# Patient Record
Sex: MALE | Race: BLACK OR AFRICAN AMERICAN | NOT HISPANIC OR LATINO | Employment: OTHER | URBAN - METROPOLITAN AREA
[De-identification: names, ages, dates, MRNs, and addresses within clinical notes are randomized per-mention and may not be internally consistent; named-entity substitution may affect disease eponyms.]

---

## 2017-01-09 ENCOUNTER — OFFICE VISIT (OUTPATIENT)
Dept: FAMILY MEDICINE | Facility: CLINIC | Age: 67
End: 2017-01-09
Payer: MEDICARE

## 2017-01-09 VITALS
HEART RATE: 70 BPM | OXYGEN SATURATION: 97 % | WEIGHT: 205.25 LBS | TEMPERATURE: 98 F | DIASTOLIC BLOOD PRESSURE: 80 MMHG | BODY MASS INDEX: 27.2 KG/M2 | SYSTOLIC BLOOD PRESSURE: 140 MMHG | HEIGHT: 73 IN

## 2017-01-09 DIAGNOSIS — R51.9 SINUS HEADACHE: Primary | ICD-10-CM

## 2017-01-09 PROCEDURE — 99999 PR PBB SHADOW E&M-EST. PATIENT-LVL IV: CPT | Mod: PBBFAC,,, | Performed by: NURSE PRACTITIONER

## 2017-01-09 PROCEDURE — 99213 OFFICE O/P EST LOW 20 MIN: CPT | Mod: S$PBB,,, | Performed by: NURSE PRACTITIONER

## 2017-01-09 PROCEDURE — 99214 OFFICE O/P EST MOD 30 MIN: CPT | Mod: PBBFAC,PO | Performed by: NURSE PRACTITIONER

## 2017-01-09 RX ORDER — BUDESONIDE 0.5 MG/2ML
INHALANT ORAL
COMMUNITY
Start: 2016-12-13 | End: 2017-02-07 | Stop reason: ALTCHOICE

## 2017-01-09 NOTE — PROGRESS NOTES
Patient Name: Rizwan Demarco Jr.    : 1950  MRN: [id number]    Subjective:  Rizwan is a 66 y.o. male who presents today for     1. Severe headache, facial pain  after sneezing in am, has a history of sinus surgery  so this alarm him. He recently recovered from a sinus infection started around Miguel, completed 10 day course of augmentin and has felt well until am of visit. The HA and facial pain has since spontaneously resolved.     Past Medical History  Past Medical History   Diagnosis Date    Allergy     Arthritis      Rheumatoid arthritis    Blood clotting tendency     BPH (benign prostatic hypertrophy)     Cervical spondylosis     Colon polyps     Coronary artery disease     Depression 2015    Dry eyes     Dry mouth     Emphysema of lung     GERD (gastroesophageal reflux disease)     Heart attack     Hyperlipidemia     Hypertension     Lumbar spondylosis     Nasal obstruction     Sinusitis     Special screening for malignant neoplasm of colon 2016       Past Surgical History  Past Surgical History   Procedure Laterality Date    Coronary artery bypass graft      Tonsillectomy      Knee arthroscopy w/ debridement      Excision turbinate, submucous      Nasal septum surgery      Colonoscopy N/A 2016     Procedure: COLONOSCOPY;  Surgeon: Partha Saucedo MD;  Location: [facility];  Service: Endoscopy;  Laterality: N/A;       Family History  Family History   Problem Relation Age of Onset    Hyperlipidemia Mother     Stomach cancer Father     Cataracts Other     No Known Problems Sister     No Known Problems Brother     No Known Problems Maternal Aunt     No Known Problems Maternal Uncle     No Known Problems Paternal Aunt     No Known Problems Paternal Uncle     No Known Problems Maternal Grandmother     No Known Problems Maternal Grandfather     No Known Problems Paternal Grandmother     No Known Problems Paternal Grandfather     Blindness Neg Hx      Cancer Neg Hx     Diabetes Neg Hx     Glaucoma Neg Hx     Rheum arthritis Neg Hx     Psoriasis Neg Hx     Lupus Neg Hx     Amblyopia Neg Hx     Hypertension Neg Hx     Macular degeneration Neg Hx     Retinal detachment Neg Hx     Strabismus Neg Hx     Stroke Neg Hx     Thyroid disease Neg Hx        Social History  Social History     Social History    Marital status: Single     Spouse name: N/A    Number of children: N/A    Years of education: N/A     Occupational History    Not on file.     Social History Main Topics    Smoking status: Former Smoker     Packs/day: 1.00     Years: 20.00     Types: Cigarettes     Quit date: 1/1/1991    Smokeless tobacco: Not on file      Comment: Quit 1991.    Alcohol use No    Drug use: No    Sexual activity: Not on file     Other Topics Concern    Not on file     Social History Narrative       Allergies  Review of patient's allergies indicates:   Allergen Reactions    Astelin [azelastine] Other (See Comments)     Dry mouth    Flomax [tamsulosin]      Nosebleed    -reviewed and updated      Medications  Reviewed and updated.   Current Outpatient Prescriptions   Medication Sig Dispense Refill    alfuzosin (UROXATRAL) 10 mg Tb24 Take 1 tablet (10 mg total) by mouth daily with breakfast. 30 tablet 11    budesonide (PULMICORT) 0.5 mg/2 mL nebulizer solution       BUDESONIDE NASL by Nasal route. Nasal flush      cinnamon bark 500 mg capsule Take 500 mg by mouth once daily.      fluoxetine (PROZAC) 20 MG tablet Take 1 tablet (20 mg total) by mouth once daily. 30 tablet 11    fluticasone (FLONASE) 50 mcg/actuation nasal spray 1 spray by Each Nare route once daily. 16 g 0    lisinopril-hydrochlorothiazide (PRINZIDE,ZESTORETIC) 10-12.5 mg per tablet Take 1 tablet by mouth once daily. 30 tablet 11    multivitamin (THERAGRAN) per tablet Take by mouth.  Tablet Oral Every day      omega-3 fatty acids 1,250 mg Cap Take by mouth.      SOD CHLOR&BICARB/SQUEEZ  "BOTTLE (NEILMED SINUS RINSE COMPLETE NASL) 1 Bottle by Nasal route once daily.      aspirin (ECOTRIN) 81 MG EC tablet Take 1 tablet (81 mg total) by mouth once daily.  0     No current facility-administered medications for this visit.          Review of Systems   Constitutional: Negative for chills and fever.   HENT: Positive for congestion (feel congested, no drainage). Negative for ear pain and sore throat.    Respiratory: Negative for cough.    Cardiovascular: Negative for chest pain.   Neurological: Positive for headaches.         Physical Exam  Visit Vitals    BP (!) 140/80 (BP Location: Left arm, Patient Position: Sitting, BP Method: Manual)    Pulse 70    Temp 97.6 °F (36.4 °C) (Oral)    Ht 6' 1" (1.854 m)    Wt 93.1 kg (205 lb 4 oz)    SpO2 97%    BMI 27.08 kg/m2     Physical Exam   Constitutional: He appears well-developed and well-nourished. No distress.   HENT:   Head: Normocephalic and atraumatic.   Right Ear: Tympanic membrane normal.   Left Ear: Tympanic membrane normal.   Nose: Mucosal edema present. Right sinus exhibits no maxillary sinus tenderness and no frontal sinus tenderness. Left sinus exhibits no maxillary sinus tenderness and no frontal sinus tenderness.   Mouth/Throat: No posterior oropharyngeal edema or posterior oropharyngeal erythema.   Cardiovascular: Normal rate, regular rhythm and normal heart sounds.    Pulmonary/Chest: Effort normal and breath sounds normal.   Skin: He is not diaphoretic.         Assessment/Plan:  Rizwan Demarco Jr. is a 66 y.o. male who presents today for :    Sinus headache   to monitor conservatively for recurrence of symptoms as may just be sinus pressure after sneeze    Return if symptoms worsen or fail to improve.      "

## 2017-01-09 NOTE — PATIENT INSTRUCTIONS
Sinus Headache    The sinuses are air-filled spaces within the bones of the face. They connect to the inside of the nose. Sinusitis is an inflammation of the tissue lining the sinus cavity. Sinus inflammation can occur during a cold or hay fever (allergies to pollens and other particles in the air) and cause symptoms of sinus congestion and fullness and perhaps a low-grade fever. An infection is usually present when there is also facial pain or headache and green or yellow drainage from the nose or into the back of the throat (postnasal drip). Antibiotics are often prescribed to treat this condition.  Sinus headache may cause pain in different places, depending on which sinuses are infected. There may be pain in the temples, forehead, top of the head, behind or around the eye, across the cheekbone, or into the upper teeth.  You may find that changing your position, sitting upright or lying down, will bring some relief.  Home care  The following guidelines will help you care for yourself at home:  1. Drink plenty of water, hot tea, and other liquids to stay well hydrated. This thins the mucus and promotes sinus drainage.  2. Apply heat to the painful areas of the face. Use a towel soaked in hot water. Or,  the shower and direct the hot spray onto your face. This is a good way to inhale warm water vapor and get heat on your face at the same time. (Cover your mouth and nose with your hands so you can still breathe as you do this.)  3. Use a cool mist vaporizer at night or breathe in steam from a hot shower. Suck on peppermint, menthol, or eucalyptus hard candies during the day.  4. An expectorant containing guaifenesin helps thin the mucus and promote drainage from the sinuses.  5. Over-the-counter decongestants may be used unless a similar medicine was prescribed. Nasal sprays work the fastest. Use one that contains phenylephrine or oxymetazoline. First blow the nose gently to remove mucus. Then apply the  drops. Don't use decongestant nasal sprays more often than the label says or for more than 3 days. This can make symptoms worse. Nasal sprays prescribed from your doctor typically do not have these restrictions. Check with your doctor or pharmacist. You may also use tablets containing pseudoephedrine. Many sinus remedies combine ingredients, which may increase side effects. Also, if you are taking a combination medicine with another medicine, be sure you are not taking a double dose of anything by mistake. Read the labels or ask the pharmacist for help. [NOTE: Those with high blood pressure should not use decongestants. They can raise blood pressure.]  6. Antihistamines are useful if allergies are a cause of your sinusitis. You can get chlorpheniramine and diphenhydramine over the counter, but these can cause drowsiness. [NOTE: Don't use these if you have glaucoma or if you are a man with trouble urinating due to an enlarged prostate.] Over-the-counter antihistamines containing loratidine and cetirizine cause less drowsiness and are a good choice for daytime use.  7. When allergies are the cause for sinusitis, a saline nasal rinse may give relief. Saline nasal rinse reduces swelling and clears excess mucus. This allows sinuses to drain. Prepackaged kits are available at [facility]. These contain premixed salt packets and an irrigation device. If antibiotics have been prescribed to treat an acute sinus infection, talk to your doctor before using a nasal rinse to be sure it is safe for you.  8. You may use acetaminophen or ibuprofen to control pain, unless another pain medicine was prescribed. [NOTE: If you have chronic liver or kidney disease or ever had a stomach ulcer, talk with your doctor before using these medicines.] (Aspirin should never be used in anyone under 18 years of age who has a fever. It may cause severe liver damage.)  9. If antibiotics were given, finish all of them, even if you are feeling  better after a few days.  Follow-up care  Follow up with your doctor or this facility in 1 week or as instructed by our staff if not improving.  When to seek medical care  Get prompt medical attention if any of the following occur:  · Facial pain or headache becomes more severe  · Stiff neck  · Unusual drowsiness or confusion  · Swelling of the forehead or eyelids  · Vision problems including blurred or double vision  · Fever over 100.4º F (38.0º C) oral for more than 3 days on antibiotics  · Bleeding from the nose or throat  · Seizure  © 9500-5304 [employer]. [address] [place] [address]. All rights reserved. This information is not intended as a substitute for professional medical care. Always follow your healthcare professional's instructions.

## 2017-01-09 NOTE — MR AVS SNAPSHOT
[facility]  [address]  [address]  [place] [address]  Phone: [phone]  Fax: [phone]                  Rizwan Demarco Jr.   2017 11:20 AM   Office Visit    Description:  Male : 1950   Provider:  Divine Aquino NP   Department:  [facility]           Reason for Visit     Sinus Problem           Diagnoses this Visit        Comments    Sinus headache    -  Primary            To Do List           Goals (5 Years of Data)     None      Follow-Up and Disposition     Return if symptoms worsen or fail to improve.      [facility]     [facility] -  Assistance  Registered nurses in the [facility] provide clinical advisement, health education, appointment booking, and other advisory services.  Call for this free service at [phone].             Medications           Message regarding Medications     Verify the changes and/or additions to your medication regime listed below are the same as discussed with your clinician today.  If any of these changes or additions are incorrect, please notify your healthcare provider.        STOP taking these medications     papaverine 30 mg/mL injection Add: Phentolamine 10 mg  Add: PGE1 100 mcg    Sig:  Inject 35 units (0.35 mls) as directed    HYPROMELLOSE (SYSTANE GEL OPHT) Apply to eye.    gabapentin (NEURONTIN) 100 MG capsule Take 1-3 capsules (100-300 mg total) by mouth every evening.    alclomethasone (ACLOVATE) 0.05 % ointment Apply topically 2 (two) times daily.    polyethylene glycol (COLYTE) 240-22.72-6.72 -5.84 gram SolR Take 4,000 mLs (4 L total) by mouth as directed.           Verify that the below list of medications is an accurate representation of the medications you are currently taking.  If none reported, the list may be blank. If incorrect, please contact your healthcare provider. Carry this list with you in case of emergency.           Current Medications  "    alfuzosin (UROXATRAL) 10 mg Tb24 Take 1 tablet (10 mg total) by mouth daily with breakfast.    budesonide (PULMICORT) 0.5 mg/2 mL nebulizer solution     BUDESONIDE NASL by Nasal route. Nasal flush    cinnamon bark 500 mg capsule Take 500 mg by mouth once daily.    fluoxetine (PROZAC) 20 MG tablet Take 1 tablet (20 mg total) by mouth once daily.    fluticasone (FLONASE) 50 mcg/actuation nasal spray 1 spray by Each Nare route once daily.    lisinopril-hydrochlorothiazide (PRINZIDE,ZESTORETIC) 10-12.5 mg per tablet Take 1 tablet by mouth once daily.    multivitamin (THERAGRAN) per tablet Take by mouth.  Tablet Oral Every day    omega-3 fatty acids 1,250 mg Cap Take by mouth.    SOD CHLOR&BICARB/SQUEEZ BOTTLE (NEILMED SINUS RINSE COMPLETE NASL) 1 Bottle by Nasal route once daily.    aspirin (ECOTRIN) 81 MG EC tablet Take 1 tablet (81 mg total) by mouth once daily.           Clinical Reference Information           Vital Signs - Last Recorded  Most recent update: 1/9/2017 11:22 AM by Basilio Longoria MA    BP Pulse Temp Ht Wt SpO2    (!) 140/80 (BP Location: Left arm, Patient Position: Sitting, BP Method: Manual) 70 97.6 °F (36.4 °C) (Oral) 6' 1" (1.854 m) 93.1 kg (205 lb 4 oz) 97%    BMI                27.08 kg/m2          Blood Pressure          Most Recent Value    BP  (!)  140/80      Allergies as of 1/9/2017     Astelin [Azelastine]    Flomax [Tamsulosin]      Immunizations Administered on Date of Encounter - 1/9/2017     None      Instructions      Sinus Headache    The sinuses are air-filled spaces within the bones of the face. They connect to the inside of the nose. Sinusitis is an inflammation of the tissue lining the sinus cavity. Sinus inflammation can occur during a cold or hay fever (allergies to pollens and other particles in the air) and cause symptoms of sinus congestion and fullness and perhaps a low-grade fever. An infection is usually present when there is also facial pain or headache and green or " yellow drainage from the nose or into the back of the throat (postnasal drip). Antibiotics are often prescribed to treat this condition.  Sinus headache may cause pain in different places, depending on which sinuses are infected. There may be pain in the temples, forehead, top of the head, behind or around the eye, across the cheekbone, or into the upper teeth.  You may find that changing your position, sitting upright or lying down, will bring some relief.  Home care  The following guidelines will help you care for yourself at home:  1. Drink plenty of water, hot tea, and other liquids to stay well hydrated. This thins the mucus and promotes sinus drainage.  2. Apply heat to the painful areas of the face. Use a towel soaked in hot water. Or,  the shower and direct the hot spray onto your face. This is a good way to inhale warm water vapor and get heat on your face at the same time. (Cover your mouth and nose with your hands so you can still breathe as you do this.)  3. Use a cool mist vaporizer at night or breathe in steam from a hot shower. Suck on peppermint, menthol, or eucalyptus hard candies during the day.  4. An expectorant containing guaifenesin helps thin the mucus and promote drainage from the sinuses.  5. Over-the-counter decongestants may be used unless a similar medicine was prescribed. Nasal sprays work the fastest. Use one that contains phenylephrine or oxymetazoline. First blow the nose gently to remove mucus. Then apply the drops. Don't use decongestant nasal sprays more often than the label says or for more than 3 days. This can make symptoms worse. Nasal sprays prescribed from your doctor typically do not have these restrictions. Check with your doctor or pharmacist. You may also use tablets containing pseudoephedrine. Many sinus remedies combine ingredients, which may increase side effects. Also, if you are taking a combination medicine with another medicine, be sure you are not taking a  double dose of anything by mistake. Read the labels or ask the pharmacist for help. [NOTE: Those with high blood pressure should not use decongestants. They can raise blood pressure.]  6. Antihistamines are useful if allergies are a cause of your sinusitis. You can get chlorpheniramine and diphenhydramine over the counter, but these can cause drowsiness. [NOTE: Don't use these if you have glaucoma or if you are a man with trouble urinating due to an enlarged prostate.] Over-the-counter antihistamines containing loratidine and cetirizine cause less drowsiness and are a good choice for daytime use.  7. When allergies are the cause for sinusitis, a saline nasal rinse may give relief. Saline nasal rinse reduces swelling and clears excess mucus. This allows sinuses to drain. Prepackaged kits are available at most drugstores. These contain premixed salt packets and an irrigation device. If antibiotics have been prescribed to treat an acute sinus infection, talk to your doctor before using a nasal rinse to be sure it is safe for you.  8. You may use acetaminophen or ibuprofen to control pain, unless another pain medicine was prescribed. [NOTE: If you have chronic liver or kidney disease or ever had a stomach ulcer, talk with your doctor before using these medicines.] (Aspirin should never be used in anyone under 18 years of age who has a fever. It may cause severe liver damage.)  9. If antibiotics were given, finish all of them, even if you are feeling better after a few days.  Follow-up care  Follow up with your doctor or this facility in 1 week or as instructed by our staff if not improving.  When to seek medical care  Get prompt medical attention if any of the following occur:  · Facial pain or headache becomes more severe  · Stiff neck  · Unusual drowsiness or confusion  · Swelling of the forehead or eyelids  · Vision problems including blurred or double vision  · Fever over 100.4º F (38.0º C) oral for more than 3 days  on antibiotics  · Bleeding from the nose or throat  · Seizure  © 9426-9960 [employer]. [address] [place] [address], [place] [address]. All rights reserved. This information is not intended as a substitute for professional medical care. Always follow your healthcare professional's instructions.

## 2017-02-07 ENCOUNTER — OFFICE VISIT (OUTPATIENT)
Dept: FAMILY MEDICINE | Facility: CLINIC | Age: 67
End: 2017-02-07
Payer: MEDICARE

## 2017-02-07 VITALS
HEART RATE: 66 BPM | OXYGEN SATURATION: 97 % | BODY MASS INDEX: 26.97 KG/M2 | WEIGHT: 203.5 LBS | HEIGHT: 73 IN | TEMPERATURE: 99 F | DIASTOLIC BLOOD PRESSURE: 70 MMHG | SYSTOLIC BLOOD PRESSURE: 118 MMHG

## 2017-02-07 DIAGNOSIS — I25.10 CORONARY ARTERY DISEASE DUE TO CALCIFIED CORONARY LESION: Chronic | ICD-10-CM

## 2017-02-07 DIAGNOSIS — E78.5 HYPERLIPIDEMIA, UNSPECIFIED HYPERLIPIDEMIA TYPE: Chronic | ICD-10-CM

## 2017-02-07 DIAGNOSIS — I25.84 CORONARY ARTERY DISEASE DUE TO CALCIFIED CORONARY LESION: Chronic | ICD-10-CM

## 2017-02-07 DIAGNOSIS — J32.9 SINUSITIS, UNSPECIFIED CHRONICITY, UNSPECIFIED LOCATION: Primary | ICD-10-CM

## 2017-02-07 DIAGNOSIS — I10 ESSENTIAL HYPERTENSION: Chronic | ICD-10-CM

## 2017-02-07 PROCEDURE — 99214 OFFICE O/P EST MOD 30 MIN: CPT | Mod: S$PBB,,, | Performed by: NURSE PRACTITIONER

## 2017-02-07 PROCEDURE — 99999 PR PBB SHADOW E&M-EST. PATIENT-LVL IV: CPT | Mod: PBBFAC,,, | Performed by: NURSE PRACTITIONER

## 2017-02-07 PROCEDURE — 99214 OFFICE O/P EST MOD 30 MIN: CPT | Mod: PBBFAC,PO | Performed by: NURSE PRACTITIONER

## 2017-02-07 RX ORDER — FLUOXETINE HYDROCHLORIDE 20 MG/1
20 CAPSULE ORAL DAILY
COMMUNITY
Start: 2017-02-06 | End: 2017-05-08 | Stop reason: SDUPTHER

## 2017-02-07 RX ORDER — FLUTICASONE PROPIONATE 50 MCG
1 SPRAY, SUSPENSION (ML) NASAL DAILY
Qty: 1 BOTTLE | Refills: 2 | Status: SHIPPED | OUTPATIENT
Start: 2017-02-07 | End: 2017-05-08 | Stop reason: SDUPTHER

## 2017-02-07 RX ORDER — AZITHROMYCIN 250 MG/1
TABLET, FILM COATED ORAL
Qty: 6 TABLET | Refills: 0 | Status: SHIPPED | OUTPATIENT
Start: 2017-02-07 | End: 2017-05-10 | Stop reason: ALTCHOICE

## 2017-02-07 NOTE — PROGRESS NOTES
Subjective:       Patient ID: Rizwan Demarco Jr. is a 66 y.o. male.    Chief Complaint: Nasal Congestion and Sinus Problem    HPI Comments: 66-year-old male presents to the clinic today with complaint of sinus congestion, sore throat, pressure to face for the past couple of days.  He states he is unable to get a nasal rinse to go through.  He has been using Afrin for a few days.  He denies any fever, chills, ear pain, coughing, wheezing, shortness of breath, abdominal pain, nausea, vomiting, or diarrhea.  He denies any cardiac chest pain, heart palpitations, shortness of breath, or swelling to lower extremities.      Past Medical History   Diagnosis Date    Allergy     Arthritis      Rheumatoid arthritis    Blood clotting tendency     BPH (benign prostatic hypertrophy)     Cervical spondylosis     Colon polyps     Coronary artery disease     Depression 5/8/2015    Dry eyes     Dry mouth     Emphysema of lung     GERD (gastroesophageal reflux disease)     Heart attack     Hyperlipidemia     Hypertension     Lumbar spondylosis     Nasal obstruction     Sinusitis     Special screening for malignant neoplasm of colon 6/29/2016     Past Surgical History   Procedure Laterality Date    Coronary artery bypass graft      Tonsillectomy      Knee arthroscopy w/ debridement      Excision turbinate, submucous      Nasal septum surgery      Colonoscopy N/A 6/29/2016     Procedure: COLONOSCOPY;  Surgeon: Partha Saucedo MD;  Location: [facility];  Service: Endoscopy;  Laterality: N/A;      reports that he quit smoking about 26 years ago. His smoking use included Cigarettes. He has a 20.00 pack-year smoking history. He does not have any smokeless tobacco history on file. He reports that he does not drink alcohol or use illicit drugs.  Review of Systems   Constitutional: Negative for chills and fever.   HENT: Positive for congestion and sinus pressure. Negative for ear discharge, ear pain, postnasal  drip, rhinorrhea, sneezing and sore throat.    Eyes: Negative for pain, discharge and itching.   Respiratory: Negative for cough, shortness of breath and wheezing.    Cardiovascular: Negative for chest pain, palpitations and leg swelling.   Gastrointestinal: Negative for abdominal pain, diarrhea, nausea and vomiting.   Musculoskeletal: Negative for gait problem.   Neurological: Negative for dizziness, light-headedness and headaches.       Objective:      Physical Exam   Constitutional: He is oriented to person, place, and time. He appears well-developed and well-nourished. No distress.   HENT:   Head: Normocephalic and atraumatic.   Right Ear: External ear normal.   Left Ear: External ear normal.   Mouth/Throat: Oropharynx is clear and moist. No oropharyngeal exudate.   Both nares red and inflamed with tenderness noted over both maxillary sinuses    Eyes: Conjunctivae and EOM are normal. Pupils are equal, round, and reactive to light. Right eye exhibits no discharge. Left eye exhibits no discharge. No scleral icterus.   Neck: Normal range of motion. Neck supple.   Cardiovascular: Normal rate, regular rhythm and normal heart sounds.  Exam reveals no gallop and no friction rub.    No murmur heard.  Pulmonary/Chest: Effort normal and breath sounds normal. No respiratory distress. He has no wheezes. He has no rales.   Abdominal: Soft. Bowel sounds are normal. There is no tenderness.   Musculoskeletal: Normal range of motion. He exhibits no edema.   Lymphadenopathy:     He has no cervical adenopathy.   Neurological: He is alert and oriented to person, place, and time.   Skin: Skin is warm and dry. He is not diaphoretic.   Psychiatric: He has a normal mood and affect.       Assessment:       1. Sinusitis, unspecified chronicity, unspecified location    2. Coronary artery disease due to calcified coronary lesion    3. Essential hypertension    4. Hyperlipidemia, unspecified hyperlipidemia type        Plan:          Sinusitis, unspecified chronicity, unspecified location  -     azithromycin (ZITHROMAX Z-RAMANDEEP) 250 MG tablet; Take 2 pills day 1, then 1 pill day 2-5.  Dispense: 6 tablet; Refill: 0  -     fluticasone (FLONASE) 50 mcg/actuation nasal spray; 1 spray by Each Nare route once daily.  Dispense: 1 Bottle; Refill: 2  Plain Mucinex     Stop Afrin     Coronary artery disease due to calcified coronary lesion  - The current medical regimen is effective;  continue present plan and medications.    Essential hypertension  - The current medical regimen is effective;  continue present plan and medications.    Hyperlipidemia, unspecified hyperlipidemia type  - The current medical regimen is effective;  continue present plan and medications.

## 2017-02-07 NOTE — MR AVS SNAPSHOT
[facility]  [address]  [place] [address]  Phone: [phone]  Fax: [phone]                  Rizwan Demarco Jr.   2017 4:00 PM   Office Visit    Description:  Male : 1950   Provider:  AYLA Duvall   Department:  [facility]           Reason for Visit     Nasal Congestion     Sinus Problem           Diagnoses this Visit        Comments    Sinusitis, unspecified chronicity, unspecified location    -  Primary     Coronary artery disease due to calcified coronary lesion         Essential hypertension         Hyperlipidemia, unspecified hyperlipidemia type                To Do List           Goals (5 Years of Data)     None       These Medications        Disp Refills Start End    azithromycin (ZITHROMAX Z-RAMANDEEP) 250 MG tablet 6 tablet 0 2017     Take 2 pills day 1, then 1 pill day 2-5.    Pharmacy: [facility] [address] [place] [address] [address] Ph #: [phone]       fluticasone (FLONASE) 50 mcg/actuation nasal spray 1 Bottle 2 2017     1 spray by Each Nare route once daily. - Each Nare    Pharmacy: [facility] [address] [place] [address] [address] Ph #: [phone]         [facility]     [facility] Nurse Care [facility] -  Assistance  Registered nurses in the [facility] Center provide clinical advisement, health education, appointment booking, and other advisory services.  Call for this free service at [phone].             Medications           Message regarding Medications     Verify the changes and/or additions to your medication regime listed below are the same as discussed with your clinician today.  If any of these changes or additions are incorrect, please notify your healthcare provider.        START taking these NEW medications        Refills    azithromycin (ZITHROMAX Z-RAMANDEEP) 250 MG tablet 0    Sig: Take 2 pills day 1, then 1 pill day 2-5.    Class: Normal    fluticasone (FLONASE)  "50 mcg/actuation nasal spray 2    Si spray by Each Nare route once daily.    Class: Normal    Route: Each Nare      STOP taking these medications     fluoxetine (PROZAC) 20 MG tablet Take 1 tablet (20 mg total) by mouth once daily.    budesonide (PULMICORT) 0.5 mg/2 mL nebulizer solution            Verify that the below list of medications is an accurate representation of the medications you are currently taking.  If none reported, the list may be blank. If incorrect, please contact your healthcare provider. Carry this list with you in case of emergency.           Current Medications     alfuzosin (UROXATRAL) 10 mg Tb24 Take 1 tablet (10 mg total) by mouth daily with breakfast.    BUDESONIDE NASL by Nasal route. Nasal flush    cinnamon bark 500 mg capsule Take 500 mg by mouth once daily.    fluoxetine (PROZAC) 20 MG capsule Take 20 mg by mouth once daily.     lisinopril-hydrochlorothiazide (PRINZIDE,ZESTORETIC) 10-12.5 mg per tablet Take 1 tablet by mouth once daily.    multivitamin (THERAGRAN) per tablet Take by mouth.  Tablet Oral Every day    omega-3 fatty acids 1,250 mg Cap Take by mouth.    SOD CHLOR&BICARB/SQUEEZ BOTTLE (NEILMED SINUS RINSE COMPLETE NASL) 1 Bottle by Nasal route once daily.    aspirin (ECOTRIN) 81 MG EC tablet Take 1 tablet (81 mg total) by mouth once daily.    azithromycin (ZITHROMAX Z-RAMANDEEP) 250 MG tablet Take 2 pills day 1, then 1 pill day 2-5.    fluticasone (FLONASE) 50 mcg/actuation nasal spray 1 spray by Each Nare route once daily.           Clinical Reference Information           Your Vitals Were     BP Pulse Temp Height Weight SpO2    118/70 (BP Location: Left arm, Patient Position: Sitting, BP Method: Manual) 66 98.6 °F (37 °C) (Oral) 6' 1" (1.854 m) 92.3 kg (203 lb 7.8 oz) 97%    BMI                26.85 kg/m2          Blood Pressure          Most Recent Value    BP  118/70      Allergies as of 2017     Astelin [Azelastine]    Flomax [Tamsulosin]      Immunizations " Administered on Date of Encounter - 2/7/2017     None      Instructions    Mucinex as directed        Language Assistance Services     ATTENTION: Language assistance services are available, free of charge. Please call [phone].      ATENCIÓN: Si shaheen grier, tiene a randall disposición servicios gratuitos de asistencia lingüística. Llame al [phone].     CHÚ Ý: N?u b?n nói Ti?ng Vi?t, có các d?ch v? h? tr? ngôn ng? mi?n phí dành cho b?n. G?i s? [phone].         [facility] complies with applicable Federal civil rights laws and does not discriminate on the basis of race, color, national origin, age, disability, or sex.

## 2017-03-13 ENCOUNTER — OFFICE VISIT (OUTPATIENT)
Dept: OTOLARYNGOLOGY | Facility: CLINIC | Age: 67
End: 2017-03-13
Payer: MEDICARE

## 2017-03-13 DIAGNOSIS — Z98.890 S/P FESS (FUNCTIONAL ENDOSCOPIC SINUS SURGERY): Primary | ICD-10-CM

## 2017-03-13 PROCEDURE — 99212 OFFICE O/P EST SF 10 MIN: CPT | Mod: PBBFAC,25 | Performed by: OTOLARYNGOLOGY

## 2017-03-13 PROCEDURE — 31231 NASAL ENDOSCOPY DX: CPT | Mod: PBBFAC | Performed by: OTOLARYNGOLOGY

## 2017-03-13 PROCEDURE — 99213 OFFICE O/P EST LOW 20 MIN: CPT | Mod: 25,S$PBB,, | Performed by: OTOLARYNGOLOGY

## 2017-03-13 PROCEDURE — 99999 PR PBB SHADOW E&M-EST. PATIENT-LVL II: CPT | Mod: PBBFAC,,, | Performed by: OTOLARYNGOLOGY

## 2017-03-13 PROCEDURE — 31231 NASAL ENDOSCOPY DX: CPT | Mod: S$PBB,,, | Performed by: OTOLARYNGOLOGY

## 2017-03-14 NOTE — PROGRESS NOTES
Subjective:       Patient ID: Rizwan Demarco Jr. is a 66 y.o. male.    Chief Complaint: Follow-up and Sinusitis    HPI   65 y/o male presents with a history of sinus surgery (FESS) in 09/18/ 2015 and a 3 month history of sinusitis and sinus headaches. He reports no improvement with prescribed  Augmentin, promethazine-dextromethorphan (PROMETHAZINE-DM), and Flonase. Nothing aggravates or alleviates his symptoms. He presents today with c/o several month history of left sided nasal congestion. He denies rhinorrhea, hyposmia/anosmia, postnasal drip, facial pressure/headache, cough.     Past Medical History: Patient has a past medical history of Allergy; Arthritis; Blood clotting tendency; BPH (benign prostatic hypertrophy); Cervical spondylosis; Colon polyps; Coronary artery disease; Depression (5/8/2015); Dry eyes; Dry mouth; Emphysema of lung; GERD (gastroesophageal reflux disease); Heart attack; Hyperlipidemia; Hypertension; Lumbar spondylosis; Nasal obstruction; Sinusitis; and Special screening for malignant neoplasm of colon (6/29/2016).    Past Surgical History: Patient has a past surgical history that includes Coronary artery bypass graft; Tonsillectomy; Knee arthroscopy w/ debridement; Excision turbinate, submucous; Nasal septum surgery; and Colonoscopy (N/A, 6/29/2016).    Social History: Patient reports that he quit smoking about 26 years ago. His smoking use included Cigarettes. He has a 20.00 pack-year smoking history. He does not have any smokeless tobacco history on file. He reports that he does not drink alcohol or use illicit drugs.    Family History: family history includes Cataracts in his other; Hyperlipidemia in his mother; No Known Problems in his brother, maternal aunt, maternal grandfather, maternal grandmother, maternal uncle, paternal aunt, paternal grandfather, paternal grandmother, paternal uncle, and sister; Stomach cancer in his father. There is no history of Blindness, Cancer, Diabetes,  Glaucoma, Rheum arthritis, Psoriasis, Lupus, Amblyopia, Hypertension, Macular degeneration, Retinal detachment, Strabismus, Stroke, or Thyroid disease.    Medications:   Current Outpatient Prescriptions   Medication Sig    alfuzosin (UROXATRAL) 10 mg Tb24 Take 1 tablet (10 mg total) by mouth daily with breakfast.    azithromycin (ZITHROMAX Z-RAMANDEEP) 250 MG tablet Take 2 pills day 1, then 1 pill day 2-5.    BUDESONIDE NASL by Nasal route. Nasal flush    cinnamon bark 500 mg capsule Take 500 mg by mouth once daily.    fluoxetine (PROZAC) 20 MG capsule Take 20 mg by mouth once daily.     fluticasone (FLONASE) 50 mcg/actuation nasal spray 1 spray by Each Nare route once daily.    lisinopril-hydrochlorothiazide (PRINZIDE,ZESTORETIC) 10-12.5 mg per tablet Take 1 tablet by mouth once daily.    multivitamin (THERAGRAN) per tablet Take by mouth.  Tablet Oral Every day    omega-3 fatty acids 1,250 mg Cap Take by mouth.    SOD CHLOR&BICARB/SQUEEZ BOTTLE (NEILMED SINUS RINSE COMPLETE NASL) 1 Bottle by Nasal route once daily.    aspirin (ECOTRIN) 81 MG EC tablet Take 1 tablet (81 mg total) by mouth once daily.     No current facility-administered medications for this visit.        Allergies: Patient is allergic to astelin [azelastine] and flomax [tamsulosin].    Review of Systems   Constitutional: Negative for activity change, fatigue, fever and unexpected weight change.   HENT: Negative for dental problem, ear discharge, facial swelling, hearing loss, mouth sores, nosebleeds, postnasal drip, rhinorrhea, tinnitus, trouble swallowing and voice change.         C/o left nasal obstruction.   Eyes: Negative for pain and visual disturbance.   Respiratory: Negative for choking, chest tightness, wheezing and stridor.    Cardiovascular: Negative for chest pain.   Gastrointestinal: Negative for nausea and vomiting.   Musculoskeletal: Negative for gait problem and neck stiffness.   Skin: Negative for color change, rash and wound.    Allergic/Immunologic: Negative for environmental allergies.   Neurological: Negative for dizziness, seizures, syncope, facial asymmetry, speech difficulty, weakness, light-headedness and numbness.   Psychiatric/Behavioral: Negative for agitation, confusion and decreased concentration. The patient is not nervous/anxious.        Objective:       There were no vitals taken for this visit.      Constitutional:   Vital signs are normal. He appears well-developed and well-nourished. Normal speech.      Head:  Normocephalic.     Ears:  Hearing normal to normal and whispered voice; external ear normal without scars, lesions, or masses; ear canal, tympanic membrane, and middle ear normal..     Nose:  No rhinorrhea, nose lacerations, sinus tenderness, septal deviation, nasal septal hematoma or polyps. No epistaxis.  No foreign bodies. Turbinate hypertrophy.  Right sinus exhibits no maxillary sinus tenderness and no frontal sinus tenderness. Left sinus exhibits no maxillary sinus tenderness and no frontal sinus tenderness.     Neck:  Neck normal without thyromegaly masses, asymmetry, normal tracheal structure, crepitus, and tenderness, thyroid normal, trachea normal, phonation normal and full range of motion with neck supple.     Pulmonary/Chest:   Effort normal.     Psychiatric:   He has a normal mood and affect. His speech is normal and behavior is normal.       Assessment:       1. S/P FESS (functional endoscopic sinus surgery)        Plan:       Resume Steroid Sinus Rinses.  RTC prn or sooner if symptoms worsen.

## 2017-03-15 NOTE — PROGRESS NOTES
I have personally taken the history and examined this patient and agree with the NP's note as stated below with the following exceptions:  Exam with scope # [id number] reveal his sinus ostea to be open and well healed.  No evidence of infection or polyps.  Very mild right sided inflammation of ethmoid mucosa.  Imp: Resolved sinusitis.  Plan: Resume steroid sinus rinses.  RTC prn

## 2017-05-08 DIAGNOSIS — J32.9 SINUSITIS, UNSPECIFIED CHRONICITY, UNSPECIFIED LOCATION: ICD-10-CM

## 2017-05-08 DIAGNOSIS — I10 ESSENTIAL HYPERTENSION: ICD-10-CM

## 2017-05-08 RX ORDER — LISINOPRIL AND HYDROCHLOROTHIAZIDE 10; 12.5 MG/1; MG/1
1 TABLET ORAL DAILY
Qty: 30 TABLET | Refills: 2 | Status: SHIPPED | OUTPATIENT
Start: 2017-05-08 | End: 2017-07-31 | Stop reason: SDUPTHER

## 2017-05-08 RX ORDER — FLUTICASONE PROPIONATE 50 MCG
1 SPRAY, SUSPENSION (ML) NASAL DAILY
Qty: 1 BOTTLE | Refills: 11 | Status: SHIPPED | OUTPATIENT
Start: 2017-05-08 | End: 2017-12-06

## 2017-05-08 RX ORDER — ALFUZOSIN HYDROCHLORIDE 10 MG/1
10 TABLET, EXTENDED RELEASE ORAL
Qty: 30 TABLET | Refills: 5 | Status: SHIPPED | OUTPATIENT
Start: 2017-05-08 | End: 2018-07-09 | Stop reason: SDUPTHER

## 2017-05-08 RX ORDER — FLUOXETINE HYDROCHLORIDE 20 MG/1
20 CAPSULE ORAL DAILY
Qty: 30 CAPSULE | Refills: 5 | Status: SHIPPED | OUTPATIENT
Start: 2017-05-08 | End: 2019-02-19

## 2017-05-08 NOTE — TELEPHONE ENCOUNTER
----- Message from Flora Herrera sent at 5/8/2017  7:37 AM CDT -----  Contact: self  fluoxetine (PROZAC) 20 MG capsule  fluticasone (FLONASE) 50 mcg/actuation nasal spray  lisinopril-hydrochlorothiazide (PRINZIDE,ZESTORETIC) 10-12.5 mg per tablet  alfuzosin (UROXATRAL) 10 mg Tb24    Pt calling to request a refill of the above to be sent to [facility]. Please call [phone].

## 2017-05-10 ENCOUNTER — OFFICE VISIT (OUTPATIENT)
Dept: FAMILY MEDICINE | Facility: CLINIC | Age: 67
End: 2017-05-10
Payer: MEDICARE

## 2017-05-10 VITALS
HEART RATE: 60 BPM | BODY MASS INDEX: 27.09 KG/M2 | TEMPERATURE: 97 F | WEIGHT: 204.38 LBS | SYSTOLIC BLOOD PRESSURE: 102 MMHG | OXYGEN SATURATION: 99 % | DIASTOLIC BLOOD PRESSURE: 64 MMHG | HEIGHT: 73 IN

## 2017-05-10 DIAGNOSIS — J34.89 NASAL OBSTRUCTION: ICD-10-CM

## 2017-05-10 DIAGNOSIS — M35.01 SJOGREN'S SYNDROME WITH KERATOCONJUNCTIVITIS SICCA: ICD-10-CM

## 2017-05-10 DIAGNOSIS — J30.89 PERENNIAL ALLERGIC RHINITIS: ICD-10-CM

## 2017-05-10 DIAGNOSIS — M25.50 ARTHRALGIA, UNSPECIFIED JOINT: ICD-10-CM

## 2017-05-10 DIAGNOSIS — Z23 NEED FOR ZOSTAVAX ADMINISTRATION: ICD-10-CM

## 2017-05-10 DIAGNOSIS — E78.5 HYPERLIPIDEMIA, UNSPECIFIED HYPERLIPIDEMIA TYPE: Chronic | ICD-10-CM

## 2017-05-10 DIAGNOSIS — F51.01 PRIMARY INSOMNIA: ICD-10-CM

## 2017-05-10 DIAGNOSIS — I10 ESSENTIAL HYPERTENSION: Chronic | ICD-10-CM

## 2017-05-10 DIAGNOSIS — F32.A DEPRESSION, UNSPECIFIED DEPRESSION TYPE: Chronic | ICD-10-CM

## 2017-05-10 DIAGNOSIS — Z23 NEED FOR PNEUMOCOCCAL VACCINATION: Primary | ICD-10-CM

## 2017-05-10 PROCEDURE — G0009 ADMIN PNEUMOCOCCAL VACCINE: HCPCS | Mod: PBBFAC

## 2017-05-10 PROCEDURE — 90670 PCV13 VACCINE IM: CPT | Mod: PBBFAC,PO | Performed by: FAMILY MEDICINE

## 2017-05-10 PROCEDURE — 99999 PR PBB SHADOW E&M-EST. PATIENT-LVL III: CPT | Mod: PBBFAC,,, | Performed by: FAMILY MEDICINE

## 2017-05-10 PROCEDURE — 90670 PCV13 VACCINE IM: CPT | Mod: S$PBB,,, | Performed by: FAMILY MEDICINE

## 2017-05-10 PROCEDURE — G0009 ADMIN PNEUMOCOCCAL VACCINE: HCPCS | Mod: S$PBB,,, | Performed by: FAMILY MEDICINE

## 2017-05-10 PROCEDURE — 99215 OFFICE O/P EST HI 40 MIN: CPT | Mod: S$PBB,,, | Performed by: FAMILY MEDICINE

## 2017-05-10 PROCEDURE — 99213 OFFICE O/P EST LOW 20 MIN: CPT | Mod: PBBFAC,PO | Performed by: FAMILY MEDICINE

## 2017-05-10 RX ORDER — BUDESONIDE 0.5 MG/2ML
INHALANT ORAL
COMMUNITY
Start: 2017-04-04 | End: 2017-10-04 | Stop reason: SDUPTHER

## 2017-05-10 RX ORDER — IPRATROPIUM BROMIDE 42 UG/1
2 SPRAY, METERED NASAL 4 TIMES DAILY
Qty: 15 ML | Refills: 1 | Status: SHIPPED | OUTPATIENT
Start: 2017-05-10 | End: 2021-05-05

## 2017-05-10 RX ORDER — TIZANIDINE 4 MG/1
4-8 TABLET ORAL EVERY 6 HOURS PRN
Qty: 60 TABLET | Refills: 0 | Status: SHIPPED | OUTPATIENT
Start: 2017-05-10 | End: 2017-06-09

## 2017-05-10 RX ORDER — TIZANIDINE 4 MG/1
4-8 TABLET ORAL EVERY 6 HOURS PRN
Qty: 60 TABLET | Refills: 0 | Status: SHIPPED | OUTPATIENT
Start: 2017-05-10 | End: 2017-05-10 | Stop reason: SDUPTHER

## 2017-05-10 RX ORDER — IPRATROPIUM BROMIDE 42 UG/1
2 SPRAY, METERED NASAL 4 TIMES DAILY
Qty: 15 ML | Refills: 1 | Status: SHIPPED | OUTPATIENT
Start: 2017-05-10 | End: 2017-05-10 | Stop reason: SDUPTHER

## 2017-05-10 NOTE — PROGRESS NOTES
Chief Complaint   Patient presents with    Annual Exam       SUBJECTIVE:  Rizwan Demarco Jr. is a 66 y.o. male here for new problem of reviewing his conditions, he is very skeptical of medical care in light of Anabaptist beliefs and his history is very bizarre at times and contradictory.  However he does state he is feeling better, but the sinus issues and the sleep problems are still present, urinating well.  Currently has co-morbidities including per problem list.      Past Medical History:   Diagnosis Date    Allergy     Arthritis     Rheumatoid arthritis    Blood clotting tendency     BPH (benign prostatic hypertrophy)     Cervical spondylosis     Colon polyps     Coronary artery disease     Depression 5/8/2015    Dry eyes     Dry mouth     Emphysema of lung     GERD (gastroesophageal reflux disease)     Heart attack     Hyperlipidemia     Hypertension     Lumbar spondylosis     Nasal obstruction     Sinusitis     Special screening for malignant neoplasm of colon 6/29/2016     Past Surgical History:   Procedure Laterality Date    COLONOSCOPY N/A 6/29/2016    Procedure: COLONOSCOPY;  Surgeon: Partha Saucedo MD;  [facility];  Service: Endoscopy;  Laterality: N/A;    CORONARY ARTERY BYPASS GRAFT      EXCISION TURBINATE, SUBMUCOUS      KNEE ARTHROSCOPY W/ DEBRIDEMENT      NASAL SEPTUM SURGERY      TONSILLECTOMY       Social History     Social History    Marital status: Single     Spouse name: N/A    Number of children: N/A    Years of education: N/A     Occupational History    Not on file.     Social History Main Topics    Smoking status: Former Smoker     Packs/day: 1.00     Years: 20.00     Types: Cigarettes     Quit date: 1/1/1991    Smokeless tobacco: Not on file      Comment: Quit 1991.    Alcohol use No    Drug use: No    Sexual activity: Not on file     Other Topics Concern    Not on file     Social History Narrative     Family History   Problem Relation Age  of Onset    Hyperlipidemia Mother     Stomach cancer Father     Cataracts Other     No Known Problems Sister     No Known Problems Brother     No Known Problems Maternal Aunt     No Known Problems Maternal Uncle     No Known Problems Paternal Aunt     No Known Problems Paternal Uncle     No Known Problems Maternal Grandmother     No Known Problems Maternal Grandfather     No Known Problems Paternal Grandmother     No Known Problems Paternal Grandfather     Blindness Neg Hx     Cancer Neg Hx     Diabetes Neg Hx     Glaucoma Neg Hx     Rheum arthritis Neg Hx     Psoriasis Neg Hx     Lupus Neg Hx     Amblyopia Neg Hx     Hypertension Neg Hx     Macular degeneration Neg Hx     Retinal detachment Neg Hx     Strabismus Neg Hx     Stroke Neg Hx     Thyroid disease Neg Hx      Current Outpatient Prescriptions on File Prior to Visit   Medication Sig Dispense Refill    alfuzosin (UROXATRAL) 10 mg Tb24 Take 1 tablet (10 mg total) by mouth daily with breakfast. 30 tablet 5    cinnamon bark 500 mg capsule Take 500 mg by mouth once daily.      fluoxetine (PROZAC) 20 MG capsule Take 1 capsule (20 mg total) by mouth once daily. 30 capsule 5    fluticasone (FLONASE) 50 mcg/actuation nasal spray 1 spray by Each Nare route once daily. 1 Bottle 11    lisinopril-hydrochlorothiazide (PRINZIDE,ZESTORETIC) 10-12.5 mg per tablet Take 1 tablet by mouth once daily. 30 tablet 2    multivitamin (THERAGRAN) per tablet Take by mouth.  Tablet Oral Every day      omega-3 fatty acids 1,250 mg Cap Take by mouth.      SOD CHLOR&BICARB/SQUEEZ BOTTLE (NEILMED SINUS RINSE COMPLETE NASL) 1 Bottle by Nasal route once daily.      [DISCONTINUED] azithromycin (ZITHROMAX Z-RAMANDEEP) 250 MG tablet Take 2 pills day 1, then 1 pill day 2-5. 6 tablet 0    aspirin (ECOTRIN) 81 MG EC tablet Take 1 tablet (81 mg total) by mouth once daily.  0     No current facility-administered medications on file prior to visit.      Review of  "patient's allergies indicates:   Allergen Reactions    Astelin [azelastine] Other (See Comments)     Dry mouth    Flomax [tamsulosin]      Nosebleed         Review of Systems   Constitutional: Malaise/fatigue: likely from not sleeping well.   HENT:        2 years ago surgery for sinuses, chronic obstruction and fluid build up, taking flonase and other treatments. Has sjogren's, uses distilled water and steroids   Eyes: Positive for redness (sjogren's, coffee worsens).   Respiratory: Negative.    Cardiovascular: Negative.    Gastrointestinal: Negative.         3 weeks ago had constipation and urinary retention, stool softener resolved.   Genitourinary: Negative.    Musculoskeletal: Positive for neck pain. Negative for back pain, falls, joint pain and myalgias.   Skin: Negative.         None noted, watch with sjogrens   Neurological: Positive for headaches (cervicogenic headaches still present takes tylenol, avoids NSAID).   Psychiatric/Behavioral: The patient has insomnia.        OBJECTIVE:  /64 (BP Location: Left arm, Patient Position: Sitting, BP Method: Manual)  Pulse 60  Temp 96.7 °F (35.9 °C) (Oral)   Ht 6' 1" (1.854 m)  Wt 92.7 kg (204 lb 5.9 oz)  SpO2 99%  BMI 26.96 kg/m2    Wt Readings from Last 3 Encounters:   05/10/17 92.7 kg (204 lb 5.9 oz)   02/07/17 92.3 kg (203 lb 7.8 oz)   01/09/17 93.1 kg (205 lb 4 oz)     BP Readings from Last 3 Encounters:   05/10/17 102/64   02/07/17 118/70   01/09/17 (!) 140/80       He appears well, in no apparent distress.  Alert and oriented times three, pleasant and cooperative. Vital signs are as documented in vital signs section.  Eyes with chronic dry appearance, his vision is good.  Nose with coryza  Throat with PND  The neck is supple and free of adenopathy or masses, the thyroid is normal without enlargement or nodules.  Has left sided increased muscle tone in the neck  S1 and S2 normal, no murmurs, clicks, gallops or rubs. Regular rate and rhythm. Chest is " clear; no wheezes or rales. No edema or JVD.      Review of old Records:  Reviewed per epic since last visit    Review of old labs:  Lab Results   Component Value Date    TSH 0.506 05/11/2016     Lab Results   Component Value Date    WBC 5.09 05/11/2016    HGB 15.5 05/11/2016    HCT 48.8 05/11/2016    MCV 91 05/11/2016     05/11/2016       Chemistry        Component Value Date/Time     05/11/2016 0832    K 4.5 05/11/2016 0832     05/11/2016 0832    CO2 30 (H) 05/11/2016 0832    BUN 10 05/11/2016 0832    CREATININE 1.1 05/11/2016 0832    GLU 94 05/11/2016 0832        Component Value Date/Time    CALCIUM 10.1 05/11/2016 0832    ALKPHOS 45 (L) 05/11/2016 0832    AST 15 05/11/2016 0832    ALT 12 05/11/2016 0832    BILITOT 0.4 05/11/2016 0832        Lab Results   Component Value Date    CHOL 219 (H) 05/11/2016    CHOL 168 06/03/2015    CHOL 150 02/03/2014     Lab Results   Component Value Date    HDL 46 05/11/2016    HDL 38 (L) 06/03/2015    HDL 34 (L) 02/03/2014     Lab Results   Component Value Date    LDLCALC 156.0 05/11/2016    LDLCALC 113.2 06/03/2015    LDLCALC 99.6 02/03/2014     Lab Results   Component Value Date    TRIG 85 05/11/2016    TRIG 84 06/03/2015    TRIG 82 02/03/2014     Lab Results   Component Value Date    CHOLHDL 21.0 05/11/2016    CHOLHDL 22.6 06/03/2015    CHOLHDL 22.7 02/03/2014         Review of old imaging:  Reviewed sleep study    ASSESSMENT:  Problem List Items Addressed This Visit     Sjogren's disease    Primary insomnia    Perennial allergic rhinitis    Nasal obstruction    Relevant Medications    ipratropium (ATROVENT) 0.06 % nasal spray    Joint pain    Relevant Medications    tizanidine (ZANAFLEX) 4 MG tablet    Hypertension (Chronic)    Hyperlipidemia (Chronic)    Depression (Chronic)      Other Visit Diagnoses     Need for pneumococcal vaccination    -  Primary    Relevant Orders    Pneumococcal Conjugate Vaccine (13 Valent) (IM) (Completed)    Need for Zostavax  administration        Relevant Medications    zoster vaccine live, PF, (ZOSTAVAX, PF,) 19,400 unit/0.65 mL injection          ICD-10-CM ICD-9-CM   1. Need for pneumococcal vaccination Z23 V03.82   2. Need for Zostavax administration Z23 V04.89   3. Nasal obstruction J34.89 478.19   4. Primary insomnia F51.01 307.42   5. Arthralgia, unspecified joint M25.50 719.40   6. Depression, unspecified depression type F32.9 311   7. Perennial allergic rhinitis J30.89 477.8   8. Essential hypertension I10 401.9   9. Sjogren's syndrome with keratoconjunctivitis sicca M35.01 710.2   10. Hyperlipidemia, unspecified hyperlipidemia type E78.5 272.4         PLAN:  1. Need for pneumococcal vaccination    - Pneumococcal Conjugate Vaccine (13 Valent) (IM)    2. Need for Zostavax administration  At the pharmacy  - zoster vaccine live, PF, (ZOSTAVAX, PF,) 19,400 unit/0.65 mL injection; Inject 19,400 Units into the skin once.  Dispense: 1 vial; Refill: 0    3. Nasal obstruction  Trial of this with zanaflex  To help with snoring and restlessness  - ipratropium (ATROVENT) 0.06 % nasal spray; 2 sprays by Nasal route 4 (four) times daily.  Dispense: 15 mL; Refill: 1    4. Primary insomnia  Long standing hard to say given his skepticism to medication    5. Arthralgia, unspecified joint  Potential medication side effects were discussed with the patient; let me know if any occur.    - tizanidine (ZANAFLEX) 4 MG tablet; Take 1-2 tablets (4-8 mg total) by mouth every 6 (six) hours as needed.  Dispense: 60 tablet; Refill: 0    6. Depression, unspecified depression type  Unclear if good or bad, he states he has frandy in God.  Continue to seek spiritual guidance    7. Perennial allergic rhinitis  Continue nasal rinse, flonase    8. Essential hypertension  The current medical regimen is effective;  continue present plan and medications.      9. Sjogren's syndrome with keratoconjunctivitis sicca  Noted.  Continue eye drops    10. Hyperlipidemia,  unspecified hyperlipidemia type  The current medical regimen is effective;  continue present plan and medications.      He has hyperreligious beliefs.  This directly impacts his care.  We will respect his beliefs and try to just give the best medical knowledge we can for his own education and let him choose.  Spent >40 minutes with the patient with 1/2 time in face to face counseling about the above.    Medication List with Changes/Refills   New Medications    IPRATROPIUM (ATROVENT) 0.06 % NASAL SPRAY    2 sprays by Nasal route 4 (four) times daily.    TIZANIDINE (ZANAFLEX) 4 MG TABLET    Take 1-2 tablets (4-8 mg total) by mouth every 6 (six) hours as needed.    ZOSTER VACCINE LIVE, PF, (ZOSTAVAX, PF,) 19,400 UNIT/0.65 ML INJECTION    Inject 19,400 Units into the skin once.   Current Medications    ALFUZOSIN (UROXATRAL) 10 MG TB24    Take 1 tablet (10 mg total) by mouth daily with breakfast.    ASPIRIN (ECOTRIN) 81 MG EC TABLET    Take 1 tablet (81 mg total) by mouth once daily.    BUDESONIDE (PULMICORT) 0.5 MG/2 ML NEBULIZER SOLUTION        CINNAMON BARK 500 MG CAPSULE    Take 500 mg by mouth once daily.    FLUOXETINE (PROZAC) 20 MG CAPSULE    Take 1 capsule (20 mg total) by mouth once daily.    FLUTICASONE (FLONASE) 50 MCG/ACTUATION NASAL SPRAY    1 spray by Each Nare route once daily.    LISINOPRIL-HYDROCHLOROTHIAZIDE (PRINZIDE,ZESTORETIC) 10-12.5 MG PER TABLET    Take 1 tablet by mouth once daily.    MULTIVITAMIN (THERAGRAN) PER TABLET    Take by mouth.  Tablet Oral Every day    OMEGA-3 FATTY ACIDS 1,250 MG CAP    Take by mouth.    SOD CHLOR&BICARB/SQUEEZ BOTTLE (NEILMED SINUS RINSE COMPLETE NASL)    1 Bottle by Nasal route once daily.   Discontinued Medications    AZITHROMYCIN (ZITHROMAX Z-RAMANDEEP) 250 MG TABLET    Take 2 pills day 1, then 1 pill day 2-5.     Call if medication is working for 3 month supply    Return in about 3 months (around 8/10/2017) for assess treatment plan.

## 2017-05-31 ENCOUNTER — HOSPITAL ENCOUNTER (OUTPATIENT)
Dept: RADIOLOGY | Facility: HOSPITAL | Age: 67
Discharge: HOME | End: 2017-05-31
Attending: OTOLARYNGOLOGY
Payer: MEDICARE

## 2017-05-31 DIAGNOSIS — J32.9 CHRONIC RECURRENT SINUSITIS: ICD-10-CM

## 2017-05-31 DIAGNOSIS — J32.9 CHRONIC RECURRENT SINUSITIS: Primary | ICD-10-CM

## 2017-05-31 PROCEDURE — 70486 CT MAXILLOFACIAL W/O DYE: CPT | Mod: 26,,, | Performed by: RADIOLOGY

## 2017-05-31 PROCEDURE — 70486 CT MAXILLOFACIAL W/O DYE: CPT | Mod: TC

## 2017-06-14 ENCOUNTER — OFFICE VISIT (OUTPATIENT)
Dept: OPTOMETRY | Facility: CLINIC | Age: 67
End: 2017-06-14
Payer: MEDICARE

## 2017-06-14 DIAGNOSIS — H16.133 FLASH BURN OF BOTH EYES: Primary | ICD-10-CM

## 2017-06-14 PROCEDURE — 92012 INTRM OPH EXAM EST PATIENT: CPT | Mod: S$PBB,,, | Performed by: OPTOMETRIST

## 2017-06-14 PROCEDURE — 99999 PR PBB SHADOW E&M-EST. PATIENT-LVL II: CPT | Mod: PBBFAC,,, | Performed by: OPTOMETRIST

## 2017-06-14 PROCEDURE — 99212 OFFICE O/P EST SF 10 MIN: CPT | Mod: PBBFAC,PO | Performed by: OPTOMETRIST

## 2017-06-14 RX ORDER — PREDNISOLONE ACETATE 10 MG/ML
1 SUSPENSION/ DROPS OPHTHALMIC 4 TIMES DAILY
Qty: 5 ML | Refills: 0 | Status: SHIPPED | OUTPATIENT
Start: 2017-06-14 | End: 2017-12-06

## 2017-06-16 ENCOUNTER — OFFICE VISIT (OUTPATIENT)
Dept: OPTOMETRY | Facility: CLINIC | Age: 67
End: 2017-06-16
Payer: MEDICARE

## 2017-06-16 DIAGNOSIS — H16.133 FLASH BURN OF BOTH EYES: Primary | ICD-10-CM

## 2017-06-16 PROCEDURE — 92012 INTRM OPH EXAM EST PATIENT: CPT | Mod: S$PBB,,, | Performed by: OPTOMETRIST

## 2017-06-16 NOTE — PROGRESS NOTES
HPI      ios here for flash burn follow/up. Pt sts his eyes feel   much  Better   Eyemeds: Pred Forte as  Directed   (-)Flashes (-)Floaters  (-)Itch, (-)tear, (-)burn, (-)Dryness. (-) OTC Drops   (-)Photophobia  (-)Glare (-)diplopia (-) headaches          Last edited by Ishmael Kurtz PCT on 6/16/2017 11:25 AM. (History)            Assessment /Plan     For exam results, see Encounter Report.    Flash burn of both eyes  -Taper PF1% 3-2-1 every 3 days    RTC annual

## 2017-07-05 ENCOUNTER — OFFICE VISIT (OUTPATIENT)
Dept: OTOLARYNGOLOGY | Facility: CLINIC | Age: 67
End: 2017-07-05
Payer: MEDICARE

## 2017-07-05 DIAGNOSIS — Z98.890 S/P FESS (FUNCTIONAL ENDOSCOPIC SINUS SURGERY): Primary | ICD-10-CM

## 2017-07-05 PROCEDURE — 99213 OFFICE O/P EST LOW 20 MIN: CPT | Mod: S$PBB,,, | Performed by: OTOLARYNGOLOGY

## 2017-07-05 PROCEDURE — 1159F MED LIST DOCD IN RCRD: CPT | Mod: ,,, | Performed by: OTOLARYNGOLOGY

## 2017-07-05 PROCEDURE — 99212 OFFICE O/P EST SF 10 MIN: CPT | Mod: PBBFAC | Performed by: OTOLARYNGOLOGY

## 2017-07-05 PROCEDURE — 99999 PR PBB SHADOW E&M-EST. PATIENT-LVL II: CPT | Mod: PBBFAC,,, | Performed by: OTOLARYNGOLOGY

## 2017-07-05 NOTE — PROGRESS NOTES
Four months since last F/U.   He is S/P FESS in 9/2015.  Had sinus infection symptoms in March of this year so changed to medicated rinses.  Also using steroid sprays from another prescriber. This caused nose bleeds.  He was advised by phone to D/C sprays.  He is doing well now with no C/O.  Exam shows good airway.  Plan: Cont present Rx.  RTC prn

## 2017-07-31 ENCOUNTER — PATIENT MESSAGE (OUTPATIENT)
Dept: FAMILY MEDICINE | Facility: CLINIC | Age: 67
End: 2017-07-31

## 2017-07-31 DIAGNOSIS — I10 ESSENTIAL HYPERTENSION: ICD-10-CM

## 2017-08-01 ENCOUNTER — TELEPHONE (OUTPATIENT)
Dept: FAMILY MEDICINE | Facility: CLINIC | Age: 67
End: 2017-08-01

## 2017-08-01 RX ORDER — LISINOPRIL AND HYDROCHLOROTHIAZIDE 10; 12.5 MG/1; MG/1
1 TABLET ORAL DAILY
Qty: 30 TABLET | Refills: 5 | Status: SHIPPED | OUTPATIENT
Start: 2017-08-01 | End: 2018-05-21 | Stop reason: SDUPTHER

## 2017-08-01 NOTE — TELEPHONE ENCOUNTER
----- Message from Della Moon sent at 8/1/2017  8:42 AM CDT -----  Contact: Self   Patient need a refill. Please call patient at [phone]    lisinopril-hydrochlorothiazide (PRINZIDE,ZESTORETIC) 10-12.5 mg per tablet      [facility] SYBIL RENO [facility]  [phone]

## 2017-08-02 ENCOUNTER — LAB VISIT (OUTPATIENT)
Dept: LAB | Facility: HOSPITAL | Age: 67
End: 2017-08-02
Attending: NURSE PRACTITIONER
Payer: MEDICARE

## 2017-08-02 DIAGNOSIS — I10 ESSENTIAL HYPERTENSION: ICD-10-CM

## 2017-08-02 LAB
ALBUMIN SERPL BCP-MCNC: 3.3 G/DL
ALP SERPL-CCNC: 41 U/L
ALT SERPL W/O P-5'-P-CCNC: 12 U/L
ANION GAP SERPL CALC-SCNC: 6 MMOL/L
AST SERPL-CCNC: 17 U/L
BILIRUB SERPL-MCNC: 0.6 MG/DL
BUN SERPL-MCNC: 13 MG/DL
CALCIUM SERPL-MCNC: 9.6 MG/DL
CHLORIDE SERPL-SCNC: 106 MMOL/L
CHOLEST/HDLC SERPL: 5 {RATIO}
CO2 SERPL-SCNC: 27 MMOL/L
CREAT SERPL-MCNC: 1.1 MG/DL
EST. GFR  (AFRICAN AMERICAN): >60 ML/MIN/1.73 M^2
EST. GFR  (NON AFRICAN AMERICAN): >60 ML/MIN/1.73 M^2
GLUCOSE SERPL-MCNC: 86 MG/DL
HDL/CHOLESTEROL RATIO: 19.9 %
HDLC SERPL-MCNC: 226 MG/DL
HDLC SERPL-MCNC: 45 MG/DL
LDLC SERPL CALC-MCNC: 162.6 MG/DL
NONHDLC SERPL-MCNC: 181 MG/DL
POTASSIUM SERPL-SCNC: 4 MMOL/L
PROT SERPL-MCNC: 6.4 G/DL
SODIUM SERPL-SCNC: 139 MMOL/L
TRIGL SERPL-MCNC: 92 MG/DL

## 2017-08-02 PROCEDURE — 36415 COLL VENOUS BLD VENIPUNCTURE: CPT | Mod: PO

## 2017-08-02 PROCEDURE — 80053 COMPREHEN METABOLIC PANEL: CPT

## 2017-08-02 PROCEDURE — 80061 LIPID PANEL: CPT

## 2017-08-03 ENCOUNTER — PATIENT MESSAGE (OUTPATIENT)
Dept: FAMILY MEDICINE | Facility: CLINIC | Age: 67
End: 2017-08-03

## 2017-08-08 ENCOUNTER — TELEPHONE (OUTPATIENT)
Dept: FAMILY MEDICINE | Facility: CLINIC | Age: 67
End: 2017-08-08

## 2017-08-08 NOTE — TELEPHONE ENCOUNTER
"Pt called to follow up on recommendation for statin therapy, high risk heart event  given history CAD and high cholesterol. Pt states he has talked to his cardiologist about this. He does not desire to start statin.States that he is "trusting in God" and ready to go if God desires.   "

## 2017-09-01 ENCOUNTER — TELEPHONE (OUTPATIENT)
Dept: OTOLARYNGOLOGY | Facility: CLINIC | Age: 67
End: 2017-09-01

## 2017-10-04 RX ORDER — BUDESONIDE 0.25 MG/2ML
0.25 INHALANT ORAL DAILY
Qty: 30 VIAL | Refills: 3 | Status: SHIPPED | OUTPATIENT
Start: 2017-10-04 | End: 2018-08-14 | Stop reason: SDUPTHER

## 2017-12-06 ENCOUNTER — OFFICE VISIT (OUTPATIENT)
Dept: FAMILY MEDICINE | Facility: CLINIC | Age: 67
End: 2017-12-06
Payer: MEDICARE

## 2017-12-06 VITALS
OXYGEN SATURATION: 98 % | HEART RATE: 66 BPM | TEMPERATURE: 98 F | BODY MASS INDEX: 26.21 KG/M2 | SYSTOLIC BLOOD PRESSURE: 100 MMHG | HEIGHT: 73 IN | DIASTOLIC BLOOD PRESSURE: 60 MMHG | WEIGHT: 197.75 LBS

## 2017-12-06 DIAGNOSIS — Z23 NEED FOR SHINGLES VACCINE: ICD-10-CM

## 2017-12-06 DIAGNOSIS — J06.9 VIRAL URI: Primary | ICD-10-CM

## 2017-12-06 PROCEDURE — 99213 OFFICE O/P EST LOW 20 MIN: CPT | Mod: S$PBB,,, | Performed by: INTERNAL MEDICINE

## 2017-12-06 PROCEDURE — 99999 PR PBB SHADOW E&M-EST. PATIENT-LVL III: CPT | Mod: PBBFAC,,, | Performed by: INTERNAL MEDICINE

## 2017-12-06 PROCEDURE — 99213 OFFICE O/P EST LOW 20 MIN: CPT | Mod: PBBFAC,PO | Performed by: INTERNAL MEDICINE

## 2017-12-06 RX ORDER — BENZONATATE 100 MG/1
100 CAPSULE ORAL 3 TIMES DAILY PRN
Qty: 30 CAPSULE | Refills: 0 | Status: SHIPPED | OUTPATIENT
Start: 2017-12-06 | End: 2017-12-16
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
## 2018-01-04 ENCOUNTER — OFFICE VISIT (OUTPATIENT)
Dept: OTOLARYNGOLOGY | Facility: CLINIC | Age: 68
End: 2018-01-04
Payer: MEDICARE

## 2018-01-04 VITALS
SYSTOLIC BLOOD PRESSURE: 122 MMHG | HEART RATE: 68 BPM | TEMPERATURE: 99 F | HEIGHT: 72 IN | DIASTOLIC BLOOD PRESSURE: 71 MMHG

## 2018-01-04 DIAGNOSIS — J00 OTHER ACUTE RHINITIS: ICD-10-CM

## 2018-01-04 DIAGNOSIS — Z87.898 HX OF EPISTAXIS: ICD-10-CM

## 2018-01-04 DIAGNOSIS — R43.9 CACOSMIA: ICD-10-CM

## 2018-01-04 DIAGNOSIS — Z98.890 S/P FESS (FUNCTIONAL ENDOSCOPIC SINUS SURGERY): ICD-10-CM

## 2018-01-04 DIAGNOSIS — J06.9 ACUTE UPPER RESPIRATORY INFECTION: ICD-10-CM

## 2018-01-04 PROCEDURE — 87077 CULTURE AEROBIC IDENTIFY: CPT

## 2018-01-04 PROCEDURE — 31231 NASAL ENDOSCOPY DX: CPT | Mod: S$GLB,,, | Performed by: OTOLARYNGOLOGY

## 2018-01-04 PROCEDURE — 87186 SC STD MICRODIL/AGAR DIL: CPT

## 2018-01-04 PROCEDURE — 99214 OFFICE O/P EST MOD 30 MIN: CPT | Mod: 25,S$GLB,, | Performed by: OTOLARYNGOLOGY

## 2018-01-04 PROCEDURE — 87070 CULTURE OTHR SPECIMN AEROBIC: CPT

## 2018-01-04 RX ORDER — MUPIROCIN 20 MG/G
OINTMENT TOPICAL 2 TIMES DAILY
Qty: 15 G | Refills: 0 | Status: SHIPPED | OUTPATIENT
Start: 2018-01-04 | End: 2018-01-14

## 2018-01-04 NOTE — PATIENT INSTRUCTIONS
No nose blowing, wiping, cleaning.  Start mupirocin ointment and use in both nostrils 2 - 3 times a day for 10 days, then discontinue.  Call for culture results in 3 days.  Follow up in 10 - 14 days.

## 2018-01-05 ENCOUNTER — TELEPHONE (OUTPATIENT)
Dept: OTOLARYNGOLOGY | Facility: CLINIC | Age: 68
End: 2018-01-05

## 2018-01-05 NOTE — PROGRESS NOTES
Subjective:       Patient ID: Rizwan Demarco Jr. is a 67 y.o. male.    Chief Complaint: Epistaxis (has been about 1 week and foul smell today but went away with irragation)    Mr. Demarco is a new patient for me here today complaining of symptoms for the past week.  He has a history of endoscopic sinus surgery in 2015 and since then has been using nasal sinus irrigations with saline on a regular basis.  Over the past week he has noted whenever he rinses his nose he sees blood primarily spotting from the left side of his nose.  He also reports a foul odor today which he believes is coming from his left nostril and is described as a rotten smell.  He later states he may have had very mild sense of a bad smell over the past week as well.  He denies any environmental changes or any changes in his normal routine as far as taking care of his nose and sinuses.  He denies URI or LRI symptoms, however he does give a history of a persisting respiratory infection beginning at the end of November but which he believes eventually cleared a few weeks ago.  Review of his record reveals reports of periapical lucencies on prior sinus CTs.  But he reports extraction of his remaining upper dentition a few months ago for which he was just fitted with a temporary upper denture 2 days ago.          Review of Systems   Ears: Positive for ringing in ear.  Negative for hearing loss, ear pain, ear pressure, ear discharge, ear infections, dizziness, head trauma, taken gentramycin/streptomycin and family history of hearing loss.    Nose:  Positive for nosebleeds, nasal obstruction, nasal or sinus surgery, loss of smell and snoring. Negative for postnasal drip.    Mouth/Throat: Negative for pain swallowing, impaired swallowing, hoarse voice, throat mass, neck mass, oral ulcers and neck lumps.   Constitutional: Negative for recent unexplained weight loss, fever, chills and night sweats.    Eyes:  Negative for history of glaucoma and visual  change.   Cardiovascular:  Positive for history of high blood pressure. Negative for chest pain and palpitations.   Respiratory:  Negative for asthma, emphysema, history of tuberculosis, recent cough and shortness of breath.    Gastrointestinal:  Positive for acid reflux and indigestion. Negative for history of stomach ulcers or pain, blood in stool and change in stool.   Other:  Positive for arthritis. Negative for kidney problem, bladder problem, prostate disease, new or changing moles, weakness, disturbances in coordination, slurred, confusion, heat intolerance, cold intolerance, swollen glands, anemia and persistent infections.           Objective:      Physical Exam   Constitutional: He is oriented to person, place, and time. He appears well-developed and well-nourished. No distress.   HENT:   Head: Normocephalic and atraumatic.   Right Ear: Tympanic membrane, external ear and ear canal normal.   Left Ear: Tympanic membrane, external ear and ear canal normal.   Nose: No mucosal edema, rhinorrhea or nasal deformity. No epistaxis.   Mouth/Throat: Uvula is midline, oropharynx is clear and moist and mucous membranes are normal. No oral lesions. No trismus in the jaw. No uvula swelling. No oropharyngeal exudate, posterior oropharyngeal edema or posterior oropharyngeal erythema.   Nasal mucosa is pink with few flecks of dried blood over the anterior septum bilaterally and some pale yellow secretions anteriorly as well.    An upper full denture is in place and removed with no lesions seen.  He has very mild upper alveolar tenderness status post extractions within the past few months and presently with a new denture as of the past 2 days.   Eyes: Conjunctivae are normal. Right eye exhibits no discharge. Left eye exhibits no discharge. No scleral icterus.   Neck: Normal range of motion and phonation normal. Neck supple. No tracheal deviation present. No thyromegaly present.   Cardiovascular: Normal rate and regular  rhythm.    Pulmonary/Chest: Effort normal and breath sounds normal. No stridor. No respiratory distress. He has no wheezes. He has no rales.   Musculoskeletal: Normal range of motion. He exhibits no deformity.   Lymphadenopathy:     He has no cervical adenopathy.   Neurological: He is alert and oriented to person, place, and time. He displays no weakness. No cranial nerve deficit. Coordination normal.   Skin: Skin is warm and dry. He is not diaphoretic.   Psychiatric: He has a normal mood and affect. His behavior is normal. His speech is not slurred.       Assessment:       1. Hx of epistaxis    2. Cacosmia    3. Acute upper respiratory infection    4. Other acute rhinitis    5. S/P FESS (functional endoscopic sinus surgery)        Plan:        Reviewed above and options and nasal sinus endoscopy and patient would like to proceed today.  See procedure note.    Reviewed all above and considerations and recommendations and answered questions.  Start mupirocin antibiotic ointment and use 2-3 times daily in both nostrils for 10 days and then follow up.  Patient to call in 3 days for culture results.  Nasal care reviewed including no wiping or blowing or cleaning the nose although he denies any of this and states his only contact to the inside of his nose is for the irrigations.  Hold nasal and sinus irrigations for the next couple of days and then resume as tolerated.  Follow-up with Dr. Nascimento or with me in 2 weeks.

## 2018-01-05 NOTE — PROCEDURES
Nasal/sinus endoscopy  Date/Time: 1/4/2018 7:31 PM  Performed by: ALEJANDRINA MAY  Authorized by: ALEJANDRINA MAY     Consent Done?:  Yes (Verbal)  Anesthesia:     Local anesthetic:  Lidocaine 2% and Brady-Synephrine 1/2%    Patient tolerance:  Patient tolerated the procedure well with no immediate complications  Nose:     Procedure Performed:  Nasal Endoscopy  External:      No external nasal deformity  Intranasal:      Mucosa no polyps     Mucosa ulcers not present     No mucosa lesions present     No septum gross deformity  Nasopharynx:      No mucosa lesions     Adenoids not present     Posterior choanae patent     Bilateral nasal and sinus endoscopy was performed revealing postop changes consistent with his history of endoscopic sinus surgery in 2015.  The exteriorized ethmoidal air cells do not reveal any polyps or fungal debris.  Similarly the maxillary antra are without polyps or fungal debris.  There are a few flecks of blood over the anterior nasal septum bilaterally but otherwise no blood is seen.  The anterior nasal septum is pink and irritated with some yellow secretions although secretions are whitish posterior to this.  Culture was obtained using a mini tip culturette.

## 2018-01-08 LAB — BACTERIA NPH AEROBE CULT: NORMAL

## 2018-01-11 ENCOUNTER — TELEPHONE (OUTPATIENT)
Dept: OTOLARYNGOLOGY | Facility: CLINIC | Age: 68
End: 2018-01-11

## 2018-01-11 NOTE — TELEPHONE ENCOUNTER
----- Message from Sylvia Xiao MA sent at 1/10/2018 11:27 AM CST -----  Contact: pt  Patient calling for culture results and questions about medications.  ----- Message -----  From: Nora Briones  Sent: 1/10/2018   9:33 AM  To: [facility] Staff    x_  1st Request  _  2nd Request  _  3rd Request    Who: JOANNE BUENO JR. [[id number]]    Why: Patient would like to speak with staff to discuss lab results and also medication. No further details given. Please call to further discuss monique advise.     What Number to Call Back:[phone]    When to Expect a call back: (Within 24 hours)    Please return the call at earliest convenience. Thanks!

## 2018-01-15 ENCOUNTER — TELEPHONE (OUTPATIENT)
Dept: OTOLARYNGOLOGY | Facility: CLINIC | Age: 68
End: 2018-01-15

## 2018-01-15 NOTE — TELEPHONE ENCOUNTER
Called pt again and reviewed culture results and recommendations.  States nose is feeling much better and doing well and stopped mupirocin ointment 1/13/18.  Some nasal dryness yesterday so used Ayr gel as per prior recommendation.  Okay to resume nasal irrigations in next few days if tolerated.  Keep follow up with Dr. Nascimento next week.

## 2018-01-15 NOTE — TELEPHONE ENCOUNTER
Patient is calling back today for his culture results and to see if he needs any medication if so its to go to [facility] on [facility] / seeing Dr. Nascimento on Monday 1/22/2018

## 2018-01-22 ENCOUNTER — OFFICE VISIT (OUTPATIENT)
Dept: OTOLARYNGOLOGY | Facility: CLINIC | Age: 68
End: 2018-01-22
Payer: MEDICARE

## 2018-01-22 VITALS — SYSTOLIC BLOOD PRESSURE: 132 MMHG | DIASTOLIC BLOOD PRESSURE: 74 MMHG | HEART RATE: 64 BPM

## 2018-01-22 DIAGNOSIS — R04.0 EPISTAXIS: Primary | ICD-10-CM

## 2018-01-22 PROCEDURE — 99212 OFFICE O/P EST SF 10 MIN: CPT | Mod: PBBFAC | Performed by: OTOLARYNGOLOGY

## 2018-01-22 PROCEDURE — 99213 OFFICE O/P EST LOW 20 MIN: CPT | Mod: 25,S$PBB,, | Performed by: OTOLARYNGOLOGY

## 2018-01-22 PROCEDURE — 30901 CONTROL OF NOSEBLEED: CPT | Mod: PBBFAC | Performed by: OTOLARYNGOLOGY

## 2018-01-22 PROCEDURE — 31238 NSL/SINS NDSC SRG NSL HEMRRG: CPT | Mod: S$PBB,LT,, | Performed by: OTOLARYNGOLOGY

## 2018-01-22 PROCEDURE — 99999 PR PBB SHADOW E&M-EST. PATIENT-LVL II: CPT | Mod: PBBFAC,,, | Performed by: OTOLARYNGOLOGY

## 2018-01-22 PROCEDURE — 31231 NASAL ENDOSCOPY DX: CPT | Mod: PBBFAC | Performed by: OTOLARYNGOLOGY

## 2018-01-23 NOTE — PROGRESS NOTES
Rizwan presents back in followup.  He is a past patient of mine having undergone   FESS in September 2015.  He had most recently seen Dr. Foote with complaint   of epistaxis.  He presents back today with similar complaint.  On scope exam   with scope #029587, his ostia appear open.  He does have two very prominent   vessels in the anterior aspect of his left nasal septum.  I have cauterized   these utilizing silver nitrate.  I have given epistaxis precaution information   and discussed its use.  He will return to clinic p.r.n.      HG/HN  dd: 01/22/2018 13:04:16 (CST)  td: 01/22/2018 23:36:31 (CST)  Doc ID   #[id number]  Job ID #[id number]    CC:

## 2018-02-05 ENCOUNTER — OFFICE VISIT (OUTPATIENT)
Dept: OTOLARYNGOLOGY | Facility: CLINIC | Age: 68
End: 2018-02-05
Payer: MEDICARE

## 2018-02-05 VITALS — HEART RATE: 58 BPM | DIASTOLIC BLOOD PRESSURE: 74 MMHG | SYSTOLIC BLOOD PRESSURE: 126 MMHG

## 2018-02-05 DIAGNOSIS — Z87.898 HX OF EPISTAXIS: Primary | ICD-10-CM

## 2018-02-05 PROCEDURE — 99212 OFFICE O/P EST SF 10 MIN: CPT | Mod: S$PBB,,, | Performed by: OTOLARYNGOLOGY

## 2018-02-05 PROCEDURE — 99999 PR PBB SHADOW E&M-EST. PATIENT-LVL II: CPT | Mod: PBBFAC,,, | Performed by: OTOLARYNGOLOGY

## 2018-02-05 PROCEDURE — 99212 OFFICE O/P EST SF 10 MIN: CPT | Mod: PBBFAC | Performed by: OTOLARYNGOLOGY

## 2018-02-05 PROCEDURE — 1126F AMNT PAIN NOTED NONE PRSNT: CPT | Mod: ,,, | Performed by: OTOLARYNGOLOGY

## 2018-02-05 PROCEDURE — 1159F MED LIST DOCD IN RCRD: CPT | Mod: ,,, | Performed by: OTOLARYNGOLOGY

## 2018-02-05 NOTE — PROGRESS NOTES
Rizwan presents back in followup.  He is two weeks S/P cauterization of 2 prominent vessels on left anterior septum utilizing silver nitrate.    He has followed epistaxis precautions and is healing well.  Plan: Cont present Rx.  RTC prn

## 2018-05-21 ENCOUNTER — OFFICE VISIT (OUTPATIENT)
Dept: FAMILY MEDICINE | Facility: CLINIC | Age: 68
End: 2018-05-21
Payer: MEDICARE

## 2018-05-21 VITALS
TEMPERATURE: 99 F | HEART RATE: 72 BPM | BODY MASS INDEX: 26.3 KG/M2 | OXYGEN SATURATION: 98 % | DIASTOLIC BLOOD PRESSURE: 68 MMHG | SYSTOLIC BLOOD PRESSURE: 146 MMHG | HEIGHT: 73 IN | WEIGHT: 198.44 LBS

## 2018-05-21 DIAGNOSIS — M05.79 RHEUMATOID ARTHRITIS INVOLVING MULTIPLE SITES WITH POSITIVE RHEUMATOID FACTOR: Primary | ICD-10-CM

## 2018-05-21 DIAGNOSIS — M35.01 SJOGREN'S SYNDROME WITH KERATOCONJUNCTIVITIS SICCA: ICD-10-CM

## 2018-05-21 DIAGNOSIS — I10 ESSENTIAL HYPERTENSION: Chronic | ICD-10-CM

## 2018-05-21 DIAGNOSIS — Z97.2 WEARS DENTURES: ICD-10-CM

## 2018-05-21 PROCEDURE — 99999 PR PBB SHADOW E&M-EST. PATIENT-LVL III: CPT | Mod: PBBFAC,,, | Performed by: FAMILY MEDICINE

## 2018-05-21 PROCEDURE — 99213 OFFICE O/P EST LOW 20 MIN: CPT | Mod: PBBFAC,PO | Performed by: FAMILY MEDICINE

## 2018-05-21 PROCEDURE — 99214 OFFICE O/P EST MOD 30 MIN: CPT | Mod: S$PBB,,, | Performed by: FAMILY MEDICINE

## 2018-05-21 RX ORDER — LISINOPRIL AND HYDROCHLOROTHIAZIDE 10; 12.5 MG/1; MG/1
1 TABLET ORAL DAILY
Qty: 90 TABLET | Refills: 3 | Status: SHIPPED | OUTPATIENT
Start: 2018-05-21 | End: 2019-01-28 | Stop reason: SDUPTHER

## 2018-05-21 NOTE — PROGRESS NOTES
Chief Complaint   Patient presents with    Establish Care       SUBJECTIVE:  Rizwan Demarco Jr. is a 67 y.o. male here for new problem of HTN and he is out of his medication, lost it.  He has new dentures.  He doing better overall, still focusing on his spiritual side.  Currently has co-morbidities including per problem list.      Past Medical History:   Diagnosis Date    Allergy     Arthritis     Rheumatoid arthritis    Blood clotting tendency     BPH (benign prostatic hypertrophy)     Cervical spondylosis     Colon polyps     Coronary artery disease     Depression 5/8/2015    Dry eyes     Dry mouth     Emphysema of lung     GERD (gastroesophageal reflux disease)     Heart attack     Hyperlipidemia     Hypertension     Lumbar spondylosis     Nasal obstruction     Sinusitis     Special screening for malignant neoplasm of colon 6/29/2016     Past Surgical History:   Procedure Laterality Date    COLONOSCOPY N/A 6/29/2016    Procedure: COLONOSCOPY;  Surgeon: Partha Saucedo MD;  Location: [facility];  Service: Endoscopy;  Laterality: N/A;    CORONARY ARTERY BYPASS GRAFT      EXCISION TURBINATE, SUBMUCOUS      KNEE ARTHROSCOPY W/ DEBRIDEMENT      NASAL SEPTUM SURGERY      TONSILLECTOMY       Social History     Social History    Marital status: Single     Spouse name: N/A    Number of children: N/A    Years of education: N/A     Occupational History    Not on file.     Social History Main Topics    Smoking status: Former Smoker     Packs/day: 1.00     Years: 20.00     Types: Cigarettes     Quit date: 1/1/1991    Smokeless tobacco: Never Used      Comment: Quit 1991.    Alcohol use No    Drug use: No    Sexual activity: Not on file     Other Topics Concern    Not on file     Social History Narrative    No narrative on file     Family History   Problem Relation Age of Onset    Hyperlipidemia Mother     Stomach cancer Father     Cataracts Other     No Known Problems Sister      No Known Problems Brother     No Known Problems Maternal Aunt     No Known Problems Maternal Uncle     No Known Problems Paternal Aunt     No Known Problems Paternal Uncle     No Known Problems Maternal Grandmother     No Known Problems Maternal Grandfather     No Known Problems Paternal Grandmother     No Known Problems Paternal Grandfather     Blindness Neg Hx     Cancer Neg Hx     Diabetes Neg Hx     Glaucoma Neg Hx     Rheum arthritis Neg Hx     Psoriasis Neg Hx     Lupus Neg Hx     Amblyopia Neg Hx     Hypertension Neg Hx     Macular degeneration Neg Hx     Retinal detachment Neg Hx     Strabismus Neg Hx     Stroke Neg Hx     Thyroid disease Neg Hx      Current Outpatient Prescriptions on File Prior to Visit   Medication Sig Dispense Refill    alfuzosin (UROXATRAL) 10 mg Tb24 Take 1 tablet (10 mg total) by mouth daily with breakfast. 30 tablet 5    aspirin (ECOTRIN) 81 MG EC tablet Take 1 tablet (81 mg total) by mouth once daily.  0    budesonide (PULMICORT) 0.25 mg/2 mL nebulizer solution Take 2 mLs (0.25 mg total) by nebulization once daily. Controller 30 vial 3    cinnamon bark 500 mg capsule Take 500 mg by mouth once daily.      fluoxetine (PROZAC) 20 MG capsule Take 1 capsule (20 mg total) by mouth once daily. 30 capsule 5    ipratropium (ATROVENT) 0.06 % nasal spray 2 sprays by Nasal route 4 (four) times daily. 15 mL 1    lisinopril-hydrochlorothiazide (PRINZIDE,ZESTORETIC) 10-12.5 mg per tablet Take 1 tablet by mouth once daily. 30 tablet 5    multivitamin (THERAGRAN) per tablet Take by mouth.  Tablet Oral Every day      omega-3 fatty acids 1,250 mg Cap Take by mouth.       No current facility-administered medications on file prior to visit.      Review of patient's allergies indicates:   Allergen Reactions    Astelin [azelastine] Other (See Comments)     Dry mouth    Flomax [tamsulosin]      Nosebleed         Review of Systems   Constitutional: Negative.    HENT:  "Negative.    Eyes: Negative.    Respiratory: Negative.    Cardiovascular: Negative.    Gastrointestinal: Negative.    Genitourinary: Negative.    Musculoskeletal: Negative.    Skin: Negative.    Neurological: Negative.    Endo/Heme/Allergies: Negative.    Psychiatric/Behavioral: Negative.        OBJECTIVE:  BP (!) 146/68   Pulse 72   Temp 98.6 °F (37 °C) (Oral)   Ht 6' 1" (1.854 m)   Wt 90 kg (198 lb 6.6 oz)   SpO2 98%   BMI 26.18 kg/m²     Wt Readings from Last 3 Encounters:   05/21/18 90 kg (198 lb 6.6 oz)   12/06/17 89.7 kg (197 lb 12 oz)   05/10/17 92.7 kg (204 lb 5.9 oz)     BP Readings from Last 3 Encounters:   05/21/18 (!) 146/68   02/05/18 126/74   01/22/18 132/74       He appears well, in no apparent distress.  Alert and oriented times three, pleasant and cooperative. Vital signs are as documented in vital signs section.  S1 and S2 normal, no murmurs, clicks, gallops or rubs. Regular rate and rhythm. Chest is clear; no wheezes or rales. No edema or JVD.      Review of old Records:  Reviewed per [facility]    Review of old labs:      Review of old imaging:      ASSESSMENT:  Problem List Items Addressed This Visit     Wears dentures    Sjogren's disease    RA (rheumatoid arthritis) - Primary    Hypertension (Chronic)          ICD-10-CM ICD-9-CM   1. Rheumatoid arthritis involving multiple sites with positive rheumatoid factor M05.79 714.0   2. Sjogren's syndrome with keratoconjunctivitis sicca M35.01 710.2   3. Wears dentures Z97.2 V45.84   4. Essential hypertension I10 401.9         PLAN:  1. Rheumatoid arthritis involving multiple sites with positive rheumatoid factor  monitor    2. Sjogren's syndrome with keratoconjunctivitis sicca  monitor    3. Wears dentures  The current medical regimen is effective;  continue present plan and medications.      4. Essential hypertension  The current medical regimen is effective;  continue present plan and medications.        Medication List with Changes/Refills "   Current Medications    ALFUZOSIN (UROXATRAL) 10 MG TB24    Take 1 tablet (10 mg total) by mouth daily with breakfast.    ASPIRIN (ECOTRIN) 81 MG EC TABLET    Take 1 tablet (81 mg total) by mouth once daily.    BUDESONIDE (PULMICORT) 0.25 MG/2 ML NEBULIZER SOLUTION    Take 2 mLs (0.25 mg total) by nebulization once daily. Controller    CINNAMON BARK 500 MG CAPSULE    Take 500 mg by mouth once daily.    FLUOXETINE (PROZAC) 20 MG CAPSULE    Take 1 capsule (20 mg total) by mouth once daily.    IPRATROPIUM (ATROVENT) 0.06 % NASAL SPRAY    2 sprays by Nasal route 4 (four) times daily.    LISINOPRIL-HYDROCHLOROTHIAZIDE (PRINZIDE,ZESTORETIC) 10-12.5 MG PER TABLET    Take 1 tablet by mouth once daily.    MULTIVITAMIN (THERAGRAN) PER TABLET    Take by mouth.  Tablet Oral Every day    OMEGA-3 FATTY ACIDS 1,250 MG CAP    Take by mouth.       No Follow-up on file.

## 2018-07-09 RX ORDER — ALFUZOSIN HYDROCHLORIDE 10 MG/1
10 TABLET, EXTENDED RELEASE ORAL
Qty: 30 TABLET | Refills: 5 | Status: SHIPPED | OUTPATIENT
Start: 2018-07-09 | End: 2019-01-28 | Stop reason: SDUPTHER

## 2018-07-09 NOTE — TELEPHONE ENCOUNTER
----- Message from Semaj Travis sent at 2018  2:38 PM CDT -----  Contact: JOANNE [phone]  REFILL:alfuzosin (UROXATRAL) 10 mg Tb24 (),     PHARMACY: [facility] [place] [address]

## 2018-07-10 ENCOUNTER — CLINICAL SUPPORT (OUTPATIENT)
Dept: FAMILY MEDICINE | Facility: CLINIC | Age: 68
End: 2018-07-10
Payer: MEDICARE

## 2018-07-10 VITALS — SYSTOLIC BLOOD PRESSURE: 122 MMHG | DIASTOLIC BLOOD PRESSURE: 70 MMHG

## 2018-07-10 DIAGNOSIS — I10 ESSENTIAL HYPERTENSION: Primary | ICD-10-CM

## 2018-07-10 PROCEDURE — 99999 PR PBB SHADOW E&M-EST. PATIENT-LVL II: CPT | Mod: PBBFAC,,,

## 2018-07-10 PROCEDURE — 99499 UNLISTED E&M SERVICE: CPT | Mod: S$PBB,,, | Performed by: FAMILY MEDICINE

## 2018-07-10 PROCEDURE — 99212 OFFICE O/P EST SF 10 MIN: CPT | Mod: PBBFAC,PO

## 2018-07-10 NOTE — PROGRESS NOTES
Rizwan Demarco Jr. 68 y.o. male is here today for Blood Pressure check.   History of HTN yes.    Review of patient's allergies indicates:   Allergen Reactions    Astelin [azelastine] Other (See Comments)     Dry mouth    Flomax [tamsulosin]      Nosebleed     Creatinine   Date Value Ref Range Status   08/02/2017 1.1 0.5 - 1.4 mg/dL Final     Sodium   Date Value Ref Range Status   08/02/2017 139 136 - 145 mmol/L Final     Potassium   Date Value Ref Range Status   08/02/2017 4.0 3.5 - 5.1 mmol/L Final   ]  Patient verifies taking blood pressure medications on a regular basis at the same time of the day.     Current Outpatient Prescriptions:     lisinopril-hydrochlorothiazide (PRINZIDE,ZESTORETIC) 10-12.5 mg per tablet, Take 1 tablet by mouth once daily., Disp: 90 tablet, Rfl: 3    alfuzosin (UROXATRAL) 10 mg Tb24, Take 1 tablet (10 mg total) by mouth daily with breakfast., Disp: 30 tablet, Rfl: 5    aspirin (ECOTRIN) 81 MG EC tablet, Take 1 tablet (81 mg total) by mouth once daily., Disp: , Rfl: 0    budesonide (PULMICORT) 0.25 mg/2 mL nebulizer solution, Take 2 mLs (0.25 mg total) by nebulization once daily. Controller, Disp: 30 vial, Rfl: 3    cinnamon bark 500 mg capsule, Take 500 mg by mouth once daily., Disp: , Rfl:     fluoxetine (PROZAC) 20 MG capsule, Take 1 capsule (20 mg total) by mouth once daily., Disp: 30 capsule, Rfl: 5    ipratropium (ATROVENT) 0.06 % nasal spray, 2 sprays by Nasal route 4 (four) times daily., Disp: 15 mL, Rfl: 1    multivitamin (THERAGRAN) per tablet, Take by mouth.  Tablet Oral Every day, Disp: , Rfl:     omega-3 fatty acids 1,250 mg Cap, Take by mouth., Disp: , Rfl:   Does patient have record of home blood pressure readings no. Readings have been averaging .   Last dose of blood pressure medication was taken at 7 am.  Patient is asymptomatic.   Complains of patient has no complaints.    Vitals:    07/10/18 0953   BP: 122/70         Dr. Perkins informed of nurse visit.

## 2018-08-14 RX ORDER — BUDESONIDE 0.25 MG/2ML
0.25 INHALANT ORAL DAILY
Qty: 30 VIAL | Refills: 3 | Status: SHIPPED | OUTPATIENT
Start: 2018-08-14 | End: 2019-01-28 | Stop reason: SDUPTHER

## 2018-08-14 NOTE — TELEPHONE ENCOUNTER
----- Message from Kesha Trujillo sent at 8/14/2018  7:40 AM CDT -----  Contact: self [phone]  Pt is requesting a refill on Budesonide, Pls call [facility] [phone]. Thanks.......Linda

## 2018-09-10 ENCOUNTER — OFFICE VISIT (OUTPATIENT)
Dept: FAMILY MEDICINE | Facility: CLINIC | Age: 68
End: 2018-09-10
Payer: MEDICARE

## 2018-09-10 VITALS
HEART RATE: 65 BPM | SYSTOLIC BLOOD PRESSURE: 130 MMHG | WEIGHT: 202.19 LBS | DIASTOLIC BLOOD PRESSURE: 90 MMHG | HEIGHT: 73 IN | BODY MASS INDEX: 26.8 KG/M2 | TEMPERATURE: 98 F | OXYGEN SATURATION: 97 %

## 2018-09-10 DIAGNOSIS — I10 ESSENTIAL HYPERTENSION: ICD-10-CM

## 2018-09-10 DIAGNOSIS — M25.512 ACUTE PAIN OF LEFT SHOULDER: Primary | ICD-10-CM

## 2018-09-10 PROCEDURE — 99999 PR PBB SHADOW E&M-EST. PATIENT-LVL III: CPT | Mod: PBBFAC,,, | Performed by: INTERNAL MEDICINE

## 2018-09-10 PROCEDURE — 99214 OFFICE O/P EST MOD 30 MIN: CPT | Mod: S$PBB,,, | Performed by: INTERNAL MEDICINE

## 2018-09-10 PROCEDURE — 99213 OFFICE O/P EST LOW 20 MIN: CPT | Mod: PBBFAC,PO | Performed by: INTERNAL MEDICINE

## 2018-09-10 RX ORDER — TIZANIDINE 4 MG/1
4 TABLET ORAL EVERY 8 HOURS PRN
Qty: 30 TABLET | Refills: 0 | Status: SHIPPED | OUTPATIENT
Start: 2018-09-10 | End: 2018-09-20

## 2018-09-10 NOTE — PROGRESS NOTES
SUBJECTIVE     Chief Complaint   Patient presents with    Shoulder Pain     L shoulder pain x 1 week . No full ROM        HPI  Rizwan Demarco Jr. is a 68 y.o. male with multiple medical diagnoses as listed in the medical history and problem list that presents for evaluation of L shoulder pain since Friday. Pt was the  on a long road trip, but denies any preceding trauma, falls, or heavy lifting. He now has pain that is sharp at a 10/10 and intermittent in nature with radiation to the elbow. Pt also reports some numbness to the LUE. He has been taking Tylenol, Biofreeze, and other OTC ointment without relief of pain.    PAST MEDICAL HISTORY:  Past Medical History:   Diagnosis Date    Allergy     Arthritis     Rheumatoid arthritis    Blood clotting tendency     BPH (benign prostatic hypertrophy)     Cervical spondylosis     Colon polyps     Coronary artery disease     Depression 5/8/2015    Dry eyes     Dry mouth     Emphysema of lung     GERD (gastroesophageal reflux disease)     Heart attack     Hyperlipidemia     Hypertension     Lumbar spondylosis     Nasal obstruction     Sinusitis     Special screening for malignant neoplasm of colon 6/29/2016       PAST SURGICAL HISTORY:  Past Surgical History:   Procedure Laterality Date    COLONOSCOPY N/A 6/29/2016    Procedure: COLONOSCOPY;  Surgeon: Partha Saucedo MD;  Location: [facility];  Service: Endoscopy;  Laterality: N/A;    COLONOSCOPY N/A 6/29/2016    [facility]O    CORONARY ARTERY BYPASS GRAFT      EXCISION TURBINATE, SUBMUCOUS      KNEE ARTHROSCOPY W/ DEBRIDEMENT      NASAL SEPTUM SURGERY      RESECTION-TURBINATES (SMR) Bilateral 9/18/2015    Performed by Jose Nascimento III, MD at [facility] OR [address] [place] [address]    SEPTOPLASTY Bilateral 9/18/2015    Performed by Jose Nascimento III, MD at [facility] OR [address] [place] [address]    SINUS SURGERY FUNCTIONAL ENDOSCOPIC WITH NAVIGATION Bilateral 9/18/2015    Performed by Jose THAO  Azam TORRES MD at [facility] OR [facility]    TONSILLECTOMY         SOCIAL HISTORY:  Social History     Socioeconomic History    Marital status: Single     Spouse name: Not on file    Number of children: Not on file    Years of education: Not on file    Highest education level: Not on file   Social Needs    Financial resource strain: Not on file    Food insecurity - worry: Not on file    Food insecurity - inability: Not on file    Transportation needs - medical: Not on file    Transportation needs - non-medical: Not on file   Occupational History    Not on file   Tobacco Use    Smoking status: Former Smoker     Packs/day: 1.00     Years: 20.00     Pack years: 20.00     Types: Cigarettes     Last attempt to quit: 1991     Years since quittin.7    Smokeless tobacco: Never Used    Tobacco comment: Quit .   Substance and Sexual Activity    Alcohol use: No    Drug use: No    Sexual activity: Not on file   Other Topics Concern    Not on file   Social History Narrative    Not on file       FAMILY HISTORY:  Family History   Problem Relation Age of Onset    Hyperlipidemia Mother     Stomach cancer Father     Cataracts Other     No Known Problems Sister     No Known Problems Brother     No Known Problems Maternal Aunt     No Known Problems Maternal Uncle     No Known Problems Paternal Aunt     No Known Problems Paternal Uncle     No Known Problems Maternal Grandmother     No Known Problems Maternal Grandfather     No Known Problems Paternal Grandmother     No Known Problems Paternal Grandfather     Blindness Neg Hx     Cancer Neg Hx     Diabetes Neg Hx     Glaucoma Neg Hx     Rheum arthritis Neg Hx     Psoriasis Neg Hx     Lupus Neg Hx     Amblyopia Neg Hx     Hypertension Neg Hx     Macular degeneration Neg Hx     Retinal detachment Neg Hx     Strabismus Neg Hx     Stroke Neg Hx     Thyroid disease Neg Hx        ALLERGIES AND MEDICATIONS: updated and reviewed.  Review of  patient's allergies indicates:   Allergen Reactions    Astelin [azelastine] Other (See Comments)     Dry mouth    Flomax [tamsulosin]      Nosebleed     Current Outpatient Medications   Medication Sig Dispense Refill    alfuzosin (UROXATRAL) 10 mg Tb24 Take 1 tablet (10 mg total) by mouth daily with breakfast. 30 tablet 5    budesonide (PULMICORT) 0.25 mg/2 mL nebulizer solution Take 2 mLs (0.25 mg total) by nebulization once daily. Controller 30 vial 3    cinnamon bark 500 mg capsule Take 500 mg by mouth once daily.      fluoxetine (PROZAC) 20 MG capsule Take 1 capsule (20 mg total) by mouth once daily. 30 capsule 5    ipratropium (ATROVENT) 0.06 % nasal spray 2 sprays by Nasal route 4 (four) times daily. 15 mL 1    lisinopril-hydrochlorothiazide (PRINZIDE,ZESTORETIC) 10-12.5 mg per tablet Take 1 tablet by mouth once daily. 90 tablet 3    multivitamin (THERAGRAN) per tablet Take by mouth.  Tablet Oral Every day      omega-3 fatty acids 1,250 mg Cap Take by mouth.      aspirin (ECOTRIN) 81 MG EC tablet Take 1 tablet (81 mg total) by mouth once daily.  0    tiZANidine (ZANAFLEX) 4 MG tablet Take 1 tablet (4 mg total) by mouth every 8 (eight) hours as needed. 30 tablet 0     No current facility-administered medications for this visit.        ROS  Review of Systems   Constitutional: Negative for chills and fever.   HENT: Negative for hearing loss and sore throat.    Eyes: Negative for visual disturbance.   Respiratory: Negative for cough and shortness of breath.    Cardiovascular: Negative for chest pain, palpitations and leg swelling.   Gastrointestinal: Negative for abdominal pain, constipation, diarrhea, nausea and vomiting.   Genitourinary: Negative for dysuria, frequency and urgency.   Musculoskeletal: Positive for arthralgias (L shoulder pain). Negative for joint swelling and myalgias.   Skin: Negative for rash and wound.   Neurological: Negative for headaches.   Psychiatric/Behavioral: Negative for  "agitation and confusion. The patient is not nervous/anxious.          OBJECTIVE     Physical Exam  Vitals:    09/10/18 1357   BP: (!) 130/90   Pulse: 65   Temp: 98.3 °F (36.8 °C)    Body mass index is 26.67 kg/m².  Weight: 91.7 kg (202 lb 2.6 oz)   Height: 6' 1" (185.4 cm)     Physical Exam   Constitutional: He is oriented to person, place, and time. He appears well-developed and well-nourished. No distress.   HENT:   Head: Normocephalic and atraumatic.   Right Ear: External ear normal.   Left Ear: External ear normal.   Nose: Nose normal.   Mouth/Throat: Oropharynx is clear and moist.   Eyes: Conjunctivae and EOM are normal. Right eye exhibits no discharge. Left eye exhibits no discharge. No scleral icterus.   Neck: Normal range of motion. Neck supple. No JVD present. No tracheal deviation present.   Cardiovascular: Normal rate, regular rhythm, normal heart sounds and intact distal pulses. Exam reveals no gallop and no friction rub.   No murmur heard.  Pulmonary/Chest: Effort normal and breath sounds normal. No respiratory distress. He has no wheezes.   Abdominal: Soft. Bowel sounds are normal. He exhibits no distension and no mass. There is no tenderness. There is no rebound and no guarding.   Musculoskeletal: Normal range of motion. He exhibits tenderness (L posterior shoulder). He exhibits no edema or deformity.   Neurological: He is alert and oriented to person, place, and time. He exhibits normal muscle tone. Coordination normal.   Skin: Skin is warm and dry. No rash noted. No erythema.   Psychiatric: He has a normal mood and affect. His behavior is normal. Judgment and thought content normal.         Health Maintenance       Date Due Completion Date    High Dose Statin 06/09/1969 ---    Zoster Vaccine 06/09/2008 ---    Influenza Vaccine 08/01/2018 10/30/2015 (Declined)    Override on 10/30/2015: Declined    Lipid Panel 08/02/2022 8/2/2017    TETANUS VACCINE 06/20/2026 6/20/2016    Colonoscopy 06/29/2026 " 6/29/2016            ASSESSMENT     68 y.o. male with     1. Acute pain of left shoulder    2. Essential hypertension        PLAN:     1. Acute pain of left shoulder  - Possibly 2/2 muscle strain  - Pt encouraged to apply ice packs 2-3 times daily at 10 minute intervals x 72 hours, then okay to change to heating compress with care not to burn his self; he  voiced understanding   - Pt advised to take NSAIDs prn pain, but reports a ?adverse reaction so pt to continue Tylenol prn  - tiZANidine (ZANAFLEX) 4 MG tablet; Take 1 tablet (4 mg total) by mouth every 8 (eight) hours as needed.  Dispense: 30 tablet; Refill: 0    2. Essential hypertension  - BP elevated above goal of <140/90; possibly 2/2 pain  - Continue current meds and monitor        RTC in 1-2 weeks as needed for any acute worsening of current condition or failure to improve        Serina Boles MD  09/10/2018 2:28 PM        No Follow-up on file.

## 2018-10-08 ENCOUNTER — PES CALL (OUTPATIENT)
Dept: ADMINISTRATIVE | Facility: CLINIC | Age: 68
End: 2018-10-08

## 2018-12-28 ENCOUNTER — PES CALL (OUTPATIENT)
Dept: ADMINISTRATIVE | Facility: CLINIC | Age: 68
End: 2018-12-28

## 2019-01-23 ENCOUNTER — OFFICE VISIT (OUTPATIENT)
Dept: FAMILY MEDICINE | Facility: CLINIC | Age: 69
End: 2019-01-23
Payer: MEDICARE

## 2019-01-23 VITALS
HEIGHT: 73 IN | DIASTOLIC BLOOD PRESSURE: 80 MMHG | SYSTOLIC BLOOD PRESSURE: 142 MMHG | WEIGHT: 201.94 LBS | BODY MASS INDEX: 26.76 KG/M2 | OXYGEN SATURATION: 97 % | HEART RATE: 82 BPM

## 2019-01-23 DIAGNOSIS — Z00.00 ENCOUNTER FOR PREVENTIVE HEALTH EXAMINATION: Primary | ICD-10-CM

## 2019-01-23 DIAGNOSIS — M05.79 RHEUMATOID ARTHRITIS INVOLVING MULTIPLE SITES WITH POSITIVE RHEUMATOID FACTOR: ICD-10-CM

## 2019-01-23 DIAGNOSIS — I70.0 ATHEROSCLEROSIS OF AORTA: ICD-10-CM

## 2019-01-23 DIAGNOSIS — I10 ESSENTIAL HYPERTENSION: ICD-10-CM

## 2019-01-23 DIAGNOSIS — I25.10 CORONARY ARTERY DISEASE DUE TO CALCIFIED CORONARY LESION: Chronic | ICD-10-CM

## 2019-01-23 DIAGNOSIS — M35.01 SJOGREN'S SYNDROME WITH KERATOCONJUNCTIVITIS SICCA: ICD-10-CM

## 2019-01-23 DIAGNOSIS — I25.84 CORONARY ARTERY DISEASE DUE TO CALCIFIED CORONARY LESION: Chronic | ICD-10-CM

## 2019-01-23 PROCEDURE — G0439 PPPS, SUBSEQ VISIT: HCPCS | Mod: S$GLB,,, | Performed by: NURSE PRACTITIONER

## 2019-01-23 PROCEDURE — 99214 OFFICE O/P EST MOD 30 MIN: CPT | Mod: PBBFAC,PO | Performed by: NURSE PRACTITIONER

## 2019-01-23 PROCEDURE — 99999 PR PBB SHADOW E&M-EST. PATIENT-LVL IV: ICD-10-PCS | Mod: PBBFAC,,, | Performed by: NURSE PRACTITIONER

## 2019-01-23 PROCEDURE — 99999 PR PBB SHADOW E&M-EST. PATIENT-LVL IV: CPT | Mod: PBBFAC,,, | Performed by: NURSE PRACTITIONER

## 2019-01-23 PROCEDURE — G0439 PR MEDICARE ANNUAL WELLNESS SUBSEQUENT VISIT: ICD-10-PCS | Mod: S$GLB,,, | Performed by: NURSE PRACTITIONER

## 2019-01-23 NOTE — PATIENT INSTRUCTIONS
Counseling and Referral of Other Preventative  (Italic type indicates deductible and co-insurance are waived)    Patient Name: Rizwan Demarco  Today's Date: 1/23/2019    Health Maintenance       Date Due Completion Date    High Dose Statin 06/09/1969 ---    Zoster Vaccine 06/09/2008 Patient aware of recommendation for zoster vaccine.     Influenza Vaccine 08/01/2018 10/30/2015 (Declined), Patient aware of recommendation for updated influenza vaccine.     Override on 10/30/2015: Declined    Lipid Panel 08/02/2022 8/2/2017    TETANUS VACCINE 06/20/2026 6/20/2016    Colonoscopy 06/29/2026 6/29/2016        No orders of the defined types were placed in this encounter.    The following information is provided to all patients.  This information is to help you find resources for any of the problems found today that may be affecting your health:                Living healthy guide: www.[facility].[place].gov      Understanding Diabetes: www.diabetes.org      Eating healthy: www.cdc.gov/healthyweight      CDC home safety checklist: www.cdc.gov/steadi/patient.html      Agency on Aging: www.goea.[place].[facility]      Alcoholics anonymous (AA): www.aa.org      Physical Activity: www.jimenez.nih.gov/vz8bgxm      Tobacco use: www.quitwithusla.org

## 2019-01-24 PROBLEM — I70.0 ATHEROSCLEROSIS OF AORTA: Status: ACTIVE | Noted: 2019-01-24

## 2019-01-24 NOTE — PROGRESS NOTES
"Rizwan Demarco presented for a  Medicare AWV and comprehensive Health Risk Assessment today. The following components were reviewed and updated:    · Medical history  · Family History  · Social history  · Allergies and Current Medications  · Health Risk Assessment  · Health Maintenance  · Care Team     ** See Completed Assessments for Annual Wellness Visit within the encounter summary.**       The following assessments were completed:  · Living Situation  · CAGE  · Depression Screening  · Timed Get Up and Go  · Whisper Test  · Cognitive Function Screening  ·   ·   · Nutrition Screening  · ADL Screening  · PAQ Screening    Vitals:    01/23/19 0806   BP: (!) 142/80   BP Location: Left arm   Patient Position: Sitting   BP Method: Large (Manual)   Pulse: 82   SpO2: 97%   Weight: 91.6 kg (201 lb 15.1 oz)   Height: 6' 1" (1.854 m)     Body mass index is 26.64 kg/m².  Physical Exam   Constitutional: He is oriented to person, place, and time.   Cardiovascular: Normal rate.   Pulmonary/Chest: Effort normal.   Musculoskeletal: Normal range of motion.   Neurological: He is alert and oriented to person, place, and time.   Skin: Skin is warm. Capillary refill takes less than 2 seconds.   Psychiatric: He has a normal mood and affect. His behavior is normal. Thought content normal.   Vitals reviewed.        Diagnoses and health risks identified today and associated recommendations/orders:    1. Encounter for preventive health examination  Education provided about preventive health examinations and procedures; addressed and discussed patient's health concerns. Additionally, reviewed medical record for risk factors and documented the results during this encounter.    2. Sjogren's syndrome with keratoconjunctivitis sicca  Stable and controlled. Continue current treatment plan as previously prescribed with your PCP.     3. Rheumatoid arthritis involving multiple sites with positive rheumatoid factor  Stable, patient " evaluated/monitored by [facility] rheumatology dept; continue as advised.     4. Atherosclerosis of aorta  Stable, patient evaluated/monitored by [facility] cardiology dept; continue as advised.     5. Coronary artery disease due to calcified coronary lesion  Stable, patient evaluated/monitored by [facility] cardiology dept; continue as advised.     6. Essential hypertension  Presently not at goal. We discussed health risks associated with this issue.  Patient aware of dietary and lifestyle modifications and medicinal interventions.     Reviewed health maintenance, educated about recommended examinations, procedures (labs & images), and immunizations. Declined immunizations (influenza and zoster) at today's visit.       Provided Rizwan with a 5-10 year written screening schedule and personal prevention plan. Recommendations were developed using the USPSTF age appropriate recommendations. Education, counseling, and referrals were provided as needed. After Visit Summary printed and given to patient which includes a list of additional screenings\tests needed.    Follow-up in about 1 year (around 1/23/2020) for assessment .    Aurelio Schaefer Jr, NP

## 2019-01-27 ENCOUNTER — PATIENT MESSAGE (OUTPATIENT)
Dept: OTOLARYNGOLOGY | Facility: CLINIC | Age: 69
End: 2019-01-27

## 2019-01-28 ENCOUNTER — TELEPHONE (OUTPATIENT)
Dept: FAMILY MEDICINE | Facility: CLINIC | Age: 69
End: 2019-01-28

## 2019-01-28 ENCOUNTER — OFFICE VISIT (OUTPATIENT)
Dept: FAMILY MEDICINE | Facility: CLINIC | Age: 69
End: 2019-01-28
Payer: MEDICARE

## 2019-01-28 VITALS
TEMPERATURE: 98 F | WEIGHT: 207.44 LBS | SYSTOLIC BLOOD PRESSURE: 154 MMHG | BODY MASS INDEX: 27.49 KG/M2 | OXYGEN SATURATION: 97 % | DIASTOLIC BLOOD PRESSURE: 88 MMHG | HEART RATE: 68 BPM | HEIGHT: 73 IN

## 2019-01-28 DIAGNOSIS — I25.84 CORONARY ARTERY DISEASE DUE TO CALCIFIED CORONARY LESION: Chronic | ICD-10-CM

## 2019-01-28 DIAGNOSIS — R39.9 LOWER URINARY TRACT SYMPTOMS (LUTS): ICD-10-CM

## 2019-01-28 DIAGNOSIS — R06.02 SHORTNESS OF BREATH: ICD-10-CM

## 2019-01-28 DIAGNOSIS — I10 ESSENTIAL HYPERTENSION: ICD-10-CM

## 2019-01-28 DIAGNOSIS — L24.9 IRRITANT CONTACT DERMATITIS, UNSPECIFIED TRIGGER: Primary | ICD-10-CM

## 2019-01-28 DIAGNOSIS — I10 ESSENTIAL HYPERTENSION: Chronic | ICD-10-CM

## 2019-01-28 DIAGNOSIS — I70.0 ATHEROSCLEROSIS OF AORTA: ICD-10-CM

## 2019-01-28 DIAGNOSIS — I25.10 CORONARY ARTERY DISEASE DUE TO CALCIFIED CORONARY LESION: Chronic | ICD-10-CM

## 2019-01-28 PROCEDURE — 99213 OFFICE O/P EST LOW 20 MIN: CPT | Mod: PBBFAC,PO | Performed by: INTERNAL MEDICINE

## 2019-01-28 PROCEDURE — 99213 PR OFFICE/OUTPT VISIT, EST, LEVL III, 20-29 MIN: ICD-10-PCS | Mod: S$PBB,,, | Performed by: INTERNAL MEDICINE

## 2019-01-28 PROCEDURE — 99999 PR PBB SHADOW E&M-EST. PATIENT-LVL III: ICD-10-PCS | Mod: PBBFAC,,, | Performed by: INTERNAL MEDICINE

## 2019-01-28 PROCEDURE — 99213 OFFICE O/P EST LOW 20 MIN: CPT | Mod: S$PBB,,, | Performed by: INTERNAL MEDICINE

## 2019-01-28 PROCEDURE — 99999 PR PBB SHADOW E&M-EST. PATIENT-LVL III: CPT | Mod: PBBFAC,,, | Performed by: INTERNAL MEDICINE

## 2019-01-28 RX ORDER — ALFUZOSIN HYDROCHLORIDE 10 MG/1
10 TABLET, EXTENDED RELEASE ORAL
Qty: 90 TABLET | Refills: 1 | Status: SHIPPED | OUTPATIENT
Start: 2019-01-28 | End: 2019-10-30 | Stop reason: SDUPTHER

## 2019-01-28 RX ORDER — ALFUZOSIN HYDROCHLORIDE 10 MG/1
10 TABLET, EXTENDED RELEASE ORAL
Qty: 90 TABLET | OUTPATIENT
Start: 2019-01-28 | End: 2020-01-28

## 2019-01-28 RX ORDER — BUDESONIDE 0.25 MG/2ML
0.25 INHALANT ORAL DAILY
Qty: 90 VIAL | Refills: 1 | Status: SHIPPED | OUTPATIENT
Start: 2019-01-28 | End: 2019-08-02 | Stop reason: SDUPTHER

## 2019-01-28 RX ORDER — ALCLOMETASONE DIPROPIONATE 0.5 MG/G
CREAM TOPICAL 2 TIMES DAILY
Qty: 45 G | Refills: 0 | Status: SHIPPED | OUTPATIENT
Start: 2019-01-28 | End: 2022-06-03

## 2019-01-28 RX ORDER — BUDESONIDE 0.25 MG/2ML
0.25 INHALANT ORAL DAILY
Qty: 180 ML | Refills: 3 | OUTPATIENT
Start: 2019-01-28 | End: 2020-01-28

## 2019-01-28 RX ORDER — LISINOPRIL AND HYDROCHLOROTHIAZIDE 10; 12.5 MG/1; MG/1
1 TABLET ORAL DAILY
Qty: 90 TABLET | Refills: 3 | Status: SHIPPED | OUTPATIENT
Start: 2019-01-28 | End: 2019-02-19 | Stop reason: SDUPTHER

## 2019-01-28 RX ORDER — BUDESONIDE 0.25 MG/2ML
0.25 INHALANT ORAL DAILY
Qty: 90 VIAL | Refills: 1 | Status: SHIPPED | OUTPATIENT
Start: 2019-01-28 | End: 2019-01-28 | Stop reason: SDUPTHER

## 2019-01-28 RX ORDER — ALFUZOSIN HYDROCHLORIDE 10 MG/1
10 TABLET, EXTENDED RELEASE ORAL
Qty: 90 TABLET | Refills: 1 | Status: SHIPPED | OUTPATIENT
Start: 2019-01-28 | End: 2019-01-28 | Stop reason: SDUPTHER

## 2019-01-28 RX ORDER — LISINOPRIL AND HYDROCHLOROTHIAZIDE 10; 12.5 MG/1; MG/1
1 TABLET ORAL DAILY
Qty: 90 TABLET | OUTPATIENT
Start: 2019-01-28 | End: 2020-01-28

## 2019-01-28 RX ORDER — LISINOPRIL AND HYDROCHLOROTHIAZIDE 10; 12.5 MG/1; MG/1
1 TABLET ORAL DAILY
Qty: 90 TABLET | Refills: 1 | Status: SHIPPED | OUTPATIENT
Start: 2019-01-28 | End: 2019-01-28 | Stop reason: SDUPTHER

## 2019-01-28 RX ORDER — LISINOPRIL AND HYDROCHLOROTHIAZIDE 10; 12.5 MG/1; MG/1
1 TABLET ORAL DAILY
Qty: 90 TABLET | Refills: 1 | Status: SHIPPED | OUTPATIENT
Start: 2019-01-28 | End: 2021-05-05

## 2019-01-28 NOTE — TELEPHONE ENCOUNTER
----- Message from Kesha Trujillo sent at 1/28/2019  8:22 AM CST -----  Contact: self  [phone]  Pt is requesting 90 day refill on      budesonide (PULMICORT) 0.25 mg/2 mL nebulizer solution      alfuzosin (UROXATRAL) 10 mg Tb24     lisinopril-hydrochlorothiazide (PRINZIDE,ZESTORETIC) 10-12    Pls send to ...  [facility] - [place], [place] - [address] [address]  Phone: [phone] Fax: [phone]    Thanks......Linda

## 2019-01-28 NOTE — PROGRESS NOTES
Subjective:       Chief Complaint  Chief Complaint   Patient presents with    Rash     1 mo       HPI  Rizwan Demarco Jr. is a 68 y.o. male with multiple medical diagnoses as listed in the medical history and problem list that presents for rash.     Rash/irrititation of penis - 1 month  Non-painful  Has had similar rash before - Seen in 2016 by Dermatology - Dr Smith - diagnosed with irritant contact dermatitis  No sexual activity/new partners  No exposures or ointments/creams that he has been using  History of frequent urination an dpoor urinary flow - evaluated by Urology/Dr Diaz      Patient Care Team:  Raúl Perkins MD as PCP - General (Family Medicine)  Raúl Perkins MD as PCP - MSSP Attributed  Jose Nascimento III, MD as Consulting Physician (Otolaryngology)  Osman Ordoñez OD as Consulting Physician (Optometry)      PAST MEDICAL HISTORY:  Past Medical History:   Diagnosis Date    Allergy     Arthritis     Rheumatoid arthritis    Blood clotting tendency     BPH (benign prostatic hypertrophy)     Cervical spondylosis     Colon polyps     Coronary artery disease     Depression 5/8/2015    Dry eyes     Dry mouth     GERD (gastroesophageal reflux disease)     Heart attack     Hyperlipidemia     Hypertension     Lumbar spondylosis     Nasal obstruction     Rheumatoid arthritis     Sinusitis     Special screening for malignant neoplasm of colon 6/29/2016    Trouble in sleeping        PAST SURGICAL HISTORY:  Past Surgical History:   Procedure Laterality Date    COLONOSCOPY N/A 6/29/2016    [facility]O    CORONARY ARTERY BYPASS GRAFT      EXCISION TURBINATE, SUBMUCOUS      KNEE ARTHROSCOPY W/ DEBRIDEMENT      NASAL SEPTUM SURGERY      RESECTION-TURBINATES (SMR) Bilateral 9/18/2015    Performed by [facility] at [facility] OR 2ND FLR    SEPTOPLASTY Bilateral 9/18/2015    Performed by Jose Nascimento III, MD at [facility] OR [facility] FLR    SINUS SURGERY  FUNCTIONAL ENDOSCOPIC WITH NAVIGATION Bilateral 2015    Per[facility] at [facility] OR 2ND FLR    TONSILLECTOMY         SOCIAL HISTORY:  Social History     Socioeconomic History    Marital status: Single     Spouse name: Not on file    Number of children: Not on file    Years of education: Not on file    Highest education level: Not on file   Social Needs    Financial resource strain: Not on file    Food insecurity - worry: Not on file    Food insecurity - inability: Not on file    Transportation needs - medical: Not on file    Transportation needs - non-medical: Not on file   Occupational History    Not on file   Tobacco Use    Smoking status: Former Smoker     Packs/day: 1.00     Years: 20.00     Pack years: 20.00     Types: Cigarettes     Last attempt to quit: 1991     Years since quittin.0    Smokeless tobacco: Never Used    Tobacco comment: Quit .   Substance and Sexual Activity    Alcohol use: No    Drug use: No    Sexual activity: Not on file   Other Topics Concern    Not on file   Social History Narrative    Not on file       FAMILY HISTORY:  Family History   Problem Relation Age of Onset    Hyperlipidemia Mother     Alzheimer's disease Mother     Stomach cancer Father     Cataracts Other     No Known Problems Sister     No Known Problems Brother     No Known Problems Maternal Aunt     No Known Problems Maternal Uncle     No Known Problems Paternal Aunt     No Known Problems Paternal Uncle     No Known Problems Maternal Grandmother     No Known Problems Maternal Grandfather     No Known Problems Paternal Grandmother     No Known Problems Paternal Grandfather     Blindness Neg Hx     Cancer Neg Hx     Diabetes Neg Hx     Glaucoma Neg Hx     Rheum arthritis Neg Hx     Psoriasis Neg Hx     Lupus Neg Hx     Amblyopia Neg Hx     Hypertension Neg Hx     Macular degeneration Neg Hx     Retinal detachment Neg Hx     Strabismus Neg Hx      Stroke Neg Hx     Thyroid disease Neg Hx        ALLERGIES AND MEDICATIONS: updated and reviewed.  Review of patient's allergies indicates:   Allergen Reactions    Astelin [azelastine] Other (See Comments)     Dry mouth    Flomax [tamsulosin]      Nosebleed     Current Outpatient Medications   Medication Sig Dispense Refill    alfuzosin (UROXATRAL) 10 mg Tb24 Take 1 tablet (10 mg total) by mouth daily with breakfast. 90 tablet 1    budesonide (PULMICORT) 0.25 mg/2 mL nebulizer solution Take 2 mLs (0.25 mg total) by nebulization once daily. Controller 90 vial 1    cinnamon bark 500 mg capsule Take 500 mg by mouth once daily.      ipratropium (ATROVENT) 0.06 % nasal spray 2 sprays by Nasal route 4 (four) times daily. 15 mL 1    lisinopril-hydrochlorothiazide (PRINZIDE,ZESTORETIC) 10-12.5 mg per tablet Take 1 tablet by mouth once daily. 90 tablet 3    lisinopril-hydrochlorothiazide (PRINZIDE,ZESTORETIC) 10-12.5 mg per tablet Take 1 tablet by mouth once daily. 90 tablet 1    multivitamin (THERAGRAN) per tablet Take by mouth.  Tablet Oral Every day      propylene glycol (SYSTANE BALANCE) 0.6 % Drop Apply to eye.      alclomethasone (ACLOVATE) 0.05 % cream Apply topically 2 (two) times daily. For irritant contact dermatitis 45 g 0    aspirin (ECOTRIN) 81 MG EC tablet Take 1 tablet (81 mg total) by mouth once daily.  0    fluoxetine (PROZAC) 20 MG capsule Take 1 capsule (20 mg total) by mouth once daily. 30 capsule 5    omega-3 fatty acids 1,250 mg Cap Take by mouth.       No current facility-administered medications for this visit.          ROS  Review of Systems   Constitutional: Negative for chills, diaphoresis and fever.   Skin: Positive for rash (penis). Negative for wound.   Hematological: Negative for adenopathy.         Objective:       Physical Exam  Vitals:    01/28/19 1023 01/28/19 1241   BP: (!) 160/80 (!) 154/88   BP Location: Left arm    Patient Position: Sitting    BP Method: Large (Manual)  "   Pulse: 68    Temp: 97.9 °F (36.6 °C)    TempSrc: Oral    SpO2: 97%    Weight: 94.1 kg (207 lb 7.3 oz)    Height: 6' 1" (1.854 m)     Body mass index is 27.37 kg/m².  Weight: 94.1 kg (207 lb 7.3 oz)   Height: 6' 1" (185.4 cm)   Physical Exam   Constitutional: He appears well-developed and well-nourished.   HENT:   Head: Normocephalic and atraumatic.   Eyes: Conjunctivae and EOM are normal.   Neck: Normal range of motion. Neck supple.   Cardiovascular: Normal rate.   Pulmonary/Chest: Effort normal.   Musculoskeletal: He exhibits no edema.   Lymphadenopathy:     He has no cervical adenopathy.   Neurological: He is alert. No cranial nerve deficit.   Skin: Skin is warm and dry. Rash (pink erythematous patches of glans penis) noted.   Psychiatric: He has a normal mood and affect. His behavior is normal.   Vitals reviewed.          Assessment:     1. Irritant contact dermatitis, unspecified trigger    2. Essential hypertension    3. Coronary artery disease due to calcified coronary lesion    4. Atherosclerosis of aorta    5. Lower urinary tract symptoms (LUTS)      Plan:     Rizwan was seen today for rash.    Diagnoses and all orders for this visit:    Irritant contact dermatitis, unspecified trigger  Of glans penis - steroid prescribed as noted  Consider Derm referral if not improving  -     alclomethasone (ACLOVATE) 0.05 % cream; Apply topically 2 (two) times daily. For irritant contact dermatitis    Essential hypertension  Blood pressure is not controlled today. We discussed low salt diet and regular exercise. Patient reports that they have not taken any decongestant or anti-inflammatory medication recently (patient educated that these medications can increase blood pressure).  Medication changes made today: None - needs refill on ACEi-HCTZ combo - Patient will come back in 2-4 weeks for recheck of blood pressure by nursing staff. Patient also asked to check blood pressure at home and bring recordings to next office " visit. Patient may consider Digital HTN program  -     lisinopril-hydrochlorothiazide (PRINZIDE,ZESTORETIC) 10-12.5 mg per tablet; Take 1 tablet by mouth once daily.    Coronary artery disease due to calcified coronary lesion  No anginal complaints. The current medical regimen is effective;  continue present plan and medications.    Atherosclerosis of aorta  Asymtptomatic. The current medical regimen is effective;  continue present plan and medications.    Lower urinary tract symptoms (LUTS)  Sent medication refill to patient's preferred pharmacy on file.  -     alfuzosin (UROXATRAL) 10 mg Tb24; Take 1 tablet (10 mg total) by mouth daily with breakfast.    Other orders  Sent medication refill to patient's preferred pharmacy on file.  -     budesonide (PULMICORT) 0.25 mg/2 mL nebulizer solution; Take 2 mLs (0.25 mg total) by nebulization once daily. Controller          Health Maintenance       Date Due Completion Date    High Dose Statin 06[birth year]/1969 ---    Influenza Vaccine 03/14/2019 (Originally 8/1/2018) 10/30/2015 (Declined)    Override on 10/30/2015: Declined    Zoster Vaccine 01/23/2020 (Originally 6/9/2008) ---    Lipid Panel 08/02/2022 8/2/2017    TETANUS VACCINE 06/20/2026 6/20/2016    Colonoscopy 06/29/2026 6/29/2016            Health Maintenance reviewed, addressed as per orders    Follow-up if symptoms worsen or fail to improve.    The patient expressed understanding and no barriers to adherence were identified.     1. The patient indicates understanding of these issues and agrees with the plan. Brief care plan is updated and reviewed with the patient as applicable.     2. The patient is given an After Visit Summary that lists all medications with directions, allergies, orders placed during this encounter and follow-up instructions.     3. I have reviewed the patient's medical information including past medical, family, and social history sections including the medications and allergies.     4. We discussed  the patient's current medications. I reconciled the patient's medication list and prepared and supplied needed refills.       Ignacio Hodges MD  Internal Medicine-Pediatrics

## 2019-01-28 NOTE — TELEPHONE ENCOUNTER
----- Message from Moriah Jha sent at 1/28/2019  2:30 PM CST -----  Contact: self [phone]  Pt is requesting a refill on albuterol sulfate nebulizer solution 2.5 mg   .  [facility] [place] [facility] - [place], [place] - [facility] [address]  [facility] [address] [facility] [id number]  Phone: [phone] Fax: [phone]

## 2019-01-28 NOTE — PATIENT INSTRUCTIONS
"  Contact Dermatitis  Contact dermatitis is a skin rash caused by something that touches the skin and makes it irritated and inflamed. Your skin may be red, swollen, dry, and may be cracked. Blisters may form and ooze. The rash will itch.  Contact dermatitis can form on the face and neck, backs of hands, forearms, genitals, and lower legs.  People can get contact dermatitis from lots of sources. These include:  · Plants such as poison ivy, oak, or sumac  · Chemicals in hair dyes and rinses, soaps, solvents, waxes, fingernail polish, and deodorants   · Jewelry or watchbands made of nickel  Contact dermatitis is not passed from person to person.  Talk with your healthcare provider about what may have caused the rash. A type of allergy testing called "patch testing" may be used to discover what you are allergic to. You will need to avoid the source of your rash in the future to prevent it from coming back.  Treatment is done to relieve itching and prevent the rash from coming back. The rash should go away in a few days to a few weeks.  Home care  Your healthcare provider may prescribe medicine to relieve swelling and itching. Follow all instructions when using these medicines.  General care:  · Avoid anything that heats up your skin, such as hot showers or baths, or direct sunlight. This can make itching worse.  · Apply cold compresses to soothe your sores to help relieve your symptoms. Do this for 30 minutes 3 to 4 times a day. You can make a cold compress by soaking a cloth in cold water. Squeeze out excess water. You can add colloidal oatmeal to the water to help reduce itching. For severe itching in a small area, apply an ice pack wrapped in a thin towel. Do this for 20 minutes 3 to 4 times a day.  · You can also try wet dressings. One way to do this is to wear a wet piece of clothing under a dry one. Wear a damp shirt under a dry shirt if your upper body is affected. This can relieve itching and prevent you from " scratching the affected area.  · You can also help relieve large areas of itching by taking a lukewarm bath with colloidal oatmeal added to the water.  · Use hydrocortisone cream for redness and irritation, unless another medicine was prescribed. You can also use benzocaine anesthetic cream or spray. Calamine lotion can also relieve mild symptoms.  · Use oral diphenhydramine to help reduce itching. You can buy this antihistamine at drug and grocery stores. It can make you sleepy, so use lower doses during the daytime. Or you can use loratadine. This is an antihistamine that will not make you sleepy. Do not use diphenhydramine if you have glaucoma or have trouble urinating due to an enlarged prostate.  · If a plant causes your rash, make sure to wash your skin and the clothes you were wearing when you came into contact with the plant. This is to wash away the plant oils that gave you the rash and prevent more or worse symptoms.  · Stay away from the substance or object that causes your symptoms. If you cant avoid it, wear gloves or some other type of protection.  Follow-up care  Follow up with your healthcare provider, or as advised.  When to seek medical advice  Call your healthcare provider right away if any of these occur:  · Spreading of the rash to other parts of your body  · Severe swelling of your face, eyelids, mouth, throat or tongue  · Trouble urinating due to swelling in the genital area  · Fever of 100.4°F (38°C) or higher  · Redness or swelling that gets worse  · Pain that gets worse  · Foul-smelling fluid leaking from the skin  · Yellow-brown crusts on the open blisters  Date Last Reviewed: 9/1/2016  © 7333-5549 [employer]. [address] [place] [address], [place] [address]. All rights reserved. This information is not intended as a substitute for professional medical care. Always follow your healthcare professional's instructions.

## 2019-01-28 NOTE — TELEPHONE ENCOUNTER
Spoke with pt refill request were sent to the [facility] and a refill for topical cream called to [facility], pt aware.

## 2019-01-29 ENCOUNTER — PATIENT MESSAGE (OUTPATIENT)
Dept: ADMINISTRATIVE | Facility: OTHER | Age: 69
End: 2019-01-29

## 2019-02-19 ENCOUNTER — OFFICE VISIT (OUTPATIENT)
Dept: FAMILY MEDICINE | Facility: CLINIC | Age: 69
End: 2019-02-19
Payer: MEDICARE

## 2019-02-19 VITALS
HEART RATE: 61 BPM | SYSTOLIC BLOOD PRESSURE: 130 MMHG | WEIGHT: 203.5 LBS | RESPIRATION RATE: 16 BRPM | OXYGEN SATURATION: 98 % | HEIGHT: 73 IN | BODY MASS INDEX: 26.97 KG/M2 | DIASTOLIC BLOOD PRESSURE: 74 MMHG | TEMPERATURE: 98 F

## 2019-02-19 DIAGNOSIS — R39.12 BENIGN PROSTATIC HYPERPLASIA WITH WEAK URINARY STREAM: ICD-10-CM

## 2019-02-19 DIAGNOSIS — M47.812 OSTEOARTHRITIS OF CERVICAL SPINE, UNSPECIFIED SPINAL OSTEOARTHRITIS COMPLICATION STATUS: Chronic | ICD-10-CM

## 2019-02-19 DIAGNOSIS — M05.79 RHEUMATOID ARTHRITIS INVOLVING MULTIPLE SITES WITH POSITIVE RHEUMATOID FACTOR: Primary | ICD-10-CM

## 2019-02-19 DIAGNOSIS — E78.5 HYPERLIPIDEMIA, UNSPECIFIED HYPERLIPIDEMIA TYPE: Chronic | ICD-10-CM

## 2019-02-19 DIAGNOSIS — I25.84 CORONARY ARTERY DISEASE DUE TO CALCIFIED CORONARY LESION: Chronic | ICD-10-CM

## 2019-02-19 DIAGNOSIS — N40.1 BENIGN PROSTATIC HYPERPLASIA WITH WEAK URINARY STREAM: ICD-10-CM

## 2019-02-19 DIAGNOSIS — I25.10 CORONARY ARTERY DISEASE DUE TO CALCIFIED CORONARY LESION: Chronic | ICD-10-CM

## 2019-02-19 DIAGNOSIS — R53.83 FATIGUE, UNSPECIFIED TYPE: ICD-10-CM

## 2019-02-19 DIAGNOSIS — K59.04 CHRONIC IDIOPATHIC CONSTIPATION: ICD-10-CM

## 2019-02-19 PROCEDURE — 99213 OFFICE O/P EST LOW 20 MIN: CPT | Mod: PBBFAC,PO | Performed by: FAMILY MEDICINE

## 2019-02-19 PROCEDURE — 99214 PR OFFICE/OUTPT VISIT, EST, LEVL IV, 30-39 MIN: ICD-10-PCS | Mod: S$PBB,,, | Performed by: FAMILY MEDICINE

## 2019-02-19 PROCEDURE — 99999 PR PBB SHADOW E&M-EST. PATIENT-LVL III: ICD-10-PCS | Mod: PBBFAC,,, | Performed by: FAMILY MEDICINE

## 2019-02-19 PROCEDURE — 99999 PR PBB SHADOW E&M-EST. PATIENT-LVL III: CPT | Mod: PBBFAC,,, | Performed by: FAMILY MEDICINE

## 2019-02-19 PROCEDURE — 99214 OFFICE O/P EST MOD 30 MIN: CPT | Mod: S$PBB,,, | Performed by: FAMILY MEDICINE

## 2019-02-19 NOTE — PROGRESS NOTES
"Chief Complaint   Patient presents with    Hypertension     SUBJECTIVE:   Rizwan Demarco Jr. is a 68 y.o. male presenting for his annual checkup.    Current Outpatient Medications   Medication Sig Dispense Refill    alfuzosin (UROXATRAL) 10 mg Tb24 Take 1 tablet (10 mg total) by mouth daily with breakfast. 90 tablet 1    aspirin (ECOTRIN) 81 MG EC tablet Take 1 tablet (81 mg total) by mouth once daily.  0    budesonide (PULMICORT) 0.25 mg/2 mL nebulizer solution Take 2 mLs (0.25 mg total) by nebulization once daily. Controller 90 vial 1    cinnamon bark 500 mg capsule Take 500 mg by mouth once daily.      ipratropium (ATROVENT) 0.06 % nasal spray 2 sprays by Nasal route 4 (four) times daily. 15 mL 1    lisinopril-hydrochlorothiazide (PRINZIDE,ZESTORETIC) 10-12.5 mg per tablet Take 1 tablet by mouth once daily. 90 tablet 1    multivitamin (THERAGRAN) per tablet Take by mouth.  Tablet Oral Every day      omega-3 fatty acids 1,250 mg Cap Take by mouth.      propylene glycol (SYSTANE BALANCE) 0.6 % Drop Apply to eye.      alclomethasone (ACLOVATE) 0.05 % cream Apply topically 2 (two) times daily. For irritant contact dermatitis 45 g 0     No current facility-administered medications for this visit.      Allergies: Astelin [azelastine] and Flomax [tamsulosin]     ROS:  Feeling well. No dyspnea or chest pain on exertion. No abdominal pain, change in bowel habits, black or bloody stools. No urinary tract or prostatic symptoms. No neurological complaints.    OBJECTIVE:   The patient appears well, alert, oriented x 3, in no distress.   /74   Pulse 61   Temp 98.1 °F (36.7 °C) (Oral)   Resp 16   Ht 6' 1" (1.854 m)   Wt 92.3 kg (203 lb 7.8 oz)   SpO2 98%   BMI 26.85 kg/m²        ENT normal.  Neck supple. No adenopathy or thyromegaly. DANIEL. Lungs are clear, good air entry, no wheezes, rhonchi or rales. S1 and S2 normal, no murmurs, regular rate and rhythm. Abdomen is soft without tenderness, guarding, " mass or organomegaly.  exam: deferred.  Extremities show no edema, normal peripheral pulses. Neurological is normal without focal findings.    ASSESSMENT:   1. Rheumatoid arthritis involving multiple sites with positive rheumatoid factor    2. Osteoarthritis of cervical spine, unspecified spinal osteoarthritis complication status    3. Coronary artery disease due to calcified coronary lesion    4. Benign prostatic hyperplasia with weak urinary stream    5. Fatigue, unspecified type    6. Hyperlipidemia, unspecified hyperlipidemia type        PLAN:   Rizwan was seen today for hypertension.    Diagnoses and all orders for this visit:    Rheumatoid arthritis involving multiple sites with positive rheumatoid factor    Osteoarthritis of cervical spine, unspecified spinal osteoarthritis complication status    Coronary artery disease due to calcified coronary lesion    Benign prostatic hyperplasia with weak urinary stream    Fatigue, unspecified type    Hyperlipidemia, unspecified hyperlipidemia type    continue to monitor no symptoms    Potential medication side effects were discussed with the patient; let me know if any occur.    The current medical regimen is effective;  continue present plan and medications.

## 2019-04-23 ENCOUNTER — OFFICE VISIT (OUTPATIENT)
Dept: FAMILY MEDICINE | Facility: CLINIC | Age: 69
End: 2019-04-23
Payer: MEDICARE

## 2019-04-23 VITALS
TEMPERATURE: 98 F | SYSTOLIC BLOOD PRESSURE: 136 MMHG | BODY MASS INDEX: 27.53 KG/M2 | HEART RATE: 60 BPM | DIASTOLIC BLOOD PRESSURE: 64 MMHG | WEIGHT: 207.69 LBS | OXYGEN SATURATION: 99 % | HEIGHT: 73 IN

## 2019-04-23 DIAGNOSIS — M05.79 RHEUMATOID ARTHRITIS INVOLVING MULTIPLE SITES WITH POSITIVE RHEUMATOID FACTOR: Primary | ICD-10-CM

## 2019-04-23 DIAGNOSIS — I25.84 CORONARY ARTERY DISEASE DUE TO CALCIFIED CORONARY LESION: Chronic | ICD-10-CM

## 2019-04-23 DIAGNOSIS — E78.5 HYPERLIPIDEMIA, UNSPECIFIED HYPERLIPIDEMIA TYPE: Chronic | ICD-10-CM

## 2019-04-23 DIAGNOSIS — J30.89 PERENNIAL ALLERGIC RHINITIS: ICD-10-CM

## 2019-04-23 DIAGNOSIS — I25.10 CORONARY ARTERY DISEASE DUE TO CALCIFIED CORONARY LESION: Chronic | ICD-10-CM

## 2019-04-23 DIAGNOSIS — I70.0 ATHEROSCLEROSIS OF AORTA: ICD-10-CM

## 2019-04-23 DIAGNOSIS — I65.01 OBSTRUCTION OF RIGHT VERTEBRAL ARTERY: ICD-10-CM

## 2019-04-23 PROCEDURE — 99214 PR OFFICE/OUTPT VISIT, EST, LEVL IV, 30-39 MIN: ICD-10-PCS | Mod: S$PBB,,, | Performed by: FAMILY MEDICINE

## 2019-04-23 PROCEDURE — 99999 PR PBB SHADOW E&M-EST. PATIENT-LVL III: ICD-10-PCS | Mod: PBBFAC,,, | Performed by: FAMILY MEDICINE

## 2019-04-23 PROCEDURE — 99214 OFFICE O/P EST MOD 30 MIN: CPT | Mod: S$PBB,,, | Performed by: FAMILY MEDICINE

## 2019-04-23 PROCEDURE — 99999 PR PBB SHADOW E&M-EST. PATIENT-LVL III: CPT | Mod: PBBFAC,,, | Performed by: FAMILY MEDICINE

## 2019-04-23 PROCEDURE — 99213 OFFICE O/P EST LOW 20 MIN: CPT | Mod: PBBFAC,PO | Performed by: FAMILY MEDICINE

## 2019-04-23 RX ORDER — ATORVASTATIN CALCIUM 40 MG/1
40 TABLET, FILM COATED ORAL DAILY
Qty: 90 TABLET | Refills: 3 | Status: SHIPPED | OUTPATIENT
Start: 2019-04-23 | End: 2020-02-14

## 2019-04-23 NOTE — PROGRESS NOTES
Chief Complaint   Patient presents with    Rheumatoid Arthritis     follow up      Chief Complaint   Patient presents with    Rheumatoid Arthritis     follow up        SUBJECTIVE:  Rizwan Demarco Jr. is a 68 y.o. male here for new problem of resolving constipation and he is doing well with the current treatment.  He has RA that is doing well and back and neck issues that are stable as well, here to go over his results as well and b/p that is contorlled.  Currently has co-morbidities including per problem list.      Past Medical History:   Diagnosis Date    Allergy     Arthritis     Rheumatoid arthritis    Blood clotting tendency     BPH (benign prostatic hypertrophy)     Cervical spondylosis     Colon polyps     Coronary artery disease     Depression 5/8/2015    Dry eyes     Dry mouth     GERD (gastroesophageal reflux disease)     Heart attack     Hyperlipidemia     Hypertension     Lumbar spondylosis     Nasal obstruction     Rheumatoid arthritis     Sinusitis     Special screening for malignant neoplasm of colon 6/29/2016    Trouble in sleeping      Past Surgical History:   Procedure Laterality Date    COLONOSCOPY N/A 6/29/2016    Per[facility] by [facility] ENDO    CORONARY ARTERY BYPASS GRAFT      EXCISION TURBINATE, SUBMUCOUS      KNEE ARTHROSCOPY W/ DEBRIDEMENT      NASAL SEPTUM SURGERY      RESECTION-TURBINATES (SMR) Bilateral 9/18/2015    Performed by Jose Nascimento III, MD at [facility] OR [address] [place] [address]    SEPTOPLASTY Bilateral 9/18/2015    Performed by Jose Nascimento III, MD at [facility] OR [address] [place] [address]    SINUS SURGERY FUNCTIONAL ENDOSCOPIC WITH NAVIGATION Bilateral 9/18/2015    Performed by Jose Nascimento III, MD at [facility] OR [address] [place] [address]    TONSILLECTOMY       Social History     Socioeconomic History    Marital status: Single     Spouse name: Not on file    Number of children: Not on file    Years of education: Not on file    Highest education level: Not on file   Occupational  History    Not on file   Social Needs    Financial resource strain: Not on file    Food insecurity:     Worry: Not on file     Inability: Not on file    Transportation needs:     Medical: Not on file     Non-medical: Not on file   Tobacco Use    Smoking status: Former Smoker     Packs/day: 1.00     Years: 20.00     Pack years: 20.00     Types: Cigarettes     Last attempt to quit: 1991     Years since quittin.3    Smokeless tobacco: Never Used    Tobacco comment: Quit .   Substance and Sexual Activity    Alcohol use: No    Drug use: No    Sexual activity: Not on file   Lifestyle    Physical activity:     Days per week: Not on file     Minutes per session: Not on file    Stress: Not on file   Relationships    Social connections:     Talks on phone: Not on file     Gets together: Not on file     Attends Jainism service: Not on file     Active member of club or organization: Not on file     Attends meetings of clubs or organizations: Not on file     Relationship status: Not on file   Other Topics Concern    Not on file   Social History Narrative    Not on file     Family History   Problem Relation Age of Onset    Hyperlipidemia Mother     Alzheimer's disease Mother     Stomach cancer Father     Cataracts Other     No Known Problems Sister     No Known Problems Brother     No Known Problems Maternal Aunt     No Known Problems Maternal Uncle     No Known Problems Paternal Aunt     No Known Problems Paternal Uncle     No Known Problems Maternal Grandmother     No Known Problems Maternal Grandfather     No Known Problems Paternal Grandmother     No Known Problems Paternal Grandfather     Blindness Neg Hx     Cancer Neg Hx     Diabetes Neg Hx     Glaucoma Neg Hx     Rheum arthritis Neg Hx     Psoriasis Neg Hx     Lupus Neg Hx     Amblyopia Neg Hx     Hypertension Neg Hx     Macular degeneration Neg Hx     Retinal detachment Neg Hx     Strabismus Neg Hx     Stroke Neg  "Hx     Thyroid disease Neg Hx      Current Outpatient Medications on File Prior to Visit   Medication Sig Dispense Refill    alclomethasone (ACLOVATE) 0.05 % cream Apply topically 2 (two) times daily. For irritant contact dermatitis 45 g 0    alfuzosin (UROXATRAL) 10 mg Tb24 Take 1 tablet (10 mg total) by mouth daily with breakfast. 90 tablet 1    aspirin (ECOTRIN) 81 MG EC tablet Take 1 tablet (81 mg total) by mouth once daily.  0    budesonide (PULMICORT) 0.25 mg/2 mL nebulizer solution Take 2 mLs (0.25 mg total) by nebulization once daily. Controller 90 vial 1    cinnamon bark 500 mg capsule Take 500 mg by mouth once daily.      ipratropium (ATROVENT) 0.06 % nasal spray 2 sprays by Nasal route 4 (four) times daily. 15 mL 1    linaclotide (LINZESS) 145 mcg Cap capsule Take 1 capsule (145 mcg total) by mouth once daily. 90 capsule 3    lisinopril-hydrochlorothiazide (PRINZIDE,ZESTORETIC) 10-12.5 mg per tablet Take 1 tablet by mouth once daily. 90 tablet 1    multivitamin (THERAGRAN) per tablet Take by mouth.  Tablet Oral Every day      omega-3 fatty acids 1,250 mg Cap Take by mouth.      propylene glycol (SYSTANE BALANCE) 0.6 % Drop Apply to eye.       No current facility-administered medications on file prior to visit.      Review of patient's allergies indicates:   Allergen Reactions    Astelin [azelastine] Other (See Comments)     Dry mouth    Flomax [tamsulosin]      Nosebleed         Review of Systems   Constitutional: Negative.    Cardiovascular: Negative.    Musculoskeletal: Positive for back pain, joint pain, myalgias and neck pain.   Skin: Negative.    Psychiatric/Behavioral: Negative.        OBJECTIVE:  /64 (BP Location: Right arm, Patient Position: Sitting, BP Method: Medium (Manual))   Pulse 60   Temp 98.2 °F (36.8 °C) (Oral)   Ht 6' 1" (1.854 m)   Wt 94.2 kg (207 lb 10.8 oz)   SpO2 99%   BMI 27.40 kg/m²     Wt Readings from Last 3 Encounters:   04/23/19 94.2 kg (207 lb 10.8 " oz)   02/19/19 92.3 kg (203 lb 7.8 oz)   01/28/19 94.1 kg (207 lb 7.3 oz)     BP Readings from Last 3 Encounters:   04/23/19 136/64   02/19/19 130/74   01/28/19 (!) 154/88       He appears well, in no apparent distress.  Alert and oriented times three, pleasant and cooperative. Vital signs are as documented in vital signs section.  S1 and S2 normal, no murmurs, clicks, gallops or rubs. Regular rate and rhythm. Chest is clear; no wheezes or rales. No edema or JVD.  Neck with some crepitus noted and muscles normal  No rashes noted.  Neuro: Cranial nerves and fundi are normal. DANIEL. EOM's intact. No papilledema. Neck supple. No bruits. Normal deep tendon reflexes.      Review of old Records:  Reviewed per epic and     Review of old labs:  Lab Results   Component Value Date    TSH 0.506 05/11/2016     Lab Results   Component Value Date    WBC 5.09 05/11/2016    HGB 15.5 05/11/2016    HCT 48.8 05/11/2016    MCV 91 05/11/2016     05/11/2016       Chemistry        Component Value Date/Time     08/02/2017 1044    K 4.0 08/02/2017 1044     08/02/2017 1044    CO2 27 08/02/2017 1044    BUN 13 08/02/2017 1044    CREATININE 1.1 08/02/2017 1044    GLU 86 08/02/2017 1044        Component Value Date/Time    CALCIUM 9.6 08/02/2017 1044    ALKPHOS 41 (L) 08/02/2017 1044    AST 17 08/02/2017 1044    ALT 12 08/02/2017 1044    BILITOT 0.6 08/02/2017 1044    ESTGFRAFRICA >60.0 08/02/2017 1044    EGFRNONAA >60.0 08/02/2017 1044        Lab Results   Component Value Date    CHOL 226 (H) 08/02/2017    CHOL 219 (H) 05/11/2016    CHOL 168 06/03/2015     Lab Results   Component Value Date    HDL 45 08/02/2017    HDL 46 05/11/2016    HDL 38 (L) 06/03/2015     Lab Results   Component Value Date    LDLCALC 162.6 (H) 08/02/2017    LDLCALC 156.0 05/11/2016    LDLCALC 113.2 06/03/2015     Lab Results   Component Value Date    TRIG 92 08/02/2017    TRIG 85 05/11/2016    TRIG 84 06/03/2015     Lab Results   Component Value Date     CHOLHDL 19.9 (L) 08/02/2017    CHOLHDL 21.0 05/11/2016    CHOLHDL 22.6 06/03/2015         Review of old imaging:  n/a    ASSESSMENT:  Problem List Items Addressed This Visit     Vertebral artery obstruction    RA (rheumatoid arthritis) - Primary    Perennial allergic rhinitis    Hyperlipidemia (Chronic)    CAD (coronary artery disease) (Chronic)    Atherosclerosis of aorta          ICD-10-CM ICD-9-CM   1. Rheumatoid arthritis involving multiple sites with positive rheumatoid factor M05.79 714.0   2. Obstruction of right vertebral artery I65.01 433.20   3. Coronary artery disease due to calcified coronary lesion I25.10 414.00    I25.84 414.4   4. Atherosclerosis of aorta I70.0 440.0   5. Perennial allergic rhinitis J30.89 477.8   6. Hyperlipidemia, unspecified hyperlipidemia type E78.5 272.4         PLAN:  1. Rheumatoid arthritis involving multiple sites with positive rheumatoid factor  Continue to monitor.    2. Obstruction of right vertebral artery  Given risk will start statin    3. Coronary artery disease due to calcified coronary lesion  The current medical regimen is effective;  continue present plan and medications.  Start statin    4. Atherosclerosis of aorta  Start statin  Potential medication side effects were discussed with the patient; let me know if any occur.      5. Perennial allergic rhinitis  The current medical regimen is effective;  continue present plan and medications.      6. Hyperlipidemia, unspecified hyperlipidemia type  See above  A low fat, low cholesterol is discussed with the patient, and a written copy is given to him.        Medication List with Changes/Refills   Current Medications    ALCLOMETHASONE (ACLOVATE) 0.05 % CREAM    Apply topically 2 (two) times daily. For irritant contact dermatitis    ALFUZOSIN (UROXATRAL) 10 MG TB24    Take 1 tablet (10 mg total) by mouth daily with breakfast.    ASPIRIN (ECOTRIN) 81 MG EC TABLET    Take 1 tablet (81 mg total) by mouth once daily.     BUDESONIDE (PULMICORT) 0.25 MG/2 ML NEBULIZER SOLUTION    Take 2 mLs (0.25 mg total) by nebulization once daily. Controller    CINNAMON BARK 500 MG CAPSULE    Take 500 mg by mouth once daily.    IPRATROPIUM (ATROVENT) 0.06 % NASAL SPRAY    2 sprays by Nasal route 4 (four) times daily.    LINACLOTIDE (LINZESS) 145 MCG CAP CAPSULE    Take 1 capsule (145 mcg total) by mouth once daily.    LISINOPRIL-HYDROCHLOROTHIAZIDE (PRINZIDE,ZESTORETIC) 10-12.5 MG PER TABLET    Take 1 tablet by mouth once daily.    MULTIVITAMIN (THERAGRAN) PER TABLET    Take by mouth.  Tablet Oral Every day    OMEGA-3 FATTY ACIDS 1,250 MG CAP    Take by mouth.    PROPYLENE GLYCOL (SYSTANE BALANCE) 0.6 % DROP    Apply to eye.       No follow-ups on file.

## 2019-05-22 ENCOUNTER — TELEPHONE (OUTPATIENT)
Dept: FAMILY MEDICINE | Facility: CLINIC | Age: 69
End: 2019-05-22

## 2019-05-22 DIAGNOSIS — R39.12 BENIGN PROSTATIC HYPERPLASIA WITH WEAK URINARY STREAM: ICD-10-CM

## 2019-05-22 DIAGNOSIS — G47.20 DYSFUNCTIONS ASSOCIATED WITH SLEEP STAGES OR AROUSAL FROM SLEEP: ICD-10-CM

## 2019-05-22 DIAGNOSIS — R79.9 ABNORMAL FINDING OF BLOOD CHEMISTRY: ICD-10-CM

## 2019-05-22 DIAGNOSIS — I70.0 ATHEROSCLEROSIS OF AORTA: Primary | ICD-10-CM

## 2019-05-22 DIAGNOSIS — M47.812 OSTEOARTHRITIS OF CERVICAL SPINE, UNSPECIFIED SPINAL OSTEOARTHRITIS COMPLICATION STATUS: Chronic | ICD-10-CM

## 2019-05-22 DIAGNOSIS — M25.50 ARTHRALGIA, UNSPECIFIED JOINT: ICD-10-CM

## 2019-05-22 DIAGNOSIS — N40.1 BENIGN PROSTATIC HYPERPLASIA WITH WEAK URINARY STREAM: ICD-10-CM

## 2019-05-22 DIAGNOSIS — I25.84 CORONARY ARTERY DISEASE DUE TO CALCIFIED CORONARY LESION: Chronic | ICD-10-CM

## 2019-05-22 DIAGNOSIS — E78.5 HYPERLIPIDEMIA, UNSPECIFIED HYPERLIPIDEMIA TYPE: Chronic | ICD-10-CM

## 2019-05-22 DIAGNOSIS — I10 ESSENTIAL HYPERTENSION: ICD-10-CM

## 2019-05-22 DIAGNOSIS — I25.10 CORONARY ARTERY DISEASE DUE TO CALCIFIED CORONARY LESION: Chronic | ICD-10-CM

## 2019-05-22 DIAGNOSIS — Z12.5 ENCOUNTER FOR SCREENING FOR MALIGNANT NEOPLASM OF PROSTATE: ICD-10-CM

## 2019-05-22 NOTE — TELEPHONE ENCOUNTER
----- Message from Francine Hernandez sent at 5/22/2019 11:52 AM CDT -----  Contact: Self   Type: Patient Call Back    Who called: Self     What is the request in detail: Patient is asking to speak with the office in ref to believing something was supposed to be scheduled for him. He is asking to speak with the office     Can the clinic reply by [facility]?    Would the patient rather a call back or a response via [facility]? Call     Best call back number: [phone]

## 2019-05-23 PROBLEM — H16.133: Status: RESOLVED | Noted: 2017-06-16 | Resolved: 2019-05-23

## 2019-05-24 ENCOUNTER — LAB VISIT (OUTPATIENT)
Dept: LAB | Facility: HOSPITAL | Age: 69
End: 2019-05-24
Attending: FAMILY MEDICINE
Payer: MEDICARE

## 2019-05-24 DIAGNOSIS — Z12.5 ENCOUNTER FOR SCREENING FOR MALIGNANT NEOPLASM OF PROSTATE: ICD-10-CM

## 2019-05-24 DIAGNOSIS — I25.10 CORONARY ARTERY DISEASE DUE TO CALCIFIED CORONARY LESION: Chronic | ICD-10-CM

## 2019-05-24 DIAGNOSIS — I25.84 CORONARY ARTERY DISEASE DUE TO CALCIFIED CORONARY LESION: Chronic | ICD-10-CM

## 2019-05-24 DIAGNOSIS — M25.50 ARTHRALGIA, UNSPECIFIED JOINT: ICD-10-CM

## 2019-05-24 DIAGNOSIS — I10 ESSENTIAL HYPERTENSION: ICD-10-CM

## 2019-05-24 DIAGNOSIS — E78.5 HYPERLIPIDEMIA, UNSPECIFIED HYPERLIPIDEMIA TYPE: Chronic | ICD-10-CM

## 2019-05-24 DIAGNOSIS — R39.12 BENIGN PROSTATIC HYPERPLASIA WITH WEAK URINARY STREAM: ICD-10-CM

## 2019-05-24 DIAGNOSIS — I70.0 ATHEROSCLEROSIS OF AORTA: ICD-10-CM

## 2019-05-24 DIAGNOSIS — G47.20 DYSFUNCTIONS ASSOCIATED WITH SLEEP STAGES OR AROUSAL FROM SLEEP: ICD-10-CM

## 2019-05-24 DIAGNOSIS — M47.812 OSTEOARTHRITIS OF CERVICAL SPINE, UNSPECIFIED SPINAL OSTEOARTHRITIS COMPLICATION STATUS: Chronic | ICD-10-CM

## 2019-05-24 DIAGNOSIS — R79.9 ABNORMAL FINDING OF BLOOD CHEMISTRY: ICD-10-CM

## 2019-05-24 DIAGNOSIS — N40.1 BENIGN PROSTATIC HYPERPLASIA WITH WEAK URINARY STREAM: ICD-10-CM

## 2019-05-24 LAB
ALBUMIN SERPL BCP-MCNC: 3.5 G/DL (ref 3.5–5.2)
ALP SERPL-CCNC: 62 U/L (ref 55–135)
ALT SERPL W/O P-5'-P-CCNC: 18 U/L (ref 10–44)
ANION GAP SERPL CALC-SCNC: 5 MMOL/L (ref 8–16)
AST SERPL-CCNC: 17 U/L (ref 10–40)
BASOPHILS # BLD AUTO: 0.03 K/UL (ref 0–0.2)
BASOPHILS NFR BLD: 0.6 % (ref 0–1.9)
BILIRUB SERPL-MCNC: 0.4 MG/DL (ref 0.1–1)
BUN SERPL-MCNC: 10 MG/DL (ref 8–23)
CALCIUM SERPL-MCNC: 10.3 MG/DL (ref 8.7–10.5)
CHLORIDE SERPL-SCNC: 104 MMOL/L (ref 95–110)
CHOLEST SERPL-MCNC: 152 MG/DL (ref 120–199)
CHOLEST/HDLC SERPL: 3.6 {RATIO} (ref 2–5)
CO2 SERPL-SCNC: 29 MMOL/L (ref 23–29)
COMPLEXED PSA SERPL-MCNC: 1.2 NG/ML (ref 0–4)
CREAT SERPL-MCNC: 1.2 MG/DL (ref 0.5–1.4)
DIFFERENTIAL METHOD: ABNORMAL
EOSINOPHIL # BLD AUTO: 0.2 K/UL (ref 0–0.5)
EOSINOPHIL NFR BLD: 3 % (ref 0–8)
ERYTHROCYTE [DISTWIDTH] IN BLOOD BY AUTOMATED COUNT: 12.7 % (ref 11.5–14.5)
EST. GFR  (AFRICAN AMERICAN): >60 ML/MIN/1.73 M^2
EST. GFR  (NON AFRICAN AMERICAN): >60 ML/MIN/1.73 M^2
ESTIMATED AVG GLUCOSE: 117 MG/DL (ref 68–131)
GLUCOSE SERPL-MCNC: 103 MG/DL (ref 70–110)
HBA1C MFR BLD HPLC: 5.7 % (ref 4–5.6)
HCT VFR BLD AUTO: 49.4 % (ref 40–54)
HDLC SERPL-MCNC: 42 MG/DL (ref 40–75)
HDLC SERPL: 27.6 % (ref 20–50)
HGB BLD-MCNC: 15.7 G/DL (ref 14–18)
IMM GRANULOCYTES # BLD AUTO: 0.01 K/UL (ref 0–0.04)
IMM GRANULOCYTES NFR BLD AUTO: 0.2 % (ref 0–0.5)
LDLC SERPL CALC-MCNC: 98.8 MG/DL (ref 63–159)
LYMPHOCYTES # BLD AUTO: 3.6 K/UL (ref 1–4.8)
LYMPHOCYTES NFR BLD: 67.9 % (ref 18–48)
MCH RBC QN AUTO: 30.5 PG (ref 27–31)
MCHC RBC AUTO-ENTMCNC: 31.8 G/DL (ref 32–36)
MCV RBC AUTO: 96 FL (ref 82–98)
MONOCYTES # BLD AUTO: 0.4 K/UL (ref 0.3–1)
MONOCYTES NFR BLD: 8.3 % (ref 4–15)
NEUTROPHILS # BLD AUTO: 1.1 K/UL (ref 1.8–7.7)
NEUTROPHILS NFR BLD: 20 % (ref 38–73)
NONHDLC SERPL-MCNC: 110 MG/DL
NRBC BLD-RTO: 0 /100 WBC
PLATELET # BLD AUTO: 174 K/UL (ref 150–350)
PMV BLD AUTO: 10.6 FL (ref 9.2–12.9)
POTASSIUM SERPL-SCNC: 4.5 MMOL/L (ref 3.5–5.1)
PROT SERPL-MCNC: 6.7 G/DL (ref 6–8.4)
RBC # BLD AUTO: 5.15 M/UL (ref 4.6–6.2)
SODIUM SERPL-SCNC: 138 MMOL/L (ref 136–145)
TRIGL SERPL-MCNC: 56 MG/DL (ref 30–150)
TSH SERPL DL<=0.005 MIU/L-ACNC: 0.44 UIU/ML (ref 0.4–4)
URATE SERPL-MCNC: 7.8 MG/DL (ref 3.4–7)
WBC # BLD AUTO: 5.27 K/UL (ref 3.9–12.7)

## 2019-05-24 PROCEDURE — 85025 COMPLETE CBC W/AUTO DIFF WBC: CPT

## 2019-05-24 PROCEDURE — 83036 HEMOGLOBIN GLYCOSYLATED A1C: CPT

## 2019-05-24 PROCEDURE — 84443 ASSAY THYROID STIM HORMONE: CPT

## 2019-05-24 PROCEDURE — 84550 ASSAY OF BLOOD/URIC ACID: CPT

## 2019-05-24 PROCEDURE — 36415 COLL VENOUS BLD VENIPUNCTURE: CPT | Mod: PO

## 2019-05-24 PROCEDURE — 80053 COMPREHEN METABOLIC PANEL: CPT

## 2019-05-24 PROCEDURE — 84153 ASSAY OF PSA TOTAL: CPT

## 2019-05-24 PROCEDURE — 80061 LIPID PANEL: CPT

## 2019-08-02 DIAGNOSIS — R06.02 SHORTNESS OF BREATH: ICD-10-CM

## 2019-08-02 RX ORDER — BUDESONIDE 0.25 MG/2ML
0.25 INHALANT ORAL DAILY
Qty: 90 VIAL | Refills: 1 | Status: SHIPPED | OUTPATIENT
Start: 2019-08-02 | End: 2019-12-18 | Stop reason: SDUPTHER

## 2019-08-02 NOTE — TELEPHONE ENCOUNTER
----- Message from Bhavana Ayers sent at 8/2/2019  8:22 AM CDT -----  Contact: Patient  Type: RX Refill Request    Who Called: Patient    Refill or New Rx: Refill    RX Name and Strength: budesonide (PULMICORT) 0.25 mg/2 mL nebulizer solution    How is the patient currently taking it? (ex. 1XDay):    Is this a 30 day or 90 day RX:    Preferred Pharmacy with phone number: [facility] [place], [place] - [facility] [address] [phone] (Phone)  [phone] (Fax)        Local or Mail Order: Local    Ordering Provider: Dr. Perkins     Would the patient rather a call back or a response [facility]? Call back     Best Call Back Number: [phone]    Additional Information:

## 2019-09-09 ENCOUNTER — PATIENT OUTREACH (OUTPATIENT)
Dept: ADMINISTRATIVE | Facility: HOSPITAL | Age: 69
End: 2019-09-09

## 2019-10-14 ENCOUNTER — OFFICE VISIT (OUTPATIENT)
Dept: FAMILY MEDICINE | Facility: CLINIC | Age: 69
End: 2019-10-14
Payer: MEDICARE

## 2019-10-14 VITALS
OXYGEN SATURATION: 97 % | SYSTOLIC BLOOD PRESSURE: 130 MMHG | HEART RATE: 62 BPM | BODY MASS INDEX: 26.91 KG/M2 | TEMPERATURE: 98 F | WEIGHT: 203.06 LBS | DIASTOLIC BLOOD PRESSURE: 90 MMHG | HEIGHT: 73 IN

## 2019-10-14 DIAGNOSIS — I10 ESSENTIAL HYPERTENSION: ICD-10-CM

## 2019-10-14 DIAGNOSIS — I70.0 ATHEROSCLEROSIS OF AORTA: ICD-10-CM

## 2019-10-14 DIAGNOSIS — I25.10 CORONARY ARTERY DISEASE DUE TO CALCIFIED CORONARY LESION: ICD-10-CM

## 2019-10-14 DIAGNOSIS — H00.012 HORDEOLUM EXTERNUM OF RIGHT LOWER EYELID: Primary | ICD-10-CM

## 2019-10-14 DIAGNOSIS — I25.84 CORONARY ARTERY DISEASE DUE TO CALCIFIED CORONARY LESION: ICD-10-CM

## 2019-10-14 PROCEDURE — 99214 OFFICE O/P EST MOD 30 MIN: CPT | Mod: S$PBB,,, | Performed by: FAMILY MEDICINE

## 2019-10-14 PROCEDURE — 99214 PR OFFICE/OUTPT VISIT, EST, LEVL IV, 30-39 MIN: ICD-10-PCS | Mod: S$PBB,,, | Performed by: FAMILY MEDICINE

## 2019-10-14 PROCEDURE — 99999 PR PBB SHADOW E&M-EST. PATIENT-LVL III: CPT | Mod: PBBFAC,,, | Performed by: FAMILY MEDICINE

## 2019-10-14 PROCEDURE — 99999 PR PBB SHADOW E&M-EST. PATIENT-LVL III: ICD-10-PCS | Mod: PBBFAC,,, | Performed by: FAMILY MEDICINE

## 2019-10-14 PROCEDURE — 99213 OFFICE O/P EST LOW 20 MIN: CPT | Mod: PBBFAC,PO | Performed by: FAMILY MEDICINE

## 2019-10-14 RX ORDER — ERYTHROMYCIN 5 MG/G
OINTMENT OPHTHALMIC EVERY 8 HOURS
Qty: 3.5 G | Refills: 1 | Status: SHIPPED | OUTPATIENT
Start: 2019-10-14 | End: 2020-02-14

## 2019-10-14 NOTE — PROGRESS NOTES
Office Visit    Patient Name: Rizwan Demarco Jr.    : 1950  MRN: [id number]      Assessment/Plan:  Rizwan Demarco Jr. is a 69 y.o. male who presents today for :    Hordeolum externum of right lower eyelid  -     erythromycin (ROMYCIN) ophthalmic ointment; Place into the right eye every 8 (eight) hours.  Dispense: 3.5 g; Refill: 1    Advised warm compresses to affected eye for 5 to 10 minutes, 3 or 4 times a day.   Keep the area around eye clean and avoid touching/rubbing eye. Daily cleansing of eyelid with gentle scrub of eyelid/lashes  Reassured pt that it should be self-limited.  Advised to avoid contact lenses while the sty heals.  Advised to avoid squeezing or popping any bumps near eye as doing so can spread the infection.        Essential hypertension  Coronary artery disease due to calcified coronary lesion  Atherosclerosis of aorta  -stable, continue current regimen   -f/u as needed          Follow up for worsening Sx or as needed.     This note was created by combination of typed  and Dragon dictation.  Transcription errors may be present.  If there are any questions, please contact me.      ----------------------------------------------------------------------------------------------------------------------      HPI:  Patient Care Team:  Raúl Perkins MD as PCP - General (Family Medicine)  [facility] Perkins [facility]  Texline KEESHA Nascimento III, MD as Consulting Physician (Otolaryngology)  Osman Ordoñez OD as Consulting Physician (Optometry)  Kenzie Sandoval MA as Care Coordinator    Rizwan is a 69 y.o. male with      Patient Active Problem List   Diagnosis    Sjogren's disease    Encounter for long-term (current) use of other medications    Fatigue    GERD (gastroesophageal reflux disease)    CAD (coronary artery disease)    Hyperlipidemia    Cervical spondylosis    Lumbar spondylosis    RA (rheumatoid arthritis)    Benign prostatic hyperplasia with weak urinary  stream    Perennial allergic rhinitis    Essential hypertension    High frequency hearing loss    Vertebral artery obstruction    Nasal obstruction    Joint pain    Suspected sleep apnea    Dysfunctions associated with sleep stages or arousal from sleep    Primary insomnia    Wears dentures    Atherosclerosis of aorta     This patient is new to me      Patient presents today for :  Stye (on right on redness/ itchy  sx been lisa on for 2 wks )  and pain with swelling on the R lower eyelid for over a week and a half. He says that overall it's getting better but he would like to take medication for it to resolve faster. He endorses redness, and pain when he tries to clean his eyelids. He denies vision changes, no eye discharge. Patient denies any F/C/N/V/sore throat/ear pain/coughing/nasal congestion.    Pt is compliant with daily BP medications at home - no side effects, BP log is noted to be controlled at home.  No CP/SOB/leg swelling        Additional ROS    No dysphagia  No CP/SOB/palpitations/swelling  No nausea/vomiting/abd pain/no diarrhea  No rashes      Patient Active Problem List   Diagnosis    Sjogren's disease    Encounter for long-term (current) use of other medications    Fatigue    GERD (gastroesophageal reflux disease)    CAD (coronary artery disease)    Hyperlipidemia    Cervical spondylosis    Lumbar spondylosis    RA (rheumatoid arthritis)    Benign prostatic hyperplasia with weak urinary stream    Perennial allergic rhinitis    Essential hypertension    High frequency hearing loss    Vertebral artery obstruction    Nasal obstruction    Joint pain    Suspected sleep apnea    Dysfunctions associated with sleep stages or arousal from sleep    Primary insomnia    Wears dentures    Atherosclerosis of aorta       Current Medications  Medications reviewed and updated.       Current Outpatient Medications:     alclomethasone (ACLOVATE) 0.05 % cream, Apply topically 2 (two)  times daily. For irritant contact dermatitis, Disp: 45 g, Rfl: 0    alfuzosin (UROXATRAL) 10 mg Tb24, Take 1 tablet (10 mg total) by mouth daily with breakfast., Disp: 90 tablet, Rfl: 1    aspirin (ECOTRIN) 81 MG EC tablet, Take 1 tablet (81 mg total) by mouth once daily., Disp: , Rfl: 0    budesonide (PULMICORT) 0.25 mg/2 mL nebulizer solution, Take 2 mLs (0.25 mg total) by nebulization once daily. Controller, Disp: 90 vial, Rfl: 1    cinnamon bark 500 mg capsule, Take 500 mg by mouth once daily., Disp: , Rfl:     ipratropium (ATROVENT) 0.06 % nasal spray, 2 sprays by Nasal route 4 (four) times daily., Disp: 15 mL, Rfl: 1    lisinopril-hydrochlorothiazide (PRINZIDE,ZESTORETIC) 10-12.5 mg per tablet, Take 1 tablet by mouth once daily., Disp: 90 tablet, Rfl: 1    multivitamin (THERAGRAN) per tablet, Take by mouth.  Tablet Oral Every day, Disp: , Rfl:     omega-3 fatty acids 1,250 mg Cap, Take by mouth., Disp: , Rfl:     atorvastatin (LIPITOR) 40 MG tablet, Take 1 tablet (40 mg total) by mouth once daily. (Patient not taking: Reported on 10/14/2019), Disp: 90 tablet, Rfl: 3    erythromycin (ROMYCIN) ophthalmic ointment, Place into the right eye every 8 (eight) hours., Disp: 3.5 g, Rfl: 1    linaclotide (LINZESS) 145 mcg Cap capsule, Take 1 capsule (145 mcg total) by mouth once daily. (Patient not taking: Reported on 10/14/2019), Disp: 90 capsule, Rfl: 3    propylene glycol (SYSTANE BALANCE) 0.6 % Drop, Apply to eye., Disp: , Rfl:     Past Surgical History:   Procedure Laterality Date    COLONOSCOPY N/A 6/29/2016    Procedure: COLONOSCOPY;  Surgeon: Partha Saucedo MD;  Location: [facility];  Service: Endoscopy;  Laterality: N/A;    CORONARY ARTERY BYPASS GRAFT      EXCISION TURBINATE, SUBMUCOUS      KNEE ARTHROSCOPY W/ DEBRIDEMENT      NASAL SEPTUM SURGERY      TONSILLECTOMY         Family History   Problem Relation Age of Onset    Hyperlipidemia Mother     Alzheimer's disease Mother     Stomach  cancer Father     Cataracts Other     No Known Problems Sister     No Known Problems Brother     No Known Problems Maternal Aunt     No Known Problems Maternal Uncle     No Known Problems Paternal Aunt     No Known Problems Paternal Uncle     No Known Problems Maternal Grandmother     No Known Problems Maternal Grandfather     No Known Problems Paternal Grandmother     No Known Problems Paternal Grandfather     Blindness Neg Hx     Cancer Neg Hx     Diabetes Neg Hx     Glaucoma Neg Hx     Rheum arthritis Neg Hx     Psoriasis Neg Hx     Lupus Neg Hx     Amblyopia Neg Hx     Hypertension Neg Hx     Macular degeneration Neg Hx     Retinal detachment Neg Hx     Strabismus Neg Hx     Stroke Neg Hx     Thyroid disease Neg Hx        Social History     Socioeconomic History    Marital status: Single     Spouse name: Not on file    Number of children: Not on file    Years of education: Not on file    Highest education level: Not on file   Occupational History    Not on file   Social Needs    Financial resource strain: Not on file    Food insecurity:     Worry: Not on file     Inability: Not on file    Transportation needs:     Medical: Not on file     Non-medical: Not on file   Tobacco Use    Smoking status: Former Smoker     Packs/day: 1.00     Years: 20.00     Pack years: 20.00     Types: Cigarettes     Last attempt to quit: 1991     Years since quittin.8    Smokeless tobacco: Never Used    Tobacco comment: Quit .   Substance and Sexual Activity    Alcohol use: No    Drug use: No    Sexual activity: Not on file   Lifestyle    Physical activity:     Days per week: Not on file     Minutes per session: Not on file    Stress: Not on file   Relationships    Social connections:     Talks on phone: Not on file     Gets together: Not on file     Attends [employer] service: Not on file     Active member of club or organization: Not on file     Attends meetings of clubs or  "organizations: Not on file     Relationship status: Not on file   Other Topics Concern    Not on file   Social History Narrative    Not on file             Allergies   Review of patient's allergies indicates:   Allergen Reactions    Astelin [azelastine] Other (See Comments)     Dry mouth    Flomax [tamsulosin]      Nosebleed             Review of Systems  See HPI      Physical Exam  BP (!) 130/90 (BP Location: Left arm, Patient Position: Sitting, BP Method: Large (Manual))   Pulse 62   Temp 97.9 °F (36.6 °C) (Oral)   Ht 6' 1" (1.854 m)   Wt 92.1 kg (203 lb 0.7 oz)   SpO2 97%   BMI 26.79 kg/m²     GEN: NAD, well developed  HEENT: NCAT, PERRLA, EOMI, sclera clear, anicteric, R lower eyelid with +small hordeolum that is slightly TTP with moderate erythema - no obvious drainage, TM clear bilaterally with normal light reflex, mild nasal turbinate swelling, MMM with no lesions, O/P clear - no tonsillar swelling/discharge, +mild drainage, +minimal clear nasal discharge, no maxillary TTP, No trismus/uvula deviation  NECK: normal, supple with midline trachea, no preauricular nor cervical LAD, no thyromegaly  LUNGS: CTAB, no w/r/r, no increased work of breathing  HEART: RRR, normal S1 and S2, no m/r/g  ABD: s/nt/nd, NABS  SKIN: normal turgor, no rashes, no other lesions.   PSYCH: AOx3, appropriate mood and affect    "

## 2019-10-30 DIAGNOSIS — R39.9 LOWER URINARY TRACT SYMPTOMS (LUTS): ICD-10-CM

## 2019-10-30 RX ORDER — ALFUZOSIN HYDROCHLORIDE 10 MG/1
10 TABLET, EXTENDED RELEASE ORAL
Qty: 90 TABLET | Refills: 1 | Status: SHIPPED | OUTPATIENT
Start: 2019-10-30 | End: 2020-04-20 | Stop reason: SDUPTHER

## 2019-10-30 NOTE — TELEPHONE ENCOUNTER
----- Message from Moriah Abhijeet sent at 10/30/2019  8:42 AM CDT -----  Contact: self  [phone]  .Type: RX Refill Request    Who Called:  self    Have you contacted your pharmacy: yes    Refill or New Rx: refill    RX Name and Strength: alfuzosin (UROXATRAL) 10 mg Tb24    Is this a 30 day or 90 day RX: 90 day     Preferred Pharm[place] with phone number: .  [place] [place] [address] [address]  Phone: [phone] Fax: [phone]    Local or Mail Order: local    Would the patient rather a call back or a response via My [facility]?  Call back    Best Call Back Number: [phone]

## 2019-12-02 ENCOUNTER — NURSE TRIAGE (OUTPATIENT)
Dept: ADMINISTRATIVE | Facility: CLINIC | Age: 69
End: 2019-12-02

## 2019-12-02 NOTE — TELEPHONE ENCOUNTER
"    Reason for Disposition   NON-URGENT call redirected to PCP's office because it is open    Protocols used: ST NO CONTACT OR DUPLICATE CONTACT CALL-A-AH    Rizwan states he had sinus surgery by Dr Jose Nascimento.  He is requesting an appointment with Dr Nascimento to discuss sinus concerns.  No sinus issues at present, but is concerned because he was prescribed Cipro for hematuria at a [facility] last Friday, and wants to see Dr Nascimento for follow up of his sinus issues (worried that Cipro "bad for my sinus").  Declined offer of assistance to appoint with primary care. Call soft-transferred to , to assist Rizwan in reaching Dr Nascimento's office.  Message to Dr Nascimento.  Please contact patient directly with appointment and with any additional care advice as soon as possible..    "

## 2019-12-11 ENCOUNTER — PATIENT OUTREACH (OUTPATIENT)
Dept: ADMINISTRATIVE | Facility: OTHER | Age: 69
End: 2019-12-11

## 2019-12-17 ENCOUNTER — PATIENT OUTREACH (OUTPATIENT)
Dept: ADMINISTRATIVE | Facility: OTHER | Age: 69
End: 2019-12-17

## 2019-12-18 ENCOUNTER — OFFICE VISIT (OUTPATIENT)
Dept: OTOLARYNGOLOGY | Facility: CLINIC | Age: 69
End: 2019-12-18
Payer: MEDICARE

## 2019-12-18 VITALS
SYSTOLIC BLOOD PRESSURE: 135 MMHG | DIASTOLIC BLOOD PRESSURE: 88 MMHG | HEART RATE: 63 BPM | WEIGHT: 205.25 LBS | BODY MASS INDEX: 27.08 KG/M2

## 2019-12-18 DIAGNOSIS — R06.02 SHORTNESS OF BREATH: ICD-10-CM

## 2019-12-18 DIAGNOSIS — J01.90 ACUTE SINUSITIS, RECURRENCE NOT SPECIFIED, UNSPECIFIED LOCATION: Primary | ICD-10-CM

## 2019-12-18 PROCEDURE — 99999 PR PBB SHADOW E&M-EST. PATIENT-LVL III: CPT | Mod: PBBFAC,,, | Performed by: OTOLARYNGOLOGY

## 2019-12-18 PROCEDURE — 99213 OFFICE O/P EST LOW 20 MIN: CPT | Mod: 25,S$PBB,, | Performed by: OTOLARYNGOLOGY

## 2019-12-18 PROCEDURE — 1159F MED LIST DOCD IN RCRD: CPT | Mod: ,,, | Performed by: OTOLARYNGOLOGY

## 2019-12-18 PROCEDURE — 1126F AMNT PAIN NOTED NONE PRSNT: CPT | Mod: ,,, | Performed by: OTOLARYNGOLOGY

## 2019-12-18 PROCEDURE — 1159F PR MEDICATION LIST DOCUMENTED IN MEDICAL RECORD: ICD-10-PCS | Mod: ,,, | Performed by: OTOLARYNGOLOGY

## 2019-12-18 PROCEDURE — 99213 PR OFFICE/OUTPT VISIT, EST, LEVL III, 20-29 MIN: ICD-10-PCS | Mod: 25,S$PBB,, | Performed by: OTOLARYNGOLOGY

## 2019-12-18 PROCEDURE — 31231 PR NASAL ENDOSCOPY, DX: ICD-10-PCS | Mod: S$PBB,,, | Performed by: OTOLARYNGOLOGY

## 2019-12-18 PROCEDURE — 31231 NASAL ENDOSCOPY DX: CPT | Mod: PBBFAC | Performed by: OTOLARYNGOLOGY

## 2019-12-18 PROCEDURE — 1126F PR PAIN SEVERITY QUANTIFIED, NO PAIN PRESENT: ICD-10-PCS | Mod: ,,, | Performed by: OTOLARYNGOLOGY

## 2019-12-18 PROCEDURE — 99213 OFFICE O/P EST LOW 20 MIN: CPT | Mod: PBBFAC | Performed by: OTOLARYNGOLOGY

## 2019-12-18 PROCEDURE — 99999 PR PBB SHADOW E&M-EST. PATIENT-LVL III: ICD-10-PCS | Mod: PBBFAC,,, | Performed by: OTOLARYNGOLOGY

## 2019-12-18 PROCEDURE — 31231 NASAL ENDOSCOPY DX: CPT | Mod: S$PBB,,, | Performed by: OTOLARYNGOLOGY

## 2019-12-18 RX ORDER — BUDESONIDE 0.25 MG/2ML
0.25 INHALANT ORAL DAILY
Qty: 90 VIAL | Refills: 1 | Status: SHIPPED | OUTPATIENT
Start: 2019-12-18 | End: 2020-02-17 | Stop reason: SDUPTHER

## 2019-12-18 RX ORDER — CIPROFLOXACIN 500 MG/1
TABLET ORAL
Refills: 0 | COMMUNITY
Start: 2019-11-29 | End: 2020-02-14

## 2019-12-18 NOTE — PROGRESS NOTES
presents back in followup.  He is a past patient of mine having undergone   FESS in September 2015.  He has recently had C/O brownish nasal D/C and Facial pressure pain last month.  He has been using his steroid sinus rinses. He was placed on 21 days of Cipro and his symptoms have mostly resolved.    On scope exam with scope #121SC, his ostia appear open though some mucosal edema.    Plan: Finish up Cipro(friday)  Resume steroid sinus rinses(reordered)  Contact me if symptoms recur otherwise RTC annually   Answers for HPI/ROS submitted by the patient on 12/18/2019   tinnitus: Yes  sinus pressure : Yes  Sinus infection(s)?: Yes  congestion: Yes  Hoarseness?: Yes  eye itching: Yes  Snoring?: Yes  shortness of breath: Yes  wheezing: Yes  cough: Yes  Foot swelling?: Yes  abdominal pain: Yes  constipation: Yes  heartburn: Yes  Acid Reflux?: Yes  frequency: Yes  Sexual problems / dysfunction?: Yes  back pain: Yes  seizures: Yes

## 2020-01-07 ENCOUNTER — OFFICE VISIT (OUTPATIENT)
Dept: FAMILY MEDICINE | Facility: CLINIC | Age: 70
End: 2020-01-07
Payer: MEDICARE

## 2020-01-07 VITALS
HEART RATE: 73 BPM | OXYGEN SATURATION: 97 % | RESPIRATION RATE: 16 BRPM | TEMPERATURE: 99 F | BODY MASS INDEX: 27.23 KG/M2 | HEIGHT: 73 IN | DIASTOLIC BLOOD PRESSURE: 72 MMHG | SYSTOLIC BLOOD PRESSURE: 112 MMHG | WEIGHT: 205.5 LBS

## 2020-01-07 DIAGNOSIS — M05.79 RHEUMATOID ARTHRITIS INVOLVING MULTIPLE SITES WITH POSITIVE RHEUMATOID FACTOR: ICD-10-CM

## 2020-01-07 DIAGNOSIS — R39.12 BENIGN PROSTATIC HYPERPLASIA WITH WEAK URINARY STREAM: ICD-10-CM

## 2020-01-07 DIAGNOSIS — I70.0 ATHEROSCLEROSIS OF AORTA: ICD-10-CM

## 2020-01-07 DIAGNOSIS — N41.0 ACUTE PROSTATITIS: Primary | ICD-10-CM

## 2020-01-07 DIAGNOSIS — N40.1 BENIGN PROSTATIC HYPERPLASIA WITH WEAK URINARY STREAM: ICD-10-CM

## 2020-01-07 DIAGNOSIS — K21.9 GASTROESOPHAGEAL REFLUX DISEASE WITHOUT ESOPHAGITIS: Chronic | ICD-10-CM

## 2020-01-07 LAB
BACTERIA #/AREA URNS HPF: ABNORMAL /HPF
BILIRUB UR QL STRIP: NEGATIVE
CLARITY UR: CLEAR
COLOR UR: ABNORMAL
GLUCOSE UR QL STRIP: NEGATIVE
HGB UR QL STRIP: ABNORMAL
KETONES UR QL STRIP: NEGATIVE
LEUKOCYTE ESTERASE UR QL STRIP: NEGATIVE
MICROSCOPIC COMMENT: ABNORMAL
NITRITE UR QL STRIP: NEGATIVE
PH UR STRIP: 7 [PH] (ref 5–8)
PROT UR QL STRIP: NEGATIVE
RBC #/AREA URNS HPF: 5 /HPF (ref 0–4)
SP GR UR STRIP: 1 (ref 1–1.03)
SQUAMOUS #/AREA URNS HPF: 1 /HPF
URN SPEC COLLECT METH UR: ABNORMAL
UROBILINOGEN UR STRIP-ACNC: NEGATIVE EU/DL

## 2020-01-07 PROCEDURE — 87086 URINE CULTURE/COLONY COUNT: CPT

## 2020-01-07 PROCEDURE — 1159F PR MEDICATION LIST DOCUMENTED IN MEDICAL RECORD: ICD-10-PCS | Mod: ,,, | Performed by: FAMILY MEDICINE

## 2020-01-07 PROCEDURE — 81000 URINALYSIS NONAUTO W/SCOPE: CPT

## 2020-01-07 PROCEDURE — 99213 OFFICE O/P EST LOW 20 MIN: CPT | Mod: S$PBB,,, | Performed by: FAMILY MEDICINE

## 2020-01-07 PROCEDURE — 1159F MED LIST DOCD IN RCRD: CPT | Mod: ,,, | Performed by: FAMILY MEDICINE

## 2020-01-07 PROCEDURE — 99213 PR OFFICE/OUTPT VISIT, EST, LEVL III, 20-29 MIN: ICD-10-PCS | Mod: S$PBB,,, | Performed by: FAMILY MEDICINE

## 2020-01-07 PROCEDURE — 99999 PR PBB SHADOW E&M-EST. PATIENT-LVL III: ICD-10-PCS | Mod: PBBFAC,,, | Performed by: FAMILY MEDICINE

## 2020-01-07 PROCEDURE — 99999 PR PBB SHADOW E&M-EST. PATIENT-LVL III: CPT | Mod: PBBFAC,,, | Performed by: FAMILY MEDICINE

## 2020-01-07 PROCEDURE — 1126F PR PAIN SEVERITY QUANTIFIED, NO PAIN PRESENT: ICD-10-PCS | Mod: ,,, | Performed by: FAMILY MEDICINE

## 2020-01-07 PROCEDURE — 99213 OFFICE O/P EST LOW 20 MIN: CPT | Mod: PBBFAC,PO | Performed by: FAMILY MEDICINE

## 2020-01-07 PROCEDURE — 1126F AMNT PAIN NOTED NONE PRSNT: CPT | Mod: ,,, | Performed by: FAMILY MEDICINE

## 2020-01-07 RX ORDER — LISINOPRIL 10 MG/1
10 TABLET ORAL DAILY
COMMUNITY
Start: 2019-10-23 | End: 2020-04-20 | Stop reason: SDUPTHER

## 2020-01-07 RX ORDER — HYDROCHLOROTHIAZIDE 12.5 MG/1
CAPSULE ORAL
COMMUNITY
Start: 2019-10-23 | End: 2020-04-20 | Stop reason: SDUPTHER

## 2020-01-08 ENCOUNTER — TELEPHONE (OUTPATIENT)
Dept: FAMILY MEDICINE | Facility: CLINIC | Age: 70
End: 2020-01-08

## 2020-01-08 NOTE — TELEPHONE ENCOUNTER
----- Message from Marah Damon sent at 1/8/2020 11:18 AM CST -----  Contact: Self  Type: Patient Call Back    Who called:Rizwan    What is the request in detail:Patient called to speak with office about recent results and repeating labs. Please call to advise    Can the clinic reply by [facility]?    Would the patient rather a call back or a response via [facility]? Call    Best call back number:[phone]

## 2020-01-09 LAB — BACTERIA UR CULT: NO GROWTH

## 2020-01-10 NOTE — PROGRESS NOTES
Chief Complaint   Patient presents with    Follow-up       SUBJECTIVE:  Rizwan Demarco Jr. is a 69 y.o. male here for new problem of acute prostattis s/p treatment and needed f/u per urgent care with new labs.  We will work from there,, he has GERD and will see GI from there.  Currently has co-morbidities including per problem list.      Past Medical History:   Diagnosis Date    Allergy     Arthritis     Rheumatoid arthritis    Blood clotting tendency     BPH (benign prostatic hypertrophy)     Cervical spondylosis     Colon polyps     Coronary artery disease     Depression 2015    Dry eyes     Dry mouth     GERD (gastroesophageal reflux disease)     Heart attack     Hyperlipidemia     Hypertension     Lumbar spondylosis     Nasal obstruction     Rheumatoid arthritis     Sinusitis     Special screening for malignant neoplasm of colon 2016    Trouble in sleeping      Past Surgical History:   Procedure Laterality Date    COLONOSCOPY N/A 2016    Procedure: COLONOSCOPY;  Surgeon: Partha Saucedo MD;  [facility];  Service: Endoscopy;  Laterality: N/A;    CORONARY ARTERY BYPASS GRAFT      EXCISION TURBINATE, SUBMUCOUS      KNEE ARTHROSCOPY W/ DEBRIDEMENT      NASAL SEPTUM SURGERY      TONSILLECTOMY       Social History     Socioeconomic History    Marital status: Single     Spouse name: Not on file    Number of children: Not on file    Years of education: Not on file    Highest education level: Not on file   Occupational History    Not on file   Social Needs    Financial resource strain: Not on file    Food insecurity:     Worry: Not on file     Inability: Not on file    Transportation needs:     Medical: Not on file     Non-medical: Not on file   Tobacco Use    Smoking status: Former Smoker     Packs/day: 1.00     Years: 20.00     Pack years: 20.00     Types: Cigarettes     Last attempt to quit: 1991     Years since quittin.0    Smokeless tobacco:  Never Used    Tobacco comment: Quit 1991.   Substance and Sexual Activity    Alcohol use: No    Drug use: No    Sexual activity: Not on file   Lifestyle    Physical activity:     Days per week: Not on file     Minutes per session: Not on file    Stress: Not on file   Relationships    Social connections:     Talks on phone: Not on file     Gets together: Not on file     Attends Druze service: Not on file     Active member of club or organization: Not on file     Attends meetings of clubs or organizations: Not on file     Relationship status: Not on file   Other Topics Concern    Not on file   Social History Narrative    Not on file     Family History   Problem Relation Age of Onset    Hyperlipidemia Mother     Alzheimer's disease Mother     Stomach cancer Father     Cataracts Other     No Known Problems Sister     No Known Problems Brother     No Known Problems Maternal Aunt     No Known Problems Maternal Uncle     No Known Problems Paternal Aunt     No Known Problems Paternal Uncle     No Known Problems Maternal Grandmother     No Known Problems Maternal Grandfather     No Known Problems Paternal Grandmother     No Known Problems Paternal Grandfather     Blindness Neg Hx     Cancer Neg Hx     Diabetes Neg Hx     Glaucoma Neg Hx     Rheum arthritis Neg Hx     Psoriasis Neg Hx     Lupus Neg Hx     Amblyopia Neg Hx     Hypertension Neg Hx     Macular degeneration Neg Hx     Retinal detachment Neg Hx     Strabismus Neg Hx     Stroke Neg Hx     Thyroid disease Neg Hx      Current Outpatient Medications on File Prior to Visit   Medication Sig Dispense Refill    alclomethasone (ACLOVATE) 0.05 % cream Apply topically 2 (two) times daily. For irritant contact dermatitis 45 g 0    alfuzosin (UROXATRAL) 10 mg Tb24 Take 1 tablet (10 mg total) by mouth daily with breakfast. 90 tablet 1    aspirin (ECOTRIN) 81 MG EC tablet Take 1 tablet (81 mg total) by mouth once daily.  0     "atorvastatin (LIPITOR) 40 MG tablet Take 1 tablet (40 mg total) by mouth once daily. 90 tablet 3    budesonide (PULMICORT) 0.25 mg/2 mL nebulizer solution Take 2 mLs (0.25 mg total) by nebulization once daily. Controller 90 vial 1    cinnamon bark 500 mg capsule Take 500 mg by mouth once daily.      ciprofloxacin HCl (CIPRO) 500 MG tablet TAKE 1 TABLET BY MOUTH TWICE DAILY FOR 21 DAYS  0    erythromycin (ROMYCIN) ophthalmic ointment Place into the right eye every 8 (eight) hours. 3.5 g 1    ipratropium (ATROVENT) 0.06 % nasal spray 2 sprays by Nasal route 4 (four) times daily. 15 mL 1    linaclotide (LINZESS) 145 mcg Cap capsule Take 1 capsule (145 mcg total) by mouth once daily. 90 capsule 3    lisinopril-hydrochlorothiazide (PRINZIDE,ZESTORETIC) 10-12.5 mg per tablet Take 1 tablet by mouth once daily. 90 tablet 1    multivitamin (THERAGRAN) per tablet Take by mouth.  Tablet Oral Every day      omega-3 fatty acids 1,250 mg Cap Take by mouth.      propylene glycol (SYSTANE BALANCE) 0.6 % Drop Apply to eye.      hydroCHLOROthiazide (MICROZIDE) 12.5 mg capsule       lisinopril 10 MG tablet        No current facility-administered medications on file prior to visit.      Review of patient's allergies indicates:   Allergen Reactions    Astelin [azelastine] Other (See Comments)     Dry mouth    Flomax [tamsulosin]      Nosebleed         Review of Systems   Constitutional: Negative for fever.   Gastrointestinal: Positive for heartburn.   Genitourinary: Positive for dysuria.       OBJECTIVE:  /72 (BP Location: Left arm, Patient Position: Sitting, BP Method: Large (Manual))   Pulse 73   Temp 98.6 °F (37 °C) (Oral)   Resp 16   Ht 6' 1" (1.854 m)   Wt 93.2 kg (205 lb 7.5 oz)   SpO2 97%   BMI 27.11 kg/m²     Wt Readings from Last 3 Encounters:   01/07/20 93.2 kg (205 lb 7.5 oz)   12/18/19 93.1 kg (205 lb 4 oz)   10/14/19 92.1 kg (203 lb 0.7 oz)     BP Readings from Last 3 Encounters:   01/07/20 112/72 "   12/18/19 135/88   10/14/19 (!) 130/90       He appears well, in no apparent distress.  Alert and oriented times three, pleasant and cooperative. Vital signs are as documented in vital signs section.  The abdomen is soft without tenderness, guarding, mass, rebound or organomegaly. Bowel sounds are normal. No CVA tenderness or inguinal adenopathy noted.      Review of old Records:  reviewd per epic and     Review of old labs:  Lab Results   Component Value Date    TSH 0.440 05/24/2019     Lab Results   Component Value Date    WBC 5.27 05/24/2019    HGB 15.7 05/24/2019    HCT 49.4 05/24/2019    MCV 96 05/24/2019     05/24/2019       Chemistry        Component Value Date/Time     05/24/2019 1008    K 4.5 05/24/2019 1008     05/24/2019 1008    CO2 29 05/24/2019 1008    BUN 10 05/24/2019 1008    CREATININE 1.2 05/24/2019 1008     05/24/2019 1008        Component Value Date/Time    CALCIUM 10.3 05/24/2019 1008    ALKPHOS 62 05/24/2019 1008    AST 17 05/24/2019 1008    ALT 18 05/24/2019 1008    BILITOT 0.4 05/24/2019 1008    ESTGFRAFRICA >60.0 05/24/2019 1008    EGFRNONAA >60.0 05/24/2019 1008        Lab Results   Component Value Date    CHOL 152 05/24/2019    CHOL 226 (H) 08/02/2017    CHOL 219 (H) 05/11/2016     Lab Results   Component Value Date    HDL 42 05/24/2019    HDL 45 08/02/2017    HDL 46 05/11/2016     Lab Results   Component Value Date    LDLCALC 98.8 05/24/2019    LDLCALC 162.6 (H) 08/02/2017    LDLCALC 156.0 05/11/2016     Lab Results   Component Value Date    TRIG 56 05/24/2019    TRIG 92 08/02/2017    TRIG 85 05/11/2016     Lab Results   Component Value Date    CHOLHDL 27.6 05/24/2019    CHOLHDL 19.9 (L) 08/02/2017    CHOLHDL 21.0 05/11/2016         Review of old imaging:  n/a    ASSESSMENT:  Problem List Items Addressed This Visit     GERD (gastroesophageal reflux disease) (Chronic)     Will see GI to see about this         Benign prostatic hyperplasia with weak urinary  stream      Other Visit Diagnoses     Acute prostatitis    -  Primary    Relevant Orders    Urinalysis (Completed)    Urine culture (Completed)          ICD-10-CM ICD-9-CM   1. Acute prostatitis N41.0 601.0   2. Gastroesophageal reflux disease without esophagitis K21.9 530.81   3. Benign prostatic hyperplasia with weak urinary stream N40.1 600.01    R39.12 788.62         PLAN:  Problem List Items Addressed This Visit     GERD (gastroesophageal reflux disease) (Chronic)    Current Assessment & Plan     Will see GI to see about this         Benign prostatic hyperplasia with weak urinary stream      Other Visit Diagnoses     Acute prostatitis    -  Primary    Relevant Orders    Urinalysis (Completed)    Urine culture (Completed)          Medication List with Changes/Refills   Current Medications    ALCLOMETHASONE (ACLOVATE) 0.05 % CREAM    Apply topically 2 (two) times daily. For irritant contact dermatitis    ALFUZOSIN (UROXATRAL) 10 MG TB24    Take 1 tablet (10 mg total) by mouth daily with breakfast.    ASPIRIN (ECOTRIN) 81 MG EC TABLET    Take 1 tablet (81 mg total) by mouth once daily.    ATORVASTATIN (LIPITOR) 40 MG TABLET    Take 1 tablet (40 mg total) by mouth once daily.    BUDESONIDE (PULMICORT) 0.25 MG/2 ML NEBULIZER SOLUTION    Take 2 mLs (0.25 mg total) by nebulization once daily. Controller    CINNAMON BARK 500 MG CAPSULE    Take 500 mg by mouth once daily.    CIPROFLOXACIN HCL (CIPRO) 500 MG TABLET    TAKE 1 TABLET BY MOUTH TWICE DAILY FOR 21 DAYS    ERYTHROMYCIN (ROMYCIN) OPHTHALMIC OINTMENT    Place into the right eye every 8 (eight) hours.    HYDROCHLOROTHIAZIDE (MICROZIDE) 12.5 MG CAPSULE        IPRATROPIUM (ATROVENT) 0.06 % NASAL SPRAY    2 sprays by Nasal route 4 (four) times daily.    LINACLOTIDE (LINZESS) 145 MCG CAP CAPSULE    Take 1 capsule (145 mcg total) by mouth once daily.    LISINOPRIL 10 MG TABLET        LISINOPRIL-HYDROCHLOROTHIAZIDE (PRINZIDE,ZESTORETIC) 10-12.5 MG PER TABLET    Take 1  tablet by mouth once daily.    MULTIVITAMIN (THERAGRAN) PER TABLET    Take by mouth.  Tablet Oral Every day    OMEGA-3 FATTY ACIDS 1,250 MG CAP    Take by mouth.    PROPYLENE GLYCOL (SYSTANE BALANCE) 0.6 % DROP    Apply to eye.       sjogren's is currently stable.  Monitor  Atherosclerosis of aorta noted and stable  The current medical regimen is effective;  continue present plan and medications.    No follow-ups on file.

## 2020-02-06 ENCOUNTER — OFFICE VISIT (OUTPATIENT)
Dept: OPTOMETRY | Facility: CLINIC | Age: 70
End: 2020-02-06
Payer: MEDICARE

## 2020-02-06 DIAGNOSIS — H02.88B MEIBOMIAN GLAND DYSFUNCTION (MGD), BILATERAL, BOTH UPPER AND LOWER LIDS: ICD-10-CM

## 2020-02-06 DIAGNOSIS — H52.13 MYOPIA WITH PRESBYOPIA OF BOTH EYES: ICD-10-CM

## 2020-02-06 DIAGNOSIS — H02.88A MEIBOMIAN GLAND DYSFUNCTION (MGD), BILATERAL, BOTH UPPER AND LOWER LIDS: ICD-10-CM

## 2020-02-06 DIAGNOSIS — H25.13 NUCLEAR SCLEROSIS, BILATERAL: Primary | ICD-10-CM

## 2020-02-06 DIAGNOSIS — Z13.5 GLAUCOMA SCREENING: ICD-10-CM

## 2020-02-06 DIAGNOSIS — H52.4 MYOPIA WITH PRESBYOPIA OF BOTH EYES: ICD-10-CM

## 2020-02-06 PROCEDURE — 92015 DETERMINE REFRACTIVE STATE: CPT | Mod: ,,, | Performed by: OPTOMETRIST

## 2020-02-06 PROCEDURE — 99999 PR PBB SHADOW E&M-EST. PATIENT-LVL III: ICD-10-PCS | Mod: PBBFAC,,, | Performed by: OPTOMETRIST

## 2020-02-06 PROCEDURE — 92014 COMPRE OPH EXAM EST PT 1/>: CPT | Mod: S$PBB,,, | Performed by: OPTOMETRIST

## 2020-02-06 PROCEDURE — 99999 PR PBB SHADOW E&M-EST. PATIENT-LVL III: CPT | Mod: PBBFAC,,, | Performed by: OPTOMETRIST

## 2020-02-06 PROCEDURE — 92015 PR REFRACTION: ICD-10-PCS | Mod: ,,, | Performed by: OPTOMETRIST

## 2020-02-06 PROCEDURE — 92014 PR EYE EXAM, EST PATIENT,COMPREHESV: ICD-10-PCS | Mod: S$PBB,,, | Performed by: OPTOMETRIST

## 2020-02-06 PROCEDURE — 99213 OFFICE O/P EST LOW 20 MIN: CPT | Mod: PBBFAC | Performed by: OPTOMETRIST

## 2020-02-06 NOTE — PROGRESS NOTES
HPI     Patient is here for annual eye exam  Patient complaints of pressure behind the eyes   (-)Flashes (-)Floaters  (+)Itch, (+)tear, (+)burn, (+)Dryness. (+) OTC Drops Systane Gel and   Balance  (-)Photophobia  (-)Glare (-)diplopia (-) headaches          Last edited by Osman Ordoñez, OD on 2/6/2020  9:46 AM. (History)            Assessment /Plan     For exam results, see Encounter Report.    Nuclear sclerosis, bilateral  -Educated patient on presence of cataracts at today's exam, monitor at annual dilated fundus exam. 5+ years surgical estimate.    Meibomian gland dysfunction (MGD), bilateral, both upper and lower lids  -Systane Balance    Glaucoma screening  -Monitor with annual eye exam and IOP check    Myopia with presbyopia of both eyes  Eyeglass Final Rx     Eyeglass Final Rx       Sphere Cylinder Axis Dist VA Add    Right -0.25 Sphere  20/30 +2.50    Left -0.75 +0.50 150 20/30 +2.50    Type:  Bifocal    Expiration Date:  2/6/2021                  RTC 1 yr

## 2020-02-12 NOTE — PROGRESS NOTES
[facility] Consultation Note    Reason for Consult:  Gastroesophageal reflux    PCP: Raúl Perkins     Referring MD:   Aaareferral Self  No address on file    HPI:  This is a 69 y.o. male here for evaluation of gastroesophageal reflux. Has a history of Sjogren's disease.  Drinks an herbal tea with laxative      Typical Symptoms (high pretest probability=impedance on meds):  Pyrosis - not in chest but in abdomen  Regurgitation/Water Brash - no  Upright/Nocturnal symptoms - no   Dysphagia/Odynophagia - no  PPI usage/history/response - Tums prn    Atypical Symptoms (low pretest probability=bravo off meds):  Hoarseness/Cough -   Throat clearing -   Chest pain -   Asthma -         ROS:  Constitutional: No fevers, chills, No weight loss  ENT: No allergies  CV: No chest pain  Pulm: No cough, No shortness of breath  Ophtho: No vision changes  GI: see HPI  Derm: No rash  Heme: No lymphadenopathy, No bruising  MSK: No arthritis  : No dysuria, No hematuria  Endo: No hot or cold intolerance  Neuro: No syncope, No seizure  Psych: No anxiety, No depression    Medical History:  has a past medical history of Allergy, Arthritis, Blood clotting tendency, BPH (benign prostatic hypertrophy), Cervical spondylosis, Colon polyps, Coronary artery disease, Depression (5/8/2015), Dry eyes, Dry mouth, GERD (gastroesophageal reflux disease), Heart attack, Hyperlipidemia, Hypertension, Lumbar spondylosis, Nasal obstruction, Rheumatoid arthritis, Sinusitis, Special screening for malignant neoplasm of colon (6/29/2016), and Trouble in sleeping.    Surgical History:  has a past surgical history that includes Coronary artery bypass graft; Tonsillectomy; Knee arthroscopy w/ debridement; Excision turbinate, submucous; Nasal septum surgery; and Colonoscopy (N/A, 6/29/2016).    Family History: family history includes Alzheimer's disease in his mother; Cataracts in his other; Colon cancer (age of onset: 45) in his father;  Hyperlipidemia in his mother; No Known Problems in his brother, maternal aunt, maternal grandfather, maternal grandmother, maternal uncle, paternal aunt, paternal grandfather, paternal grandmother, paternal uncle, and sister; Stomach cancer in his father..     Social History:  reports that he quit smoking about 29 years ago. His smoking use included cigarettes. He has a 20.00 pack-year smoking history. He has never used smokeless tobacco. He reports that he does not drink alcohol or use drugs.    Review of patient's allergies indicates:   Allergen Reactions    Astelin [azelastine] Other (See Comments)     Dry mouth    Flomax [tamsulosin]      Nosebleed         Medication List with Changes/Refills   Current Medications    ALCLOMETHASONE (ACLOVATE) 0.05 % CREAM    Apply topically 2 (two) times daily. For irritant contact dermatitis    ALFUZOSIN (UROXATRAL) 10 MG TB24    Take 1 tablet (10 mg total) by mouth daily with breakfast.    ASPIRIN (ECOTRIN) 81 MG EC TABLET    Take 1 tablet (81 mg total) by mouth once daily.    BUDESONIDE (PULMICORT) 0.25 MG/2 ML NEBULIZER SOLUTION    Take 2 mLs (0.25 mg total) by nebulization once daily. Controller    CINNAMON BARK 500 MG CAPSULE    Take 500 mg by mouth once daily.    HYDROCHLOROTHIAZIDE (MICROZIDE) 12.5 MG CAPSULE        IPRATROPIUM (ATROVENT) 0.06 % NASAL SPRAY    2 sprays by Nasal route 4 (four) times daily.    LISINOPRIL 10 MG TABLET    Take 10 mg by mouth once daily.     LISINOPRIL-HYDROCHLOROTHIAZIDE (PRINZIDE,ZESTORETIC) 10-12.5 MG PER TABLET    Take 1 tablet by mouth once daily.    MULTIVITAMIN (THERAGRAN) PER TABLET    Take by mouth.  Tablet Oral Every day    OMEGA-3 FATTY ACIDS 1,250 MG CAP    Take by mouth.    PROPYLENE GLYCOL (SYSTANE BALANCE) 0.6 % DROP    Apply to eye.    SODIUM BICARB-SODIUM CHLORIDE (SINUS RINSE) PACK    by sinus irrigation route.    SODIUM CHLORIDE/ALOE VERA (AYR SALINE GEL NASL)    by Nasal route.   Discontinued Medications    ATORVASTATIN  "(LIPITOR) 40 MG TABLET    Take 1 tablet (40 mg total) by mouth once daily.    CIPROFLOXACIN HCL (CIPRO) 500 MG TABLET    TAKE 1 TABLET BY MOUTH TWICE DAILY FOR 21 DAYS    ERYTHROMYCIN (ROMYCIN) OPHTHALMIC OINTMENT    Place into the right eye every 8 (eight) hours.    LINACLOTIDE (LINZESS) 145 MCG CAP CAPSULE    Take 1 capsule (145 mcg total) by mouth once daily.         Objective Findings:    Vital Signs:  /85 (BP Location: Left arm, Patient Position: Sitting, BP Method: Large (Automatic))   Pulse 69   Ht 6' 1" (1.854 m)   Wt 93 kg (205 lb 0.4 oz)   BMI 27.05 kg/m²   Body mass index is 27.05 kg/m².    Physical Exam:  General Appearance: Well appearing in no acute distress  Head:   Normocephalic, without obvious abnormality  Eyes:    No scleral icterus, EOMI  ENT: Neck supple, Lips, mucosa, and tongue normal; teeth and gums normal  Lungs: CTA bilaterally in anterior and posterior fields, no wheezes, no crackles.  Heart:  Regular rate and rhythm, S1, S2 normal, no murmurs heard  Abdomen: Soft, non tender, non distended with positive bowel sounds in all four quadrants. No hepatosplenomegaly, ascites, or mass  Extremities: 2+ pulses, no clubbing, cyanosis or edema  Skin: No rash  Neurologic: CN II-XII intact      Labs:  Lab Results   Component Value Date    WBC 5.27 05/24/2019    HGB 15.7 05/24/2019    HCT 49.4 05/24/2019     05/24/2019    CHOL 152 05/24/2019    TRIG 56 05/24/2019    HDL 42 05/24/2019    ALT 18 05/24/2019    AST 17 05/24/2019     05/24/2019    K 4.5 05/24/2019     05/24/2019    CREATININE 1.2 05/24/2019    BUN 10 05/24/2019    CO2 29 05/24/2019    TSH 0.440 05/24/2019    PSA 1.2 05/24/2019    INR 1.0 08/20/2015    HGBA1C 5.7 (H) 05/24/2019         Imaging:  CT 2010 -   1.  BILATERAL RENAL CYSTS.  2.  PROSTATOMEGALY.    3.  DEGENERATIVE CHANGES IN THE SPINE AND ATHEROSCLEROTIC CHANGES WITHIN   THE AORTA.      Endoscopy:    Colon 2016 - melanosis, tics    Assessment:  1. " Gastroesophageal reflux disease, esophagitis presence not specified    2. Family history of colon cancer           Recommendations:  1. EGD/Colon  2. CT if negative    No follow-ups on file.      Order summary:  Orders Placed This Encounter    Case request GI: ESOPHAGOGASTRODUODENOSCOPY (EGD), COLONOSCOPY         Thank you so much for allowing me to participate in the care of Rizwan Dav Martinez MD

## 2020-02-14 ENCOUNTER — TELEPHONE (OUTPATIENT)
Dept: ENDOSCOPY | Facility: HOSPITAL | Age: 70
End: 2020-02-14

## 2020-02-14 ENCOUNTER — OFFICE VISIT (OUTPATIENT)
Dept: GASTROENTEROLOGY | Facility: CLINIC | Age: 70
End: 2020-02-14
Payer: MEDICARE

## 2020-02-14 VITALS
HEIGHT: 73 IN | HEART RATE: 69 BPM | BODY MASS INDEX: 27.17 KG/M2 | SYSTOLIC BLOOD PRESSURE: 138 MMHG | WEIGHT: 205 LBS | DIASTOLIC BLOOD PRESSURE: 85 MMHG

## 2020-02-14 DIAGNOSIS — Z12.11 SPECIAL SCREENING FOR MALIGNANT NEOPLASMS, COLON: Primary | ICD-10-CM

## 2020-02-14 DIAGNOSIS — Z80.0 FAMILY HISTORY OF COLON CANCER: ICD-10-CM

## 2020-02-14 DIAGNOSIS — K21.9 GASTROESOPHAGEAL REFLUX DISEASE, ESOPHAGITIS PRESENCE NOT SPECIFIED: Primary | ICD-10-CM

## 2020-02-14 PROCEDURE — 99204 OFFICE O/P NEW MOD 45 MIN: CPT | Mod: S$PBB,,, | Performed by: INTERNAL MEDICINE

## 2020-02-14 PROCEDURE — 99999 PR PBB SHADOW E&M-EST. PATIENT-LVL III: ICD-10-PCS | Mod: PBBFAC,,, | Performed by: INTERNAL MEDICINE

## 2020-02-14 PROCEDURE — 99999 PR PBB SHADOW E&M-EST. PATIENT-LVL III: CPT | Mod: PBBFAC,,, | Performed by: INTERNAL MEDICINE

## 2020-02-14 PROCEDURE — 99204 PR OFFICE/OUTPT VISIT, NEW, LEVL IV, 45-59 MIN: ICD-10-PCS | Mod: S$PBB,,, | Performed by: INTERNAL MEDICINE

## 2020-02-14 PROCEDURE — 99213 OFFICE O/P EST LOW 20 MIN: CPT | Mod: PBBFAC | Performed by: INTERNAL MEDICINE

## 2020-02-14 RX ORDER — POLYETHYLENE GLYCOL 3350, SODIUM SULFATE ANHYDROUS, SODIUM BICARBONATE, SODIUM CHLORIDE, POTASSIUM CHLORIDE 236; 22.74; 6.74; 5.86; 2.97 G/4L; G/4L; G/4L; G/4L; G/4L
4 POWDER, FOR SOLUTION ORAL ONCE
Qty: 4000 ML | Refills: 0 | Status: SHIPPED | OUTPATIENT
Start: 2020-02-14 | End: 2020-02-14

## 2020-02-16 ENCOUNTER — PATIENT MESSAGE (OUTPATIENT)
Dept: FAMILY MEDICINE | Facility: CLINIC | Age: 70
End: 2020-02-16

## 2020-02-16 DIAGNOSIS — R06.02 SHORTNESS OF BREATH: ICD-10-CM

## 2020-02-17 ENCOUNTER — OFFICE VISIT (OUTPATIENT)
Dept: FAMILY MEDICINE | Facility: CLINIC | Age: 70
End: 2020-02-17
Payer: MEDICARE

## 2020-02-17 VITALS
HEART RATE: 90 BPM | TEMPERATURE: 98 F | HEIGHT: 73 IN | DIASTOLIC BLOOD PRESSURE: 80 MMHG | OXYGEN SATURATION: 96 % | BODY MASS INDEX: 27.54 KG/M2 | SYSTOLIC BLOOD PRESSURE: 132 MMHG | WEIGHT: 207.81 LBS

## 2020-02-17 DIAGNOSIS — I70.0 ATHEROSCLEROSIS OF AORTA: ICD-10-CM

## 2020-02-17 DIAGNOSIS — I10 ESSENTIAL HYPERTENSION: ICD-10-CM

## 2020-02-17 DIAGNOSIS — Z74.09 OTHER REDUCED MOBILITY: ICD-10-CM

## 2020-02-17 DIAGNOSIS — M35.01 SJOGREN'S SYNDROME WITH KERATOCONJUNCTIVITIS SICCA: ICD-10-CM

## 2020-02-17 DIAGNOSIS — Z00.00 ENCOUNTER FOR PREVENTIVE HEALTH EXAMINATION: Primary | ICD-10-CM

## 2020-02-17 DIAGNOSIS — M05.79 RHEUMATOID ARTHRITIS INVOLVING MULTIPLE SITES WITH POSITIVE RHEUMATOID FACTOR: ICD-10-CM

## 2020-02-17 PROCEDURE — 99214 OFFICE O/P EST MOD 30 MIN: CPT | Mod: PBBFAC,PO | Performed by: NURSE PRACTITIONER

## 2020-02-17 PROCEDURE — G0439 PR MEDICARE ANNUAL WELLNESS SUBSEQUENT VISIT: ICD-10-PCS | Mod: S$GLB,,, | Performed by: NURSE PRACTITIONER

## 2020-02-17 PROCEDURE — 99999 PR PBB SHADOW E&M-EST. PATIENT-LVL IV: ICD-10-PCS | Mod: PBBFAC,,, | Performed by: NURSE PRACTITIONER

## 2020-02-17 PROCEDURE — 99999 PR PBB SHADOW E&M-EST. PATIENT-LVL IV: CPT | Mod: PBBFAC,,, | Performed by: NURSE PRACTITIONER

## 2020-02-17 PROCEDURE — G0439 PPPS, SUBSEQ VISIT: HCPCS | Mod: S$GLB,,, | Performed by: NURSE PRACTITIONER

## 2020-02-17 RX ORDER — BUDESONIDE 0.25 MG/2ML
0.25 INHALANT ORAL DAILY
Qty: 90 VIAL | Refills: 1 | Status: SHIPPED | OUTPATIENT
Start: 2020-02-17 | End: 2020-08-21 | Stop reason: SDUPTHER

## 2020-02-17 NOTE — PATIENT INSTRUCTIONS
Counseling and Referral of Other Preventative  (Italic type indicates deductible and co-insurance are waived)    Patient Name: Rizwan Demarco  Today's Date: 2/17/2020    Health Maintenance       Date Due Completion Date    High Dose Statin 06/09/1969 ---    Shingles Vaccine (1 of 2) 06/09/1998 ---    Aspirin/Antiplatelet Therapy 02/17/2021 2/17/2020    Lipid Panel 05/24/2024 5/24/2019    TETANUS VACCINE 06/20/2026 6/20/2016    Colonoscopy 06/29/2026 6/29/2016        No orders of the defined types were placed in this encounter.    The following information is provided to all patients.  This information is to help you find resources for any of the problems found today that may be affecting your health:                Living healthy guide: www.[facility].[place].gov      Understanding Diabetes: www.diabetes.org      Eating healthy: www.cdc.gov/healthyweight      [facility] home safety checklist: www.cdc.gov/steadi/patient.html      Agency on Aging: www.goea.[place].gov      Alcoholics anonymous (AA): www.aa.org      Physical Activity: www.jimenez.nih.gov/sa3tscc      Tobacco use: www.quitwithusla.org

## 2020-02-17 NOTE — PROGRESS NOTES
"Rizwan Demarco presented for a  Medicare AWV and comprehensive Health Risk Assessment today. The following components were reviewed and updated:    · Medical history  · Family History  · Social history  · Allergies and Current Medications  · Health Risk Assessment  · Health Maintenance  · Care Team     ** See Completed Assessments for Annual Wellness Visit within the encounter summary.**       The following assessments were completed:  · Living Situation  · CAGE  · Depression Screening  · Timed Get Up and Go  · Whisper Test  · Cognitive Function Screening  ·   ·   · Nutrition Screening  · ADL Screening  · PAQ Screening    Vitals:    02/17/20 0807   BP: 132/80   BP Location: Left arm   Patient Position: Sitting   BP Method: Medium (Automatic)   Pulse: 90   Temp: 98.3 °F (36.8 °C)   TempSrc: Oral   SpO2: 96%   Weight: 94.3 kg (207 lb 12.5 oz)   Height: 6' 1" (1.854 m)     Body mass index is 27.41 kg/m².  Physical Exam   Constitutional: He is oriented to person, place, and time.   Cardiovascular: Normal rate, regular rhythm and normal heart sounds.   Pulmonary/Chest: Effort normal and breath sounds normal.   Musculoskeletal: Normal range of motion.   Neurological: He is alert and oriented to person, place, and time.   Skin: Skin is warm.   Psychiatric: He has a normal mood and affect. His behavior is normal. Thought content normal.   Vitals reviewed.        Diagnoses and health risks identified today and associated recommendations/orders:    1. Encounter for preventive health examination  Education provided about preventive health examinations and procedures; addressed and discussed patient's health concerns. Additionally, reviewed medical record for risk factors and documented the results during this encounter.    2. Rheumatoid arthritis involving multiple sites with positive rheumatoid factor  Stable and monitored. Continue current treatment plan as previously prescribed.     3. Atherosclerosis of aorta  Stable " and monitored. Continue current treatment plan as previously prescribed.     4. Sjogren's syndrome with keratoconjunctivitis sicca  Stable, patient evaluated/monitored by [facility] optometry dept; continue as advised.     5. Essential hypertension  Presently at goal. Continue as advised regarding dietary and lifestyle modifications.     6. Other reduced mobility  We discussed shoes, insoles, sleep hygiene, and fall precautions.     Reviewed health maintenance, educated about recommended examinations, procedures (labs & images), and immunizations. Declined shingles vaccine at today's visit.     Provided Rizwan with a 5-10 year written screening schedule and personal prevention plan. Recommendations were developed using the USPSTF age appropriate recommendations. Education, counseling, and referrals were provided as needed. After Visit Summary printed and given to patient which includes a list of additional screenings\tests needed.    Follow up in about 1 year (around 2/17/2021) for assessment .    Aurelio Schaefer Jr, NP  I offered to discuss end of life issues, including information on how to make advance directives that the patient could use to name someone who would make medical decisions on their behalf if they became too ill to make themselves.    ___Patient declined  _X_Patient is interested, I provided paper work and offered to discuss.

## 2020-03-13 ENCOUNTER — TELEPHONE (OUTPATIENT)
Dept: ENDOSCOPY | Facility: HOSPITAL | Age: 70
End: 2020-03-13

## 2020-03-13 NOTE — TELEPHONE ENCOUNTER
Dr. Martinez,    I called patient to confirm his EGD/Colonoscopy appointment on 3/19/20. I answered his questions/concerns regarding COVID-19 and reassured him we are following CDC guidelines.     Patient would still like you to give him a call to discuss his concerns regarding COVID-19.      Thank you,  Almita

## 2020-03-17 ENCOUNTER — TELEPHONE (OUTPATIENT)
Dept: ENDOSCOPY | Facility: HOSPITAL | Age: 70
End: 2020-03-17

## 2020-03-17 NOTE — TELEPHONE ENCOUNTER
----- Message from Liliana Iqbal MA sent at 3/16/2020  4:59 PM CDT -----  Contact: pt  See message below   ----- Message -----  From: Lanette Martinez  Sent: 3/16/2020   4:52 PM CDT  To: [facility] Staff    Pt called to cancel procedure scheduled for 3/19/2020    Pt can be reached at [phone]

## 2020-04-08 ENCOUNTER — OFFICE VISIT (OUTPATIENT)
Dept: FAMILY MEDICINE | Facility: CLINIC | Age: 70
End: 2020-04-08
Payer: MEDICARE

## 2020-04-08 VITALS — TEMPERATURE: 97 F

## 2020-04-08 DIAGNOSIS — J32.9 SINUSITIS, UNSPECIFIED CHRONICITY, UNSPECIFIED LOCATION: Primary | ICD-10-CM

## 2020-04-08 PROCEDURE — 99441 PR PHYSICIAN TELEPHONE EVALUATION 5-10 MIN: CPT | Mod: 95,,, | Performed by: FAMILY MEDICINE

## 2020-04-08 PROCEDURE — 99441 PR PHYSICIAN TELEPHONE EVALUATION 5-10 MIN: ICD-10-PCS | Mod: 95,,, | Performed by: FAMILY MEDICINE

## 2020-04-08 RX ORDER — PREDNISONE 20 MG/1
20 TABLET ORAL DAILY
Qty: 7 TABLET | Refills: 0 | Status: SHIPPED | OUTPATIENT
Start: 2020-04-08 | End: 2020-04-15

## 2020-04-08 RX ORDER — PENICILLIN V POTASSIUM 500 MG/1
500 TABLET, FILM COATED ORAL EVERY 8 HOURS
Qty: 21 TABLET | Refills: 0 | Status: SHIPPED | OUTPATIENT
Start: 2020-04-08 | End: 2020-04-15

## 2020-04-08 NOTE — PROGRESS NOTES
Chief Complaint   Patient presents with    Sinusitis       SUBJECTIVE:  Rizwan Demarco Jr. is a 69 y.o. male here for new problem of sinusitis and to just review and make sure he doesn't need anything else, no change in taste or smell.  no fever or chills and doesn't feel sick..  Currently has co-morbidities including per problem list.      The patient location is: [place]/home  The chief complaint leading to consultation is: sinusitis  Visit type: Virtual visit with synchronous audio and video  Total time spent with patient: 8  Each patient to whom he or she provides medical services by telemedicine is:  (1) informed of the relationship between the physician and patient and the respective role of any other health care provider with respect to management of the patient; and (2) notified that he or she may decline to receive medical services by telemedicine and may withdraw from such care at any time.    Notes:     Past Medical History:   Diagnosis Date    Allergy     Arthritis     Rheumatoid arthritis    Blood clotting tendency     BPH (benign prostatic hypertrophy)     Cervical spondylosis     Colon polyp 2010    adenoma    Coronary artery disease     Depression 5/8/2015    Dry eyes     Dry mouth     GERD (gastroesophageal reflux disease)     Heart attack     pt denies    Hyperlipidemia     Hypertension     Lumbar spondylosis     Nasal obstruction     Rheumatoid arthritis     Sinusitis     Special screening for malignant neoplasm of colon 6/29/2016    Trouble in sleeping      Past Surgical History:   Procedure Laterality Date    COLONOSCOPY N/A 6/29/2016    Procedure: COLONOSCOPY;  Surgeon: Partha Saucedo MD;  Location: [facility];  Service: Endoscopy;  Laterality: N/A;    CORONARY ARTERY BYPASS GRAFT      EXCISION TURBINATE, SUBMUCOUS      KNEE ARTHROSCOPY W/ DEBRIDEMENT      NASAL SEPTUM SURGERY      TONSILLECTOMY       Social History     Socioeconomic History    Marital status:       Spouse name: Not on file    Number of children: Not on file    Years of education: Not on file    Highest education level: Not on file   Occupational History    Not on file   Social Needs    Financial resource strain: Not on file    Food insecurity:     Worry: Not on file     Inability: Not on file    Transportation needs:     Medical: Not on file     Non-medical: Not on file   Tobacco Use    Smoking status: Former Smoker     Packs/day: 1.00     Years: 20.00     Pack years: 20.00     Types: Cigarettes     Last attempt to quit: 1991     Years since quittin.2    Smokeless tobacco: Never Used    Tobacco comment: Quit .   Substance and Sexual Activity    Alcohol use: No    Drug use: No    Sexual activity: Not on file   Lifestyle    Physical activity:     Days per week: Not on file     Minutes per session: Not on file    Stress: Not on file   Relationships    Social connections:     Talks on phone: Not on file     Gets together: Not on file     Attends Yazidism service: Not on file     Active member of club or organization: Not on file     Attends meetings of clubs or organizations: Not on file     Relationship status: Not on file   Other Topics Concern    Not on file   Social History Narrative    Not on file     Family History   Problem Relation Age of Onset    Hyperlipidemia Mother     Alzheimer's disease Mother     Stomach cancer Father     Colon cancer Father 45    Cataracts Other     No Known Problems Sister     No Known Problems Brother     No Known Problems Maternal Aunt     No Known Problems Maternal Uncle     No Known Problems Paternal Aunt     No Known Problems Paternal Uncle     No Known Problems Maternal Grandmother     No Known Problems Maternal Grandfather     No Known Problems Paternal Grandmother     No Known Problems Paternal Grandfather     Blindness Neg Hx     Cancer Neg Hx     Diabetes Neg Hx     Glaucoma Neg Hx     Rheum arthritis Neg Hx      Psoriasis Neg Hx     Lupus Neg Hx     Amblyopia Neg Hx     Hypertension Neg Hx     Macular degeneration Neg Hx     Retinal detachment Neg Hx     Strabismus Neg Hx     Stroke Neg Hx     Thyroid disease Neg Hx     Esophageal cancer Neg Hx      Current Outpatient Medications on File Prior to Visit   Medication Sig Dispense Refill    alclomethasone (ACLOVATE) 0.05 % cream Apply topically 2 (two) times daily. For irritant contact dermatitis 45 g 0    alfuzosin (UROXATRAL) 10 mg Tb24 Take 1 tablet (10 mg total) by mouth daily with breakfast. 90 tablet 1    aspirin (ECOTRIN) 81 MG EC tablet Take 1 tablet (81 mg total) by mouth once daily.  0    budesonide (PULMICORT) 0.25 mg/2 mL nebulizer solution Take 2 mLs (0.25 mg total) by nebulization once daily. Controller 90 vial 1    cinnamon bark 500 mg capsule Take 500 mg by mouth once daily.      hydroCHLOROthiazide (MICROZIDE) 12.5 mg capsule       ipratropium (ATROVENT) 0.06 % nasal spray 2 sprays by Nasal route 4 (four) times daily. (Patient not taking: Reported on 2/17/2020) 15 mL 1    lisinopril 10 MG tablet Take 10 mg by mouth once daily.       lisinopril-hydrochlorothiazide (PRINZIDE,ZESTORETIC) 10-12.5 mg per tablet Take 1 tablet by mouth once daily. 90 tablet 1    multivitamin (THERAGRAN) per tablet Take by mouth.  Tablet Oral Every day      omega-3 fatty acids 1,250 mg Cap Take by mouth.      propylene glycol (SYSTANE BALANCE) 0.6 % Drop Apply to eye.      sodium bicarb-sodium chloride (SINUS RINSE) Pack by sinus irrigation route.      sodium chloride/aloe vera (AYR SALINE GEL NASL) by Nasal route.       No current facility-administered medications on file prior to visit.      Review of patient's allergies indicates:   Allergen Reactions    Astelin [azelastine] Other (See Comments)     Dry mouth    Flomax [tamsulosin]      Nosebleed         ROS    OBJECTIVE:  Temp 97.3 °F (36.3 °C)     Wt Readings from Last 3 Encounters:   02/17/20 94.3  kg (207 lb 12.5 oz)   02/14/20 93 kg (205 lb 0.4 oz)   01/07/20 93.2 kg (205 lb 7.5 oz)     BP Readings from Last 3 Encounters:   02/17/20 132/80   02/14/20 138/85   01/07/20 112/72       Sounds like his sinus but has raw inflammation by history.      Review of old Records:  reviewed    Review of old labs:      Review of old imaging:      ASSESSMENT:  Problem List Items Addressed This Visit     None      Visit Diagnoses     Sinusitis, unspecified chronicity, unspecified location    -  Primary          ICD-10-CM ICD-9-CM   1. Sinusitis, unspecified chronicity, unspecified location J32.9 473.9         PLAN:  Problem List Items Addressed This Visit     None      Visit Diagnoses     Sinusitis, unspecified chronicity, unspecified location    -  Primary          Medication List with Changes/Refills   New Medications    PENICILLIN V POTASSIUM (VEETID) 500 MG TABLET    Take 1 tablet (500 mg total) by mouth every 8 (eight) hours. for 7 days    PREDNISONE (DELTASONE) 20 MG TABLET    Take 1 tablet (20 mg total) by mouth once daily. for 7 days   Current Medications    ALCLOMETHASONE (ACLOVATE) 0.05 % CREAM    Apply topically 2 (two) times daily. For irritant contact dermatitis    ALFUZOSIN (UROXATRAL) 10 MG TB24    Take 1 tablet (10 mg total) by mouth daily with breakfast.    ASPIRIN (ECOTRIN) 81 MG EC TABLET    Take 1 tablet (81 mg total) by mouth once daily.    BUDESONIDE (PULMICORT) 0.25 MG/2 ML NEBULIZER SOLUTION    Take 2 mLs (0.25 mg total) by nebulization once daily. Controller    CINNAMON BARK 500 MG CAPSULE    Take 500 mg by mouth once daily.    HYDROCHLOROTHIAZIDE (MICROZIDE) 12.5 MG CAPSULE        IPRATROPIUM (ATROVENT) 0.06 % NASAL SPRAY    2 sprays by Nasal route 4 (four) times daily.    LISINOPRIL 10 MG TABLET    Take 10 mg by mouth once daily.     LISINOPRIL-HYDROCHLOROTHIAZIDE (PRINZIDE,ZESTORETIC) 10-12.5 MG PER TABLET    Take 1 tablet by mouth once daily.    MULTIVITAMIN (THERAGRAN) PER TABLET    Take by  mouth.  Tablet Oral Every day    OMEGA-3 FATTY ACIDS 1,250 MG CAP    Take by mouth.    PROPYLENE GLYCOL (SYSTANE BALANCE) 0.6 % DROP    Apply to eye.    SODIUM BICARB-SODIUM CHLORIDE (SINUS RINSE) PACK    by sinus irrigation route.    SODIUM CHLORIDE/ALOE VERA (AYR SALINE GEL NASL)    by Nasal route.         No follow-ups on file.

## 2020-04-12 ENCOUNTER — PATIENT MESSAGE (OUTPATIENT)
Dept: FAMILY MEDICINE | Facility: CLINIC | Age: 70
End: 2020-04-12

## 2020-04-13 ENCOUNTER — TELEPHONE (OUTPATIENT)
Dept: FAMILY MEDICINE | Facility: CLINIC | Age: 70
End: 2020-04-13

## 2020-04-13 DIAGNOSIS — R09.1 PLEURISY: Primary | ICD-10-CM

## 2020-04-13 NOTE — TELEPHONE ENCOUNTER
----- Message from Semaj Travis sent at 4/13/2020  7:41 AM CDT -----  Contact: Rizwan [phone]  Type: Patient Call Back    Who called:Rizwan    What is the request in detail: The patient is requesting a call back from the staff in regards to medication that Dr. Perkins called in. He stated that it causes him back pain    Can the clinic reply by [facility]?no    Would the patient rather a call back or a response via [facility]? Call back     Best call back number: [phone]

## 2020-04-13 NOTE — TELEPHONE ENCOUNTER
That is very odd.  Tell him to please stop the medications for 2 days and let us know Wednesday if improving or not.    JR

## 2020-04-13 NOTE — TELEPHONE ENCOUNTER
Pt states that the meds you prescribed are causing him to have back pain ( prednisone and Penicillin) pt states he can barely  Move around. Said you told him to call if he had a problem.

## 2020-04-16 ENCOUNTER — TELEPHONE (OUTPATIENT)
Dept: FAMILY MEDICINE | Facility: CLINIC | Age: 70
End: 2020-04-16

## 2020-04-16 ENCOUNTER — HOSPITAL ENCOUNTER (EMERGENCY)
Facility: HOSPITAL | Age: 70
Discharge: HOME | End: 2020-04-16
Attending: EMERGENCY MEDICINE
Payer: MEDICARE

## 2020-04-16 ENCOUNTER — HOSPITAL ENCOUNTER (OUTPATIENT)
Dept: RADIOLOGY | Facility: HOSPITAL | Age: 70
Discharge: HOME | End: 2020-04-16
Attending: FAMILY MEDICINE
Payer: MEDICARE

## 2020-04-16 VITALS
RESPIRATION RATE: 16 BRPM | HEART RATE: 88 BPM | OXYGEN SATURATION: 96 % | HEIGHT: 73 IN | DIASTOLIC BLOOD PRESSURE: 72 MMHG | BODY MASS INDEX: 26.51 KG/M2 | WEIGHT: 200 LBS | TEMPERATURE: 99 F | SYSTOLIC BLOOD PRESSURE: 166 MMHG

## 2020-04-16 DIAGNOSIS — M54.9 BACK PAIN, UNSPECIFIED BACK LOCATION, UNSPECIFIED BACK PAIN LATERALITY, UNSPECIFIED CHRONICITY: Primary | ICD-10-CM

## 2020-04-16 DIAGNOSIS — R09.1 PLEURISY: ICD-10-CM

## 2020-04-16 DIAGNOSIS — R19.5 DARK STOOLS: ICD-10-CM

## 2020-04-16 DIAGNOSIS — R35.0 URINARY FREQUENCY: ICD-10-CM

## 2020-04-16 LAB
ALBUMIN SERPL-MCNC: 4 G/DL (ref 3.3–5.5)
ALP SERPL-CCNC: 43 U/L (ref 42–141)
BILIRUB SERPL-MCNC: 1 MG/DL (ref 0.2–1.6)
BILIRUBIN, POC UA: NEGATIVE
BLOOD, POC UA: NEGATIVE
BUN SERPL-MCNC: 10 MG/DL (ref 7–22)
CALCIUM SERPL-MCNC: 10.1 MG/DL (ref 8–10.3)
CHLORIDE SERPL-SCNC: 107 MMOL/L (ref 98–108)
CLARITY, POC UA: CLEAR
COLOR, POC UA: YELLOW
CREAT SERPL-MCNC: 1 MG/DL (ref 0.6–1.2)
CTP QC/QA: YES
FECAL OCCULT BLOOD, POC: NEGATIVE
GLUCOSE SERPL-MCNC: 96 MG/DL (ref 73–118)
GLUCOSE, POC UA: NEGATIVE
KETONES, POC UA: NEGATIVE
LEUKOCYTE EST, POC UA: NEGATIVE
NITRITE, POC UA: NEGATIVE
PH UR STRIP: 7.5 [PH]
POC ALT (SGPT): 18 U/L (ref 10–47)
POC AST (SGOT): 23 U/L (ref 11–38)
POC PTINR: 1 (ref 0.9–1.2)
POC PTWBT: 12.2 SEC (ref 9.7–14.3)
POC TCO2: 31 MMOL/L (ref 18–33)
POTASSIUM BLD-SCNC: 3.8 MMOL/L (ref 3.6–5.1)
PROTEIN, POC UA: NEGATIVE
PROTEIN, POC: 6.7 G/DL (ref 6.4–8.1)
SAMPLE: NORMAL
SODIUM BLD-SCNC: 142 MMOL/L (ref 128–145)
SPECIFIC GRAVITY, POC UA: 1.02
UROBILINOGEN, POC UA: 0.2 E.U./DL

## 2020-04-16 PROCEDURE — 80053 COMPREHEN METABOLIC PANEL: CPT | Mod: ER

## 2020-04-16 PROCEDURE — 85025 COMPLETE CBC W/AUTO DIFF WBC: CPT | Mod: ER

## 2020-04-16 PROCEDURE — 99283 EMERGENCY DEPT VISIT LOW MDM: CPT | Mod: 25,ER

## 2020-04-16 PROCEDURE — 71046 XR CHEST PA AND LATERAL: ICD-10-PCS | Mod: 26,,, | Performed by: RADIOLOGY

## 2020-04-16 PROCEDURE — 85610 PROTHROMBIN TIME: CPT | Mod: ER

## 2020-04-16 PROCEDURE — 82270 OCCULT BLOOD FECES: CPT | Mod: ER

## 2020-04-16 PROCEDURE — 81003 URINALYSIS AUTO W/O SCOPE: CPT | Mod: ER

## 2020-04-16 PROCEDURE — 71046 X-RAY EXAM CHEST 2 VIEWS: CPT | Mod: TC,FY,PO

## 2020-04-16 PROCEDURE — 71046 X-RAY EXAM CHEST 2 VIEWS: CPT | Mod: 26,,, | Performed by: RADIOLOGY

## 2020-04-16 NOTE — TELEPHONE ENCOUNTER
Pt states that since he started the ABO-therapy that he was having side effects- frequent urination, black stool, and pain. I asked Pt if he stopped the medication. Pt stated that he stopped it 2 day's ago when he spoke to someone in the office.That he has abdominal pain and he doesn't know what he had a chest xray when it's his back and stomach bothering him. He was informed to call the office today, and give a update on his status. Pt states that he is having serious back pain, that he is hurting. Lisy advise.

## 2020-04-16 NOTE — TELEPHONE ENCOUNTER
----- Message from Catarina Horn sent at 4/16/2020  7:53 AM CDT -----  Contact: Self/  [phone]  Type: Patient Call Back    Who called:  Patient    What is the request in detail:  Patient stated it's more to the right side of his back that is hurting.  He stated when he take a deep breath he can feel it as well.  He would like staff to give him a call.  Thank you    Would the patient rather a call back or a response via [facility]?   Call back    Best call back number: [phone]

## 2020-04-16 NOTE — TELEPHONE ENCOUNTER
Call placed to Pt, and informed of Provider response. Pt acknowledged understanding and stated that he will go to the ED.

## 2020-04-16 NOTE — TELEPHONE ENCOUNTER
Chest xray was to look at the lungs and the heart and make sure he wasn't having a problem in the big artery that can cause back and stomach pain that is life threatening.    With the black stool, I am very concerned he could have a bleed in the stomach!    If he is hurting that bad and he is having the black stools he must go to ER to get evaluated immediately to be sure he is not ACTIVELY bleeding, which is life threatening.    I am awaiting his urine results.

## 2020-04-16 NOTE — TELEPHONE ENCOUNTER
----- Message from Semaj Travis sent at 4/16/2020  1:32 PM CDT -----  Contact: Rizwan [phone]  Type: Patient Call Back    Who called:Rizwan     What is the request in detail: The patient is requesting a call back from the staff. He stated that he completed the chest xray, but he thought it was supposed to be for his lower back on the right hand side. The patient stated that his last visit with Dr. Perkins was for pain in the lower back area. Patient stated he completed the chest xray anyway.    Can the clinic reply by [facility]?no    Would the patient rather a call back or a response via [facility]? Call back     Best call back number:[phone]

## 2020-04-16 NOTE — ED PROVIDER NOTES
Encounter Date: 4/16/2020    SCRIBE #1 NOTE: I, Verona Corea, am scribing for, and in the presence of,  DONTA Barrientos. I have scribed the following portions of the note - Other sections scribed: HPI, ROS, PE.       History     Chief Complaint   Patient presents with    Back Pain     and dark stool x 1 week     69 year old male complains of lower back pain and dark stools x1 week.  He reports the back pain has been mostly in the middle of his lower back, but is currently without pain.  He also reports noticing his stools as being dark in color, but denies black tar appearance or tia blood.  He denies abdominal pain, nausea vomiting, weight loss, or weakness.  He takes a baby aspirin a day, but denies other recent NSAID use.  He does drink alcohol regularly, and smoke cigarettes.  He also reports urinary frequency without dysuria, hematuria, dyspnea, or extremity edema.  He was directed to the ED by primary care due to concern for GI bleed.     The history is provided by the patient. No  was used.     Review of patient's allergies indicates:   Allergen Reactions    Astelin [azelastine] Other (See Comments)     Dry mouth    Flomax [tamsulosin]      Nosebleed     Past Medical History:   Diagnosis Date    Allergy     Arthritis     Rheumatoid arthritis    Blood clotting tendency     BPH (benign prostatic hypertrophy)     Cervical spondylosis     Colon polyp 2010    adenoma    Coronary artery disease     Depression 5/8/2015    Dry eyes     Dry mouth     GERD (gastroesophageal reflux disease)     Heart attack     pt denies    Hyperlipidemia     Hypertension     Lumbar spondylosis     Nasal obstruction     Rheumatoid arthritis     Sinusitis     Special screening for malignant neoplasm of colon 6/29/2016    Trouble in sleeping      Past Surgical History:   Procedure Laterality Date    COLONOSCOPY N/A 6/29/2016    Procedure: COLONOSCOPY;  Surgeon: Partha Saucedo MD;   Location: [facility];  Service: Endoscopy;  Laterality: N/A;    CORONARY ARTERY BYPASS GRAFT      EXCISION TURBINATE, SUBMUCOUS      KNEE ARTHROSCOPY W/ DEBRIDEMENT      NASAL SEPTUM SURGERY      TONSILLECTOMY       Family History   Problem Relation Age of Onset    Hyperlipidemia Mother     Alzheimer's disease Mother     Stomach cancer Father     Colon cancer Father 45    Cataracts Other     No Known Problems Sister     No Known Problems Brother     No Known Problems Maternal Aunt     No Known Problems Maternal Uncle     No Known Problems Paternal Aunt     No Known Problems Paternal Uncle     No Known Problems Maternal Grandmother     No Known Problems Maternal Grandfather     No Known Problems Paternal Grandmother     No Known Problems Paternal Grandfather     Blindness Neg Hx     Cancer Neg Hx     Diabetes Neg Hx     Glaucoma Neg Hx     Rheum arthritis Neg Hx     Psoriasis Neg Hx     Lupus Neg Hx     Amblyopia Neg Hx     Hypertension Neg Hx     Macular degeneration Neg Hx     Retinal detachment Neg Hx     Strabismus Neg Hx     Stroke Neg Hx     Thyroid disease Neg Hx     Esophageal cancer Neg Hx      Social History     Tobacco Use    Smoking status: Former Smoker     Packs/day: 1.00     Years: 20.00     Pack years: 20.00     Types: Cigarettes     Last attempt to quit: 1991     Years since quittin.3    Smokeless tobacco: Never Used    Tobacco comment: Quit .   Substance Use Topics    Alcohol use: No    Drug use: No     Review of Systems   Constitutional: Negative.  Negative for fever.   HENT: Negative.  Negative for sore throat.    Eyes: Negative.  Negative for pain.   Respiratory: Negative.  Negative for shortness of breath.    Cardiovascular: Negative.  Negative for chest pain.   Gastrointestinal: Negative.  Negative for abdominal pain and vomiting.   Genitourinary: Negative.  Negative for dysuria.   Musculoskeletal: Positive for back pain.   Skin: Negative.   Negative for rash.   Neurological: Negative.  Negative for headaches.   Hematological: Negative.    Psychiatric/Behavioral: Negative.    All other systems reviewed and are negative.      Physical Exam     Initial Vitals [04/16/20 1429]   BP Pulse Resp Temp SpO2   (!) 154/86 80 19 98.6 °F (37 °C) 98 %      MAP       --         Physical Exam    Nursing note and vitals reviewed.  Constitutional: He appears well-developed and well-nourished.   HENT:   Head: Normocephalic and atraumatic.   Eyes: Conjunctivae are normal.   Neck: Normal range of motion. Neck supple.   Cardiovascular: Normal rate, regular rhythm and intact distal pulses.   Pulmonary/Chest: Breath sounds normal. No respiratory distress. He has no wheezes. He has no rales.   Abdominal: Soft. He exhibits no distension and no mass. There is no tenderness. There is no rebound.   Musculoskeletal: Normal range of motion.   Neurological: He is alert and oriented to person, place, and time.   Skin: Skin is warm and dry. Capillary refill takes less than 2 seconds.   Psychiatric: He has a normal mood and affect. His behavior is normal.         ED Course   Procedures  Labs Reviewed   POCT CBC   POCT OCCULT BLOOD STOOL   POCT URINALYSIS W/O SCOPE   POCT URINALYSIS DIPSTICK (CULTURE IF INDICATED)   POCT CMP   POCT PROTIME-INR   ISTAT PROCEDURE   POCT CMP          Imaging Results    None          Medical Decision Making:   History:   Old Medical Records: I decided to obtain old medical records.  Clinical Tests:   Lab Tests: Ordered and Reviewed  ED Management:  69-year-old male with recent lower back pain and reported dark stools.  Stool is Hemoccult negative and brown.  Abdomen exam is benign.  H&H within normal limits.  Low suspicion for brisk GI bleed.  Patient's last colonoscopy was 10 years ago.  Malignancy not ruled out.  UA without evidence for infection or hematuria.  Electrolytes and glucose within normal limits.  Suspect BPH as etiology of urinary frequency.   Chest x-ray obtained prior to ED arrival reviewed in without evidence of acute cardiopulmonary process.  Patient currently asymptomatic and stable for discharge.  Instructions to follow up with PCP, and GI for colonoscopy.            Scribe Attestation:   Scribe #1: I performed the above scribed service and the documentation accurately describes the services I performed. I attest to the accuracy of the note.    Marky Lugo                      Clinical Impression:     1. Back pain, unspecified back location, unspecified back pain laterality, unspecified chronicity    2. Urinary frequency    3. Dark stools                ED Disposition Condition    Discharge Stable        ED Prescriptions     None        Follow-up Information     Follow up With Specialties Details Why Contact Info    Raúl Perkins MD Family Medicine Schedule an appointment as soon as possible for a visit  For follow-up care [address] [facility]  [place] [address]  [phone]      [facility] Emergency Department Emergency Medicine Go to  If symptoms worsen [address] [facility] [id number] [phone]                                     Marky Lugo PAIRON  04/16/20 1782

## 2020-04-16 NOTE — TELEPHONE ENCOUNTER
Patient is requesting to do a urine sample also (has complaints of frequent urination)/ please place a STAT order for a home collect urine test

## 2020-04-20 DIAGNOSIS — R39.9 LOWER URINARY TRACT SYMPTOMS (LUTS): ICD-10-CM

## 2020-04-20 RX ORDER — LISINOPRIL 10 MG/1
10 TABLET ORAL DAILY
Qty: 90 TABLET | Refills: 3 | Status: SHIPPED | OUTPATIENT
Start: 2020-04-20 | End: 2021-05-06 | Stop reason: SDUPTHER

## 2020-04-20 RX ORDER — HYDROCHLOROTHIAZIDE 12.5 MG/1
12.5 CAPSULE ORAL DAILY
Qty: 90 CAPSULE | Refills: 3 | Status: SHIPPED | OUTPATIENT
Start: 2020-04-20 | End: 2021-05-03 | Stop reason: SDUPTHER

## 2020-04-20 RX ORDER — ALFUZOSIN HYDROCHLORIDE 10 MG/1
10 TABLET, EXTENDED RELEASE ORAL
Qty: 90 TABLET | Refills: 1 | Status: SHIPPED | OUTPATIENT
Start: 2020-04-20 | End: 2020-11-10 | Stop reason: SDUPTHER

## 2020-04-20 NOTE — TELEPHONE ENCOUNTER
----- Message from Cynthia majorizwaniza sent at 4/20/2020  2:56 PM CDT -----  Type: RX Refill Request    Who Called: pt     Have you contacted your pharmacy: no     Refill or New Rx: refill     RX Name and Strength: hydroCHLOROthiazide (MICROZIDE) 12.5 mg capsule--lisinopril 10 MG tablet--and---alfuzosin (UROXATRAL) 10 mg Tb24    How is the patient currently taking it? (ex. 1XDay):    Is this a 30 day or 90 day RX:    Preferred Pharm[facility] with phone number: [facility] [place] [address]  [address] [facility] [address]  Phone: [phone] Fax: [phone]    Local or Mail Order: local    Ordering Provider:    Would the patient rather a call back or a response via [facility]? Call back     Best Call Back Number: [phone]    Additional Information:

## 2020-04-21 ENCOUNTER — HOSPITAL ENCOUNTER (EMERGENCY)
Facility: HOSPITAL | Age: 70
Discharge: HOME | End: 2020-04-21
Attending: INTERNAL MEDICINE
Payer: MEDICARE

## 2020-04-21 VITALS
TEMPERATURE: 98 F | DIASTOLIC BLOOD PRESSURE: 71 MMHG | BODY MASS INDEX: 26.51 KG/M2 | WEIGHT: 200 LBS | HEIGHT: 73 IN | SYSTOLIC BLOOD PRESSURE: 140 MMHG | HEART RATE: 57 BPM | OXYGEN SATURATION: 97 % | RESPIRATION RATE: 18 BRPM

## 2020-04-21 DIAGNOSIS — H10.33 ACUTE CONJUNCTIVITIS OF BOTH EYES, UNSPECIFIED ACUTE CONJUNCTIVITIS TYPE: Primary | ICD-10-CM

## 2020-04-21 PROCEDURE — 25000003 PHARM REV CODE 250: Mod: ER | Performed by: INTERNAL MEDICINE

## 2020-04-21 PROCEDURE — 90714 TD VACC NO PRESV 7 YRS+ IM: CPT | Mod: ER | Performed by: INTERNAL MEDICINE

## 2020-04-21 PROCEDURE — 99283 EMERGENCY DEPT VISIT LOW MDM: CPT | Mod: 25,ER

## 2020-04-21 PROCEDURE — 90471 IMMUNIZATION ADMIN: CPT | Mod: ER | Performed by: INTERNAL MEDICINE

## 2020-04-21 PROCEDURE — 63600175 PHARM REV CODE 636 W HCPCS: Mod: ER | Performed by: INTERNAL MEDICINE

## 2020-04-21 RX ORDER — TOBRAMYCIN AND DEXAMETHASONE 3; 1 MG/ML; MG/ML
2 SUSPENSION/ DROPS OPHTHALMIC
Qty: 5 ML | Refills: 0 | Status: SHIPPED | OUTPATIENT
Start: 2020-04-21 | End: 2020-04-26

## 2020-04-21 RX ORDER — TOBRAMYCIN AND DEXAMETHASONE 3; 1 MG/ML; MG/ML
2 SUSPENSION/ DROPS OPHTHALMIC
Status: COMPLETED | OUTPATIENT
Start: 2020-04-21 | End: 2020-04-21

## 2020-04-21 RX ORDER — TOBRAMYCIN AND DEXAMETHASONE 3; 1 MG/ML; MG/ML
2 SUSPENSION/ DROPS OPHTHALMIC
Qty: 5 ML | Refills: 0 | Status: SHIPPED | OUTPATIENT
Start: 2020-04-21 | End: 2020-04-21 | Stop reason: SDUPTHER

## 2020-04-21 RX ADMIN — CLOSTRIDIUM TETANI TOXOID ANTIGEN (FORMALDEHYDE INACTIVATED) AND CORYNEBACTERIUM DIPHTHERIAE TOXOID ANTIGEN (FORMALDEHYDE INACTIVATED) 0.5 ML: 5; 2 INJECTION, SUSPENSION INTRAMUSCULAR at 08:04

## 2020-04-21 RX ADMIN — TOBRAMYCIN AND DEXAMETHASONE 2 DROP: 3; 1 SUSPENSION/ DROPS OPHTHALMIC at 08:04

## 2020-04-22 ENCOUNTER — TELEPHONE (OUTPATIENT)
Dept: OPHTHALMOLOGY | Facility: CLINIC | Age: 70
End: 2020-04-22

## 2020-04-22 ENCOUNTER — OFFICE VISIT (OUTPATIENT)
Dept: OPTOMETRY | Facility: CLINIC | Age: 70
End: 2020-04-22
Payer: MEDICARE

## 2020-04-22 DIAGNOSIS — H10.213 CHEMICAL CONJUNCTIVITIS OF BOTH EYES: Primary | ICD-10-CM

## 2020-04-22 PROCEDURE — 92012 PR EYE EXAM, EST PATIENT,INTERMED: ICD-10-PCS | Mod: S$PBB,,, | Performed by: OPTOMETRIST

## 2020-04-22 PROCEDURE — 92012 INTRM OPH EXAM EST PATIENT: CPT | Mod: S$PBB,,, | Performed by: OPTOMETRIST

## 2020-04-22 PROCEDURE — 99999 PR PBB SHADOW E&M-EST. PATIENT-LVL I: CPT | Mod: PBBFAC,,, | Performed by: OPTOMETRIST

## 2020-04-22 PROCEDURE — 99999 PR PBB SHADOW E&M-EST. PATIENT-LVL I: ICD-10-PCS | Mod: PBBFAC,,, | Performed by: OPTOMETRIST

## 2020-04-22 PROCEDURE — 99211 OFF/OP EST MAY X REQ PHY/QHP: CPT | Mod: PBBFAC,PO | Performed by: OPTOMETRIST

## 2020-04-22 NOTE — PROGRESS NOTES
Subjective:       Patient ID: Rizwan Demarco Jr. is a 69 y.o. male      Chief Complaint   Patient presents with    Eye Problem     History of Present Illness  Dls: 2/6/20 Dr. Ordoñez     68 y/o male presents today with c/o x 1 day bleach splashes in ou more in os than od. Pt was seen in ER x 1 day. Pt was given rx gtts Q 4 hrs ou  while awake. Pt c/o red ou no pain no blurry vision no tearing no mucous.     Tobradex q4h OU, last does at noon     Assessment/Plan:     1. Chemical conjunctivitis of both eyes  Pt was seen in ED after getting bleach splashed into the eyes. No pain, mild SPK with injection. Taper Tobradex QID/TID/BID/QD every 2 days. AT PRN. RTC PRN.     Follow up if symptoms worsen or fail to improve.

## 2020-04-22 NOTE — TELEPHONE ENCOUNTER
----- Message from Shanice Angeles sent at 4/22/2020  8:40 AM CDT -----  Contact: Pt  Pt was admitted to ER yesterday because he was exposed to chemicals in the OU. Dr. Luther Larson from [facility] told pt he should schedule an appt w/ Dr. Ceja immediately. However, there isn't a referral in for this pt. Pt says he is a pt of Dr. Ordoñez.    Pts# [phone]

## 2020-04-22 NOTE — ED PROVIDER NOTES
Encounter Date: 4/21/2020    SCRIBE #1 NOTE: I, Danuta Iqbal, am scribing for, and in the presence of,  Dr. Larson. I have scribed the following portions of the note - Other sections scribed: HPI, ROS, PE.       History     Chief Complaint   Patient presents with    Eye Problem     PT REPORTS SPLASHED BLEACH IN EYES 15 MIN AGO, REPORTS RINSED EYES OUT PTA, PT REPORTS MOSTLY LEFT EYE AND STATES FEELS LIKE SOMETHING IS IN EYE     Rizwan Demarco Jr. is a 69 y.o. male who presents to the ED complaining of pain in his left eye s/p splashing bleach into his left eye 15 min PTA. Pt reports rinsing his eye out with water with no relief. He reports it feels like something is in his eye.    The history is provided by the patient. No  was used.     Review of patient's allergies indicates:   Allergen Reactions    Astelin [azelastine] Other (See Comments)     Dry mouth    Flomax [tamsulosin]      Nosebleed     Past Medical History:   Diagnosis Date    Allergy     Arthritis     Rheumatoid arthritis    Blood clotting tendency     BPH (benign prostatic hypertrophy)     Cervical spondylosis     Colon polyp 2010    adenoma    Coronary artery disease     Depression 5/8/2015    Dry eyes     Dry mouth     GERD (gastroesophageal reflux disease)     Heart attack     pt denies    Hyperlipidemia     Hypertension     Lumbar spondylosis     Nasal obstruction     Rheumatoid arthritis     Sinusitis     Special screening for malignant neoplasm of colon 6/29/2016    Trouble in sleeping      Past Surgical History:   Procedure Laterality Date    COLONOSCOPY N/A 6/29/2016    Procedure: COLONOSCOPY;  Surgeon: Partha Saucedo MD;  Location: [facility];  Service: Endoscopy;  Laterality: N/A;    CORONARY ARTERY BYPASS GRAFT      EXCISION TURBINATE, SUBMUCOUS      KNEE ARTHROSCOPY W/ DEBRIDEMENT      NASAL SEPTUM SURGERY      TONSILLECTOMY       Family History   Problem Relation Age of Onset     Hyperlipidemia Mother     Alzheimer's disease Mother     Stomach cancer Father     Colon cancer Father 45    Cataracts Other     No Known Problems Sister     No Known Problems Brother     No Known Problems Maternal Aunt     No Known Problems Maternal Uncle     No Known Problems Paternal Aunt     No Known Problems Paternal Uncle     No Known Problems Maternal Grandmother     No Known Problems Maternal Grandfather     No Known Problems Paternal Grandmother     No Known Problems Paternal Grandfather     Blindness Neg Hx     Cancer Neg Hx     Diabetes Neg Hx     Glaucoma Neg Hx     Rheum arthritis Neg Hx     Psoriasis Neg Hx     Lupus Neg Hx     Amblyopia Neg Hx     Hypertension Neg Hx     Macular degeneration Neg Hx     Retinal detachment Neg Hx     Strabismus Neg Hx     Stroke Neg Hx     Thyroid disease Neg Hx     Esophageal cancer Neg Hx      Social History     Tobacco Use    Smoking status: Former Smoker     Packs/day: 1.00     Years: 20.00     Pack years: 20.00     Types: Cigarettes     Last attempt to quit: 1991     Years since quittin.3    Smokeless tobacco: Never Used    Tobacco comment: Quit .   Substance Use Topics    Alcohol use: No    Drug use: No     Review of Systems   Constitutional: Negative for fever.   HENT: Negative for sore throat.    Eyes: Positive for pain.   Respiratory: Negative for shortness of breath.    Cardiovascular: Negative for chest pain.   Gastrointestinal: Negative for diarrhea, nausea and vomiting.   Genitourinary: Negative for dysuria.   Musculoskeletal: Negative for back pain.   Skin: Negative for rash.   Neurological: Negative for weakness and headaches.   Psychiatric/Behavioral: Negative for behavioral problems.   All other systems reviewed and are negative.      Physical Exam     Initial Vitals [20]   BP Pulse Resp Temp SpO2   (!) 163/87 73 20 98.4 °F (36.9 °C) 98 %      MAP       --         Physical Exam    Nursing note  and vitals reviewed.  Constitutional: He appears well-developed and well-nourished.   HENT:   Head: Normocephalic and atraumatic.   Right Ear: External ear normal.   Left Ear: External ear normal.   Eyes: EOM are normal. Pupils are equal, round, and reactive to light. Right eye exhibits no discharge. Left eye exhibits no discharge.   Bilateral conjunctival injection   Neck: Normal range of motion. Neck supple.   Cardiovascular: Normal rate, regular rhythm and normal heart sounds. Exam reveals no gallop and no friction rub.    No murmur heard.  Pulmonary/Chest: Breath sounds normal. No respiratory distress. He has no wheezes. He has no rhonchi. He has no rales.   Abdominal: Soft. There is no tenderness.   Musculoskeletal: Normal range of motion. He exhibits no edema.   Neurological: He is alert and oriented to person, place, and time. GCS score is 15. GCS eye subscore is 4. GCS verbal subscore is 5. GCS motor subscore is 6.   Skin: Skin is warm and dry.   Psychiatric: He has a normal mood and affect.         ED Course   Procedures  Labs Reviewed - No data to display       Imaging Results    None          Medical Decision Making:   History:   Old Medical Records: I decided to obtain old medical records.  Initial Assessment:    69 y.o. male who presents to the ED complaining of pain in his left eye s/p splashing bleach into his left eye 15 min PTA  ED Management:  Patient states his eyes feel better after irrigation and TobraDex.  He was given a prescription for TobraDex and advised to follow-up with ophthalmology tomorrow for re-evaluation/return to the emergency department if condition worsens.            Scribe Attestation:   Scribe #1: I performed the above scribed service and the documentation accurately describes the services I performed. I attest to the accuracy of the note.    This document was produced by a scribe under my direction and in my presence. I agree with the content of the note and have made any  necessary edits.     Dr. Larson    04/22/2020 2:10 AM                        Clinical Impression:     1. Acute conjunctivitis of both eyes, unspecified acute conjunctivitis type            Disposition:   Disposition: Discharged  Condition: Stable                        Robert Larson MD  04/22/20 0215

## 2020-06-05 DIAGNOSIS — I10 ESSENTIAL HYPERTENSION: ICD-10-CM

## 2020-06-19 ENCOUNTER — LAB VISIT (OUTPATIENT)
Dept: LAB | Facility: HOSPITAL | Age: 70
End: 2020-06-19
Attending: FAMILY MEDICINE
Payer: MEDICARE

## 2020-06-19 ENCOUNTER — TELEPHONE (OUTPATIENT)
Dept: OTOLARYNGOLOGY | Facility: CLINIC | Age: 70
End: 2020-06-19

## 2020-06-19 DIAGNOSIS — I10 ESSENTIAL HYPERTENSION: ICD-10-CM

## 2020-06-19 LAB
ALBUMIN SERPL BCP-MCNC: 3.6 G/DL (ref 3.5–5.2)
ALP SERPL-CCNC: 48 U/L (ref 55–135)
ALT SERPL W/O P-5'-P-CCNC: 16 U/L (ref 10–44)
ANION GAP SERPL CALC-SCNC: 6 MMOL/L (ref 8–16)
AST SERPL-CCNC: 17 U/L (ref 10–40)
BILIRUB SERPL-MCNC: 0.7 MG/DL (ref 0.1–1)
BUN SERPL-MCNC: 12 MG/DL (ref 8–23)
CALCIUM SERPL-MCNC: 9.8 MG/DL (ref 8.7–10.5)
CHLORIDE SERPL-SCNC: 106 MMOL/L (ref 95–110)
CO2 SERPL-SCNC: 28 MMOL/L (ref 23–29)
CREAT SERPL-MCNC: 1.2 MG/DL (ref 0.5–1.4)
EST. GFR  (AFRICAN AMERICAN): >60 ML/MIN/1.73 M^2
EST. GFR  (NON AFRICAN AMERICAN): >60 ML/MIN/1.73 M^2
GLUCOSE SERPL-MCNC: 94 MG/DL (ref 70–110)
POTASSIUM SERPL-SCNC: 4.1 MMOL/L (ref 3.5–5.1)
PROT SERPL-MCNC: 6.8 G/DL (ref 6–8.4)
SODIUM SERPL-SCNC: 140 MMOL/L (ref 136–145)

## 2020-06-19 PROCEDURE — 80053 COMPREHEN METABOLIC PANEL: CPT

## 2020-06-19 PROCEDURE — 36415 COLL VENOUS BLD VENIPUNCTURE: CPT | Mod: PO

## 2020-06-19 NOTE — TELEPHONE ENCOUNTER
Called and left voicemail for patient.      We have available appointments on June 24th.  Patient should not need a COVID swab or scope test done.  Last scoped in December.      If patient calls in please schedule appointment.

## 2020-06-19 NOTE — TELEPHONE ENCOUNTER
----- Message from Mamie Arteaga sent at 6/19/2020  9:51 AM CDT -----  Contact: self  Pt is asking for a call back in regards to scheduling a sinus f/u appointment as soon as possible    I was not able to schedule due to no availability       Contact info- [phone]

## 2020-06-23 DIAGNOSIS — Z01.818 PRE-OP TESTING: ICD-10-CM

## 2020-06-24 ENCOUNTER — OFFICE VISIT (OUTPATIENT)
Dept: OTOLARYNGOLOGY | Facility: CLINIC | Age: 70
End: 2020-06-24
Payer: MEDICARE

## 2020-06-24 VITALS
DIASTOLIC BLOOD PRESSURE: 78 MMHG | BODY MASS INDEX: 27.22 KG/M2 | WEIGHT: 206.31 LBS | SYSTOLIC BLOOD PRESSURE: 132 MMHG | HEART RATE: 62 BPM | TEMPERATURE: 98 F

## 2020-06-24 DIAGNOSIS — R51.9 FACIAL PAIN: Primary | ICD-10-CM

## 2020-06-24 PROCEDURE — 99213 PR OFFICE/OUTPT VISIT, EST, LEVL III, 20-29 MIN: ICD-10-PCS | Mod: S$GLB,,, | Performed by: OTOLARYNGOLOGY

## 2020-06-24 PROCEDURE — 99213 OFFICE O/P EST LOW 20 MIN: CPT | Mod: S$GLB,,, | Performed by: OTOLARYNGOLOGY

## 2020-06-24 NOTE — PROGRESS NOTES
presents back in followup.  He is a past patient of mine having undergone   FESS in September 2015.  His last visit with me was 12/19 at which point he had a sinus infection and was treated with Cipro.That resolved but his pressure pain,especially around his eyes has persisted and worsened.  He has been using his steroid sinus rinses.  Is intranasal exam shows mild septal deflection to the right though nasal airway is clear.  He shows no masses lesions or polyps nor any purulent discharge intranasally.  He is somewhat tender to palpation in his ethmoid and frontal sinus regions.  I have discussed my findings with him in detail as well as my recommendations for treatment.  Will order a [place] CT scan of his sinuses for evaluation.  He will contact us after this is completed to arrange for follow-up.

## 2020-06-29 ENCOUNTER — HOSPITAL ENCOUNTER (OUTPATIENT)
Dept: RADIOLOGY | Facility: OTHER | Age: 70
Discharge: HOME | End: 2020-06-29
Attending: OTOLARYNGOLOGY
Payer: MEDICARE

## 2020-06-29 DIAGNOSIS — R51.9 FACIAL PAIN: ICD-10-CM

## 2020-06-29 PROCEDURE — 70486 CT MAXILLOFACIAL W/O DYE: CPT | Mod: 26,,, | Performed by: RADIOLOGY

## 2020-06-29 PROCEDURE — 70486 CT MAXILLOFACIAL W/O DYE: CPT | Mod: TC

## 2020-06-29 PROCEDURE — 70486 CT MEDTRONIC SINUSES WITHOUT: ICD-10-PCS | Mod: 26,,, | Performed by: RADIOLOGY

## 2020-07-01 ENCOUNTER — TELEPHONE (OUTPATIENT)
Dept: OTOLARYNGOLOGY | Facility: CLINIC | Age: 70
End: 2020-07-01

## 2020-07-02 ENCOUNTER — PATIENT MESSAGE (OUTPATIENT)
Dept: OTOLARYNGOLOGY | Facility: CLINIC | Age: 70
End: 2020-07-02

## 2020-07-02 NOTE — TELEPHONE ENCOUNTER
----- Message from [place] KEESHA Nascimento III, MD sent at 7/2/2020  7:44 AM CDT -----  His scans are clear with no sign of sinus disease.  Rec: Cont sinus rinses.  No surgery required at this time.

## 2020-07-21 ENCOUNTER — LAB VISIT (OUTPATIENT)
Dept: FAMILY MEDICINE | Facility: CLINIC | Age: 70
End: 2020-07-21
Payer: MEDICARE

## 2020-07-21 DIAGNOSIS — Z01.818 PRE-OP TESTING: ICD-10-CM

## 2020-07-21 PROCEDURE — U0003 INFECTIOUS AGENT DETECTION BY NUCLEIC ACID (DNA OR RNA); SEVERE ACUTE RESPIRATORY SYNDROME CORONAVIRUS 2 (SARS-COV-2) (CORONAVIRUS DISEASE [COVID-19]), AMPLIFIED PROBE TECHNIQUE, MAKING USE OF HIGH THROUGHPUT TECHNOLOGIES AS DESCRIBED BY CMS-2020-01-R: HCPCS

## 2020-07-22 LAB — SARS-COV-2 RNA RESP QL NAA+PROBE: NOT DETECTED

## 2020-07-24 ENCOUNTER — HOSPITAL ENCOUNTER (OUTPATIENT)
Facility: HOSPITAL | Age: 70
Discharge: HOME | End: 2020-07-24
Attending: INTERNAL MEDICINE | Admitting: INTERNAL MEDICINE
Payer: MEDICARE

## 2020-07-24 ENCOUNTER — ANESTHESIA (OUTPATIENT)
Dept: ENDOSCOPY | Facility: HOSPITAL | Age: 70
End: 2020-07-24
Payer: MEDICARE

## 2020-07-24 ENCOUNTER — ANESTHESIA EVENT (OUTPATIENT)
Dept: ENDOSCOPY | Facility: HOSPITAL | Age: 70
End: 2020-07-24
Payer: MEDICARE

## 2020-07-24 VITALS
OXYGEN SATURATION: 98 % | HEART RATE: 63 BPM | TEMPERATURE: 98 F | RESPIRATION RATE: 16 BRPM | WEIGHT: 205 LBS | BODY MASS INDEX: 27.17 KG/M2 | HEIGHT: 73 IN | SYSTOLIC BLOOD PRESSURE: 126 MMHG | DIASTOLIC BLOOD PRESSURE: 73 MMHG

## 2020-07-24 DIAGNOSIS — K21.9 GERD (GASTROESOPHAGEAL REFLUX DISEASE): ICD-10-CM

## 2020-07-24 PROCEDURE — 37000009 HC ANESTHESIA EA ADD 15 MINS: Performed by: INTERNAL MEDICINE

## 2020-07-24 PROCEDURE — 45385 COLONOSCOPY W/LESION REMOVAL: CPT | Performed by: INTERNAL MEDICINE

## 2020-07-24 PROCEDURE — 88305 TISSUE EXAM BY PATHOLOGIST: ICD-10-PCS | Mod: 26,,, | Performed by: PATHOLOGY

## 2020-07-24 PROCEDURE — 27201012 HC FORCEPS, HOT/COLD, DISP: Performed by: INTERNAL MEDICINE

## 2020-07-24 PROCEDURE — 43235 EGD DIAGNOSTIC BRUSH WASH: CPT | Performed by: INTERNAL MEDICINE

## 2020-07-24 PROCEDURE — 45380 COLONOSCOPY AND BIOPSY: CPT | Performed by: INTERNAL MEDICINE

## 2020-07-24 PROCEDURE — 37000008 HC ANESTHESIA 1ST 15 MINUTES: Performed by: INTERNAL MEDICINE

## 2020-07-24 PROCEDURE — 25000003 PHARM REV CODE 250: Performed by: INTERNAL MEDICINE

## 2020-07-24 PROCEDURE — 45380 COLONOSCOPY AND BIOPSY: CPT | Mod: 59,,, | Performed by: INTERNAL MEDICINE

## 2020-07-24 PROCEDURE — 63600175 PHARM REV CODE 636 W HCPCS: Performed by: NURSE ANESTHETIST, CERTIFIED REGISTERED

## 2020-07-24 PROCEDURE — E9220 PRA ENDO ANESTHESIA: HCPCS | Mod: PT,,, | Performed by: NURSE ANESTHETIST, CERTIFIED REGISTERED

## 2020-07-24 PROCEDURE — 43235 PR EGD, FLEX, DIAGNOSTIC: ICD-10-PCS | Mod: 51,GC,, | Performed by: INTERNAL MEDICINE

## 2020-07-24 PROCEDURE — 43235 EGD DIAGNOSTIC BRUSH WASH: CPT | Mod: 51,GC,, | Performed by: INTERNAL MEDICINE

## 2020-07-24 PROCEDURE — 45385 COLONOSCOPY W/LESION REMOVAL: CPT | Mod: PT,,, | Performed by: INTERNAL MEDICINE

## 2020-07-24 PROCEDURE — 88305 TISSUE EXAM BY PATHOLOGIST: CPT | Mod: 26,,, | Performed by: PATHOLOGY

## 2020-07-24 PROCEDURE — E9220 PRA ENDO ANESTHESIA: ICD-10-PCS | Mod: PT,,, | Performed by: NURSE ANESTHETIST, CERTIFIED REGISTERED

## 2020-07-24 PROCEDURE — 45380 PR COLONOSCOPY,BIOPSY: ICD-10-PCS | Mod: 59,,, | Performed by: INTERNAL MEDICINE

## 2020-07-24 PROCEDURE — 88305 TISSUE EXAM BY PATHOLOGIST: CPT | Performed by: PATHOLOGY

## 2020-07-24 PROCEDURE — 45385 PR COLONOSCOPY,REMV LESN,SNARE: ICD-10-PCS | Mod: PT,,, | Performed by: INTERNAL MEDICINE

## 2020-07-24 PROCEDURE — 27201089 HC SNARE, DISP (ANY): Performed by: INTERNAL MEDICINE

## 2020-07-24 RX ORDER — OMEPRAZOLE 40 MG/1
40 CAPSULE, DELAYED RELEASE ORAL EVERY MORNING
Qty: 30 CAPSULE | Refills: 3 | Status: SHIPPED | OUTPATIENT
Start: 2020-07-24 | End: 2021-03-19 | Stop reason: SDUPTHER

## 2020-07-24 RX ORDER — SODIUM CHLORIDE 9 MG/ML
INJECTION, SOLUTION INTRAVENOUS CONTINUOUS
Status: DISCONTINUED | OUTPATIENT
Start: 2020-07-24 | End: 2020-07-24 | Stop reason: HOSPADM

## 2020-07-24 RX ORDER — PHENYLEPHRINE HYDROCHLORIDE 10 MG/ML
INJECTION INTRAVENOUS
Status: DISCONTINUED | OUTPATIENT
Start: 2020-07-24 | End: 2020-07-24

## 2020-07-24 RX ORDER — LIDOCAINE HYDROCHLORIDE 20 MG/ML
INJECTION INTRAVENOUS
Status: DISCONTINUED | OUTPATIENT
Start: 2020-07-24 | End: 2020-07-24

## 2020-07-24 RX ORDER — PROPOFOL 10 MG/ML
VIAL (ML) INTRAVENOUS CONTINUOUS PRN
Status: DISCONTINUED | OUTPATIENT
Start: 2020-07-24 | End: 2020-07-24

## 2020-07-24 RX ORDER — PROPOFOL 10 MG/ML
VIAL (ML) INTRAVENOUS
Status: DISCONTINUED | OUTPATIENT
Start: 2020-07-24 | End: 2020-07-24

## 2020-07-24 RX ADMIN — LIDOCAINE HYDROCHLORIDE 100 MG: 20 INJECTION, SOLUTION INTRAVENOUS at 07:07

## 2020-07-24 RX ADMIN — PHENYLEPHRINE HYDROCHLORIDE 100 MCG: 10 INJECTION INTRAVENOUS at 08:07

## 2020-07-24 RX ADMIN — PROPOFOL 50 MG: 10 INJECTION, EMULSION INTRAVENOUS at 07:07

## 2020-07-24 RX ADMIN — PHENYLEPHRINE HYDROCHLORIDE 100 MCG: 10 INJECTION INTRAVENOUS at 07:07

## 2020-07-24 RX ADMIN — SODIUM CHLORIDE: 0.9 INJECTION, SOLUTION INTRAVENOUS at 07:07

## 2020-07-24 RX ADMIN — PROPOFOL 150 MCG/KG/MIN: 10 INJECTION, EMULSION INTRAVENOUS at 07:07

## 2020-07-24 RX ADMIN — PROPOFOL 20 MG: 10 INJECTION, EMULSION INTRAVENOUS at 07:07

## 2020-07-24 RX ADMIN — PROPOFOL 30 MG: 10 INJECTION, EMULSION INTRAVENOUS at 07:07

## 2020-07-24 NOTE — PLAN OF CARE
Written and verbal instructions given to patient. Patient verbalizes understanding and has no questions at this time.

## 2020-07-24 NOTE — ANESTHESIA PREPROCEDURE EVALUATION
2020  Rizwan Demarco Jr. is a 70 y.o., male.  Pre-operative evaluation for ESOPHAGOGASTRODUODENOSCOPY (EGD) (N/A), COLONOSCOPY (N/A)    Chief Complaint:polyps, GERD    PMH:  RA  Sjogren's  CAD, s/p CABG  Past Surgical History:   Procedure Laterality Date    COLONOSCOPY N/A 2016    Procedure: COLONOSCOPY;  Surgeon: Partha Saucedo MD;  Location: [facility];  Service: Endoscopy;  Laterality: N/A;    CORONARY ARTERY BYPASS GRAFT      EXCISION TURBINATE, SUBMUCOUS      KNEE ARTHROSCOPY W/ DEBRIDEMENT      NASAL SEPTUM SURGERY      TONSILLECTOMY           Vital Signs Range (Last 24H):         CBC:   No results for input(s): WBC, RBC, HGB, HCT, PLT, MCV, MCH, MCHC in the last 720 hours.    CMP: No results for input(s): NA, K, CL, CO2, BUN, CREATININE, GLU, MG, PHOS, CALCIUM, ALBUMIN, PROT, ALKPHOS, ALT, AST, BILITOT in the last 720 hours.    INR:  No results for input(s): PT, INR, PROTIME, APTT in the last 720 hours.      Diagnostic Studies:      EKD Echo:  CONCLUSIONS     1 - Normal left ventricular systolic function (EF 60-65%).     2 - Concentric remodeling.     3 - Mild left atrial enlargement.     4 - Mild mitral regurgitation.     5 - Trivial tricuspid regurgitation.     6 - Trivial pulmonic regurgitation.         This document has been electronically    SIGNED BY: Rizwan Elise MD On: 2015 15:16  Anesthesia Evaluation    I have reviewed the Patient Summary Reports.     I have reviewed the Nursing Notes.    I have reviewed the Medications.     Review of Systems  Anesthesia Hx:  No problems with previous Anesthesia    Social:  Non-Smoker    Pulmonary:  Pulmonary Normal    Neurological:  Neurology Normal        Physical Exam  General:  Well nourished    Airway/Jaw/Neck:  Airway Findings: Mouth Opening: Normal Tongue: Normal  General Airway Assessment: Good  Mallampati: I   TM Distance: Normal, at least 6 cm  Jaw/Neck Findings:  Neck ROM: Normal ROM      Dental:  Dental Findings: Edentulous   Chest/Lungs:  Chest/Lungs Findings: Normal Respiratory Rate     Heart/Vascular:  Heart Findings: Rate: Normal  Rhythm: Regular Rhythm  Sounds: Normal       Skin:  Skin Findings:  Red conjunctivae     Mental Status:  Mental Status Findings:  Cooperative, Alert and Oriented         Anesthesia Plan  Type of Anesthesia, risks & benefits discussed:  Anesthesia Type:  general  Patient's Preference:   Intra-op Monitoring Plan:   Intra-op Monitoring Plan Comments:   Post Op Pain Control Plan:   Post Op Pain Control Plan Comments:   Induction:   IV  Beta Blocker:  Patient is not currently on a Beta-Blocker (No further documentation required).       Informed Consent: Patient understands risks and agrees with Anesthesia plan.  Questions answered. Anesthesia consent signed with patient.  ASA Score: 3     Day of Surgery Review of History & Physical:    H&P update referred to the surgeon.         Ready For Surgery From Anesthesia Perspective.

## 2020-07-24 NOTE — ANESTHESIA POSTPROCEDURE EVALUATION
Anesthesia Post Evaluation    Patient: Rizwan Demarco Jr.    Procedure(s) Performed: Procedure(s) (LRB):  ESOPHAGOGASTRODUODENOSCOPY (EGD) (N/A)  COLONOSCOPY (N/A)    Final Anesthesia Type: general    Patient location during evaluation: PACU  Patient participation: Yes- Able to Participate  Level of consciousness: awake and alert and oriented  Post-procedure vital signs: reviewed and stable  Pain management: adequate  Airway patency: patent    PONV status at discharge: No PONV  Anesthetic complications: no      Cardiovascular status: hemodynamically stable  Respiratory status: unassisted  Hydration status: euvolemic  Follow-up not needed.          Vitals Value Taken Time   /73 07/24/20 0834   Temp 36.7 °C (98 °F) 07/24/20 0810   Pulse 63 07/24/20 0834   Resp 16 07/24/20 0834   SpO2 98 % 07/24/20 0834         No case tracking events are documented in the log.      Pain/Prashanth Score: Prashanth Score: 9 (7/24/2020  8:20 AM)

## 2020-07-24 NOTE — [employer] PATIENT INSTRUCTIONS
Discharge Summary/Instructions after an Endoscopic Procedure  Patient Name: Rizwan Demarco  Patient MRN: [id number]  Patient YOB: 1950 Friday, July 24, 2020  Ian Martinez MD  RESTRICTIONS:  During your procedure today, you received medications for sedation.  These   medications may affect your judgment, balance and coordination.  Therefore,   for 24 hours, you have the following restrictions:   - DO NOT drive a car, operate machinery, make legal/financial decisions,   sign important papers or drink alcohol.    ACTIVITY:  Today: no heavy lifting, straining or running due to procedural   sedation/anesthesia.  The following day: return to full activity including work.  DIET:  Eat and drink normally unless instructed otherwise.     TREATMENT FOR COMMON SIDE EFFECTS:  - Mild abdominal pain, nausea, belching, bloating or excessive gas:  rest,   eat lightly and use a heating pad.  - Sore Throat: treat with throat lozenges and/or gargle with warm salt   water.  - Because air was used during the procedure, expelling large amounts of air   from your rectum or belching is normal.  - If a bowel prep was taken, you may not have a bowel movement for 1-3 days.    This is normal.  SYMPTOMS TO WATCH FOR AND REPORT TO YOUR PHYSICIAN:  1. Abdominal pain or bloating, other than gas cramps.  2. Chest pain.  3. Back pain.  4. Signs of infection such as: chills or fever occurring within 24 hours   after the procedure.  5. Rectal bleeding, which would show as bright red, maroon, or black stools.   (A tablespoon of blood from the rectum is not serious, especially if   hemorrhoids are present.)  6. Vomiting.  7. Weakness or dizziness.  GO DIRECTLY TO THE NEAREST EMERGENCY ROOM IF YOU HAVE ANY OF THE FOLLOWING:      Difficulty breathing              Chills and/or fever over 101 F   Persistent vomiting and/or vomiting blood   Severe abdominal pain   Severe chest pain   Black, tarry stools   Bleeding- more than one tablespoon   Any  other symptom or condition that you feel may need urgent attention  Your doctor recommends these additional instructions:  If any biopsies were taken, your doctors clinic will contact you in 1 to 2   weeks with any results.  - Patient has a contact number available for emergencies.  The signs and   symptoms of potential delayed complications were discussed with the   patient.  Return to normal activities tomorrow.  Written discharge   instructions were provided to the patient.   - Discharge patient to home.   - Await pathology results.   - Repeat colonoscopy in 3 years for surveillance.   - The findings and recommendations were discussed with the designated   responsible adult.  For questions, problems or results please call your physician - Ian Martinez MD at Work:  [phone].  [facility], EMERGENCY ROOM PHONE NUMBER: [phone]  IF A COMPLICATION OR EMERGENCY SITUATION ARISES AND YOU ARE UNABLE TO REACH   YOUR PHYSICIAN - GO DIRECTLY TO THE EMERGENCY ROOM.  Ian Martinez MD  7/24/2020 8:09:10 AM  This report has been verified and signed electronically.  [employer]

## 2020-07-24 NOTE — H&P
[facility]  History & Physical    Subjective:      Chief Complaint/Reason for Admission: EGD/Colon, GERD and CRC screening    Rizwan Demarco Jr. is a 70 y.o. male.    Past Medical History:   Diagnosis Date    Allergy     Arthritis     Rheumatoid arthritis    Blood clotting tendency     BPH (benign prostatic hypertrophy)     Cervical spondylosis     Colon polyp 2010    adenoma    Coronary artery disease     Depression 5/8/2015    Dry eyes     Dry mouth     GERD (gastroesophageal reflux disease)     Heart attack     pt denies    Hyperlipidemia     Hypertension     Lumbar spondylosis     Nasal obstruction     Rheumatoid arthritis     Sinusitis     Special screening for malignant neoplasm of colon 6/29/2016    Trouble in sleeping      Past Surgical History:   Procedure Laterality Date    COLONOSCOPY N/A 6/29/2016    Procedure: COLONOSCOPY;  Surgeon: Partha Saucedo MD;  Location: [facility];  Service: Endoscopy;  Laterality: N/A;    CORONARY ARTERY BYPASS GRAFT      EXCISION TURBINATE, SUBMUCOUS      KNEE ARTHROSCOPY W/ DEBRIDEMENT      NASAL SEPTUM SURGERY      TONSILLECTOMY       Family History   Problem Relation Age of Onset    Hyperlipidemia Mother     Alzheimer's disease Mother     Stomach cancer Father     Colon cancer Father 45    Cataracts Other     No Known Problems Sister     No Known Problems Brother     No Known Problems Maternal Aunt     No Known Problems Maternal Uncle     No Known Problems Paternal Aunt     No Known Problems Paternal Uncle     No Known Problems Maternal Grandmother     No Known Problems Maternal Grandfather     No Known Problems Paternal Grandmother     No Known Problems Paternal Grandfather     Blindness Neg Hx     Cancer Neg Hx     Diabetes Neg Hx     Glaucoma Neg Hx     Rheum arthritis Neg Hx     Psoriasis Neg Hx     Lupus Neg Hx     Amblyopia Neg Hx     Hypertension Neg Hx     Macular degeneration Neg Hx      Retinal detachment Neg Hx     Strabismus Neg Hx     Stroke Neg Hx     Thyroid disease Neg Hx     Esophageal cancer Neg Hx      Social History     Tobacco Use    Smoking status: Former Smoker     Packs/day: 1.00     Years: 20.00     Pack years: 20.00     Types: Cigarettes     Quit date: 1991     Years since quittin.5    Smokeless tobacco: Never Used    Tobacco comment: Quit .   Substance Use Topics    Alcohol use: No    Drug use: No       PTA Medications   Medication Sig    alfuzosin (UROXATRAL) 10 mg Tb24 Take 1 tablet (10 mg total) by mouth daily with breakfast.    aspirin (ECOTRIN) 81 MG EC tablet Take 1 tablet (81 mg total) by mouth once daily.    cinnamon bark 500 mg capsule Take 500 mg by mouth once daily.    hydroCHLOROthiazide (MICROZIDE) 12.5 mg capsule Take 1 capsule (12.5 mg total) by mouth once daily.    lisinopriL 10 MG tablet Take 1 tablet (10 mg total) by mouth once daily.    multivitamin (THERAGRAN) per tablet Take by mouth.  Tablet Oral Every day    omega-3 fatty acids 1,250 mg Cap Take by mouth.    alclomethasone (ACLOVATE) 0.05 % cream Apply topically 2 (two) times daily. For irritant contact dermatitis    budesonide (PULMICORT) 0.25 mg/2 mL nebulizer solution Take 2 mLs (0.25 mg total) by nebulization once daily. Controller    ipratropium (ATROVENT) 0.06 % nasal spray 2 sprays by Nasal route 4 (four) times daily.    lisinopril-hydrochlorothiazide (PRINZIDE,ZESTORETIC) 10-12.5 mg per tablet Take 1 tablet by mouth once daily.    propylene glycol (SYSTANE BALANCE) 0.6 % Drop Apply to eye.    sodium bicarb-sodium chloride (SINUS RINSE) Pack by sinus irrigation route.    sodium chloride/aloe vera (AYR SALINE GEL NASL) by Nasal route.     Review of patient's allergies indicates:   Allergen Reactions    Astelin [azelastine] Other (See Comments)     Dry mouth    Flomax [tamsulosin]      Nosebleed        Review of Systems   Constitutional: Negative for chills and  fever.   Respiratory: Negative for cough, sputum production and shortness of breath.    Cardiovascular: Negative for chest pain and palpitations.   Gastrointestinal: Positive for heartburn. Negative for abdominal pain, blood in stool, constipation, diarrhea, melena, nausea and vomiting.   Genitourinary: Negative.    Neurological: Negative.    Psychiatric/Behavioral: Negative.        Objective:      Vital Signs (Most Recent)       Vital Signs Range (Last 24H):       Physical Exam    Gen: NAD, lying comfortably  HENT: NCAT, oropharynx clear  Eyes: anicteric sclerae, EOMI grossly  Neck: supple, no visible masses/goiter  Cardiac: RRR  Lungs:non-labored  Abd: soft, NT/ND, normoactive BS  Ext: no LE edema, warm, well perfused  Skin: skin intact on exposed body parts, no visible rashes, lesions  Neuro: A&Ox4, neuro exam grossly intact, moves all extremities  Psych: appropriate mood, affect      Assessment:      Active Hospital Problems    Diagnosis  POA    GERD (gastroesophageal reflux disease) [K21.9]  Yes     Chronic      Resolved Hospital Problems   No resolved problems to display.       Plan:    Indication for procedure:    ASA: 2  Airway normal  Malampati class: per anesthesia    Personal and family history negative for anesthesia problems    Plan: proceed with EGD/Colon  Anesthesia plan: general

## 2020-07-24 NOTE — [employer]ATION PATIENT INSTRUCTIONS
Discharge Summary/Instructions after an Endoscopic Procedure  Patient Name: Rizwan Demarco  Patient MRN: [id number]  Patient YOB: 1950 Friday, July 24, 2020  Ian Martinez MD  RESTRICTIONS:  During your procedure today, you received medications for sedation.  These   medications may affect your judgment, balance and coordination.  Therefore,   for 24 hours, you have the following restrictions:   - DO NOT drive a car, operate machinery, make legal/financial decisions,   sign important papers or drink alcohol.    ACTIVITY:  Today: no heavy lifting, straining or running due to procedural   sedation/anesthesia.  The following day: return to full activity including work.  DIET:  Eat and drink normally unless instructed otherwise.     TREATMENT FOR COMMON SIDE EFFECTS:  - Mild abdominal pain, nausea, belching, bloating or excessive gas:  rest,   eat lightly and use a heating pad.  - Sore Throat: treat with throat lozenges and/or gargle with warm salt   water.  - Because air was used during the procedure, expelling large amounts of air   from your rectum or belching is normal.  - If a bowel prep was taken, you may not have a bowel movement for 1-3 days.    This is normal.  SYMPTOMS TO WATCH FOR AND REPORT TO YOUR PHYSICIAN:  1. Abdominal pain or bloating, other than gas cramps.  2. Chest pain.  3. Back pain.  4. Signs of infection such as: chills or fever occurring within 24 hours   after the procedure.  5. Rectal bleeding, which would show as bright red, maroon, or black stools.   (A tablespoon of blood from the rectum is not serious, especially if   hemorrhoids are present.)  6. Vomiting.  7. Weakness or dizziness.  GO DIRECTLY TO THE NEAREST EMERGENCY ROOM IF YOU HAVE ANY OF THE FOLLOWING:      Difficulty breathing              Chills and/or fever over 101 F   Persistent vomiting and/or vomiting blood   Severe abdominal pain   Severe chest pain   Black, tarry stools   Bleeding- more than one tablespoon   Any  other symptom or condition that you feel may need urgent attention  Your doctor recommends these additional instructions:  If any biopsies were taken, your doctors clinic will contact you in 1 to 2   weeks with any results.  - Discharge patient to home.   - Perform a colonoscopy today.   - The findings and recommendations were discussed with the designated   responsible adult.  For questions, problems or results please call your physician - Ian Martinez MD at Work:  [phone].  [facility], EMERGENCY ROOM PHONE NUMBER: [phone]  IF A COMPLICATION OR EMERGENCY SITUATION ARISES AND YOU ARE UNABLE TO REACH   YOUR PHYSICIAN - GO DIRECTLY TO THE EMERGENCY ROOM.  Ian Martinez MD  7/24/2020 7:43:39 AM  This report has been verified and signed electronically.  [employer]

## 2020-07-24 NOTE — TRANSFER OF CARE
"Anesthesia Transfer of Care Note    Patient: Rizwan Demarco Jr.    Procedure(s) Performed: Procedure(s) (LRB):  ESOPHAGOGASTRODUODENOSCOPY (EGD) (N/A)  COLONOSCOPY (N/A)    Patient location: [facility]    Anesthesia Type: general    Transport from OR: Transported from OR on 2-3 L/min O2 by NC with adequate spontaneous ventilation    Post pain: adequate analgesia    Post assessment: no apparent anesthetic complications    Post vital signs: stable    Level of consciousness: lethargic and responds to stimulation    Nausea/Vomiting: no nausea/vomiting    Complications: none    Transfer of care protocol was followed      Last vitals:   Visit Vitals  BP (!) 136/91 (BP Location: Left arm, Patient Position: Lying)   Pulse 76   Temp 36.7 °C (98.1 °F) (Oral)   Resp 16   Ht 6' 1" (1.854 m)   Wt 93 kg (205 lb)   SpO2 98%   BMI 27.05 kg/m²     "

## 2020-07-27 ENCOUNTER — TELEPHONE (OUTPATIENT)
Dept: GASTROENTEROLOGY | Facility: CLINIC | Age: 70
End: 2020-07-27

## 2020-07-27 LAB
FINAL PATHOLOGIC DIAGNOSIS: NORMAL
GROSS: NORMAL

## 2020-07-27 NOTE — TELEPHONE ENCOUNTER
MA informed pt per Dr. Martinez - tell him just to plan on taking it for 8 weeks since we saw some reflux inflammation on his EGD scope recently.     Pt verbalize understanding, cancelled appt with Dedrick and thank MA

## 2020-07-27 NOTE — TELEPHONE ENCOUNTER
MA spoke to pt,     Pt would like to know why he need to take medication  (omeprazole) and how long he should take it for?    MA informed pt that omeprazole is for his GERD     Pt stated he don't think it's necessary that he take this medication (omeprazole) because his GERD isn't that bad. Pt is requesting to speak directly to Dr. Martinez.    MA informed pt that Dr. Martinez isn't in clinic this entire week but I will send a message so that he can advise.   MA offered pt an appt with Dr. Martinez to discuss in October which is Dr. Martinez's next available appt.   MA offered pt appt with NP on 8/17/20 at 3:40 pm to discuss med's and any other concerns.     Pt verbalize understanding, confirmed appt with NP and request to send Dr. Martinez a message and thank MA     ----- Message from Taya Villanueva sent at 7/27/2020  2:45 PM CDT -----  Regarding: pt   pt is calling to speak with the nurse pt had a colonoscopy done on Friday pt is calling in ref to his medication. Can you please call pt at [phone].    SUE

## 2020-08-21 DIAGNOSIS — R06.02 SHORTNESS OF BREATH: ICD-10-CM

## 2020-08-21 RX ORDER — BUDESONIDE 0.25 MG/2ML
0.25 INHALANT ORAL DAILY
Qty: 90 VIAL | Refills: 1 | Status: SHIPPED | OUTPATIENT
Start: 2020-08-21 | End: 2020-08-27 | Stop reason: SDUPTHER

## 2020-08-21 NOTE — TELEPHONE ENCOUNTER
----- Message from Catarina Horn sent at 8/21/2020  7:32 AM CDT -----  Regarding: Self/  [phone]  Type: RX Refill Request    Who Called:   Patient    Refill or New Rx:  Refill    RX Name and Strength:  budesonide (PULMICORT) 0.25 mg/2 mL nebulizer solution    Preferred Pharmacy with phone number:  [facility] [place] [address] [address]    Local or Mail Order:  Local    Ordering Provider:  TISH Perkins    Would the patient rather a call back or a response via [facility]?   Call back    Best Call Back Number:    [phone]

## 2020-08-27 DIAGNOSIS — R06.02 SHORTNESS OF BREATH: ICD-10-CM

## 2020-08-27 RX ORDER — BUDESONIDE 0.25 MG/2ML
0.25 INHALANT ORAL DAILY
Qty: 90 VIAL | Refills: 1 | Status: SHIPPED | OUTPATIENT
Start: 2020-08-27 | End: 2021-03-17 | Stop reason: SDUPTHER

## 2020-09-06 ENCOUNTER — PATIENT OUTREACH (OUTPATIENT)
Dept: ADMINISTRATIVE | Facility: OTHER | Age: 70
End: 2020-09-06

## 2020-09-09 ENCOUNTER — OFFICE VISIT (OUTPATIENT)
Dept: CARDIOLOGY | Facility: CLINIC | Age: 70
End: 2020-09-09
Payer: MEDICARE

## 2020-09-09 VITALS
HEART RATE: 69 BPM | SYSTOLIC BLOOD PRESSURE: 155 MMHG | HEIGHT: 73 IN | DIASTOLIC BLOOD PRESSURE: 78 MMHG | WEIGHT: 205.69 LBS | BODY MASS INDEX: 27.26 KG/M2

## 2020-09-09 DIAGNOSIS — I25.83 CORONARY ARTERY DISEASE DUE TO LIPID RICH PLAQUE: Primary | Chronic | ICD-10-CM

## 2020-09-09 DIAGNOSIS — I25.10 CORONARY ARTERY DISEASE DUE TO LIPID RICH PLAQUE: Primary | Chronic | ICD-10-CM

## 2020-09-09 DIAGNOSIS — I10 ESSENTIAL HYPERTENSION: ICD-10-CM

## 2020-09-09 DIAGNOSIS — E78.5 HYPERLIPIDEMIA, UNSPECIFIED HYPERLIPIDEMIA TYPE: Chronic | ICD-10-CM

## 2020-09-09 PROCEDURE — 93005 ELECTROCARDIOGRAM TRACING: CPT | Mod: PBBFAC | Performed by: INTERNAL MEDICINE

## 2020-09-09 PROCEDURE — 99214 PR OFFICE/OUTPT VISIT, EST, LEVL IV, 30-39 MIN: ICD-10-PCS | Mod: S$PBB,,, | Performed by: INTERNAL MEDICINE

## 2020-09-09 PROCEDURE — 99999 PR PBB SHADOW E&M-EST. PATIENT-LVL V: CPT | Mod: PBBFAC,,, | Performed by: INTERNAL MEDICINE

## 2020-09-09 PROCEDURE — 93010 EKG 12-LEAD: ICD-10-PCS | Mod: S$PBB,,, | Performed by: INTERNAL MEDICINE

## 2020-09-09 PROCEDURE — 99999 PR PBB SHADOW E&M-EST. PATIENT-LVL V: ICD-10-PCS | Mod: PBBFAC,,, | Performed by: INTERNAL MEDICINE

## 2020-09-09 PROCEDURE — 99214 OFFICE O/P EST MOD 30 MIN: CPT | Mod: S$PBB,,, | Performed by: INTERNAL MEDICINE

## 2020-09-09 PROCEDURE — 93010 ELECTROCARDIOGRAM REPORT: CPT | Mod: S$PBB,,, | Performed by: INTERNAL MEDICINE

## 2020-09-09 PROCEDURE — 99215 OFFICE O/P EST HI 40 MIN: CPT | Mod: PBBFAC | Performed by: INTERNAL MEDICINE

## 2020-09-09 NOTE — PROGRESS NOTES
Subjective:   Patient ID:  Rizwan Demarco Jr. is a 70 y.o. male who presents for follow-up of Coronary Artery Disease      HPI:   Rizwan Demarco Jr. presents for follow up of coronary artery disease having had CABG in 2003. Rizwan Demarco Jr. is not exercising but stays active. Rizwan Demarco Jr. denies chest pain, shortness of breath, palpitations, presyncope , or syncope. Rizwan Demarco Jr. has hypertension with BP at home in the 130-140/. Rizwan Demarco Jr. has dyslipidemia not At LDL goal ( refuses lipid lowering agents . Doesn't want to manipulate his body).    Review of Systems   Constitution: Negative for malaise/fatigue, weight gain and weight loss.   Eyes: Negative for blurred vision.   Cardiovascular: Negative for chest pain, claudication, cyanosis, dyspnea on exertion, irregular heartbeat, leg swelling, near-syncope, orthopnea, palpitations, paroxysmal nocturnal dyspnea and syncope.   Respiratory: Negative for cough, shortness of breath and wheezing.    Musculoskeletal: Negative for falls and myalgias.   Gastrointestinal: Positive for constipation. Negative for abdominal pain, heartburn, nausea and vomiting.   Genitourinary: Negative for nocturia.   Neurological: Negative for brief paralysis, dizziness, focal weakness, headaches, numbness, paresthesias and weakness.   Psychiatric/Behavioral: Negative for altered mental status.       Current Outpatient Medications   Medication Sig    alclomethasone (ACLOVATE) 0.05 % cream Apply topically 2 (two) times daily. For irritant contact dermatitis    alfuzosin (UROXATRAL) 10 mg Tb24 Take 1 tablet (10 mg total) by mouth daily with breakfast.    aspirin (ECOTRIN) 81 MG EC tablet Take 1 tablet (81 mg total) by mouth once daily.    budesonide (PULMICORT) 0.25 mg/2 mL nebulizer solution Take 2 mLs (0.25 mg total) by nebulization once daily. Controller    cinnamon bark 500 mg capsule Take 500 mg by mouth once daily.    hydroCHLOROthiazide  "(MICROZIDE) 12.5 mg capsule Take 1 capsule (12.5 mg total) by mouth once daily.    ipratropium (ATROVENT) 0.06 % nasal spray 2 sprays by Nasal route 4 (four) times daily.    lisinopriL 10 MG tablet Take 1 tablet (10 mg total) by mouth once daily.    multivitamin (THERAGRAN) per tablet Take by mouth.  Tablet Oral Every day    omega-3 fatty acids 1,250 mg Cap Take by mouth.    omeprazole (PRILOSEC) 40 MG capsule Take 1 capsule (40 mg total) by mouth every morning.    propylene glycol (SYSTANE BALANCE) 0.6 % Drop Apply to eye.    sodium bicarb-sodium chloride (SINUS RINSE) Pack by sinus irrigation route.    sodium chloride/aloe vera (AYR SALINE GEL NASL) by Nasal route.    lisinopril-hydrochlorothiazide (PRINZIDE,ZESTORETIC) 10-12.5 mg per tablet Take 1 tablet by mouth once daily. (Patient not taking: Reported on 9/9/2020)     No current facility-administered medications for this visit.      Objective:   Physical Exam   Constitutional: He is oriented to person, place, and time. He appears well-developed. No distress.   BP (!) 155/78 (BP Location: Left arm, Patient Position: Sitting, BP Method: Medium (Automatic))   Pulse 69   Ht 6' 1" (1.854 m)   Wt 93.3 kg (205 lb 11 oz)   BMI 27.14 kg/m²    HENT:   Head: Normocephalic.   Right Ear: External ear normal.   Left Ear: External ear normal.   Eyes: Pupils are equal, round, and reactive to light. EOM are normal. No scleral icterus.   Neck: Neck supple. No JVD present. No thyromegaly present.   Cardiovascular: Normal rate, regular rhythm and intact distal pulses. Frequent extrasystoles are present. PMI is not displaced. Exam reveals no gallop and no friction rub.   Murmur heard.   Medium-pitched midsystolic murmur is present with a grade of 1/6 at the upper left sternal border and lower left sternal border.  Pulmonary/Chest: Effort normal and breath sounds normal. No respiratory distress. He has no wheezes. He has no rales.   Abdominal: Soft. He exhibits no " distension. There is no hepatosplenomegaly. There is no abdominal tenderness.   Musculoskeletal:         General: No tenderness or edema.      Comments: Gait normal   Neurological: He is alert and oriented to person, place, and time.   Skin: Skin is warm and dry. No rash noted.   Psychiatric: He has a normal mood and affect. His behavior is normal.       Lab Results   Component Value Date     06/19/2020    K 4.1 06/19/2020     06/19/2020    CO2 28 06/19/2020    BUN 12 06/19/2020    CREATININE 1.2 06/19/2020    GLU 94 06/19/2020    HGBA1C 5.7 (H) 05/24/2019    MG 2.2 05/08/2015    AST 17 06/19/2020    ALT 16 06/19/2020    ALBUMIN 3.6 06/19/2020    PROT 6.8 06/19/2020    BILITOT 0.7 06/19/2020    WBC 5.27 05/24/2019    HGB 15.7 05/24/2019    HCT 49.4 05/24/2019    MCV 96 05/24/2019     05/24/2019    TSH 0.440 05/24/2019    CHOL 152 05/24/2019    HDL 42 05/24/2019    LDLCALC 98.8 05/24/2019    TRIG 56 05/24/2019       Assessment:     1. Coronary artery disease due to lipid rich plaque : stable   2. Essential hypertension ; Borderline control per home readings   3. Hyperlipidemia, unspecified hyperlipidemia type ; not At LDL goal refusing medication       Plan:     Rizwan was seen today for coronary artery disease.    Diagnoses and all orders for this visit:    Coronary artery disease due to lipid rich plaque  -     EKG 12-lead; Future ( PACs with aberrancy, fusion beat )    Essential hypertension  It is recommended that  blood pressure be checked 3-4 times a week and a log  maintained with a goal 120-130/.  Indicates he is not interested in increasing his medications.  Hyperlipidemia, unspecified hyperlipidemia type  -     Lipid Panel; Future; Expected date: 09/09/2020  See above

## 2020-09-09 NOTE — PATIENT INSTRUCTIONS
.Mediteranian diet recommended  Graded exercise for 30 minutes a day at least 5 days a week suggested.

## 2020-09-10 ENCOUNTER — LAB VISIT (OUTPATIENT)
Dept: LAB | Facility: HOSPITAL | Age: 70
End: 2020-09-10
Attending: INTERNAL MEDICINE
Payer: MEDICARE

## 2020-09-10 DIAGNOSIS — E78.5 HYPERLIPIDEMIA, UNSPECIFIED HYPERLIPIDEMIA TYPE: Chronic | ICD-10-CM

## 2020-09-10 LAB
CHOLEST SERPL-MCNC: 214 MG/DL (ref 120–199)
CHOLEST/HDLC SERPL: 4.6 {RATIO} (ref 2–5)
HDLC SERPL-MCNC: 47 MG/DL (ref 40–75)
HDLC SERPL: 22 % (ref 20–50)
LDLC SERPL CALC-MCNC: 151.4 MG/DL (ref 63–159)
NONHDLC SERPL-MCNC: 167 MG/DL
TRIGL SERPL-MCNC: 78 MG/DL (ref 30–150)

## 2020-09-10 PROCEDURE — 36415 COLL VENOUS BLD VENIPUNCTURE: CPT | Mod: PO

## 2020-09-10 PROCEDURE — 80061 LIPID PANEL: CPT

## 2020-10-29 ENCOUNTER — TELEPHONE (OUTPATIENT)
Dept: FAMILY MEDICINE | Facility: CLINIC | Age: 70
End: 2020-10-29

## 2020-11-09 ENCOUNTER — PATIENT MESSAGE (OUTPATIENT)
Dept: FAMILY MEDICINE | Facility: CLINIC | Age: 70
End: 2020-11-09

## 2020-11-10 DIAGNOSIS — R39.9 LOWER URINARY TRACT SYMPTOMS (LUTS): ICD-10-CM

## 2020-11-10 NOTE — TELEPHONE ENCOUNTER
Last Office Visit Info:   The patient's last visit with Raúl Perkins MD was on 4/8/2020.    The patient's last visit in current department was on Visit date not found.        Last CBC Results:   Lab Results   Component Value Date    WBC 5.27 05/24/2019    HGB 15.7 05/24/2019    HCT 49.4 05/24/2019     05/24/2019       Last CMP Results  Lab Results   Component Value Date     06/19/2020    K 4.1 06/19/2020     06/19/2020    CO2 28 06/19/2020    BUN 12 06/19/2020    CREATININE 1.2 06/19/2020    CALCIUM 9.8 06/19/2020    ALBUMIN 3.6 06/19/2020    AST 17 06/19/2020    ALT 16 06/19/2020       Last Lipids  Lab Results   Component Value Date    CHOL 214 (H) 09/10/2020    TRIG 78 09/10/2020    HDL 47 09/10/2020    LDLCALC 151.4 09/10/2020       Last A1C  Lab Results   Component Value Date    HGBA1C 5.7 (H) 05/24/2019       Last TSH  Lab Results   Component Value Date    TSH 0.440 05/24/2019         Current Med Refills  Medication List with Changes/Refills   Current Medications    ALCLOMETHASONE (ACLOVATE) 0.05 % CREAM    Apply topically 2 (two) times daily. For irritant contact dermatitis       Start Date: 1/28/2019 End Date: --    ALFUZOSIN (UROXATRAL) 10 MG TB24    Take 1 tablet (10 mg total) by mouth daily with breakfast.       Start Date: 4/20/2020 End Date: 4/20/2021    ASPIRIN (ECOTRIN) 81 MG EC TABLET    Take 1 tablet (81 mg total) by mouth once daily.       Start Date: 5/8/2015  End Date: 2/19/2022    BUDESONIDE (PULMICORT) 0.25 MG/2 ML NEBULIZER SOLUTION    Take 2 mLs (0.25 mg total) by nebulization once daily. Controller       Start Date: 8/27/2020 End Date: 8/27/2021    CINNAMON BARK 500 MG CAPSULE    Take 500 mg by mouth once daily.       Start Date: --        End Date: --    HYDROCHLOROTHIAZIDE (MICROZIDE) 12.5 MG CAPSULE    Take 1 capsule (12.5 mg total) by mouth once daily.       Start Date: 4/20/2020 End Date: --    IPRATROPIUM (ATROVENT) 0.06 % NASAL SPRAY    2 sprays by Nasal route 4  (four) times daily.       Start Date: 5/10/2017 End Date: --    LISINOPRIL 10 MG TABLET    Take 1 tablet (10 mg total) by mouth once daily.       Start Date: 4/20/2020 End Date: --    LISINOPRIL-HYDROCHLOROTHIAZIDE (PRINZIDE,ZESTORETIC) 10-12.5 MG PER TABLET    Take 1 tablet by mouth once daily.       Start Date: 1/28/2019 End Date: 1/28/2020    MULTIVITAMIN (THERAGRAN) PER TABLET    Take by mouth.  Tablet Oral Every day       Start Date: --        End Date: --    OMEGA-3 FATTY ACIDS 1,250 MG CAP    Take by mouth.       Start Date: --        End Date: --    OMEPRAZOLE (PRILOSEC) 40 MG CAPSULE    Take 1 capsule (40 mg total) by mouth every morning.       Start Date: 7/24/2020 End Date: --    PROPYLENE GLYCOL (SYSTANE BALANCE) 0.6 % DROP    Apply to eye.       Start Date: --        End Date: --    SODIUM BICARB-SODIUM CHLORIDE (SINUS RINSE) PACK    by sinus irrigation route.       Start Date: --        End Date: --    SODIUM CHLORIDE/ALOE VERA (AYR SALINE GEL NASL)    by Nasal route.       Start Date: --        End Date: --

## 2020-11-11 RX ORDER — ALFUZOSIN HYDROCHLORIDE 10 MG/1
10 TABLET, EXTENDED RELEASE ORAL
Qty: 90 TABLET | Refills: 1 | Status: SHIPPED | OUTPATIENT
Start: 2020-11-11 | End: 2021-01-06 | Stop reason: SDUPTHER

## 2021-01-04 ENCOUNTER — PATIENT OUTREACH (OUTPATIENT)
Dept: ADMINISTRATIVE | Facility: OTHER | Age: 71
End: 2021-01-04

## 2021-01-06 ENCOUNTER — LAB VISIT (OUTPATIENT)
Dept: LAB | Facility: HOSPITAL | Age: 71
End: 2021-01-06
Payer: MEDICARE

## 2021-01-06 ENCOUNTER — OFFICE VISIT (OUTPATIENT)
Dept: UROLOGY | Facility: CLINIC | Age: 71
End: 2021-01-06
Payer: MEDICARE

## 2021-01-06 VITALS
HEART RATE: 57 BPM | WEIGHT: 200.19 LBS | DIASTOLIC BLOOD PRESSURE: 83 MMHG | HEIGHT: 73 IN | SYSTOLIC BLOOD PRESSURE: 160 MMHG | BODY MASS INDEX: 26.53 KG/M2

## 2021-01-06 DIAGNOSIS — N40.1 BPH WITH OBSTRUCTION/LOWER URINARY TRACT SYMPTOMS: ICD-10-CM

## 2021-01-06 DIAGNOSIS — N13.8 BPH WITH OBSTRUCTION/LOWER URINARY TRACT SYMPTOMS: Primary | ICD-10-CM

## 2021-01-06 DIAGNOSIS — N13.8 BPH WITH OBSTRUCTION/LOWER URINARY TRACT SYMPTOMS: ICD-10-CM

## 2021-01-06 DIAGNOSIS — R39.9 LOWER URINARY TRACT SYMPTOMS (LUTS): ICD-10-CM

## 2021-01-06 DIAGNOSIS — N40.1 BPH WITH OBSTRUCTION/LOWER URINARY TRACT SYMPTOMS: Primary | ICD-10-CM

## 2021-01-06 LAB — COMPLEXED PSA SERPL-MCNC: 1.9 NG/ML (ref 0–4)

## 2021-01-06 PROCEDURE — 99999 PR PBB SHADOW E&M-EST. PATIENT-LVL III: ICD-10-PCS | Mod: PBBFAC,,, | Performed by: PHYSICIAN ASSISTANT

## 2021-01-06 PROCEDURE — 84153 ASSAY OF PSA TOTAL: CPT

## 2021-01-06 PROCEDURE — 99999 PR PBB SHADOW E&M-EST. PATIENT-LVL III: CPT | Mod: PBBFAC,,, | Performed by: PHYSICIAN ASSISTANT

## 2021-01-06 PROCEDURE — 99204 PR OFFICE/OUTPT VISIT, NEW, LEVL IV, 45-59 MIN: ICD-10-PCS | Mod: S$PBB,,, | Performed by: PHYSICIAN ASSISTANT

## 2021-01-06 PROCEDURE — 36415 COLL VENOUS BLD VENIPUNCTURE: CPT

## 2021-01-06 PROCEDURE — 99204 OFFICE O/P NEW MOD 45 MIN: CPT | Mod: S$PBB,,, | Performed by: PHYSICIAN ASSISTANT

## 2021-01-06 PROCEDURE — 99213 OFFICE O/P EST LOW 20 MIN: CPT | Mod: PBBFAC | Performed by: PHYSICIAN ASSISTANT

## 2021-01-06 RX ORDER — ALFUZOSIN HYDROCHLORIDE 10 MG/1
10 TABLET, EXTENDED RELEASE ORAL
Qty: 90 TABLET | Refills: 3 | Status: SHIPPED | OUTPATIENT
Start: 2021-01-06 | End: 2021-05-03 | Stop reason: SDUPTHER

## 2021-01-20 ENCOUNTER — OFFICE VISIT (OUTPATIENT)
Dept: FAMILY MEDICINE | Facility: CLINIC | Age: 71
End: 2021-01-20
Payer: MEDICARE

## 2021-01-20 VITALS
HEART RATE: 90 BPM | WEIGHT: 205 LBS | DIASTOLIC BLOOD PRESSURE: 60 MMHG | SYSTOLIC BLOOD PRESSURE: 124 MMHG | BODY MASS INDEX: 27.17 KG/M2 | OXYGEN SATURATION: 96 % | HEIGHT: 73 IN | TEMPERATURE: 97 F

## 2021-01-20 DIAGNOSIS — M05.79 RHEUMATOID ARTHRITIS INVOLVING MULTIPLE SITES WITH POSITIVE RHEUMATOID FACTOR: ICD-10-CM

## 2021-01-20 DIAGNOSIS — M35.01 SJOGREN'S SYNDROME WITH KERATOCONJUNCTIVITIS SICCA: ICD-10-CM

## 2021-01-20 DIAGNOSIS — J30.89 PERENNIAL ALLERGIC RHINITIS: ICD-10-CM

## 2021-01-20 DIAGNOSIS — E78.5 HYPERLIPIDEMIA, UNSPECIFIED HYPERLIPIDEMIA TYPE: ICD-10-CM

## 2021-01-20 DIAGNOSIS — Z00.00 ANNUAL PHYSICAL EXAM: Primary | ICD-10-CM

## 2021-01-20 DIAGNOSIS — I25.10 CORONARY ARTERY DISEASE DUE TO LIPID RICH PLAQUE: ICD-10-CM

## 2021-01-20 DIAGNOSIS — I10 ESSENTIAL HYPERTENSION: ICD-10-CM

## 2021-01-20 DIAGNOSIS — I25.83 CORONARY ARTERY DISEASE DUE TO LIPID RICH PLAQUE: ICD-10-CM

## 2021-01-20 PROBLEM — Z91.89 OTHER PERSONAL HISTORY PRESENTING HAZARDS TO HEALTH: Status: ACTIVE | Noted: 2021-01-20

## 2021-01-20 PROCEDURE — 99999 PR PBB SHADOW E&M-EST. PATIENT-LVL IV: CPT | Mod: PBBFAC,,, | Performed by: FAMILY MEDICINE

## 2021-01-20 PROCEDURE — 99213 PR OFFICE/OUTPT VISIT, EST, LEVL III, 20-29 MIN: ICD-10-PCS | Mod: S$PBB,,, | Performed by: FAMILY MEDICINE

## 2021-01-20 PROCEDURE — 99999 PR PBB SHADOW E&M-EST. PATIENT-LVL IV: ICD-10-PCS | Mod: PBBFAC,,, | Performed by: FAMILY MEDICINE

## 2021-01-20 PROCEDURE — 99214 OFFICE O/P EST MOD 30 MIN: CPT | Mod: PBBFAC,PO | Performed by: FAMILY MEDICINE

## 2021-01-20 PROCEDURE — 99213 OFFICE O/P EST LOW 20 MIN: CPT | Mod: S$PBB,,, | Performed by: FAMILY MEDICINE

## 2021-01-21 ENCOUNTER — LAB VISIT (OUTPATIENT)
Dept: LAB | Facility: HOSPITAL | Age: 71
End: 2021-01-21
Attending: FAMILY MEDICINE
Payer: MEDICARE

## 2021-01-21 DIAGNOSIS — M05.79 RHEUMATOID ARTHRITIS INVOLVING MULTIPLE SITES WITH POSITIVE RHEUMATOID FACTOR: ICD-10-CM

## 2021-01-21 DIAGNOSIS — I10 ESSENTIAL HYPERTENSION: ICD-10-CM

## 2021-01-21 DIAGNOSIS — E78.5 HYPERLIPIDEMIA, UNSPECIFIED HYPERLIPIDEMIA TYPE: ICD-10-CM

## 2021-01-21 DIAGNOSIS — M35.01 SJOGREN'S SYNDROME WITH KERATOCONJUNCTIVITIS SICCA: ICD-10-CM

## 2021-01-21 DIAGNOSIS — I25.83 CORONARY ARTERY DISEASE DUE TO LIPID RICH PLAQUE: ICD-10-CM

## 2021-01-21 DIAGNOSIS — J30.89 PERENNIAL ALLERGIC RHINITIS: ICD-10-CM

## 2021-01-21 DIAGNOSIS — I25.10 CORONARY ARTERY DISEASE DUE TO LIPID RICH PLAQUE: ICD-10-CM

## 2021-01-21 LAB
ALBUMIN SERPL BCP-MCNC: 3.6 G/DL (ref 3.5–5.2)
ALP SERPL-CCNC: 45 U/L (ref 55–135)
ALT SERPL W/O P-5'-P-CCNC: 12 U/L (ref 10–44)
ANION GAP SERPL CALC-SCNC: 4 MMOL/L (ref 8–16)
AST SERPL-CCNC: 14 U/L (ref 10–40)
BASOPHILS # BLD AUTO: 0.03 K/UL (ref 0–0.2)
BASOPHILS NFR BLD: 0.7 % (ref 0–1.9)
BILIRUB SERPL-MCNC: 0.7 MG/DL (ref 0.1–1)
BUN SERPL-MCNC: 12 MG/DL (ref 8–23)
CALCIUM SERPL-MCNC: 9.8 MG/DL (ref 8.7–10.5)
CHLORIDE SERPL-SCNC: 106 MMOL/L (ref 95–110)
CHOLEST SERPL-MCNC: 215 MG/DL (ref 120–199)
CHOLEST/HDLC SERPL: 5.2 {RATIO} (ref 2–5)
CO2 SERPL-SCNC: 30 MMOL/L (ref 23–29)
CREAT SERPL-MCNC: 1.1 MG/DL (ref 0.5–1.4)
DIFFERENTIAL METHOD: ABNORMAL
EOSINOPHIL # BLD AUTO: 0.2 K/UL (ref 0–0.5)
EOSINOPHIL NFR BLD: 3.9 % (ref 0–8)
ERYTHROCYTE [DISTWIDTH] IN BLOOD BY AUTOMATED COUNT: 12 % (ref 11.5–14.5)
ERYTHROCYTE [SEDIMENTATION RATE] IN BLOOD BY WESTERGREN METHOD: 1 MM/HR (ref 0–10)
EST. GFR  (AFRICAN AMERICAN): >60 ML/MIN/1.73 M^2
EST. GFR  (NON AFRICAN AMERICAN): >60 ML/MIN/1.73 M^2
GLUCOSE SERPL-MCNC: 94 MG/DL (ref 70–110)
HCT VFR BLD AUTO: 48.2 % (ref 40–54)
HDLC SERPL-MCNC: 41 MG/DL (ref 40–75)
HDLC SERPL: 19.1 % (ref 20–50)
HGB BLD-MCNC: 15.7 G/DL (ref 14–18)
IMM GRANULOCYTES # BLD AUTO: 0.01 K/UL (ref 0–0.04)
IMM GRANULOCYTES NFR BLD AUTO: 0.2 % (ref 0–0.5)
LDLC SERPL CALC-MCNC: 157.4 MG/DL (ref 63–159)
LYMPHOCYTES # BLD AUTO: 2.9 K/UL (ref 1–4.8)
LYMPHOCYTES NFR BLD: 62.9 % (ref 18–48)
MCH RBC QN AUTO: 30.3 PG (ref 27–31)
MCHC RBC AUTO-ENTMCNC: 32.6 G/DL (ref 32–36)
MCV RBC AUTO: 93 FL (ref 82–98)
MONOCYTES # BLD AUTO: 0.4 K/UL (ref 0.3–1)
MONOCYTES NFR BLD: 9.2 % (ref 4–15)
NEUTROPHILS # BLD AUTO: 1.1 K/UL (ref 1.8–7.7)
NEUTROPHILS NFR BLD: 23.1 % (ref 38–73)
NONHDLC SERPL-MCNC: 174 MG/DL
NRBC BLD-RTO: 0 /100 WBC
PLATELET # BLD AUTO: 163 K/UL (ref 150–350)
PMV BLD AUTO: 10.6 FL (ref 9.2–12.9)
POTASSIUM SERPL-SCNC: 4 MMOL/L (ref 3.5–5.1)
PROT SERPL-MCNC: 6.8 G/DL (ref 6–8.4)
RBC # BLD AUTO: 5.19 M/UL (ref 4.6–6.2)
SODIUM SERPL-SCNC: 140 MMOL/L (ref 136–145)
TRIGL SERPL-MCNC: 83 MG/DL (ref 30–150)
WBC # BLD AUTO: 4.56 K/UL (ref 3.9–12.7)

## 2021-01-21 PROCEDURE — 85652 RBC SED RATE AUTOMATED: CPT

## 2021-01-21 PROCEDURE — 85025 COMPLETE CBC W/AUTO DIFF WBC: CPT

## 2021-01-21 PROCEDURE — 80061 LIPID PANEL: CPT

## 2021-01-21 PROCEDURE — 80053 COMPREHEN METABOLIC PANEL: CPT
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
## 2021-02-02 ENCOUNTER — TELEPHONE (OUTPATIENT)
Dept: GASTROENTEROLOGY | Facility: CLINIC | Age: 71
End: 2021-02-02

## 2021-02-24 ENCOUNTER — PES CALL (OUTPATIENT)
Dept: ADMINISTRATIVE | Facility: CLINIC | Age: 71
End: 2021-02-24

## 2021-02-26 NOTE — TELEPHONE ENCOUNTER
----- Message from Janice Iqbal sent at 1/5/2018 10:36 AM CST -----  Contact: patient  [phone]-please call above patient need to speak with the nurse thanks  
Returned call. Spoke with pt. Requested appointment. Appointment scheduled.  
Render Risk Assessment In Note?: no
Additional Notes: Single dermaplane, waxed brows, glycolic on/off, zo aloe masque, ultra sheer,spf
Detail Level: Zone

## 2021-03-11 ENCOUNTER — PES CALL (OUTPATIENT)
Dept: ADMINISTRATIVE | Facility: CLINIC | Age: 71
End: 2021-03-11

## 2021-03-15 DIAGNOSIS — R06.02 SHORTNESS OF BREATH: ICD-10-CM

## 2021-03-16 RX ORDER — BUDESONIDE 0.25 MG/2ML
0.25 INHALANT ORAL DAILY
Qty: 90 VIAL | Refills: 1 | OUTPATIENT
Start: 2021-03-16 | End: 2022-03-16

## 2021-03-17 ENCOUNTER — PATIENT OUTREACH (OUTPATIENT)
Dept: ADMINISTRATIVE | Facility: OTHER | Age: 71
End: 2021-03-17

## 2021-03-17 ENCOUNTER — OFFICE VISIT (OUTPATIENT)
Dept: OPTOMETRY | Facility: CLINIC | Age: 71
End: 2021-03-17
Payer: MEDICARE

## 2021-03-17 DIAGNOSIS — H16.103 SUPERFICIAL KERATITIS OF BOTH EYES: ICD-10-CM

## 2021-03-17 DIAGNOSIS — H16.229 KERATOCONJUNCT SICCA, NOT SPECIFIED AS SJOGREN'S, UNSP EYE: Primary | ICD-10-CM

## 2021-03-17 DIAGNOSIS — M05.711 RHEUMATOID ARTHRITIS INVOLVING BOTH SHOULDERS WITH POSITIVE RHEUMATOID FACTOR: ICD-10-CM

## 2021-03-17 DIAGNOSIS — H26.9 CORTICAL CATARACT OF BOTH EYES: ICD-10-CM

## 2021-03-17 DIAGNOSIS — H04.123 DRY EYE SYNDROME, BILATERAL: ICD-10-CM

## 2021-03-17 DIAGNOSIS — H25.13 NUCLEAR SCLEROSIS, BILATERAL: ICD-10-CM

## 2021-03-17 DIAGNOSIS — M05.712 RHEUMATOID ARTHRITIS INVOLVING BOTH SHOULDERS WITH POSITIVE RHEUMATOID FACTOR: ICD-10-CM

## 2021-03-17 DIAGNOSIS — R06.02 SHORTNESS OF BREATH: ICD-10-CM

## 2021-03-17 PROCEDURE — 92014 COMPRE OPH EXAM EST PT 1/>: CPT | Mod: S$PBB,,, | Performed by: OPTOMETRIST

## 2021-03-17 PROCEDURE — 92014 PR EYE EXAM, EST PATIENT,COMPREHESV: ICD-10-PCS | Mod: S$PBB,,, | Performed by: OPTOMETRIST

## 2021-03-17 PROCEDURE — 99999 PR PBB SHADOW E&M-EST. PATIENT-LVL II: CPT | Mod: PBBFAC,,, | Performed by: OPTOMETRIST

## 2021-03-17 PROCEDURE — 99212 OFFICE O/P EST SF 10 MIN: CPT | Mod: PBBFAC | Performed by: OPTOMETRIST

## 2021-03-17 PROCEDURE — 99999 PR PBB SHADOW E&M-EST. PATIENT-LVL II: ICD-10-PCS | Mod: PBBFAC,,, | Performed by: OPTOMETRIST

## 2021-03-17 RX ORDER — BUDESONIDE 0.25 MG/2ML
0.25 INHALANT ORAL DAILY
Qty: 90 VIAL | Refills: 1 | Status: SHIPPED | OUTPATIENT
Start: 2021-03-17 | End: 2022-01-04 | Stop reason: SDUPTHER

## 2021-03-17 RX ORDER — CYCLOSPORINE 0.5 MG/ML
1 EMULSION OPHTHALMIC 2 TIMES DAILY
Qty: 180 VIAL | Refills: 3 | Status: SHIPPED | OUTPATIENT
Start: 2021-03-17 | End: 2021-05-05

## 2021-03-19 ENCOUNTER — OFFICE VISIT (OUTPATIENT)
Dept: GASTROENTEROLOGY | Facility: CLINIC | Age: 71
End: 2021-03-19
Payer: MEDICARE

## 2021-03-19 VITALS
WEIGHT: 207.88 LBS | SYSTOLIC BLOOD PRESSURE: 145 MMHG | DIASTOLIC BLOOD PRESSURE: 77 MMHG | HEIGHT: 73 IN | HEART RATE: 68 BPM | BODY MASS INDEX: 27.55 KG/M2

## 2021-03-19 DIAGNOSIS — K21.00 GASTROESOPHAGEAL REFLUX DISEASE WITH ESOPHAGITIS WITHOUT HEMORRHAGE: Primary | ICD-10-CM

## 2021-03-19 DIAGNOSIS — Z86.010 HISTORY OF COLONIC POLYPS: ICD-10-CM

## 2021-03-19 DIAGNOSIS — Z80.0 FAMILY HISTORY OF COLON CANCER: ICD-10-CM

## 2021-03-19 PROCEDURE — 99214 OFFICE O/P EST MOD 30 MIN: CPT | Mod: S$PBB,,, | Performed by: INTERNAL MEDICINE

## 2021-03-19 PROCEDURE — 99999 PR PBB SHADOW E&M-EST. PATIENT-LVL IV: ICD-10-PCS | Mod: PBBFAC,,, | Performed by: INTERNAL MEDICINE

## 2021-03-19 PROCEDURE — 99214 OFFICE O/P EST MOD 30 MIN: CPT | Mod: PBBFAC | Performed by: INTERNAL MEDICINE

## 2021-03-19 PROCEDURE — 99214 PR OFFICE/OUTPT VISIT, EST, LEVL IV, 30-39 MIN: ICD-10-PCS | Mod: S$PBB,,, | Performed by: INTERNAL MEDICINE

## 2021-03-19 PROCEDURE — 99999 PR PBB SHADOW E&M-EST. PATIENT-LVL IV: CPT | Mod: PBBFAC,,, | Performed by: INTERNAL MEDICINE

## 2021-03-19 RX ORDER — OMEPRAZOLE 20 MG/1
20 CAPSULE, DELAYED RELEASE ORAL DAILY PRN
Qty: 30 CAPSULE | Refills: 3 | Status: SHIPPED | OUTPATIENT
Start: 2021-03-19 | End: 2022-02-25 | Stop reason: SDUPTHER

## 2021-03-31 ENCOUNTER — PES CALL (OUTPATIENT)
Dept: ADMINISTRATIVE | Facility: CLINIC | Age: 71
End: 2021-03-31

## 2021-03-31 ENCOUNTER — TELEPHONE (OUTPATIENT)
Dept: OPTOMETRY | Facility: CLINIC | Age: 71
End: 2021-03-31

## 2021-04-19 ENCOUNTER — OFFICE VISIT (OUTPATIENT)
Dept: URGENT CARE | Facility: CLINIC | Age: 71
End: 2021-04-19
Payer: MEDICARE

## 2021-04-19 VITALS
RESPIRATION RATE: 12 BRPM | HEART RATE: 81 BPM | OXYGEN SATURATION: 98 % | DIASTOLIC BLOOD PRESSURE: 72 MMHG | TEMPERATURE: 98 F | SYSTOLIC BLOOD PRESSURE: 130 MMHG

## 2021-04-19 DIAGNOSIS — M54.50 ACUTE LEFT-SIDED LOW BACK PAIN WITHOUT SCIATICA: Primary | ICD-10-CM

## 2021-04-19 DIAGNOSIS — R35.0 URINARY FREQUENCY: ICD-10-CM

## 2021-04-19 LAB
BILIRUB UR QL STRIP: NEGATIVE
GLUCOSE UR QL STRIP: NEGATIVE
KETONES UR QL STRIP: NEGATIVE
LEUKOCYTE ESTERASE UR QL STRIP: NEGATIVE
PH, POC UA: 7 (ref 5–8)
POC BLOOD, URINE: NEGATIVE
POC NITRATES, URINE: NEGATIVE
PROT UR QL STRIP: NEGATIVE
SP GR UR STRIP: 1.01 (ref 1–1.03)
UROBILINOGEN UR STRIP-ACNC: NORMAL (ref 0.3–2.2)

## 2021-04-19 PROCEDURE — 99214 PR OFFICE/OUTPT VISIT, EST, LEVL IV, 30-39 MIN: ICD-10-PCS | Mod: 25,S$GLB,, | Performed by: INTERNAL MEDICINE

## 2021-04-19 PROCEDURE — 81003 POCT URINALYSIS, DIPSTICK, AUTOMATED, W/O SCOPE: ICD-10-PCS | Mod: QW,S$GLB,, | Performed by: INTERNAL MEDICINE

## 2021-04-19 PROCEDURE — 99214 OFFICE O/P EST MOD 30 MIN: CPT | Mod: 25,S$GLB,, | Performed by: INTERNAL MEDICINE

## 2021-04-19 PROCEDURE — 81003 URINALYSIS AUTO W/O SCOPE: CPT | Mod: QW,S$GLB,, | Performed by: INTERNAL MEDICINE

## 2021-04-19 RX ORDER — TIZANIDINE 4 MG/1
2 TABLET ORAL EVERY 8 HOURS
Qty: 8 TABLET | Refills: 0 | Status: SHIPPED | OUTPATIENT
Start: 2021-04-19 | End: 2021-04-24

## 2021-04-19 RX ORDER — DICLOFENAC SODIUM 10 MG/G
2 GEL TOPICAL 3 TIMES DAILY
Qty: 100 G | Refills: 0 | Status: SHIPPED | OUTPATIENT
Start: 2021-04-19 | End: 2021-05-05

## 2021-05-03 ENCOUNTER — PES CALL (OUTPATIENT)
Dept: ADMINISTRATIVE | Facility: CLINIC | Age: 71
End: 2021-05-03

## 2021-05-03 DIAGNOSIS — I10 ESSENTIAL HYPERTENSION: Primary | ICD-10-CM

## 2021-05-03 DIAGNOSIS — R39.9 LOWER URINARY TRACT SYMPTOMS (LUTS): ICD-10-CM

## 2021-05-03 RX ORDER — ALFUZOSIN HYDROCHLORIDE 10 MG/1
10 TABLET, EXTENDED RELEASE ORAL
Qty: 90 TABLET | Refills: 3 | Status: SHIPPED | OUTPATIENT
Start: 2021-05-03 | End: 2022-05-02 | Stop reason: SDUPTHER

## 2021-05-03 RX ORDER — HYDROCHLOROTHIAZIDE 12.5 MG/1
12.5 CAPSULE ORAL DAILY
Qty: 90 CAPSULE | Refills: 3 | Status: SHIPPED | OUTPATIENT
Start: 2021-05-03 | End: 2022-05-02 | Stop reason: SDUPTHER

## 2021-05-05 ENCOUNTER — OFFICE VISIT (OUTPATIENT)
Dept: FAMILY MEDICINE | Facility: CLINIC | Age: 71
End: 2021-05-05
Payer: MEDICARE

## 2021-05-05 VITALS
BODY MASS INDEX: 28.2 KG/M2 | SYSTOLIC BLOOD PRESSURE: 122 MMHG | RESPIRATION RATE: 16 BRPM | HEIGHT: 73 IN | TEMPERATURE: 98 F | OXYGEN SATURATION: 97 % | HEART RATE: 79 BPM | DIASTOLIC BLOOD PRESSURE: 70 MMHG | WEIGHT: 212.75 LBS

## 2021-05-05 DIAGNOSIS — K21.00 GASTROESOPHAGEAL REFLUX DISEASE WITH ESOPHAGITIS WITHOUT HEMORRHAGE: Chronic | ICD-10-CM

## 2021-05-05 DIAGNOSIS — J34.89 NASAL OBSTRUCTION: ICD-10-CM

## 2021-05-05 DIAGNOSIS — H91.93 HIGH FREQUENCY HEARING LOSS OF BOTH EARS: ICD-10-CM

## 2021-05-05 DIAGNOSIS — R39.12 BENIGN PROSTATIC HYPERPLASIA WITH WEAK URINARY STREAM: ICD-10-CM

## 2021-05-05 DIAGNOSIS — I25.10 CORONARY ARTERY DISEASE DUE TO LIPID RICH PLAQUE: Chronic | ICD-10-CM

## 2021-05-05 DIAGNOSIS — J01.90 ACUTE SINUSITIS WITH SYMPTOMS GREATER THAN 10 DAYS: ICD-10-CM

## 2021-05-05 DIAGNOSIS — I70.0 ATHEROSCLEROSIS OF AORTA: ICD-10-CM

## 2021-05-05 DIAGNOSIS — M47.816 LUMBAR SPONDYLOSIS: Chronic | ICD-10-CM

## 2021-05-05 DIAGNOSIS — I25.83 CORONARY ARTERY DISEASE DUE TO LIPID RICH PLAQUE: Chronic | ICD-10-CM

## 2021-05-05 DIAGNOSIS — M47.812 CERVICAL SPONDYLOSIS: Chronic | ICD-10-CM

## 2021-05-05 DIAGNOSIS — I10 ESSENTIAL HYPERTENSION: ICD-10-CM

## 2021-05-05 DIAGNOSIS — N40.1 BENIGN PROSTATIC HYPERPLASIA WITH WEAK URINARY STREAM: ICD-10-CM

## 2021-05-05 DIAGNOSIS — F51.01 PRIMARY INSOMNIA: ICD-10-CM

## 2021-05-05 DIAGNOSIS — Z00.00 ENCOUNTER FOR PREVENTIVE HEALTH EXAMINATION: Primary | ICD-10-CM

## 2021-05-05 DIAGNOSIS — J30.89 PERENNIAL ALLERGIC RHINITIS: ICD-10-CM

## 2021-05-05 DIAGNOSIS — M35.01 SJOGREN'S SYNDROME WITH KERATOCONJUNCTIVITIS SICCA: ICD-10-CM

## 2021-05-05 DIAGNOSIS — E78.5 HYPERLIPIDEMIA, UNSPECIFIED HYPERLIPIDEMIA TYPE: Chronic | ICD-10-CM

## 2021-05-05 DIAGNOSIS — M06.9 RHEUMATOID ARTHRITIS INVOLVING SHOULDER, UNSPECIFIED LATERALITY, UNSPECIFIED WHETHER RHEUMATOID FACTOR PRESENT: ICD-10-CM

## 2021-05-05 PROBLEM — H10.33 ACUTE CONJUNCTIVITIS OF BOTH EYES: Status: RESOLVED | Noted: 2020-04-21 | Resolved: 2021-05-05

## 2021-05-05 PROCEDURE — 99999 PR PBB SHADOW E&M-EST. PATIENT-LVL V: ICD-10-PCS | Mod: PBBFAC,,, | Performed by: PHYSICIAN ASSISTANT

## 2021-05-05 PROCEDURE — 99215 OFFICE O/P EST HI 40 MIN: CPT | Mod: PBBFAC,PO | Performed by: PHYSICIAN ASSISTANT

## 2021-05-05 PROCEDURE — 99999 PR PBB SHADOW E&M-EST. PATIENT-LVL V: CPT | Mod: PBBFAC,,, | Performed by: PHYSICIAN ASSISTANT

## 2021-05-05 PROCEDURE — G0439 PPPS, SUBSEQ VISIT: HCPCS | Mod: ,,, | Performed by: PHYSICIAN ASSISTANT

## 2021-05-05 PROCEDURE — G0439 PR MEDICARE ANNUAL WELLNESS SUBSEQUENT VISIT: ICD-10-PCS | Mod: ,,, | Performed by: PHYSICIAN ASSISTANT

## 2021-05-05 RX ORDER — AMOXICILLIN AND CLAVULANATE POTASSIUM 500; 125 MG/1; MG/1
1 TABLET, FILM COATED ORAL 2 TIMES DAILY
Qty: 14 TABLET | Refills: 0 | Status: SHIPPED | OUTPATIENT
Start: 2021-05-05 | End: 2021-05-12

## 2021-05-06 RX ORDER — LISINOPRIL 10 MG/1
10 TABLET ORAL DAILY
Qty: 90 TABLET | Refills: 3 | Status: SHIPPED | OUTPATIENT
Start: 2021-05-06 | End: 2022-05-02 | Stop reason: SDUPTHER

## 2021-05-20 ENCOUNTER — OFFICE VISIT (OUTPATIENT)
Dept: OTOLARYNGOLOGY | Facility: CLINIC | Age: 71
End: 2021-05-20
Payer: MEDICARE

## 2021-05-20 VITALS
TEMPERATURE: 98 F | WEIGHT: 207 LBS | DIASTOLIC BLOOD PRESSURE: 78 MMHG | BODY MASS INDEX: 27.31 KG/M2 | SYSTOLIC BLOOD PRESSURE: 154 MMHG | HEART RATE: 77 BPM

## 2021-05-20 DIAGNOSIS — Z98.890 S/P FESS (FUNCTIONAL ENDOSCOPIC SINUS SURGERY): Primary | ICD-10-CM

## 2021-05-20 DIAGNOSIS — R06.02 SHORTNESS OF BREATH: ICD-10-CM

## 2021-05-20 PROCEDURE — 99213 OFFICE O/P EST LOW 20 MIN: CPT | Mod: 25,S$GLB,, | Performed by: OTOLARYNGOLOGY

## 2021-05-20 PROCEDURE — 31231 PR NASAL ENDOSCOPY, DX: ICD-10-PCS | Mod: S$GLB,,, | Performed by: OTOLARYNGOLOGY

## 2021-05-20 PROCEDURE — 31231 NASAL ENDOSCOPY DX: CPT | Mod: S$GLB,,, | Performed by: OTOLARYNGOLOGY

## 2021-05-20 PROCEDURE — 99213 PR OFFICE/OUTPT VISIT, EST, LEVL III, 20-29 MIN: ICD-10-PCS | Mod: 25,S$GLB,, | Performed by: OTOLARYNGOLOGY

## 2021-05-20 RX ORDER — BUDESONIDE 0.25 MG/2ML
0.25 INHALANT ORAL DAILY
Qty: 90 VIAL | Refills: 1 | Status: CANCELLED | OUTPATIENT
Start: 2021-05-20 | End: 2022-05-20

## 2021-06-01 ENCOUNTER — OFFICE VISIT (OUTPATIENT)
Dept: PODIATRY | Facility: CLINIC | Age: 71
End: 2021-06-01
Payer: MEDICARE

## 2021-06-01 VITALS — WEIGHT: 207 LBS | BODY MASS INDEX: 27.43 KG/M2 | HEIGHT: 73 IN

## 2021-06-01 DIAGNOSIS — M79.672 PAIN IN BOTH FEET: Primary | ICD-10-CM

## 2021-06-01 DIAGNOSIS — M79.671 PAIN IN BOTH FEET: Primary | ICD-10-CM

## 2021-06-01 DIAGNOSIS — B35.1 ONYCHOMYCOSIS DUE TO DERMATOPHYTE: ICD-10-CM

## 2021-06-01 PROCEDURE — 99999 PR PBB SHADOW E&M-EST. PATIENT-LVL III: ICD-10-PCS | Mod: PBBFAC,,, | Performed by: PODIATRIST

## 2021-06-01 PROCEDURE — 99213 OFFICE O/P EST LOW 20 MIN: CPT | Mod: PBBFAC,PO | Performed by: PODIATRIST

## 2021-06-01 PROCEDURE — 99999 PR PBB SHADOW E&M-EST. PATIENT-LVL III: CPT | Mod: PBBFAC,,, | Performed by: PODIATRIST

## 2021-06-01 PROCEDURE — 99203 OFFICE O/P NEW LOW 30 MIN: CPT | Mod: S$PBB,,, | Performed by: PODIATRIST

## 2021-06-01 PROCEDURE — 99203 PR OFFICE/OUTPT VISIT, NEW, LEVL III, 30-44 MIN: ICD-10-PCS | Mod: S$PBB,,, | Performed by: PODIATRIST

## 2021-06-01 RX ORDER — CICLOPIROX 80 MG/ML
SOLUTION TOPICAL NIGHTLY
Qty: 1 BOTTLE | Refills: 3 | Status: SHIPPED | OUTPATIENT
Start: 2021-06-01 | End: 2022-06-03

## 2021-06-03 NOTE — TELEPHONE ENCOUNTER
--- Message from Catarina Horn sent at 4/16/2020  7:53 AM CDT -----  Contact: Self/  [phone]  Type: Patient Call Back    Who called:  Patient    What is the request in detail:  Patient stated it's more to the right side of his back that is hurting.  He stated when he take a deep breath he can feel it as well.  He would like staff to give him a call.  Thank you    Would the patient rather a call back or a response via [facility]?   Call back    Best call back number: [phone]      LOV 03/11/2021  NOV 06/17/2021    Refilled 12/14/2020

## 2021-06-14 ENCOUNTER — TELEPHONE (OUTPATIENT)
Dept: OTOLARYNGOLOGY | Facility: CLINIC | Age: 71
End: 2021-06-14

## 2021-06-23 ENCOUNTER — HOSPITAL ENCOUNTER (OUTPATIENT)
Dept: CARDIOLOGY | Facility: HOSPITAL | Age: 71
Discharge: HOME | End: 2021-06-23
Attending: PODIATRIST
Payer: MEDICARE

## 2021-06-23 DIAGNOSIS — M79.671 PAIN IN BOTH FEET: ICD-10-CM

## 2021-06-23 DIAGNOSIS — M79.672 PAIN IN BOTH FEET: Primary | ICD-10-CM

## 2021-06-23 DIAGNOSIS — M79.671 PAIN IN BOTH FEET: Primary | ICD-10-CM

## 2021-06-23 DIAGNOSIS — M79.672 PAIN IN BOTH FEET: ICD-10-CM

## 2021-06-23 LAB
LEFT ABI: 0.89
LEFT ARM BP: 136 MMHG
LEFT DORSALIS PEDIS: 119 MMHG
LEFT POSTERIOR TIBIAL: 121 MMHG
LEFT TBI: 0.62
LEFT TOE PRESSURE: 84 MMHG
RIGHT ABI: 0.68
RIGHT ARM BP: 135 MMHG
RIGHT DORSALIS PEDIS: 92 MMHG
RIGHT POSTERIOR TIBIAL: 81 MMHG
RIGHT TBI: 0.46
RIGHT TOE PRESSURE: 63 MMHG

## 2021-06-23 PROCEDURE — 93922 ANKLE BRACHIAL INDICES (ABI): ICD-10-PCS | Mod: 26,,, | Performed by: INTERNAL MEDICINE

## 2021-06-23 PROCEDURE — 93922 UPR/L XTREMITY ART 2 LEVELS: CPT

## 2021-06-23 PROCEDURE — 93922 UPR/L XTREMITY ART 2 LEVELS: CPT | Mod: 26,,, | Performed by: INTERNAL MEDICINE

## 2021-06-24 ENCOUNTER — TELEPHONE (OUTPATIENT)
Dept: PODIATRY | Facility: CLINIC | Age: 71
End: 2021-06-24

## 2021-07-21 ENCOUNTER — INITIAL CONSULT (OUTPATIENT)
Dept: VASCULAR SURGERY | Facility: CLINIC | Age: 71
End: 2021-07-21
Payer: MEDICARE

## 2021-07-21 VITALS
DIASTOLIC BLOOD PRESSURE: 68 MMHG | SYSTOLIC BLOOD PRESSURE: 126 MMHG | WEIGHT: 205.56 LBS | HEIGHT: 73 IN | BODY MASS INDEX: 27.24 KG/M2

## 2021-07-21 DIAGNOSIS — M79.661 RIGHT CALF PAIN: Primary | ICD-10-CM

## 2021-07-21 DIAGNOSIS — M79.671 PAIN IN BOTH FEET: ICD-10-CM

## 2021-07-21 DIAGNOSIS — M79.672 PAIN IN BOTH FEET: ICD-10-CM

## 2021-07-21 DIAGNOSIS — I70.203 ATHEROSCLEROSIS OF NATIVE ARTERY OF BOTH LOWER EXTREMITIES, WITH UNSPECIFIED PRESENCE OF CLINICAL MANIFESTATION: ICD-10-CM

## 2021-07-21 PROCEDURE — 99214 OFFICE O/P EST MOD 30 MIN: CPT | Mod: PBBFAC | Performed by: SURGERY

## 2021-07-21 PROCEDURE — 99999 PR PBB SHADOW E&M-EST. PATIENT-LVL IV: ICD-10-PCS | Mod: PBBFAC,,, | Performed by: SURGERY

## 2021-07-21 PROCEDURE — 99204 OFFICE O/P NEW MOD 45 MIN: CPT | Mod: S$PBB,,, | Performed by: SURGERY

## 2021-07-21 PROCEDURE — 99999 PR PBB SHADOW E&M-EST. PATIENT-LVL IV: CPT | Mod: PBBFAC,,, | Performed by: SURGERY

## 2021-07-21 PROCEDURE — 99204 PR OFFICE/OUTPT VISIT, NEW, LEVL IV, 45-59 MIN: ICD-10-PCS | Mod: S$PBB,,, | Performed by: SURGERY

## 2021-08-04 ENCOUNTER — OFFICE VISIT (OUTPATIENT)
Dept: FAMILY MEDICINE | Facility: CLINIC | Age: 71
End: 2021-08-04
Payer: MEDICARE

## 2021-08-04 DIAGNOSIS — J32.9 SINUSITIS, UNSPECIFIED CHRONICITY, UNSPECIFIED LOCATION: Primary | ICD-10-CM

## 2021-08-04 PROCEDURE — 99442 PR PHYSICIAN TELEPHONE EVALUATION 11-20 MIN: CPT | Mod: 95,,, | Performed by: FAMILY MEDICINE

## 2021-08-04 PROCEDURE — 99442 PR PHYSICIAN TELEPHONE EVALUATION 11-20 MIN: ICD-10-PCS | Mod: 95,,, | Performed by: FAMILY MEDICINE

## 2021-08-04 RX ORDER — METHYLPREDNISOLONE 4 MG/1
TABLET ORAL
Qty: 1 PACKAGE | Refills: 0 | Status: SHIPPED | OUTPATIENT
Start: 2021-08-04 | End: 2021-08-25

## 2021-08-04 RX ORDER — AZITHROMYCIN 500 MG/1
500 TABLET, FILM COATED ORAL DAILY
Qty: 3 TABLET | Refills: 0 | Status: SHIPPED | OUTPATIENT
Start: 2021-08-04 | End: 2022-01-29

## 2021-08-09 ENCOUNTER — TELEPHONE (OUTPATIENT)
Dept: FAMILY MEDICINE | Facility: CLINIC | Age: 71
End: 2021-08-09
Payer: MEDICARE

## 2021-08-09 NOTE — TELEPHONE ENCOUNTER
Per patient he is blowing yellowish mucus from his nose and wants to know if he needs another set of Antibodies. Please Advised. Thanks

## 2021-08-09 NOTE — TELEPHONE ENCOUNTER
----- Message from Aziza Wiggins sent at 8/9/2021 12:58 PM CDT -----  Type: Patient Call Back    Who called: pt     What is the request in detail: pt states the medication the dr gave him last week worked. He states he would like to speak with the dr to discuss something else with him please contact the pt.     Can the clinic reply by [facility]?  No     Would the patient rather a call back or a response via [facility]? Call back     Best call back number: [phone]    Additional Information:        Thank You

## 2021-09-23 ENCOUNTER — OFFICE VISIT (OUTPATIENT)
Dept: OTOLARYNGOLOGY | Facility: CLINIC | Age: 71
End: 2021-09-23
Payer: MEDICARE

## 2021-09-23 VITALS
DIASTOLIC BLOOD PRESSURE: 74 MMHG | HEIGHT: 73 IN | SYSTOLIC BLOOD PRESSURE: 118 MMHG | WEIGHT: 203.69 LBS | BODY MASS INDEX: 26.99 KG/M2

## 2021-09-23 DIAGNOSIS — J01.90 ACUTE SINUSITIS, RECURRENCE NOT SPECIFIED, UNSPECIFIED LOCATION: ICD-10-CM

## 2021-09-23 DIAGNOSIS — J34.89 NASAL VESTIBULITIS: Primary | ICD-10-CM

## 2021-09-23 DIAGNOSIS — J34.89 NOSTRIL SORE: ICD-10-CM

## 2021-09-23 PROCEDURE — 99213 PR OFFICE/OUTPT VISIT, EST, LEVL III, 20-29 MIN: ICD-10-PCS | Mod: S$GLB,,, | Performed by: NURSE PRACTITIONER

## 2021-09-23 PROCEDURE — 99213 OFFICE O/P EST LOW 20 MIN: CPT | Mod: S$GLB,,, | Performed by: NURSE PRACTITIONER

## 2022-01-04 DIAGNOSIS — R06.02 SHORTNESS OF BREATH: ICD-10-CM

## 2022-01-04 RX ORDER — BUDESONIDE 0.25 MG/2ML
0.25 INHALANT ORAL DAILY
Qty: 90 EACH | Refills: 1 | Status: SHIPPED | OUTPATIENT
Start: 2022-01-04 | End: 2022-01-05 | Stop reason: SDUPTHER

## 2022-01-04 NOTE — TELEPHONE ENCOUNTER
Care Due:                  Date            Visit Type   Department     Provider  --------------------------------------------------------------------------------                                             [facility]                                           MEDICINE/INTERN  Last Visit: 08-      None         AL MED         Raúl Perkins  Next Visit: None Scheduled  None         None Found                                                            Last  Test          Frequency    Reason                     Performed    Due Date  --------------------------------------------------------------------------------    CMP.........  12 months..  hydroCHLOROthiazide,       Not Found    Overdue                             lisinopriL...............    Powered by [employer] by [employer]. Reference number: [id number].   1/04/2022 2:28:55 PM CST

## 2022-01-04 NOTE — TELEPHONE ENCOUNTER
----- Message from Geri Sargent sent at 1/4/2022  9:17 AM CST -----  Type: RX Refill Request    Who Called:  self    Have you contacted your pharmacy: no    Refill or New Rx: refill    RX Name and Strength: budesonide (PULMICORT) 0.25 mg/2 mL nebulizer solution    Preferred Pharmacy with phone number: [facility] [place] [place] [address] [address]   Phone:  [phone]  Fax:  [phone]    Local or Mail Order:  Local    Would the patient rather a call back or a response via [facility]?  call    Best Call Back Number:.[phone] (home)

## 2022-01-05 NOTE — TELEPHONE ENCOUNTER
----- Message from Thai Arteaga sent at 1/5/2022 11:22 AM CST -----      Can the clinic reply in [facility]:N        Please refill the medication(s) listed below. Please call the patient when the prescription(s) is ready for  at this phone number. Pt said script should be written for twice daily instead on once.        Medication #1 budesonide (PULMICORT) 0.25 mg/2 mL nebulizer solution    Medication #2       Preferred Pharmacy: [facility] [place] [facility] [place] [address] [address]

## 2022-01-06 DIAGNOSIS — R06.02 SHORTNESS OF BREATH: ICD-10-CM

## 2022-01-06 RX ORDER — BUDESONIDE 0.25 MG/2ML
0.25 INHALANT ORAL DAILY
Qty: 90 EACH | Refills: 1 | Status: SHIPPED | OUTPATIENT
Start: 2022-01-06 | End: 2022-01-06 | Stop reason: SDUPTHER

## 2022-01-06 RX ORDER — BUDESONIDE 0.25 MG/2ML
0.25 INHALANT ORAL 2 TIMES DAILY
Qty: 90 EACH | Refills: 1 | Status: SHIPPED | OUTPATIENT
Start: 2022-01-06 | End: 2022-07-13 | Stop reason: SDUPTHER

## 2022-01-06 NOTE — TELEPHONE ENCOUNTER
----- Message from Lisa Sutton sent at 1/6/2022  9:54 AM CST -----  Regarding: Rx concerns  Name of Who is Calling: Rizwan           What is the request in detail: Patient is requesting a call in regards to his budesonide (PULMICORT) 0.25 mg/2 mL nebulizer solution that is only for 1 time a day but his ENT had it for 2 times a day. He need a new Rx for 2 times a day. He picked up the Rx already for the 1 time a day.           Can the clinic reply by [facility]: No           What Number to Call Back [facility]: [phone]

## 2022-01-06 NOTE — TELEPHONE ENCOUNTER
No new care gaps identified.  Powered by [employer]. Reference number: [id number].   1/06/2022 11:23:47 AM CST

## 2022-01-29 ENCOUNTER — NURSE TRIAGE (OUTPATIENT)
Dept: ADMINISTRATIVE | Facility: CLINIC | Age: 72
End: 2022-01-29
Payer: MEDICARE

## 2022-01-29 ENCOUNTER — HOSPITAL ENCOUNTER (EMERGENCY)
Facility: HOSPITAL | Age: 72
Discharge: HOME | End: 2022-01-29
Attending: EMERGENCY MEDICINE
Payer: MEDICARE

## 2022-01-29 VITALS
OXYGEN SATURATION: 98 % | HEART RATE: 71 BPM | WEIGHT: 200 LBS | SYSTOLIC BLOOD PRESSURE: 122 MMHG | HEIGHT: 73 IN | TEMPERATURE: 98 F | DIASTOLIC BLOOD PRESSURE: 67 MMHG | RESPIRATION RATE: 18 BRPM | BODY MASS INDEX: 26.51 KG/M2

## 2022-01-29 DIAGNOSIS — M54.50 ACUTE LEFT-SIDED LOW BACK PAIN WITHOUT SCIATICA: Primary | ICD-10-CM

## 2022-01-29 LAB
BILIRUBIN, POC UA: NEGATIVE
BLOOD, POC UA: NEGATIVE
CLARITY, POC UA: CLEAR
COLOR, POC UA: YELLOW
GLUCOSE, POC UA: NEGATIVE
KETONES, POC UA: NEGATIVE
LEUKOCYTE EST, POC UA: NEGATIVE
NITRITE, POC UA: NEGATIVE
PH UR STRIP: 6.5 [PH]
PROTEIN, POC UA: NEGATIVE
SPECIFIC GRAVITY, POC UA: 1.01
UROBILINOGEN, POC UA: 0.2 E.U./DL

## 2022-01-29 PROCEDURE — 96372 THER/PROPH/DIAG INJ SC/IM: CPT | Mod: ER

## 2022-01-29 PROCEDURE — 99284 EMERGENCY DEPT VISIT MOD MDM: CPT | Mod: 25,ER

## 2022-01-29 PROCEDURE — 25000003 PHARM REV CODE 250: Mod: ER | Performed by: EMERGENCY MEDICINE

## 2022-01-29 PROCEDURE — 81003 URINALYSIS AUTO W/O SCOPE: CPT | Mod: ER

## 2022-01-29 PROCEDURE — 63600175 PHARM REV CODE 636 W HCPCS: Mod: ER | Performed by: EMERGENCY MEDICINE

## 2022-01-29 RX ORDER — MELOXICAM 7.5 MG/1
7.5 TABLET ORAL DAILY PRN
Qty: 15 TABLET | Refills: 0 | Status: SHIPPED | OUTPATIENT
Start: 2022-01-29 | End: 2022-06-03

## 2022-01-29 RX ORDER — KETOROLAC TROMETHAMINE 30 MG/ML
INJECTION, SOLUTION INTRAMUSCULAR; INTRAVENOUS
Status: DISCONTINUED
Start: 2022-01-29 | End: 2022-01-30 | Stop reason: HOSPADM

## 2022-01-29 RX ORDER — ACETAMINOPHEN 500 MG
1000 TABLET ORAL EVERY 8 HOURS PRN
Qty: 30 TABLET | Refills: 0 | Status: SHIPPED | OUTPATIENT
Start: 2022-01-29 | End: 2022-06-03

## 2022-01-29 RX ORDER — DOCUSATE SODIUM 100 MG/1
100 CAPSULE, LIQUID FILLED ORAL 2 TIMES DAILY
Qty: 60 CAPSULE | Refills: 0 | Status: SHIPPED | OUTPATIENT
Start: 2022-01-29

## 2022-01-29 RX ORDER — HYDROCODONE BITARTRATE AND ACETAMINOPHEN 5; 325 MG/1; MG/1
1 TABLET ORAL EVERY 8 HOURS PRN
Qty: 6 TABLET | Refills: 0 | Status: SHIPPED | OUTPATIENT
Start: 2022-01-29 | End: 2022-06-03

## 2022-01-29 RX ORDER — DEXAMETHASONE SODIUM PHOSPHATE 4 MG/ML
6 INJECTION, SOLUTION INTRA-ARTICULAR; INTRALESIONAL; INTRAMUSCULAR; INTRAVENOUS; SOFT TISSUE
Status: COMPLETED | OUTPATIENT
Start: 2022-01-29 | End: 2022-01-29

## 2022-01-29 RX ORDER — BACLOFEN 10 MG/1
TABLET ORAL
Status: DISCONTINUED
Start: 2022-01-29 | End: 2022-01-30 | Stop reason: HOSPADM

## 2022-01-29 RX ORDER — HYDROCODONE BITARTRATE AND ACETAMINOPHEN 5; 325 MG/1; MG/1
1 TABLET ORAL
Status: COMPLETED | OUTPATIENT
Start: 2022-01-29 | End: 2022-01-29

## 2022-01-29 RX ORDER — BACLOFEN 5 MG/1
5 TABLET ORAL
Status: COMPLETED | OUTPATIENT
Start: 2022-01-29 | End: 2022-01-29

## 2022-01-29 RX ORDER — KETOROLAC TROMETHAMINE 30 MG/ML
15 INJECTION, SOLUTION INTRAMUSCULAR; INTRAVENOUS
Status: COMPLETED | OUTPATIENT
Start: 2022-01-29 | End: 2022-01-29

## 2022-01-29 RX ORDER — TIZANIDINE 4 MG/1
4 TABLET ORAL EVERY 8 HOURS PRN
Qty: 20 TABLET | Refills: 0 | Status: SHIPPED | OUTPATIENT
Start: 2022-01-29 | End: 2022-02-08

## 2022-01-29 RX ADMIN — HYDROCODONE BITARTRATE AND ACETAMINOPHEN 1 TABLET: 5; 325 TABLET ORAL at 09:01

## 2022-01-29 RX ADMIN — KETOROLAC TROMETHAMINE 15 MG: 30 INJECTION, SOLUTION INTRAMUSCULAR at 08:01

## 2022-01-29 RX ADMIN — BACLOFEN 5 MG: 10 TABLET ORAL at 08:01

## 2022-01-29 RX ADMIN — DEXAMETHASONE SODIUM PHOSPHATE 6 MG: 4 INJECTION INTRA-ARTICULAR; INTRALESIONAL; INTRAMUSCULAR; INTRAVENOUS; SOFT TISSUE at 09:01

## 2022-01-30 NOTE — ED PROVIDER NOTES
Encounter Date: 1/29/2022    SCRIBE #1 NOTE: I, Orlin Heller, am scribing for, and in the presence of,  Luis Alberto Schwab MD. I have scribed the following portions of the note - Other sections scribed: HPI, ROS, PE.       History     Chief Complaint   Patient presents with    Back Pain     Reports twisting/bending wrong on Thursday and felt pain in left lower back. Reports pain worse with movement and bending. Denies any urinary complaints.      Rizwan Demarco Jr. 71 y.o. male, with hx of lumbar spondylosis, CAD, GERD, HTN, presents to the ED with left lower back pain that began 2 days ago after twisting and bending. Patient also reports of intermittent frequency at night.  Denies radiation pain, numbness, weakness, saddle anesthesia bowel or bladder incontinence.  Patient endorses the use of Tylenol last night with minimal improvement in symptoms.. Patient denies nausea, vomiting, headache, chest pain, or other associated symptoms. Former smoker.    The history is provided by the patient. No  was used.     Review of patient's allergies indicates:   Allergen Reactions    Astelin [azelastine] Other (See Comments)     Dry mouth    Flomax [tamsulosin]      Nosebleed    Aspirin Nausea And Vomiting     Past Medical History:   Diagnosis Date    Allergy     Arthritis     Rheumatoid arthritis    Back pain     Blood clotting tendency     BPH (benign prostatic hypertrophy)     Cervical spondylosis     Colon polyp 2010    adenoma    Coronary artery disease     Depression 5/8/2015    Dry eyes     Dry mouth     GERD (gastroesophageal reflux disease)     Hyperlipidemia     Hypertension     Lumbar spondylosis     Nasal obstruction     Rheumatoid arthritis     Sinusitis     Special screening for malignant neoplasm of colon 6/29/2016    Trouble in sleeping      Past Surgical History:   Procedure Laterality Date    COLONOSCOPY N/A 6/29/2016    Procedure: COLONOSCOPY;  Surgeon: Partha Flowers  MD Lewis;  Location: [facility] ENDO;  Service: Endoscopy;  Laterality: N/A;    COLONOSCOPY N/A 2020    Procedure: COLONOSCOPY;  Surgeon: Ian Martinez MD;  [facility] [place]  Service: Endoscopy;  Laterality: N/A;  20 - removed from 20, not called yet due to acute pain issues being addressed today - pg    CORONARY ARTERY BYPASS GRAFT      ESOPHAGOGASTRODUODENOSCOPY N/A 2020    Procedure: ESOPHAGOGASTRODUODENOSCOPY (EGD);  Surgeon: Ian Martinez MD;  Location: [facility] [place] (4TH FLR);  Service: Endoscopy;  Laterality: N/A;  20 - removed from 20, not called yet due to acute pain issues being addressed today.  20 - rescheduled 20 - pg    EXCISION TURBINATE, SUBMUCOUS      KNEE ARTHROSCOPY W/ DEBRIDEMENT      NASAL SEPTUM SURGERY      TONSILLECTOMY       Family History   Problem Relation Age of Onset    Hyperlipidemia Mother     Alzheimer's disease Mother     Stomach cancer Father     Colon cancer Father         from wife--he is not unsure     Cataracts Other     No Known Problems Maternal Aunt     No Known Problems Maternal Uncle     No Known Problems Paternal Aunt     No Known Problems Paternal Uncle     No Known Problems Maternal Grandmother     No Known Problems Maternal Grandfather     No Known Problems Paternal Grandmother     No Known Problems Paternal Grandfather     Blindness Neg Hx     Cancer Neg Hx     Diabetes Neg Hx     Glaucoma Neg Hx     Rheum arthritis Neg Hx     Psoriasis Neg Hx     Lupus Neg Hx     Amblyopia Neg Hx     Hypertension Neg Hx     Macular degeneration Neg Hx     Retinal detachment Neg Hx     Strabismus Neg Hx     Stroke Neg Hx     Thyroid disease Neg Hx     Esophageal cancer Neg Hx      Social History     Tobacco Use    Smoking status: Former Smoker     Packs/day: 1.00     Years: 20.00     Pack years: 20.00     Types: Cigarettes     Quit date: 1991     Years since quittin.0    Smokeless tobacco: Never Used     Tobacco comment: Quit 1991.   Substance Use Topics    Alcohol use: No    Drug use: No     Review of Systems   Constitutional: Negative for fever.   HENT: Negative for sore throat.    Respiratory: Negative for shortness of breath.    Cardiovascular: Negative for chest pain and leg swelling.   Gastrointestinal: Negative for nausea and vomiting.   Genitourinary: Positive for frequency. Negative for dysuria.   Musculoskeletal: Positive for back pain.   Skin: Negative for rash.   Neurological: Negative for weakness, numbness and headaches.   Hematological: Does not bruise/bleed easily.   All other systems reviewed and are negative.      Physical Exam     Initial Vitals [01/29/22 1945]   BP Pulse Resp Temp SpO2   (!) 155/72 71 18 98 °F (36.7 °C) 97 %      MAP       --         Physical Exam    Nursing note and vitals reviewed.  Constitutional: He is not diaphoretic. No distress.   HENT:   Head: Normocephalic and atraumatic.   Mouth/Throat: Oropharynx is clear and moist.   Eyes: EOM are normal. No scleral icterus.   Neck: Neck supple. No tracheal deviation present.   Normal range of motion.  Cardiovascular: Normal rate, regular rhythm and intact distal pulses.   Pulmonary/Chest: Breath sounds normal. No stridor. No respiratory distress.   Abdominal: Abdomen is soft. He exhibits no distension. There is no abdominal tenderness.   Musculoskeletal:         General: No edema. Normal range of motion.      Cervical back: Normal range of motion and neck supple.      Lumbar back: Tenderness present.      Comments: Left paraspinal lumbar tenderness present.  No midline tenderness or step offs.     Neurological: He is alert. He has normal strength. No cranial nerve deficit or sensory deficit. GCS score is 15. GCS eye subscore is 4. GCS verbal subscore is 5. GCS motor subscore is 6.   Skin: Skin is warm and dry.   Psychiatric: He has a normal mood and affect.         ED Course   Procedures  Labs Reviewed   POCT URINALYSIS W/O SCOPE    POCT URINALYSIS W/O SCOPE          Imaging Results          X-Ray Lumbar Spine Ap And Lateral (Final result)  Result time 01/29/22 20:59:13    Final result by Violeta Escalante MD (01/29/22 20:59:13)                 Impression:      No acute lumbar spine abnormalities identified.      Electronically signed by: Violeta Escalante MD  Date:    01/29/2022  Time:    20:59             Narrative:    EXAMINATION:  XR LUMBAR SPINE AP AND LATERAL    CLINICAL HISTORY:  low back pain;    TECHNIQUE:  AP, lateral and spot images were performed of the lumbar spine.    COMPARISON:  None    FINDINGS:  Lumbar spine alignment is within normal limits.  No evidence of acute lumbar spine fracture or subluxation.  Intervertebral disc spaces appear fairly well maintained.  Visualized sacrum is unremarkable.                                 Medications   ketorolac injection 15 mg (15 mg Intramuscular Given 1/29/22 2042)   baclofen tablet 5 mg (5 mg Oral Given 1/29/22 2041)   dexamethasone injection 6 mg (6 mg Intramuscular Given 1/29/22 2153)   HYDROcodone-acetaminophen 5-325 mg per tablet 1 tablet (1 tablet Oral Given 1/29/22 2153)     Medical Decision Making:   History:   Old Medical Records: I decided to obtain old medical records.  Differential Diagnosis:   Includes but is not limited to: strain, sprain, radiculopathy, spondylolisthesis  Clinical Tests:   Lab Tests: Ordered and Reviewed  Radiological Study: Ordered and Reviewed          Scribe Attestation:   Scribe #1: I performed the above scribed service and the documentation accurately describes the services I performed. I attest to the accuracy of the note.        ED Course as of 01/29/22 2331   Sat Jan 29, 2022   2203 Patient is afebrile and in no acute distress at time history and physical.  He has no obvious focal neurological deficits.  He has no midline vertebral tenderness or step-offs.  He denies saddle anesthesia or bowel or bladder symptoms.  Urinalysis is without occult  blood or evidence of UTI.  X-rays without acute fracture or spondylolisthesis.  Patient given analgesia in the emergency department with some improvement symptoms.  He is clinically stable and fit for discharge on trial of management supportive care and close follow-up with primary physician as well as Orthopedics.  Patient and escort at the bedside counseled on supportive care, appropriate medication usage, concerning symptoms for which to return to ER and the importance of follow up. Understanding and agreement with treatment plan was expressed.  [SG]      ED Course User Index  [SG] Luis Alberto Schwab MD        This chart was completed using dictation software, as a result there may be some transcription errors.       I, Luis Alberto Schwab , personally performed the services described in the documentation. All medical record entries made by the scribe were at my direction and in my presence. I have reviewed the chart and agree that the record reflects my personal performance and is accurate and complete.    Clinical Impression:   Final diagnoses:  [M54.50] Acute left-sided low back pain without sciatica (Primary)          ED Disposition Condition    Discharge Stable        ED Prescriptions     Medication Sig Dispense Start Date End Date Auth. Provider    tiZANidine (ZANAFLEX) 4 MG tablet Take 1 tablet (4 mg total) by mouth every 8 (eight) hours as needed (Back pain, muscle tightness, spasm). Do not drive or operate heavy machinery while taking this medications as it can cause drowsiness and decrease coordination. 20 tablet 1/29/2022 2/8/2022 Luis Alberto Schwab MD    meloxicam (MOBIC) 7.5 MG tablet Take 1 tablet (7.5 mg total) by mouth daily as needed for Pain. 15 tablet 1/29/2022  Luis Alberto Schwab MD    acetaminophen (TYLENOL) 500 MG tablet Take 2 tablets (1,000 mg total) by mouth every 8 (eight) hours as needed for Pain. 30 tablet 1/29/2022  Luis Alberto Schwab MD    HYDROcodone-acetaminophen (NORCO) 5-325 mg per tablet  Take 1 tablet by mouth every 8 (eight) hours as needed (Severe pain not improved by other medications). 6 tablet 1/29/2022  Luis Alberto Schwab MD    docusate sodium (COLACE) 100 MG capsule Take 1 capsule (100 mg total) by mouth 2 (two) times daily. 60 capsule 1/29/2022  Luis Alberto Schwab MD        Follow-up Information     Follow up With Specialties Details Why Contact Info    Raúl Perkins MD Family Medicine Schedule an appointment as soon as possible for a visit   [address] [place] [address]  [phone]      [facility] Orthopedics Schedule an appointment as soon as possible for a visit   [address]  [facility] [address]  [phone]           Luis Alberto Schwab MD  01/29/22 0621

## 2022-01-30 NOTE — TELEPHONE ENCOUNTER
[place]    PCP:  Dr. Perkins    Reports twisted back wrong while trying to reach something in the closet Thurs.  Had taken Tylenol, Biofreeze, and Epsom salt bath.  Reports that Tylenol and Epsom salt bath has not improved sxs.  He does report biofreeze has helped sx.  H/O previous back injury on the job.  Reports constant severe lower back that radiates into Lt hip rated 10/10.  He reports that movement intensifies sx.  He thinks that he thinks re-injury caused back pain.  Denies any strenuous work or exercise or lifting of any heavy objects.  Denies any weakness, numbness, problems with bowel/bladder control, fever, abd pain, burning with urination, or blood in urine.  Per protocol, care advised is go to the ED now.  Instructed to call NOC for worsening/questions/concerns.  VU.    Reason for Disposition   [1] SEVERE back pain (e.g., excruciating) AND [2] sudden onset AND [3] age > 60 years    Additional Information   Negative: Passed out (i.e., lost consciousness, collapsed and was not responding)   Negative: Shock suspected (e.g., cold/pale/clammy skin, too weak to stand, low BP, rapid pulse)   Negative: Sounds like a life-threatening emergency to the triager    Protocols used: BACK PAIN-A-

## 2022-01-30 NOTE — DISCHARGE INSTRUCTIONS
X-rays are within normal ranges.  Urinalysis does not show urine infection.  Your symptoms are likely related to a lumbar strain.  Use meloxicam and tizanidine as needed for pain or muscle spasm.  Do not drive or operate heavy machinery while taking tizanidine cause drowsiness decrease coordination.  You can attempt Tylenol as needed for pain as well.  Use Norco as needed for severe pain not improved by other medications.  Use Norco with caution as it can cause drowsiness and decrease coordination.  Use Colace when taking Norco to prevent constipation.  Be sure to continue your Prilosec antacid. Schedule close follow-up with your primary physician to monitor and further evaluate your symptoms.  You have been referred to Orthopedics as well.  Return to the emergency department for fever, new numbness, new weakness, bowel or bladder problems or any new, worsening or significantly concerning symptoms.    Thank you for coming to our Emergency Department today. It is important to remember that some problems are difficult to diagnose and may not be found during your first visit. Be sure to follow up with your primary care doctor and review any labs/imaging that was performed with them. If you do not have a primary care doctor, you may contact the one listed on your discharge paperwork or you may also call the [facility] Appointment Desk at [phone] to schedule an appointment with one.     All medications may potentially have side effects and it is impossible to predict which medications may give you side effects. If you feel that you are having a negative effect of any medication you should immediately stop taking them and seek medical attention.    Return to the ER with any questions/concerns, new/concerning symptoms, worsening or failure to improve. Do not drive or make any important decisions for 24 hours if you have received any pain medications, sedatives or mood altering drugs during your ER visit.

## 2022-02-07 ENCOUNTER — OFFICE VISIT (OUTPATIENT)
Dept: FAMILY MEDICINE | Facility: CLINIC | Age: 72
End: 2022-02-07
Payer: MEDICARE

## 2022-02-07 VITALS
OXYGEN SATURATION: 98 % | SYSTOLIC BLOOD PRESSURE: 136 MMHG | BODY MASS INDEX: 27.17 KG/M2 | HEART RATE: 60 BPM | TEMPERATURE: 98 F | DIASTOLIC BLOOD PRESSURE: 76 MMHG | WEIGHT: 200.63 LBS | HEIGHT: 72 IN

## 2022-02-07 DIAGNOSIS — Z00.00 ANNUAL PHYSICAL EXAM: Primary | ICD-10-CM

## 2022-02-07 DIAGNOSIS — R79.9 ABNORMAL FINDING OF BLOOD CHEMISTRY: ICD-10-CM

## 2022-02-07 DIAGNOSIS — M51.36 DDD (DEGENERATIVE DISC DISEASE), LUMBAR: Primary | ICD-10-CM

## 2022-02-07 DIAGNOSIS — M50.30 DDD (DEGENERATIVE DISC DISEASE), CERVICAL: Primary | ICD-10-CM

## 2022-02-07 DIAGNOSIS — I70.203 ATHEROSCLEROSIS OF NATIVE ARTERY OF BOTH LOWER EXTREMITIES, WITH UNSPECIFIED PRESENCE OF CLINICAL MANIFESTATION: ICD-10-CM

## 2022-02-07 DIAGNOSIS — M35.01 SJOGREN'S SYNDROME WITH KERATOCONJUNCTIVITIS SICCA: ICD-10-CM

## 2022-02-07 PROCEDURE — 99214 OFFICE O/P EST MOD 30 MIN: CPT | Mod: S$PBB,,, | Performed by: FAMILY MEDICINE

## 2022-02-07 PROCEDURE — 99214 PR OFFICE/OUTPT VISIT, EST, LEVL IV, 30-39 MIN: ICD-10-PCS | Mod: S$PBB,,, | Performed by: FAMILY MEDICINE

## 2022-02-07 PROCEDURE — 99999 PR PBB SHADOW E&M-EST. PATIENT-LVL IV: ICD-10-PCS | Mod: PBBFAC,,, | Performed by: FAMILY MEDICINE

## 2022-02-07 PROCEDURE — 99214 OFFICE O/P EST MOD 30 MIN: CPT | Mod: PBBFAC,PO | Performed by: FAMILY MEDICINE

## 2022-02-07 PROCEDURE — 99999 PR PBB SHADOW E&M-EST. PATIENT-LVL IV: CPT | Mod: PBBFAC,,, | Performed by: FAMILY MEDICINE

## 2022-02-07 NOTE — PROGRESS NOTES
Chief Complaint   Patient presents with    Annual Exam       SUBJECTIVE:   Rizwan Demarco Jr. is a 71 y.o. male presenting for his annual checkup. Stable and doing well.  He had back issue that resolved  Constipation is better  Doing well with medication and treatment.  Current Outpatient Medications   Medication Sig Dispense Refill    acetaminophen (TYLENOL) 500 MG tablet Take 2 tablets (1,000 mg total) by mouth every 8 (eight) hours as needed for Pain. 30 tablet 0    alclomethasone (ACLOVATE) 0.05 % cream Apply topically 2 (two) times daily. For irritant contact dermatitis 45 g 0    alfuzosin (UROXATRAL) 10 mg Tb24 Take 1 tablet (10 mg total) by mouth daily with breakfast. 90 tablet 3    aspirin (ECOTRIN) 81 MG EC tablet Take 1 tablet (81 mg total) by mouth once daily.  0    budesonide (PULMICORT) 0.25 mg/2 mL nebulizer solution Take 2 mLs (0.25 mg total) by nebulization 2 (two) times daily. Controller 90 each 1    ciclopirox (PENLAC) 8 % Soln Apply topically nightly. 1 Bottle 3    cinnamon bark 500 mg capsule Take 500 mg by mouth once daily.      docusate sodium (COLACE) 100 MG capsule Take 1 capsule (100 mg total) by mouth 2 (two) times daily. 60 capsule 0    hydroCHLOROthiazide (MICROZIDE) 12.5 mg capsule Take 1 capsule (12.5 mg total) by mouth once daily. 90 capsule 3    HYDROcodone-acetaminophen (NORCO) 5-325 mg per tablet Take 1 tablet by mouth every 8 (eight) hours as needed (Severe pain not improved by other medications). 6 tablet 0    lisinopriL 10 MG tablet Take 1 tablet (10 mg total) by mouth once daily. 90 tablet 3    meloxicam (MOBIC) 7.5 MG tablet Take 1 tablet (7.5 mg total) by mouth daily as needed for Pain. 15 tablet 0    multivitamin (THERAGRAN) per tablet Take by mouth.  Tablet Oral Every day      omega-3 fatty acids 1,250 mg Cap Take by mouth.      omeprazole (PRILOSEC) 20 MG capsule Take 1 capsule (20 mg total) by mouth daily as needed (reflux). 30 capsule 3    propylene  glycoL 0.6 % Drop Apply to eye.      sodium bicarb-sodium chloride Pack by sinus irrigation route.      tiZANidine (ZANAFLEX) 4 MG tablet Take 1 tablet (4 mg total) by mouth every 8 (eight) hours as needed (Back pain, muscle tightness, spasm). Do not drive or operate heavy machinery while taking this medications as it can cause drowsiness and decrease coordination. 20 tablet 0     No current facility-administered medications for this visit.     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 No dyspnea or chest pain on exertion. No abdominal pain, change in bowel habits, black or bloody stools. No urinary tract or prostatic symptoms. No neurological complaints.  Improving with his arthritis and had a flare of the back  OBJECTIVE:   The patient appears well, alert, oriented x 3, in no distress.   /76   Pulse 60   Temp 97.7 °F (36.5 °C) (Oral)   Ht 6' (1.829 m)   Wt 91 kg (200 lb 9.9 oz)   SpO2 98%   BMI 27.21 kg/m²   Wt Readings from Last 5 Encounters:   02/07/22 91 kg (200 lb 9.9 oz)   01/29/22 90.7 kg (200 lb)   09/23/21 92.4 kg (203 lb 11.3 oz)   07/21/21 93.2 kg (205 lb  9.3 oz)   06/01/21 93.9 kg (207 lb 0.2 oz)       ENT abnormal.  Neck supple. No adenopathy or thyromegaly. DANIEL. Lungs are clear, good air entry, no wheezes, rhonchi or rales. S1 and S2 normal, no murmurs, regular rate and rhythm. Abdomen is soft without tenderness, guarding, mass or organomegaly.  exam: deferred.  Extremities show no edema, normal peripheral pulses. Neurological is normal without focal findings.    ASSESSMENT:   1. Annual physical exam    2. Sjogren's syndrome with keratoconjunctivitis sicca    3. Atherosclerosis of native artery of both lower extremities, with unspecified presence of clinical manifestation          PLAN:   Counseled on age appropriate medical preventative services, including age appropriate cancer screenings, over all nutritional health, need for a consistent exercise regimen and an over all push towards maintaining a vigorous and active lifestyle.  Counseled on age appropriate vaccines and discussed upcoming health care needs based on age/gender.  Spent time with patient counseling on need for a good patient/doctor relationship moving forward.  Discussed use of common OTC medications and supplements.  Discussed common dietary aids and use of caffeine and the need for good sleep hygiene and stress management.    Problem List Items Addressed This Visit     Sjogren's disease      Other Visit Diagnoses     Annual physical exam    -  Primary    Atherosclerosis of native artery of both lower extremities, with unspecified presence of clinical manifestation            The current medical regimen is effective;  continue present plan and medications.    Dry eyes manageable  Constipation manageable.  Chronic sinusitis is tony with treatment.    The 10-year ASCVD risk score (Lancasternicolas ALLEN Jr., et al., 2013) is: 24.8%    Values used to calculate the score:      Age: 71 years      Sex: Male      Is Non- : Yes      Diabetic: No      Tobacco smoker: No      Systolic Blood  Pressure: 136 mmHg      Is BP treated: Yes      HDL Cholesterol: 41 mg/dL      Total Cholesterol: 215 mg/dL  Higher risk  Still won't do statin, last done in 2019 understands risk score and risk  Get new labs.

## 2022-02-08 ENCOUNTER — TELEPHONE (OUTPATIENT)
Dept: FAMILY MEDICINE | Facility: CLINIC | Age: 72
End: 2022-02-08
Payer: MEDICARE

## 2022-02-08 ENCOUNTER — LAB VISIT (OUTPATIENT)
Dept: LAB | Facility: HOSPITAL | Age: 72
End: 2022-02-08
Attending: FAMILY MEDICINE
Payer: MEDICARE

## 2022-02-08 DIAGNOSIS — M35.01 SJOGREN'S SYNDROME WITH KERATOCONJUNCTIVITIS SICCA: ICD-10-CM

## 2022-02-08 DIAGNOSIS — I70.203 ATHEROSCLEROSIS OF NATIVE ARTERY OF BOTH LOWER EXTREMITIES, WITH UNSPECIFIED PRESENCE OF CLINICAL MANIFESTATION: ICD-10-CM

## 2022-02-08 DIAGNOSIS — R79.9 ABNORMAL FINDING OF BLOOD CHEMISTRY: ICD-10-CM

## 2022-02-08 LAB
ALBUMIN SERPL BCP-MCNC: 3.8 G/DL (ref 3.5–5.2)
ALP SERPL-CCNC: 46 U/L (ref 55–135)
ALT SERPL W/O P-5'-P-CCNC: 13 U/L (ref 10–44)
ANION GAP SERPL CALC-SCNC: 8 MMOL/L (ref 8–16)
AST SERPL-CCNC: 13 U/L (ref 10–40)
BASOPHILS # BLD AUTO: 0.04 K/UL (ref 0–0.2)
BASOPHILS NFR BLD: 0.7 % (ref 0–1.9)
BILIRUB SERPL-MCNC: 0.7 MG/DL (ref 0.1–1)
BUN SERPL-MCNC: 13 MG/DL (ref 8–23)
CALCIUM SERPL-MCNC: 10.2 MG/DL (ref 8.7–10.5)
CHLORIDE SERPL-SCNC: 105 MMOL/L (ref 95–110)
CHOLEST SERPL-MCNC: 238 MG/DL (ref 120–199)
CHOLEST/HDLC SERPL: 5.7 {RATIO} (ref 2–5)
CO2 SERPL-SCNC: 29 MMOL/L (ref 23–29)
CREAT SERPL-MCNC: 1.2 MG/DL (ref 0.5–1.4)
DIFFERENTIAL METHOD: ABNORMAL
EOSINOPHIL # BLD AUTO: 0.1 K/UL (ref 0–0.5)
EOSINOPHIL NFR BLD: 1.7 % (ref 0–8)
ERYTHROCYTE [DISTWIDTH] IN BLOOD BY AUTOMATED COUNT: 12.5 % (ref 11.5–14.5)
EST. GFR  (AFRICAN AMERICAN): >60 ML/MIN/1.73 M^2
EST. GFR  (NON AFRICAN AMERICAN): 60 ML/MIN/1.73 M^2
GLUCOSE SERPL-MCNC: 92 MG/DL (ref 70–110)
HCT VFR BLD AUTO: 53 % (ref 40–54)
HDLC SERPL-MCNC: 42 MG/DL (ref 40–75)
HDLC SERPL: 17.6 % (ref 20–50)
HGB BLD-MCNC: 16.6 G/DL (ref 14–18)
IMM GRANULOCYTES # BLD AUTO: 0.01 K/UL (ref 0–0.04)
IMM GRANULOCYTES NFR BLD AUTO: 0.2 % (ref 0–0.5)
LDLC SERPL CALC-MCNC: 180.8 MG/DL (ref 63–159)
LYMPHOCYTES # BLD AUTO: 3.2 K/UL (ref 1–4.8)
LYMPHOCYTES NFR BLD: 55.1 % (ref 18–48)
MCH RBC QN AUTO: 30 PG (ref 27–31)
MCHC RBC AUTO-ENTMCNC: 31.3 G/DL (ref 32–36)
MCV RBC AUTO: 96 FL (ref 82–98)
MONOCYTES # BLD AUTO: 0.5 K/UL (ref 0.3–1)
MONOCYTES NFR BLD: 8.4 % (ref 4–15)
NEUTROPHILS # BLD AUTO: 2 K/UL (ref 1.8–7.7)
NEUTROPHILS NFR BLD: 33.9 % (ref 38–73)
NONHDLC SERPL-MCNC: 196 MG/DL
NRBC BLD-RTO: 0 /100 WBC
PLATELET # BLD AUTO: 197 K/UL (ref 150–450)
PMV BLD AUTO: 10.5 FL (ref 9.2–12.9)
POTASSIUM SERPL-SCNC: 4.8 MMOL/L (ref 3.5–5.1)
PROT SERPL-MCNC: 7.3 G/DL (ref 6–8.4)
RBC # BLD AUTO: 5.53 M/UL (ref 4.6–6.2)
SODIUM SERPL-SCNC: 142 MMOL/L (ref 136–145)
TRIGL SERPL-MCNC: 76 MG/DL (ref 30–150)
WBC # BLD AUTO: 5.81 K/UL (ref 3.9–12.7)

## 2022-02-08 PROCEDURE — 80061 LIPID PANEL: CPT | Performed by: FAMILY MEDICINE

## 2022-02-08 PROCEDURE — 36415 COLL VENOUS BLD VENIPUNCTURE: CPT | Mod: PO | Performed by: FAMILY MEDICINE

## 2022-02-08 PROCEDURE — 85025 COMPLETE CBC W/AUTO DIFF WBC: CPT | Performed by: FAMILY MEDICINE

## 2022-02-08 PROCEDURE — 83036 HEMOGLOBIN GLYCOSYLATED A1C: CPT | Performed by: FAMILY MEDICINE

## 2022-02-08 PROCEDURE — 80053 COMPREHEN METABOLIC PANEL: CPT | Performed by: FAMILY MEDICINE

## 2022-02-08 NOTE — TELEPHONE ENCOUNTER
----- Message from Krupa Live sent at 2/8/2022  4:36 PM CST -----  Regarding: Returning call  Who Called: pt         Who Left Message for Patient: n/a         Does the patient know what this is regarding: throat burning         Best Call Back Number: [phone]

## 2022-02-08 NOTE — TELEPHONE ENCOUNTER
----- Message from Neeru Anguiano sent at 2/8/2022  2:50 PM CST -----  Regarding: Patient advice  Contact: Pt  Pt called in regards to experiencing  coughing and  burning in throat when swallowing     Please advise , Pt can be reached at [phone]

## 2022-02-08 NOTE — TELEPHONE ENCOUNTER
Patient stated that while he was fasting for labs that were done this morning he began experiencing coughing and burning in his throat when he swallows. Is still experiencing even with having eaten something.  Would like to know what Dr. Perkins suggests to help with this issue.  Please advise.

## 2022-02-09 LAB
ESTIMATED AVG GLUCOSE: 114 MG/DL (ref 68–131)
HBA1C MFR BLD: 5.6 % (ref 4–5.6)

## 2022-02-10 ENCOUNTER — OFFICE VISIT (OUTPATIENT)
Dept: ORTHOPEDICS | Facility: CLINIC | Age: 72
End: 2022-02-10
Payer: MEDICARE

## 2022-02-10 ENCOUNTER — HOSPITAL ENCOUNTER (OUTPATIENT)
Dept: RADIOLOGY | Facility: HOSPITAL | Age: 72
Discharge: HOME | End: 2022-02-10
Attending: ORTHOPAEDIC SURGERY
Payer: MEDICARE

## 2022-02-10 VITALS — WEIGHT: 201.19 LBS | HEIGHT: 73 IN | BODY MASS INDEX: 26.66 KG/M2

## 2022-02-10 DIAGNOSIS — M51.36 DDD (DEGENERATIVE DISC DISEASE), LUMBAR: ICD-10-CM

## 2022-02-10 DIAGNOSIS — M47.816 LUMBAR SPONDYLOSIS: Primary | ICD-10-CM

## 2022-02-10 DIAGNOSIS — M54.50 ACUTE LEFT-SIDED LOW BACK PAIN WITHOUT SCIATICA: ICD-10-CM

## 2022-02-10 PROCEDURE — 72120 X-RAY BEND ONLY L-S SPINE: CPT | Mod: TC

## 2022-02-10 PROCEDURE — 99999 PR PBB SHADOW E&M-EST. PATIENT-LVL IV: CPT | Mod: PBBFAC,,, | Performed by: ORTHOPAEDIC SURGERY

## 2022-02-10 PROCEDURE — 99204 OFFICE O/P NEW MOD 45 MIN: CPT | Mod: S$PBB,,, | Performed by: ORTHOPAEDIC SURGERY

## 2022-02-10 PROCEDURE — 72120 X-RAY BEND ONLY L-S SPINE: CPT | Mod: 26,,, | Performed by: RADIOLOGY

## 2022-02-10 PROCEDURE — 72120 XR LUMBAR SPINE FLEXION AND EXTENSION ONLY: ICD-10-PCS | Mod: 26,,, | Performed by: RADIOLOGY

## 2022-02-10 PROCEDURE — 99214 OFFICE O/P EST MOD 30 MIN: CPT | Mod: PBBFAC | Performed by: ORTHOPAEDIC SURGERY

## 2022-02-10 PROCEDURE — 99204 PR OFFICE/OUTPT VISIT, NEW, LEVL IV, 45-59 MIN: ICD-10-PCS | Mod: S$PBB,,, | Performed by: ORTHOPAEDIC SURGERY

## 2022-02-10 PROCEDURE — 99999 PR PBB SHADOW E&M-EST. PATIENT-LVL IV: ICD-10-PCS | Mod: PBBFAC,,, | Performed by: ORTHOPAEDIC SURGERY

## 2022-02-10 NOTE — TELEPHONE ENCOUNTER
Patient advised of Dr. Perkins's response and verbalized understanding.  Stated at this time he is feeling fine and thinks it was caused by too much citrus in diet.  Will keep Dr. Perkins's suggestion in mind next time he experiences this.

## 2022-02-10 NOTE — PROGRESS NOTES
DATE: 2/10/2022  PATIENT: Rizwan Demarco Jr.    Attending Physician: Darnell Pelletier M.D.    CHIEF COMPLAINT: LBP    HISTORY:  Rizwan Demarco Jr. is a 71 y.o. male here for initial evaluation of low back pain (Back - 0). The pain has been present since 1/27/22 when he twisted his back wrong. He went to the ER and got mobic and zanaflex. The pain has resolved. The patient describes the pain as dull.  The pain is worse with activity and improved by rest. There is no associated numbness and tingling. There is no subjective weakness. Prior treatments have included ER treatment, but no PT, injections or surgery.    The Patient denies myelopathic symptoms such as handwriting changes or difficulty with buttons/coins/keys. Denies perineal paresthesias, bowel/bladder dysfunction.    The patient does not smoke, have DM or endorse IVDU. The patient is not on any blood thinners and does not take chronic narcotics. Patient is retired; he used to work in [employer] and was in .    PAST MEDICAL/SURGICAL HISTORY:  Past Medical History:   Diagnosis Date    Allergy     Arthritis     Rheumatoid arthritis    Back pain     Blood clotting tendency     BPH (benign prostatic hypertrophy)     Cervical spondylosis     Colon polyp 2010    adenoma    Coronary artery disease     Depression 5/8/2015    Dry eyes     Dry mouth     GERD (gastroesophageal reflux disease)     Hyperlipidemia     Hypertension     Lumbar spondylosis     Nasal obstruction     Rheumatoid arthritis     Sinusitis     Special screening for malignant neoplasm of colon 6/29/2016    Trouble in sleeping      Past Surgical History:   Procedure Laterality Date    COLONOSCOPY N/A 6/29/2016    Procedure: COLONOSCOPY;  Surgeon: Partha Saucedo MD;  Location: [facility];  Service: Endoscopy;  Laterality: N/A;    COLONOSCOPY N/A 7/24/2020    Procedure: COLONOSCOPY;  Surgeon: Ian Martinez MD;  Location: [facility] ([address] [place] [address]);  Service: Endoscopy;  Laterality:  N/A;  4/16/20 - removed from 5/1/20, not called yet due to acute pain issues being addressed today - pg    CORONARY ARTERY BYPASS GRAFT      ESOPHAGOGASTRODUODENOSCOPY N/A 7/24/2020    Procedure: ESOPHAGOGASTRODUODENOSCOPY (EGD);  Surgeon: Ian Martinez MD;  Location: [address];  Service: Endoscopy;  Laterality: N/A;  4/16/20 - removed from 5/1/20, not called yet due to acute pain issues being addressed today.  4/17/20 - rescheduled 7/24/20 - pg    EXCISION TURBINATE, SUBMUCOUS      KNEE ARTHROSCOPY W/ DEBRIDEMENT      NASAL SEPTUM SURGERY      TONSILLECTOMY         Current Medications:   Current Outpatient Medications:     acetaminophen (TYLENOL) 500 MG tablet, Take 2 tablets (1,000 mg total) by mouth every 8 (eight) hours as needed for Pain., Disp: 30 tablet, Rfl: 0    alclomethasone (ACLOVATE) 0.05 % cream, Apply topically 2 (two) times daily. For irritant contact dermatitis, Disp: 45 g, Rfl: 0    alfuzosin (UROXATRAL) 10 mg Tb24, Take 1 tablet (10 mg total) by mouth daily with breakfast., Disp: 90 tablet, Rfl: 3    aspirin (ECOTRIN) 81 MG EC tablet, Take 1 tablet (81 mg total) by mouth once daily., Disp: , Rfl: 0    budesonide (PULMICORT) 0.25 mg/2 mL nebulizer solution, Take 2 mLs (0.25 mg total) by nebulization 2 (two) times daily. Controller, Disp: 90 each, Rfl: 1    ciclopirox (PENLAC) 8 % Soln, Apply topically nightly., Disp: 1 Bottle, Rfl: 3    cinnamon bark 500 mg capsule, Take 500 mg by mouth once daily., Disp: , Rfl:     docusate sodium (COLACE) 100 MG capsule, Take 1 capsule (100 mg total) by mouth 2 (two) times daily., Disp: 60 capsule, Rfl: 0    hydroCHLOROthiazide (MICROZIDE) 12.5 mg capsule, Take 1 capsule (12.5 mg total) by mouth once daily., Disp: 90 capsule, Rfl: 3    HYDROcodone-acetaminophen (NORCO) 5-325 mg per tablet, Take 1 tablet by mouth every 8 (eight) hours as needed (Severe pain not improved by other medications)., Disp: 6 tablet, Rfl: 0    lisinopriL 10 MG  "tablet, Take 1 tablet (10 mg total) by mouth once daily., Disp: 90 tablet, Rfl: 3    meloxicam (MOBIC) 7.5 MG tablet, Take 1 tablet (7.5 mg total) by mouth daily as needed for Pain., Disp: 15 tablet, Rfl: 0    multivitamin (THERAGRAN) per tablet, Take by mouth.  Tablet Oral Every day, Disp: , Rfl:     omega-3 fatty acids 1,250 mg Cap, Take by mouth., Disp: , Rfl:     omeprazole (PRILOSEC) 20 MG capsule, Take 1 capsule (20 mg total) by mouth daily as needed (reflux)., Disp: 30 capsule, Rfl: 3    propylene glycoL 0.6 % Drop, Apply to eye., Disp: , Rfl:     sodium bicarb-sodium chloride Pack, by sinus irrigation route., Disp: , Rfl:     Social History:   Social History     Socioeconomic History    Marital status:    Tobacco Use    Smoking status: Former Smoker     Packs/day: 1.00     Years: 20.00     Pack years: 20.00     Types: Cigarettes     Quit date: 1991     Years since quittin.1    Smokeless tobacco: Never Used    Tobacco comment: Quit .   Substance and Sexual Activity    Alcohol use: No    Drug use: No    Sexual activity: Yes     Partners: Female       REVIEW OF SYSTEMS:  Constitution: Negative. Negative for chills, fever and night sweats.   Cardiovascular: Negative for chest pain and syncope.   Respiratory: Negative for cough and shortness of breath.   Gastrointestinal: See HPI. Negative for nausea/vomiting. Negative for abdominal pain.  Genitourinary: See HPI. Negative for discoloration or dysuria.  Skin: Negative for dry skin, itching and rash.   Hematologic/Lymphatic: negative for bleeding/clotting disorders.   Musculoskeletal: Negative for falls and muscle weakness.   Neurological: See HPI. no history of seizures. no history of cranial surgery or shunts.  Endocrine: Negative for polydipsia, polyphagia and polyuria.   Allergic/Immunologic: Negative for hives and persistent infections.    PHYSICAL EXAMINATION:    Ht 6' 1" (1.854 m)   Wt 91.3 kg (201 lb 2.7 oz)   BMI 26.54 " kg/m²     General: The patient is a 71 y.o. male in no apparent distress, the patient is orientatied to person, place and time.   Psych: Normal mood and affect  HEENT: Vision grossly intact, hearing intact to the spoken word.  Lungs: Respirations unlabored.  Gait: Normal station and gait, no difficulty with toe or heel walk.   Skin: Dorsal lumbar skin negative for rashes, lesions, hairy patches and surgical scars.  Range of motion: Lumbar range of motion is acceptable. There is no lumbar tenderness to palpation.  Spinal Balance: Global saggital and coronal spinal balance acceptable, no significant for scoliosis and kyphosis.  Musculoskeletal: No pain with the range of motion of the bilateral hips. No trochanteric tenderness to palpation.  Vascular: Bilateral lower extremities warm and well perfused, Dorsalis pedis pulses 2+ bilaterally.  Neurological: Normal strength and tone in all major motor groups in the bilateral lower extremities. Normal sensation to light touch in the L2-S1 dermatomes bilaterally.  Deep tendon reflexes symmetric 2+ in the bilateral lower extremities.  Negative Babinski bilaterally.    IMAGING:   Today I personally reviewed AP, Lat and Flex/Ex upright L-spine films that demonstrate lumbar spondylosis and L5-S1 anterolisthesis measuring 4mm.      Body mass index is 26.54 kg/m².  Hemoglobin A1C   Date Value Ref Range Status   02/08/2022 5.6 4.0 - 5.6 % Final     Comment:     ADA Screening Guidelines:  5.7-6.4%  Consistent with prediabetes  >or=6.5%  Consistent with diabetes    High levels of fetal hemoglobin interfere with the HbA1C  assay. Heterozygous hemoglobin variants (HbS, HgC, etc)do  not significantly interfere with this assay.   However, presence of multiple variants may affect accuracy.     05/24/2019 5.7 (H) 4.0 - 5.6 % Final     Comment:     ADA Screening Guidelines:  5.7-6.4%  Consistent with prediabetes  >or=6.5%  Consistent with diabetes  High levels of fetal hemoglobin interfere  with the HbA1C  assay. Heterozygous hemoglobin variants (HbS, HgC, etc)do  not significantly interfere with this assay.   However, presence of multiple variants may affect accuracy.           ASSESSMENT/PLAN:    Rizwan was seen today for pain.    Diagnoses and all orders for this visit:    Lumbar spondylosis    Acute left-sided low back pain without sciatica  -     Ambulatory referral/consult to Orthopedics      Follow up if symptoms worsen or fail to improve.    Patient has lumbar spondylosis. His LBP has resolved. I discussed the natural history of their diagnoses as well as surgical and nonsurgical treatment options. I educated the patient on the importance of core/back strengthening, correct posture, bending/lifting ergonomics, and low-impact aerobic exercises (walking, elliptical, and aquatherapy). Patient will follow up PRN.    Darnell Pelletier MD  Orthopaedic Spine Surgeon  Department of Orthopaedic Surgery  [phone]

## 2022-02-15 ENCOUNTER — OFFICE VISIT (OUTPATIENT)
Dept: FAMILY MEDICINE | Facility: CLINIC | Age: 72
End: 2022-02-15
Payer: MEDICARE

## 2022-02-15 VITALS
WEIGHT: 201.06 LBS | HEART RATE: 74 BPM | BODY MASS INDEX: 26.65 KG/M2 | SYSTOLIC BLOOD PRESSURE: 130 MMHG | DIASTOLIC BLOOD PRESSURE: 68 MMHG | RESPIRATION RATE: 19 BRPM | HEIGHT: 73 IN | OXYGEN SATURATION: 96 % | TEMPERATURE: 98 F

## 2022-02-15 DIAGNOSIS — R09.82 POST-NASAL DRIP: ICD-10-CM

## 2022-02-15 DIAGNOSIS — R05.9 COUGH IN ADULT PATIENT: Primary | ICD-10-CM

## 2022-02-15 PROCEDURE — 99214 OFFICE O/P EST MOD 30 MIN: CPT | Mod: S$PBB,,, | Performed by: NURSE PRACTITIONER

## 2022-02-15 PROCEDURE — 99214 PR OFFICE/OUTPT VISIT, EST, LEVL IV, 30-39 MIN: ICD-10-PCS | Mod: S$PBB,,, | Performed by: NURSE PRACTITIONER

## 2022-02-15 PROCEDURE — 99999 PR PBB SHADOW E&M-EST. PATIENT-LVL IV: ICD-10-PCS | Mod: PBBFAC,,, | Performed by: NURSE PRACTITIONER

## 2022-02-15 PROCEDURE — 99214 OFFICE O/P EST MOD 30 MIN: CPT | Mod: PBBFAC,PN | Performed by: NURSE PRACTITIONER

## 2022-02-15 PROCEDURE — 99999 PR PBB SHADOW E&M-EST. PATIENT-LVL IV: CPT | Mod: PBBFAC,,, | Performed by: NURSE PRACTITIONER

## 2022-02-15 RX ORDER — FLUTICASONE PROPIONATE 50 MCG
1 SPRAY, SUSPENSION (ML) NASAL DAILY
Qty: 15.8 ML | Refills: 0 | Status: SHIPPED | OUTPATIENT
Start: 2022-02-15 | End: 2022-06-03

## 2022-02-15 NOTE — PROGRESS NOTES
Routine Office Visit    Patient Name: Rizwan Demarco Jr.    : 1950  MRN: [id number]    Chief Complaint:  Postnasal drip, cough    Subjective:  Rizwan is a 71 y.o. male who presents today for:    1.  Postnasal drip, cough -  Patient who is new to me with a history of CAD, BPH, RA reports today for evaluation.  Since last week he has been dealing with what he describes as a head cold endorses slight nasal congestion, postnasal drip, and productive cough without any fevers, chills, shortness of breath, chest pain, wheezing, palpitations, or recent sick contacts.  He notes that his sputum color has turned from clear to greenish so he became concerned.  He reports a history of sinus surgeries in the past and uses nasal water irrigation daily.  He notes that his symptoms have improved over the last week with the water irrigations as well as Mucinex.  He has not had his COVID her flu vaccinations.  Denies any sore throat or other acute concerns.  He does not smoke. He has no other acute concerns.    Past Medical History  Past Medical History:   Diagnosis Date    Allergy     Arthritis     Rheumatoid arthritis    Back pain     Blood clotting tendency     BPH (benign prostatic hypertrophy)     Cervical spondylosis     Colon polyp     adenoma    Coronary artery disease     Depression 2015    Dry eyes     Dry mouth     GERD (gastroesophageal reflux disease)     Hyperlipidemia     Hypertension     Lumbar spondylosis     Nasal obstruction     Rheumatoid arthritis     Sinusitis     Special screening for malignant neoplasm of colon 2016    Trouble in sleeping        Past Surgical History  Past Surgical History:   Procedure Laterality Date    COLONOSCOPY N/A 2016    Procedure: COLONOSCOPY;  Surgeon: Partha Saucedo MD;  Location: [facility];  Service: Endoscopy;  Laterality: N/A;    COLONOSCOPY N/A 2020    Procedure: COLONOSCOPY;  Surgeon: Ian Martinez MD;  Location: [address]  [facility]);  Service: Endoscopy;  Laterality: N/A;  20 - removed from 20, not called yet due to acute pain issues being addressed today - pg    CORONARY ARTERY BYPASS GRAFT      ESOPHAGOGASTRODUODENOSCOPY N/A 2020    Procedure: ESOPHAGOGASTRODUODENOSCOPY (EGD);  Surgeon: Ian Martinez MD;  Location: [facility] (4TH FLR);  Service: Endoscopy;  Laterality: N/A;  20 - removed from 20, not called yet due to acute pain issues being addressed today.  20 - rescheduled 20 - pg    EXCISION TURBINATE, SUBMUCOUS      KNEE ARTHROSCOPY W/ DEBRIDEMENT      NASAL SEPTUM SURGERY      TONSILLECTOMY         Family History  Family History   Problem Relation Age of Onset    Hyperlipidemia Mother     Alzheimer's disease Mother     Stomach cancer Father     Colon cancer Father         from wife--he is not unsure     Cataracts Other     No Known Problems Maternal Aunt     No Known Problems Maternal Uncle     No Known Problems Paternal Aunt     No Known Problems Paternal Uncle     No Known Problems Maternal Grandmother     No Known Problems Maternal Grandfather     No Known Problems Paternal Grandmother     No Known Problems Paternal Grandfather     Blindness Neg Hx     Cancer Neg Hx     Diabetes Neg Hx     Glaucoma Neg Hx     Rheum arthritis Neg Hx     Psoriasis Neg Hx     Lupus Neg Hx     Amblyopia Neg Hx     Hypertension Neg Hx     Macular degeneration Neg Hx     Retinal detachment Neg Hx     Strabismus Neg Hx     Stroke Neg Hx     Thyroid disease Neg Hx     Esophageal cancer Neg Hx        Social History  Social History     Socioeconomic History    Marital status:    Tobacco Use    Smoking status: Former Smoker     Packs/day: 1.00     Years: 20.00     Pack years: 20.00     Types: Cigarettes     Quit date: 1991     Years since quittin.1    Smokeless tobacco: Never Used    Tobacco comment: Quit .   Substance and Sexual Activity    Alcohol use: No    Drug  use: No    Sexual activity: Yes     Partners: Female       Current Medications  Current Outpatient Medications on File Prior to Visit   Medication Sig Dispense Refill    acetaminophen (TYLENOL) 500 MG tablet Take 2 tablets (1,000 mg total) by mouth every 8 (eight) hours as needed for Pain. 30 tablet 0    alclomethasone (ACLOVATE) 0.05 % cream Apply topically 2 (two) times daily. For irritant contact dermatitis 45 g 0    alfuzosin (UROXATRAL) 10 mg Tb24 Take 1 tablet (10 mg total) by mouth daily with breakfast. 90 tablet 3    aspirin (ECOTRIN) 81 MG EC tablet Take 1 tablet (81 mg total) by mouth once daily.  0    budesonide (PULMICORT) 0.25 mg/2 mL nebulizer solution Take 2 mLs (0.25 mg total) by nebulization 2 (two) times daily. Controller 90 each 1    ciclopirox (PENLAC) 8 % Soln Apply topically nightly. 1 Bottle 3    cinnamon bark 500 mg capsule Take 500 mg by mouth once daily.      docusate sodium (COLACE) 100 MG capsule Take 1 capsule (100 mg total) by mouth 2 (two) times daily. 60 capsule 0    hydroCHLOROthiazide (MICROZIDE) 12.5 mg capsule Take 1 capsule (12.5 mg total) by mouth once daily. 90 capsule 3    HYDROcodone-acetaminophen (NORCO) 5-325 mg per tablet Take 1 tablet by mouth every 8 (eight) hours as needed (Severe pain not improved by other medications). 6 tablet 0    lisinopriL 10 MG tablet Take 1 tablet (10 mg total) by mouth once daily. 90 tablet 3    meloxicam (MOBIC) 7.5 MG tablet Take 1 tablet (7.5 mg total) by mouth daily as needed for Pain. 15 tablet 0    multivitamin (THERAGRAN) per tablet Take by mouth.  Tablet Oral Every day      omega-3 fatty acids 1,250 mg Cap Take by mouth.      omeprazole (PRILOSEC) 20 MG capsule Take 1 capsule (20 mg total) by mouth daily as needed (reflux). 30 capsule 3    propylene glycoL 0.6 % Drop Apply to eye.      sodium bicarb-sodium chloride Pack by sinus irrigation route.       No current facility-administered medications on file prior to  "visit.       Allergies   Review of patient's allergies indicates:   Allergen Reactions    Astelin [azelastine] Other (See Comments)     Dry mouth    Flomax [tamsulosin]      Nosebleed    Aspirin Nausea And Vomiting       Review of Systems (Pertinent positives)  Review of Systems   Constitutional: Negative.  Negative for chills, fever and weight loss.   HENT: Positive for congestion. Negative for ear discharge, ear pain, hearing loss, nosebleeds, sinus pain, sore throat and tinnitus.    Eyes: Negative.    Respiratory: Positive for cough and sputum production. Negative for hemoptysis, shortness of breath, wheezing and stridor.    Cardiovascular: Negative.    Gastrointestinal: Negative.    Genitourinary: Negative.    Musculoskeletal: Negative.    Skin: Negative.    Neurological: Negative.    Endo/Heme/Allergies: Negative.    Psychiatric/Behavioral: Negative.        /68 (BP Location: Right arm, Patient Position: Sitting, BP Method: Large (Manual))   Pulse 74   Temp 98.1 °F (36.7 °C) (Oral)   Resp 19   Ht 6' 1" (1.854 m)   Wt 91.2 kg (201 lb 1 oz)   SpO2 96%   BMI 26.53 kg/m²     Physical Exam  Vitals reviewed.   Constitutional:       General: He is not in acute distress.     Appearance: Normal appearance. He is not ill-appearing, toxic-appearing or diaphoretic.   HENT:      Head: Normocephalic and atraumatic.      Right Ear: Tympanic membrane, ear canal and external ear normal.      Left Ear: Tympanic membrane, ear canal and external ear normal.      Nose: Mucosal edema and rhinorrhea present. Rhinorrhea is clear.      Right Sinus: No maxillary sinus tenderness or frontal sinus tenderness.      Left Sinus: No maxillary sinus tenderness or frontal sinus tenderness.      Mouth/Throat:      Mouth: Mucous membranes are moist.      Pharynx: Uvula midline. No pharyngeal swelling, oropharyngeal exudate, posterior oropharyngeal erythema or uvula swelling.      Tonsils: No tonsillar exudate or tonsillar " abscesses.     Eyes:      Conjunctiva/sclera: Conjunctivae normal.   Cardiovascular:      Rate and Rhythm: Normal rate and regular rhythm.      Pulses: Normal pulses.      Heart sounds: Normal heart sounds.   Pulmonary:      Effort: Pulmonary effort is normal.      Breath sounds: Normal breath sounds.   Abdominal:      General: Bowel sounds are normal. There is no distension.      Palpations: Abdomen is soft.      Tenderness: There is no abdominal tenderness.   Musculoskeletal:         General: No swelling or tenderness. Normal range of motion.      Cervical back: Normal range of motion and neck supple.   Lymphadenopathy:      Head:      Right side of head: No submental, submandibular or tonsillar adenopathy.      Left side of head: No submental, submandibular or tonsillar adenopathy.      Cervical: No cervical adenopathy.   Skin:     General: Skin is warm and dry.      Capillary Refill: Capillary refill takes less than 2 seconds.   Neurological:      General: No focal deficit present.      Mental Status: He is alert and oriented to person, place, and time.   Psychiatric:         Mood and Affect: Mood normal.         Behavior: Behavior normal.          Assessment/Plan:  Rizwan Demarco Jr. is a 71 y.o. male who presents today for :    Diagnoses and all orders for this visit:    Cough in adult patient    Post-nasal drip  -     fluticasone propionate (FLONASE) 50 mcg/actuation nasal spray; 1 spray (50 mcg total) by Each Nostril route once daily.    I had a detailed discussion with the patient regarding his symptoms.  Cough likely due to bronchitis and/or postnasal drip.  Will treat with Flonase for postnasal drip.  Offered antihistamine the patient states that his eyes get too dry with antihistamines sometimes and would like to avoid these.  I also offered COVID testing but patient declined.  No need for imaging today.  No need for antibiotics.  Continue Mucinex as needed for cough.  Follow-up as needed.  All  questions answered.        This office note has been dictated.  This dictation has been generated using M-Modal Fluency Direct dictation; some phonetic errors may occur.   My collaborating physician is Dr. Raúl Perkins.

## 2022-04-08 ENCOUNTER — PATIENT OUTREACH (OUTPATIENT)
Dept: ADMINISTRATIVE | Facility: OTHER | Age: 72
End: 2022-04-08
Payer: MEDICARE

## 2022-04-09 NOTE — PROGRESS NOTES
LINKS immunization registry updated  Care Everywhere updated  Health Maintenance updated  Chart reviewed for overdue Proactive [facility]counters (ANNELIESE) health maintenance testing (CRS, Breast Ca, Diabetic Eye Exam)   Orders entered:N/A

## 2022-04-12 ENCOUNTER — OFFICE VISIT (OUTPATIENT)
Dept: UROLOGY | Facility: CLINIC | Age: 72
End: 2022-04-12
Payer: MEDICARE

## 2022-04-12 VITALS
BODY MASS INDEX: 25.48 KG/M2 | SYSTOLIC BLOOD PRESSURE: 129 MMHG | HEIGHT: 73 IN | HEART RATE: 63 BPM | WEIGHT: 192.25 LBS | DIASTOLIC BLOOD PRESSURE: 75 MMHG

## 2022-04-12 DIAGNOSIS — N40.1 BENIGN PROSTATIC HYPERPLASIA WITH WEAK URINARY STREAM: Primary | ICD-10-CM

## 2022-04-12 DIAGNOSIS — R39.12 BENIGN PROSTATIC HYPERPLASIA WITH WEAK URINARY STREAM: Primary | ICD-10-CM

## 2022-04-12 PROCEDURE — 99213 OFFICE O/P EST LOW 20 MIN: CPT | Mod: PBBFAC | Performed by: UROLOGY

## 2022-04-12 PROCEDURE — 99213 PR OFFICE/OUTPT VISIT, EST, LEVL III, 20-29 MIN: ICD-10-PCS | Mod: S$PBB,,, | Performed by: UROLOGY

## 2022-04-12 PROCEDURE — 99999 PR PBB SHADOW E&M-EST. PATIENT-LVL III: ICD-10-PCS | Mod: PBBFAC,,, | Performed by: UROLOGY

## 2022-04-12 PROCEDURE — 99213 OFFICE O/P EST LOW 20 MIN: CPT | Mod: S$PBB,,, | Performed by: UROLOGY

## 2022-04-12 PROCEDURE — 99999 PR PBB SHADOW E&M-EST. PATIENT-LVL III: CPT | Mod: PBBFAC,,, | Performed by: UROLOGY

## 2022-04-12 NOTE — PROGRESS NOTES
"Urology - [facility]  Clinic Note    SUBJECTIVE:     Chief Complaint: BPH and ED    History of Present Illness:  Rizwan Demarco Jr. is a 71 y.o. male with PMH of BPH, ED, CAD, HTN who presents to clinic for BPH and ED. He is established to our clinic. Referral from No ref. provider found     He has a history of BPH and ED.  If he misses a dose of uroxatral his urine starts to "slow down" but he is still able to void.   When he is constipated he reports urinary frequency, urgency, weak stream.   He denies dysuria, gross hematuria, hesitancy or intermittency.      He reports severe ED for 20 years.   Tried ICI but did not like it. Does not want to try medications. ED is not bothersome to him.     Anticoagulation:  Yes ASA 81    OBJECTIVE:     Estimated body mass index is 25.36 kg/m² as calculated from the following:    Height as of this encounter: 6' 1" (1.854 m).    Weight as of this encounter: 87.2 kg (192 lb 3.9 oz).    Vital Signs (Most Recent)  Pulse: 63 (04/12/22 0845)  BP: 129/75 (04/12/22 0845)    Physical Exam  Constitutional:       General: He is not in acute distress.     Appearance: He is not diaphoretic.   HENT:      Head: Normocephalic and atraumatic.      Nose: Nose normal.   Eyes:      Conjunctiva/sclera: Conjunctivae normal.   Cardiovascular:      Rate and Rhythm: Normal rate.   Pulmonary:      Effort: Pulmonary effort is normal. No respiratory distress.   Abdominal:      General: There is no distension.   Musculoskeletal:         General: Normal range of motion.      Cervical back: Normal range of motion.   Skin:     General: Skin is dry.   Neurological:      Mental Status: He is alert.   Psychiatric:         Behavior: Behavior normal.         Thought Content: Thought content normal.         Lab Results   Component Value Date    BUN 13 02/08/2022    CREATININE 1.2 02/08/2022    WBC 5.81 02/08/2022    HGB 16.6 02/08/2022    HCT 53.0 02/08/2022     02/08/2022    AST 13 02/08/2022    " ALT 13 02/08/2022    ALKPHOS 46 (L) 02/08/2022    ALBUMIN 3.8 02/08/2022    HGBA1C 5.6 02/08/2022        Lab Results   Component Value Date    PSA 1.2 05/24/2019    PSA 0.97 06/18/2013    PSA 0.85 06/14/2012    PSA 0.88 06/10/2011    PSA 0.89 05/05/2010    PSADIAG 1.9 01/06/2021    PSADIAG 2.9 09/13/2016       ASSESSMENT     1. Benign prostatic hyperplasia with weak urinary stream      PLAN:   Rizwan was seen today for annual exam.    Diagnoses and all orders for this visit:    Benign prostatic hyperplasia with weak urinary stream    - Continue Uroxatral, refilled by PCP.  - Avoid constipation.  - Discussed options for ED. Not bothered by ED.  - RTC 1 year.    Jamie Parker MD     Letter to No ref. provider found

## 2022-04-25 ENCOUNTER — PES CALL (OUTPATIENT)
Dept: ADMINISTRATIVE | Facility: CLINIC | Age: 72
End: 2022-04-25
Payer: MEDICARE

## 2022-05-01 ENCOUNTER — PATIENT MESSAGE (OUTPATIENT)
Dept: FAMILY MEDICINE | Facility: CLINIC | Age: 72
End: 2022-05-01
Payer: MEDICARE

## 2022-05-01 DIAGNOSIS — R39.9 LOWER URINARY TRACT SYMPTOMS (LUTS): ICD-10-CM

## 2022-05-01 DIAGNOSIS — I10 ESSENTIAL HYPERTENSION: ICD-10-CM

## 2022-05-02 RX ORDER — LISINOPRIL 10 MG/1
10 TABLET ORAL DAILY
Qty: 90 TABLET | Refills: 3 | Status: SHIPPED | OUTPATIENT
Start: 2022-05-02 | End: 2023-02-20 | Stop reason: SDUPTHER

## 2022-05-02 RX ORDER — HYDROCHLOROTHIAZIDE 12.5 MG/1
12.5 CAPSULE ORAL DAILY
Qty: 90 CAPSULE | Refills: 3 | Status: SHIPPED | OUTPATIENT
Start: 2022-05-02 | End: 2023-02-20 | Stop reason: SDUPTHER

## 2022-05-02 RX ORDER — ALFUZOSIN HYDROCHLORIDE 10 MG/1
10 TABLET, EXTENDED RELEASE ORAL
Qty: 90 TABLET | Refills: 3 | Status: SHIPPED | OUTPATIENT
Start: 2022-05-02 | End: 2023-02-20 | Stop reason: SDUPTHER

## 2022-05-02 NOTE — TELEPHONE ENCOUNTER
No new care gaps identified.  Powered by [employer]. Reference number: [id number].   5/02/2022 8:05:08 AM CDT

## 2022-05-10 ENCOUNTER — TELEPHONE (OUTPATIENT)
Dept: ADMINISTRATIVE | Facility: CLINIC | Age: 72
End: 2022-05-10
Payer: MEDICARE

## 2022-05-11 ENCOUNTER — OFFICE VISIT (OUTPATIENT)
Dept: FAMILY MEDICINE | Facility: CLINIC | Age: 72
End: 2022-05-11
Payer: MEDICARE

## 2022-05-11 VITALS
RESPIRATION RATE: 18 BRPM | HEIGHT: 73 IN | WEIGHT: 199.75 LBS | SYSTOLIC BLOOD PRESSURE: 120 MMHG | BODY MASS INDEX: 26.47 KG/M2 | TEMPERATURE: 98 F | OXYGEN SATURATION: 97 % | HEART RATE: 73 BPM | DIASTOLIC BLOOD PRESSURE: 66 MMHG

## 2022-05-11 DIAGNOSIS — Z00.00 ENCOUNTER FOR PREVENTIVE HEALTH EXAMINATION: Primary | ICD-10-CM

## 2022-05-11 DIAGNOSIS — I70.0 ATHEROSCLEROSIS OF AORTA: ICD-10-CM

## 2022-05-11 DIAGNOSIS — M06.9 RHEUMATOID ARTHRITIS INVOLVING SHOULDER, UNSPECIFIED LATERALITY, UNSPECIFIED WHETHER RHEUMATOID FACTOR PRESENT: ICD-10-CM

## 2022-05-11 DIAGNOSIS — M35.00 SJOGREN'S SYNDROME, WITH UNSPECIFIED ORGAN INVOLVEMENT: ICD-10-CM

## 2022-05-11 DIAGNOSIS — I25.10 ARTERIOSCLEROSIS OF CORONARY ARTERY: ICD-10-CM

## 2022-05-11 DIAGNOSIS — I10 ESSENTIAL HYPERTENSION: ICD-10-CM

## 2022-05-11 PROCEDURE — G0439 PPPS, SUBSEQ VISIT: HCPCS | Mod: ,,, | Performed by: NURSE PRACTITIONER

## 2022-05-11 PROCEDURE — G0439 PR MEDICARE ANNUAL WELLNESS SUBSEQUENT VISIT: ICD-10-PCS | Mod: ,,, | Performed by: NURSE PRACTITIONER

## 2022-05-11 PROCEDURE — 99999 PR PBB SHADOW E&M-EST. PATIENT-LVL V: CPT | Mod: PBBFAC,,, | Performed by: NURSE PRACTITIONER

## 2022-05-11 PROCEDURE — 99999 PR PBB SHADOW E&M-EST. PATIENT-LVL V: ICD-10-PCS | Mod: PBBFAC,,, | Performed by: NURSE PRACTITIONER

## 2022-05-11 PROCEDURE — 99215 OFFICE O/P EST HI 40 MIN: CPT | Mod: PBBFAC,PN | Performed by: NURSE PRACTITIONER

## 2022-05-11 NOTE — PATIENT INSTRUCTIONS
Counseling and Referral of Other Preventative  (Italic type indicates deductible and co-insurance are waived)    Patient Name: Rizwan Demarco  Today's Date: 5/11/2022    Health Maintenance       Date Due Completion Date    Aspirin/Antiplatelet Therapy Never done ---    High Dose Statin Never done ---    Shingles Vaccine (1 of 2) Never done ---    COVID-19 Vaccine (2 - Mixed Product series) 04/03/2021 3/6/2021    Influenza Vaccine (Season Ended) 09/01/2022 12/18/2019 (Declined)    Override on 12/18/2019: Declined (Declines until next season)    Colorectal Cancer Screening 07/24/2023 7/24/2020    Lipid Panel 02/08/2027 2/8/2022    TETANUS VACCINE 04/21/2030 4/21/2020        No orders of the defined types were placed in this encounter.    The following information is provided to all patients.  This information is to help you find resources for any of the problems found today that may be affecting your health:                Living healthy guide: www.[facility].[place].[facility]      Understanding Diabetes: www.diabetes.org      Eating healthy: www.cdc.gov/healthyweight      CDC home safety checklist: www.cdc.gov/steadi/patient.html      Agency on Aging: www.goea.[place].gov      Alcoholics anonymous (AA): www.aa.org      Physical Activity: www.jimenez.nih.gov/tt4qpzl      Tobacco use: www.quitwithusla.org

## 2022-05-11 NOTE — PROGRESS NOTES
"  Rizwan Demarco presented for a  Medicare AWV and comprehensive Health Risk Assessment today. The following components were reviewed and updated:    · Medical history  · Family History  · Social history  · Allergies and Current Medications  · Health Risk Assessment  · Health Maintenance  · Care Team         ** See Completed Assessments for Annual Wellness Visit within the encounter summary.**         The following assessments were completed:  · Living Situation  · CAGE  · Depression Screening  · Timed Get Up and Go  · Whisper Test  · Cognitive Function Screening  · Nutrition Screening  · ADL Screening  · PAQ Screening        Vitals:    05/11/22 1540   BP: 120/66   BP Location: Left arm   Patient Position: Sitting   BP Method: Medium (Manual)   Pulse: 73   Resp: 18   Temp: 98.2 °F (36.8 °C)   TempSrc: Oral   SpO2: 97%   Weight: 90.6 kg (199 lb 11.8 oz)   Height: 6' 1" (1.854 m)     Body mass index is 26.35 kg/m².  Physical Exam  Vitals reviewed.   Constitutional:       General: He is not in acute distress.     Appearance: Normal appearance. He is not ill-appearing, toxic-appearing or diaphoretic.   Cardiovascular:      Rate and Rhythm: Normal rate and regular rhythm.      Pulses: Normal pulses.      Heart sounds: Normal heart sounds.   Skin:     General: Skin is warm and dry.   Neurological:      General: No focal deficit present.      Mental Status: He is alert and oriented to person, place, and time.   Psychiatric:         Mood and Affect: Mood normal.         Behavior: Behavior normal.                   Diagnoses and health risks identified today and associated recommendations/orders:    1. Encounter for preventive health examination  Provided patient with a 5-10 year written screening schedule and personal prevention plan. Recommendations were developed using the USPSTF age appropriate recommendations. Education, counseling, and referrals were provided as needed. After Visit Summary printed and given to " patient which includes a list of additional screenings\tests needed.    2. Rheumatoid arthritis involving shoulder, unspecified laterality, unspecified whether rheumatoid factor present  Patient reports a history of rheumatoid arthritis but he has not seen Rheumatology in some time.  He would like to be set up with a new rheumatologist.  - Ambulatory referral/consult to Rheumatology; Future    3. Sjogren's syndrome, with unspecified organ involvement  As above  - Ambulatory referral/consult to Rheumatology; Future    4. Atherosclerosis of aorta  No acute concerns has declined statin per PCP in the past    5. Arteriosclerosis of coronary artery  As above    6. Essential hypertension  No acute concerns      Provided Rizwan with a 5-10 year written screening schedule and personal prevention plan. Recommendations were developed using the USPSTF age appropriate recommendations. Education, counseling, and referrals were provided as needed. After Visit Summary printed and given to patient which includes a list of additional screenings\tests needed.    Follow up in about 1 year (around 5/11/2023) for AWV.    Luther Beaver, PAOLO  I offered to discuss advanced care planning, including how to pick a person who would make decisions for you if you were unable to make them for yourself, called a health care power of , and what kind of decisions you might make such as use of life sustaining treatments such as ventilators and tube feeding when faced with a life limiting illness recorded on a living will that they will need to know. (How you want to be cared for as you near the end of your natural life)     X Patient is interested in learning more about how to make advanced directives.  I provided them paperwork and offered to discuss this with them.

## 2022-06-01 ENCOUNTER — TELEPHONE (OUTPATIENT)
Dept: GASTROENTEROLOGY | Facility: CLINIC | Age: 72
End: 2022-06-01
Payer: MEDICARE

## 2022-06-01 NOTE — TELEPHONE ENCOUNTER
Spoke to pt regarding message below and informed pt to start miralax per Dr. Juan louis not see if that helps with constipation. Scheduled pt and appt to be seen by Dr. Roberts on 6/2 at 3:30 pm at [place] location.   Pt verbalize understanding, confirmed appt and thank me.   ----- Message from Taya Villanueva sent at 6/1/2022  9:22 AM CDT -----  Regarding: pt  Same Day Appointment Request    Was an appointment with another provider offered?  N/A   Reason for FST appt.: burning in stomach pt said he has spots on his stomach   Communication Preference: can you please call pt at [phone]  Additional Information: none    SUE

## 2022-06-03 ENCOUNTER — OFFICE VISIT (OUTPATIENT)
Dept: FAMILY MEDICINE | Facility: CLINIC | Age: 72
End: 2022-06-03
Payer: MEDICARE

## 2022-06-03 VITALS
SYSTOLIC BLOOD PRESSURE: 130 MMHG | HEIGHT: 73 IN | BODY MASS INDEX: 26.27 KG/M2 | WEIGHT: 198.19 LBS | HEART RATE: 70 BPM | TEMPERATURE: 98 F | DIASTOLIC BLOOD PRESSURE: 78 MMHG | OXYGEN SATURATION: 98 % | RESPIRATION RATE: 16 BRPM

## 2022-06-03 DIAGNOSIS — K21.00 GASTROESOPHAGEAL REFLUX DISEASE WITH ESOPHAGITIS WITHOUT HEMORRHAGE: ICD-10-CM

## 2022-06-03 DIAGNOSIS — L50.9 HIVES OF UNKNOWN ORIGIN: Primary | ICD-10-CM

## 2022-06-03 PROCEDURE — 96372 THER/PROPH/DIAG INJ SC/IM: CPT | Mod: PBBFAC,PO

## 2022-06-03 PROCEDURE — 99214 OFFICE O/P EST MOD 30 MIN: CPT | Mod: PBBFAC,PO | Performed by: NURSE PRACTITIONER

## 2022-06-03 PROCEDURE — 99999 PR PBB SHADOW E&M-EST. PATIENT-LVL IV: CPT | Mod: PBBFAC,,, | Performed by: NURSE PRACTITIONER

## 2022-06-03 PROCEDURE — 99214 OFFICE O/P EST MOD 30 MIN: CPT | Mod: S$PBB,,, | Performed by: NURSE PRACTITIONER

## 2022-06-03 PROCEDURE — 99214 PR OFFICE/OUTPT VISIT, EST, LEVL IV, 30-39 MIN: ICD-10-PCS | Mod: S$PBB,,, | Performed by: NURSE PRACTITIONER

## 2022-06-03 PROCEDURE — 99999 PR PBB SHADOW E&M-EST. PATIENT-LVL IV: ICD-10-PCS | Mod: PBBFAC,,, | Performed by: NURSE PRACTITIONER

## 2022-06-03 RX ORDER — HYPROMELLOSE 3 MG/G
1 GEL OPHTHALMIC
COMMUNITY

## 2022-06-03 RX ORDER — OMEPRAZOLE 20 MG/1
20 CAPSULE, DELAYED RELEASE ORAL DAILY
Qty: 30 CAPSULE | Refills: 5 | Status: SHIPPED | OUTPATIENT
Start: 2022-06-03 | End: 2023-02-20 | Stop reason: SDUPTHER

## 2022-06-03 RX ADMIN — METHYLPREDNISOLONE SODIUM SUCCINATE 125 MG: 125 INJECTION, POWDER, FOR SOLUTION INTRAMUSCULAR; INTRAVENOUS at 11:06

## 2022-06-03 NOTE — PROGRESS NOTES
Subjective:      Patient ID: Rizwan Demarco Jr. is a 71 y.o. male.  New to me but seen previously in clinic by a fellow provider. Pt presents with rash after working out in the field a few days ago. Also reports gastric burnging with excessive gas and constipation now relieved with colace and mirilax.     Rash  This is a new problem. The current episode started in the past 7 days. The problem has been gradually worsening since onset. The affected locations include the abdomen, torso, chest, back and neck. The rash is characterized by dryness, redness and itchiness. It is unknown if there was an exposure to a precipitant. Pertinent negatives include no anorexia, congestion, cough, diarrhea, eye pain, facial edema, fatigue, fever, joint pain, nail changes, rhinorrhea, shortness of breath, sore throat or vomiting. Past treatments include nothing. The treatment provided no relief. There is no history of allergies, asthma, eczema or varicella.   Abdominal Pain  This is a recurrent problem. The current episode started in the past 7 days. The onset quality is gradual. The problem occurs intermittently. The problem has been rapidly improving. The pain is located in the generalized abdominal region. The pain is moderate. The quality of the pain is aching, cramping, colicky, a sensation of fullness and burning. The abdominal pain does not radiate. Associated symptoms include constipation and flatus. Pertinent negatives include no anorexia, arthralgias, belching, diarrhea, dysuria, fever, frequency, headaches, hematochezia, hematuria, melena, myalgias, nausea, vomiting or weight loss. Nothing aggravates the pain. The pain is relieved by bowel movements. Treatments tried: laxative. The treatment provided significant relief. His past medical history is significant for GERD. There is no history of abdominal surgery, colon cancer, Crohn's disease, gallstones, irritable bowel syndrome, pancreatitis, PUD or ulcerative colitis.  "Patient's medical history does not include kidney stones and UTI.     Review of Systems   Constitutional: Negative for activity change, appetite change, fatigue, fever, unexpected weight change and weight loss.   HENT: Negative for nasal congestion, ear pain, postnasal drip, rhinorrhea, sneezing, sore throat and voice change.    Eyes: Negative for pain and visual disturbance.   Respiratory: Negative for cough, chest tightness and shortness of breath.    Cardiovascular: Negative for chest pain, palpitations and leg swelling.   Gastrointestinal: Positive for abdominal distention, abdominal pain, constipation, flatus and reflux. Negative for anal bleeding, anorexia, blood in stool, diarrhea, hematochezia, melena, nausea, rectal pain, vomiting and fecal incontinence.   Endocrine: Negative.    Genitourinary: Negative for difficulty urinating, dysuria, frequency and hematuria.   Musculoskeletal: Negative for arthralgias, gait problem, joint pain and myalgias.   Integumentary:  Positive for rash. Negative for nail changes.   Allergic/Immunologic: Negative.    Neurological: Negative for speech difficulty and headaches.   Hematological: Negative.    Psychiatric/Behavioral: Negative.    All other systems reviewed and are negative.        Objective:     Vitals:    06/03/22 1024   BP: 130/78   Pulse: 70   Resp: 16   Temp: 98 °F (36.7 °C)   TempSrc: Oral   SpO2: 98%   Weight: 89.9 kg (198 lb 3.1 oz)   Height: 6' 1" (1.854 m)     Physical Exam  Vitals and nursing note reviewed.   Constitutional:       General: He is not in acute distress.     Appearance: Normal appearance. He is well-developed, well-groomed and normal weight. He is not ill-appearing.   HENT:      Head: Normocephalic and atraumatic.      Right Ear: External ear normal.      Left Ear: External ear normal.      Nose: Nose normal.      Mouth/Throat:      Lips: Pink.      Mouth: Mucous membranes are moist.   Eyes:      General: Lids are normal.      " Conjunctiva/sclera: Conjunctivae normal.      Pupils: Pupils are equal, round, and reactive to light.   Neck:      Trachea: Phonation normal.   Cardiovascular:      Rate and Rhythm: Normal rate and regular rhythm.      Pulses: Normal pulses.      Heart sounds: Normal heart sounds.   Pulmonary:      Effort: Pulmonary effort is normal. No accessory muscle usage or respiratory distress.      Breath sounds: Normal breath sounds and air entry. No wheezing or rales.   Abdominal:      General: Abdomen is flat. Bowel sounds are normal. There is no distension.      Palpations: Abdomen is soft.      Tenderness: There is no abdominal tenderness.   Musculoskeletal:      Cervical back: Neck supple.      Right lower leg: No edema.      Left lower leg: No edema.   Skin:     General: Skin is warm and dry.      Findings: Rash present. Rash is urticarial.   Neurological:      General: No focal deficit present.      Mental Status: He is alert and oriented to person, place, and time. Mental status is at baseline.      Cranial Nerves: Cranial nerves are intact.      Sensory: Sensation is intact.      Motor: Motor function is intact.      Coordination: Coordination is intact.      Gait: Gait is intact.   Psychiatric:         Attention and Perception: Attention and perception normal.         Mood and Affect: Mood and affect normal.         Speech: Speech normal.         Behavior: Behavior normal. Behavior is cooperative.         Thought Content: Thought content normal.         Cognition and Memory: Cognition and memory normal.         Judgment: Judgment normal.       Assessment and Plan:     1. Hives of unknown origin  Aveeno baths  Benadryl prn for itching.  Follow up prn  Information on the above diagnosis was given to the patient.  Observe for signs of superimposed infection and systemic symptoms.  Skin moisturizer.  Tylenol or Ibuprofen for pain, fever.  Watch for signs of fever or worsening of the rash.  - methylPREDNISolone sod  suc(PF) injection 125 mg    2. Gastroesophageal reflux disease with esophagitis without hemorrhage  The pathophysiology of reflux is discussed.  Anti-reflux measures such as raising the head of the bed, avoiding tight clothing or belts, avoiding eating late at night and not lying down shortly after mealtime and achieving weight loss are discussed. Avoid ASA, NSAID's, caffeine, peppermints, alcohol and tobacco. OTC H2 blockers and/or antacids are often very helpful for PRN use. However, for persisting chronic or daily symptoms, prescription strength H2 blockers or a trial of PPI's are often used. Further recommendations to her: Rx for PPI is written. She should alert me if there are persistent symptoms, dysphagia, weight loss or GI bleeding.  - omeprazole (PRILOSEC) 20 MG capsule; Take 1 capsule (20 mg total) by mouth once daily.  Dispense: 30 capsule; Refill: 5           ELIZABETH Whitney, FNP-C  Family/Internal Medicine  [facility]

## 2022-06-28 ENCOUNTER — OFFICE VISIT (OUTPATIENT)
Dept: URGENT CARE | Facility: CLINIC | Age: 72
End: 2022-06-28
Payer: MEDICARE

## 2022-06-28 ENCOUNTER — OFFICE VISIT (OUTPATIENT)
Dept: OPTOMETRY | Facility: CLINIC | Age: 72
End: 2022-06-28
Payer: MEDICARE

## 2022-06-28 VITALS
WEIGHT: 198 LBS | BODY MASS INDEX: 26.24 KG/M2 | RESPIRATION RATE: 16 BRPM | OXYGEN SATURATION: 97 % | HEIGHT: 73 IN | HEART RATE: 63 BPM | SYSTOLIC BLOOD PRESSURE: 147 MMHG | DIASTOLIC BLOOD PRESSURE: 87 MMHG | TEMPERATURE: 98 F

## 2022-06-28 DIAGNOSIS — Z13.5 GLAUCOMA SCREENING: ICD-10-CM

## 2022-06-28 DIAGNOSIS — Z87.438 HISTORY OF BPH: ICD-10-CM

## 2022-06-28 DIAGNOSIS — H16.229 KERATOCONJUNCT SICCA, NOT SPECIFIED AS SJOGREN'S, UNSP EYE: Primary | ICD-10-CM

## 2022-06-28 DIAGNOSIS — H25.13 NUCLEAR SCLEROSIS, BILATERAL: ICD-10-CM

## 2022-06-28 DIAGNOSIS — M54.9 BACK PAIN, UNSPECIFIED BACK LOCATION, UNSPECIFIED BACK PAIN LATERALITY, UNSPECIFIED CHRONICITY: ICD-10-CM

## 2022-06-28 DIAGNOSIS — R35.0 URINARY FREQUENCY: Primary | ICD-10-CM

## 2022-06-28 LAB
BILIRUB UR QL STRIP: NEGATIVE
GLUCOSE UR QL STRIP: NEGATIVE
KETONES UR QL STRIP: NEGATIVE
LEUKOCYTE ESTERASE UR QL STRIP: NEGATIVE
PH, POC UA: 7.5
POC BLOOD, URINE: NEGATIVE
POC NITRATES, URINE: NEGATIVE
PROT UR QL STRIP: NEGATIVE
SP GR UR STRIP: 1.01 (ref 1–1.03)
UROBILINOGEN UR STRIP-ACNC: NORMAL (ref 0.3–2.2)

## 2022-06-28 PROCEDURE — 92014 COMPRE OPH EXAM EST PT 1/>: CPT | Mod: S$PBB,,, | Performed by: OPTOMETRIST

## 2022-06-28 PROCEDURE — 99999 PR PBB SHADOW E&M-EST. PATIENT-LVL III: ICD-10-PCS | Mod: PBBFAC,,, | Performed by: OPTOMETRIST

## 2022-06-28 PROCEDURE — 99213 OFFICE O/P EST LOW 20 MIN: CPT | Mod: PBBFAC,PO | Performed by: OPTOMETRIST

## 2022-06-28 PROCEDURE — 81003 URINALYSIS AUTO W/O SCOPE: CPT | Mod: QW,S$GLB,,

## 2022-06-28 PROCEDURE — 99213 PR OFFICE/OUTPT VISIT, EST, LEVL III, 20-29 MIN: ICD-10-PCS | Mod: S$GLB,,,

## 2022-06-28 PROCEDURE — 81003 POCT URINALYSIS, DIPSTICK, AUTOMATED, W/O SCOPE: ICD-10-PCS | Mod: QW,S$GLB,,

## 2022-06-28 PROCEDURE — 99999 PR PBB SHADOW E&M-EST. PATIENT-LVL III: CPT | Mod: PBBFAC,,, | Performed by: OPTOMETRIST

## 2022-06-28 PROCEDURE — 92014 PR EYE EXAM, EST PATIENT,COMPREHESV: ICD-10-PCS | Mod: S$PBB,,, | Performed by: OPTOMETRIST

## 2022-06-28 PROCEDURE — 87086 URINE CULTURE/COLONY COUNT: CPT

## 2022-06-28 PROCEDURE — 99213 OFFICE O/P EST LOW 20 MIN: CPT | Mod: S$GLB,,,

## 2022-06-28 NOTE — PATIENT INSTRUCTIONS
PLEASE READ YOUR DISCHARGE INSTRUCTIONS ENTIRELY AS IT CONTAINS IMPORTANT INFORMATION.          Drink plenty of fluids, wipe front to back, take showers not baths, no scented soaps, wear breathable cotton underwear, urinate after sexual intercourse.     IF A URINE CULTURE WAS SENT: You will be contacted once it results and appropriate action will be taken if needed.         Please go to the ER for worsening symptoms including fever, worsening flank pain, vomiting, etc.       Please return or see your primary care doctor if you develop new or worsening symptoms.     Please arrange follow up with your primary medical clinic as soon as possible. You must understand that you've received an Urgent Care treatment only and that you may be released before all of your medical problems are known or treated. You, the patient, will arrange for follow up as instructed. If your symptoms worsen or fail to improve you should go to the Emergency Room.    WE CANNOT RULE OUT ALL POSSIBLE CAUSES OF YOUR SYMPTOMS IN THE URGENT CARE SETTING PLEASE GO TO THE ER IF YOU FEELS YOUR CONDITION IS WORSENING OR YOU WOULD LIKE EMERGENT EVALUATION.

## 2022-06-28 NOTE — PROGRESS NOTES
HPI     Annual eye exam  DLS: 02/06/20    GTTS: Systane    Patient is here for annual eye exam  Pt states he have dry eyes. Systane with relief  Pt have flashes and floaters but stable over several years  Pt feels his RX is the same  Pt want to ask  about blue circles around his OU    Last edited by Osman Ordoñez, OD on 6/28/2022  9:38 AM. (History)            Assessment /Plan     For exam results, see Encounter Report.    Keratoconjunct sicca, not specified as Sjogren's, unsp eye  -Fish Oil BID PO  -Systane Gel    Nuclear sclerosis, bilateral  -Educated patient on presence of cataracts at today's exam, monitor at annual dilated fundus exam. 5+ years surgical estimate.    Glaucoma screening  -Monitor with annual eye exam and IOP check      RTC 1 yr

## 2022-06-28 NOTE — PROGRESS NOTES
"Subjective:       Patient ID: Rizwan Demarco Jr. is a 72 y.o. male.    Vitals:  height is 6' 1" (1.854 m) and weight is 89.8 kg (198 lb). His tympanic temperature is 98.1 °F (36.7 °C). His blood pressure is 147/87 (abnormal) and his pulse is 63. His respiration is 16 and oxygen saturation is 97%.     Chief Complaint: Urinary Tract Infection    73 yo male presents to urgent care for evaluation. Patient states his urine had a "foul" odor last week and he is concerned for a UTI. Denies dysuria. Does report some frequency and urgency but states he notes prostate issues when he gets constipated which happened recently. Denies fever, flank pain, nausea/vomiting.     Urinary Tract Infection   This is a new problem. The current episode started 1 to 4 weeks ago. The problem occurs every urination. The problem has been unchanged. The quality of the pain is described as aching. The pain is at a severity of 4/10. The pain is mild. There has been no fever. Associated symptoms include frequency and urgency. Pertinent negatives include no chills, flank pain, nausea or vomiting. Associated symptoms comments: Back pain. He has tried nothing for the symptoms. The treatment provided no relief. There is no history of kidney stones or recurrent UTIs.       Constitution: Negative for chills, fatigue and fever.   HENT: Negative for ear pain, ear discharge, congestion, postnasal drip, sinus pain, sore throat and trouble swallowing.    Neck: Negative for neck pain and neck stiffness.   Cardiovascular: Negative for chest pain.   Eyes: Negative for eye pain.   Respiratory: Negative for cough, sputum production, shortness of breath and wheezing.    Gastrointestinal: Negative for abdominal pain, nausea, vomiting and diarrhea.   Genitourinary: Positive for frequency and urgency. Negative for dysuria, urine decreased and flank pain.   Neurological: Negative for dizziness and headaches.       Objective:      Physical Exam   Constitutional: He " is oriented to person, place, and time. He appears well-developed. No distress.      Comments:Sitting comfortably in exam room, well appearing. NAD  Able to talk in complete sentences without difficulty or pause       HENT:   Head: Normocephalic and atraumatic.   Ears:   Right Ear: External ear normal.   Left Ear: External ear normal.   Nose: Nose normal. No nasal deformity. No epistaxis.   Mouth/Throat: Oropharynx is clear and moist and mucous membranes are normal.   Eyes: Conjunctivae and lids are normal.   Neck: Trachea normal and phonation normal. Neck supple.   Cardiovascular: Normal rate, regular rhythm and normal heart sounds.   Pulmonary/Chest: Effort normal and breath sounds normal.   Abdominal: Normal appearance and bowel sounds are normal. He exhibits no distension. Soft. There is no abdominal tenderness. There is no left CVA tenderness and no right CVA tenderness.   Musculoskeletal: Normal range of motion.         General: Normal range of motion.   Neurological: He is alert and oriented to person, place, and time. He has normal reflexes.   Skin: Skin is warm, dry, intact and not diaphoretic.   Psychiatric: His speech is normal and behavior is normal. Judgment and thought content normal.   Nursing note and vitals reviewed.    Results for orders placed or performed in visit on 06/28/22   POCT Urinalysis, Dipstick, Automated, W/O Scope   Result Value Ref Range    POC Blood, Urine Negative Negative    POC Bilirubin, Urine Negative Negative    POC Urobilinogen, Urine normal 0.3 - 2.2    POC Ketones, Urine Negative Negative    POC Protein, Urine Negative Negative    POC Nitrates, Urine Negative Negative    POC Glucose, Urine Negative Negative    pH, UA 7.5     POC Specific Gravity, Urine 1.010 1.003 - 1.029    POC Leukocytes, Urine Negative Negative           Assessment:       1. Urinary frequency    2. Back pain, unspecified back location, unspecified back pain laterality, unspecified chronicity    3. History  of BPH          Plan:         Discussed with patient that his sx are likely 2/2 BPH rather than UTI. Low suspicion given normal urine. Will send for UCx to ensure no infection. Patient instructed to f/u with urologist if sx persist. ED precautions discussed     Urinary frequency  -     CULTURE, URINE    Back pain, unspecified back location, unspecified back pain laterality, unspecified chronicity  -     POCT Urinalysis, Dipstick, Automated, W/O Scope    History of BPH         Patient Instructions   PLEASE READ YOUR DISCHARGE INSTRUCTIONS ENTIRELY AS IT CONTAINS IMPORTANT INFORMATION.          Drink plenty of fluids, wipe front to back, take showers not baths, no scented soaps, wear breathable cotton underwear, urinate after sexual intercourse.     IF A URINE CULTURE WAS SENT: You will be contacted once it results and appropriate action will be taken if needed.         Please go to the ER for worsening symptoms including fever, worsening flank pain, vomiting, etc.       Please return or see your primary care doctor if you develop new or worsening symptoms.     Please arrange follow up with your primary medical clinic as soon as possible. You must understand that you've received an Urgent Care treatment only and that you may be released before all of your medical problems are known or treated. You, the patient, will arrange for follow up as instructed. If your symptoms worsen or fail to improve you should go to the Emergency Room.    WE CANNOT RULE OUT ALL POSSIBLE CAUSES OF YOUR SYMPTOMS IN THE URGENT CARE SETTING PLEASE GO TO THE ER IF YOU FEELS YOUR CONDITION IS WORSENING OR YOU WOULD LIKE EMERGENT EVALUATION.

## 2022-06-29 LAB — BACTERIA UR CULT: NO GROWTH

## 2022-07-01 ENCOUNTER — TELEPHONE (OUTPATIENT)
Dept: URGENT CARE | Facility: CLINIC | Age: 72
End: 2022-07-01
Payer: MEDICARE

## 2022-07-01 NOTE — TELEPHONE ENCOUNTER
Call back 07/01/22 - urine cx - Spoke with and gave him neg culture results.     ----- Message from Mariia Bustillo PA-C sent at 7/1/2022  8:18 AM CDT -----  Please call the patient about his negative test result(s).

## 2022-07-13 DIAGNOSIS — R06.02 SHORTNESS OF BREATH: ICD-10-CM

## 2022-07-13 RX ORDER — BUDESONIDE 0.25 MG/2ML
0.25 INHALANT ORAL 2 TIMES DAILY
Qty: 90 EACH | Refills: 1 | Status: SHIPPED | OUTPATIENT
Start: 2022-07-13 | End: 2022-12-19 | Stop reason: SDUPTHER

## 2022-07-13 NOTE — TELEPHONE ENCOUNTER
No new care gaps identified.  [facility] Embedded Care Gaps. Reference number: [id number]. 7/13/2022   1:45:21 PM CDT

## 2022-07-13 NOTE — TELEPHONE ENCOUNTER
Last Office Visit Info:   The patient's last visit with Raúl Perkins MD was on 2/7/2022.    The patient's last visit in current department was on 6/3/2022.        Last CBC Results:   Lab Results   Component Value Date    WBC 5.81 02/08/2022    HGB 16.6 02/08/2022    HCT 53.0 02/08/2022     02/08/2022       Last CMP Results  Lab Results   Component Value Date     02/08/2022    K 4.8 02/08/2022     02/08/2022    CO2 29 02/08/2022    BUN 13 02/08/2022    CREATININE 1.2 02/08/2022    CALCIUM 10.2 02/08/2022    ALBUMIN 3.8 02/08/2022    AST 13 02/08/2022    ALT 13 02/08/2022       Last Lipids  Lab Results   Component Value Date    CHOL 238 (H) 02/08/2022    TRIG 76 02/08/2022    HDL 42 02/08/2022    LDLCALC 180.8 (H) 02/08/2022       Last A1C  Lab Results   Component Value Date    HGBA1C 5.6 02/08/2022       Last TSH  Lab Results   Component Value Date    TSH 0.440 05/24/2019             Current Med Refills  Medication List with Changes/Refills   Current Medications    ALFUZOSIN (UROXATRAL) 10 MG TB24    Take 1 tablet (10 mg total) by mouth daily with breakfast.       Start Date: 5/2/2022  End Date: 5/2/2023    ARTIFICIAL TEARS,HYPROMELLOSE,,GENTEAL/SUSTANE, (SYSTANE GEL) 0.3 % GEL    1 drop as needed.       Start Date: --        End Date: --    ASPIRIN (ECOTRIN) 81 MG EC TABLET    Take 1 tablet (81 mg total) by mouth once daily.       Start Date: 5/8/2015  End Date: 6/3/2023    BUDESONIDE (PULMICORT) 0.25 MG/2 ML NEBULIZER SOLUTION    Take 2 mLs (0.25 mg total) by nebulization 2 (two) times daily. Controller       Start Date: 1/6/2022  End Date: 1/6/2023    CINNAMON BARK 500 MG CAPSULE    Take 500 mg by mouth once daily.       Start Date: --        End Date: --    DOCUSATE SODIUM (COLACE) 100 MG CAPSULE    Take 1 capsule (100 mg total) by mouth 2 (two) times daily.       Start Date: 1/29/2022 End Date: --    HYDROCHLOROTHIAZIDE (MICROZIDE) 12.5 MG CAPSULE    Take 1 capsule (12.5 mg total) by mouth  once daily.       Start Date: 5/2/2022  End Date: --    LISINOPRIL 10 MG TABLET    Take 1 tablet (10 mg total) by mouth once daily.       Start Date: 5/2/2022  End Date: --    MULTIVITAMIN (THERAGRAN) PER TABLET    Take by mouth.  Tablet Oral Every day       Start Date: --        End Date: --    OMEGA-3 FATTY ACIDS 1,250 MG CAP    Take by mouth.       Start Date: --        End Date: --    OMEPRAZOLE (PRILOSEC) 20 MG CAPSULE    Take 1 capsule (20 mg total) by mouth once daily.       Start Date: 6/3/2022  End Date: --    PROPYLENE GLYCOL 0.6 % DROP    Apply to eye.       Start Date: --        End Date: --    SODIUM BICARB-SODIUM CHLORIDE PACK    by sinus irrigation route.       Start Date: --        End Date: --

## 2022-07-13 NOTE — TELEPHONE ENCOUNTER
----- Message from Aminta Zheng sent at 7/13/2022 12:37 PM CDT -----  Regarding: Refill request  Type:  RX Refill Request    Who Called: JOANNE BUENO JR. [[id number]]    Refill or New Rx: refill     RX Name and Strength: budesonide (PULMICORT) 0.25 mg/2 mL nebulizer solution     How is the patient currently taking it? (ex. 1XDay) 1xday     Is this a 30 day or 90 day RX: 90 day     Preferred Pharmacy with phone number:[facility] [place], [place] - [facility] [address]    Local or Mail Order:[email or web address]     Ordering Provider:Gregorio Tariq Call Back Number: [phone]     Additional Information:

## 2022-08-09 ENCOUNTER — TELEPHONE (OUTPATIENT)
Dept: OPHTHALMOLOGY | Facility: CLINIC | Age: 72
End: 2022-08-09
Payer: MEDICARE

## 2022-08-10 ENCOUNTER — OFFICE VISIT (OUTPATIENT)
Dept: OPTOMETRY | Facility: CLINIC | Age: 72
End: 2022-08-10
Payer: MEDICARE

## 2022-08-10 DIAGNOSIS — Z77.098 CHEMICAL EXPOSURE OF EYE: Primary | ICD-10-CM

## 2022-08-10 DIAGNOSIS — H04.123 DRY EYE SYNDROME, BILATERAL: ICD-10-CM

## 2022-08-10 PROCEDURE — 99999 PR PBB SHADOW E&M-EST. PATIENT-LVL II: ICD-10-PCS | Mod: PBBFAC,,, | Performed by: OPTOMETRIST

## 2022-08-10 PROCEDURE — 92012 INTRM OPH EXAM EST PATIENT: CPT | Mod: S$PBB,,, | Performed by: OPTOMETRIST

## 2022-08-10 PROCEDURE — 92012 PR EYE EXAM, EST PATIENT,INTERMED: ICD-10-PCS | Mod: S$PBB,,, | Performed by: OPTOMETRIST

## 2022-08-10 PROCEDURE — 99212 OFFICE O/P EST SF 10 MIN: CPT | Mod: PBBFAC,PO | Performed by: OPTOMETRIST

## 2022-08-10 PROCEDURE — 99999 PR PBB SHADOW E&M-EST. PATIENT-LVL II: CPT | Mod: PBBFAC,,, | Performed by: OPTOMETRIST

## 2022-08-10 NOTE — PROGRESS NOTES
Subjective:       Patient ID: Rizwan Demarco Jr. is a 72 y.o. male      Chief Complaint   Patient presents with    Eye Problem     History of Present Illness  HPI     Dls: 6/28/22 Dr. Ordoñez     73 y/o male presents today with c/o x 1 day some of carbarator  splashed in ou. Pt washed out ou with water and otc eye wash. Pt c/o  red ou no fbs no pain no changes in vision.     Eye meds   Systane ou prn     Last edited by Mirna Branch MA on 8/10/2022 10:53 AM. (History)      Assessment/Plan:     1. Chemical exposure of eye  2. Dry eye syndrome, bilateral  Pt flushed out eye and used systane balance. No changes in vision, no eye pain. H/o dry eyes. Continue systane QID OU.     Follow up if symptoms worsen or fail to improve.

## 2022-08-15 ENCOUNTER — OFFICE VISIT (OUTPATIENT)
Dept: RHEUMATOLOGY | Facility: CLINIC | Age: 72
End: 2022-08-15
Payer: MEDICARE

## 2022-08-15 VITALS
HEIGHT: 73 IN | WEIGHT: 196.44 LBS | HEART RATE: 76 BPM | DIASTOLIC BLOOD PRESSURE: 62 MMHG | SYSTOLIC BLOOD PRESSURE: 121 MMHG | OXYGEN SATURATION: 97 % | BODY MASS INDEX: 26.03 KG/M2

## 2022-08-15 DIAGNOSIS — Z71.89 COUNSELING AND COORDINATION OF CARE: ICD-10-CM

## 2022-08-15 DIAGNOSIS — M35.00 SJOGREN'S SYNDROME, WITH UNSPECIFIED ORGAN INVOLVEMENT: ICD-10-CM

## 2022-08-15 DIAGNOSIS — M15.9 PRIMARY OSTEOARTHRITIS INVOLVING MULTIPLE JOINTS: ICD-10-CM

## 2022-08-15 DIAGNOSIS — M06.9 RHEUMATOID ARTHRITIS INVOLVING SHOULDER, UNSPECIFIED LATERALITY, UNSPECIFIED WHETHER RHEUMATOID FACTOR PRESENT: Primary | ICD-10-CM

## 2022-08-15 PROCEDURE — 99999 PR PBB SHADOW E&M-EST. PATIENT-LVL IV: ICD-10-PCS | Mod: PBBFAC,,, | Performed by: INTERNAL MEDICINE

## 2022-08-15 PROCEDURE — 99204 OFFICE O/P NEW MOD 45 MIN: CPT | Mod: S$PBB,,, | Performed by: INTERNAL MEDICINE

## 2022-08-15 PROCEDURE — 99214 OFFICE O/P EST MOD 30 MIN: CPT | Mod: PBBFAC,PN | Performed by: INTERNAL MEDICINE

## 2022-08-15 PROCEDURE — 99999 PR PBB SHADOW E&M-EST. PATIENT-LVL IV: CPT | Mod: PBBFAC,,, | Performed by: INTERNAL MEDICINE

## 2022-08-15 PROCEDURE — 99204 PR OFFICE/OUTPT VISIT, NEW, LEVL IV, 45-59 MIN: ICD-10-PCS | Mod: S$PBB,,, | Performed by: INTERNAL MEDICINE

## 2022-08-15 NOTE — PROGRESS NOTES
RHEUMATOLOGY OUTPATIENT CLINIC NOTE    8/15/2022    Attending Rheumatologist: Adrian Gallegos  Primary Care Provider: Raúl Perkins MD   Physician Requesting Consultation: Luther Beaver, NP  [address] [place] [address]  Chief Complaint/Reason For Consultation:  No chief complaint on file.      Subjective:       HPI  Rizwan Demarco Jr. is a 72 y.o. Black or  male with medical history noted below who presents for evaluation of RA.     Patient last seen Dr. Lugo in 2016, at which point no evidence of active RA.   Patient notes overall feeling well, denies chronic joint pain, occasional back pain if he over exerts himself, otherwise no issues. Does not take anything for pain. He notes overall he is fine, just came as his PCP urged him to see Rheumatology.     Review of Systems   Constitutional: Negative for chills, fatigue, fever and unexpected weight change.   HENT: Negative for mouth sores.    Eyes: Positive for eye dryness. Negative for redness.   Respiratory: Negative for cough and shortness of breath.    Cardiovascular: Negative for chest pain.   Gastrointestinal: Negative for abdominal distention, constipation, diarrhea, nausea, vomiting and reflux.   Musculoskeletal: Negative for arthralgias, back pain, gait problem, joint swelling, leg pain, myalgias, neck pain, neck stiffness and joint deformity.   Integumentary:  Negative for rash.   Neurological: Negative for weakness, numbness, headaches and memory loss.   Hematological: Negative for adenopathy. Does not bruise/bleed easily.   Psychiatric/Behavioral: Negative for confusion, decreased concentration, sleep disturbance and suicidal ideas. The patient is not nervous/anxious.    All other systems reviewed and are negative.       Chronic comorbid conditions affecting medical decision making today:  Past Medical History:   Diagnosis Date    Allergy     Arthritis     Rheumatoid arthritis    Back pain     Blood clotting  tendency     BPH (benign prostatic hypertrophy)     Cervical spondylosis     Colon polyp 2010    adenoma    Coronary artery disease     Depression 5/8/2015    Dry eyes     Dry mouth     GERD (gastroesophageal reflux disease)     Hyperlipidemia     Hypertension     Lumbar spondylosis     Nasal obstruction     Rheumatoid arthritis     Sinusitis     Special screening for malignant neoplasm of colon 6/29/2016    Trouble in sleeping      Past Surgical History:   Procedure Laterality Date    CHALAZION EXCISION      COLONOSCOPY N/A 6/29/2016    Procedure: COLONOSCOPY;  Surgeon: Partha Saucedo MD;  Location: [facility] ENDO;  Service: Endoscopy;  Laterality: N/A;    COLONOSCOPY N/A 7/24/2020    Procedure: COLONOSCOPY;  Surgeon: Ian Martinez MD;  [facility]  Service: Endoscopy;  Laterality: N/A;  4/16/20 - removed from 5/1/20, not called yet due to acute pain issues being addressed today - pg    CORONARY ARTERY BYPASS GRAFT      ESOPHAGOGASTRODUODENOSCOPY N/A 7/24/2020    Procedure: ESOPHAGOGASTRODUODENOSCOPY (EGD);  Surgeon: Ian Martinez MD;  [facility]  Service: Endoscopy;  Laterality: N/A;  4/16/20 - removed from 5/1/20, not called yet due to acute pain issues being addressed today.  4/17/20 - rescheduled 7/24/20 - pg    EXCISION TURBINATE, SUBMUCOUS      KNEE ARTHROSCOPY W/ DEBRIDEMENT      NASAL SEPTUM SURGERY      TONSILLECTOMY       Family History   Problem Relation Age of Onset    Hyperlipidemia Mother     Alzheimer's disease Mother     Stomach cancer Father     Colon cancer Father         from wife--he is not unsure     Cataracts Other     No Known Problems Maternal Aunt     No Known Problems Maternal Uncle     No Known Problems Paternal Aunt     No Known Problems Paternal Uncle     No Known Problems Maternal Grandmother     No Known Problems Maternal Grandfather     No Known Problems Paternal Grandmother     No Known Problems Paternal  Grandfather     Blindness Neg Hx     Cancer Neg Hx     Diabetes Neg Hx     Glaucoma Neg Hx     Rheum arthritis Neg Hx     Psoriasis Neg Hx     Lupus Neg Hx     Amblyopia Neg Hx     Hypertension Neg Hx     Macular degeneration Neg Hx     Retinal detachment Neg Hx     Strabismus Neg Hx     Stroke Neg Hx     Thyroid disease Neg Hx     Esophageal cancer Neg Hx      Social History     Substance and Sexual Activity   Alcohol Use No     Social History     Tobacco Use   Smoking Status Former Smoker    Packs/day: 1.00    Years: 20.00    Pack years: 20.00    Types: Cigarettes    Quit date: 1991    Years since quittin.6   Smokeless Tobacco Never Used   Tobacco Comment    Quit .     Social History     Substance and Sexual Activity   Drug Use No       Current Outpatient Medications:     alfuzosin (UROXATRAL) 10 mg Tb24, Take 1 tablet (10 mg total) by mouth daily with breakfast., Disp: 90 tablet, Rfl: 3    artificial tears,hypromellose,,GENTEAL/SUSTANE, (SYSTANE GEL) 0.3 % Gel, 1 drop as needed., Disp: , Rfl:     aspirin (ECOTRIN) 81 MG EC tablet, Take 1 tablet (81 mg total) by mouth once daily., Disp: , Rfl: 0    budesonide (PULMICORT) 0.25 mg/2 mL nebulizer solution, Take 2 mLs (0.25 mg total) by nebulization 2 (two) times daily. Controller, Disp: 90 each, Rfl: 1    cinnamon bark 500 mg capsule, Take 500 mg by mouth once daily., Disp: , Rfl:     docusate sodium (COLACE) 100 MG capsule, Take 1 capsule (100 mg total) by mouth 2 (two) times daily., Disp: 60 capsule, Rfl: 0    hydroCHLOROthiazide (MICROZIDE) 12.5 mg capsule, Take 1 capsule (12.5 mg total) by mouth once daily., Disp: 90 capsule, Rfl: 3    lisinopriL 10 MG tablet, Take 1 tablet (10 mg total) by mouth once daily., Disp: 90 tablet, Rfl: 3    multivitamin (THERAGRAN) per tablet, Take by mouth.  Tablet Oral Every day, Disp: , Rfl:     omega-3 fatty acids 1,250 mg Cap, Take by mouth., Disp: , Rfl:     omeprazole (PRILOSEC) 20  MG capsule, Take 1 capsule (20 mg total) by mouth once daily., Disp: 30 capsule, Rfl: 5    propylene glycoL 0.6 % Drop, Apply to eye., Disp: , Rfl:     sodium bicarb-sodium chloride Pack, by sinus irrigation route., Disp: , Rfl:      Objective:         Vitals:    08/15/22 1454   BP: 121/62   Pulse: 76     Physical Exam   Musculoskeletal:      Comments: Can make fist, no synovitis  Knee crepitus  Negative ankle/MTP  No tender points        Reviewed old and all outside pertinent medical records available.    All lab results personally reviewed and interpreted by me.  Lab Results   Component Value Date    WBC 5.81 02/08/2022    HGB 16.6 02/08/2022    HCT 53.0 02/08/2022    MCV 96 02/08/2022    MCH 30.0 02/08/2022    MCHC 31.3 (L) 02/08/2022    RDW 12.5 02/08/2022     02/08/2022    MPV 10.5 02/08/2022    PLTEST Adequate 09/29/2011       Lab Results   Component Value Date     02/08/2022    K 4.8 02/08/2022     02/08/2022    CO2 29 02/08/2022    GLU 92 02/08/2022    BUN 13 02/08/2022    CALCIUM 10.2 02/08/2022    PROT 7.3 02/08/2022    ALBUMIN 3.8 02/08/2022    BILITOT 0.7 02/08/2022    AST 13 02/08/2022    ALKPHOS 46 (L) 02/08/2022    ALT 13 02/08/2022       Lab Results   Component Value Date    COLORU Yellow 02/08/2022    APPEARANCEUA Clear 02/08/2022    SPECGRAV 1.015 02/08/2022    PHUR 7.5 06/28/2022    PROTEINUA Negative 02/08/2022    KETONESU Negative 02/08/2022    LEUKOCYTESUR Negative 02/08/2022    NITRITE Negative 02/08/2022    UROBILINOGEN Negative 02/08/2022       Lab Results   Component Value Date    CRP 6.8 05/11/2016       Lab Results   Component Value Date    SEDRATE 1 01/21/2021       Lab Results   Component Value Date    SEDRATE 1 01/21/2021       No components found for: 25OHVITDTOT, 99TIHDPG0, 40ZZTHMU2, METHODNOTE    Lab Results   Component Value Date    URICACID 7.8 (H) 05/24/2019       No components found for: TSPOTTB        Imaging:  All imaging reviewed and independently  interpreted by me.         ASSESSMENT / PLAN:     Rizwan Demarco Jr. is a 72 y.o. Black or  male with:      1. Rheumatoid arthritis involving shoulder, unspecified laterality, unspecified whether rheumatoid factor present  - patient with Hx of RA  - prior HCQ use, stopped due to skin hyperpigmentation  - no evidence of active disease  - reassurance    2. Sjogren's syndrome, with unspecified organ involvement  - patient with 2/2 Sjogrens  - following with EYE  - artificial tear use encouraged  - reassurance     3. Primary osteoarthritis involving multiple joints  - patient does have OA  - though well controlled  -reassurance and exercise     4. Other specified counseling  - over 10 minutes spent regarding below topics:  - Immunization counseling done.  - Weight loss counseling done.  - Nutrition and exercise counseling.  - Limitation of alcohol consumption.  - Regular exercise:  Aerobic and resistance.  - Medication counseling provided.      Follow up in about 6 months (around 2/15/2023).    Method of contact with patient concerns: [facility]    Disclaimer:  This note is prepared using voice recognition software and as such is likely to have errors and has not been proof read. Please contact me for questions.     Time spent: 45 minutes in face to face discussion concerning diagnosis, prognosis, review of lab and test results, benefits of treatment as well as management of disease, counseling of patient and coordination of care between various health care providers.  Greater than half the time spent was used for coordination of care and counseling of patient.    Adrian Gallegos M.D.  Rheumatology Department   [facility]

## 2022-12-12 ENCOUNTER — TELEPHONE (OUTPATIENT)
Dept: RHEUMATOLOGY | Facility: CLINIC | Age: 72
End: 2022-12-12
Payer: MEDICARE

## 2022-12-12 NOTE — TELEPHONE ENCOUNTER
----- Message from Alice Iqbal sent at 12/12/2022  8:48 AM CST -----  Type:  Sooner Apoointment Request    Caller is requesting a sooner appointment.  Caller declined first available appointment listed below.  Caller will not accept being placed on the waitlist and is requesting a message be sent to doctor.  Name of Caller:pt  When is the first available appointment?Mar 2023  Symptoms:f/u  Would the patient rather a call back or a response via [employer]? call  Best Call Back Number:[phone]  Additional Information: pt had to cancel last appt because he had to go out of town

## 2022-12-14 DIAGNOSIS — R06.02 SHORTNESS OF BREATH: ICD-10-CM

## 2022-12-14 RX ORDER — BUDESONIDE 0.25 MG/2ML
0.25 INHALANT ORAL 2 TIMES DAILY
Qty: 90 EACH | Refills: 1 | Status: CANCELLED | OUTPATIENT
Start: 2022-12-14 | End: 2023-12-14

## 2022-12-14 NOTE — TELEPHONE ENCOUNTER
Last Office Visit Info:   The patient's last visit with Raúl Perkins MD was on 2/7/2022.    The patient's last visit in current department was on Visit date not found.        Last CBC Results:   Lab Results   Component Value Date    WBC 5.81 02/08/2022    HGB 16.6 02/08/2022    HCT 53.0 02/08/2022     02/08/2022       Last CMP Results  Lab Results   Component Value Date     02/08/2022    K 4.8 02/08/2022     02/08/2022    CO2 29 02/08/2022    BUN 13 02/08/2022    CREATININE 1.2 02/08/2022    CALCIUM 10.2 02/08/2022    ALBUMIN 3.8 02/08/2022    AST 13 02/08/2022    ALT 13 02/08/2022       Last Lipids  Lab Results   Component Value Date    CHOL 238 (H) 02/08/2022    TRIG 76 02/08/2022    HDL 42 02/08/2022    LDLCALC 180.8 (H) 02/08/2022       Last A1C  Lab Results   Component Value Date    HGBA1C 5.6 02/08/2022       Last TSH  Lab Results   Component Value Date    TSH 0.440 05/24/2019             Current Med Refills  Medication List with Changes/Refills   Current Medications    ALFUZOSIN (UROXATRAL) 10 MG TB24    Take 1 tablet (10 mg total) by mouth daily with breakfast.       Start Date: 5/2/2022  End Date: 5/2/2023    ARTIFICIAL TEARS,HYPROMELLOSE,,GENTEAL/SUSTANE, (SYSTANE GEL) 0.3 % GEL    1 drop as needed.       Start Date: --        End Date: --    ASPIRIN (ECOTRIN) 81 MG EC TABLET    Take 1 tablet (81 mg total) by mouth once daily.       Start Date: 5/8/2015  End Date: 6/3/2023    BUDESONIDE (PULMICORT) 0.25 MG/2 ML NEBULIZER SOLUTION    Take 2 mLs (0.25 mg total) by nebulization 2 (two) times daily. Controller       Start Date: 7/13/2022 End Date: 7/13/2023    CINNAMON BARK 500 MG CAPSULE    Take 500 mg by mouth once daily.       Start Date: --        End Date: --    DOCUSATE SODIUM (COLACE) 100 MG CAPSULE    Take 1 capsule (100 mg total) by mouth 2 (two) times daily.       Start Date: 1/29/2022 End Date: --    HYDROCHLOROTHIAZIDE (MICROZIDE) 12.5 MG CAPSULE    Take 1 capsule (12.5 mg  total) by mouth once daily.       Start Date: 5/2/2022  End Date: --    LISINOPRIL 10 MG TABLET    Take 1 tablet (10 mg total) by mouth once daily.       Start Date: 5/2/2022  End Date: --    MULTIVITAMIN (THERAGRAN) PER TABLET    Take by mouth.  Tablet Oral Every day       Start Date: --        End Date: --    OMEGA-3 FATTY ACIDS 1,250 MG CAP    Take by mouth.       Start Date: --        End Date: --    OMEPRAZOLE (PRILOSEC) 20 MG CAPSULE    Take 1 capsule (20 mg total) by mouth once daily.       Start Date: 6/3/2022  End Date: --    PROPYLENE GLYCOL 0.6 % DROP    Apply to eye.       Start Date: --        End Date: --    SODIUM BICARB-SODIUM CHLORIDE PACK    by sinus irrigation route.       Start Date: --        End Date: --

## 2022-12-14 NOTE — TELEPHONE ENCOUNTER
Care Due:                  Date            Visit Type   Department     Provider  --------------------------------------------------------------------------------                                [facility] FAMILY                              PRIMARY      MEDICINE/INTERN  Last Visit: 02-      CARE ([facility])   MICHAEL Perkins                              [facility]                              PRIMARY      MEDICINE/INTERN  Next Visit: 01-      CARE ([facility])   AL BARAK Perkins                                                            Last  Test          Frequency    Reason                     Performed    Due Date  --------------------------------------------------------------------------------    CMP.........  12 months..  hydroCHLOROthiazide,       02- 02-                             lisinopriL...............    [employer] Embedded Care Gaps. Reference number: [id number]. 12/14/2022   9:19:37 AM CST

## 2022-12-19 ENCOUNTER — OFFICE VISIT (OUTPATIENT)
Dept: FAMILY MEDICINE | Facility: CLINIC | Age: 72
End: 2022-12-19
Payer: MEDICARE

## 2022-12-19 VITALS
DIASTOLIC BLOOD PRESSURE: 66 MMHG | OXYGEN SATURATION: 97 % | HEART RATE: 90 BPM | SYSTOLIC BLOOD PRESSURE: 122 MMHG | HEIGHT: 73 IN | TEMPERATURE: 98 F | WEIGHT: 208.75 LBS | BODY MASS INDEX: 27.67 KG/M2

## 2022-12-19 DIAGNOSIS — R79.9 ABNORMAL FINDING OF BLOOD CHEMISTRY: ICD-10-CM

## 2022-12-19 DIAGNOSIS — R39.12 BENIGN PROSTATIC HYPERPLASIA WITH WEAK URINARY STREAM: ICD-10-CM

## 2022-12-19 DIAGNOSIS — R06.02 SHORTNESS OF BREATH: ICD-10-CM

## 2022-12-19 DIAGNOSIS — N40.1 BENIGN PROSTATIC HYPERPLASIA WITH WEAK URINARY STREAM: ICD-10-CM

## 2022-12-19 DIAGNOSIS — I10 ESSENTIAL HYPERTENSION: ICD-10-CM

## 2022-12-19 DIAGNOSIS — M35.00 SJOGREN'S SYNDROME, WITH UNSPECIFIED ORGAN INVOLVEMENT: Primary | ICD-10-CM

## 2022-12-19 PROCEDURE — 99999 PR PBB SHADOW E&M-EST. PATIENT-LVL III: ICD-10-PCS | Mod: PBBFAC,,, | Performed by: FAMILY MEDICINE

## 2022-12-19 PROCEDURE — 99214 PR OFFICE/OUTPT VISIT, EST, LEVL IV, 30-39 MIN: ICD-10-PCS | Mod: S$PBB,,, | Performed by: FAMILY MEDICINE

## 2022-12-19 PROCEDURE — 99214 OFFICE O/P EST MOD 30 MIN: CPT | Mod: S$PBB,,, | Performed by: FAMILY MEDICINE

## 2022-12-19 PROCEDURE — 99999 PR PBB SHADOW E&M-EST. PATIENT-LVL III: CPT | Mod: PBBFAC,,, | Performed by: FAMILY MEDICINE

## 2022-12-19 PROCEDURE — 99213 OFFICE O/P EST LOW 20 MIN: CPT | Mod: PBBFAC,PO | Performed by: FAMILY MEDICINE

## 2022-12-19 RX ORDER — BUDESONIDE 0.25 MG/2ML
0.25 INHALANT ORAL 2 TIMES DAILY
Qty: 90 EACH | Refills: 5 | Status: SHIPPED | OUTPATIENT
Start: 2022-12-19 | End: 2023-08-08 | Stop reason: SDUPTHER

## 2022-12-19 NOTE — PROGRESS NOTES
"HISTORY OF PRESENT ILLNESS:  Rizwan Demarco Jr. is a 72 y.o. male who presents to the clinic today for Follow-up  .     Stable and doing well.  Medication side effects not noted.  Overall doing better.        Patient Active Problem List   Diagnosis    Sjogren's disease    GERD (gastroesophageal reflux disease)    Arteriosclerosis of coronary artery    Hyperlipidemia    Cervical spondylosis    Degeneration of intervertebral disc    Rheumatoid arthritis    Benign prostatic hyperplasia with weak urinary stream    Perennial allergic rhinitis    Essential hypertension    High frequency hearing loss    Nasal obstruction    Dysfunctions associated with sleep stages or arousal from sleep    Primary insomnia    Wears dentures    Atherosclerosis of aorta    Other personal history presenting hazards to health           CARE TEAM:  Patient Care Team:  Raúl Perkins MD as PCP - General (Family Medicine)  [place] KEESHA Nascimento III, MD as Consulting Physician (Otolaryngology)  Kenzie Sandoval MA (Inactive) as Care Coordinator  Ian Martinez MD as Consulting Physician (Gastroenterology)  Angeles Harding OD as Consulting Physician (Optometry)         ROS        PHYSICAL EXAM:  /66   Pulse 90   Temp 98.2 °F (36.8 °C) (Oral)   Ht 6' 1" (1.854 m)   Wt 94.7 kg (208 lb 12.4 oz)   SpO2 97%   BMI 27.54 kg/m²   Wt Readings from Last 5 Encounters:   12/19/22 94.7 kg (208 lb 12.4 oz)   08/15/22 89.1 kg (196 lb 6.9 oz)   06/28/22 89.8 kg (198 lb)   06/03/22 89.9 kg (198 lb 3.1 oz)   05/11/22 90.6 kg (199 lb 11.8 oz)     BP Readings from Last 5 Encounters:   12/19/22 122/66   08/15/22 121/62   06/28/22 (!) 147/87   06/03/22 130/78   05/11/22 120/66           He appears well, in no apparent distress.  Alert and oriented times three, pleasant and cooperative. Vital signs are as documented in vital signs section.  S1 and S2 normal, no murmurs, clicks, gallops or rubs. Regular rate and rhythm. Chest is clear; no wheezes or rales. No edema " or JVD.  Chronic mucus membrane changes.          Medication List with Changes/Refills   Current Medications    ALFUZOSIN (UROXATRAL) 10 MG TB24    Take 1 tablet (10 mg total) by mouth daily with breakfast.    ARTIFICIAL TEARS,HYPROMELLOSE,,GENTEAL/SUSTANE, (SYSTANE GEL) 0.3 % GEL    1 drop as needed.    ASPIRIN (ECOTRIN) 81 MG EC TABLET    Take 1 tablet (81 mg total) by mouth once daily.    CINNAMON BARK 500 MG CAPSULE    Take 500 mg by mouth once daily.    DOCUSATE SODIUM (COLACE) 100 MG CAPSULE    Take 1 capsule (100 mg total) by mouth 2 (two) times daily.    HYDROCHLOROTHIAZIDE (MICROZIDE) 12.5 MG CAPSULE    Take 1 capsule (12.5 mg total) by mouth once daily.    LISINOPRIL 10 MG TABLET    Take 1 tablet (10 mg total) by mouth once daily.    MULTIVITAMIN (THERAGRAN) PER TABLET    Take by mouth.  Tablet Oral Every day    OMEGA-3 FATTY ACIDS 1,250 MG CAP    Take by mouth.    OMEPRAZOLE (PRILOSEC) 20 MG CAPSULE    Take 1 capsule (20 mg total) by mouth once daily.    PROPYLENE GLYCOL 0.6 % DROP    Apply to eye.    SODIUM BICARB-SODIUM CHLORIDE PACK    by sinus irrigation route.   Changed and/or Refilled Medications    Modified Medication Previous Medication    BUDESONIDE (PULMICORT) 0.25 MG/2 ML NEBULIZER SOLUTION budesonide (PULMICORT) 0.25 mg/2 mL nebulizer solution       Take 2 mLs (0.25 mg total) by nebulization 2 (two) times daily. Controller    Take 2 mLs (0.25 mg total) by nebulization 2 (two) times daily. Controller       ASSESSMENT AND PLAN:    Problem List Items Addressed This Visit       Sjogren's disease - Primary    Relevant Orders    CBC Auto Differential    Comprehensive Metabolic Panel    Urinalysis, Reflex to Urine Culture Urine, Supra Pubic    Hemoglobin A1C    Lipid Panel    TSH    Benign prostatic hyperplasia with weak urinary stream    Relevant Orders    CBC Auto Differential    Comprehensive Metabolic Panel    Urinalysis, Reflex to Urine Culture Urine, Supra Pubic    Hemoglobin A1C    Lipid Panel     TSH    Essential hypertension    Relevant Orders    CBC Auto Differential    Comprehensive Metabolic Panel    Urinalysis, Reflex to Urine Culture Urine, Supra Pubic    Hemoglobin A1C    Lipid Panel    TSH     Other Visit Diagnoses       Shortness of breath        Relevant Medications    budesonide (PULMICORT) 0.25 mg/2 mL nebulizer solution    Other Relevant Orders    CBC Auto Differential    Comprehensive Metabolic Panel    Urinalysis, Reflex to Urine Culture Urine, Supra Pubic    Hemoglobin A1C    Lipid Panel    TSH    Abnormal finding of blood chemistry        Relevant Orders    CBC Auto Differential    Comprehensive Metabolic Panel    Urinalysis, Reflex to Urine Culture Urine, Supra Pubic    Hemoglobin A1C    Lipid Panel    TSH            Future Appointments   Date Time Provider Department Center   2/13/2023  8:00 AM LAB, [facility] DRAW STATION [facility] LAB [facility]   2/20/2023  9:00 AM [facility]l [facility]   3/8/2023 12:00 PM Adrian Gallegos MD [facility]       Follow up in about 3 months (around 3/19/2023) for assess treatment plan. or sooner as needed.

## 2023-01-13 ENCOUNTER — PATIENT MESSAGE (OUTPATIENT)
Dept: OPTOMETRY | Facility: CLINIC | Age: 73
End: 2023-01-13
Payer: MEDICARE

## 2023-01-16 ENCOUNTER — HOSPITAL ENCOUNTER (EMERGENCY)
Facility: HOSPITAL | Age: 73
Discharge: HOME | End: 2023-01-16
Attending: EMERGENCY MEDICINE
Payer: MEDICARE

## 2023-01-16 VITALS
WEIGHT: 200 LBS | DIASTOLIC BLOOD PRESSURE: 65 MMHG | HEART RATE: 52 BPM | BODY MASS INDEX: 26.51 KG/M2 | HEIGHT: 73 IN | RESPIRATION RATE: 22 BRPM | SYSTOLIC BLOOD PRESSURE: 144 MMHG | OXYGEN SATURATION: 96 % | TEMPERATURE: 98 F

## 2023-01-16 DIAGNOSIS — J01.10 ACUTE NON-RECURRENT FRONTAL SINUSITIS: Primary | ICD-10-CM

## 2023-01-16 DIAGNOSIS — I49.9 IRREGULAR HEART BEAT: ICD-10-CM

## 2023-01-16 LAB
ALBUMIN SERPL-MCNC: 4 G/DL (ref 3.3–5.5)
ALP SERPL-CCNC: 55 U/L (ref 42–141)
BILIRUB SERPL-MCNC: 1 MG/DL (ref 0.2–1.6)
BUN SERPL-MCNC: 14 MG/DL (ref 7–22)
CALCIUM SERPL-MCNC: 10.3 MG/DL (ref 8–10.3)
CHLORIDE SERPL-SCNC: 104 MMOL/L (ref 98–108)
CREAT SERPL-MCNC: 1 MG/DL (ref 0.6–1.2)
CTP QC/QA: YES
GLUCOSE SERPL-MCNC: 110 MG/DL (ref 73–118)
INFLUENZA A ANTIGEN, POC: NEGATIVE
INFLUENZA B ANTIGEN, POC: NEGATIVE
POC ALT (SGPT): 16 U/L (ref 10–47)
POC AST (SGOT): 25 U/L (ref 11–38)
POC CARDIAC TROPONIN I: 0.01 NG/ML (ref 0–0.08)
POC TCO2: 32 MMOL/L (ref 18–33)
POTASSIUM BLD-SCNC: 4.5 MMOL/L (ref 3.6–5.1)
PROTEIN, POC: 7.1 G/DL (ref 6.4–8.1)
SAMPLE: NORMAL
SARS-COV-2 RDRP RESP QL NAA+PROBE: NEGATIVE
SODIUM BLD-SCNC: 143 MMOL/L (ref 128–145)

## 2023-01-16 PROCEDURE — 87804 INFLUENZA ASSAY W/OPTIC: CPT | Mod: ER

## 2023-01-16 PROCEDURE — 99284 EMERGENCY DEPT VISIT MOD MDM: CPT | Mod: ER

## 2023-01-16 PROCEDURE — 93010 EKG 12-LEAD: ICD-10-PCS | Mod: ,,, | Performed by: INTERNAL MEDICINE

## 2023-01-16 PROCEDURE — 87635 SARS-COV-2 COVID-19 AMP PRB: CPT | Mod: ER | Performed by: EMERGENCY MEDICINE

## 2023-01-16 PROCEDURE — 93010 ELECTROCARDIOGRAM REPORT: CPT | Mod: ,,, | Performed by: INTERNAL MEDICINE

## 2023-01-16 PROCEDURE — 93005 ELECTROCARDIOGRAM TRACING: CPT | Mod: ER

## 2023-01-16 RX ORDER — AMOXICILLIN AND CLAVULANATE POTASSIUM 875; 125 MG/1; MG/1
1 TABLET, FILM COATED ORAL 2 TIMES DAILY
Qty: 14 TABLET | Refills: 0 | Status: SHIPPED | OUTPATIENT
Start: 2023-01-16 | End: 2023-01-23

## 2023-01-16 RX ORDER — ERYTHROMYCIN 5 MG/G
OINTMENT OPHTHALMIC
Qty: 1 G | Refills: 0 | Status: SHIPPED | OUTPATIENT
Start: 2023-01-16 | End: 2023-04-13

## 2023-01-16 NOTE — DISCHARGE INSTRUCTIONS
Thank you for coming to our Emergency Department today. It is important to remember that some problems are difficult to diagnose and may not be found during your Emergency Department visit. Be sure to follow up with your primary care doctor and review all labs/imaging/tests that were performed during this visit with them. Some labs/tests may be outside of the normal range and require non-emergent follow-up and further investigation to help diagnose/exclude/prevent complications or other medical conditions.    PLEASE CALL CARDIOLOGY TODAY FOR A FOLLOW UP APPOINTMENT FOR YOUR IRREGULAR HEART RHYTHM. IF YOU DEVELOP ANY WEAKNESS, CHEST PAIN, SHORTNESS OF BREATH, PLEASE IMMEDIATELY GO TO THE EMERGENCY DEPARTMENT FOR AN EVALUATION.     If you do not have a primary care doctor, you may contact the one listed on your discharge paperwork or you may also call the [facility] Appointment Desk at [phone] to schedule an appointment and establish care with one. It is important to your health that you have a primary care doctor.    Please take all medications as directed. All medications may potentially have side-effects and it is impossible to predict which medications may give you side-effects or what side-effects (if any) they will give you.. If you feel that you are having a negative effect or side-effect of any medication you should immediately stop taking them and seek medical attention. If you feel that you are having a life-threatening reaction call 201.    Return to the ER with any questions/concerns, new/concerning symptoms, worsening or failure to improve.     Do not drive, swim, climb to height, take a bath or make any important decisions for 24 hours if you have received any pain medications, sedatives or mood altering drugs during your ER visit.

## 2023-01-16 NOTE — ED PROVIDER NOTES
"Encounter Date: 1/16/2023    SCRIBE #1 NOTE: I, Naheed Boone, am scribing for, and in the presence of,  Marisela Lopez MD. I have scribed the following portions of the note - Other sections scribed: HPI, ROS, PE.     History     Chief Complaint   Patient presents with    Nasal Congestion     Patient reports eye itching and/or irritation.  Reports nasal congestion.  Onset of symptoms January 1st.     URI     72 y.o. male with PMHx of CAD, HLD, HTN, Sjogren's syndrome, and others who presents to the ED for chief complaint of eye redness/ irritation and sinus problems since 1/2. Patient notes that he has been having his "sinus act up" for 2 weeks. He has been using a Neti pot and eye drops. Patient additionally reports rhinorrhea and congestion. Denies any fever, chest pain, SOB, leg pain, or eye drainage/ discharge. He states he follows with Dr. Perkins and has an appointment next month with him.     The history is provided by the patient. No  was used.   Review of patient's allergies indicates:   Allergen Reactions    Aspirin Nausea And Vomiting    Astelin [azelastine] Other (See Comments)     Dry mouth    Flomax [tamsulosin] Other (See Comments)     Nosebleed     Past Medical History:   Diagnosis Date    Allergy     Arthritis     Rheumatoid arthritis    Back pain     Blood clotting tendency     BPH (benign prostatic hypertrophy)     Cervical spondylosis     Colon polyp 2010    adenoma    Coronary artery disease     Depression 5/8/2015    Dry eyes     Dry mouth     GERD (gastroesophageal reflux disease)     Hyperlipidemia     Hypertension     Lumbar spondylosis     Nasal obstruction     Rheumatoid arthritis     Sinusitis     Special screening for malignant neoplasm of colon 6/29/2016    Trouble in sleeping      Past Surgical History:   Procedure Laterality Date    CHALAZION EXCISION      COLONOSCOPY N/A 6/29/2016    Procedure: COLONOSCOPY;  Surgeon: Partha Saucedo MD;  Location: [facility];  Service: " Endoscopy;  Laterality: N/A;    COLONOSCOPY N/A 2020    Procedure: COLONOSCOPY;  Surgeon: Ian Martinez MD;  [facility]  Service: Endoscopy;  Laterality: N/A;  20 - removed from 20, not called yet due to acute pain issues being addressed today - pg    CORONARY ARTERY BYPASS GRAFT      ESOPHAGOGASTRODUODENOSCOPY N/A 2020    Procedure: ESOPHAGOGASTRODUODENOSCOPY (EGD);  Surgeon: Ian Martinez MD;  [facility]  Service: Endoscopy;  Laterality: N/A;  20 - removed from 20, not called yet due to acute pain issues being addressed today.  20 - rescheduled 20 - pg    EXCISION TURBINATE, SUBMUCOUS      KNEE ARTHROSCOPY W/ DEBRIDEMENT      NASAL SEPTUM SURGERY      TONSILLECTOMY       Family History   Problem Relation Age of Onset    Hyperlipidemia Mother     Alzheimer's disease Mother     Stomach cancer Father     Colon cancer Father         from wife--he is not unsure     Cataracts Other     No Known Problems Maternal Aunt     No Known Problems Maternal Uncle     No Known Problems Paternal Aunt     No Known Problems Paternal Uncle     No Known Problems Maternal Grandmother     No Known Problems Maternal Grandfather     No Known Problems Paternal Grandmother     No Known Problems Paternal Grandfather     Blindness Neg Hx     Cancer Neg Hx     Diabetes Neg Hx     Glaucoma Neg Hx     Rheum arthritis Neg Hx     Psoriasis Neg Hx     Lupus Neg Hx     Amblyopia Neg Hx     Hypertension Neg Hx     Macular degeneration Neg Hx     Retinal detachment Neg Hx     Strabismus Neg Hx     Stroke Neg Hx     Thyroid disease Neg Hx     Esophageal cancer Neg Hx      Social History     Tobacco Use    Smoking status: Former     Packs/day: 1.00     Years: 20.00     Pack years: 20.00     Types: Cigarettes     Quit date: 1991     Years since quittin.0    Smokeless tobacco: Never    Tobacco comments:     Quit .   Substance Use Topics    Alcohol use: No    Drug use: No      Review of Systems   Constitutional:  Negative for fatigue and fever.   HENT:  Positive for congestion and rhinorrhea. Negative for facial swelling.         (+) Sinus problem   Eyes:  Positive for redness. Negative for discharge.        (+) Eye irritation   Respiratory:  Negative for shortness of breath.    Cardiovascular:  Negative for chest pain.   Gastrointestinal:  Negative for abdominal pain, nausea and vomiting.   Genitourinary:  Negative for dysuria.   Musculoskeletal:  Negative for back pain and myalgias (BLE).   Skin:  Negative for color change.   Neurological:  Negative for headaches.   Hematological:  Does not bruise/bleed easily.   Psychiatric/Behavioral:  Negative for behavioral problems.      Physical Exam     Initial Vitals [01/16/23 0847]   BP Pulse Resp Temp SpO2   (!) 170/89 (!) 46 18 98.1 °F (36.7 °C) 98 %      MAP       --         Physical Exam    Nursing note and vitals reviewed.  Constitutional: He appears well-developed.   HENT:   Head: Normocephalic.   No tenderness to percussion of frontal or maxillary sinuses.    Eyes: EOM are normal. Right conjunctiva is injected. Left conjunctiva is injected.   No drainage bilaterally.   Neck: Neck supple.   Cardiovascular:  Normal heart sounds.   Bradycardia present.         Pulmonary/Chest: Breath sounds normal. No respiratory distress. He has no wheezes.   Abdominal: Abdomen is soft. He exhibits no distension. There is no abdominal tenderness.   Musculoskeletal:         General: Normal range of motion.      Cervical back: Neck supple.     Neurological: He is alert.   Skin: Skin is warm and dry.   Psychiatric: He has a normal mood and affect.       ED Course   Procedures  Labs Reviewed   TROPONIN ISTAT   SARS-COV-2 RDRP GENE    Narrative:     This test utilizes isothermal nucleic acid amplification technology to detect the SARS-CoV-2 RdRp nucleic acid segment. The analytical sensitivity (limit of detection) is 500 copies/swab.     A POSITIVE result  is indicative of the presence of SARS-CoV-2 RNA; clinical correlation with patient history and other diagnostic information is necessary to determine patient infection status.    A NEGATIVE result means that SARS-CoV-2 nucleic acids are not present above the limit of detection. A NEGATIVE result should be treated as presumptive. It does not rule out the possibility of COVID-19 and should not be the sole basis for treatment decisions. If COVID-19 is strongly suspected based on clinical and exposure history, re-testing using an alternate molecular assay should be considered.     This test is only for use under the Food and Drug Administration s Emergency Use Authorization (EUA).     Commercial kits are provided by [employer]. Performance characteristics of the EUA have been independently verified by [facility] Department of Pathology and Laboratory Medicine.   _________________________________________________________________   The authorized Fact Sheet for Healthcare Providers and the authorized Fact Sheet for Patients of the ID NOW COVID-19 are available on the FDA website:    https://www.fda.gov/media/633553/download      https://www.fda.gov/media/374451/download      POCT CBC   POCT INFLUENZA A/B MOLECULAR   POCT CMP   POCT TROPONIN   POCT RAPID INFLUENZA A/B   POCT CMP       EKG Readings: (Independently Interpreted)   Initial Reading: No STEMI. Rhythm: Sinus Bradycardia. Heart Rate: 52. Conduction: 2nd Degree AV Block - Type I.     Imaging Results    None          Medications - No data to display  Medical Decision Making:   History:   Old Medical Records: I decided to obtain old medical records.  Initial Assessment:   This is an emergent evaluation of a 72-year-old man who presented to the emergency department today for evaluation of sinus complaints and red eyes.  Differential Diagnosis:   Differential diagnoses include exacerbation of his Sjogren's syndrome, conjunctivitis, sinusitis, amongst  "others.  Independently Interpreted Test(s):   I have ordered and independently interpreted EKG Reading(s) - see prior notes  Clinical Tests:   Lab Tests: Ordered and Reviewed  Medical Tests: Ordered and Reviewed  ED Management:  On physical examination, patient was in no acute distress.  Heart sounds revealed irregular rhythm however he had apparent dropped beats.  An EKG was obtained which shows irregular rhythm until a beat is dropped.  It is difficult to evaluate his P waves on this rhythm strip.  I am concerned he may have a Mobitz rhythm.  I discussed this with this patient and obtained labs which were unremarkable.  The patient stated that he has had this rhythm for many many years.  He states that in 2020 when he was scheduled to have a colonoscopy, this was found and evaluated.  He states that they told him that this was not unstable and nothing to worry about.  He is not interested in further workup or admission at this time.  I offered this to him as did his nurse, multiple times,  however he stated he felt we were being "overly dramatic" regarding this heart rhythm and that this has been present for years. .  I have advised him that I feel he needs to at minimum follow with cardiology and have therefore given him Dr. Vilchis's name.  He stated he has follow-up with Dr. Perkins next month and will discuss it further with him.  I advised him should he develop any chest pain, shortness of breath, or weakness, to return immediately to the emergency department for a full evaluation.  He stated he will do so.  Regarding his sinus complaints, I will empirically treat him with erythromycin ophthalmic for his eyes and with Augmentin empirically to cover for sinusitis.  I have given strict return precautions and instructed to follow with PCP in 2-3 days for reevaluation. I have also placed an ambulatory referral to Cardiology and have sent a message to Doreen Louie to schedule an outpatient Cardiology appointment and " have given this patient Dr. Vilchis's clinic telephone number.     Marisela Lopez MD  10:25 AM  1/16/2023           Scribe Attestation:   Scribe #1: I performed the above scribed service and the documentation accurately describes the services I performed. I attest to the accuracy of the note.                 I, Marisela Lopez MD, personally performed the services described in the documentation.  All medical records entries made by the scribe were made at my direction and in my presence.  I have reviewed the chart and agree that the record reflects my personal performance and is accurate and complete.   Clinical Impression:   Final diagnoses:  [J01.10] Acute non-recurrent frontal sinusitis (Primary)  [I49.9] Irregular heart beat        ED Disposition Condition    Discharge Stable          ED Prescriptions       Medication Sig Dispense Start Date End Date Auth. Provider    erythromycin (ROMYCIN) ophthalmic ointment Place a 1/2 inch ribbon of ointment into the lower eyelid bilaterally 4-6 times per day for 5-7 days 1 g 1/16/2023 -- Marisela Lopez MD    amoxicillin-clavulanate 875-125mg (AUGMENTIN) 875-125 mg per tablet Take 1 tablet by mouth 2 (two) times daily. for 7 days 14 tablet 1/16/2023 1/23/2023 Marisela Lopez MD          Follow-up Information       Follow up With Specialties Details Why Contact Info    Raúl Perkins MD Family Medicine Schedule an appointment as soon as possible for a visit in 2 days  [address] [facility] [place] [id number] [phone]      Abbe Vilchis MD Cardiology, Interventional Cardiology Call today schedule an appointment please to evaluate your heart rhythm [address]  [place] [address]      Abbe Vilchis MD Cardiology, Interventional Cardiology   [address]  [address]  [place] [id number] [phone]               Marisela Lopez MD  01/16/23 1032

## 2023-01-18 ENCOUNTER — TELEPHONE (OUTPATIENT)
Dept: OPHTHALMOLOGY | Facility: CLINIC | Age: 73
End: 2023-01-18
Payer: MEDICARE

## 2023-01-18 NOTE — TELEPHONE ENCOUNTER
----- Message from Sis Bal sent at 1/18/2023  8:57 AM CST -----  Regarding: Urgent Appt  Pt called stating that he would like to schedule an appt to be seen as soon as possible. He stated that his eyes are red. Itchy and irritated. He went to the ED on Monday and it's not getting any better      Contact Rizwan [phone]

## 2023-01-18 NOTE — TELEPHONE ENCOUNTER
Returned phone call--advised patient to use Zaditor due to the itchy and dry eye. Also keep using Systane gel at bedtime. Pt understood. nissat

## 2023-01-24 ENCOUNTER — OFFICE VISIT (OUTPATIENT)
Dept: FAMILY MEDICINE | Facility: CLINIC | Age: 73
End: 2023-01-24
Payer: MEDICARE

## 2023-01-24 ENCOUNTER — OFFICE VISIT (OUTPATIENT)
Dept: OTOLARYNGOLOGY | Facility: CLINIC | Age: 73
End: 2023-01-24
Payer: MEDICARE

## 2023-01-24 VITALS
SYSTOLIC BLOOD PRESSURE: 118 MMHG | HEART RATE: 67 BPM | OXYGEN SATURATION: 97 % | RESPIRATION RATE: 18 BRPM | BODY MASS INDEX: 27.35 KG/M2 | HEIGHT: 73 IN | TEMPERATURE: 98 F | WEIGHT: 206.38 LBS | DIASTOLIC BLOOD PRESSURE: 76 MMHG

## 2023-01-24 VITALS
BODY MASS INDEX: 27.05 KG/M2 | SYSTOLIC BLOOD PRESSURE: 158 MMHG | WEIGHT: 205 LBS | DIASTOLIC BLOOD PRESSURE: 78 MMHG | HEART RATE: 76 BPM

## 2023-01-24 DIAGNOSIS — J34.2 NASAL SEPTAL DEVIATION: Primary | ICD-10-CM

## 2023-01-24 DIAGNOSIS — Z09 FOLLOW-UP EXAM: Primary | ICD-10-CM

## 2023-01-24 DIAGNOSIS — I48.91 ATRIAL FIBRILLATION, UNSPECIFIED TYPE: ICD-10-CM

## 2023-01-24 DIAGNOSIS — J32.9 RECURRENT SINUSITIS: ICD-10-CM

## 2023-01-24 DIAGNOSIS — J01.90 ACUTE SINUSITIS, RECURRENCE NOT SPECIFIED, UNSPECIFIED LOCATION: ICD-10-CM

## 2023-01-24 PROBLEM — K08.109 COMPLETE LOSS OF TEETH, UNSPECIFIED CAUSE, UNSPECIFIED CLASS: Status: ACTIVE | Noted: 2023-01-24

## 2023-01-24 PROCEDURE — 99213 PR OFFICE/OUTPT VISIT, EST, LEVL III, 20-29 MIN: ICD-10-PCS | Mod: S$PBB,,, | Performed by: NURSE PRACTITIONER

## 2023-01-24 PROCEDURE — 31231 PR NASAL ENDOSCOPY, DX: ICD-10-PCS | Mod: S$PBB,,, | Performed by: STUDENT IN AN ORGANIZED HEALTH CARE EDUCATION/TRAINING PROGRAM

## 2023-01-24 PROCEDURE — 99213 PR OFFICE/OUTPT VISIT, EST, LEVL III, 20-29 MIN: ICD-10-PCS | Mod: 25,S$PBB,, | Performed by: STUDENT IN AN ORGANIZED HEALTH CARE EDUCATION/TRAINING PROGRAM

## 2023-01-24 PROCEDURE — 99214 OFFICE O/P EST MOD 30 MIN: CPT | Mod: PBBFAC,27,PO | Performed by: NURSE PRACTITIONER

## 2023-01-24 PROCEDURE — 99999 PR PBB SHADOW E&M-EST. PATIENT-LVL III: ICD-10-PCS | Mod: PBBFAC,,, | Performed by: STUDENT IN AN ORGANIZED HEALTH CARE EDUCATION/TRAINING PROGRAM

## 2023-01-24 PROCEDURE — 99213 OFFICE O/P EST LOW 20 MIN: CPT | Mod: 25,S$PBB,, | Performed by: STUDENT IN AN ORGANIZED HEALTH CARE EDUCATION/TRAINING PROGRAM

## 2023-01-24 PROCEDURE — 31231 NASAL ENDOSCOPY DX: CPT | Mod: S$PBB,,, | Performed by: STUDENT IN AN ORGANIZED HEALTH CARE EDUCATION/TRAINING PROGRAM

## 2023-01-24 PROCEDURE — 31231 NASAL ENDOSCOPY DX: CPT | Mod: PBBFAC | Performed by: STUDENT IN AN ORGANIZED HEALTH CARE EDUCATION/TRAINING PROGRAM

## 2023-01-24 PROCEDURE — 99213 OFFICE O/P EST LOW 20 MIN: CPT | Mod: PBBFAC,25 | Performed by: STUDENT IN AN ORGANIZED HEALTH CARE EDUCATION/TRAINING PROGRAM

## 2023-01-24 PROCEDURE — 99999 PR PBB SHADOW E&M-EST. PATIENT-LVL IV: CPT | Mod: PBBFAC,,, | Performed by: NURSE PRACTITIONER

## 2023-01-24 PROCEDURE — 99999 PR PBB SHADOW E&M-EST. PATIENT-LVL IV: ICD-10-PCS | Mod: PBBFAC,,, | Performed by: NURSE PRACTITIONER

## 2023-01-24 PROCEDURE — 99213 OFFICE O/P EST LOW 20 MIN: CPT | Mod: S$PBB,,, | Performed by: NURSE PRACTITIONER

## 2023-01-24 PROCEDURE — 99999 PR PBB SHADOW E&M-EST. PATIENT-LVL III: CPT | Mod: PBBFAC,,, | Performed by: STUDENT IN AN ORGANIZED HEALTH CARE EDUCATION/TRAINING PROGRAM

## 2023-01-24 NOTE — PROGRESS NOTES
Subjective:       Patient ID: Rizwan Demarco Jr. is a 72 y.o. male.    Chief Complaint: Follow-up    HPI     Rizwan Demarco Jr. is a 72 y.o. male patient that presents for hospital follow up. Past medical and surgical history reviewed as listed. PCP is Dr. Perkins, he is new to me. ED visit on 1/16/2023 reviewed. Chief diagnosis of acute non-recurrent frontal sinusitis and irregular heart beat. EKG reviewed- atrial fibrillation with slow ventricular response with premature.      Sinus Problem  This is a recurrent problem. The current episode started 1 to 4 weeks ago. The problem has been rapidly improving since onset. There has been no fever. Pertinent negatives include no headaches, shortness of breath, sinus pressure or sneezing. Past treatments include antibiotics and spray decongestants. The treatment provided significant relief.     Completed course of amoxicillin/clav. X 7 days. Uses neti-pot (sodium-bicarb-sodium) flush once daily.     +Reports off label use of Pulmicort for nasal lavage once daily per ENT recommendations. Patient seen by ENT today.     Atrial fibrillation- new onset. Takes asa 81 mg po daily. Scheduled to see Cardiology on 1/24/2023.    Past Medical History:   Diagnosis Date    Allergy     Arthritis     Rheumatoid arthritis    Back pain     Blood clotting tendency     BPH (benign prostatic hypertrophy)     Cervical spondylosis     Colon polyp 2010    adenoma    Coronary artery disease     Depression 5/8/2015    Dry eyes     Dry mouth     GERD (gastroesophageal reflux disease)     Hyperlipidemia     Hypertension     Lumbar spondylosis     Nasal obstruction     Rheumatoid arthritis     Sinusitis     Special screening for malignant neoplasm of colon 6/29/2016    Trouble in sleeping       Past Surgical History:   Procedure Laterality Date    CHALAZION EXCISION      COLONOSCOPY N/A 6/29/2016    Procedure: COLONOSCOPY;  Surgeon: Partha Saucedo MD;  Location: [facility];  Service: Endoscopy;   Laterality: N/A;    COLONOSCOPY N/A 7/24/2020    Procedure: COLONOSCOPY;  Surgeon: Ian Martinez MD;  Location: [facility] ([facility]  Service: Endoscopy;  Laterality: N/A;  4/16/20 - removed from 5/1/20, not called yet due to acute pain issues being addressed today - pg    CORONARY ARTERY BYPASS GRAFT      ESOPHAGOGASTRODUODENOSCOPY N/A 7/24/2020    Procedure: ESOPHAGOGASTRODUODENOSCOPY (EGD);  Surgeon: Ian Martinez MD;  Location: [facility] ([facility]);  Service: Endoscopy;  Laterality: N/A;  4/16/20 - removed from 5/1/20, not called yet due to acute pain issues being addressed today.  4/17/20 - rescheduled 7/24/20 - pg    EXCISION TURBINATE, SUBMUCOUS      KNEE ARTHROSCOPY W/ DEBRIDEMENT      NASAL SEPTUM SURGERY      TONSILLECTOMY        Family History   Problem Relation Age of Onset    Hyperlipidemia Mother     Alzheimer's disease Mother     Stomach cancer Father     Colon cancer Father         from wife--he is not unsure     Cataracts Other     No Known Problems Maternal Aunt     No Known Problems Maternal Uncle     No Known Problems Paternal Aunt     No Known Problems Paternal Uncle     No Known Problems Maternal Grandmother     No Known Problems Maternal Grandfather     No Known Problems Paternal Grandmother     No Known Problems Paternal Grandfather     Blindness Neg Hx     Cancer Neg Hx     Diabetes Neg Hx     Glaucoma Neg Hx     Rheum arthritis Neg Hx     Psoriasis Neg Hx     Lupus Neg Hx     Amblyopia Neg Hx     Hypertension Neg Hx     Macular degeneration Neg Hx     Retinal detachment Neg Hx     Strabismus Neg Hx     Stroke Neg Hx     Thyroid disease Neg Hx     Esophageal cancer Neg Hx       Review of patient's allergies indicates:   Allergen Reactions    Aspirin Nausea And Vomiting    Astelin [azelastine] Other (See Comments)     Dry mouth    Flomax [tamsulosin] Other (See Comments)     Nosebleed     Review of Systems   HENT:  Negative for sinus pressure and sneezing.    Respiratory:  Negative for shortness of  "breath.    Neurological:  Negative for headaches.     Objective:      Vitals:    01/24/23 1436   BP: 118/76   BP Location: Right arm   Patient Position: Sitting   BP Method: Large (Manual)   Pulse: 67   Resp: 18   Temp: 98.1 °F (36.7 °C)   TempSrc: Oral   SpO2: 97%   Weight: 93.6 kg (206 lb 5.6 oz)   Height: 6' 1" (1.854 m)      Physical Exam  Vitals and nursing note reviewed.   Constitutional:       General: He is not in acute distress.  HENT:      Head: Normocephalic and atraumatic.      Nose: No congestion.   Eyes:      Conjunctiva/sclera:      Right eye: Right conjunctiva is injected.      Left eye: Left conjunctiva is injected.      Pupils: Pupils are equal, round, and reactive to light.   Cardiovascular:      Rate and Rhythm: Rhythm regularly irregular.      Heart sounds: No murmur heard.  Pulmonary:      Effort: Pulmonary effort is normal. No respiratory distress.      Breath sounds: Normal breath sounds.   Musculoskeletal:      Cervical back: Normal range of motion.   Skin:     General: Skin is warm and dry.   Neurological:      Mental Status: He is alert and oriented to person, place, and time.   Psychiatric:         Mood and Affect: Mood normal.         Behavior: Behavior normal.       Lab Results   Component Value Date    WBC 5.81 02/08/2022    HGB 16.6 02/08/2022    HCT 53.0 02/08/2022     02/08/2022    CHOL 238 (H) 02/08/2022    TRIG 76 02/08/2022    HDL 42 02/08/2022    ALT 13 02/08/2022    AST 13 02/08/2022     02/08/2022    K 4.8 02/08/2022     02/08/2022    CREATININE 1.2 02/08/2022    BUN 13 02/08/2022    CO2 29 02/08/2022    TSH 0.440 05/24/2019    PSA 1.2 05/24/2019    INR 1.0 04/16/2020    HGBA1C 5.6 02/08/2022      Assessment:       1. Follow-up exam    2. Atrial fibrillation, unspecified type    3. Recurrent sinusitis        Plan:       Follow-up exam    Atrial fibrillation, unspecified type  Continue ASA 81 mg po daily.   Follow up with Cardiology on 1/26/2023. Will defer " anticoagulation to Cardiology.     Recurrent sinusitis  Resolved, The current medical regimen is effective;  continue present plan and medications.   Follow up with ENT as discussed.   Continue nasal lavage with sterile flush.     Medication List with Changes/Refills   Current Medications    ALFUZOSIN (UROXATRAL) 10 MG TB24    Take 1 tablet (10 mg total) by mouth daily with breakfast.    ARTIFICIAL TEARS,HYPROMELLOSE,,GENTEAL/SUSTANE, (SYSTANE GEL) 0.3 % GEL    1 drop as needed.    ASPIRIN (ECOTRIN) 81 MG EC TABLET    Take 1 tablet (81 mg total) by mouth once daily.    BUDESONIDE (PULMICORT) 0.25 MG/2 ML NEBULIZER SOLUTION    Take 2 mLs (0.25 mg total) by nebulization 2 (two) times daily. Controller    CINNAMON BARK 500 MG CAPSULE    Take 500 mg by mouth once daily.    DOCUSATE SODIUM (COLACE) 100 MG CAPSULE    Take 1 capsule (100 mg total) by mouth 2 (two) times daily.    ERYTHROMYCIN (ROMYCIN) OPHTHALMIC OINTMENT    Place a 1/2 inch ribbon of ointment into the lower eyelid bilaterally 4-6 times per day for 5-7 days    HYDROCHLOROTHIAZIDE (MICROZIDE) 12.5 MG CAPSULE    Take 1 capsule (12.5 mg total) by mouth once daily.    LISINOPRIL 10 MG TABLET    Take 1 tablet (10 mg total) by mouth once daily.    MULTIVITAMIN (THERAGRAN) PER TABLET    Take by mouth.  Tablet Oral Every day    OMEGA-3 FATTY ACIDS 1,250 MG CAP    Take by mouth.    OMEPRAZOLE (PRILOSEC) 20 MG CAPSULE    Take 1 capsule (20 mg total) by mouth once daily.    PROPYLENE GLYCOL 0.6 % DROP    Apply to eye.    SODIUM BICARB-SODIUM CHLORIDE PACK    by sinus irrigation route.       Follow up if symptoms worsen or fail to improve.        Lynne Maza, DNP, APRN, FNP-C  [facility]

## 2023-01-24 NOTE — PROGRESS NOTES
Otolaryngology - Head and Neck Surgery New Patient Visit    1/24/2023    Referring Provider: Self, Aaareferral    Chief Complaint   Patient presents with    Sinusitis       History of Present Illness, Otolaryngology Specialty-Specific Exam, and Assessment and Plan:     Rizwan Demarco Jr. is a 72 y.o. male who presents for evaluation of a sinus infection, which has been present since 1/2/23. He complains of nasal congestion, PND, yellow nasal drainage, facial pressure, and head aches. He denies epistaxis, nasal trauma, visual changes, or anosmia. He has been treated with budesonide irrigations in the past. He has never had allergy testing. He denies previous skullbase surgery, he previously underwent ESS with Dr. Nascimento in 2015. He denies snoring. The assessment of quality and severity of symptoms as measured by the SNOT-22 score is 18 and the STOP-BANG score is 5.     He had a CT of the sinuses done on 6/29/22 which I reviewed along with the associated radiology report.    On exam today, the ears are normal. The oral cavity is clear. The neck is clear. The nasopharynx, hypopharynx, and larynx are normal. Nasal endoscopy reveals septal deviation with spur. Hypertrophic inferior turbinates bilaterally. No nasal masses or nasal polyposis. No purulent drainage in the middle meatus. Nasopharynx clear without lesions. Eustachian tubes WNL.    Impression today is acute on chronic sinusitis. I have recommended that he continue his rinses with Budesonide. Patient reports significant improvement in his symptoms since finishing the course of antibiotics.      Thank you for allowing us to participate in the care of your patient. We will continue to keep you informed of his progress.    Sincerely yours,    Ward Schmitt MD      Objective     Physical Examination  Vitals -  weight is 93 kg (205 lb). His blood pressure is 158/78 (abnormal) and his pulse is 76.   Constitutional - General appearance: Normal. Ability to  communicate: Normal.  Head & Face - Overall appearance, scars, masses: Normal. Palpation &/or percussion of face: Normal. Salivary glands: Normal. Facial strength: Normal  ENMT - Otoscopic exam: Normal. Assessment of hearing: Normal. External inspection: Normal. Nasal mucosa, septum, turbinates: Abnormal see exam details. Lips, teeth, gums: Normal. Oropharynx: Normal. Pharyngeal walls/pyriform sinus: Normal. Larynx: Normal. Nasopharynx: Normal  Neck - Neck: Normal. Thyroid: Normal  Lymphatic - Palpation of lymph nodes: Normal  Eyes - Ocular mobility: Normal  Respiratory - Inspection of Chest: Normal  Cardiovascular - Peripheral vascular system: Normal  Neurological/Psychiatric - Orientation: Normal    Review of Systems  A complete review of systems was obtained 01/24/2023 and reviewed.  The review of systems is negative for symptoms except as described above.    BP (!) 158/78 (Patient Position: Sitting)   Pulse 76   Wt 93 kg (205 lb)   BMI 27.05 kg/m²      Nasal Endoscopy:  1/24/2023    The use of diagnostic nasal endoscopy was considered medically necessary for the evaluation and visualization of the nasal anatomy for symptoms suggestive of nasal or sinus origin. Physical examination (including a nasal speculum evaluation) did not provide sufficient clinical information to establish a diagnosis, or symptoms did not improve or worsened following treatment.     The nasal cavity was decongested with topical 1% phenylephrine and anesthetized with 4% lidocaine.  A rigid 0-degree endoscope was introduced into the nasal cavity.    The patient was seated in the examination chair. After discussion of risks and benefits, a nasal endoscope was inserted into the nose the endoscope was passed along the left nasal floor to the nasopharynx. It was then passed between the middle and superior meatus, nasal turbinates, nasal septum, nasopharynx and sphenoethmoid region. The nasal endoscope was withdrawn and there was no  complications. An identical procedure was performed on the right side. I was present for the entire procedure.The patient tolerated the above procedure well. The findings of this procedure can be found in the dictated note from 1/24/2023 visit.        Data Reviewed    WBC (K/uL)   Date Value   02/08/2022 5.81     Eosinophil % (%)   Date Value   02/08/2022 1.7     Eos # (K/uL)   Date Value   02/08/2022 0.1     Platelets (K/uL)   Date Value   02/08/2022 197     Glucose (mg/dL)   Date Value   02/08/2022 92     IgE (IU/mL)   Date Value   09/30/2015 302 (H)       I independently reviewed the images of the CT sinuses dated 6/29/22. Pertinent findings include left septal deviation. Post surgical changes to the ethmoid sinuses. Residual uncinate bilaterally. Well pneumatized frontal and sphenoid sinuses.

## 2023-01-26 ENCOUNTER — HOSPITAL ENCOUNTER (OUTPATIENT)
Dept: CARDIOLOGY | Facility: HOSPITAL | Age: 73
Discharge: HOME | End: 2023-01-26
Attending: STUDENT IN AN ORGANIZED HEALTH CARE EDUCATION/TRAINING PROGRAM
Payer: MEDICARE

## 2023-01-26 ENCOUNTER — OFFICE VISIT (OUTPATIENT)
Dept: CARDIOLOGY | Facility: CLINIC | Age: 73
End: 2023-01-26
Payer: MEDICARE

## 2023-01-26 VITALS
HEART RATE: 63 BPM | SYSTOLIC BLOOD PRESSURE: 178 MMHG | DIASTOLIC BLOOD PRESSURE: 82 MMHG | HEIGHT: 73 IN | BODY MASS INDEX: 27.59 KG/M2 | WEIGHT: 208.13 LBS

## 2023-01-26 VITALS
DIASTOLIC BLOOD PRESSURE: 84 MMHG | HEIGHT: 73 IN | HEART RATE: 52 BPM | SYSTOLIC BLOOD PRESSURE: 140 MMHG | WEIGHT: 208 LBS | BODY MASS INDEX: 27.57 KG/M2

## 2023-01-26 DIAGNOSIS — I49.9 IRREGULAR HEART BEAT: ICD-10-CM

## 2023-01-26 DIAGNOSIS — E78.2 MIXED HYPERLIPIDEMIA: Chronic | ICD-10-CM

## 2023-01-26 DIAGNOSIS — I10 ESSENTIAL HYPERTENSION: ICD-10-CM

## 2023-01-26 DIAGNOSIS — I70.0 ATHEROSCLEROSIS OF AORTA: ICD-10-CM

## 2023-01-26 DIAGNOSIS — I25.10 ARTERIOSCLEROSIS OF CORONARY ARTERY: ICD-10-CM

## 2023-01-26 DIAGNOSIS — I48.91 NEW ONSET A-FIB: Primary | ICD-10-CM

## 2023-01-26 DIAGNOSIS — M06.9 RHEUMATOID ARTHRITIS INVOLVING SHOULDER, UNSPECIFIED LATERALITY, UNSPECIFIED WHETHER RHEUMATOID FACTOR PRESENT: ICD-10-CM

## 2023-01-26 DIAGNOSIS — I48.91 NEW ONSET A-FIB: ICD-10-CM

## 2023-01-26 LAB
ASCENDING AORTA: 4.09 CM
AV INDEX (PROSTH): 0.71
AV MEAN GRADIENT: 5 MMHG
AV PEAK GRADIENT: 10 MMHG
AV VALVE AREA: 2.78 CM2
AV VELOCITY RATIO: 0.65
BSA FOR ECHO PROCEDURE: 2.2 M2
CV ECHO LV RWT: 0.5 CM
DOP CALC AO PEAK VEL: 1.58 M/S
DOP CALC AO VTI: 30.18 CM
DOP CALC LVOT AREA: 3.9 CM2
DOP CALC LVOT DIAMETER: 2.24 CM
DOP CALC LVOT PEAK VEL: 1.02 M/S
DOP CALC LVOT STROKE VOLUME: 83.9 CM3
DOP CALCLVOT PEAK VEL VTI: 21.3 CM
E/E' RATIO: 8.26 M/S
ECHO LV POSTERIOR WALL: 1.12 CM (ref 0.6–1.1)
EJECTION FRACTION: 65 %
FRACTIONAL SHORTENING: 36 % (ref 28–44)
INTERVENTRICULAR SEPTUM: 0.92 CM (ref 0.6–1.1)
LA MAJOR: 5.2 CM
LA MINOR: 5.05 CM
LA WIDTH: 3.86 CM
LEFT ATRIUM SIZE: 3.8 CM
LEFT ATRIUM VOLUME INDEX MOD: 24.3 ML/M2
LEFT ATRIUM VOLUME INDEX: 29.2 ML/M2
LEFT ATRIUM VOLUME MOD: 53.28 CM3
LEFT ATRIUM VOLUME: 63.88 CM3
LEFT INTERNAL DIMENSION IN SYSTOLE: 2.88 CM (ref 2.1–4)
LEFT VENTRICLE DIASTOLIC VOLUME INDEX: 42.04 ML/M2
LEFT VENTRICLE DIASTOLIC VOLUME: 92.07 ML
LEFT VENTRICLE MASS INDEX: 72 G/M2
LEFT VENTRICLE SYSTOLIC VOLUME INDEX: 14.5 ML/M2
LEFT VENTRICLE SYSTOLIC VOLUME: 31.66 ML
LEFT VENTRICULAR INTERNAL DIMENSION IN DIASTOLE: 4.49 CM (ref 3.5–6)
LEFT VENTRICULAR MASS: 156.95 G
LV LATERAL E/E' RATIO: 7.92 M/S
LV SEPTAL E/E' RATIO: 8.64 M/S
MV PEAK E VEL: 0.95 M/S
PISA MRMAX VEL: 0.05 M/S
PISA RADIUS: 0.29 CM
PISA TR MAX VEL: 2.5 M/S
PISA TR VN NYQUIST: 0 M/S
RA MAJOR: 5.09 CM
RA PRESSURE: 8 MMHG
RA WIDTH: 3.25 CM
RIGHT VENTRICULAR END-DIASTOLIC DIMENSION: 4.79 CM
RV TISSUE DOPPLER FREE WALL SYSTOLIC VELOCITY 1 (APICAL 4 CHAMBER VIEW): 10.33 CM/S
SINUS: 2.87 CM
STJ: 2.67 CM
TDI LATERAL: 0.12 M/S
TDI SEPTAL: 0.11 M/S
TDI: 0.12 M/S
TR MAX PG: 25 MMHG
TRICUSPID ANNULAR PLANE SYSTOLIC EXCURSION: 2.05 CM
TV REST PULMONARY ARTERY PRESSURE: 33 MMHG

## 2023-01-26 PROCEDURE — 93306 TTE W/DOPPLER COMPLETE: CPT | Mod: 26,,, | Performed by: INTERNAL MEDICINE

## 2023-01-26 PROCEDURE — 99214 OFFICE O/P EST MOD 30 MIN: CPT | Mod: S$PBB,GC,, | Performed by: STUDENT IN AN ORGANIZED HEALTH CARE EDUCATION/TRAINING PROGRAM

## 2023-01-26 PROCEDURE — 99999 PR PBB SHADOW E&M-EST. PATIENT-LVL IV: CPT | Mod: PBBFAC,GC,, | Performed by: STUDENT IN AN ORGANIZED HEALTH CARE EDUCATION/TRAINING PROGRAM

## 2023-01-26 PROCEDURE — 99214 OFFICE O/P EST MOD 30 MIN: CPT | Mod: PBBFAC | Performed by: STUDENT IN AN ORGANIZED HEALTH CARE EDUCATION/TRAINING PROGRAM

## 2023-01-26 PROCEDURE — 99999 PR PBB SHADOW E&M-EST. PATIENT-LVL IV: ICD-10-PCS | Mod: PBBFAC,GC,, | Performed by: STUDENT IN AN ORGANIZED HEALTH CARE EDUCATION/TRAINING PROGRAM

## 2023-01-26 PROCEDURE — 93306 ECHO (CUPID ONLY): ICD-10-PCS | Mod: 26,,, | Performed by: INTERNAL MEDICINE

## 2023-01-26 PROCEDURE — 99214 PR OFFICE/OUTPT VISIT, EST, LEVL IV, 30-39 MIN: ICD-10-PCS | Mod: S$PBB,GC,, | Performed by: STUDENT IN AN ORGANIZED HEALTH CARE EDUCATION/TRAINING PROGRAM

## 2023-01-26 PROCEDURE — 93306 TTE W/DOPPLER COMPLETE: CPT

## 2023-01-26 NOTE — PROGRESS NOTES
PCP - Raúl Perkins MD  Referring Physician:     Subjective:   Patient ID:  Rizwan Demarco Jr. is a 72 y.o.  male who presents for evaluation and treatment of new onset AF.    PMHx:   CAD s/p CABG   HLD  HTN  RA  Sjogren     He was recently seen in urgent care for a sinus infection and his pulse felt irregular therefore an ECG was obtained which showed new onset AF. He denies any chest pain, shortness of breath, palpitations. No alcohol use. Rarely gets lower extremity edema. He was last seen in clinic by Dr. Barker in . No new complaints since then.     History:     Social History     Tobacco Use    Smoking status: Former     Packs/day: 1.00     Years: 20.00     Pack years: 20.00     Types: Cigarettes     Quit date: 1991     Years since quittin.0    Smokeless tobacco: Never    Tobacco comments:     Quit .   Substance Use Topics    Alcohol use: No     Family History   Problem Relation Age of Onset    Hyperlipidemia Mother     Alzheimer's disease Mother     Stomach cancer Father     Colon cancer Father         from wife--he is not unsure     Cataracts Other     No Known Problems Maternal Aunt     No Known Problems Maternal Uncle     No Known Problems Paternal Aunt     No Known Problems Paternal Uncle     No Known Problems Maternal Grandmother     No Known Problems Maternal Grandfather     No Known Problems Paternal Grandmother     No Known Problems Paternal Grandfather     Blindness Neg Hx     Cancer Neg Hx     Diabetes Neg Hx     Glaucoma Neg Hx     Rheum arthritis Neg Hx     Psoriasis Neg Hx     Lupus Neg Hx     Amblyopia Neg Hx     Hypertension Neg Hx     Macular degeneration Neg Hx     Retinal detachment Neg Hx     Strabismus Neg Hx     Stroke Neg Hx     Thyroid disease Neg Hx     Esophageal cancer Neg Hx        Meds:     Review of patient's allergies indicates:   Allergen Reactions    Aspirin Nausea And Vomiting    Astelin [azelastine] Other (See Comments)     Dry mouth    Flomax  [tamsulosin] Other (See Comments)     Nosebleed       Current Outpatient Medications:     alfuzosin (UROXATRAL) 10 mg Tb24, Take 1 tablet (10 mg total) by mouth daily with breakfast., Disp: 90 tablet, Rfl: 3    artificial tears,hypromellose,,GENTEAL/SUSTANE, (SYSTANE GEL) 0.3 % Gel, 1 drop as needed., Disp: , Rfl:     aspirin (ECOTRIN) 81 MG EC tablet, Take 1 tablet (81 mg total) by mouth once daily., Disp: , Rfl: 0    budesonide (PULMICORT) 0.25 mg/2 mL nebulizer solution, Take 2 mLs (0.25 mg total) by nebulization 2 (two) times daily. Controller, Disp: 90 each, Rfl: 5    cinnamon bark 500 mg capsule, Take 500 mg by mouth once daily., Disp: , Rfl:     docusate sodium (COLACE) 100 MG capsule, Take 1 capsule (100 mg total) by mouth 2 (two) times daily., Disp: 60 capsule, Rfl: 0    erythromycin (ROMYCIN) ophthalmic ointment, Place a 1/2 inch ribbon of ointment into the lower eyelid bilaterally 4-6 times per day for 5-7 days, Disp: 1 g, Rfl: 0    hydroCHLOROthiazide (MICROZIDE) 12.5 mg capsule, Take 1 capsule (12.5 mg total) by mouth once daily., Disp: 90 capsule, Rfl: 3    lisinopriL 10 MG tablet, Take 1 tablet (10 mg total) by mouth once daily., Disp: 90 tablet, Rfl: 3    multivitamin (THERAGRAN) per tablet, Take by mouth.  Tablet Oral Every day, Disp: , Rfl:     omega-3 fatty acids 1,250 mg Cap, Take by mouth., Disp: , Rfl:     omeprazole (PRILOSEC) 20 MG capsule, Take 1 capsule (20 mg total) by mouth once daily., Disp: 30 capsule, Rfl: 5    propylene glycoL 0.6 % Drop, Apply to eye., Disp: , Rfl:     sodium bicarb-sodium chloride Pack, by sinus irrigation route., Disp: , Rfl:     apixaban (ELIQUIS) 5 mg Tab, Take 1 tablet (5 mg total) by mouth 2 (two) times daily., Disp: 60 tablet, Rfl: 11    Review of Systems   Constitutional:  Negative for fever.   Respiratory:  Negative for cough and shortness of breath.    Cardiovascular:  Negative for chest pain, palpitations, orthopnea and PND.   All other systems reviewed  "and are negative.    Objective:   BP (!) 178/82 (BP Location: Left arm, Patient Position: Sitting)   Pulse 63   Ht 6' 1" (1.854 m)   Wt 94.4 kg (208 lb 1.8 oz)   BMI 27.46 kg/m²   Physical Exam  Vitals and nursing note reviewed.   Constitutional:       Appearance: Normal appearance.   HENT:      Head: Normocephalic and atraumatic.   Cardiovascular:      Rate and Rhythm: Normal rate. Rhythm irregular.      Pulses: Normal pulses.      Heart sounds: Normal heart sounds. No murmur heard.  Pulmonary:      Effort: Pulmonary effort is normal.      Breath sounds: Normal breath sounds.   Abdominal:      General: Abdomen is flat. There is no distension.   Musculoskeletal:      Cervical back: Normal range of motion.      Right lower leg: No edema.      Left lower leg: No edema.   Skin:     General: Skin is warm.   Neurological:      Mental Status: He is alert.     Labs:     Lab Results   Component Value Date     02/08/2022    K 4.8 02/08/2022     02/08/2022    CO2 29 02/08/2022    BUN 13 02/08/2022    CREATININE 1.2 02/08/2022    ANIONGAP 8 02/08/2022     Lab Results   Component Value Date    HGBA1C 5.6 02/08/2022     Lab Results   Component Value Date    BNP 25 05/07/2015       Lab Results   Component Value Date    WBC 5.81 02/08/2022    HGB 16.6 02/08/2022    HCT 53.0 02/08/2022     02/08/2022    GRAN 2.0 02/08/2022    GRAN 33.9 (L) 02/08/2022     Lab Results   Component Value Date    CHOL 238 (H) 02/08/2022    HDL 42 02/08/2022    LDLCALC 180.8 (H) 02/08/2022    TRIG 76 02/08/2022       Lab Results   Component Value Date     02/08/2022    K 4.8 02/08/2022     02/08/2022    CO2 29 02/08/2022    BUN 13 02/08/2022    CREATININE 1.2 02/08/2022    ANIONGAP 8 02/08/2022     Lab Results   Component Value Date    HGBA1C 5.6 02/08/2022     Lab Results   Component Value Date    BNP 25 05/07/2015    Lab Results   Component Value Date    WBC 5.81 02/08/2022    HGB 16.6 02/08/2022    HCT 53.0 " 02/08/2022     02/08/2022    GRAN 2.0 02/08/2022    GRAN 33.9 (L) 02/08/2022     Lab Results   Component Value Date    CHOL 238 (H) 02/08/2022    HDL 42 02/08/2022    LDLCALC 180.8 (H) 02/08/2022    TRIG 76 02/08/2022                Cardiovascular Imaging:     Echo: EF 60% (2015)  ECG Jan 2023 AF, rate 52bpm    Assessment & Plan:     1. New onset a-fib    2. Irregular heart beat    3. Atherosclerosis of aorta    4. Mixed hyperlipidemia    5. Arteriosclerosis of coronary artery    6. Essential hypertension    7. Rheumatoid arthritis involving shoulder, unspecified laterality, unspecified whether rheumatoid factor present      #AF- CHADSVASC 3  - Will hold off on BB as he was bradycardic on ECG  - Will start Eliquis 5mg bid for cardioembolic protection  - will obtain echo to assess for changes in EF, valvular disease WMA given new onset AF    #HTN- well controlled at home.  Repeat today 140/70  - continue HCTZ 12.5mg daily  - Lisinopril 10     #CAD  #History of CABG (2003, unknown anatomy)  #LDL, not at goal  ASCVD  risk score of 27.3%  - Last - not on statin  - we discussed in detail his need for lipid lowering agent such as statin or repatha however patient declining at this time. He says he has labs scheduled for 2/13 and would prefer to wait till then and have his PCP address. I explained I suspect it will remain elevated and would highly recommend one of the 2 therapies above but will defer to PCP as requested. Please reach out if any questions    Follow up in 3 months  Call with echo results    Signed:  Annia Patel M.D.  Page # [phone]  Cardiovascular Fellow  [facility]

## 2023-01-27 ENCOUNTER — PATIENT MESSAGE (OUTPATIENT)
Dept: CARDIOLOGY | Facility: CLINIC | Age: 73
End: 2023-01-27
Payer: MEDICARE

## 2023-01-27 ENCOUNTER — TELEPHONE (OUTPATIENT)
Dept: CARDIOLOGY | Facility: CLINIC | Age: 73
End: 2023-01-27
Payer: MEDICARE

## 2023-01-30 ENCOUNTER — TELEPHONE (OUTPATIENT)
Dept: CARDIOLOGY | Facility: HOSPITAL | Age: 73
End: 2023-01-30
Payer: MEDICARE

## 2023-01-30 DIAGNOSIS — E78.5 HYPERLIPIDEMIA, UNSPECIFIED HYPERLIPIDEMIA TYPE: Primary | ICD-10-CM

## 2023-01-30 RX ORDER — ATORVASTATIN CALCIUM 40 MG/1
40 TABLET, FILM COATED ORAL DAILY
Qty: 90 TABLET | Refills: 3 | Status: SHIPPED | OUTPATIENT
Start: 2023-01-30 | End: 2023-02-20 | Stop reason: SDUPTHER

## 2023-01-30 NOTE — TELEPHONE ENCOUNTER
Notified patient of normal echo results.  He has decided to start statin medication therefore sent in atorvastatin 40mg daily to his pharmacy.   Plan to repeat lipids in 3 months    Annia Patel MD PGY VI  Cardiology  Pager: [phone]  [facility]

## 2023-02-06 ENCOUNTER — OFFICE VISIT (OUTPATIENT)
Dept: RHEUMATOLOGY | Facility: CLINIC | Age: 73
End: 2023-02-06
Payer: MEDICARE

## 2023-02-06 VITALS
SYSTOLIC BLOOD PRESSURE: 128 MMHG | OXYGEN SATURATION: 96 % | HEIGHT: 73 IN | WEIGHT: 205 LBS | DIASTOLIC BLOOD PRESSURE: 67 MMHG | BODY MASS INDEX: 27.17 KG/M2 | HEART RATE: 66 BPM

## 2023-02-06 DIAGNOSIS — M06.9 RHEUMATOID ARTHRITIS INVOLVING SHOULDER, UNSPECIFIED LATERALITY, UNSPECIFIED WHETHER RHEUMATOID FACTOR PRESENT: Primary | ICD-10-CM

## 2023-02-06 DIAGNOSIS — M35.00 SJOGREN'S SYNDROME, WITH UNSPECIFIED ORGAN INVOLVEMENT: ICD-10-CM

## 2023-02-06 DIAGNOSIS — M15.9 PRIMARY OSTEOARTHRITIS INVOLVING MULTIPLE JOINTS: ICD-10-CM

## 2023-02-06 DIAGNOSIS — Z71.89 COUNSELING AND COORDINATION OF CARE: ICD-10-CM

## 2023-02-06 PROCEDURE — 99999 PR PBB SHADOW E&M-EST. PATIENT-LVL III: ICD-10-PCS | Mod: PBBFAC,,, | Performed by: INTERNAL MEDICINE

## 2023-02-06 PROCEDURE — 99214 PR OFFICE/OUTPT VISIT, EST, LEVL IV, 30-39 MIN: ICD-10-PCS | Mod: S$PBB,,, | Performed by: INTERNAL MEDICINE

## 2023-02-06 PROCEDURE — 99213 OFFICE O/P EST LOW 20 MIN: CPT | Mod: PBBFAC,PO | Performed by: INTERNAL MEDICINE

## 2023-02-06 PROCEDURE — 99999 PR PBB SHADOW E&M-EST. PATIENT-LVL III: CPT | Mod: PBBFAC,,, | Performed by: INTERNAL MEDICINE

## 2023-02-06 PROCEDURE — 99214 OFFICE O/P EST MOD 30 MIN: CPT | Mod: S$PBB,,, | Performed by: INTERNAL MEDICINE

## 2023-02-06 NOTE — PROGRESS NOTES
RHEUMATOLOGY OUTPATIENT CLINIC NOTE    2/6/2023    Attending Rheumatologist: Adrian Gallegos  Primary Care Provider: Raúl Perkins MD   Physician Requesting Consultation: No referring provider defined for this encounter.  Chief Complaint/Reason For Consultation:  Rheumatoid Arthritis      Subjective:       HPI  Rizwan Demarco Jr. is a 72 y.o. Black or  male with medical history noted below who presents for evaluation of RA.   Patient last seen Dr. Lugo in 2016, at which point no evidence of active RA.   Patient notes overall feeling well, denies chronic joint pain, occasional back pain if he over exerts himself, otherwise no issues. Does not take anything for pain. He notes overall he is fine, just came as his PCP urged him to see Rheumatology.     Today  Patient returns for follow up.   He notes he has been doing well. Not joint pain, swelling or stiffness. Has been dealing with sinus infection, dry eyes. Feels as sand in his eye, slight redness, no vision issues. Notes Sinus is getting better, has upcoming EYE.     Review of Systems   Constitutional:  Negative for chills, fatigue, fever and unexpected weight change.   HENT:  Negative for mouth sores.    Eyes:  Positive for eye dryness. Negative for redness.   Respiratory:  Negative for cough and shortness of breath.    Cardiovascular:  Negative for chest pain.   Gastrointestinal:  Negative for abdominal distention, constipation, diarrhea, nausea, vomiting and reflux.   Musculoskeletal:  Negative for arthralgias, back pain, gait problem, joint swelling, leg pain, myalgias, neck pain, neck stiffness and joint deformity.   Integumentary:  Negative for rash.   Neurological:  Negative for weakness, numbness, headaches and memory loss.   Hematological:  Negative for adenopathy. Does not bruise/bleed easily.   Psychiatric/Behavioral:  Negative for confusion, decreased concentration, sleep disturbance and suicidal ideas. The patient is not  nervous/anxious.    All other systems reviewed and are negative.     Chronic comorbid conditions affecting medical decision making today:  Past Medical History:   Diagnosis Date    Allergy     Arthritis     Rheumatoid arthritis    Back pain     Blood clotting tendency     BPH (benign prostatic hypertrophy)     Cervical spondylosis     Colon polyp 2010    adenoma    Coronary artery disease     Depression 5/8/2015    Dry eyes     Dry mouth     GERD (gastroesophageal reflux disease)     Hyperlipidemia     Hypertension     Lumbar spondylosis     Nasal obstruction     Rheumatoid arthritis     Sinusitis     Special screening for malignant neoplasm of colon 6/29/2016    Trouble in sleeping      Past Surgical History:   Procedure Laterality Date    CHALAZION EXCISION      COLONOSCOPY N/A 6/29/2016    Procedure: COLONOSCOPY;  Surgeon: Partha Saucedo MD;  Location[facility] ENDO;  Service: Endoscopy;  Laterality: N/A;    COLONOSCOPY N/A 7/24/2020    Procedure: COLONOSCOPY;  Surgeon: Ian Martinez MD;  Location: [facility] ([address] [place] [address]);  Service: Endoscopy;  Laterality: N/A;  4/16/20 - removed from 5/1/20, not called yet due to acute pain issues being addressed today - pg    CORONARY ARTERY BYPASS GRAFT      ESOPHAGOGASTRODUODENOSCOPY N/A 7/24/2020    Procedure: ESOPHAGOGASTRODUODENOSCOPY (EGD);  Surgeon: Ian Martinez MD;  Location: [facility] ([address] [place] [address]);  Service: Endoscopy;  Laterality: N/A;  4/16/20 - removed from 5/1/20, not called yet due to acute pain issues being addressed today.  4/17/20 - rescheduled 7/24/20 - pg    EXCISION TURBINATE, SUBMUCOUS      KNEE ARTHROSCOPY W/ DEBRIDEMENT      NASAL SEPTUM SURGERY      TONSILLECTOMY       Family History   Problem Relation Age of Onset    Hyperlipidemia Mother     Alzheimer's disease Mother     Stomach cancer Father     Colon cancer Father         from wife--he is not unsure     Cataracts Other     No Known Problems Maternal Aunt     No Known Problems Maternal Uncle     No Known  Problems Paternal Aunt     No Known Problems Paternal Uncle     No Known Problems Maternal Grandmother     No Known Problems Maternal Grandfather     No Known Problems Paternal Grandmother     No Known Problems Paternal Grandfather     Blindness Neg Hx     Cancer Neg Hx     Diabetes Neg Hx     Glaucoma Neg Hx     Rheum arthritis Neg Hx     Psoriasis Neg Hx     Lupus Neg Hx     Amblyopia Neg Hx     Hypertension Neg Hx     Macular degeneration Neg Hx     Retinal detachment Neg Hx     Strabismus Neg Hx     Stroke Neg Hx     Thyroid disease Neg Hx     Esophageal cancer Neg Hx      Social History     Substance and Sexual Activity   Alcohol Use No     Social History     Tobacco Use   Smoking Status Former    Packs/day: 1.00    Years: 20.00    Pack years: 20.00    Types: Cigarettes    Quit date: 1991    Years since quittin.1   Smokeless Tobacco Never   Tobacco Comments    Quit .     Social History     Substance and Sexual Activity   Drug Use No       Current Outpatient Medications:     alfuzosin (UROXATRAL) 10 mg Tb24, Take 1 tablet (10 mg total) by mouth daily with breakfast., Disp: 90 tablet, Rfl: 3    apixaban (ELIQUIS) 5 mg Tab, Take 1 tablet (5 mg total) by mouth 2 (two) times daily., Disp: 60 tablet, Rfl: 11    artificial tears,hypromellose,,GENTEAL/SUSTANE, (SYSTANE GEL) 0.3 % Gel, 1 drop as needed., Disp: , Rfl:     aspirin (ECOTRIN) 81 MG EC tablet, Take 1 tablet (81 mg total) by mouth once daily., Disp: , Rfl: 0    atorvastatin (LIPITOR) 40 MG tablet, Take 1 tablet (40 mg total) by mouth once daily., Disp: 90 tablet, Rfl: 3    budesonide (PULMICORT) 0.25 mg/2 mL nebulizer solution, Take 2 mLs (0.25 mg total) by nebulization 2 (two) times daily. Controller, Disp: 90 each, Rfl: 5    cinnamon bark 500 mg capsule, Take 500 mg by mouth once daily., Disp: , Rfl:     docusate sodium (COLACE) 100 MG capsule, Take 1 capsule (100 mg total) by mouth 2 (two) times daily., Disp: 60 capsule, Rfl: 0     hydroCHLOROthiazide (MICROZIDE) 12.5 mg capsule, Take 1 capsule (12.5 mg total) by mouth once daily., Disp: 90 capsule, Rfl: 3    lisinopriL 10 MG tablet, Take 1 tablet (10 mg total) by mouth once daily., Disp: 90 tablet, Rfl: 3    multivitamin (THERAGRAN) per tablet, Take by mouth.  Tablet Oral Every day, Disp: , Rfl:     omega-3 fatty acids 1,250 mg Cap, Take by mouth., Disp: , Rfl:     omeprazole (PRILOSEC) 20 MG capsule, Take 1 capsule (20 mg total) by mouth once daily., Disp: 30 capsule, Rfl: 5    propylene glycoL 0.6 % Drop, Apply to eye., Disp: , Rfl:     sodium bicarb-sodium chloride Pack, by sinus irrigation route., Disp: , Rfl:     erythromycin (ROMYCIN) ophthalmic ointment, Place a 1/2 inch ribbon of ointment into the lower eyelid bilaterally 4-6 times per day for 5-7 days (Patient not taking: Reported on 2/6/2023), Disp: 1 g, Rfl: 0     Objective:         Vitals:    02/06/23 1535   BP: 128/67   Pulse: 66     Physical Exam   Musculoskeletal:      Comments: Can make fist, no synovitis  Knee crepitus  Negative ankle/MTP  No tender points      Reviewed old and all outside pertinent medical records available.    All lab results personally reviewed and interpreted by me.  Lab Results   Component Value Date    WBC 5.81 02/08/2022    HGB 16.6 02/08/2022    HCT 53.0 02/08/2022    MCV 96 02/08/2022    MCH 30.0 02/08/2022    MCHC 31.3 (L) 02/08/2022    RDW 12.5 02/08/2022     02/08/2022    MPV 10.5 02/08/2022    PLTEST Adequate 09/29/2011       Lab Results   Component Value Date     02/08/2022    K 4.8 02/08/2022     02/08/2022    CO2 29 02/08/2022    GLU 92 02/08/2022    BUN 13 02/08/2022    CALCIUM 10.2 02/08/2022    PROT 7.3 02/08/2022    ALBUMIN 3.8 02/08/2022    BILITOT 0.7 02/08/2022    AST 13 02/08/2022    ALKPHOS 46 (L) 02/08/2022    ALT 13 02/08/2022       Lab Results   Component Value Date    COLORU Yellow 02/08/2022    APPEARANCEUA Clear 02/08/2022    SPECGRAV 1.015 02/08/2022    PHUR  7.5 06/28/2022    PROTEINUA Negative 02/08/2022    KETONESU Negative 02/08/2022    LEUKOCYTESUR Negative 02/08/2022    NITRITE Negative 02/08/2022    UROBILINOGEN Negative 02/08/2022       Lab Results   Component Value Date    CRP 6.8 05/11/2016       Lab Results   Component Value Date    SEDRATE 1 01/21/2021       Lab Results   Component Value Date    SEDRATE 1 01/21/2021       No components found for: 25OHVITDTOT, 09DNMLNN2, 66YKVDXA7, METHODNOTE    Lab Results   Component Value Date    URICACID 7.8 (H) 05/24/2019       No components found for: TSPOTTB        Imaging:  All imaging reviewed and independently interpreted by me.         ASSESSMENT / PLAN:     Rizwan Demarco Jr. is a 72 y.o. Black or  male with:      1. Rheumatoid arthritis involving shoulder, unspecified laterality, unspecified whether rheumatoid factor present  - patient with Hx of RA  - prior HCQ use, stopped due to skin hyperpigmentation  - his joints continue to do well, I am curious about his EYE if they are due to dryness or allergies or possible RA, he has appointment with EYE advised to inform me of outcome   - reassurance    2. Sjogren's syndrome, with unspecified organ involvement  - patient with 2/2 Sjogrens  - upcoming EYE visit   - artificial tear use encouraged  - reassurance     3. Primary osteoarthritis involving multiple joints  - patient does have OA  - well controlled  -reassurance and exercise     4. Other specified counseling  - over 10 minutes spent regarding below topics:  - Immunization counseling done.  - Weight loss counseling done.  - Nutrition and exercise counseling.  - Limitation of alcohol consumption.  - Regular exercise:  Aerobic and resistance.  - Medication counseling provided.      Follow up in about 6 months (around 8/6/2023).    Method of contact with patient concerns: Ramiro attn Rheumatology    Disclaimer:  This note is prepared using voice recognition software and as such is likely to have  errors and has not been proof read. Please contact me for questions.     Time spent: 30 minutes in face to face discussion concerning diagnosis, prognosis, review of lab and test results, benefits of treatment as well as management of disease, counseling of patient and coordination of care between various health care providers.  Greater than half the time spent was used for coordination of care and counseling of patient.    Adrian Gallegos M.D.  Rheumatology Department   [facility]

## 2023-02-08 ENCOUNTER — OFFICE VISIT (OUTPATIENT)
Dept: OPTOMETRY | Facility: CLINIC | Age: 73
End: 2023-02-08
Payer: MEDICARE

## 2023-02-08 DIAGNOSIS — H10.13 ALLERGIC CONJUNCTIVITIS, BILATERAL: Primary | ICD-10-CM

## 2023-02-08 PROCEDURE — 92014 PR EYE EXAM, EST PATIENT,COMPREHESV: ICD-10-PCS | Mod: S$PBB,,, | Performed by: OPTOMETRIST

## 2023-02-08 PROCEDURE — 99211 OFF/OP EST MAY X REQ PHY/QHP: CPT | Mod: PBBFAC | Performed by: OPTOMETRIST

## 2023-02-08 PROCEDURE — 92014 COMPRE OPH EXAM EST PT 1/>: CPT | Mod: S$PBB,,, | Performed by: OPTOMETRIST

## 2023-02-08 PROCEDURE — 99999 PR PBB SHADOW E&M-EST. PATIENT-LVL I: CPT | Mod: PBBFAC,,, | Performed by: OPTOMETRIST

## 2023-02-08 PROCEDURE — 99999 PR PBB SHADOW E&M-EST. PATIENT-LVL I: ICD-10-PCS | Mod: PBBFAC,,, | Performed by: OPTOMETRIST

## 2023-02-08 NOTE — PROGRESS NOTES
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            Assessment /Plan     For exam results, see Encounter Report.    Allergic conjunctivitis, bilateral  -Pataday Green Label BID till better QAM there after      RTC 6 mo full DFE

## 2023-02-13 ENCOUNTER — LAB VISIT (OUTPATIENT)
Dept: LAB | Facility: HOSPITAL | Age: 73
End: 2023-02-13
Attending: FAMILY MEDICINE
Payer: MEDICARE

## 2023-02-13 DIAGNOSIS — N40.1 BENIGN PROSTATIC HYPERPLASIA WITH WEAK URINARY STREAM: ICD-10-CM

## 2023-02-13 DIAGNOSIS — I10 ESSENTIAL HYPERTENSION: ICD-10-CM

## 2023-02-13 DIAGNOSIS — R79.9 ABNORMAL FINDING OF BLOOD CHEMISTRY: ICD-10-CM

## 2023-02-13 DIAGNOSIS — R06.02 SHORTNESS OF BREATH: ICD-10-CM

## 2023-02-13 DIAGNOSIS — M35.00 SJOGREN'S SYNDROME, WITH UNSPECIFIED ORGAN INVOLVEMENT: ICD-10-CM

## 2023-02-13 DIAGNOSIS — R39.12 BENIGN PROSTATIC HYPERPLASIA WITH WEAK URINARY STREAM: ICD-10-CM

## 2023-02-13 LAB
ALBUMIN SERPL BCP-MCNC: 3.5 G/DL (ref 3.5–5.2)
ALP SERPL-CCNC: 53 U/L (ref 55–135)
ALT SERPL W/O P-5'-P-CCNC: 14 U/L (ref 10–44)
ANION GAP SERPL CALC-SCNC: 10 MMOL/L (ref 8–16)
AST SERPL-CCNC: 15 U/L (ref 10–40)
BASOPHILS # BLD AUTO: 0.04 K/UL (ref 0–0.2)
BASOPHILS NFR BLD: 0.7 % (ref 0–1.9)
BILIRUB SERPL-MCNC: 0.6 MG/DL (ref 0.1–1)
BILIRUB UR QL STRIP: NEGATIVE
BUN SERPL-MCNC: 8 MG/DL (ref 8–23)
CALCIUM SERPL-MCNC: 9.4 MG/DL (ref 8.7–10.5)
CHLORIDE SERPL-SCNC: 105 MMOL/L (ref 95–110)
CHOLEST SERPL-MCNC: 155 MG/DL (ref 120–199)
CHOLEST/HDLC SERPL: 3.9 {RATIO} (ref 2–5)
CLARITY UR: CLEAR
CO2 SERPL-SCNC: 28 MMOL/L (ref 23–29)
COLOR UR: YELLOW
CREAT SERPL-MCNC: 1.2 MG/DL (ref 0.5–1.4)
DIFFERENTIAL METHOD: ABNORMAL
EOSINOPHIL # BLD AUTO: 0.2 K/UL (ref 0–0.5)
EOSINOPHIL NFR BLD: 4.1 % (ref 0–8)
ERYTHROCYTE [DISTWIDTH] IN BLOOD BY AUTOMATED COUNT: 12.1 % (ref 11.5–14.5)
EST. GFR  (NO RACE VARIABLE): >60 ML/MIN/1.73 M^2
ESTIMATED AVG GLUCOSE: 114 MG/DL (ref 68–131)
GLUCOSE SERPL-MCNC: 100 MG/DL (ref 70–110)
GLUCOSE UR QL STRIP: NEGATIVE
HBA1C MFR BLD: 5.6 % (ref 4–5.6)
HCT VFR BLD AUTO: 49.6 % (ref 40–54)
HDLC SERPL-MCNC: 40 MG/DL (ref 40–75)
HDLC SERPL: 25.8 % (ref 20–50)
HGB BLD-MCNC: 15.6 G/DL (ref 14–18)
HGB UR QL STRIP: NEGATIVE
IMM GRANULOCYTES # BLD AUTO: 0.01 K/UL (ref 0–0.04)
IMM GRANULOCYTES NFR BLD AUTO: 0.2 % (ref 0–0.5)
KETONES UR QL STRIP: NEGATIVE
LDLC SERPL CALC-MCNC: 106 MG/DL (ref 63–159)
LEUKOCYTE ESTERASE UR QL STRIP: NEGATIVE
LYMPHOCYTES # BLD AUTO: 3.2 K/UL (ref 1–4.8)
LYMPHOCYTES NFR BLD: 60.3 % (ref 18–48)
MCH RBC QN AUTO: 29.8 PG (ref 27–31)
MCHC RBC AUTO-ENTMCNC: 31.5 G/DL (ref 32–36)
MCV RBC AUTO: 95 FL (ref 82–98)
MONOCYTES # BLD AUTO: 0.5 K/UL (ref 0.3–1)
MONOCYTES NFR BLD: 9 % (ref 4–15)
NEUTROPHILS # BLD AUTO: 1.4 K/UL (ref 1.8–7.7)
NEUTROPHILS NFR BLD: 25.7 % (ref 38–73)
NITRITE UR QL STRIP: NEGATIVE
NONHDLC SERPL-MCNC: 115 MG/DL
NRBC BLD-RTO: 0 /100 WBC
PH UR STRIP: 7 [PH] (ref 5–8)
PLATELET # BLD AUTO: 162 K/UL (ref 150–450)
PMV BLD AUTO: 10.7 FL (ref 9.2–12.9)
POTASSIUM SERPL-SCNC: 4 MMOL/L (ref 3.5–5.1)
PROT SERPL-MCNC: 6.6 G/DL (ref 6–8.4)
PROT UR QL STRIP: NEGATIVE
RBC # BLD AUTO: 5.23 M/UL (ref 4.6–6.2)
SODIUM SERPL-SCNC: 143 MMOL/L (ref 136–145)
SP GR UR STRIP: 1.01 (ref 1–1.03)
TRIGL SERPL-MCNC: 45 MG/DL (ref 30–150)
TSH SERPL DL<=0.005 MIU/L-ACNC: 0.64 UIU/ML (ref 0.4–4)
URN SPEC COLLECT METH UR: NORMAL
UROBILINOGEN UR STRIP-ACNC: NEGATIVE EU/DL
WBC # BLD AUTO: 5.34 K/UL (ref 3.9–12.7)

## 2023-02-13 PROCEDURE — 81003 URINALYSIS AUTO W/O SCOPE: CPT | Performed by: FAMILY MEDICINE

## 2023-02-13 PROCEDURE — 80061 LIPID PANEL: CPT | Performed by: FAMILY MEDICINE

## 2023-02-13 PROCEDURE — 36415 COLL VENOUS BLD VENIPUNCTURE: CPT | Mod: PO | Performed by: FAMILY MEDICINE

## 2023-02-13 PROCEDURE — 83036 HEMOGLOBIN GLYCOSYLATED A1C: CPT | Performed by: FAMILY MEDICINE

## 2023-02-13 PROCEDURE — 84443 ASSAY THYROID STIM HORMONE: CPT | Performed by: FAMILY MEDICINE

## 2023-02-13 PROCEDURE — 80053 COMPREHEN METABOLIC PANEL: CPT | Performed by: FAMILY MEDICINE

## 2023-02-13 PROCEDURE — 85025 COMPLETE CBC W/AUTO DIFF WBC: CPT | Performed by: FAMILY MEDICINE

## 2023-02-20 ENCOUNTER — OFFICE VISIT (OUTPATIENT)
Dept: FAMILY MEDICINE | Facility: CLINIC | Age: 73
End: 2023-02-20
Payer: MEDICARE

## 2023-02-20 VITALS
BODY MASS INDEX: 27.64 KG/M2 | TEMPERATURE: 98 F | HEIGHT: 73 IN | HEART RATE: 56 BPM | OXYGEN SATURATION: 98 % | WEIGHT: 208.56 LBS | DIASTOLIC BLOOD PRESSURE: 60 MMHG | SYSTOLIC BLOOD PRESSURE: 132 MMHG

## 2023-02-20 DIAGNOSIS — E78.5 HYPERLIPIDEMIA, UNSPECIFIED HYPERLIPIDEMIA TYPE: ICD-10-CM

## 2023-02-20 DIAGNOSIS — K21.00 GASTROESOPHAGEAL REFLUX DISEASE WITH ESOPHAGITIS WITHOUT HEMORRHAGE: ICD-10-CM

## 2023-02-20 DIAGNOSIS — I10 ESSENTIAL HYPERTENSION: ICD-10-CM

## 2023-02-20 DIAGNOSIS — M47.812 CERVICAL SPONDYLOSIS: Primary | Chronic | ICD-10-CM

## 2023-02-20 DIAGNOSIS — R39.9 LOWER URINARY TRACT SYMPTOMS (LUTS): ICD-10-CM

## 2023-02-20 PROCEDURE — 99999 PR PBB SHADOW E&M-EST. PATIENT-LVL IV: ICD-10-PCS | Mod: PBBFAC,,, | Performed by: FAMILY MEDICINE

## 2023-02-20 PROCEDURE — 99214 PR OFFICE/OUTPT VISIT, EST, LEVL IV, 30-39 MIN: ICD-10-PCS | Mod: S$PBB,,, | Performed by: FAMILY MEDICINE

## 2023-02-20 PROCEDURE — 99214 OFFICE O/P EST MOD 30 MIN: CPT | Mod: S$PBB,,, | Performed by: FAMILY MEDICINE

## 2023-02-20 PROCEDURE — 99214 OFFICE O/P EST MOD 30 MIN: CPT | Mod: PBBFAC,PO | Performed by: FAMILY MEDICINE

## 2023-02-20 PROCEDURE — 99999 PR PBB SHADOW E&M-EST. PATIENT-LVL IV: CPT | Mod: PBBFAC,,, | Performed by: FAMILY MEDICINE

## 2023-02-20 RX ORDER — OMEPRAZOLE 20 MG/1
20 CAPSULE, DELAYED RELEASE ORAL DAILY
Qty: 30 CAPSULE | Refills: 5 | Status: SHIPPED | OUTPATIENT
Start: 2023-02-20 | End: 2024-01-06 | Stop reason: SDUPTHER

## 2023-02-20 RX ORDER — ATORVASTATIN CALCIUM 40 MG/1
40 TABLET, FILM COATED ORAL DAILY
Qty: 90 TABLET | Refills: 3 | Status: SHIPPED | OUTPATIENT
Start: 2023-02-20 | End: 2024-02-09 | Stop reason: SDUPTHER

## 2023-02-20 RX ORDER — HYDROCHLOROTHIAZIDE 12.5 MG/1
12.5 CAPSULE ORAL DAILY
Qty: 90 CAPSULE | Refills: 3 | Status: SHIPPED | OUTPATIENT
Start: 2023-02-20 | End: 2024-02-09 | Stop reason: SDUPTHER

## 2023-02-20 RX ORDER — LISINOPRIL 10 MG/1
10 TABLET ORAL DAILY
Qty: 90 TABLET | Refills: 3 | Status: SHIPPED | OUTPATIENT
Start: 2023-02-20 | End: 2023-11-27

## 2023-02-20 RX ORDER — ALFUZOSIN HYDROCHLORIDE 10 MG/1
10 TABLET, EXTENDED RELEASE ORAL
Qty: 90 TABLET | Refills: 3 | Status: SHIPPED | OUTPATIENT
Start: 2023-02-20 | End: 2024-02-09 | Stop reason: SDUPTHER

## 2023-02-20 NOTE — PROGRESS NOTES
"HISTORY OF PRESENT ILLNESS:  Rizwan Demarco Jr. is a 72 y.o. male who presents to the clinic today for Annual Exam  .     Stable and doing well  No medication side effects noted.    He is on the blood thinner  Doing well  He is feeling better      Patient Active Problem List   Diagnosis    Sjogren's disease    GERD (gastroesophageal reflux disease)    Arteriosclerosis of coronary artery    Hyperlipidemia    Cervical spondylosis    Degeneration of intervertebral disc    Rheumatoid arthritis    Benign prostatic hyperplasia with weak urinary stream    Perennial allergic rhinitis    Essential hypertension    High frequency hearing loss    Nasal obstruction    Dysfunctions associated with sleep stages or arousal from sleep    Primary insomnia    Wears dentures    Atherosclerosis of aorta    Other personal history presenting hazards to health    Complete loss of teeth, unspecified cause, unspecified class           CARE TEAM:  Patient Care Team:  Raúl Perkins MD as PCP - General (Family Medicine)  [place] KEESHA Nascimento III, MD as Consulting Physician (Otolaryngology)  Kenzie Sandoval MA (Inactive) as Care Coordinator  Ian Martinez MD as Consulting Physician (Gastroenterology)  Angeles Harding OD as Consulting Physician (Optometry)         ROS        PHYSICAL EXAM:  /60   Pulse (!) 56   Temp 98.2 °F (36.8 °C) (Oral)   Ht 6' 1" (1.854 m)   Wt 94.6 kg (208 lb 8.9 oz)   SpO2 98%   BMI 27.52 kg/m²   Wt Readings from Last 5 Encounters:   02/20/23 94.6 kg (208 lb 8.9 oz)   02/06/23 93 kg (205 lb)   01/26/23 94.3 kg (208 lb)   01/26/23 94.4 kg (208 lb 1.8 oz)   01/24/23 93.6 kg (206 lb 5.6 oz)     BP Readings from Last 5 Encounters:   02/20/23 132/60   02/06/23 128/67   01/26/23 (!) 140/84   01/26/23 (!) 178/82   01/24/23 118/76           Nose with PND  Throat with dryness  S1 and S2 normal, no murmurs, clicks, gallops or rubs. Regular rate and rhythm. Chest is clear; no wheezes or rales. No edema or JVD.  Slight " bradycardia    Reviewed hospital      Medication List with Changes/Refills   Current Medications    APIXABAN (ELIQUIS) 5 MG TAB    Take 1 tablet (5 mg total) by mouth 2 (two) times daily.    ARTIFICIAL TEARS,HYPROMELLOSE,,GENTEAL/SUSTANE, (SYSTANE GEL) 0.3 % GEL    1 drop as needed.    ASPIRIN (ECOTRIN) 81 MG EC TABLET    Take 1 tablet (81 mg total) by mouth once daily.    BUDESONIDE (PULMICORT) 0.25 MG/2 ML NEBULIZER SOLUTION    Take 2 mLs (0.25 mg total) by nebulization 2 (two) times daily. Controller    CINNAMON BARK 500 MG CAPSULE    Take 500 mg by mouth once daily.    DOCUSATE SODIUM (COLACE) 100 MG CAPSULE    Take 1 capsule (100 mg total) by mouth 2 (two) times daily.    ERYTHROMYCIN (ROMYCIN) OPHTHALMIC OINTMENT    Place a 1/2 inch ribbon of ointment into the lower eyelid bilaterally 4-6 times per day for 5-7 days    MULTIVITAMIN (THERAGRAN) PER TABLET    Take by mouth.  Tablet Oral Every day    OMEGA-3 FATTY ACIDS 1,250 MG CAP    Take by mouth.    PROPYLENE GLYCOL 0.6 % DROP    Apply to eye.    SODIUM BICARB-SODIUM CHLORIDE PACK    by sinus irrigation route.   Changed and/or Refilled Medications    Modified Medication Previous Medication    ALFUZOSIN (UROXATRAL) 10 MG TB24 alfuzosin (UROXATRAL) 10 mg Tb24       Take 1 tablet (10 mg total) by mouth daily with breakfast.    Take 1 tablet (10 mg total) by mouth daily with breakfast.    ATORVASTATIN (LIPITOR) 40 MG TABLET atorvastatin (LIPITOR) 40 MG tablet       Take 1 tablet (40 mg total) by mouth once daily.    Take 1 tablet (40 mg total) by mouth once daily.    HYDROCHLOROTHIAZIDE (MICROZIDE) 12.5 MG CAPSULE hydroCHLOROthiazide (MICROZIDE) 12.5 mg capsule       Take 1 capsule (12.5 mg total) by mouth once daily.    Take 1 capsule (12.5 mg total) by mouth once daily.    LISINOPRIL 10 MG TABLET lisinopriL 10 MG tablet       Take 1 tablet (10 mg total) by mouth once daily.    Take 1 tablet (10 mg total) by mouth once daily.    OMEPRAZOLE (PRILOSEC) 20 MG  CAPSULE omeprazole (PRILOSEC) 20 MG capsule       Take 1 capsule (20 mg total) by mouth once daily.    Take 1 capsule (20 mg total) by mouth once daily.       ASSESSMENT AND PLAN:    Problem List Items Addressed This Visit       GERD (gastroesophageal reflux disease) (Chronic)    Relevant Medications    omeprazole (PRILOSEC) 20 MG capsule    Hyperlipidemia (Chronic)    Overview     Patient refuse statin therapy         Relevant Medications    atorvastatin (LIPITOR) 40 MG tablet    Essential hypertension    Relevant Medications    hydroCHLOROthiazide (MICROZIDE) 12.5 mg capsule     Other Visit Diagnoses       Lower urinary tract symptoms (LUTS)        Relevant Medications    alfuzosin (UROXATRAL) 10 mg Tb24        The current medical regimen is effective;  continue present plan and medications.    Dealing with dryness in throat and sinus  No signs of infection  With the sjogren's continue good hygiene  Notify if worsens and need for antibiotics.  With the cervicalgia and reduced ROM he did well with the PT in the past.  States it's under workman comp  Potential medication side effects were discussed with the patient; let me know if any occur.    Future Appointments   Date Time Provider Department Center   3/28/2023  1:30 PM Ward Schmitt MD [facility]   4/25/2023  9:40 AM Abbe Vilchis MD [facility] CARDIO [facility]       No follow-ups on file. or sooner as needed.

## 2023-03-02 ENCOUNTER — CLINICAL SUPPORT (OUTPATIENT)
Dept: REHABILITATION | Facility: HOSPITAL | Age: 73
End: 2023-03-02
Payer: MEDICARE

## 2023-03-02 DIAGNOSIS — M54.2 CERVICAL PAIN (NECK): ICD-10-CM

## 2023-03-02 DIAGNOSIS — M53.82 IMPAIRED RANGE OF MOTION OF CERVICAL SPINE: ICD-10-CM

## 2023-03-02 DIAGNOSIS — R29.3 POSTURE IMBALANCE: ICD-10-CM

## 2023-03-02 DIAGNOSIS — M47.812 CERVICAL SPONDYLOSIS: Chronic | ICD-10-CM

## 2023-03-02 PROCEDURE — 97140 MANUAL THERAPY 1/> REGIONS: CPT | Mod: PN

## 2023-03-02 PROCEDURE — 97110 THERAPEUTIC EXERCISES: CPT | Mod: PN

## 2023-03-02 PROCEDURE — 97161 PT EVAL LOW COMPLEX 20 MIN: CPT | Mod: PN

## 2023-03-02 NOTE — PLAN OF CARE
[facility]  Physical Therapy Initial Evaluation    Date: 3/2/2023   Name: Rizwan Demarco Jr.  Clinic Number: [id number]    Therapy Diagnosis:   Encounter Diagnoses   Name Primary?    Cervical spondylosis     Cervical pain (neck)     Impaired range of motion of cervical spine     Posture imbalance      Physician: Raúl Perkins MD    Physician Orders: PT Eval and Treat   Medical Diagnosis from Referral: Cervical spondylosis  Evaluation Date: 3/2/2023  Authorization Period Expiration: 02/20/2024  Plan of Care Expiration: 5-2-23  Visit # / Visits authorized: 1/ 1   Progress Note Due: 4-2-23  FOTO: 1/ 1    Precautions: Standard and hx R.A, and     Time In: 12:25 pm  Time Out: 1:25 pm   Total Appointment Time (timed & untimed codes): 60  minutes    Subjective   Date of onset: over 10 years ago  History of current condition - Rizwan reports: That he has been in therapy for the problem. Therapy has helped him. Pt states he feels cervical pain during looking up and down, and turn head side to side. He has problem to sleep. Pt states he can sleep on his side, but he experience cervical pain. Pt denies any numbness and tingling radiating down towards B UE. Pt states pain is localized in the cervical region. Pt describe cervical pain as aching pain. Pt states he knows his limitations. Pt states he has hx of back pain.     SEAMUS: No traumatic event. Pain gradually increase   Falls: No   Any dizziness or headaches:  Yes   Pain radiates: No   Pain constant or intermittent: intermittent   Any injection: yes. Many years ago     Pain:  Current 5/10, worst 10/10, best 3/10   Location: bilateral Cervical    Description: Aching  Aggravating Factors: lying down certain way, doing house shores that he has to look down   Easing Factors: nothing      Prior Therapy: yes   Social History:  lives with their family  Occupation: Retired   Prior Level of Function: Independent   Current Level of Function:  Independent     Pts goals: decrease cervical pain when looking down.     Imaging, none:      Medical History:   Past Medical History:   Diagnosis Date    Allergy     Arthritis     Rheumatoid arthritis    Back pain     Blood clotting tendency     BPH (benign prostatic hypertrophy)     Cervical spondylosis     Colon polyp 2010    adenoma    Coronary artery disease     Depression 5/8/2015    Dry eyes     Dry mouth     GERD (gastroesophageal reflux disease)     Hyperlipidemia     Hypertension     Lumbar spondylosis     Nasal obstruction     Rheumatoid arthritis     Sinusitis     Special screening for malignant neoplasm of colon 6/29/2016    Trouble in sleeping        Surgical History:   Rizwan Demarco Jr.  has a past surgical history that includes Coronary artery bypass graft; Tonsillectomy; Knee arthroscopy w/ debridement; Excision turbinate, submucous; Nasal septum surgery; Colonoscopy (N/A, 6/29/2016); Esophagogastroduodenoscopy (N/A, 7/24/2020); Colonoscopy (N/A, 7/24/2020); and Chalazion excision.    Medications:   Rizwan has a current medication list which includes the following prescription(s): alfuzosin, apixaban, systane gel, aspirin, atorvastatin, budesonide, cinnamon bark, docusate sodium, erythromycin, hydrochlorothiazide, lisinopril, multivitamin, omega-3 fatty acids, omeprazole, propylene glycol, and sodium bicarb-sodium chloride.    Allergies:   Review of patient's allergies indicates:   Allergen Reactions    Aspirin Nausea And Vomiting    Astelin [azelastine] Other (See Comments)     Dry mouth    Flomax [tamsulosin] Other (See Comments)     Nosebleed          Objective       Observation:     Posture Alignment: slouched posture;rounded shoulders     Sensation: Light touch: Intact    DTR:    GAIT DEVIATIONS: Rizwan displays no major deviation ;    Cervical Range of Motion:    Degrees Pain   Flexion 10  yes     Extension 41 Yes      Right Side Bending 20 Yes      Left Side Bending 19 Yes      Right  Rotation Mod loss yes   Left Rotation Mod  Loss  Yes       Shoulder Range of Motion:   Shoulder Left Right   Flexion WFL WFL   Abduction WFL WFL   ER WFL WFL   IR WFL WFL      Upper Extremity Strength  (R) UE  (L) UE    Shoulder elevation: 4+/5 Shoulder elevation: 4+/5   Shoulder flexion: 4+/5 Shoulder flexion: 4+/5   Shoulder Abduction: 4+/5 Shoulder abduction: 4+/5   Shoulder ER 4+/5 Shoulder ER 4+/5   Shoulder IR 4+/5 Shoulder IR 4+/5    4+/5 : 4+/5   Lower Trap 4-/5 Lower Trap 4-/5   Middle Trap 4-/5 Middle Trap 4-/5   Rhomboids 4-/5 Rhomboids 4-/5       Special Tests:  Distraction Negative    Compression Negative    Spurlings Negative    Sharp-Jermain NP   VA test NP   Lateral Flexion Alar Ligament NP   DNF test NP      Joint Mobility:   PA's Glides and  Transverse glides  to C2,C3,C4,C5,C6,  with hypomobility     Thoracic mobility:decrease mobility     Palpation: increase pain and tenderness of posterior cervical muscles.       Flexibility: decrease upper traps flexibility     PT Evaluation Completed? Yes  Discussed Plan of Care with patient: Yes    CMS Impairment/Limitation/Restriction for FOTO cervical  Survey    Therapist reviewed FOTO scores for Rizwan Demarco Jr. on 3/2/2023.   FOTO documents entered into [employer] - see Media section.    Limitation Score: 44%           TREATMENT   Treatment Time In: 12:50 pm  Treatment Time Out: 1:15 pm  Total Treatment time separate from Evaluation: 25  minutes    Rizwan received therapeutic exercises to develop strength, endurance, ROM, flexibility, and posture for 10 minutes including:    Supine cervical flexion and extension 2x10  Supine cervical rotation 2x10  Supine chin tuck 2x10 hold 3 sec     Rizwan received manual therapy to decrease soft tissue restriction for 15 min.   STM of posterior cervical muscles   Posterior cervical mobs         Home Exercises and Patient Education Provided    Education provided:   - Perform HEP 2 times per day     Written Home  Exercises Provided: Patient instructed to cont prior HEP.  Exercises were reviewed and Rizwan was able to demonstrate them prior to the end of the session.  Rizwan demonstrated good  understanding of the education provided.     See EMR under Patient Instructions for exercises provided 3/2/2023.    Assessment   Rizwan is a 72 y.o. male referred to outpatient Physical Therapy with a medical diagnosis of Cervical spondylosis. Pt presents to therapy with cervical pain. Pt ambulated with decrease posture and forwards head. Pt has decrease cervical range of motion in all planes, decrease postural muscles strength and endurance, and decrease upper traps and pect minor flexibility. During palpation, pt demonstrated severe tenderness and pain of posterior cervical muscles. Pt also demonstrated decrease of transverse process and spinal process of cervical segments causing pain, stiffness and decrease AROM. Pt will benefit from skilled PT services to decrease pain to return to PLOF.       Pt prognosis is Good.   Pt will benefit from skilled outpatient Physical Therapy to address the deficits stated above and in the chart below, provide pt/family education, and to maximize pt's level of independence.     Plan of care discussed with patient: Yes  Pt's spiritual, cultural and educational needs considered and patient is agreeable to the plan of care and goals as stated below:     Anticipated Barriers for therapy: Scheduling issues     Medical Necessity is demonstrated by the following  History  Co-morbidities and personal factors that may impact the plan of care Co-morbidities:   Allergy    Arthritis    Rheumatoid arthritis   Back pain    Blood clotting tendency    BPH (benign prostatic hypertrophy)    Cervical spondylosis    Colon polyp    adenoma   Coronary artery disease    Depression    Dry eyes    Dry mouth    GERD (gastroesophageal reflux disease)    Hyperlipidemia    Hypertension    Lumbar spondylosis    Nasal obstruction     Rheumatoid arthritis    Sinusitis    Special screening for malignant neoplasm of colon    Trouble in sleeping        Personal Factors:   no deficits     low   Examination  Body Structures and Functions, activity limitations and participation restrictions that may impact the plan of care Body Regions:   neck    Body Systems:    ROM  strength  transfers  transitions  motor control  motor learning    Participation Restrictions:   Community ambulation     Activity limitations:   Learning and applying knowledge  no deficits    General Tasks and Commands  no deficits    Communication  no deficits    Mobility  lifting and carrying objects  fine hand use (grasping/picking up)  walking    Self care  no deficits    Domestic Life  shopping  cooking  doing house work (cleaning house, washing dishes, laundry)    Interactions/Relationships  no deficits    Life Areas  no deficits    Community and Social Life  community life         Complexity: low   Clinical Presentation stable and uncomplicated low   Decision Making/ Complexity Score: low       GOALS: Short Term Goals: 4 weeks  1.Report decreased in pain at worse less than  <   / =  5  /10  to increase tolerance for functional activities. On going  2. Pt to improve Cervical range of motion  by 25% to allow for improved functional mobility to allow for improvement in IADLs.  On going  3. Pt to report decrease cervical stiffness by 50% since initial eval to improve functional evaluation.   4. Pt to tolerate HEP to improve ROM and independence with ADL's. On going    Long Term Goals: 8 weeks  1.Report decreased in pain at worse less than  <   / =  2  /10  to increase tolerance for functional activities. On going  2.Pt to improve cervical range of motion by 75% to allow for improved functional mobility to allow for improvement in IADLs. On going  3.  Pt will report 44% on FOTO neck survey score for neck pain disability to demonstrate decrease in disability and improvement in neck  pain.On going  4. Pt to be Independent with HEP to improve ROM and independence with ADL's. On going  5. Increased MMT  for lower traps/middle traps/rhomboids to > or = 4+/5 to increase tolerance for ADL and improve posture. On going      Plan   Pt to report decrease cervical stiffness by 50% since initial eval to improve functional evaluation.     Plan of care Certification: 3/2/2023 to 6-2-23.    Outpatient Physical Therapy 2 times weekly for 12 weeks to include the following interventions: Cervical/Lumbar Traction, Neuromuscular Re-ed, Patient Education, Self Care, Therapeutic Activities, and Therapeutic Exercise. Dry rosa Hurt, PT      I CERTIFY THE NEED FOR THESE SERVICES FURNISHED UNDER THIS PLAN OF TREATMENT AND WHILE UNDER MY CARE   Physician's comments:     Physician's Signature: ___________________________________________________

## 2023-03-02 NOTE — TELEPHONE ENCOUNTER
----- Message from Elodia Marti sent at 1/6/2022 11:08 AM CST -----  Regarding: Dosage  Name of Who is Calling:JOANNE BUENO JR. [[id number]]    What is the request in detail:Pt is requesting a call back to verify how he should take the medication budesonide (PULMICORT) 0.25 mg/2 mL nebulizer .    Can the clinic reply by [facility]:No     What [facility] to [facility] Back [facility][phone]           TITA Shafer

## 2023-03-06 ENCOUNTER — CLINICAL SUPPORT (OUTPATIENT)
Dept: REHABILITATION | Facility: HOSPITAL | Age: 73
End: 2023-03-06
Payer: MEDICARE

## 2023-03-06 DIAGNOSIS — R29.3 POSTURE IMBALANCE: ICD-10-CM

## 2023-03-06 DIAGNOSIS — M53.82 IMPAIRED RANGE OF MOTION OF CERVICAL SPINE: ICD-10-CM

## 2023-03-06 DIAGNOSIS — M54.2 CERVICAL PAIN (NECK): Primary | ICD-10-CM

## 2023-03-06 PROCEDURE — 97110 THERAPEUTIC EXERCISES: CPT | Mod: PN

## 2023-03-06 NOTE — PROGRESS NOTES
[facility]   Physical Therapy Treatment Note     Name: Rizwan Demarco Jr.  Clinic Number: [id number]    Therapy Diagnosis:   Encounter Diagnoses   Name Primary?    Cervical pain (neck) Yes    Impaired range of motion of cervical spine     Posture imbalance      Physician: Raúl Perkins MD    Visit Date: 3/6/2023    Physician Orders: PT Eval and Treat   Medical Diagnosis from Referral: Cervical spondylosis  Evaluation Date: 3/2/2023  Authorization Period Expiration: 02/20/2024  Plan of Care Expiration: 5-2-23  Visit # / Visits authorized: 1/ 20   Progress Note Due: 4-2-23  FOTO: 1/ 1     Precautions: Standard and hx R.A, and     PTA Visit #: 0/5     Time In: 0930AM  Time Out: 1030AM  Total Billable Time: 30 minutes (2TE)    SUBJECTIVE     Pt reports: Patient reports minimal improvement with neck pain compared to last treatment session. He expresses frustration with ongoing pain and would like some relief.  He was compliant with home exercise program.  Response to previous treatment: No pain  Functional change: Ongoing    Pain: 5/10  Location: left neck      OBJECTIVE     Objective Measures updated at progress report unless specified.     Treatment     Rizwan received the treatments listed below:      therapeutic exercises to develop strength, endurance, ROM, flexibility, and posture for 30 minutes including:  Posture exercise 30 second hold x5  Horizontal shoulder abduction in supine [id number]  Cervical rotation 2x10 2'' hold B sides  Cervical retraction 5'' hold x 20  Rows [employer] 3lbs/arm 3x10  Walls [place] 3x10    manual therapy techniques: Joint mobilizations, Manual traction, Soft tissue Mobilization, and Friction Massage were applied to the: Cervical musculature for 5 minutes, including:  Manual upper trap stretch BL [id number]  Soft tissue mobilization of BL anterior scalene, upper trap, and cervical extensor musculature.      hot pack for 00 minutes to neck.    cold pack for 00  minutes to neck.        Patient Education and Home Exercises     Home Exercises Provided and Patient Education Provided     Education provided:   - Posture correction  - Further HEP education  - Clarification in exercise technique/mechanics    Written Home Exercises Provided: Patient instructed to cont prior HEP. Exercises were reviewed and Rizwan was able to demonstrate them prior to the end of the session. Rizwan demonstrated good  understanding of the education provided. See EMR under Patient Instructions for exercises provided during therapy sessions    ASSESSMENT     Patient presents with neck pain and continues to require skilled physical therapy services to address. Improvement in exercise tolerance is noted upon visual assessment. Patient tolerated treatment well and expressed satisfaction with exercises. Patient tolerated manual therapy well with no increase in reported pain.    Rizwan Is progressing well towards his goals.   Pt prognosis is Good.     Pt will continue to benefit from skilled outpatient physical therapy to address the deficits listed in the problem list box on initial evaluation, provide pt/family education and to maximize pt's level of independence in the home and community environment.     Pt's spiritual, cultural and educational needs considered and pt agreeable to plan of care and goals.     Anticipated barriers to physical therapy: None     GOALS: Short Term Goals: 4 weeks  1.Report decreased in pain at worse less than  <   / =  5  /10  to increase tolerance for functional activities. On going  2. Pt to improve Cervical range of motion  by 25% to allow for improved functional mobility to allow for improvement in IADLs.  On going  3. Pt to report decrease cervical stiffness by 50% since initial eval to improve functional evaluation.   4. Pt to tolerate HEP to improve ROM and independence with ADL's. On going     Long Term Goals: 8 weeks  1.Report decreased in pain at worse less than  <    / =  2  /10  to increase tolerance for functional activities. On going  2.Pt to improve cervical range of motion by 75% to allow for improved functional mobility to allow for improvement in IADLs. On going  3.  Pt will report 44% on FOTO neck survey score for neck pain disability to demonstrate decrease in disability and improvement in neck pain.On going  4. Pt to be Independent with HEP to improve ROM and independence with ADL's. On going  5. Increased MMT  for lower traps/middle traps/rhomboids to > or = 4+/5 to increase tolerance for ADL and improve posture. On going    Plan   Pt to report decrease cervical stiffness by 50% since initial eval to improve functional evaluation.      Plan of care Certification: 3/2/2023 to 6-2-23.     Outpatient Physical Therapy 2 times weekly for 12 weeks to include the following interventions: Cervical/Lumbar Traction, Neuromuscular Re-ed, Patient Education, Self Care, Therapeutic Activities, and Therapeutic Exercise. Jairo Delacruz, PT, DPT

## 2023-03-08 ENCOUNTER — CLINICAL SUPPORT (OUTPATIENT)
Dept: REHABILITATION | Facility: HOSPITAL | Age: 73
End: 2023-03-08
Payer: MEDICARE

## 2023-03-08 DIAGNOSIS — R29.3 POSTURE IMBALANCE: ICD-10-CM

## 2023-03-08 DIAGNOSIS — M53.82 IMPAIRED RANGE OF MOTION OF CERVICAL SPINE: ICD-10-CM

## 2023-03-08 DIAGNOSIS — M54.2 CERVICAL PAIN (NECK): Primary | ICD-10-CM

## 2023-03-08 PROCEDURE — 97110 THERAPEUTIC EXERCISES: CPT | Mod: PN

## 2023-03-08 NOTE — PROGRESS NOTES
[facility]   Physical Therapy Treatment Note     Name: Rizwan Demarco Jr.  Clinic Number: [id number]    Therapy Diagnosis:   Encounter Diagnoses   Name Primary?    Cervical pain (neck) Yes    Impaired range of motion of cervical spine     Posture imbalance      Physician: Raúl Perkins MD    Visit Date: 3/8/2023    Physician Orders: PT Eval and Treat   Medical Diagnosis from Referral: Cervical spondylosis  Evaluation Date: 3/2/2023  Authorization Period Expiration: 02/20/2024  Plan of Care Expiration: 5-2-23  Visit # / Visits authorized: 2/ 20   Progress Note Due: 4-2-23  FOTO: 1/ 1     Precautions: Standard, hx R.A    PTA Visit #: 0/5     Time In: 0930AM  Time Out: 1030AM  Total Billable Time: 30 minutes (2TE)    SUBJECTIVE     Pt reports: continued neck pain that has not improved much since last treatment session.   He was compliant with home exercise program.  Response to previous treatment: No pain  Functional change: Ongoing    Pain: 2-3/10  Location: left neck      OBJECTIVE     Objective Measures updated at progress report unless specified.     Treatment     Rizwan received the treatments listed below:      therapeutic exercises to develop strength, endurance, ROM, flexibility, and posture for 60 minutes including:  Posture exercise 30 second hold x5  Horizontal shoulder abduction in supine [id number]  Cervical rotation 2x10 2'' hold B sides  Cervical retraction 5'' hold x 20  Rows [place] 3lbs/arm 3x10  Wall [place] 3x10 limited ROM  Standing shoulder flexion w/Free motion 3x10 3lbs/arm  Standing ER isometric walkouts at Free motion 3lbs 2x10  Standing IR isometric walkouts at Free motion 3lbs 2x10      manual therapy techniques: Joint mobilizations, Manual traction, Soft tissue Mobilization, and Friction Massage were applied to the: Cervical musculature for 00 minutes, including:  Manual upper trap stretch BL [id number]  Soft tissue mobilization of BL anterior scalene, upper trap,  and cervical extensor musculature.      hot pack for 00 minutes to neck.    cold pack for 00 minutes to neck.        Patient Education and Home Exercises     Home Exercises Provided and Patient Education Provided     Education provided:   - Posture correction  - Further HEP education  - Clarification in exercise technique/mechanics    Written Home Exercises Provided: Patient instructed to cont prior HEP. Exercises were reviewed and Rizwan was able to demonstrate them prior to the end of the session. Rizwan demonstrated good  understanding of the education provided. See EMR under Patient Instructions for exercises provided during therapy sessions    ASSESSMENT     Patient reports improved pain to a 2-3/10 after AROM exercises including cervical retractions. Introduction of shoulder stabilization exercises was provided to improve functional mobility and posture, these exercises were tolerated well with no increase in pain at neck. Continuation of skilled physical therapy is indicated to continue progressing postural strengthening and pain relief of patient's neck.    Rizwan Is progressing well towards his goals.   Pt prognosis is Good.     Pt will continue to benefit from skilled outpatient physical therapy to address the deficits listed in the problem list box on initial evaluation, provide pt/family education and to maximize pt's level of independence in the home and community environment.     Pt's spiritual, cultural and educational needs considered and pt agreeable to plan of care and goals.     Anticipated barriers to physical therapy: None     GOALS: Short Term Goals: 4 weeks  1.Report decreased in pain at worse less than  <   / =  5  /10  to increase tolerance for functional activities. On going  2. Pt to improve Cervical range of motion  by 25% to allow for improved functional mobility to allow for improvement in IADLs.  On going  3. Pt to report decrease cervical stiffness by 50% since initial eval to  improve functional evaluation.   4. Pt to tolerate HEP to improve ROM and independence with ADL's. On going     Long Term Goals: 8 weeks  1.Report decreased in pain at worse less than  <   / =  2  /10  to increase tolerance for functional activities. On going  2.Pt to improve cervical range of motion by 75% to allow for improved functional mobility to allow for improvement in IADLs. On going  3.  Pt will report 44% on FOTO neck survey score for neck pain disability to demonstrate decrease in disability and improvement in neck pain.On going  4. Pt to be Independent with HEP to improve ROM and independence with ADL's. On going  5. Increased MMT  for lower traps/middle traps/rhomboids to > or = 4+/5 to increase tolerance for ADL and improve posture. On going    Plan   Pt to report decrease cervical stiffness by 50% since initial eval to improve functional evaluation.      Plan of care Certification: 3/2/2023 to 6-2-23.     Outpatient Physical Therapy 2 times weekly for 12 weeks to include the following interventions: Cervical/Lumbar Traction, Neuromuscular Re-ed, Patient Education, Self Care, Therapeutic Activities, and Therapeutic Exercise. Jairo Delacruz, PT, DPT

## 2023-03-14 ENCOUNTER — CLINICAL SUPPORT (OUTPATIENT)
Dept: REHABILITATION | Facility: HOSPITAL | Age: 73
End: 2023-03-14
Payer: MEDICARE

## 2023-03-14 DIAGNOSIS — R29.3 POSTURE IMBALANCE: ICD-10-CM

## 2023-03-14 DIAGNOSIS — M53.82 IMPAIRED RANGE OF MOTION OF CERVICAL SPINE: ICD-10-CM

## 2023-03-14 DIAGNOSIS — M54.2 CERVICAL PAIN (NECK): Primary | ICD-10-CM

## 2023-03-14 PROCEDURE — 97110 THERAPEUTIC EXERCISES: CPT | Mod: PN

## 2023-03-14 NOTE — PROGRESS NOTES
"[facility]   Physical Therapy Treatment Note     Name: Rizwan Demarco Jr.  Clinic Number: [id number]    Therapy Diagnosis:   Encounter Diagnoses   Name Primary?    Cervical pain (neck) Yes    Impaired range of motion of cervical spine     Posture imbalance      Physician: Raúl Perkins MD    Visit Date: 3/14/2023    Physician Orders: PT Eval and Treat   Medical Diagnosis from Referral: Cervical spondylosis  Evaluation Date: 3/2/2023  Authorization Period Expiration: 02/20/2024  Plan of Care Expiration: 5-2-23  Visit # / Visits authorized: 2/ 20   Progress Note Due: 4-2-23  FOTO: 1/ 1     Precautions: Standard, hx R.A    PTA Visit #: 0/5     Time In: 0930AM  Time Out: 1030AM  Total Billable Time: 30 minutes (2TE)    SUBJECTIVE     Pt reports: Pain was aggravated after playing dominoes. Woke up feeling "a little pain in the neck" and "must have slept on it wrong" per patient report.  He was compliant with home exercise program.  Response to previous treatment: No pain  Functional change: Ongoing    Pain: 5/10  Location: left neck      OBJECTIVE     Objective Measures updated at progress report unless specified.     Treatment     Rizwan received the treatments listed below:    Bold=performed    therapeutic exercises to develop strength, endurance, ROM, flexibility, and posture for 50 minutes including:  Posture exercise 30 second hold x5  Cervical retraction 5'' hold x 20  Cervical retraction w/ extension 5'' hold x20  Cervical rotation 2x10 2'' hold B sides  Rows [place] 7lbs/arm 3x10  Standing shoulder flexion w/Free motion 3x10 3lbs/arm  Standing ER isometric walkouts at Free motion 3lbs 2x10    Standing IR isometric walkouts at Free motion 3lbs 2x10  Horizontal shoulder abduction in supine [id number]  Wall [place] 3x10 limited ROM    manual therapy techniques: Joint mobilizations, Manual traction, Soft tissue Mobilization, and Friction Massage were applied to the: Cervical musculature " for 00 minutes, including:  Manual upper trap stretch BL [id number]  Soft tissue mobilization of BL anterior scalene, upper trap, and cervical extensor musculature.      hot pack for 00 minutes to neck.    cold pack for 10 minutes to neck.        Patient Education and Home Exercises     Home Exercises Provided and Patient Education Provided     Education provided:   - Posture correction  - Further HEP education  - Clarification in exercise technique/mechanics    Written Home Exercises Provided: Patient instructed to cont prior HEP. Exercises were reviewed and Rizwan was able to demonstrate them prior to the end of the session. Rizwan demonstrated good  understanding of the education provided. See EMR under Patient Instructions for exercises provided during therapy sessions    ASSESSMENT     Patient reported to treatment session and participated in all noted exercises with minimal increase in pain. Pain in neck continues to be elicited at end range of motion of neck in all planes of movement. Patient reported feeling deep achey feeling in neck at end of treatment and wanted to rest. Patient was provided ice and reported feeling much better.    Rizwan Is progressing well towards his goals.   Pt prognosis is Good.     Pt will continue to benefit from skilled outpatient physical therapy to address the deficits listed in the problem list box on initial evaluation, provide pt/family education and to maximize pt's level of independence in the home and community environment.     Pt's spiritual, cultural and educational needs considered and pt agreeable to plan of care and goals.     Anticipated barriers to physical therapy: None     GOALS: Short Term Goals: 4 weeks  1.Report decreased in pain at worse less than  <   / =  5  /10  to increase tolerance for functional activities. On going  2. Pt to improve Cervical range of motion  by 25% to allow for improved functional mobility to allow for improvement in IADLs.  On going  3.  Pt to report decrease cervical stiffness by 50% since initial eval to improve functional evaluation.   4. Pt to tolerate HEP to improve ROM and independence with ADL's. On going     Long Term Goals: 8 weeks  1.Report decreased in pain at worse less than  <   / =  2  /10  to increase tolerance for functional activities. On going  2.Pt to improve cervical range of motion by 75% to allow for improved functional mobility to allow for improvement in IADLs. On going  3.  Pt will report 44% on FOTO neck survey score for neck pain disability to demonstrate decrease in disability and improvement in neck pain.On going  4. Pt to be Independent with HEP to improve ROM and independence with ADL's. On going  5. Increased MMT  for lower traps/middle traps/rhomboids to > or = 4+/5 to increase tolerance for ADL and improve posture. On going    Plan   Pt to report decrease cervical stiffness by 50% since initial eval to improve functional evaluation.      Plan of care Certification: 3/2/2023 to 6-2-23.     Outpatient Physical Therapy 2 times weekly for 12 weeks to include the following interventions: Cervical/Lumbar Traction, Neuromuscular Re-ed, Patient Education, Self Care, Therapeutic Activities, and Therapeutic Exercise. Jairo Delacruz, PT, DPT

## 2023-03-17 ENCOUNTER — CLINICAL SUPPORT (OUTPATIENT)
Dept: REHABILITATION | Facility: HOSPITAL | Age: 73
End: 2023-03-17
Payer: MEDICARE

## 2023-03-17 DIAGNOSIS — R29.3 POSTURE IMBALANCE: ICD-10-CM

## 2023-03-17 DIAGNOSIS — M53.82 IMPAIRED RANGE OF MOTION OF CERVICAL SPINE: ICD-10-CM

## 2023-03-17 DIAGNOSIS — M54.2 CERVICAL PAIN (NECK): Primary | ICD-10-CM

## 2023-03-17 PROCEDURE — 97140 MANUAL THERAPY 1/> REGIONS: CPT | Mod: PN

## 2023-03-17 PROCEDURE — 97110 THERAPEUTIC EXERCISES: CPT | Mod: PN

## 2023-03-17 NOTE — PROGRESS NOTES
[facility]   Physical Therapy Treatment Note     Name: Rizwan Demarco Jr.  Clinic Number: [id number]    Therapy Diagnosis:   Encounter Diagnoses   Name Primary?    Cervical pain (neck) Yes    Impaired range of motion of cervical spine     Posture imbalance        Physician: Raúl Perkins MD    Visit Date: 3/17/2023    Physician Orders: PT Eval and Treat   Medical Diagnosis from Referral: Cervical spondylosis  Evaluation Date: 3/2/2023  Authorization Period Expiration: 02/20/2024  Plan of Care Expiration: 5-2-23  Visit # / Visits authorized: 3/ 20   Progress Note Due: 4-2-23  FOTO: 1/ 1     Precautions: Standard, hx R.A    PTA Visit #: 0/5     Time In: 9:30 am  Time Out: 10:40 am   Total Billable Time: 60  minutes     SUBJECTIVE     Pt reports: that he has cervical pain. He states he felt increase cervical pain after last PT session. He states after last PT session he felt some dizziness.   He was compliant with home exercise program.  Response to previous treatment: No pain  Functional change: Ongoing    Pain: 5/10  Location: left neck      OBJECTIVE     Objective Measures updated at progress report unless specified.     Treatment     Rizwan received the treatments listed below:    Bold=performed    therapeutic exercises to develop strength, endurance, ROM, flexibility, and posture for 50 minutes including:    UBE 6 min   Posture exercise 30 second hold x5  Seated Cervical retraction 5'' hold x 20  Seated Cervical retraction w/ extension 5'' hold x20  Seated Cervical rotation 2x10 2'' hold B sides  Seated upper traps stretch 3x30 sec  Seated levator stretch 3x30 sec   Seated trunk extension 2x10  Seated No moneys' YTB   Seated scapular retraction YtTB 3x10   Standing shoulder extension YTB 3x10    Not performed:   Rows [place] 7lbs/arm 3x10  Standing shoulder flexion w/Free motion 3x10 3lbs/arm  Standing ER isometric walkouts at Free motion 3lbs 2x10  Standing IR isometric walkouts  at Free motion 3lbs 2x10  Horizontal shoulder abduction in supine [id number]  Wall [place] 3x10 limited ROM    manual therapy techniques: Joint mobilizations, Manual traction, Soft tissue Mobilization, and Friction Massage were applied to the: Cervical musculature for 10 minutes, including:  Manual upper trap stretch BL [id number]  Soft tissue mobilization of BL anterior scalene, upper trap, and cervical extensor musculature.      hot pack for 00 minutes to neck.    cold pack for 10 minutes to neck.        Patient Education and Home Exercises     Home Exercises Provided and Patient Education Provided     Education provided:   - Posture correction  - Further HEP education  - Clarification in exercise technique/mechanics    Written Home Exercises Provided: Patient instructed to cont prior HEP. Exercises were reviewed and Rizwan was able to demonstrate them prior to the end of the session. Rizwan demonstrated good  understanding of the education provided. See EMR under Patient Instructions for exercises provided during therapy sessions    ASSESSMENT     Pt presented to therapy with cervical pain. Exercises were regressed today since pt experience increase in cervical pain after last PT session. Pt responded well in seated exercises with no provocation of cervical pain. Postural exercises and deep cervical muscles strength were performed today. Pt required mod v/c's to perform exercises with proper form and proper muscles activation. Manual therapy performed to suboccipital muscles. Soft tissue restriction and tightness was noted. Pt also has decreased upper traps flexibility. Plan to cont assess  cervical pain.     Rizwan Is progressing well towards his goals.   Pt prognosis is Good.     Pt will continue to benefit from skilled outpatient physical therapy to address the deficits listed in the problem list box on initial evaluation, provide pt/family education and to maximize pt's level of independence in the home and  community environment.     Pt's spiritual, cultural and educational needs considered and pt agreeable to plan of care and goals.     Anticipated barriers to physical therapy: None     GOALS: Short Term Goals: 4 weeks  1.Report decreased in pain at worse less than  <   / =  5  /10  to increase tolerance for functional activities. On going  2. Pt to improve Cervical range of motion  by 25% to allow for improved functional mobility to allow for improvement in IADLs.  On going  3. Pt to report decrease cervical stiffness by 50% since initial eval to improve functional evaluation.   4. Pt to tolerate HEP to improve ROM and independence with ADL's. On going     Long Term Goals: 8 weeks  1.Report decreased in pain at worse less than  <   / =  2  /10  to increase tolerance for functional activities. On going  2.Pt to improve cervical range of motion by 75% to allow for improved functional mobility to allow for improvement in IADLs. On going  3.  Pt will report 44% on FOTO neck survey score for neck pain disability to demonstrate decrease in disability and improvement in neck pain.On going  4. Pt to be Independent with HEP to improve ROM and independence with ADL's. On going  5. Increased MMT  for lower traps/middle traps/rhomboids to > or = 4+/5 to increase tolerance for ADL and improve posture. On going    Plan   Pt to report decrease cervical stiffness by 50% since initial eval to improve functional evaluation.      Plan of care Certification: 3/2/2023 to 6-2-23.     Outpatient Physical Therapy 2 times weekly for 12 weeks to include the following interventions: Cervical/Lumbar Traction, Neuromuscular Re-ed, Patient Education, Self Care, Therapeutic Activities, and Therapeutic Exercise. Jairo Hurt, PT, DPT

## 2023-03-21 ENCOUNTER — CLINICAL SUPPORT (OUTPATIENT)
Dept: REHABILITATION | Facility: HOSPITAL | Age: 73
End: 2023-03-21
Payer: MEDICARE

## 2023-03-21 DIAGNOSIS — M54.2 CERVICAL PAIN (NECK): Primary | ICD-10-CM

## 2023-03-21 DIAGNOSIS — R29.3 POSTURE IMBALANCE: ICD-10-CM

## 2023-03-21 DIAGNOSIS — M53.82 IMPAIRED RANGE OF MOTION OF CERVICAL SPINE: ICD-10-CM

## 2023-03-21 PROCEDURE — 97140 MANUAL THERAPY 1/> REGIONS: CPT | Mod: PN

## 2023-03-21 PROCEDURE — 97110 THERAPEUTIC EXERCISES: CPT | Mod: PN

## 2023-03-21 NOTE — PROGRESS NOTES
[facility]   Physical Therapy Treatment Note     Name: Rizwan Demarco Jr.  Clinic Number: [id number]    Therapy Diagnosis:   Encounter Diagnoses   Name Primary?    Cervical pain (neck) Yes    Impaired range of motion of cervical spine     Posture imbalance          Physician: Raúl Perkins MD    Visit Date: 3/21/2023    Physician Orders: PT Eval and Treat   Medical Diagnosis from Referral: Cervical spondylosis  Evaluation Date: 3/2/2023  Authorization Period Expiration: 02/20/2024  Plan of Care Expiration: 5-2-23  Visit # / Visits authorized: 4/ 20   Progress Note Due: 4-2-23  FOTO: 1/ 1     Precautions: Standard, hx R.A    PTA Visit #: 0/5     Time In: 9:23 am  Time Out: 10:23 am   Total Billable Time: 60 minutes     SUBJECTIVE     Pt reports: that he felt better after last PT session. Pt states she did have less cervical pain. He did not have any dizziness.   He was compliant with home exercise program.  Response to previous treatment: No pain  Functional change: Ongoing    Pain: 3/10  Location: left neck      OBJECTIVE     Objective Measures updated at progress report unless specified.     Treatment     Rizwan received the treatments listed below:    Bold=performed    therapeutic exercises to develop strength, endurance, ROM, flexibility, and posture for 50 minutes including:    UBE 6 min   Seated Cervical retraction 5'' hold x 20  Seated Cervical retraction w/ extension 5'' hold x20  Seated Cervical rotation 2x10 2'' hold B sides  Seated upper traps stretch 3x30 sec  Seated levator stretch 3x30 sec   Seated trunk extension 2x10  Seated No moneys' YTB 3x10  Seated scapular retraction YtTB 3x10   Standing shoulder extension YTB 3x10    Not performed:   Rows [place] 7lbs/arm 3x10  Standing shoulder flexion w/Free motion 3x10 3lbs/arm  Standing ER isometric walkouts at Free motion 3lbs 2x10  Standing IR isometric walkouts at Free motion 3lbs 2x10  Horizontal shoulder abduction in  supine [id number]  Wall [place] 3x10 limited ROM    manual therapy techniques: Joint mobilizations, Manual traction, Soft tissue Mobilization, and Friction Massage were applied to the: Cervical musculature for 10 minutes, including:  Manual upper trap stretch BL [id number]  Soft tissue mobilization of BL anterior scalene, upper trap, and cervical extensor musculature.      hot pack for 00 minutes to neck.    cold pack for 00 minutes to neck.        Patient Education and Home Exercises     Home Exercises Provided and Patient Education Provided     Education provided:   - Posture correction  - Further HEP education  - Clarification in exercise technique/mechanics    Written Home Exercises Provided: Patient instructed to cont prior HEP. Exercises were reviewed and Rizwan was able to demonstrate them prior to the end of the session. Rizwan demonstrated good  understanding of the education provided. See EMR under Patient Instructions for exercises provided during therapy sessions    ASSESSMENT     Pt presented to therapy with decrease cervical pain. Same exercises performed as last PT session. Pt responded well with no increase of cervical pain. Postural exercises and deep cervical muscles strength were performed today. Pt required mod v/c's to perform exercises with proper form and proper muscles activation. Manual therapy performed to suboccipital muscles. Soft tissue restriction and tightness was noted. Pt also has decreased upper traps flexibility. HEP was given today.  Plan to cont assess  cervical pain.     Rizwan Is progressing well towards his goals.   Pt prognosis is Good.     Pt will continue to benefit from skilled outpatient physical therapy to address the deficits listed in the problem list box on initial evaluation, provide pt/family education and to maximize pt's level of independence in the home and community environment.     Pt's spiritual, cultural and educational needs considered and pt agreeable to plan of  care and goals.     Anticipated barriers to physical therapy: None     GOALS: Short Term Goals: 4 weeks  1.Report decreased in pain at worse less than  <   / =  5  /10  to increase tolerance for functional activities. On going  2. Pt to improve Cervical range of motion  by 25% to allow for improved functional mobility to allow for improvement in IADLs.  On going  3. Pt to report decrease cervical stiffness by 50% since initial eval to improve functional evaluation.   4. Pt to tolerate HEP to improve ROM and independence with ADL's. On going     Long Term Goals: 8 weeks  1.Report decreased in pain at worse less than  <   / =  2  /10  to increase tolerance for functional activities. On going  2.Pt to improve cervical range of motion by 75% to allow for improved functional mobility to allow for improvement in IADLs. On going  3.  Pt will report 44% on FOTO neck survey score for neck pain disability to demonstrate decrease in disability and improvement in neck pain.On going  4. Pt to be Independent with HEP to improve ROM and independence with ADL's. On going  5. Increased MMT  for lower traps/middle traps/rhomboids to > or = 4+/5 to increase tolerance for ADL and improve posture. On going    Plan   Pt to report decrease cervical stiffness by 50% since initial eval to improve functional evaluation.      Plan of care Certification: 3/2/2023 to 6-2-23.     Outpatient Physical Therapy 2 times weekly for 12 weeks to include the following interventions: Cervical/Lumbar Traction, Neuromuscular Re-ed, Patient Education, Self Care, Therapeutic Activities, and Therapeutic Exercise. Dry needling       Mike Hurt, PT, DPT

## 2023-03-23 ENCOUNTER — CLINICAL SUPPORT (OUTPATIENT)
Dept: REHABILITATION | Facility: HOSPITAL | Age: 73
End: 2023-03-23
Payer: MEDICARE

## 2023-03-23 DIAGNOSIS — M54.2 CERVICAL PAIN (NECK): Primary | ICD-10-CM

## 2023-03-23 DIAGNOSIS — R29.3 POSTURE IMBALANCE: ICD-10-CM

## 2023-03-23 DIAGNOSIS — M53.82 IMPAIRED RANGE OF MOTION OF CERVICAL SPINE: ICD-10-CM

## 2023-03-23 PROCEDURE — 97140 MANUAL THERAPY 1/> REGIONS: CPT | Mod: PN

## 2023-03-23 PROCEDURE — 97110 THERAPEUTIC EXERCISES: CPT | Mod: PN

## 2023-03-23 NOTE — PROGRESS NOTES
[facility]   Physical Therapy Treatment Note     Name: Rizwan Demarco Jr.  Clinic Number: [id number]    Therapy Diagnosis:   Encounter Diagnoses   Name Primary?    Cervical pain (neck) Yes    Impaired range of motion of cervical spine     Posture imbalance            Physician: Raúl Perkins MD    Visit Date: 3/23/2023    Physician Orders: PT Eval and Treat   Medical Diagnosis from Referral: Cervical spondylosis  Evaluation Date: 3/2/2023  Authorization Period Expiration: 02/20/2024  Plan of Care Expiration: 5-2-23  Visit # / Visits authorized: 6/ 20   Progress Note Due: 4-2-23  FOTO: 1/ 1     Precautions: Standard, hx R.A    PTA Visit #: 0/5     Time In: 9:30 am  Time Out: 10:40 am   Total Billable Time: 30 minutes     SUBJECTIVE     Pt reports: that he felt better after last PT session. He feels some stiffness this morning.   He was compliant with home exercise program.  Response to previous treatment: No pain  Functional change: Ongoing    Pain: 2/10  Location: left neck      OBJECTIVE     Objective Measures updated at progress report unless specified.     Treatment     Rizwan received the treatments listed below:    Bold=performed    therapeutic exercises to develop strength, endurance, ROM, flexibility, and posture for 50 minutes including:    UBE 6 min   Seated Cervical retraction 5'' hold x 30  Seated Cervical retraction w/ extension 5'' hold x30  Seated Cervical rotation 3x10 2'' hold B sides  Seated upper traps stretch 3x30 sec  Seated levator stretch 3x30 sec   Seated trunk extension 3x10  Seated No moneys' YTB 3x10  Seated scapular retraction RTB 3x10   Standing shoulder extension RTB 3x10    Not performed:   Rows [employer] 7lbs/arm 3x10  Standing shoulder flexion w/Free motion 3x10 3lbs/arm  Standing ER isometric walkouts at Free motion 3lbs 2x10  Standing IR isometric walkouts at Free motion 3lbs 2x10  Horizontal shoulder abduction in supine [id number]  Wall [place] 3x10  limited ROM    manual therapy techniques: Joint mobilizations, Manual traction, Soft tissue Mobilization, and Friction Massage were applied to the: Cervical musculature for 10 minutes, including:  Manual upper trap stretch BL [id number]  Soft tissue mobilization of BL anterior scalene, upper trap, and cervical extensor musculature.      hot pack for 00 minutes to neck.    cold pack for 00 minutes to neck.        Patient Education and Home Exercises     Home Exercises Provided and Patient Education Provided     Education provided:   - Posture correction  - Further HEP education  - Clarification in exercise technique/mechanics    Written Home Exercises Provided: Patient instructed to cont prior HEP. Exercises were reviewed and Rizwan was able to demonstrate them prior to the end of the session. Rizwan demonstrated good  understanding of the education provided. See EMR under Patient Instructions for exercises provided during therapy sessions    ASSESSMENT     Pt presented to therapy with decrease cervical pain.Progression of exercises to 3 sets of 10 reps. Addition of SNAGS cervical rotation with increase range of motion. Pt responded well with no increase of cervical pain. Postural exercises and deep cervical muscles strength were performed today. Pt required mod v/c's to perform exercises with proper form and proper muscles activation. Manual therapy performed to suboccipital muscles. Soft tissue restriction and tightness was noted. Pt also has decreased upper traps flexibility. HEP was given today.  Plan to cont assess  cervical pain.     Rizwan Is progressing well towards his goals.   Pt prognosis is Good.     Pt will continue to benefit from skilled outpatient physical therapy to address the deficits listed in the problem list box on initial evaluation, provide pt/family education and to maximize pt's level of independence in the home and community environment.     Pt's spiritual, cultural and educational needs  considered and pt agreeable to plan of care and goals.     Anticipated barriers to physical therapy: None     GOALS: Short Term Goals: 4 weeks  1.Report decreased in pain at worse less than  <   / =  5  /10  to increase tolerance for functional activities. On going  2. Pt to improve Cervical range of motion  by 25% to allow for improved functional mobility to allow for improvement in IADLs.  On going  3. Pt to report decrease cervical stiffness by 50% since initial eval to improve functional evaluation.   4. Pt to tolerate HEP to improve ROM and independence with ADL's. On going     Long Term Goals: 8 weeks  1.Report decreased in pain at worse less than  <   / =  2  /10  to increase tolerance for functional activities. On going  2.Pt to improve cervical range of motion by 75% to allow for improved functional mobility to allow for improvement in IADLs. On going  3.  Pt will report 44% on FOTO neck survey score for neck pain disability to demonstrate decrease in disability and improvement in neck pain.On going  4. Pt to be Independent with HEP to improve ROM and independence with ADL's. On going  5. Increased MMT  for lower traps/middle traps/rhomboids to > or = 4+/5 to increase tolerance for ADL and improve posture. On going    Plan   Pt to report decrease cervical stiffness by 50% since initial eval to improve functional evaluation.      Plan of care Certification: 3/2/2023 to 6-2-23.     Outpatient Physical Therapy 2 times weekly for 12 weeks to include the following interventions: Cervical/Lumbar Traction, Neuromuscular Re-ed, Patient Education, Self Care, Therapeutic Activities, and Therapeutic Exercise. Dry needling       Mike Hurt, PT, DPT

## 2023-03-27 ENCOUNTER — CLINICAL SUPPORT (OUTPATIENT)
Dept: REHABILITATION | Facility: HOSPITAL | Age: 73
End: 2023-03-27
Payer: MEDICARE

## 2023-03-27 DIAGNOSIS — M53.82 IMPAIRED RANGE OF MOTION OF CERVICAL SPINE: ICD-10-CM

## 2023-03-27 DIAGNOSIS — R29.3 POSTURE IMBALANCE: ICD-10-CM

## 2023-03-27 DIAGNOSIS — M54.2 CERVICAL PAIN (NECK): Primary | ICD-10-CM

## 2023-03-27 PROCEDURE — 97110 THERAPEUTIC EXERCISES: CPT | Mod: PN

## 2023-03-27 NOTE — PROGRESS NOTES
[facility]   Physical Therapy Treatment Note     Name: Rizwan Demarco Jr.  Clinic Number: [id number]    Therapy Diagnosis:   Encounter Diagnoses   Name Primary?    Cervical pain (neck) Yes    Impaired range of motion of cervical spine     Posture imbalance          Physician: Raúl Perkins MD    Visit Date: 3/27/2023    Physician Orders: PT Eval and Treat   Medical Diagnosis from Referral: Cervical spondylosis  Evaluation Date: 3/2/2023  Authorization Period Expiration: 02/20/2024  Plan of Care Expiration: 5-2-23  Visit # / Visits authorized: 7/ 20   Progress Note Due: 4-2-23  FOTO: 1/ 1     Precautions: Standard, hx R.A    PTA Visit #: 0/5     Time In: 12:15 pm  Time Out: 1:10 pm   Total Billable Time: 55 minutes     SUBJECTIVE     Pt reports: that he still feel neck pain specially in the morning. He states some pain radiated to R shoulder. Pt feels like he improved about 70% since started therapy. He states he can turn his head further. He can look up and down and tilt his head side to side. Pt states he can turn to side when he is driving. Pt states cervical pain at worse since start therapy is about 2-3/10. Pt states he is ok to be d/c today and cont therapy on his own  He was compliant with home exercise program.  Response to previous treatment: No pain  Functional change: Ongoing    Pain: 1/10  Location: left neck      OBJECTIVE     Objective Measures updated at progress report unless specified.          Cervical Range of Motion:     Degrees Pain   Flexion 40 Np      Extension 63 no      Right Side Bending 35 No. Pain only in the end of range       Left Side Bending 38 No. Pain only in the end of range       Right Rotation WFL Slightly pain   Left Rotation WFL  Slightly pain           Treatment     Rizwan received the treatments listed below:    Bold=performed    therapeutic exercises to develop strength, endurance, ROM, flexibility, and posture for 60 minutes including:    UBE 6  min   Seated Cervical retraction 5'' hold x 30  Seated Cervical retraction w/ extension 5'' hold x30  Seated Cervical rotation 3x10 2'' hold B sides  Seated upper traps stretch 3x30 sec  Seated levator stretch 3x30 sec   Seated trunk extension 3x10  Seated No moneys' YTB 3x10  Seated scapular retraction RTB 3x10   Standing shoulder extension RTB 3x10    Not performed:   Rows [place] 7lbs/arm 3x10  Standing shoulder flexion w/Free motion 3x10 3lbs/arm  Standing ER isometric walkouts at Free motion 3lbs 2x10  Standing IR isometric walkouts at Free motion 3lbs 2x10  Horizontal shoulder abduction in supine [id number]  Wall [place] 3x10 limited ROM    manual therapy techniques: Joint mobilizations, Manual traction, Soft tissue Mobilization, and Friction Massage were applied to the: Cervical musculature for 00 minutes, including:  Manual upper trap stretch BL [id number]  Soft tissue mobilization of BL anterior scalene, upper trap, and cervical extensor musculature.      hot pack for 00 minutes to neck.    cold pack for 00 minutes to neck.        Patient Education and Home Exercises     Home Exercises Provided and Patient Education Provided     Education provided:   - Posture correction  - Further HEP education  - Clarification in exercise technique/mechanics    Written Home Exercises Provided: Patient instructed to cont prior HEP. Exercises were reviewed and Rizwan was able to demonstrate them prior to the end of the session. Rizwan demonstrated good  understanding of the education provided. See EMR under Patient Instructions for exercises provided during therapy sessions    ASSESSMENT     Pt presented to therapy with decrease cervical pain. Pt was re-assessed today. Pt has improved cervical AROM in all planes with slightly pain during cervical motion. Cervical stiffness has decreased since he started therapy. Pt has improved FOTO cervical survey. Pt demonstrated 46% limitations on FOTO survey. Pt has met 4/4 STGs and 2/5  LTGs. Overall, pt has been improving in therapy. Pt has been independent with HEP. PT and Pt agreed to be d/c today and cont exercises at home.  Plan to be d/c with HEP today.       Rizwan Is progressing well towards his goals.   Pt prognosis is Good.     Pt will continue to benefit from skilled outpatient physical therapy to address the deficits listed in the problem list box on initial evaluation, provide pt/family education and to maximize pt's level of independence in the home and community environment.     Pt's spiritual, cultural and educational needs considered and pt agreeable to plan of care and goals.     Anticipated barriers to physical therapy: None     GOALS: Short Term Goals: 4 weeks  1.Report decreased in pain at worse less than  <   / =  5  /10  to increase tolerance for functional activities. Goal met 3-27-23  2. Pt to improve Cervical range of motion  by 25% to allow for improved functional mobility to allow for improvement in IADLs.  Goal met 3-27-23  3. Pt to report decrease cervical stiffness by 50% since initial eval to improve functional evaluation.Goal met 3-27-23  4. Pt to tolerate HEP to improve ROM and independence with ADL's. Goal met 3-27-23     Long Term Goals: 8 weeks  1.Report decreased in pain at worse less than  <   / =  2  /10  to increase tolerance for functional activities. Goal  partial met 3-27-23  2.Pt to improve cervical range of motion by 75% to allow for improved functional mobility to allow for improvement in IADLs. Goal met 3-27-23  3.  Pt will report 44% on FOTO neck survey score for neck pain disability to demonstrate decrease in disability and improvement in neck pain. Goal not met 3-27-23  4. Pt to be Independent with HEP to improve ROM and independence with ADL's. Goal met 3-27-23  5. Increased MMT  for lower traps/middle traps/rhomboids to > or = 4+/5 to increase tolerance for ADL and improve posture. NP    Plan   Plan to be d/c today with HEP.     Mike Hurt  PT, DPT

## 2023-03-28 ENCOUNTER — OFFICE VISIT (OUTPATIENT)
Dept: OTOLARYNGOLOGY | Facility: CLINIC | Age: 73
End: 2023-03-28
Payer: MEDICARE

## 2023-03-28 VITALS
WEIGHT: 205 LBS | BODY MASS INDEX: 27.17 KG/M2 | DIASTOLIC BLOOD PRESSURE: 69 MMHG | TEMPERATURE: 98 F | SYSTOLIC BLOOD PRESSURE: 149 MMHG | HEIGHT: 73 IN | HEART RATE: 61 BPM

## 2023-03-28 DIAGNOSIS — Z98.890 S/P FESS (FUNCTIONAL ENDOSCOPIC SINUS SURGERY): ICD-10-CM

## 2023-03-28 DIAGNOSIS — J01.90 ACUTE SINUSITIS, RECURRENCE NOT SPECIFIED, UNSPECIFIED LOCATION: Primary | ICD-10-CM

## 2023-03-28 PROCEDURE — 99213 OFFICE O/P EST LOW 20 MIN: CPT | Mod: S$PBB,,, | Performed by: STUDENT IN AN ORGANIZED HEALTH CARE EDUCATION/TRAINING PROGRAM

## 2023-03-28 PROCEDURE — 99213 OFFICE O/P EST LOW 20 MIN: CPT | Mod: PBBFAC | Performed by: STUDENT IN AN ORGANIZED HEALTH CARE EDUCATION/TRAINING PROGRAM

## 2023-03-28 PROCEDURE — 99999 PR PBB SHADOW E&M-EST. PATIENT-LVL III: ICD-10-PCS | Mod: PBBFAC,,, | Performed by: STUDENT IN AN ORGANIZED HEALTH CARE EDUCATION/TRAINING PROGRAM

## 2023-03-28 PROCEDURE — 99213 PR OFFICE/OUTPT VISIT, EST, LEVL III, 20-29 MIN: ICD-10-PCS | Mod: S$PBB,,, | Performed by: STUDENT IN AN ORGANIZED HEALTH CARE EDUCATION/TRAINING PROGRAM

## 2023-03-28 PROCEDURE — 99999 PR PBB SHADOW E&M-EST. PATIENT-LVL III: CPT | Mod: PBBFAC,,, | Performed by: STUDENT IN AN ORGANIZED HEALTH CARE EDUCATION/TRAINING PROGRAM

## 2023-03-28 NOTE — PROGRESS NOTES
"    Subjective:      Rizwan is a 72 y.o. male who comes for follow-up of sinusitis.  His last visit with me was on 1/24/2023.  Has been doing really well. Nasal drainage improved. No nasal obstruction. Finished course of doxy.     His current sinus regime consists of: Saline.     The assessment of quality and severity of symptoms as measured by the SNOT-22 score is 6.    The patient's medications, allergies, past medical, surgical, social and family histories were reviewed and updated as appropriate.    A detailed review of systems was obtained with pertinent positives as per the above HPI, and otherwise negative.        Objective:     BP (!) 149/69   Pulse 61   Temp 98.2 °F (36.8 °C) (Oral)   Ht 6' 1" (1.854 m)   Wt 93 kg (205 lb 0.4 oz)   BMI 27.05 kg/m²        Constitutional:   Vital signs are normal. He appears well-developed and well-nourished.     Head:  Normocephalic and atraumatic.     Ears:  Hearing normal to normal and whispered voice; external ear normal without scars, lesions, or masses; ear canal, tympanic membrane, and middle ear normal..   Right Ear: No swelling. Tympanic membrane is not perforated and not bulging. No middle ear effusion.   Left Ear: No swelling. Tympanic membrane is not perforated and not bulging.  No middle ear effusion.     Nose:  Nose normal including turbinates, nasal mucosa, sinuses and nasal septum. No epistaxis.     Mouth/Throat  Oropharynx clear and moist without lesions or asymmetry and normal uvula midline. Normal dentition. No tonsillar abscesses. Tonsillar exudate.      Neck:  Neck normal without thyromegaly masses, asymmetry, normal tracheal structure, crepitus, and tenderness, thyroid normal, trachea normal, phonation normal, full range of motion with neck supple and no adenopathy. No stridor present.        Head (right side): No submental adenopathy present.        Head (left side): No submental adenopathy present.     He has no cervical adenopathy. "     Pulmonary/Chest:   No stridor.     Procedure    None    Data Reviewed    WBC (K/uL)   Date Value   02/13/2023 5.34     Eosinophil % (%)   Date Value   02/13/2023 4.1     Eos # (K/uL)   Date Value   02/13/2023 0.2     Platelets (K/uL)   Date Value   02/13/2023 162     Glucose (mg/dL)   Date Value   02/13/2023 100     IgE (IU/mL)   Date Value   09/30/2015 302 (H)     No sinus imaging available.    Assessment:     1. Acute sinusitis, recurrence not specified, unspecified location    2. S/P FESS (functional endoscopic sinus surgery)         Plan:     - Sinusitis resolved  - Cont nasal saline  - f/u PRN    Ward Schmitt MD

## 2023-04-13 ENCOUNTER — OFFICE VISIT (OUTPATIENT)
Dept: UROLOGY | Facility: CLINIC | Age: 73
End: 2023-04-13
Payer: MEDICARE

## 2023-04-13 VITALS
BODY MASS INDEX: 27.17 KG/M2 | DIASTOLIC BLOOD PRESSURE: 74 MMHG | HEIGHT: 73 IN | HEART RATE: 46 BPM | SYSTOLIC BLOOD PRESSURE: 136 MMHG | WEIGHT: 205 LBS

## 2023-04-13 DIAGNOSIS — N13.8 BPH WITH URINARY OBSTRUCTION: Primary | ICD-10-CM

## 2023-04-13 DIAGNOSIS — N40.1 BPH WITH URINARY OBSTRUCTION: Primary | ICD-10-CM

## 2023-04-13 PROCEDURE — 99213 PR OFFICE/OUTPT VISIT, EST, LEVL III, 20-29 MIN: ICD-10-PCS | Mod: S$PBB,,, | Performed by: UROLOGY

## 2023-04-13 PROCEDURE — 99999 PR PBB SHADOW E&M-EST. PATIENT-LVL III: CPT | Mod: PBBFAC,,, | Performed by: UROLOGY

## 2023-04-13 PROCEDURE — 99999 PR PBB SHADOW E&M-EST. PATIENT-LVL III: ICD-10-PCS | Mod: PBBFAC,,, | Performed by: UROLOGY

## 2023-04-13 PROCEDURE — 99213 OFFICE O/P EST LOW 20 MIN: CPT | Mod: PBBFAC | Performed by: UROLOGY

## 2023-04-13 PROCEDURE — 99213 OFFICE O/P EST LOW 20 MIN: CPT | Mod: S$PBB,,, | Performed by: UROLOGY

## 2023-04-13 NOTE — PROGRESS NOTES
Subjective:       Patient ID: Rizwan Demarco Jr. is a 72 y.o. male.    Chief Complaint: Follow-up (1 year follow up BPH)    HPI patient is here for prostate check.  He is voiding well has a good stream and empties his bladder.  He does take alfuzosin.  No complaints    Past Medical History:   Diagnosis Date    Allergy     Arthritis     Rheumatoid arthritis    Back pain     Blood clotting tendency     BPH (benign prostatic hypertrophy)     Cervical spondylosis     Colon polyp 2010    adenoma    Coronary artery disease     Depression 5/8/2015    Dry eyes     Dry mouth     GERD (gastroesophageal reflux disease)     Hyperlipidemia     Hypertension     Lumbar spondylosis     Nasal obstruction     Rheumatoid arthritis     Sinusitis     Special screening for malignant neoplasm of colon 6/29/2016    Trouble in sleeping        Past Surgical History:   Procedure Laterality Date    CHALAZION EXCISION      COLONOSCOPY N/A 6/29/2016    Procedure: COLONOSCOPY;  Surgeon: Partha Saucedo MD;  Location: [facility];  Service: Endoscopy;  Laterality: N/A;    COLONOSCOPY N/A 7/24/2020    Procedure: COLONOSCOPY;  Surgeon: Ian Martinez MD;  Location: [facility] ([address] [place] [address]);  Service: Endoscopy;  Laterality: N/A;  4/16/20 - removed from 5/1/20, not called yet due to acute pain issues being addressed today - pg    CORONARY ARTERY BYPASS GRAFT      ESOPHAGOGASTRODUODENOSCOPY N/A 7/24/2020    Procedure: ESOPHAGOGASTRODUODENOSCOPY (EGD);  Surgeon: Ian Martinez MD;  Location: [facility] ([address] [place] [address]);  Service: Endoscopy;  Laterality: N/A;  4/16/20 - removed from 5/1/20, not called yet due to acute pain issues being addressed today.  4/17/20 - rescheduled 7/24/20 - pg    EXCISION TURBINATE, SUBMUCOUS      KNEE ARTHROSCOPY W/ DEBRIDEMENT      NASAL SEPTUM SURGERY      TONSILLECTOMY         Family History   Problem Relation Age of Onset    Hyperlipidemia Mother     Alzheimer's disease Mother     Stomach cancer Father     Colon cancer Father          from wife--he is not unsure     Cataracts Other     No Known Problems Maternal Aunt     No Known Problems Maternal Uncle     No Known Problems Paternal Aunt     No Known Problems Paternal Uncle     No Known Problems Maternal Grandmother     No Known Problems Maternal Grandfather     No Known Problems Paternal Grandmother     No Known Problems Paternal Grandfather     Blindness Neg Hx     Cancer Neg Hx     Diabetes Neg Hx     Glaucoma Neg Hx     Rheum arthritis Neg Hx     Psoriasis Neg Hx     Lupus Neg Hx     Amblyopia Neg Hx     Hypertension Neg Hx     Macular degeneration Neg Hx     Retinal detachment Neg Hx     Strabismus Neg Hx     Stroke Neg Hx     Thyroid disease Neg Hx     Esophageal cancer Neg Hx        Social History     Socioeconomic History    Marital status:    Tobacco Use    Smoking status: Former     Packs/day: 1.00     Years: 20.00     Pack years: 20.00     Types: Cigarettes     Quit date: 1991     Years since quittin.3    Smokeless tobacco: Never    Tobacco comments:     Quit .   Substance and Sexual Activity    Alcohol use: No    Drug use: No    Sexual activity: Yes     Partners: Female     Social Determinants of Health     Financial Resource Strain: Low Risk     Difficulty of Paying Living Expenses: Not hard at all   Food Insecurity: No Food Insecurity    Worried About Running Out of Food in the Last Year: Never true    Ran Out of Food in the Last Year: Never true   Transportation Needs: No Transportation Needs    Lack of Transportation (Medical): No    Lack of Transportation (Non-Medical): No   Physical Activity: Inactive    Days of Exercise per Week: 0 days    Minutes of Exercise per Session: 0 min   Stress: No Stress Concern Present    Feeling of Stress : Not at all   Social Connections: Moderately Isolated    Frequency of Communication with Friends and Family: More than three times a week    Frequency of Social Gatherings with Friends and Family: Once a week    Attends  [employer] Services: Never    Active Member of Clubs or Organizations: No    Attends Club or Organization Meetings: Never    Marital Status:    Housing Stability: Low Risk     Unable to Pay for Housing in the Last Year: No    Number of Places Lived in the Last Year: 1    Unstable Housing in the Last Year: No       Allergies:  Aspirin, Astelin [azelastine], and Flomax [tamsulosin]    Medications:    Current Outpatient Medications:     alfuzosin (UROXATRAL) 10 mg Tb24, Take 1 tablet (10 mg total) by mouth daily with breakfast., Disp: 90 tablet, Rfl: 3    apixaban (ELIQUIS) 5 mg Tab, Take 1 tablet (5 mg total) by mouth 2 (two) times daily., Disp: 60 tablet, Rfl: 11    artificial tears,hypromellose,,GENTEAL/SUSTANE, (SYSTANE GEL) 0.3 % Gel, 1 drop as needed., Disp: , Rfl:     aspirin (ECOTRIN) 81 MG EC tablet, Take 1 tablet (81 mg total) by mouth once daily., Disp: , Rfl: 0    atorvastatin (LIPITOR) 40 MG tablet, Take 1 tablet (40 mg total) by mouth once daily., Disp: 90 tablet, Rfl: 3    budesonide (PULMICORT) 0.25 mg/2 mL nebulizer solution, Take 2 mLs (0.25 mg total) by nebulization 2 (two) times daily. Controller, Disp: 90 each, Rfl: 5    cinnamon bark 500 mg capsule, Take 500 mg by mouth once daily., Disp: , Rfl:     docusate sodium (COLACE) 100 MG capsule, Take 1 capsule (100 mg total) by mouth 2 (two) times daily., Disp: 60 capsule, Rfl: 0    hydroCHLOROthiazide (MICROZIDE) 12.5 mg capsule, Take 1 capsule (12.5 mg total) by mouth once daily., Disp: 90 capsule, Rfl: 3    lisinopriL 10 MG tablet, Take 1 tablet (10 mg total) by mouth once daily., Disp: 90 tablet, Rfl: 3    multivitamin (THERAGRAN) per tablet, Take by mouth.  Tablet Oral Every day, Disp: , Rfl:     omega-3 fatty acids 1,250 mg Cap, Take by mouth., Disp: , Rfl:     omeprazole (PRILOSEC) 20 MG capsule, Take 1 capsule (20 mg total) by mouth once daily., Disp: 30 capsule, Rfl: 5    propylene glycoL 0.6 % Drop, Apply to eye., Disp: , Rfl:     sodium  bicarb-sodium chloride Pack, by sinus irrigation route., Disp: , Rfl:     Review of Systems   Constitutional:  Negative for activity change, appetite change, chills, diaphoresis, fatigue, fever and unexpected weight change.   HENT:  Negative for congestion, dental problem, hearing loss, mouth sores, postnasal drip, rhinorrhea, sinus pressure and trouble swallowing.    Eyes:  Negative for pain, discharge and itching.   Respiratory:  Negative for apnea, cough, choking, chest tightness, shortness of breath and wheezing.    Cardiovascular:  Negative for chest pain, palpitations and leg swelling.   Gastrointestinal:  Negative for abdominal distention, abdominal pain, anal bleeding, blood in stool, constipation, diarrhea, nausea, rectal pain and vomiting.   Endocrine: Negative for polydipsia and polyuria.   Genitourinary:  Negative for decreased urine volume, difficulty urinating, dysuria, enuresis, flank pain, frequency, genital sores, hematuria, penile discharge, penile pain, penile swelling, scrotal swelling, testicular pain and urgency.   Musculoskeletal:  Negative for arthralgias, back pain and myalgias.   Skin:  Negative for color change, rash and wound.   Neurological:  Negative for dizziness, syncope, speech difficulty, light-headedness and headaches.   Hematological:  Negative for adenopathy. Does not bruise/bleed easily.   Psychiatric/Behavioral:  Negative for behavioral problems, confusion, hallucinations and sleep disturbance.      Objective:      Physical Exam  Constitutional:       Appearance: He is well-developed.   HENT:      Head: Normocephalic.   Cardiovascular:      Rate and Rhythm: Normal rate.   Pulmonary:      Effort: Pulmonary effort is normal.   Abdominal:      Palpations: Abdomen is soft.   Genitourinary:     Prostate: Normal.      Comments: 3540 g benign  Skin:     General: Skin is warm.   Neurological:      Mental Status: He is alert.       Assessment:       1. BPH with urinary obstruction         Plan:       Rizwan was seen today for follow-up.    Diagnoses and all orders for this visit:    BPH with urinary obstruction        Continue alfuzosin return to clinic 1 year for BERENICE

## 2023-04-19 ENCOUNTER — PES CALL (OUTPATIENT)
Dept: ADMINISTRATIVE | Facility: CLINIC | Age: 73
End: 2023-04-19
Payer: MEDICARE

## 2023-04-25 ENCOUNTER — PATIENT OUTREACH (OUTPATIENT)
Dept: ADMINISTRATIVE | Facility: HOSPITAL | Age: 73
End: 2023-04-25
Payer: MEDICARE

## 2023-04-25 ENCOUNTER — OFFICE VISIT (OUTPATIENT)
Dept: CARDIOLOGY | Facility: CLINIC | Age: 73
End: 2023-04-25
Payer: MEDICARE

## 2023-04-25 VITALS
HEART RATE: 65 BPM | DIASTOLIC BLOOD PRESSURE: 80 MMHG | BODY MASS INDEX: 27.23 KG/M2 | RESPIRATION RATE: 18 BRPM | OXYGEN SATURATION: 98 % | HEIGHT: 73 IN | SYSTOLIC BLOOD PRESSURE: 173 MMHG | WEIGHT: 205.5 LBS

## 2023-04-25 DIAGNOSIS — E78.2 MIXED HYPERLIPIDEMIA: Chronic | ICD-10-CM

## 2023-04-25 DIAGNOSIS — I73.9 PAD (PERIPHERAL ARTERY DISEASE): Primary | ICD-10-CM

## 2023-04-25 DIAGNOSIS — I70.0 ATHEROSCLEROSIS OF AORTA: ICD-10-CM

## 2023-04-25 DIAGNOSIS — R07.9 CHEST PAIN, UNSPECIFIED TYPE: ICD-10-CM

## 2023-04-25 DIAGNOSIS — I25.10 ARTERIOSCLEROSIS OF CORONARY ARTERY: ICD-10-CM

## 2023-04-25 DIAGNOSIS — K21.00 GASTROESOPHAGEAL REFLUX DISEASE WITH ESOPHAGITIS WITHOUT HEMORRHAGE: Chronic | ICD-10-CM

## 2023-04-25 DIAGNOSIS — R09.89 POOR CIRCULATION: ICD-10-CM

## 2023-04-25 DIAGNOSIS — M35.00 SJOGREN'S SYNDROME, WITH UNSPECIFIED ORGAN INVOLVEMENT: ICD-10-CM

## 2023-04-25 PROCEDURE — 99214 OFFICE O/P EST MOD 30 MIN: CPT | Mod: S$PBB,,, | Performed by: INTERNAL MEDICINE

## 2023-04-25 PROCEDURE — 99999 PR PBB SHADOW E&M-EST. PATIENT-LVL IV: ICD-10-PCS | Mod: PBBFAC,,, | Performed by: INTERNAL MEDICINE

## 2023-04-25 PROCEDURE — 99999 PR PBB SHADOW E&M-EST. PATIENT-LVL IV: CPT | Mod: PBBFAC,,, | Performed by: INTERNAL MEDICINE

## 2023-04-25 PROCEDURE — 99214 PR OFFICE/OUTPT VISIT, EST, LEVL IV, 30-39 MIN: ICD-10-PCS | Mod: S$PBB,,, | Performed by: INTERNAL MEDICINE

## 2023-04-25 PROCEDURE — 99214 OFFICE O/P EST MOD 30 MIN: CPT | Mod: PBBFAC | Performed by: INTERNAL MEDICINE

## 2023-04-25 NOTE — PROGRESS NOTES
CARDIOVASCULAR CONSULTATION    REASON FOR CONSULT:   Rizwan Demarco Jr. is a 72 y.o. male who presents for ***.      HISTORY OF PRESENT ILLNESS:           PAST MEDICAL HISTORY:     Past Medical History:   Diagnosis Date    Allergy     Arthritis     Rheumatoid arthritis    Back pain     Blood clotting tendency     BPH (benign prostatic hypertrophy)     Cervical spondylosis     Colon polyp 2010    adenoma    Coronary artery disease     Depression 5/8/2015    Dry eyes     Dry mouth     GERD (gastroesophageal reflux disease)     Hyperlipidemia     Hypertension     Lumbar spondylosis     Nasal obstruction     Rheumatoid arthritis     Sinusitis     Special screening for malignant neoplasm of colon 6/29/2016    Trouble in sleeping        PAST SURGICAL HISTORY:     Past Surgical History:   Procedure Laterality Date    CHALAZION EXCISION      COLONOSCOPY N/A 6/29/2016    Procedure: COLONOSCOPY;  Surgeon: Partha Saucedo MD;  Location: [facility] ENDO;  Service: Endoscopy;  Laterality: N/A;    COLONOSCOPY N/A 7/24/2020    Procedure: COLONOSCOPY;  Surgeon: Ian Martinez MD;  Location: [facility] ([address] [place] [address]);  Service: Endoscopy;  Laterality: N/A;  4/16/20 - removed from 5/1/20, not called yet due to acute pain issues being addressed today - pg    CORONARY ARTERY BYPASS GRAFT      ESOPHAGOGASTRODUODENOSCOPY N/A 7/24/2020    Procedure: ESOPHAGOGASTRODUODENOSCOPY (EGD);  Surgeon: Ian Martinez MD;  Location: [facility] ([address] [place] [address]);  Service: Endoscopy;  Laterality: N/A;  4/16/20 - removed from 5/1/20, not called yet due to acute pain issues being addressed today.  4/17/20 - rescheduled 7/24/20 - pg    EXCISION TURBINATE, SUBMUCOUS      KNEE ARTHROSCOPY W/ DEBRIDEMENT      NASAL SEPTUM SURGERY      TONSILLECTOMY         ALLERGIES AND MEDICATION:     Review of patient's allergies indicates:   Allergen Reactions    Aspirin Nausea And Vomiting    Astelin [azelastine] Other (See Comments)     Dry mouth    Flomax [tamsulosin] Other (See Comments)      Nosebleed        Medication List            Accurate as of 2023  9:47 AM. If you have any questions, ask your nurse or doctor.                CONTINUE taking these medications      alfuzosin 10 mg Tb24  Commonly known as: UROXATRAL  Take 1 tablet (10 mg total) by mouth daily with breakfast.     apixaban 5 mg Tab  Commonly known as: ELIQUIS  Take 1 tablet (5 mg total) by mouth 2 (two) times daily.     aspirin 81 MG EC tablet  Commonly known as: ECOTRIN  Take 1 tablet (81 mg total) by mouth once daily.     atorvastatin 40 MG tablet  Commonly known as: LIPITOR  Take 1 tablet (40 mg total) by mouth once daily.     budesonide 0.25 mg/2 mL nebulizer solution  Commonly known as: PULMICORT  Take 2 mLs (0.25 mg total) by nebulization 2 (two) times daily. Controller     cinnamon bark 500 mg capsule     docusate sodium 100 MG capsule  Commonly known as: COLACE  Take 1 capsule (100 mg total) by mouth 2 (two) times daily.     hydroCHLOROthiazide 12.5 mg capsule  Commonly known as: MICROZIDE  Take 1 capsule (12.5 mg total) by mouth once daily.     lisinopriL 10 MG tablet  Take 1 tablet (10 mg total) by mouth once daily.     multivitamin per tablet  Commonly known as: THERAGRAN     omega-3 fatty acids 1,250 mg Cap     omeprazole 20 MG capsule  Commonly known as: PRILOSEC  Take 1 capsule (20 mg total) by mouth once daily.     propylene glycoL 0.6 % Drop     sodium bicarb-sodium chloride Pack     Systane GeL 0.3 % Gel  Generic drug: artificial tears(hypromellose)(GENTEAL/SUSTANE)              SOCIAL HISTORY:     Social History     Socioeconomic History    Marital status:    Tobacco Use    Smoking status: Former     Packs/day: 1.00     Years: 20.00     Pack years: 20.00     Types: Cigarettes     Quit date: 1991     Years since quittin.3    Smokeless tobacco: Never    Tobacco comments:     Quit .   Substance and Sexual Activity    Alcohol use: No    Drug use: No    Sexual activity: Yes     Partners:  Female     Social Determinants of Health     Financial Resource Strain: Low Risk     Difficulty of Paying Living Expenses: Not hard at all   Food Insecurity: No Food Insecurity    Worried About Running Out of Food in the Last Year: Never true    Ran Out of Food in the Last Year: Never true   Transportation Needs: No Transportation Needs    Lack of Transportation (Medical): No    Lack of Transportation (Non-Medical): No   Physical Activity: Inactive    Days of Exercise per Week: 0 days    Minutes of Exercise per Session: 0 min   Stress: No Stress Concern Present    Feeling of Stress : Not at all   Social Connections: Moderately Isolated    Frequency of Communication with Friends and Family: More than three times a week    Frequency of Social Gatherings with Friends and Family: Once a week    Attends Islam Services: Never    Active Member of Clubs or Organizations: No    Attends Club or Organization Meetings: Never    Marital Status:    Housing Stability: Low Risk     Unable to Pay for Housing in the Last Year: No    Number of Places Lived in the Last Year: 1    Unstable Housing in the Last Year: No       FAMILY HISTORY:     Family History   Problem Relation Age of Onset    Hyperlipidemia Mother     Alzheimer's disease Mother     Stomach cancer Father     Colon cancer Father         from wife--he is not unsure     Cataracts Other     No Known Problems Maternal Aunt     No Known Problems Maternal Uncle     No Known Problems Paternal Aunt     No Known Problems Paternal Uncle     No Known Problems Maternal Grandmother     No Known Problems Maternal Grandfather     No Known Problems Paternal Grandmother     No Known Problems Paternal Grandfather     Blindness Neg Hx     Cancer Neg Hx     Diabetes Neg Hx     Glaucoma Neg Hx     Rheum arthritis Neg Hx     Psoriasis Neg Hx     Lupus Neg Hx     Amblyopia Neg Hx     Hypertension Neg Hx     Macular degeneration Neg Hx     Retinal detachment Neg Hx     Strabismus Neg  "Hx     Stroke Neg Hx     Thyroid disease Neg Hx     Esophageal cancer Neg Hx        REVIEW OF SYSTEMS:   ROS    A 10 point review of systems was performed and all the pertinent positives have been mentioned. Rest of review of systems was negative.        PHYSICAL EXAM:     Vitals:    04/25/23 0934   BP: (!) 173/80   Pulse: 65   Resp: 18    Body mass index is 27.11 kg/m².  Weight: 93.2 kg (205 lb 7.5 oz)   Height: 6' 1" (185.4 cm)     Physical Exam      DATA:     Laboratory:  CBC:  Recent Labs   Lab 01/21/21  0957 02/08/22  1024 02/13/23  0818   WBC 4.56 5.81 5.34   Hemoglobin 15.7 16.6 15.6   Hematocrit 48.2 53.0 49.6   Platelets 163 197 162       CHEMISTRIES:  Recent Labs   Lab 06/19/20  1138 01/21/21  0957 02/08/22  1024 02/13/23  0818   Glucose 94 94 92 100   Sodium 140 140 142 143   Potassium 4.1 4.0 4.8 4.0   BUN 12 12 13 8   Creatinine 1.2 1.1 1.2 1.2   eGFR if African American >60.0 >60 >60  --    eGFR if non African American >60.0 >60 60  --    Calcium 9.8 9.8 10.2 9.4       CARDIAC BIOMARKERS:        COAGS:        LIPIDS/LFTS:  Recent Labs   Lab 01/21/21  0957 02/08/22  1024 02/13/23  0818   Cholesterol 215 H 238 H 155   Triglycerides 83 76 45   HDL 41 42 40   LDL Cholesterol 157.4 180.8 H 106.0   Non-HDL Cholesterol 174 196 115   AST 14 13 15   ALT 12 13 14       Hemoglobin A1C   Date Value Ref Range Status   02/13/2023 5.6 4.0 - 5.6 % Final     Comment:     ADA Screening Guidelines:  5.7-6.4%  Consistent with prediabetes  >or=6.5%  Consistent with diabetes    High levels of fetal hemoglobin interfere with the HbA1C  assay. Heterozygous hemoglobin variants (HbS, HgC, etc)do  not significantly interfere with this assay.   However, presence of multiple variants may affect accuracy.     02/08/2022 5.6 4.0 - 5.6 % Final     Comment:     ADA Screening Guidelines:  5.7-6.4%  Consistent with prediabetes  >or=6.5%  Consistent with diabetes    High levels of fetal hemoglobin interfere with the HbA1C  assay. " Heterozygous hemoglobin variants (HbS, HgC, etc)do  not significantly interfere with this assay.   However, presence of multiple variants may affect accuracy.     05/24/2019 5.7 (H) 4.0 - 5.6 % Final     Comment:     ADA Screening Guidelines:  5.7-6.4%  Consistent with prediabetes  >or=6.5%  Consistent with diabetes  High levels of fetal hemoglobin interfere with the HbA1C  assay. Heterozygous hemoglobin variants (HbS, HgC, etc)do  not significantly interfere with this assay.   However, presence of multiple variants may affect accuracy.         TSH  Recent Labs   Lab 02/13/23  0818   TSH 0.641       The 10-year ASCVD risk score (Emmanuelle CORREA, et al., 2019) is: 34.7%    Values used to calculate the score:      Age: 72 years      Sex: Male      Is Non- : Yes      Diabetic: No      Tobacco smoker: No      Systolic Blood Pressure: 173 mmHg      Is BP treated: Yes      HDL Cholesterol: 40 mg/dL      Total Cholesterol: 155 mg/dL       BNP    Lab Results   Component Value Date/Time    BNP 25 05/07/2015 05:30 PM             ASSESSMENT AND PLAN     Patient Active Problem List   Diagnosis    Sjogren's disease    GERD (gastroesophageal reflux disease)    Arteriosclerosis of coronary artery    Hyperlipidemia    Cervical spondylosis    Degeneration of intervertebral disc    Rheumatoid arthritis    Benign prostatic hyperplasia with weak urinary stream    Perennial allergic rhinitis    Essential hypertension    High frequency hearing loss    Nasal obstruction    Dysfunctions associated with sleep stages or arousal from sleep    Primary insomnia    Wears dentures    Atherosclerosis of aorta    Other personal history presenting hazards to health    Complete loss of teeth, unspecified cause, unspecified class    Cervical pain (neck)    Impaired range of motion of cervical spine    Posture imbalance                 Thank you very much for involving me in the care of your patient.  Please do not hesitate to contact  me if there are any questions.      Abbe Vilchis MD, FACC, [facility]  Interventional Cardiologist, [facility].           This note was dictated with the help of speech recognition software.  There might be un-intended errors and/or substitutions.

## 2023-04-25 NOTE — PROGRESS NOTES
CARDIOVASCULAR CONSULTATION    REASON FOR CONSULT:   Rizwan Demarco Jr. is a 72 y.o. male who presents for evaluation    HISTORY OF PRESENT ILLNESS:     Patient is a pleasant 72-year-old man.  Here for evaluation.  Past medical history significant for atrial fibrillation, on Eliquis.  Coronary artery bypass grafting in 2003.  Quadruple bypass as per patient, but unknown anatomy.  States had it done at [facility].  Dyslipidemia, hypertension, Sjogren's disease.  Also peripheral artery disease with abnormal ABIs in 2021.  Denies any orthopnea, PND.  Main complaint is right leg, right calf claudication and cramping on walking.  Also complains of occasional heaviness in the chest area states that happens when he gets constipated.      2023:    The left ventricle is normal in size with concentric remodeling and normal systolic function. The estimated ejection fraction is 65%.  Normal right ventricular size with normal right ventricular systolic function.  Normal left ventricular diastolic function.  The estimated PA systolic pressure is 33 mmHg.  Intermediate central venous pressure (8 mmHg).  The ascending aorta is mildly dilated.      2021:      Moderately decreased right ORESTES, 0.68.  Moderately dampened PVR waveforms.  Mildly decreased left ORESTES, 0.89.  Moderately dampened PVR waveforms.    PAST MEDICAL HISTORY:     Past Medical History:   Diagnosis Date    Allergy     Arthritis     Rheumatoid arthritis    Back pain     Blood clotting tendency     BPH (benign prostatic hypertrophy)     Cervical spondylosis     Colon polyp 2010    adenoma    Coronary artery disease     Depression 5/8/2015    Dry eyes     Dry mouth     GERD (gastroesophageal reflux disease)     Hyperlipidemia     Hypertension     Lumbar spondylosis     Nasal obstruction     Rheumatoid arthritis     Sinusitis     Special screening for malignant neoplasm of colon 6/29/2016    Trouble in sleeping        PAST SURGICAL HISTORY:     Past  Surgical History:   Procedure Laterality Date    CHALAZION EXCISION      COLONOSCOPY N/A 6/29/2016    Procedure: COLONOSCOPY;  Surgeon: Partha Saucedo MD;  Location: [facility] ENDO;  Service: Endoscopy;  Laterality: N/A;    COLONOSCOPY N/A 7/24/2020    Procedure: COLONOSCOPY;  Surgeon: Ian Martinez MD;  Location: [facility] (4TH FLR);  Service: Endoscopy;  Laterality: N/A;  4/16/20 - removed from 5/1/20, not called yet due to acute pain issues being addressed today - pg    CORONARY ARTERY BYPASS GRAFT      ESOPHAGOGASTRODUODENOSCOPY N/A 7/24/2020    Procedure: ESOPHAGOGASTRODUODENOSCOPY (EGD);  Surgeon: Ian Martinez MD;  Location: [facility] (4TH FLR);  Service: Endoscopy;  Laterality: N/A;  4/16/20 - removed from 5/1/20, not called yet due to acute pain issues being addressed today.  4/17/20 - rescheduled 7/24/20 - pg    EXCISION TURBINATE, SUBMUCOUS      KNEE ARTHROSCOPY W/ DEBRIDEMENT      NASAL SEPTUM SURGERY      TONSILLECTOMY         ALLERGIES AND MEDICATION:     Review of patient's allergies indicates:   Allergen Reactions    Aspirin Nausea And Vomiting    Astelin [azelastine] Other (See Comments)     Dry mouth    Flomax [tamsulosin] Other (See Comments)     Nosebleed        Medication List            Accurate as of April 25, 2023  9:42 AM. If you have any questions, ask your nurse or doctor.                CONTINUE taking these medications      alfuzosin 10 mg Tb24  Commonly known as: UROXATRAL  Take 1 tablet (10 mg total) by mouth daily with breakfast.     apixaban 5 mg Tab  Commonly known as: ELIQUIS  Take 1 tablet (5 mg total) by mouth 2 (two) times daily.     aspirin 81 MG EC tablet  Commonly known as: ECOTRIN  Take 1 tablet (81 mg total) by mouth once daily.     atorvastatin 40 MG tablet  Commonly known as: LIPITOR  Take 1 tablet (40 mg total) by mouth once daily.     budesonide 0.25 mg/2 mL nebulizer solution  Commonly known as: PULMICORT  Take 2 mLs (0.25 mg total) by nebulization 2 (two) times daily.  Controller     cinnamon bark 500 mg capsule     docusate sodium 100 MG capsule  Commonly known as: COLACE  Take 1 capsule (100 mg total) by mouth 2 (two) times daily.     hydroCHLOROthiazide 12.5 mg capsule  Commonly known as: MICROZIDE  Take 1 capsule (12.5 mg total) by mouth once daily.     lisinopriL 10 MG tablet  Take 1 tablet (10 mg total) by mouth once daily.     multivitamin per tablet  Commonly known as: THERAGRAN     omega-3 fatty acids 1,250 mg Cap     omeprazole 20 MG capsule  Commonly known as: PRILOSEC  Take 1 capsule (20 mg total) by mouth once daily.     propylene glycoL 0.6 % Drop     sodium bicarb-sodium chloride Pack     Systane GeL 0.3 % Gel  Generic drug: artificial tears(hypromellose)(GENTEAL/SUSTANE)              SOCIAL HISTORY:     Social History     Socioeconomic History    Marital status:    Tobacco Use    Smoking status: Former     Packs/day: 1.00     Years: 20.00     Pack years: 20.00     Types: Cigarettes     Quit date: 1991     Years since quittin.3    Smokeless tobacco: Never    Tobacco comments:     Quit .   Substance and Sexual Activity    Alcohol use: No    Drug use: No    Sexual activity: Yes     Partners: Female     Social Determinants of Health     Financial Resource Strain: Low Risk     Difficulty of Paying Living Expenses: Not hard at all   Food Insecurity: No Food Insecurity    Worried About Running Out of Food in the Last Year: Never true    Ran Out of Food in the Last Year: Never true   Transportation Needs: No Transportation Needs    Lack of Transportation (Medical): No    Lack of Transportation (Non-Medical): No   Physical Activity: Inactive    Days of Exercise per Week: 0 days    Minutes of Exercise per Session: 0 min   Stress: No Stress Concern Present    Feeling of Stress : Not at all   Social Connections: Moderately Isolated    Frequency of Communication with Friends and Family: More than three times a week    Frequency of Social Gatherings with  Friends and Family: Once a week    Attends Confucianism Services: Never    Active Member of Clubs or Organizations: No    Attends Club or Organization Meetings: Never    Marital Status:    Housing Stability: Low Risk     Unable to Pay for Housing in the Last Year: No    Number of Places Lived in the Last Year: 1    Unstable Housing in the Last Year: No       FAMILY HISTORY:     Family History   Problem Relation Age of Onset    Hyperlipidemia Mother     Alzheimer's disease Mother     Stomach cancer Father     Colon cancer Father         from wife--he is not unsure     Cataracts Other     No Known Problems Maternal Aunt     No Known Problems Maternal Uncle     No Known Problems Paternal Aunt     No Known Problems Paternal Uncle     No Known Problems Maternal Grandmother     No Known Problems Maternal Grandfather     No Known Problems Paternal Grandmother     No Known Problems Paternal Grandfather     Blindness Neg Hx     Cancer Neg Hx     Diabetes Neg Hx     Glaucoma Neg Hx     Rheum arthritis Neg Hx     Psoriasis Neg Hx     Lupus Neg Hx     Amblyopia Neg Hx     Hypertension Neg Hx     Macular degeneration Neg Hx     Retinal detachment Neg Hx     Strabismus Neg Hx     Stroke Neg Hx     Thyroid disease Neg Hx     Esophageal cancer Neg Hx        REVIEW OF SYSTEMS:   Review of Systems   Constitutional: Negative.   HENT: Negative.     Eyes: Negative.    Cardiovascular:  Positive for chest pain and claudication.   Respiratory: Negative.     Endocrine: Negative.    Hematologic/Lymphatic: Negative.    Skin: Negative.    Musculoskeletal: Negative.    Gastrointestinal: Negative.    Genitourinary: Negative.    Neurological: Negative.    Psychiatric/Behavioral: Negative.     Allergic/Immunologic: Negative.      A 10 point review of systems was performed and all the pertinent positives have been mentioned. Rest of review of systems was negative.        PHYSICAL EXAM:     Vitals:    04/25/23 0934   BP: (!) 173/80   Pulse: 65  "  Resp: 18    Body mass index is 27.11 kg/m².  Weight: 93.2 kg (205 lb 7.5 oz)   Height: 6' 1" (185.4 cm)     Physical Exam  Vitals reviewed.   Constitutional:       Appearance: He is well-developed.   HENT:      Head: Normocephalic.   Eyes:      Conjunctiva/sclera: Conjunctivae normal.      Pupils: Pupils are equal, round, and reactive to light.   Cardiovascular:      Rate and Rhythm: Normal rate and regular rhythm.      Heart sounds: Normal heart sounds.   Pulmonary:      Effort: Pulmonary effort is normal.      Breath sounds: Normal breath sounds.   Abdominal:      General: Bowel sounds are normal.      Palpations: Abdomen is soft.   Musculoskeletal:      Cervical back: Normal range of motion and neck supple.   Skin:     General: Skin is warm.   Neurological:      Mental Status: He is alert and oriented to person, place, and time.         DATA:     Laboratory:  CBC:  Recent Labs   Lab 01/21/21  0957 02/08/22  1024 02/13/23  0818   WBC 4.56 5.81 5.34   Hemoglobin 15.7 16.6 15.6   Hematocrit 48.2 53.0 49.6   Platelets 163 197 162       CHEMISTRIES:  Recent Labs   Lab 06/19/20  1138 01/21/21  0957 02/08/22  1024 02/13/23  0818   Glucose 94 94 92 100   Sodium 140 140 142 143   Potassium 4.1 4.0 4.8 4.0   BUN 12 12 13 8   Creatinine 1.2 1.1 1.2 1.2   eGFR if African American >60.0 >60 >60  --    eGFR if non African American >60.0 >60 60  --    Calcium 9.8 9.8 10.2 9.4       CARDIAC BIOMARKERS:        COAGS:        LIPIDS/LFTS:  Recent Labs   Lab 01/21/21  0957 02/08/22  1024 02/13/23  0818   Cholesterol 215 H 238 H 155   Triglycerides 83 76 45   HDL 41 42 40   LDL Cholesterol 157.4 180.8 H 106.0   Non-HDL Cholesterol 174 196 115   AST 14 13 15   ALT 12 13 14       Hemoglobin A1C   Date Value Ref Range Status   02/13/2023 5.6 4.0 - 5.6 % Final     Comment:     ADA Screening Guidelines:  5.7-6.4%  Consistent with prediabetes  >or=6.5%  Consistent with diabetes    High levels of fetal hemoglobin interfere with the " HbA1C  assay. Heterozygous hemoglobin variants (HbS, HgC, etc)do  not significantly interfere with this assay.   However, presence of multiple variants may affect accuracy.     02/08/2022 5.6 4.0 - 5.6 % Final     Comment:     ADA Screening Guidelines:  5.7-6.4%  Consistent with prediabetes  >or=6.5%  Consistent with diabetes    High levels of fetal hemoglobin interfere with the HbA1C  assay. Heterozygous hemoglobin variants (HbS, HgC, etc)do  not significantly interfere with this assay.   However, presence of multiple variants may affect accuracy.     05/24/2019 5.7 (H) 4.0 - 5.6 % Final     Comment:     ADA Screening Guidelines:  5.7-6.4%  Consistent with prediabetes  >or=6.5%  Consistent with diabetes  High levels of fetal hemoglobin interfere with the HbA1C  assay. Heterozygous hemoglobin variants (HbS, HgC, etc)do  not significantly interfere with this assay.   However, presence of multiple variants may affect accuracy.         TSH  Recent Labs   Lab 02/13/23  0818   TSH 0.641       The 10-year ASCVD risk score (Emmanuelle CORREA, et al., 2019) is: 34.7%    Values used to calculate the score:      Age: 72 years      Sex: Male      Is Non- : Yes      Diabetic: No      Tobacco smoker: No      Systolic Blood Pressure: 173 mmHg      Is BP treated: Yes      HDL Cholesterol: 40 mg/dL      Total Cholesterol: 155 mg/dL       BNP    Lab Results   Component Value Date/Time    BNP 25 05/07/2015 05:30 PM             ASSESSMENT AND PLAN     Patient Active Problem List   Diagnosis    Sjogren's disease    GERD (gastroesophageal reflux disease)    Arteriosclerosis of coronary artery    Hyperlipidemia    Cervical spondylosis    Degeneration of intervertebral disc    Rheumatoid arthritis    Benign prostatic hyperplasia with weak urinary stream    Perennial allergic rhinitis    Essential hypertension    High frequency hearing loss    Nasal obstruction    Dysfunctions associated with sleep stages or arousal from  sleep    Primary insomnia    Wears dentures    Atherosclerosis of aorta    Other personal history presenting hazards to health    Complete loss of teeth, unspecified cause, unspecified class    Cervical pain (neck)    Impaired range of motion of cervical spine    Posture imbalance       Peripheral artery disease: Abnormal ABIs in 2021 with decreased ORESTES in the right leg.  Patient has right leg claudication.  Repeat ABIs and peripheral ultrasound     Coronary artery disease status post CABG in 2003.  Occasional chest pressure like symptoms.  Further evaluation with the help of a stress test.  Recent echo showed normal left ventricle systolic function     Check carotid ultrasound    Hypertension:  Uncontrolled in clinic today, but states at home stays well controlled around 120 mmHg.  Ate some albright before coming to clinic today.  Heart healthy diet has been recommended. avoidance of red meat and albright has been recommend    Atrial fibrillation:  On Eliquis.  Rate controlled    Follow-up after testing          Thank you very much for involving me in the care of your patient.  Please do not hesitate to contact me if there are any questions.      Abbe Vilchis MD, FACC, [facility]  Interventional Cardiologist, [facility].           This note was dictated with the help of speech recognition software.  There might be un-intended errors and/or substitutions.

## 2023-05-09 ENCOUNTER — PES CALL (OUTPATIENT)
Dept: ADMINISTRATIVE | Facility: CLINIC | Age: 73
End: 2023-05-09
Payer: MEDICARE

## 2023-05-16 ENCOUNTER — HOSPITAL ENCOUNTER (OUTPATIENT)
Dept: CARDIOLOGY | Facility: HOSPITAL | Age: 73
Discharge: HOME | End: 2023-05-16
Attending: INTERNAL MEDICINE
Payer: MEDICARE

## 2023-05-16 ENCOUNTER — HOSPITAL ENCOUNTER (OUTPATIENT)
Dept: RADIOLOGY | Facility: HOSPITAL | Age: 73
Discharge: HOME | End: 2023-05-16
Attending: INTERNAL MEDICINE
Payer: MEDICARE

## 2023-05-16 DIAGNOSIS — I73.9 PAD (PERIPHERAL ARTERY DISEASE): ICD-10-CM

## 2023-05-16 DIAGNOSIS — R09.89 POOR CIRCULATION: ICD-10-CM

## 2023-05-16 DIAGNOSIS — R07.9 CHEST PAIN, UNSPECIFIED TYPE: ICD-10-CM

## 2023-05-16 LAB
ABDOMINAL IMA AP: 1.5 CM
ABDOMINAL IMA PS VEL: 111 CM/S
ABDOMINAL IMA TRANS: 1.8 CM
ABDOMINAL INFRARENAL AORTA AP: 1.6 CM
ABDOMINAL INFRARENAL AORTA PS VEL: 104 CM/S
ABDOMINAL INFRARENAL AORTA TRANS: 1.6 CM
ABDOMINAL JUXTARENAL AORTA AP: 1.5 CM
ABDOMINAL JUXTARENAL AORTA PS VEL: 96 CM/S
ABDOMINAL JUXTARENAL AORTA TRANS: 1.7 CM
ABDOMINAL LT COM ILIAC AP: 0.8 CM
ABDOMINAL LT COM ILIAC TRANS: 0.9 CM
ABDOMINAL LT COM ILIAC VEL: 118 CM/S
ABDOMINAL RT COM ILIAC AP: 0.8 CM
ABDOMINAL RT COM ILIAC TRANS: 0.9 CM
ABDOMINAL RT COM ILIAC VEL: 205 CM/S
ABDOMINAL SUPRARENAL AORTA AP: 2.3 CM
ABDOMINAL SUPRARENAL AORTA PS VEL: 79 CM/S
ABDOMINAL SUPRARENAL AORTA TRANS: 2.3 CM
CV STRESS BASE HR: 45 BPM
DIASTOLIC BLOOD PRESSURE: 80 MMHG
LEFT ABI: 1.01
LEFT ANT TIBIAL SYS PSV: 60 CM/S
LEFT CBA DIAS: 17 CM/S
LEFT CBA SYS: 120 CM/S
LEFT CCA DIST DIAS: 17 CM/S
LEFT CCA DIST SYS: 64 CM/S
LEFT CCA MID DIAS: 15 CM/S
LEFT CCA MID SYS: 104 CM/S
LEFT CCA PROX DIAS: 21 CM/S
LEFT CCA PROX SYS: 103 CM/S
LEFT CFA PSV: 118 CM/S
LEFT DORSALIS PEDIS: 144 MMHG
LEFT ECA DIAS: 13 CM/S
LEFT ECA SYS: 144 CM/S
LEFT EXTERNAL ILIAC PSV: 99 CM/S
LEFT ICA DIST DIAS: 25 CM/S
LEFT ICA DIST SYS: 66 CM/S
LEFT ICA MID DIAS: 37 CM/S
LEFT ICA MID SYS: 121 CM/S
LEFT ICA PROX DIAS: 40 CM/S
LEFT ICA PROX SYS: 107 CM/S
LEFT PERONEAL SYS PSV: 57 CM/S
LEFT POPLITEAL PSV: 69 CM/S
LEFT POST TIBIAL SYS PSV: 0 CM/S
LEFT POSTERIOR TIBIAL: 135 MMHG
LEFT PROFUNDA SYS PSV: 237 CM/S
LEFT SUPER FEMORAL DIST SYS PSV: 59 CM/S
LEFT SUPER FEMORAL MID SYS PSV: 253 CM/S
LEFT SUPER FEMORAL OSTIAL SYS PSV: 84 CM/S
LEFT SUPER FEMORAL PROX SYS PSV: 96 CM/S
LEFT TBI: 0.55
LEFT TIB/PER TRUNK SYS PSV: 53 CM/S
LEFT TOE PRESSURE: 78 MMHG
LEFT VERTEBRAL DIAS: 13 CM/S
LEFT VERTEBRAL SYS: 40 CM/S
NUC STRESS EJECTION FRACTION: 56 %
OHS CV CAROTID RIGHT ICA EDV HIGHEST: 36
OHS CV CAROTID ULTRASOUND LEFT ICA/CCA RATIO: 1.89
OHS CV CAROTID ULTRASOUND RIGHT ICA/CCA RATIO: 1.45
OHS CV CPX 85 PERCENT MAX PREDICTED HEART RATE MALE: 124
OHS CV CPX MAX PREDICTED HEART RATE: 146
OHS CV CPX PATIENT IS FEMALE: 0
OHS CV CPX PATIENT IS MALE: 1
OHS CV CPX PEAK DIASTOLIC BLOOD PRESSURE: 64 MMHG
OHS CV CPX PEAK HEAR RATE: 87 BPM
OHS CV CPX PEAK RATE PRESSURE PRODUCT: NORMAL
OHS CV CPX PEAK SYSTOLIC BLOOD PRESSURE: 157 MMHG
OHS CV CPX PERCENT MAX PREDICTED HEART RATE ACHIEVED: 60
OHS CV CPX RATE PRESSURE PRODUCT PRESENTING: 5490
OHS CV LEFT COMMON ILIAC ARTERY PSV: 118 CM/S
OHS CV LEFT LOWER EXTREMITY ABI (NO CALC): 1.01
OHS CV PV CAROTID LEFT HIGHEST CCA: 104
OHS CV PV CAROTID LEFT HIGHEST ICA: 121
OHS CV PV CAROTID RIGHT HIGHEST CCA: 95
OHS CV PV CAROTID RIGHT HIGHEST ICA: 112
OHS CV RIGHT ABI LOWER EXTREMITY (NO CALC): 0.73
OHS CV US ABDOMINAL AORTA PSV HIGHEST VALUE: 205 CM/S
OHS CV US CAROTID LEFT HIGHEST EDV: 40
OHS CV US RIGHT COMMON ILIAC PSV: 205 CM/S
RIGHT ABI: 0.73
RIGHT ANT TIBIAL SYS PSV: 20 CM/S
RIGHT ARM BP: 143 MMHG
RIGHT CBA DIAS: 11 CM/S
RIGHT CBA SYS: 104 CM/S
RIGHT CCA DIST DIAS: 14 CM/S
RIGHT CCA DIST SYS: 77 CM/S
RIGHT CCA MID DIAS: 16 CM/S
RIGHT CCA MID SYS: 95 CM/S
RIGHT CCA PROX DIAS: 13 CM/S
RIGHT CCA PROX SYS: 81 CM/S
RIGHT CFA PSV: 102 CM/S
RIGHT DORSALIS PEDIS: 96 MMHG
RIGHT ECA DIAS: 19 CM/S
RIGHT ECA SYS: 160 CM/S
RIGHT EXTERNAL ILLIAC PSV: 99 CM/S
RIGHT ICA DIST DIAS: 25 CM/S
RIGHT ICA DIST SYS: 86 CM/S
RIGHT ICA MID DIAS: 36 CM/S
RIGHT ICA MID SYS: 112 CM/S
RIGHT ICA PROX DIAS: 22 CM/S
RIGHT ICA PROX SYS: 90 CM/S
RIGHT PERONEAL SYS PSV: 28 CM/S
RIGHT POPLITEAL PSV: 34 CM/S
RIGHT POST TIBIAL SYS PSV: 17 CM/S
RIGHT POSTERIOR TIBIAL: 104 MMHG
RIGHT PROFUNDA SYS PSV: 154 CM/S
RIGHT SUPER FEMORAL DIST SYS PSV: 95 CM/S
RIGHT SUPER FEMORAL MID SYS PSV: 63 CM/S
RIGHT SUPER FEMORAL OSTIAL SYS PSV: 77 CM/S
RIGHT SUPER FEMORAL PROX SYS PSV: 59 CM/S
RIGHT TBI: 0.41
RIGHT TIB/PER TRUNK SYS PSV: 27 CM/S
RIGHT TOE PRESSURE: 59 MMHG
RIGHT VERTEBRAL DIAS: 8 CM/S
RIGHT VERTEBRAL SYS: 41 CM/S
SYSTOLIC BLOOD PRESSURE: 122 MMHG

## 2023-05-16 PROCEDURE — 93925 CV US DOPPLER ARTERIAL LEGS BILATERAL (CUPID ONLY): ICD-10-PCS | Mod: 26,,, | Performed by: INTERNAL MEDICINE

## 2023-05-16 PROCEDURE — 93978 CV US ABDOMINAL AORTA EVALUATION (CUPID ONLY): ICD-10-PCS | Mod: 26,,, | Performed by: INTERNAL MEDICINE

## 2023-05-16 PROCEDURE — 93016 CV STRESS TEST SUPVJ ONLY: CPT | Mod: ,,, | Performed by: INTERNAL MEDICINE

## 2023-05-16 PROCEDURE — 93880 CV US DOPPLER CAROTID (CUPID ONLY): ICD-10-PCS | Mod: 26,,, | Performed by: INTERNAL MEDICINE

## 2023-05-16 PROCEDURE — 93922 ANKLE BRACHIAL INDICES (ABI): ICD-10-PCS | Mod: 26,,, | Performed by: INTERNAL MEDICINE

## 2023-05-16 PROCEDURE — 93880 EXTRACRANIAL BILAT STUDY: CPT | Mod: 26,,, | Performed by: INTERNAL MEDICINE

## 2023-05-16 PROCEDURE — 93018 CV STRESS TEST I&R ONLY: CPT | Mod: ,,, | Performed by: INTERNAL MEDICINE

## 2023-05-16 PROCEDURE — A9502 TC99M TETROFOSMIN: HCPCS

## 2023-05-16 PROCEDURE — 63600175 PHARM REV CODE 636 W HCPCS: Performed by: INTERNAL MEDICINE

## 2023-05-16 PROCEDURE — 93922 UPR/L XTREMITY ART 2 LEVELS: CPT

## 2023-05-16 PROCEDURE — 78452 NUCLEAR STRESS - CARDIOLOGY INTERPRETED (CUPID ONLY): ICD-10-PCS | Mod: 26,,, | Performed by: INTERNAL MEDICINE

## 2023-05-16 PROCEDURE — 93017 CV STRESS TEST TRACING ONLY: CPT

## 2023-05-16 PROCEDURE — 93922 UPR/L XTREMITY ART 2 LEVELS: CPT | Mod: 26,,, | Performed by: INTERNAL MEDICINE

## 2023-05-16 PROCEDURE — 93018 NUCLEAR STRESS - CARDIOLOGY INTERPRETED (CUPID ONLY): ICD-10-PCS | Mod: ,,, | Performed by: INTERNAL MEDICINE

## 2023-05-16 PROCEDURE — 93016 NUCLEAR STRESS - CARDIOLOGY INTERPRETED (CUPID ONLY): ICD-10-PCS | Mod: ,,, | Performed by: INTERNAL MEDICINE

## 2023-05-16 PROCEDURE — 93925 LOWER EXTREMITY STUDY: CPT

## 2023-05-16 PROCEDURE — 93978 VASCULAR STUDY: CPT | Mod: 26,,, | Performed by: INTERNAL MEDICINE

## 2023-05-16 PROCEDURE — 93880 EXTRACRANIAL BILAT STUDY: CPT

## 2023-05-16 PROCEDURE — 78452 HT MUSCLE IMAGE SPECT MULT: CPT

## 2023-05-16 PROCEDURE — 93925 LOWER EXTREMITY STUDY: CPT | Mod: 26,,, | Performed by: INTERNAL MEDICINE

## 2023-05-16 PROCEDURE — 93978 VASCULAR STUDY: CPT

## 2023-05-16 PROCEDURE — 78452 HT MUSCLE IMAGE SPECT MULT: CPT | Mod: 26,,, | Performed by: INTERNAL MEDICINE

## 2023-05-16 RX ORDER — REGADENOSON 0.08 MG/ML
0.4 INJECTION, SOLUTION INTRAVENOUS ONCE
Status: COMPLETED | OUTPATIENT
Start: 2023-05-16 | End: 2023-05-16

## 2023-05-16 RX ADMIN — REGADENOSON 0.4 MG: 0.08 INJECTION, SOLUTION INTRAVENOUS at 10:05

## 2023-05-25 ENCOUNTER — OFFICE VISIT (OUTPATIENT)
Dept: CARDIOLOGY | Facility: CLINIC | Age: 73
End: 2023-05-25
Payer: MEDICARE

## 2023-05-25 VITALS
RESPIRATION RATE: 18 BRPM | HEIGHT: 73 IN | OXYGEN SATURATION: 98 % | WEIGHT: 197.75 LBS | SYSTOLIC BLOOD PRESSURE: 178 MMHG | BODY MASS INDEX: 26.21 KG/M2 | DIASTOLIC BLOOD PRESSURE: 81 MMHG | HEART RATE: 55 BPM

## 2023-05-25 DIAGNOSIS — R09.89 POOR CIRCULATION: ICD-10-CM

## 2023-05-25 DIAGNOSIS — I10 ESSENTIAL HYPERTENSION: Primary | ICD-10-CM

## 2023-05-25 DIAGNOSIS — I25.10 ARTERIOSCLEROSIS OF CORONARY ARTERY: ICD-10-CM

## 2023-05-25 DIAGNOSIS — I70.0 ATHEROSCLEROSIS OF AORTA: ICD-10-CM

## 2023-05-25 DIAGNOSIS — I73.9 PAD (PERIPHERAL ARTERY DISEASE): ICD-10-CM

## 2023-05-25 DIAGNOSIS — E78.2 MIXED HYPERLIPIDEMIA: Chronic | ICD-10-CM

## 2023-05-25 PROCEDURE — 99999 PR PBB SHADOW E&M-EST. PATIENT-LVL IV: ICD-10-PCS | Mod: PBBFAC,,, | Performed by: INTERNAL MEDICINE

## 2023-05-25 PROCEDURE — 99999 PR PBB SHADOW E&M-EST. PATIENT-LVL IV: CPT | Mod: PBBFAC,,, | Performed by: INTERNAL MEDICINE

## 2023-05-25 PROCEDURE — 99214 OFFICE O/P EST MOD 30 MIN: CPT | Mod: PBBFAC | Performed by: INTERNAL MEDICINE

## 2023-05-25 PROCEDURE — 99214 OFFICE O/P EST MOD 30 MIN: CPT | Mod: S$PBB,,, | Performed by: INTERNAL MEDICINE

## 2023-05-25 PROCEDURE — 93005 ELECTROCARDIOGRAM TRACING: CPT | Mod: PBBFAC | Performed by: INTERNAL MEDICINE

## 2023-05-25 PROCEDURE — 93010 ELECTROCARDIOGRAM REPORT: CPT | Mod: S$PBB,,, | Performed by: INTERNAL MEDICINE

## 2023-05-25 PROCEDURE — 93010 EKG 12-LEAD: ICD-10-PCS | Mod: S$PBB,,, | Performed by: INTERNAL MEDICINE

## 2023-05-25 PROCEDURE — 99214 PR OFFICE/OUTPT VISIT, EST, LEVL IV, 30-39 MIN: ICD-10-PCS | Mod: S$PBB,,, | Performed by: INTERNAL MEDICINE

## 2023-05-25 RX ORDER — CLOPIDOGREL 300 MG/1
600 TABLET, FILM COATED ORAL ONCE
Status: CANCELLED | OUTPATIENT
Start: 2023-06-21

## 2023-05-25 RX ORDER — DIPHENHYDRAMINE HCL 25 MG
50 CAPSULE ORAL ONCE
Status: CANCELLED | OUTPATIENT
Start: 2023-05-25 | End: 2023-05-25

## 2023-05-25 RX ORDER — SODIUM CHLORIDE 9 MG/ML
INJECTION, SOLUTION INTRAVENOUS CONTINUOUS
Status: CANCELLED | OUTPATIENT
Start: 2023-05-25

## 2023-05-25 RX ORDER — SODIUM CHLORIDE 0.9 % (FLUSH) 0.9 %
10 SYRINGE (ML) INJECTION
Status: SHIPPED | OUTPATIENT
Start: 2023-05-25

## 2023-05-25 NOTE — PROGRESS NOTES
CARDIOVASCULAR CONSULTATION    REASON FOR CONSULT:   Rizwan Demarco Jr. is a 72 y.o. male who presents for evaluation    HISTORY OF PRESENT ILLNESS:     Patient is a pleasant 72-year-old man.  Here for evaluation.  Past medical history significant for atrial fibrillation, on Eliquis.  Coronary artery bypass grafting in 2003.  Quadruple bypass as per patient, but unknown anatomy.  States had it done at [facility].  Dyslipidemia, hypertension, Sjogren's disease.  Also peripheral artery disease with abnormal ABIs in 2021.  Denies any orthopnea, PND.  Main complaint is right leg, right calf claudication and cramping on walking.  Also complains of occasional heaviness in the chest area states that happens when he gets constipated.      2023:      The left ventricle is normal in size with concentric remodeling and normal systolic function. The estimated ejection fraction is 65%.  Normal right ventricular size with normal right ventricular systolic function.  Normal left ventricular diastolic function.  The estimated PA systolic pressure is 33 mmHg.  Intermediate central venous pressure (8 mmHg).  The ascending aorta is mildly dilated.      2021:      Moderately decreased right ORESTES, 0.68.  Moderately dampened PVR waveforms.  Mildly decreased left ORESTES, 0.89.  Moderately dampened PVR waveforms.      May 23:  Patient here for follow-up.  Peripheral ultrasound revealed 60 90% mid left SFA stenosis.  Discussed initiating Pletal with the patient.  Patient not interested.  Would like to proceed with the peripheral angiogram for further evaluation.  Lifestyle limiting calf claudication right more than left        Right ORESTES 0.73 suggesting mild arteial flow resriction. Left ORESTES normal 1.01     Mild bilateral carotid plaque with no flow limiting stenosis     Normal abdominal aortic size and flow. No AAA       Mild right SFA plaque with no flow limiting arterial stenosis  Moderate left SFA plaque with 60-90% mis SFA  stenosis. Occluded left posterial tibial artery      PAST MEDICAL HISTORY:     Past Medical History:   Diagnosis Date    Allergy     Arthritis     Rheumatoid arthritis    Back pain     Blood clotting tendency     BPH (benign prostatic hypertrophy)     Cervical spondylosis     Colon polyp 2010    adenoma    Coronary artery disease     Depression 5/8/2015    Dry eyes     Dry mouth     GERD (gastroesophageal reflux disease)     Hyperlipidemia     Hypertension     Lumbar spondylosis     Nasal obstruction     PAD (peripheral artery disease) 4/25/2023    Rheumatoid arthritis     Sinusitis     Special screening for malignant neoplasm of colon 6/29/2016    Trouble in sleeping        PAST SURGICAL HISTORY:     Past Surgical History:   Procedure Laterality Date    CHALAZION EXCISION      COLONOSCOPY N/A 6/29/2016    Procedure: COLONOSCOPY;  Surgeon: Partha Saucedo MD;  Location: [facility] ENDO;  Service: Endoscopy;  Laterality: N/A;    COLONOSCOPY N/A 7/24/2020    Procedure: COLONOSCOPY;  Surgeon: Ian Martinez MD;  Location: [facility] ([address] [place] [address]);  Service: Endoscopy;  Laterality: N/A;  4/16/20 - removed from 5/1/20, not called yet due to acute pain issues being addressed today - pg    CORONARY ARTERY BYPASS GRAFT      ESOPHAGOGASTRODUODENOSCOPY N/A 7/24/2020    Procedure: ESOPHAGOGASTRODUODENOSCOPY (EGD);  Surgeon: Ian Martinez MD;  Location: [facility] ([address] [place] [address]);  Service: Endoscopy;  Laterality: N/A;  4/16/20 - removed from 5/1/20, not called yet due to acute pain issues being addressed today.  4/17/20 - rescheduled 7/24/20 - pg    EXCISION TURBINATE, SUBMUCOUS      KNEE ARTHROSCOPY W/ DEBRIDEMENT      NASAL SEPTUM SURGERY      TONSILLECTOMY         ALLERGIES AND MEDICATION:     Review of patient's allergies indicates:   Allergen Reactions    Aspirin Nausea And Vomiting    Astelin [azelastine] Other (See Comments)     Dry mouth    Flomax [tamsulosin] Other (See Comments)     Nosebleed        Medication List            Accurate as  of May 25, 2023  9:55 AM. If you have any questions, ask your nurse or doctor.                CONTINUE taking these medications      alfuzosin 10 mg Tb24  Commonly known as: UROXATRAL  Take 1 tablet (10 mg total) by mouth daily with breakfast.     apixaban 5 mg Tab  Commonly known as: ELIQUIS  Take 1 tablet (5 mg total) by mouth 2 (two) times daily.     aspirin 81 MG EC tablet  Commonly known as: ECOTRIN  Take 1 tablet (81 mg total) by mouth once daily.     atorvastatin 40 MG tablet  Commonly known as: LIPITOR  Take 1 tablet (40 mg total) by mouth once daily.     budesonide 0.25 mg/2 mL nebulizer solution  Commonly known as: PULMICORT  Take 2 mLs (0.25 mg total) by nebulization 2 (two) times daily. Controller     cinnamon bark 500 mg capsule     docusate sodium 100 MG capsule  Commonly known as: COLACE  Take 1 capsule (100 mg total) by mouth 2 (two) times daily.     hydroCHLOROthiazide 12.5 mg capsule  Commonly known as: MICROZIDE  Take 1 capsule (12.5 mg total) by mouth once daily.     lisinopriL 10 MG tablet  Take 1 tablet (10 mg total) by mouth once daily.     multivitamin per tablet  Commonly known as: THERAGRAN     omega-3 fatty acids 1,250 mg Cap     omeprazole 20 MG capsule  Commonly known as: PRILOSEC  Take 1 capsule (20 mg total) by mouth once daily.     propylene glycoL 0.6 % Drop     sodium bicarb-sodium chloride Pack     Systane GeL 0.3 % Gel  Generic drug: artificial tears(hypromellose)(GENTEAL/SUSTANE)              SOCIAL HISTORY:     Social History     Socioeconomic History    Marital status:    Tobacco Use    Smoking status: Former     Packs/day: 1.00     Years: 20.00     Pack years: 20.00     Types: Cigarettes     Quit date: 1991     Years since quittin.4    Smokeless tobacco: Never    Tobacco comments:     Quit .   Substance and Sexual Activity    Alcohol use: No    Drug use: No    Sexual activity: Yes     Partners: Female       FAMILY HISTORY:     Family History   Problem  "Relation Age of Onset    Hyperlipidemia Mother     Alzheimer's disease Mother     Stomach cancer Father     Colon cancer Father         from wife--he is not unsure     Cataracts Other     No Known Problems Maternal Aunt     No Known Problems Maternal Uncle     No Known Problems Paternal Aunt     No Known Problems Paternal Uncle     No Known Problems Maternal Grandmother     No Known Problems Maternal Grandfather     No Known Problems Paternal Grandmother     No Known Problems Paternal Grandfather     Blindness Neg Hx     Cancer Neg Hx     Diabetes Neg Hx     Glaucoma Neg Hx     Rheum arthritis Neg Hx     Psoriasis Neg Hx     Lupus Neg Hx     Amblyopia Neg Hx     Hypertension Neg Hx     Macular degeneration Neg Hx     Retinal detachment Neg Hx     Strabismus Neg Hx     Stroke Neg Hx     Thyroid disease Neg Hx     Esophageal cancer Neg Hx        REVIEW OF SYSTEMS:   Review of Systems   Constitutional: Negative.   HENT: Negative.     Eyes: Negative.    Cardiovascular:  Positive for claudication.   Respiratory: Negative.     Endocrine: Negative.    Hematologic/Lymphatic: Negative.    Skin: Negative.    Musculoskeletal: Negative.    Gastrointestinal: Negative.    Genitourinary: Negative.    Neurological: Negative.    Psychiatric/Behavioral: Negative.     Allergic/Immunologic: Negative.      A 10 point review of systems was performed and all the pertinent positives have been mentioned. Rest of review of systems was negative.        PHYSICAL EXAM:     Vitals:    05/25/23 0916   BP: (!) 178/81   Pulse: (!) 55   Resp: 18    Body mass index is 26.09 kg/m².  Weight: 89.7 kg (197 lb 12 oz)   Height: 6' 1" (185.4 cm)     Physical Exam  Vitals reviewed.   Constitutional:       Appearance: He is well-developed.   HENT:      Head: Normocephalic.   Eyes:      Conjunctiva/sclera: Conjunctivae normal.      Pupils: Pupils are equal, round, and reactive to light.   Cardiovascular:      Rate and Rhythm: Normal rate and regular rhythm.    "   Heart sounds: Normal heart sounds.   Pulmonary:      Effort: Pulmonary effort is normal.      Breath sounds: Normal breath sounds.   Abdominal:      General: Bowel sounds are normal.      Palpations: Abdomen is soft.   Musculoskeletal:      Cervical back: Normal range of motion and neck supple.   Skin:     General: Skin is warm.   Neurological:      Mental Status: He is alert and oriented to person, place, and time.         DATA:     Laboratory:  CBC:  Recent Labs   Lab 01/21/21  0957 02/08/22  1024 02/13/23  0818   WBC 4.56 5.81 5.34   Hemoglobin 15.7 16.6 15.6   Hematocrit 48.2 53.0 49.6   Platelets 163 197 162       CHEMISTRIES:  Recent Labs   Lab 06/19/20  1138 01/21/21  0957 02/08/22  1024 02/13/23  0818   Glucose 94 94 92 100   Sodium 140 140 142 143   Potassium 4.1 4.0 4.8 4.0   BUN 12 12 13 8   Creatinine 1.2 1.1 1.2 1.2   eGFR if African American >60.0 >60 >60  --    eGFR if non African American >60.0 >60 60  --    Calcium 9.8 9.8 10.2 9.4       CARDIAC BIOMARKERS:        COAGS:        LIPIDS/LFTS:  Recent Labs   Lab 01/21/21  0957 02/08/22  1024 02/13/23  0818   Cholesterol 215 H 238 H 155   Triglycerides 83 76 45   HDL 41 42 40   LDL Cholesterol 157.4 180.8 H 106.0   Non-HDL Cholesterol 174 196 115   AST 14 13 15   ALT 12 13 14       Hemoglobin A1C   Date Value Ref Range Status   02/13/2023 5.6 4.0 - 5.6 % Final     Comment:     ADA Screening Guidelines:  5.7-6.4%  Consistent with prediabetes  >or=6.5%  Consistent with diabetes    High levels of fetal hemoglobin interfere with the HbA1C  assay. Heterozygous hemoglobin variants (HbS, HgC, etc)do  not significantly interfere with this assay.   However, presence of multiple variants may affect accuracy.     02/08/2022 5.6 4.0 - 5.6 % Final     Comment:     ADA Screening Guidelines:  5.7-6.4%  Consistent with prediabetes  >or=6.5%  Consistent with diabetes    High levels of fetal hemoglobin interfere with the HbA1C  assay. Heterozygous hemoglobin variants  (HbS, HgC, etc)do  not significantly interfere with this assay.   However, presence of multiple variants may affect accuracy.     05/24/2019 5.7 (H) 4.0 - 5.6 % Final     Comment:     ADA Screening Guidelines:  5.7-6.4%  Consistent with prediabetes  >or=6.5%  Consistent with diabetes  High levels of fetal hemoglobin interfere with the HbA1C  assay. Heterozygous hemoglobin variants (HbS, HgC, etc)do  not significantly interfere with this assay.   However, presence of multiple variants may affect accuracy.         TSH  Recent Labs   Lab 02/13/23  0818   TSH 0.641       The 10-year ASCVD risk score (Emmanuelle CORREA, et al., 2019) is: 36.3%    Values used to calculate the score:      Age: 72 years      Sex: Male      Is Non- : Yes      Diabetic: No      Tobacco smoker: No      Systolic Blood Pressure: 178 mmHg      Is BP treated: Yes      HDL Cholesterol: 40 mg/dL      Total Cholesterol: 155 mg/dL       BNP    Lab Results   Component Value Date/Time    BNP 25 05/07/2015 05:30 PM             ASSESSMENT AND PLAN     Patient Active Problem List   Diagnosis    Sjogren's disease    GERD (gastroesophageal reflux disease)    Arteriosclerosis of coronary artery    Hyperlipidemia    Cervical spondylosis    Degeneration of intervertebral disc    Rheumatoid arthritis    Benign prostatic hyperplasia with weak urinary stream    Perennial allergic rhinitis    Essential hypertension    High frequency hearing loss    Nasal obstruction    Dysfunctions associated with sleep stages or arousal from sleep    Primary insomnia    Wears dentures    Atherosclerosis of aorta    Other personal history presenting hazards to health    Complete loss of teeth, unspecified cause, unspecified class    Cervical pain (neck)    Impaired range of motion of cervical spine    Posture imbalance    PAD (peripheral artery disease)    Poor circulation    Chest pain       Peripheral artery disease: Abnormal ABIs in 2021 with decreased ORESTES in the  right leg.  Patient has right leg claudication.  Abnormal ORESTES on the right, and peripheral ultrasound revealed lesion on the left.  We will do further evaluation with the help of peripheral angiogram.  Right radial artery access      Risks, benefits and alternatives of the catheterization procedure were discussed with the patient.The risks of coronary angiography include but are not limited to: bleeding, infection, death, heart attack, arrhythmia, kidney injury or failure, potential need for dialysis, allergic reactions, stroke, need for emergency surgery, hematoma, pseudoaneurysm etc.  Should stenting be indicated, the patient has agreed to dual anti-platelet therapy for 1-consecutive year with a drug-eluting stent and a minimum of 1-month with the use of a bare metal stent. Additionally, pt is aware that non-compliance is likely to result in stent clotting with heart attack, heart failure, and/or death  The risks of moderate sedation include hypotension, respiratory depression, arrhythmias, bronchospasm, and death. Informed consent was obtained and the  patient is agreeable to proceed with the procedure. Consent was placed on the chart.          Coronary artery disease status post CABG in 2003.  Occasional chest pressure like symptoms.  Further evaluation with the help of a stress test.  Recent echo showed normal left ventricle systolic function     Check carotid ultrasound    Hypertension:  Uncontrolled in clinic today, but states at home stays well controlled around 120 mmHg.      Atrial fibrillation:  On Eliquis.  Rate controlled.  Patient has been told to hold his Eliquis 2 days prior to the procedure    Follow-up after testing          Thank you very much for involving me in the care of your patient.  Please do not hesitate to contact me if there are any questions.      Abbe Vilchis MD, FACC, [facility]  Interventional Cardiologist, [facility].           This note was dictated with the help of speech  recognition software.  There might be un-intended errors and/or substitutions.

## 2023-05-25 NOTE — H&P (VIEW-ONLY)
CARDIOVASCULAR CONSULTATION    REASON FOR CONSULT:   Rizwan Demarco Jr. is a 72 y.o. male who presents for evaluation    HISTORY OF PRESENT ILLNESS:     Patient is a pleasant 72-year-old man.  Here for evaluation.  Past medical history significant for atrial fibrillation, on Eliquis.  Coronary artery bypass grafting in 2003.  Quadruple bypass as per patient, but unknown anatomy.  States had it done at [facility].  Dyslipidemia, hypertension, Sjogren's disease.  Also peripheral artery disease with abnormal ABIs in 2021.  Denies any orthopnea, PND.  Main complaint is right leg, right calf claudication and cramping on walking.  Also complains of occasional heaviness in the chest area states that happens when he gets constipated.      2023:      The left ventricle is normal in size with concentric remodeling and normal systolic function. The estimated ejection fraction is 65%.  Normal right ventricular size with normal right ventricular systolic function.  Normal left ventricular diastolic function.  The estimated PA systolic pressure is 33 mmHg.  Intermediate central venous pressure (8 mmHg).  The ascending aorta is mildly dilated.      2021:      Moderately decreased right ORESTES, 0.68.  Moderately dampened PVR waveforms.  Mildly decreased left ORESTES, 0.89.  Moderately dampened PVR waveforms.      May 23:  Patient here for follow-up.  Peripheral ultrasound revealed 60 90% mid left SFA stenosis.  Discussed initiating Pletal with the patient.  Patient not interested.  Would like to proceed with the peripheral angiogram for further evaluation.  Lifestyle limiting calf claudication right more than left        Right ORESTES 0.73 suggesting mild arteial flow resriction. Left ORESTES normal 1.01     Mild bilateral carotid plaque with no flow limiting stenosis     Normal abdominal aortic size and flow. No AAA       Mild right SFA plaque with no flow limiting arterial stenosis  Moderate left SFA plaque with 60-90% mis SFA  stenosis. Occluded left posterial tibial artery      PAST MEDICAL HISTORY:     Past Medical History:   Diagnosis Date    Allergy     Arthritis     Rheumatoid arthritis    Back pain     Blood clotting tendency     BPH (benign prostatic hypertrophy)     Cervical spondylosis     Colon polyp 2010    adenoma    Coronary artery disease     Depression 5/8/2015    Dry eyes     Dry mouth     GERD (gastroesophageal reflux disease)     Hyperlipidemia     Hypertension     Lumbar spondylosis     Nasal obstruction     PAD (peripheral artery disease) 4/25/2023    Rheumatoid arthritis     Sinusitis     Special screening for malignant neoplasm of colon 6/29/2016    Trouble in sleeping        PAST SURGICAL HISTORY:     Past Surgical History:   Procedure Laterality Date    CHALAZION EXCISION      COLONOSCOPY N/A 6/29/2016    Procedure: COLONOSCOPY;  Surgeon: Partha Saucedo MD;  Location: [facility] ENDO;  Service: Endoscopy;  Laterality: N/A;    COLONOSCOPY N/A 7/24/2020    Procedure: COLONOSCOPY;  Surgeon: Ian Martinez MD;  Location: [facility] ([address] [place] [address]);  Service: Endoscopy;  Laterality: N/A;  4/16/20 - removed from 5/1/20, not called yet due to acute pain issues being addressed today - pg    CORONARY ARTERY BYPASS GRAFT      ESOPHAGOGASTRODUODENOSCOPY N/A 7/24/2020    Procedure: ESOPHAGOGASTRODUODENOSCOPY (EGD);  Surgeon: Ian Martinez MD;  Location: [facility] ([address] [place] [address]);  Service: Endoscopy;  Laterality: N/A;  4/16/20 - removed from 5/1/20, not called yet due to acute pain issues being addressed today.  4/17/20 - rescheduled 7/24/20 - pg    EXCISION TURBINATE, SUBMUCOUS      KNEE ARTHROSCOPY W/ DEBRIDEMENT      NASAL SEPTUM SURGERY      TONSILLECTOMY         ALLERGIES AND MEDICATION:     Review of patient's allergies indicates:   Allergen Reactions    Aspirin Nausea And Vomiting    Astelin [azelastine] Other (See Comments)     Dry mouth    Flomax [tamsulosin] Other (See Comments)     Nosebleed        Medication List            Accurate as  of May 25, 2023  9:55 AM. If you have any questions, ask your nurse or doctor.                CONTINUE taking these medications      alfuzosin 10 mg Tb24  Commonly known as: UROXATRAL  Take 1 tablet (10 mg total) by mouth daily with breakfast.     apixaban 5 mg Tab  Commonly known as: ELIQUIS  Take 1 tablet (5 mg total) by mouth 2 (two) times daily.     aspirin 81 MG EC tablet  Commonly known as: ECOTRIN  Take 1 tablet (81 mg total) by mouth once daily.     atorvastatin 40 MG tablet  Commonly known as: LIPITOR  Take 1 tablet (40 mg total) by mouth once daily.     budesonide 0.25 mg/2 mL nebulizer solution  Commonly known as: PULMICORT  Take 2 mLs (0.25 mg total) by nebulization 2 (two) times daily. Controller     cinnamon bark 500 mg capsule     docusate sodium 100 MG capsule  Commonly known as: COLACE  Take 1 capsule (100 mg total) by mouth 2 (two) times daily.     hydroCHLOROthiazide 12.5 mg capsule  Commonly known as: MICROZIDE  Take 1 capsule (12.5 mg total) by mouth once daily.     lisinopriL 10 MG tablet  Take 1 tablet (10 mg total) by mouth once daily.     multivitamin per tablet  Commonly known as: THERAGRAN     omega-3 fatty acids 1,250 mg Cap     omeprazole 20 MG capsule  Commonly known as: PRILOSEC  Take 1 capsule (20 mg total) by mouth once daily.     propylene glycoL 0.6 % Drop     sodium bicarb-sodium chloride Pack     Systane GeL 0.3 % Gel  Generic drug: artificial tears(hypromellose)(GENTEAL/SUSTANE)              SOCIAL HISTORY:     Social History     Socioeconomic History    Marital status:    Tobacco Use    Smoking status: Former     Packs/day: 1.00     Years: 20.00     Pack years: 20.00     Types: Cigarettes     Quit date: 1991     Years since quittin.4    Smokeless tobacco: Never    Tobacco comments:     Quit .   Substance and Sexual Activity    Alcohol use: No    Drug use: No    Sexual activity: Yes     Partners: Female       FAMILY HISTORY:     Family History   Problem  "Relation Age of Onset    Hyperlipidemia Mother     Alzheimer's disease Mother     Stomach cancer Father     Colon cancer Father         from wife--he is not unsure     Cataracts Other     No Known Problems Maternal Aunt     No Known Problems Maternal Uncle     No Known Problems Paternal Aunt     No Known Problems Paternal Uncle     No Known Problems Maternal Grandmother     No Known Problems Maternal Grandfather     No Known Problems Paternal Grandmother     No Known Problems Paternal Grandfather     Blindness Neg Hx     Cancer Neg Hx     Diabetes Neg Hx     Glaucoma Neg Hx     Rheum arthritis Neg Hx     Psoriasis Neg Hx     Lupus Neg Hx     Amblyopia Neg Hx     Hypertension Neg Hx     Macular degeneration Neg Hx     Retinal detachment Neg Hx     Strabismus Neg Hx     Stroke Neg Hx     Thyroid disease Neg Hx     Esophageal cancer Neg Hx        REVIEW OF SYSTEMS:   Review of Systems   Constitutional: Negative.   HENT: Negative.     Eyes: Negative.    Cardiovascular:  Positive for claudication.   Respiratory: Negative.     Endocrine: Negative.    Hematologic/Lymphatic: Negative.    Skin: Negative.    Musculoskeletal: Negative.    Gastrointestinal: Negative.    Genitourinary: Negative.    Neurological: Negative.    Psychiatric/Behavioral: Negative.     Allergic/Immunologic: Negative.      A 10 point review of systems was performed and all the pertinent positives have been mentioned. Rest of review of systems was negative.        PHYSICAL EXAM:     Vitals:    05/25/23 0916   BP: (!) 178/81   Pulse: (!) 55   Resp: 18    Body mass index is 26.09 kg/m².  Weight: 89.7 kg (197 lb 12 oz)   Height: 6' 1" (185.4 cm)     Physical Exam  Vitals reviewed.   Constitutional:       Appearance: He is well-developed.   HENT:      Head: Normocephalic.   Eyes:      Conjunctiva/sclera: Conjunctivae normal.      Pupils: Pupils are equal, round, and reactive to light.   Cardiovascular:      Rate and Rhythm: Normal rate and regular rhythm.    "   Heart sounds: Normal heart sounds.   Pulmonary:      Effort: Pulmonary effort is normal.      Breath sounds: Normal breath sounds.   Abdominal:      General: Bowel sounds are normal.      Palpations: Abdomen is soft.   Musculoskeletal:      Cervical back: Normal range of motion and neck supple.   Skin:     General: Skin is warm.   Neurological:      Mental Status: He is alert and oriented to person, place, and time.         DATA:     Laboratory:  CBC:  Recent Labs   Lab 01/21/21  0957 02/08/22  1024 02/13/23  0818   WBC 4.56 5.81 5.34   Hemoglobin 15.7 16.6 15.6   Hematocrit 48.2 53.0 49.6   Platelets 163 197 162       CHEMISTRIES:  Recent Labs   Lab 06/19/20  1138 01/21/21  0957 02/08/22  1024 02/13/23  0818   Glucose 94 94 92 100   Sodium 140 140 142 143   Potassium 4.1 4.0 4.8 4.0   BUN 12 12 13 8   Creatinine 1.2 1.1 1.2 1.2   eGFR if African American >60.0 >60 >60  --    eGFR if non African American >60.0 >60 60  --    Calcium 9.8 9.8 10.2 9.4       CARDIAC BIOMARKERS:        COAGS:        LIPIDS/LFTS:  Recent Labs   Lab 01/21/21  0957 02/08/22  1024 02/13/23  0818   Cholesterol 215 H 238 H 155   Triglycerides 83 76 45   HDL 41 42 40   LDL Cholesterol 157.4 180.8 H 106.0   Non-HDL Cholesterol 174 196 115   AST 14 13 15   ALT 12 13 14       Hemoglobin A1C   Date Value Ref Range Status   02/13/2023 5.6 4.0 - 5.6 % Final     Comment:     ADA Screening Guidelines:  5.7-6.4%  Consistent with prediabetes  >or=6.5%  Consistent with diabetes    High levels of fetal hemoglobin interfere with the HbA1C  assay. Heterozygous hemoglobin variants (HbS, HgC, etc)do  not significantly interfere with this assay.   However, presence of multiple variants may affect accuracy.     02/08/2022 5.6 4.0 - 5.6 % Final     Comment:     ADA Screening Guidelines:  5.7-6.4%  Consistent with prediabetes  >or=6.5%  Consistent with diabetes    High levels of fetal hemoglobin interfere with the HbA1C  assay. Heterozygous hemoglobin variants  (HbS, HgC, etc)do  not significantly interfere with this assay.   However, presence of multiple variants may affect accuracy.     05/24/2019 5.7 (H) 4.0 - 5.6 % Final     Comment:     ADA Screening Guidelines:  5.7-6.4%  Consistent with prediabetes  >or=6.5%  Consistent with diabetes  High levels of fetal hemoglobin interfere with the HbA1C  assay. Heterozygous hemoglobin variants (HbS, HgC, etc)do  not significantly interfere with this assay.   However, presence of multiple variants may affect accuracy.         TSH  Recent Labs   Lab 02/13/23  0818   TSH 0.641       The 10-year ASCVD risk score (Emmanuelle CORREA, et al., 2019) is: 36.3%    Values used to calculate the score:      Age: 72 years      Sex: Male      Is Non- : Yes      Diabetic: No      Tobacco smoker: No      Systolic Blood Pressure: 178 mmHg      Is BP treated: Yes      HDL Cholesterol: 40 mg/dL      Total Cholesterol: 155 mg/dL       BNP    Lab Results   Component Value Date/Time    BNP 25 05/07/2015 05:30 PM             ASSESSMENT AND PLAN     Patient Active Problem List   Diagnosis    Sjogren's disease    GERD (gastroesophageal reflux disease)    Arteriosclerosis of coronary artery    Hyperlipidemia    Cervical spondylosis    Degeneration of intervertebral disc    Rheumatoid arthritis    Benign prostatic hyperplasia with weak urinary stream    Perennial allergic rhinitis    Essential hypertension    High frequency hearing loss    Nasal obstruction    Dysfunctions associated with sleep stages or arousal from sleep    Primary insomnia    Wears dentures    Atherosclerosis of aorta    Other personal history presenting hazards to health    Complete loss of teeth, unspecified cause, unspecified class    Cervical pain (neck)    Impaired range of motion of cervical spine    Posture imbalance    PAD (peripheral artery disease)    Poor circulation    Chest pain       Peripheral artery disease: Abnormal ABIs in 2021 with decreased ORESTES in the  right leg.  Patient has right leg claudication.  Abnormal ORESTES on the right, and peripheral ultrasound revealed lesion on the left.  We will do further evaluation with the help of peripheral angiogram.  Right radial artery access      Risks, benefits and alternatives of the catheterization procedure were discussed with the patient.The risks of coronary angiography include but are not limited to: bleeding, infection, death, heart attack, arrhythmia, kidney injury or failure, potential need for dialysis, allergic reactions, stroke, need for emergency surgery, hematoma, pseudoaneurysm etc.  Should stenting be indicated, the patient has agreed to dual anti-platelet therapy for 1-consecutive year with a drug-eluting stent and a minimum of 1-month with the use of a bare metal stent. Additionally, pt is aware that non-compliance is likely to result in stent clotting with heart attack, heart failure, and/or death  The risks of moderate sedation include hypotension, respiratory depression, arrhythmias, bronchospasm, and death. Informed consent was obtained and the  patient is agreeable to proceed with the procedure. Consent was placed on the chart.          Coronary artery disease status post CABG in 2003.  Occasional chest pressure like symptoms.  Further evaluation with the help of a stress test.  Recent echo showed normal left ventricle systolic function     Check carotid ultrasound    Hypertension:  Uncontrolled in clinic today, but states at home stays well controlled around 120 mmHg.      Atrial fibrillation:  On Eliquis.  Rate controlled.  Patient has been told to hold his Eliquis 2 days prior to the procedure    Follow-up after testing          Thank you very much for involving me in the care of your patient.  Please do not hesitate to contact me if there are any questions.      Abbe Vilchis MD, FACC, [facility]  Interventional Cardiologist, [facility].           This note was dictated with the help of speech  recognition software.  There might be un-intended errors and/or substitutions.

## 2023-05-26 ENCOUNTER — TELEPHONE (OUTPATIENT)
Dept: CARDIOLOGY | Facility: HOSPITAL | Age: 73
End: 2023-05-26
Payer: MEDICARE

## 2023-05-26 NOTE — TELEPHONE ENCOUNTER
Called patient and all questions discussed and answered      ----- Message from Anai Hanson MA sent at 5/26/2023  9:10 AM CDT -----  Regarding: medication  Patient stated you and him discussed a medication he could take. Patient stated he forgot the name of the medication.  Patient is also requesting a call from you to see if it's a better option than him having the procedure that  is scheduled with you.  Please call patient to discuss.  Thank You.   ----- Message -----  From: Brennon Sanchez  Sent: 5/26/2023   7:50 AM CDT  To: D[facility] Mcad[facility] Staff    Name of Who is Calling: JOANNE BUENO JR. [[id number]]           What is the request in detail: Patient is requesting a call back to discuss upcoming procedure.  Patient would like to discuss taking medication over to having the surgery.  Please assist.           Can the clinic reply by [facility]: No           What Number to Call Back if not in [facility]: [phone]

## 2023-06-03 ENCOUNTER — PATIENT MESSAGE (OUTPATIENT)
Dept: OTOLARYNGOLOGY | Facility: CLINIC | Age: 73
End: 2023-06-03
Payer: MEDICARE

## 2023-06-06 ENCOUNTER — TELEPHONE (OUTPATIENT)
Dept: ADMINISTRATIVE | Facility: CLINIC | Age: 73
End: 2023-06-06
Payer: MEDICARE

## 2023-06-07 ENCOUNTER — OFFICE VISIT (OUTPATIENT)
Dept: FAMILY MEDICINE | Facility: CLINIC | Age: 73
End: 2023-06-07
Payer: MEDICARE

## 2023-06-07 VITALS
BODY MASS INDEX: 27.14 KG/M2 | TEMPERATURE: 98 F | OXYGEN SATURATION: 95 % | WEIGHT: 204.81 LBS | HEIGHT: 73 IN | HEART RATE: 64 BPM | DIASTOLIC BLOOD PRESSURE: 70 MMHG | SYSTOLIC BLOOD PRESSURE: 130 MMHG

## 2023-06-07 DIAGNOSIS — K08.109 COMPLETE LOSS OF TEETH, UNSPECIFIED CAUSE, UNSPECIFIED CLASS: ICD-10-CM

## 2023-06-07 DIAGNOSIS — I70.0 ATHEROSCLEROSIS OF AORTA: ICD-10-CM

## 2023-06-07 DIAGNOSIS — Z00.00 ENCOUNTER FOR PREVENTIVE HEALTH EXAMINATION: Primary | ICD-10-CM

## 2023-06-07 DIAGNOSIS — Z23 NEED FOR SHINGLES VACCINE: ICD-10-CM

## 2023-06-07 DIAGNOSIS — F51.01 PRIMARY INSOMNIA: ICD-10-CM

## 2023-06-07 DIAGNOSIS — M06.9 RHEUMATOID ARTHRITIS INVOLVING SHOULDER, UNSPECIFIED LATERALITY, UNSPECIFIED WHETHER RHEUMATOID FACTOR PRESENT: ICD-10-CM

## 2023-06-07 DIAGNOSIS — I10 ESSENTIAL HYPERTENSION: ICD-10-CM

## 2023-06-07 DIAGNOSIS — Z71.89 ADVANCED DIRECTIVES, COUNSELING/DISCUSSION: ICD-10-CM

## 2023-06-07 DIAGNOSIS — K21.00 GASTROESOPHAGEAL REFLUX DISEASE WITH ESOPHAGITIS WITHOUT HEMORRHAGE: Chronic | ICD-10-CM

## 2023-06-07 DIAGNOSIS — I73.9 PAD (PERIPHERAL ARTERY DISEASE): ICD-10-CM

## 2023-06-07 DIAGNOSIS — E78.5 HYPERLIPIDEMIA, UNSPECIFIED HYPERLIPIDEMIA TYPE: ICD-10-CM

## 2023-06-07 PROCEDURE — 99215 OFFICE O/P EST HI 40 MIN: CPT | Mod: PBBFAC,PO | Performed by: NURSE PRACTITIONER

## 2023-06-07 PROCEDURE — G0439 PR MEDICARE ANNUAL WELLNESS SUBSEQUENT VISIT: ICD-10-PCS | Mod: ,,, | Performed by: NURSE PRACTITIONER

## 2023-06-07 PROCEDURE — 99999 PR PBB SHADOW E&M-EST. PATIENT-LVL V: CPT | Mod: PBBFAC,,, | Performed by: NURSE PRACTITIONER

## 2023-06-07 PROCEDURE — 99999 PR PBB SHADOW E&M-EST. PATIENT-LVL V: ICD-10-PCS | Mod: PBBFAC,,, | Performed by: NURSE PRACTITIONER

## 2023-06-07 PROCEDURE — G0439 PPPS, SUBSEQ VISIT: HCPCS | Mod: ,,, | Performed by: NURSE PRACTITIONER

## 2023-06-07 NOTE — PROGRESS NOTES
HPI     Chief Complaint:  AWV    Rizwan Demarco Jr. is a 72 y.o. male with multiple medical diagnoses as listed in the medical history and problem list that presented for a Medicare AWV and comprehensive Health Risk Assessment today.  Pt is new to me but is known to this clinic with his last appointment being Visit date not found.      The following components were reviewed and updated:    Medical history  Family History  Social history  Allergies and Current Medications  Health Risk Assessment  Health Maintenance  Care Team       Assessment & Plan   (all problems are new to me)    Diagnoses and health risks identified today and associated recommendations/orders:    Problem List Items Addressed This Visit          ENT    Complete loss of teeth, unspecified cause, unspecified class    The current medical regimen is effective;  continue present plan         Cardiac/Vascular    Hyperlipidemia (Chronic)    discussed ways to lower triglycerides such as cutting simple sugars out of diet (white breads, candies, cookies, cakes, etc.) and reducing/eliminating intake of highly processed trans fatty acids.   Exercise 30 minutes a day for 4-5 days a week.   Eat more fiber.      Enrolled in digital medicine program for this diagnosis      Overview     Patient refuse statin therapy         Relevant Orders    Lipids Digital Medicine (LDMP) Enrollment Order (Completed)    Lipids Digital Medicine (LDMP): Assign Onboarding Questionnaires (Completed)    PAD (peripheral artery disease)    The current medical regimen is effective;  continue present plan    Followed by cardiology    Essential hypertension    BP Readings from Last 3 Encounters:   06/07/23 130/70   05/25/23 (!) 178/81   04/25/23 (!) 173/80       -continue current medication regimen  -DASH diet, regular cardiovascular exercises, portion control  - ?weight loss  -f/u with BP logs in 2 weeks     Enrolled in digital medicine program for this diagnosis      Relevant Orders  "   Hypertension Digital Medicine ([facility]) Enrollment Order (Completed)    Hypertension Digital Medicine ([facility]): Assign Onboarding Questionnaires (Completed)    Atherosclerosis of aorta    -assessed and addressed all modifiable risk factors.  Continue with appropriate management to prevent complications with regards to lipid and BP monitoring.      Overview     "THE HEART IS NOT ENLARGED, AND THE AORTA TAPERS NORMALLY WITH ATHEROSCLEROTIC   CALCIFICATIONS" - CT Abdomen with contrast 9-            Immunology/Multi System    Rheumatoid arthritis    The current medical regimen is effective;  continue present plan  Follow up with rheumatology    Overview     Has remained off Plaquenil  Has not seen rheum since 2016    No symptoms         Relevant Orders    Ambulatory referral/consult to Rheumatology       GI    GERD (gastroesophageal reflux disease) (Chronic)    -stable, discussed with patient about avoiding potential dietary triggers  -avoid spicy/greasy/sour/acidic foods, as well as tea/coffee/chocolate if possible  -Tylenol as needed for pain, avoid NSAIDs  -Keep food diary         Other    Primary insomnia    The current medical regimen is effective;  continue present plan    Overview     Very severe  Multiple failed therapies          Other Visit Diagnoses       Encounter for preventive health examination    -  Primary    Counseled on age appropriate medical preventative services including age appropriate cancer screenings, age appropriate eye and dental exams, over all nutritional health, need for a consistent exercise regimen, and an over all push towards maintaining a vigorous and active lifestyle.  Counseled on age appropriate vaccines and discussed upcoming health care needs based on age/gender. Discussed good sleep hygiene and stress management.      Need for shingles vaccine        Relevant Medications    varicella-zoster gE-AS01B, PF, (SHINGRIX) 50 mcg/0.5 mL injection    Other Relevant Orders    " Ambulatory referral/consult to the [facility]    Advanced directives, counseling/discussion        I offered to discuss advanced care planning, including how to pick a person who would make decisions for you if you were unable to make them for yourself, called a health care power of , and what kind of decisions you might make such as use of life sustaining treatments such as ventilators and tube feeding when faced with a life limiting illness recorded on a living will that they will need to know. (How you want to be cared for as you near the end of your natural life)     X Patient is interested in learning more about how to make advanced directives.  I provided them paperwork and offered to discuss this with them.                --------------------------------------------    ** See Completed Assessments for Annual Wellness Visit within the encounter summary.**    The following assessments were completed:  Living Situation  CAGE  Depression Screening  Timed Get Up and Go  Whisper Test  Cognitive Function Screening  Nutrition Screening  ADL Screening  PAQ Screening      Provided Rizwan Demarco Jr.  with a 5-10 year written screening schedule and personal prevention plan. Recommendations were developed using the USPSTF age appropriate recommendations. Education, counseling, and referrals were provided as needed. After Visit Summary printed and given to patient which includes a list of additional screenings\tests needed.    Review for Opioid Screening: Pt does not have Rx for Opioids     Review for Substance Use Disorders: Patient does not abuse use substances    Exam     Review of Systems:  (as noted above)  Review of Systems   Constitutional:  Negative for fever.   HENT:  Negative for trouble swallowing.    Eyes:  Negative for visual disturbance.   Respiratory:  Negative for chest tightness and shortness of breath.    Cardiovascular:  Negative for chest pain.   Gastrointestinal:  Negative  "for blood in stool.   Musculoskeletal:  Positive for arthralgias.     Physical Exam:   Physical Exam  Constitutional:       General: He is not in acute distress.     Appearance: He is not ill-appearing or diaphoretic.   HENT:      Head: Normocephalic and atraumatic.      Mouth/Throat:      Comments: Dentures    Eyes:      General: No scleral icterus.  Cardiovascular:      Rate and Rhythm: Normal rate. Rhythm irregular.      Pulses:           Dorsalis pedis pulses are 1+ on the right side and 1+ on the left side.        Posterior tibial pulses are 1+ on the right side and 1+ on the left side.      Heart sounds: No murmur heard.    No friction rub. No gallop.   Pulmonary:      Effort: No respiratory distress.   Chest:      Chest wall: No tenderness.   Musculoskeletal:      Cervical back: No rigidity.   Skin:     Capillary Refill: Capillary refill takes 2 to 3 seconds.   Neurological:      General: No focal deficit present.      Mental Status: He is alert and oriented to person, place, and time.     Vitals:    06/07/23 1354   BP: 130/70   Pulse: 64   Temp: 97.9 °F (36.6 °C)   TempSrc: Oral   SpO2: 95%   Weight: 92.9 kg (204 lb 12.9 oz)   Height: 6' 1" (1.854 m)      Body mass index is 27.02 kg/m².    Clock:                Health Maintenance:  Health Maintenance         Date Due Completion Date    Shingles Vaccine (1 of 2) Never done ---    COVID-19 Vaccine (2 - Mixed Product series) 05/01/2021 3/6/2021    Colorectal Cancer Screening 07/24/2023 7/24/2020    Lipid Panel 02/13/2028 2/13/2023    TETANUS VACCINE 04/21/2030 4/21/2020            Shingles vaccine ordered and Advised patient on the importance of completing overdue health maintenance items      Follow Up:  Follow up in about 3 months (around 9/7/2023), or if symptoms worsen or fail to improve.    History     Past Medical History:  Past Medical History:   Diagnosis Date    Allergy     Arthritis     Rheumatoid arthritis    Back pain     Blood clotting tendency     " BPH (benign prostatic hypertrophy)     Cervical spondylosis     Colon polyp 2010    adenoma    Coronary artery disease     Depression 2015    Dry eyes     Dry mouth     GERD (gastroesophageal reflux disease)     Hyperlipidemia     Hypertension     Lumbar spondylosis     Nasal obstruction     PAD (peripheral artery disease) 2023    Rheumatoid arthritis     Sinusitis     Special screening for malignant neoplasm of colon 2016    Trouble in sleeping        Past Surgical History:  Past Surgical History:   Procedure Laterality Date    CHALAZION EXCISION      COLONOSCOPY N/A 2016    Procedure: COLONOSCOPY;  Surgeon: Partha Saucedo MD;  Location: [facility] ENDO;  Service: Endoscopy;  Laterality: N/A;    COLONOSCOPY N/A 2020    Procedure: COLONOSCOPY;  Surgeon: Ian Martinez MD;  [facility]  Service: Endoscopy;  Laterality: N/A;  20 - removed from 20, not called yet due to acute pain issues being addressed today - pg    CORONARY ARTERY BYPASS GRAFT      ESOPHAGOGASTRODUODENOSCOPY N/A 2020    Procedure: ESOPHAGOGASTRODUODENOSCOPY (EGD);  Surgeon: Ian Martinez MD;  Location: [facility] ([address] [place] [address]);  Service: Endoscopy;  Laterality: N/A;  20 - removed from 20, not called yet due to acute pain issues being addressed today.  20 - rescheduled 20 - pg    EXCISION TURBINATE, SUBMUCOUS      KNEE ARTHROSCOPY W/ DEBRIDEMENT      NASAL SEPTUM SURGERY      TONSILLECTOMY         Social History:  Social History     Socioeconomic History    Marital status:    Tobacco Use    Smoking status: Former     Packs/day: 1.00     Years: 20.00     Pack years: 20.00     Types: Cigarettes     Quit date: 1991     Years since quittin.4    Smokeless tobacco: Never    Tobacco comments:     Quit .   Substance and Sexual Activity    Alcohol use: No    Drug use: No    Sexual activity: Yes     Partners: Female     Social Determinants of Health     Financial Resource Strain:  Low Risk     Difficulty of Paying Living Expenses: Not hard at all   Food Insecurity: No Food Insecurity    Worried About Running Out of Food in the Last Year: Never true    Ran Out of Food in the Last Year: Never true   Transportation Needs: No Transportation Needs    Lack of Transportation (Medical): No    Lack of Transportation (Non-Medical): No   Physical Activity: Insufficiently Active    Days of Exercise per Week: 3 days    Minutes of Exercise per Session: 30 min   Stress: No Stress Concern Present    Feeling of Stress : Not at all   Social Connections: Moderately Integrated    Frequency of Communication with Friends and Family: More than three times a week    Frequency of Social Gatherings with Friends and Family: More than three times a week    Attends [employer] Services: More than 4 times per year    Active Member of Clubs or Organizations: No    Attends Club or Organization Meetings: Never    Marital Status:    Housing Stability: Low Risk     Unable to Pay for Housing in the Last Year: No    Number of Places Lived in the Last Year: 1    Unstable Housing in the Last Year: No       Family History:  Family History   Problem Relation Age of Onset    Hyperlipidemia Mother     Alzheimer's disease Mother     Stomach cancer Father     Colon cancer Father         from wife--he is not unsure     Cataracts Other     No Known Problems Maternal Aunt     No Known Problems Maternal Uncle     No Known Problems Paternal Aunt     No Known Problems Paternal Uncle     No Known Problems Maternal Grandmother     No Known Problems Maternal Grandfather     No Known Problems Paternal Grandmother     No Known Problems Paternal Grandfather     Blindness Neg Hx     Cancer Neg Hx     Diabetes Neg Hx     Glaucoma Neg Hx     Rheum arthritis Neg Hx     Psoriasis Neg Hx     Lupus Neg Hx     Amblyopia Neg Hx     Hypertension Neg Hx     Macular degeneration Neg Hx     Retinal detachment Neg Hx     Strabismus Neg Hx     Stroke Neg Hx      Thyroid disease Neg Hx     Esophageal cancer Neg Hx        Allergies and Medications: (updated and reviewed)  Review of patient's allergies indicates:   Allergen Reactions    Aspirin Nausea And Vomiting    Astelin [azelastine] Other (See Comments)     Dry mouth    Flomax [tamsulosin] Other (See Comments)     Nosebleed     Current Outpatient Medications   Medication Sig Dispense Refill    alfuzosin (UROXATRAL) 10 mg Tb24 Take 1 tablet (10 mg total) by mouth daily with breakfast. 90 tablet 3    apixaban (ELIQUIS) 5 mg Tab Take 1 tablet (5 mg total) by mouth 2 (two) times daily. 60 tablet 11    artificial tears,hypromellose,,GENTEAL/SUSTANE, (SYSTANE GEL) 0.3 % Gel 1 drop as needed.      atorvastatin (LIPITOR) 40 MG tablet Take 1 tablet (40 mg total) by mouth once daily. 90 tablet 3    budesonide (PULMICORT) 0.25 mg/2 mL nebulizer solution Take 2 mLs (0.25 mg total) by nebulization 2 (two) times daily. Controller 90 each 5    cinnamon bark 500 mg capsule Take 500 mg by mouth once daily.      docusate sodium (COLACE) 100 MG capsule Take 1 capsule (100 mg total) by mouth 2 (two) times daily. 60 capsule 0    hydroCHLOROthiazide (MICROZIDE) 12.5 mg capsule Take 1 capsule (12.5 mg total) by mouth once daily. 90 capsule 3    lisinopriL 10 MG tablet Take 1 tablet (10 mg total) by mouth once daily. 90 tablet 3    multivitamin (THERAGRAN) per tablet Take by mouth.  Tablet Oral Every day      omega-3 fatty acids 1,250 mg Cap Take by mouth.      omeprazole (PRILOSEC) 20 MG capsule Take 1 capsule (20 mg total) by mouth once daily. 30 capsule 5    propylene glycoL 0.6 % Drop Apply to eye.      sodium bicarb-sodium chloride Pack by sinus irrigation route.      aspirin (ECOTRIN) 81 MG EC tablet Take 1 tablet (81 mg total) by mouth once daily.  0    varicella-zoster gE-AS01B, PF, (SHINGRIX) 50 mcg/0.5 mL injection Inject 0.5 mLs into the muscle once. for 1 dose 1 each 0     Current Facility-Administered Medications   Medication  Dose Route Frequency Provider Last Rate Last Admin    sodium chloride 0.9% flush 10 mL  10 mL Intravenous PRN Abbe Vilchis MD               - The patient is given an After Visit Summary that lists all medications with directions, allergies, education, orders placed during this encounter and follow-up instructions.      - I have reviewed the patient's medical information including past medical, family, and social history sections including the medications and allergies.      - We discussed the patient's current medications.     This note was created by combination of typed  and MModal dictation.  Transcription errors may be present.  If there are any questions, please contact me.       Manuel Moon NP                Complex Repair Preamble Text (Leave Blank If You Do Not Want): Extensive wide undermining was performed.

## 2023-06-07 NOTE — PATIENT INSTRUCTIONS
Counseling and Referral of Other Preventative  (Italic type indicates deductible and co-insurance are waived)    Patient Name: Rizwan Demarco  Today's Date: 6/7/2023    Health Maintenance         Date Due Completion Date    Shingles Vaccine (1 of 2) Never done ---    COVID-19 Vaccine (2 - Mixed Product series) 05/01/2021 3/6/2021    Colorectal Cancer Screening 07/24/2023 7/24/2020    Lipid Panel 02/13/2028 2/13/2023    TETANUS VACCINE 04/21/2030 4/21/2020          Orders Placed This Encounter   Procedures    Ambulatory referral/consult to the [facility]    Hypertension Digital Medicine (HDMP) Enrollment Order    Lipids Digital Medicine (LDMP) Enrollment Order       The following information is provided to all patients.  This information is to help you find resources for any of the problems found today that may be affecting your health:                Living healthy guide: www.[facility].[place].[facility]      Understanding Diabetes: www.diabetes.org      Eating healthy: www.cdc.gov/healthyweight      [facility] home safety checklist: www.cdc.gov/steadi/patient.html      Agency on Aging: www.goea.[place].[facility]      Alcoholics anonymous (AA): www.aa.org      Physical Activity: www.jimenez.nih.gov/cu9iija      Tobacco use: www.quitwithusla.org       Patient Education        Yearly Physical for Adults   About this topic   Most people do not want to be sick. Having a checkup each year with your doctor is one way to help you stay healthy. You may need to see your doctor more or less often. How often you need to go to the doctor depends on your age. Your family and medical history also play a role in how often you need to go to the doctor. Going to see your doctor on a routine basis can help you find problems early or even before they start. This may make it easier to treat or cure your problem.  General   Your doctor will talk about many things during your checkup. Your doctor may ask about:  Your medical and family  history.  All the drugs you are taking. Be sure to include all prescription, over the counter, and herbal supplements. Tell the doctor if you have any drug allergy. Bring a list of drugs you take with you.  How you are feeling and if you are having any problems.  Risky behaviors like smoking, drinking alcohol, using illegal drugs, not wearing seatbelts, having unprotected sex, etc.  Your doctor will do a physical exam and may check your:  Height and weight  Blood pressure  Reflexes  Memory  Vision  Hearing  Your doctor may order:  Lab tests  ECG to check your heart rhythm  X-rays  Tests or treatments based on your exam  What lifestyle changes are needed?   Your doctor may suggest you make changes to your lifestyle at this visit. The doctor may talk with you about being more active or lowering stress levels. Ask your doctor what you need to do.  What drugs may be needed?   Your doctor may order drugs or vaccines to protect you from illnesses.  What changes to diet are needed?   Talk to your doctor to see if any changes are needed to your diet.  When do I need to call the doctor?   Call your doctor if you need to learn about any test results. Together you can make a plan for more care.  Helpful tips   Make a list of questions for your doctor before you go. This will help you remember to ask about any concerns. Write down any answers from your doctor so you can look over them after your visit.   Tell your doctor about any changes in your body or health since your last visit.  Ask your doctor about any screening tests you need.  Where can I learn more?   American Academy of Family Physicians  http://familydoctor.org/familydoctor/en/prevention-wellness/staying-healthy/healthy-living/preventive-services-for-healthy-living.printerview.html   Centers for Disease Control  http://www.cdc.gov/family/checkup/   Last Reviewed Date   2019-04-22  Consumer Information Use and Disclaimer   This information is not specific medical  advice and does not replace information you receive from your health care provider. This is only a brief summary of general information. It does NOT include all information about conditions, illnesses, injuries, tests, procedures, treatments, therapies, discharge instructions or life-style choices that may apply to you. You must talk with your health care provider for complete information about your health and treatment options. This information should not be used to decide whether or not to accept your health care providers advice, instructions or recommendations. Only your health care provider has the knowledge and training to provide advice that is right for you.  Copyright   Copyright © 2021 UpToDate, Inc. and its affiliates and/or licensors. All rights reserved.

## 2023-06-14 ENCOUNTER — TELEPHONE (OUTPATIENT)
Dept: ENDOSCOPY | Facility: HOSPITAL | Age: 73
End: 2023-06-14
Payer: MEDICARE

## 2023-06-14 DIAGNOSIS — Z86.010 ENCOUNTER FOR COLONOSCOPY DUE TO HISTORY OF COLONIC POLYP: Primary | ICD-10-CM

## 2023-06-14 DIAGNOSIS — Z12.11 SPECIAL SCREENING FOR MALIGNANT NEOPLASMS, COLON: ICD-10-CM

## 2023-06-14 DIAGNOSIS — Z12.11 ENCOUNTER FOR COLONOSCOPY DUE TO HISTORY OF COLONIC POLYP: Primary | ICD-10-CM

## 2023-06-14 RX ORDER — POLYETHYLENE GLYCOL 3350, SODIUM SULFATE ANHYDROUS, SODIUM BICARBONATE, SODIUM CHLORIDE, POTASSIUM CHLORIDE 236; 22.74; 6.74; 5.86; 2.97 G/4L; G/4L; G/4L; G/4L; G/4L
4 POWDER, FOR SOLUTION ORAL ONCE
Qty: 4000 ML | Refills: 0 | Status: SHIPPED | OUTPATIENT
Start: 2023-06-14 | End: 2023-06-14

## 2023-06-14 NOTE — TELEPHONE ENCOUNTER
Patient called to schedule for a colonoscopy. Spoke to patient. Colonoscopy scheduled. 7/27/23 with an arrival time of 9:15 AM confirmed.  Instructions reviewed and verbalized. Eliquis pending. Instructions sent via portal.  Patient verbalized an understanding.

## 2023-06-15 ENCOUNTER — TELEPHONE (OUTPATIENT)
Dept: ENDOSCOPY | Facility: HOSPITAL | Age: 73
End: 2023-06-15
Payer: MEDICARE

## 2023-06-15 ENCOUNTER — NURSE TRIAGE (OUTPATIENT)
Dept: ADMINISTRATIVE | Facility: CLINIC | Age: 73
End: 2023-06-15
Payer: MEDICARE

## 2023-06-15 NOTE — TELEPHONE ENCOUNTER
Dear Dr Vilchis-    Patient has a scheduled procedure and is currently taking a blood thinner prescribed by your office. In order to ensure patient safety, we would like to confirm that the patient can place their blood thinner medication on hold for the procedure. Can he/she discontinue Eliquis (apixaban) for a minimum of 2 days prior to the procedure?     Thank you for your prompt reply.    [facility] Scheduling

## 2023-06-15 NOTE — TELEPHONE ENCOUNTER
Patient states c/o lower back pain rated 2/10 possibly associated with constipation. Patient denies abdominal pain, incontinence, numbness and fever.     Care Advice given to Go to Urgent Care Center for evaluation/treatment. Patient states understanding of care advice and cites no additional concerns at this time.       Reason for Disposition   Age > 50 and no history of prior similar back pain    Additional Information   Negative: Passed out (i.e., fainted, collapsed and was not responding)   Negative: Shock suspected (e.g., cold/pale/clammy skin, too weak to stand, low BP, rapid pulse)   Negative: Sounds like a life-threatening emergency to the triager   Negative: Major injury to the back (e.g., MVA, fall > 10 feet or 3 meters, penetrating injury, etc.)   Negative: Pain in the upper back over the ribs (rib cage) that radiates (travels) into the chest   Negative: Pain in the upper back over the ribs (rib cage) and worsened by coughing (or clearly increases with breathing)   Negative: Back pain during pregnancy   Negative: SEVERE back pain of sudden onset and age > 60 years   Negative: SEVERE abdominal pain (e.g., excruciating)   Negative: Abdominal pain and age > 60 years   Negative: Unable to urinate (or only a few drops) and bladder feels very full   Negative: Loss of bladder or bowel control (urine or bowel incontinence; wetting self, leaking stool) of new-onset   Negative: Numbness (loss of sensation) in groin or rectal area   Negative: Pain radiates into groin, scrotum   Negative: Blood in urine (red, pink, or tea-colored)   Negative: Vomiting and pain over lower ribs of back (i.e., flank - kidney area)   Negative: Weakness of a leg or foot (e.g., unable to bear weight, dragging foot)   Negative: Patient sounds very sick or weak to the triager   Negative: Fever > 100.4 F (38.0 C) and flank pain   Negative: Pain or burning with passing urine (urination)   Negative: SEVERE back pain (e.g., excruciating, unable  to do any normal activities) and not improved after pain medicine and CARE ADVICE   Negative: Numbness in an arm or hand (i.e., loss of sensation) and upper back pain   Negative: Numbness in a leg or foot (i.e., loss of sensation)   Negative: High-risk adult (e.g., history of cancer, history of HIV, or history of IV Drug Use)   Negative: Soft tissue infection (e.g., abscess, cellulitis) or other serious infection (e.g., bacteremia) in last 2 weeks   Negative: Painful rash with multiple small blisters grouped together (i.e., dermatomal distribution or 'band' or 'stripe')   Negative: Pain radiates into the thigh or further down the leg, and in both legs    Protocols used: Back Pain-A-[place]

## 2023-06-16 ENCOUNTER — HOSPITAL ENCOUNTER (OUTPATIENT)
Dept: PREADMISSION TESTING | Facility: HOSPITAL | Age: 73
Discharge: HOME | End: 2023-06-16
Attending: INTERNAL MEDICINE
Payer: MEDICARE

## 2023-06-16 ENCOUNTER — PATIENT MESSAGE (OUTPATIENT)
Dept: ENDOSCOPY | Facility: HOSPITAL | Age: 73
End: 2023-06-16
Payer: MEDICARE

## 2023-06-16 ENCOUNTER — TELEPHONE (OUTPATIENT)
Dept: ENDOSCOPY | Facility: HOSPITAL | Age: 73
End: 2023-06-16
Payer: MEDICARE

## 2023-06-16 VITALS
BODY MASS INDEX: 27.03 KG/M2 | HEART RATE: 54 BPM | DIASTOLIC BLOOD PRESSURE: 67 MMHG | TEMPERATURE: 98 F | SYSTOLIC BLOOD PRESSURE: 122 MMHG | WEIGHT: 203.94 LBS | RESPIRATION RATE: 18 BRPM | HEIGHT: 73 IN

## 2023-06-16 DIAGNOSIS — I10 ESSENTIAL HYPERTENSION: ICD-10-CM

## 2023-06-16 DIAGNOSIS — I73.9 PAD (PERIPHERAL ARTERY DISEASE): ICD-10-CM

## 2023-06-16 LAB
ANION GAP SERPL CALC-SCNC: 7 MMOL/L (ref 8–16)
BASOPHILS # BLD AUTO: 0.03 K/UL (ref 0–0.2)
BASOPHILS NFR BLD: 0.6 % (ref 0–1.9)
BUN SERPL-MCNC: 10 MG/DL (ref 8–23)
CALCIUM SERPL-MCNC: 9.8 MG/DL (ref 8.7–10.5)
CHLORIDE SERPL-SCNC: 105 MMOL/L (ref 95–110)
CO2 SERPL-SCNC: 28 MMOL/L (ref 23–29)
CREAT SERPL-MCNC: 0.9 MG/DL (ref 0.5–1.4)
DIFFERENTIAL METHOD: ABNORMAL
EOSINOPHIL # BLD AUTO: 0.1 K/UL (ref 0–0.5)
EOSINOPHIL NFR BLD: 2.3 % (ref 0–8)
ERYTHROCYTE [DISTWIDTH] IN BLOOD BY AUTOMATED COUNT: 12.6 % (ref 11.5–14.5)
EST. GFR  (NO RACE VARIABLE): >60 ML/MIN/1.73 M^2
GLUCOSE SERPL-MCNC: 71 MG/DL (ref 70–110)
HCT VFR BLD AUTO: 48.3 % (ref 40–54)
HGB BLD-MCNC: 15.5 G/DL (ref 14–18)
IMM GRANULOCYTES # BLD AUTO: 0.01 K/UL (ref 0–0.04)
IMM GRANULOCYTES NFR BLD AUTO: 0.2 % (ref 0–0.5)
LYMPHOCYTES # BLD AUTO: 3.4 K/UL (ref 1–4.8)
LYMPHOCYTES NFR BLD: 64.7 % (ref 18–48)
MCH RBC QN AUTO: 29.5 PG (ref 27–31)
MCHC RBC AUTO-ENTMCNC: 32.1 G/DL (ref 32–36)
MCV RBC AUTO: 92 FL (ref 82–98)
MONOCYTES # BLD AUTO: 0.4 K/UL (ref 0.3–1)
MONOCYTES NFR BLD: 8.1 % (ref 4–15)
NEUTROPHILS # BLD AUTO: 1.3 K/UL (ref 1.8–7.7)
NEUTROPHILS NFR BLD: 24.1 % (ref 38–73)
NRBC BLD-RTO: 0 /100 WBC
PLATELET # BLD AUTO: 176 K/UL (ref 150–450)
PMV BLD AUTO: 10.5 FL (ref 9.2–12.9)
POTASSIUM SERPL-SCNC: 3.9 MMOL/L (ref 3.5–5.1)
RBC # BLD AUTO: 5.26 M/UL (ref 4.6–6.2)
SODIUM SERPL-SCNC: 140 MMOL/L (ref 136–145)
WBC # BLD AUTO: 5.32 K/UL (ref 3.9–12.7)

## 2023-06-16 PROCEDURE — 80048 BASIC METABOLIC PNL TOTAL CA: CPT | Performed by: INTERNAL MEDICINE

## 2023-06-16 PROCEDURE — 85025 COMPLETE CBC W/AUTO DIFF WBC: CPT | Performed by: INTERNAL MEDICINE

## 2023-06-16 NOTE — TELEPHONE ENCOUNTER
Patient has been notified via [employer] to hold Eliquis (apixaban) for 2 day(s).

## 2023-06-16 NOTE — DISCHARGE INSTRUCTIONS
Before 7 AM, enter through the Emergency Entrance..   After 7 AM enter through the Main Entrance.        Your procedure  is scheduled for _____06/21/2023____________.    Call [phone] between 2pm and 5pm on __06/20/2023_____to find out your arrival time for the day of surgery.    You may have one visitor.  No children allowed.      You will be going to the Same Day Surgery Unit on the 2nd floor of the hospital.    Important instructions:  Do not eat anything after midnight.  You may have plain water, non carbonated.  You may also have Gatorade or Powerade after midnight.    Stop all fluids 2 hours before your surgery.    It is okay to brush your teeth.  Do not have gum, candy or mints.    Do not take any diabetic medication on the morning of surgery unless instructed to do so by your doctor or pre op nurse.    Metformin, Invokamet and Synjardy must be stopped two days before your procedure.  Do not take on the day of procedure.  Resume when instructed.    Please shower the night before and the morning of your surgery.        Use Chlorhexidine soap as instructed by your pre op nurse.   Please place clean linens on your bed the night before surgery. Please wear fresh clean clothing after each shower.    No shaving of procedural area at least 4-5 days before surgery due to increased risk of skin irritation and/or possible infection.    Contact lenses and removable denture work may not be worn during your procedure.    You may wear deodorant only.     Do not wear powder, body lotion, perfume/cologne, oils, creams or rubs.    Do not wear any jewelry or have any metal on your body.    You will be asked to remove any dentures or partials for the procedure.    If you are going home on the same day of surgery, you must arrange for a family member or a friend to drive you home.  Public transportation is prohibited.  You will not be able to drive home if you were given anesthesia or sedation.    Please leave money  and valuables home.      You may bring your cell phone.    Call the doctor if fever or illness should occur before your surgery.    Call [phone] to contact us here if needed.

## 2023-06-16 NOTE — TELEPHONE ENCOUNTER
----- Message from Sonya Bradley RN sent at 2023 12:12 PM CDT -----  Regardin/27 BT  The patient is currently under an internal cardiologist Dr. Abbe stevens and requires a blood thinner Eliquis (apixaban) for their upcoming scheduled Colonoscopy on 23.     Additional clearances required:        External provider information:

## 2023-06-19 NOTE — PLAN OF CARE
Pre-operative instructions, medication directives and pain scales reviewed with patient. All questions the patient had were answered. Re-assurance about surgical procedure and day of surgery routine given as needed, patient verbalized understanding of the pre-op instructions.  -- LATE ENTRY 06/19/2023

## 2023-06-21 ENCOUNTER — HOSPITAL ENCOUNTER (OUTPATIENT)
Facility: HOSPITAL | Age: 73
Discharge: HOME | End: 2023-06-21
Attending: INTERNAL MEDICINE | Admitting: INTERNAL MEDICINE
Payer: MEDICARE

## 2023-06-21 VITALS
BODY MASS INDEX: 26.9 KG/M2 | HEART RATE: 50 BPM | DIASTOLIC BLOOD PRESSURE: 71 MMHG | TEMPERATURE: 98 F | SYSTOLIC BLOOD PRESSURE: 149 MMHG | WEIGHT: 203.88 LBS | OXYGEN SATURATION: 100 % | RESPIRATION RATE: 16 BRPM

## 2023-06-21 DIAGNOSIS — I48.91 NEW ONSET A-FIB: ICD-10-CM

## 2023-06-21 DIAGNOSIS — Z98.890 STATUS POST CARDIAC SURGERY: ICD-10-CM

## 2023-06-21 DIAGNOSIS — I73.9 PAD (PERIPHERAL ARTERY DISEASE): ICD-10-CM

## 2023-06-21 DIAGNOSIS — I10 ESSENTIAL HYPERTENSION: ICD-10-CM

## 2023-06-21 LAB
ALLENS TEST: ABNORMAL
POC ACTIVATED CLOTTING TIME K: 203 SEC (ref 74–137)
POC ACTIVATED CLOTTING TIME K: 227 SEC (ref 74–137)
POC ACTIVATED CLOTTING TIME K: 251 SEC (ref 74–137)
POC ACTIVATED CLOTTING TIME K: 263 SEC (ref 74–137)
SAMPLE: ABNORMAL
SITE: ABNORMAL

## 2023-06-21 PROCEDURE — 25000003 PHARM REV CODE 250: Performed by: HOSPITALIST

## 2023-06-21 PROCEDURE — C1769 GUIDE WIRE: HCPCS | Performed by: INTERNAL MEDICINE

## 2023-06-21 PROCEDURE — C1894 INTRO/SHEATH, NON-LASER: HCPCS | Performed by: INTERNAL MEDICINE

## 2023-06-21 PROCEDURE — 37252 PR IVUS, NON-CORONARY, 1ST VESSEL: ICD-10-PCS | Mod: ,,, | Performed by: INTERNAL MEDICINE

## 2023-06-21 PROCEDURE — 99153 MOD SED SAME PHYS/QHP EA: CPT | Performed by: INTERNAL MEDICINE

## 2023-06-21 PROCEDURE — 75625 PR  ANGIO AORTOGRAM ABD SERIAL: ICD-10-PCS | Mod: 26,,, | Performed by: INTERNAL MEDICINE

## 2023-06-21 PROCEDURE — 37225 PR FEM/POPL REVAS W/ATHERECTOMY: ICD-10-PCS | Mod: RT,,, | Performed by: INTERNAL MEDICINE

## 2023-06-21 PROCEDURE — 63600175 PHARM REV CODE 636 W HCPCS: Performed by: INTERNAL MEDICINE

## 2023-06-21 PROCEDURE — C1887 CATHETER, GUIDING: HCPCS | Performed by: INTERNAL MEDICINE

## 2023-06-21 PROCEDURE — C1714 CATH, TRANS ATHERECTOMY, DIR: HCPCS | Performed by: INTERNAL MEDICINE

## 2023-06-21 PROCEDURE — 75716 PR  ANGIO EXTERMITY BILAT: ICD-10-PCS | Mod: 26,59,, | Performed by: INTERNAL MEDICINE

## 2023-06-21 PROCEDURE — 99152 PR MOD CONSCIOUS SEDATION, SAME PHYS, 5+ YRS, FIRST 15 MIN: ICD-10-PCS | Mod: ,,, | Performed by: INTERNAL MEDICINE

## 2023-06-21 PROCEDURE — 25500020 PHARM REV CODE 255

## 2023-06-21 PROCEDURE — 85347 COAGULATION TIME ACTIVATED: CPT | Performed by: INTERNAL MEDICINE

## 2023-06-21 PROCEDURE — 75625 CONTRAST EXAM ABDOMINL AORTA: CPT | Performed by: INTERNAL MEDICINE

## 2023-06-21 PROCEDURE — C1725 CATH, TRANSLUMIN NON-LASER: HCPCS | Performed by: INTERNAL MEDICINE

## 2023-06-21 PROCEDURE — C1760 CLOSURE DEV, VASC: HCPCS | Performed by: INTERNAL MEDICINE

## 2023-06-21 PROCEDURE — 25000003 PHARM REV CODE 250: Performed by: INTERNAL MEDICINE

## 2023-06-21 PROCEDURE — 99152 MOD SED SAME PHYS/QHP 5/>YRS: CPT | Performed by: INTERNAL MEDICINE

## 2023-06-21 PROCEDURE — 37225 HC FEM/POPL REVAS W/ATHER: CPT | Mod: RT | Performed by: INTERNAL MEDICINE

## 2023-06-21 PROCEDURE — 27201423 OPTIME MED/SURG SUP & DEVICES STERILE SUPPLY: Performed by: INTERNAL MEDICINE

## 2023-06-21 PROCEDURE — C1753 CATH, INTRAVAS ULTRASOUND: HCPCS | Performed by: INTERNAL MEDICINE

## 2023-06-21 PROCEDURE — 75716 ARTERY X-RAYS ARMS/LEGS: CPT | Mod: 59 | Performed by: INTERNAL MEDICINE

## 2023-06-21 PROCEDURE — 99152 MOD SED SAME PHYS/QHP 5/>YRS: CPT | Mod: ,,, | Performed by: INTERNAL MEDICINE

## 2023-06-21 PROCEDURE — 37252 INTRVASC US NONCORONARY 1ST: CPT | Performed by: INTERNAL MEDICINE

## 2023-06-21 PROCEDURE — 63600175 PHARM REV CODE 636 W HCPCS: Performed by: HOSPITALIST

## 2023-06-21 PROCEDURE — C2623 CATH, TRANSLUMIN, DRUG-COAT: HCPCS | Performed by: INTERNAL MEDICINE

## 2023-06-21 PROCEDURE — 75625 CONTRAST EXAM ABDOMINL AORTA: CPT | Mod: 26,,, | Performed by: INTERNAL MEDICINE

## 2023-06-21 PROCEDURE — 75716 ARTERY X-RAYS ARMS/LEGS: CPT | Mod: 26,59,, | Performed by: INTERNAL MEDICINE

## 2023-06-21 PROCEDURE — 37225 PR FEM/POPL REVAS W/ATHERECTOMY: CPT | Mod: RT,,, | Performed by: INTERNAL MEDICINE

## 2023-06-21 PROCEDURE — 37252 INTRVASC US NONCORONARY 1ST: CPT | Mod: ,,, | Performed by: INTERNAL MEDICINE

## 2023-06-21 RX ORDER — ONDANSETRON 2 MG/ML
4 INJECTION INTRAMUSCULAR; INTRAVENOUS EVERY 12 HOURS PRN
Status: DISCONTINUED | OUTPATIENT
Start: 2023-06-21 | End: 2023-06-21 | Stop reason: HOSPADM

## 2023-06-21 RX ORDER — DIPHENHYDRAMINE HCL 25 MG
50 CAPSULE ORAL ONCE
Status: COMPLETED | OUTPATIENT
Start: 2023-06-21 | End: 2023-06-21

## 2023-06-21 RX ORDER — SODIUM CHLORIDE 9 MG/ML
INJECTION, SOLUTION INTRAVENOUS CONTINUOUS
Status: DISCONTINUED | OUTPATIENT
Start: 2023-06-21 | End: 2023-06-21 | Stop reason: HOSPADM

## 2023-06-21 RX ORDER — CLOPIDOGREL 300 MG/1
600 TABLET, FILM COATED ORAL ONCE
Status: COMPLETED | OUTPATIENT
Start: 2023-06-21 | End: 2023-06-21

## 2023-06-21 RX ORDER — HEPARIN SODIUM 1000 [USP'U]/ML
INJECTION, SOLUTION INTRAVENOUS; SUBCUTANEOUS
Status: DISCONTINUED | OUTPATIENT
Start: 2023-06-21 | End: 2023-06-21 | Stop reason: HOSPADM

## 2023-06-21 RX ORDER — MIDAZOLAM HYDROCHLORIDE 1 MG/ML
INJECTION INTRAMUSCULAR; INTRAVENOUS
Status: DISCONTINUED | OUTPATIENT
Start: 2023-06-21 | End: 2023-06-21 | Stop reason: HOSPADM

## 2023-06-21 RX ORDER — LIDOCAINE HYDROCHLORIDE 20 MG/ML
INJECTION, SOLUTION INFILTRATION; PERINEURAL
Status: DISCONTINUED | OUTPATIENT
Start: 2023-06-21 | End: 2023-06-21 | Stop reason: HOSPADM

## 2023-06-21 RX ORDER — OXYCODONE HYDROCHLORIDE 5 MG/1
5 TABLET ORAL EVERY 4 HOURS PRN
Status: DISCONTINUED | OUTPATIENT
Start: 2023-06-21 | End: 2023-06-21 | Stop reason: HOSPADM

## 2023-06-21 RX ORDER — ACETAMINOPHEN 325 MG/1
650 TABLET ORAL EVERY 4 HOURS PRN
Status: DISCONTINUED | OUTPATIENT
Start: 2023-06-21 | End: 2023-06-21 | Stop reason: HOSPADM

## 2023-06-21 RX ORDER — ASPIRIN 81 MG/1
81 TABLET ORAL DAILY
Refills: 0 | Status: ON HOLD | COMMUNITY
Start: 2023-06-21 | End: 2023-10-19

## 2023-06-21 RX ORDER — MORPHINE SULFATE 4 MG/ML
3 INJECTION, SOLUTION INTRAMUSCULAR; INTRAVENOUS
Status: DISCONTINUED | OUTPATIENT
Start: 2023-06-21 | End: 2023-06-21 | Stop reason: HOSPADM

## 2023-06-21 RX ORDER — FENTANYL CITRATE 50 UG/ML
INJECTION, SOLUTION INTRAMUSCULAR; INTRAVENOUS
Status: DISCONTINUED | OUTPATIENT
Start: 2023-06-21 | End: 2023-06-21 | Stop reason: HOSPADM

## 2023-06-21 RX ORDER — CLOPIDOGREL BISULFATE 75 MG/1
75 TABLET ORAL DAILY
Qty: 90 TABLET | Refills: 3 | Status: SHIPPED | OUTPATIENT
Start: 2023-06-21 | End: 2024-06-15

## 2023-06-21 RX ADMIN — SODIUM CHLORIDE: 9 INJECTION, SOLUTION INTRAVENOUS at 06:06

## 2023-06-21 RX ADMIN — CLOPIDOGREL BISULFATE 600 MG: 300 TABLET, FILM COATED ORAL at 06:06

## 2023-06-21 RX ADMIN — SODIUM CHLORIDE: 0.9 INJECTION, SOLUTION INTRAVENOUS at 11:06

## 2023-06-21 RX ADMIN — DIPHENHYDRAMINE HYDROCHLORIDE 50 MG: 25 CAPSULE ORAL at 06:06

## 2023-06-21 NOTE — PROGRESS NOTES
Dr. Sullivan [facility]. Patient's HR is in the high 40's and low 50's. Patient is currently asymptomatic. Patient follows commands and is alert & oriented x 4.

## 2023-06-21 NOTE — Clinical Note
The left DP pulse was 2+. The right DP pulse was detected with doppler.  The left PT pulse was 2+. The right PT pulse was detected with doppler. The radial pulses were +2 bilaterally.

## 2023-06-21 NOTE — Clinical Note
An angiography of the  left lower extremity was performed with the sheath and power injected with 10 mL contrast at at 5 mL/s.

## 2023-06-21 NOTE — DISCHARGE INSTRUCTIONS
Drink plenty of fluids for the next 48 hours and follow your doctor's diet orders.  Rest for the next 72 hours. Try not to keep the injected leg bent for a long period of time.  Remove the dressing in 24 hours, and you may shower. Clean the area with soap and water, and apply a band aid for the  next 5 days.    No Lifting over 5-10 lbs., that is, not more than 1 gallon of water, or straining for 72 hours.    No driving, no drinking alcohol, and no signing legal documents for the next 24 hours.    Call your doctor for elevated temperature, shortness of breath, chest pain, or cold discolored leg.   If oozing occurs at the injections site, lie down. Apply pressure with a clean wash cloth for 20 to 30 minutes and call  your doctor.  If severe bleeding occurs, lie down, apply pressure. Call 911 and request an ambulance to take you to the nearest  hospital emergency room.  Continue to take your regular medications as instructed.  Follow the instructions in the handout given to you.     Fall Prevention  Millions of people fall every year and injure themselves. You may have had anesthesia or sedation which may increase your risk of falling. You may have health issues that put you at an increased risk of falling.     Here are ways to reduce your risk of falling.    Make your home safe by keeping walkways clear of objects you may trip over.  Use non-slip pads under rugs. Do not use area rugs or small throw rugs.  Use non-slip mats in bathtubs and showers.  Install handrails and lights on staircases.  Do not walk in poorly lit areas.  Do not stand on chairs or wobbly ladders.  Use caution when reaching overhead or looking upward. This position can cause a loss of balance.  Be sure your shoes fit properly, have non-slip bottoms and are in good condition.   Wear shoes both inside and out. Avoid going barefoot or wearing slippers.  Be cautious when going up and down stairs, curbs, and when walking on uneven sidewalks.  If your  balance is poor, consider using a cane or walker.  If your fall was related to alcohol use, stop or limit alcohol intake.   If your fall was related to use of sleeping medicines, talk to your doctor about this. You may need to reduce your dosage at bedtime if you awaken during the night to go to the bathroom.    To reduce the need for nighttime bathroom trips:  Avoid drinking fluids for several hours before going to bed  Empty your bladder before going to bed  Men can keep a urinal at the bedside  Stay as active as you can. Balance, flexibility, strength, and endurance all come from exercise. They all play a role in preventing falls. Ask your healthcare provider which types of activity are right for you.  Get your vision checked on a regular basis.  If you have pets, know where they are before you stand up or walk so you don't trip over them.  Use night lights.

## 2023-06-21 NOTE — Clinical Note
The catheter was repositioned to the  ostial left common iliac arteries. Selective angiogram performed. Catheter removed .

## 2023-06-21 NOTE — Clinical Note
The catheter was inserted into the  distal left . Lower leg arteries. IVUS performed, catheter removed .

## 2023-06-21 NOTE — BRIEF OP NOTE
Successful laser atherectomy and dcb balloon angioplasty of rt SFA with excellent angiographic results.      Resume eliquis in am  Aspirin 81 mg qd x 1 m  Plavix 75 mg qd x 12 m

## 2023-06-21 NOTE — Clinical Note
An angiography of the  abdominal aorta was performed with the catheter and power injected with 40 mL contrast at at 20 mL/s.  Catheter removed over the wire.

## 2023-06-26 ENCOUNTER — HOSPITAL ENCOUNTER (EMERGENCY)
Facility: HOSPITAL | Age: 73
Discharge: HOME | End: 2023-06-26
Attending: STUDENT IN AN ORGANIZED HEALTH CARE EDUCATION/TRAINING PROGRAM
Payer: MEDICARE

## 2023-06-26 ENCOUNTER — PATIENT OUTREACH (OUTPATIENT)
Dept: ADMINISTRATIVE | Facility: OTHER | Age: 73
End: 2023-06-26
Payer: MEDICARE

## 2023-06-26 VITALS
HEART RATE: 47 BPM | HEIGHT: 73 IN | BODY MASS INDEX: 27.17 KG/M2 | TEMPERATURE: 98 F | WEIGHT: 205 LBS | SYSTOLIC BLOOD PRESSURE: 157 MMHG | OXYGEN SATURATION: 95 % | DIASTOLIC BLOOD PRESSURE: 80 MMHG | RESPIRATION RATE: 16 BRPM

## 2023-06-26 DIAGNOSIS — R19.09 MASS OF LEFT INGUINAL REGION: Primary | ICD-10-CM

## 2023-06-26 PROCEDURE — 99284 PR EMERGENCY DEPT VISIT,LEVEL IV: ICD-10-PCS | Mod: ,,, | Performed by: INTERNAL MEDICINE

## 2023-06-26 PROCEDURE — 99284 EMERGENCY DEPT VISIT MOD MDM: CPT | Mod: ,,, | Performed by: INTERNAL MEDICINE

## 2023-06-26 PROCEDURE — 99284 EMERGENCY DEPT VISIT MOD MDM: CPT | Mod: 25

## 2023-06-26 NOTE — CONSULTS
[facility] [place] [facility]  [place]iology  Consult Note    Patient Name: Rizwan Demarco Jr.  MRN: [id number]  Admission Date: 6/26/2023  Hospital Length of Stay: 0 days  Code Status: Prior   Attending Provider: No att. providers found   Consulting Provider: Abbe Vilchis MD  Primary Care Physician: Raúl Perkins MD  Principal Problem:<principal problem not specified>    Patient information was obtained from patient and ER records.     Inpatient consult to Cardiology  Consult performed by: Abbe Vilchis MD  Consult ordered by: Smith Haley MD        Subjective:     Chief Complaint:  To eval lcfa access site     HPI:   Patient is a Pleasant 73-year-old man.  Past medical history significant for peripheral artery disease.  Follows with me in the clinic.  Last week underwent peripheral angiogram performed by me via the left common femoral artery approach.  At that point he had laser atherectomy and balloon angioplasty of right SFA performed.  States that he has not walk around much since his angiogram, but does not have right leg pain on doing minor walking around the house.  Had a slight bruise and felt a knot at the puncture site and hence decided to come in to the hospital.  No hematoma noted on examination.  Peripheral ultrasound done did not reveal any pseudoaneurysm.  Patient does not have any pain or tenderness of fever or chills or discharge from the puncture site.  On examination punch ascites seen with slight thickening of skin around that, but no hematoma noted.  Mild bruising around the left common femoral access site    Peripheral ultrasound done today:     FINDINGS:  Left lower extremity     CFA 90 cm/sec biphasic     Prox SFA 66 cm/sec biphasic     Mid SFA 66 cm/sec  biphasic     Dist SFA 44 cm/sec  biphasic     Pop A 96 cm/sec monophasic     LEIGH 39 cm/sec biphasic     PTA 99 cm/sec monophasic     DPA 31 cm/sec monophasic     Impression:     #1. Diffuse PVD with hemodynamically significant distal SFA  stenosis.     Monophasic flow in the posterior tibial and dorsalis pedis artery suggesting diffuse multifocal stenosis of these vessels.        Electronically signed by: Kiko Guzman Jr  Date:                                            06/26/2023  Time:                                           08:29          Past Medical History:   Diagnosis Date    Allergy     Anticoagulant long-term use     Arthritis     Rheumatoid arthritis    Back pain     Blood clotting tendency     BPH (benign prostatic hypertrophy)     Cervical spondylosis     Colon polyp 2010    adenoma    Coronary artery disease     Depression 05/08/2015    Dry eyes     Dry mouth     GERD (gastroesophageal reflux disease)     Hyperlipidemia     Hypertension     Lumbar spondylosis     Nasal obstruction     PAD (peripheral artery disease) 04/25/2023    Rheumatoid arthritis     Sinusitis     Special screening for malignant neoplasm of colon 06/29/2016    Trouble in sleeping        Past Surgical History:   Procedure Laterality Date    ATHERECTOMY Left 6/21/2023    Procedure: Atherectomy;  Surgeon: Abbe Vilchis MD;  Location: [facility] CATH LAB;  Service: Cardiology;  Laterality: Left;    CHALAZION EXCISION      COLONOSCOPY N/A 6/29/2016    Procedure: COLONOSCOPY;  Surgeon: Partha Saucedo MD;  Location: [facility] ENDO;  Service: Endoscopy;  Laterality: N/A;    COLONOSCOPY N/A 7/24/2020    Procedure: COLONOSCOPY;  Surgeon: Ian Martinez MD;  [facility]  Service: Endoscopy;  Laterality: N/A;  4/16/20 - removed from 5/1/20, not called yet due to acute pain issues being addressed today - pg    CORONARY ARTERY BYPASS GRAFT      ESOPHAGOGASTRODUODENOSCOPY N/A 7/24/2020    Procedure: ESOPHAGOGASTRODUODENOSCOPY (EGD);  Surgeon: Ian Martinez MD;  Location: [facility] ENDO (4TH FLR);  Service: Endoscopy;  Laterality: N/A;  4/16/20 - removed from 5/1/20, not called yet due to acute pain issues being addressed today.  4/17/20 -  rescheduled 7/24/20 - pg    EXCISION TURBINATE, SUBMUCOUS      KNEE ARTHROSCOPY W/ DEBRIDEMENT      NASAL SEPTUM SURGERY      PERIPHERAL ANGIOGRAPHY N/A 6/21/2023    Procedure: Peripheral angiography;  Surgeon: Abbe Vilchis MD;  Location: [facility] CATH LAB;  Service: Cardiology;  Laterality: N/A;  right radial access--- RN PREOP 6/16----JM    TONSILLECTOMY         Review of patient's allergies indicates:   Allergen Reactions    Aspirin Nausea And Vomiting    Astelin [azelastine] Other (See Comments)     Dry mouth    Flomax [tamsulosin] Other (See Comments)     Nosebleed       Current Facility-Administered Medications on File Prior to Encounter   Medication    sodium chloride 0.9% flush 10 mL     Current Outpatient Medications on File Prior to Encounter   Medication Sig    apixaban (ELIQUIS) 5 mg Tab Take 1 tablet (5 mg total) by mouth 2 (two) times daily.    aspirin (ECOTRIN) 81 MG EC tablet Take 1 tablet (81 mg total) by mouth once daily.    clopidogreL (PLAVIX) 75 mg tablet Take 1 tablet (75 mg total) by mouth once daily.    alfuzosin (UROXATRAL) 10 mg Tb24 Take 1 tablet (10 mg total) by mouth daily with breakfast.    artificial tears,hypromellose,,GENTEAL/SUSTANE, (SYSTANE GEL) 0.3 % Gel 1 drop as needed.    atorvastatin (LIPITOR) 40 MG tablet Take 1 tablet (40 mg total) by mouth once daily.    budesonide (PULMICORT) 0.25 mg/2 mL nebulizer solution Take 2 mLs (0.25 mg total) by nebulization 2 (two) times daily. Controller    cinnamon bark 500 mg capsule Take 500 mg by mouth once daily.    docusate sodium (COLACE) 100 MG capsule Take 1 capsule (100 mg total) by mouth 2 (two) times daily.    hydroCHLOROthiazide (MICROZIDE) 12.5 mg capsule Take 1 capsule (12.5 mg total) by mouth once daily.    lisinopriL 10 MG tablet Take 1 tablet (10 mg total) by mouth once daily.    multivitamin (THERAGRAN) per tablet Take by mouth.  Tablet Oral Every day    omega-3 fatty acids 1,250 mg Cap Take by mouth.     omeprazole (PRILOSEC) 20 MG capsule Take 1 capsule (20 mg total) by mouth once daily.    propylene glycoL 0.6 % Drop Apply to eye.    sodium bicarb-sodium chloride Pack by sinus irrigation route.     Family History       Problem Relation (Age of Onset)    Alzheimer's disease Mother    Cataracts Other    Colon cancer Father    Hyperlipidemia Mother    No Known Problems Maternal Aunt, Maternal Uncle, Paternal Aunt, Paternal Uncle, Maternal Grandmother, Maternal Grandfather, Paternal Grandmother, Paternal Grandfather    Stomach cancer Father          Tobacco Use    Smoking status: Former     Packs/day: 1.00     Years: 20.00     Pack years: 20.00     Types: Cigarettes     Quit date: 1991     Years since quittin.5    Smokeless tobacco: Never    Tobacco comments:     Quit .   Substance and Sexual Activity    Alcohol use: No    Drug use: No    Sexual activity: Yes     Partners: Female     Review of Systems   Constitutional: Negative.   HENT: Negative.     Eyes: Negative.    Cardiovascular: Negative.    Respiratory: Negative.     Endocrine: Negative.    Hematologic/Lymphatic: Negative.    Skin: Negative.    Musculoskeletal: Negative.    Gastrointestinal: Negative.    Genitourinary: Negative.    Neurological: Negative.    Psychiatric/Behavioral: Negative.     Allergic/Immunologic: Negative.    Objective:     Vital Signs (Most Recent):  Temp: 98.1 °F (36.7 °C) (23 1217)  Pulse: (!) 47 (23 1132)  Resp: 16 (23 0656)  BP: (!) 157/80 (23 1132)  SpO2: 95 % (23 1132) Vital Signs (24h Range):  Temp:  [98.1 °F (36.7 °C)-98.4 °F (36.9 °C)] 98.1 °F (36.7 °C)  Pulse:  [47-64] 47  Resp:  [16] 16  SpO2:  [95 %-99 %] 95 %  BP: (152-170)/(79-92) 157/80     Weight: 93 kg (205 lb)  Body mass index is 27.05 kg/m².    SpO2: 95 %       No intake or output data in the 24 hours ending 23 1233    Lines/Drains/Airways       None                    Physical Exam  Vitals reviewed.    Constitutional:       Appearance: He is well-developed.   HENT:      Head: Normocephalic.   Eyes:      Conjunctiva/sclera: Conjunctivae normal.      Pupils: Pupils are equal, round, and reactive to light.   Cardiovascular:      Rate and Rhythm: Normal rate and regular rhythm.      Heart sounds: Normal heart sounds.   Pulmonary:      Effort: Pulmonary effort is normal.      Breath sounds: Normal breath sounds.   Abdominal:      General: Bowel sounds are normal.      Palpations: Abdomen is soft.   Musculoskeletal:      Cervical back: Normal range of motion and neck supple.      Comments: Mild bruising noted on left common femoral access site.  No hematoma or pseudoaneurysm noted.   Skin:     General: Skin is warm.   Neurological:      Mental Status: He is alert and oriented to person, place, and time.        Significant Labs: BMP: No results for input(s): GLU, NA, K, CL, CO2, BUN, CREATININE, CALCIUM, MG in the last 48 hours., CMP No results for input(s): NA, K, CL, CO2, GLU, BUN, CREATININE, CALCIUM, PROT, ALBUMIN, BILITOT, ALKPHOS, AST, ALT, ANIONGAP, ESTGFRAFRICA, EGFRNONAA in the last 48 hours., CBC No results for input(s): WBC, HGB, HCT, PLT in the last 48 hours., INR No results for input(s): INR, PROTIME in the last 48 hours., Lipid Panel No results for input(s): CHOL, HDL, LDLCALC, TRIG, CHOLHDL in the last 48 hours., Troponin No results for input(s): TROPONINI in the last 48 hours., and All pertinent lab results from the last 24 hours have been reviewed.    Significant Imaging: Echocardiogram: Transthoracic echo (TTE) complete (Cupid Only):   Results for orders placed or performed during the hospital encounter of 01/26/23   Echo   Result Value Ref Range    BSA 2.2 m2    TDI SEPTAL 0.11 m/s    LV LATERAL E/E' RATIO 7.92 m/s    LV SEPTAL E/E' RATIO 8.64 m/s    LA WIDTH 3.86 cm    TDI LATERAL 0.12 m/s    LVIDd 4.49 3.5 - 6.0 cm    IVS 0.92 0.6 - 1.1 cm    Posterior Wall 1.12 (A) 0.6 - 1.1 cm    LVIDs 2.88 2.1 -  4.0 cm    FS 36 28 - 44 %    LA volume 63.88 cm3    Sinus 2.87 cm    STJ 2.67 cm    Ascending aorta 4.09 cm    LV mass 156.95 g    LA size 3.80 cm    RVDD 4.79 cm    TAPSE 2.05 cm    RV S' 10.33 cm/s    Left Ventricle Relative Wall Thickness 0.50 cm    AV mean gradient 5 mmHg    AV valve area 2.78 cm2    AV Velocity Ratio 0.65     AV index (prosthetic) 0.71     Mean e' 0.12 m/s    LVOT diameter 2.24 cm    LVOT area 3.9 cm2    LVOT peak [place] 1.02 m/s    LVOT peak VTI 21.30 cm    Ao peak [place] 1.58 m/s    Ao VTI 30.18 cm    Radius 0.29 cm    Mr max [place] 0.05 m/s    LVOT stroke volume 83.90 cm3    AV peak gradient 10 mmHg    E/E' ratio 8.26 m/s    MV Peak E [place] 0.95 m/s    PISA TR VN Nyquist 0.00 m/s    TR Max [place] 2.50 m/s    LV Systolic Volume 31.66 mL    LV Systolic Volume Index 14.5 mL/m2    LV Diastolic Volume 92.07 mL    LV Diastolic Volume Index 42.04 mL/m2    LA Volume Index 29.2 mL/m2    LV Mass Index 72 g/m2    RA Major Axis 5.09 cm    Left Atrium Minor Axis 5.05 cm    Left Atrium Major Axis 5.20 cm    Triscuspid Valve Regurgitation Peak Gradient 25 mmHg    LA Volume Index (Mod) 24.3 mL/m2    LA volume (mod) 53.28 cm3    RA Width 3.25 cm    Right Atrial Pressure (from IVC) 8 mmHg    EF 65 %    TV rest pulmonary artery pressure 33 mmHg    Narrative    · The left ventricle is normal in size with concentric remodeling and   normal systolic function. The estimated ejection fraction is 65%.  · Normal right ventricular size with normal right ventricular systolic   function.  · Normal left ventricular diastolic function.  · The estimated PA systolic pressure is 33 mmHg.  · Intermediate central venous pressure (8 mmHg).  · The ascending aorta is mildly dilated.        Assessment and Plan:     Poor circulation  Known bilateral PAD.    PAD (peripheral artery disease)  Patient with known peripheral artery disease status post recent right SFA angioplasty.  No symptoms in the right leg.  Had some bruising in the left groin  area and hence came to the ER.  No hematoma noted.  Ultrasound did not show any pseudoaneurysm.  Doing fine.  Patient is asymptomatic, states that he wanted to be careful since he is on multiple blood thinners as well as Eliquis.  Reassurance provided.  No further workup indicated during this hospital stay.  Follow-up in Cardiology Clinic with me    Hyperlipidemia       Arteriosclerosis of coronary artery  Currently angina free        VTE Risk Mitigation (From admission, onward)    None          Thank you for your consult. I will sign off. Please contact us if you have any additional questions.    Abbe Vilchis MD  Cardiology   [facility] [place]

## 2023-06-26 NOTE — SUBJECTIVE & OBJECTIVE
Past Medical History:   Diagnosis Date    Allergy     Anticoagulant long-term use     Arthritis     Rheumatoid arthritis    Back pain     Blood clotting tendency     BPH (benign prostatic hypertrophy)     Cervical spondylosis     Colon polyp 2010    adenoma    Coronary artery disease     Depression 05/08/2015    Dry eyes     Dry mouth     GERD (gastroesophageal reflux disease)     Hyperlipidemia     Hypertension     Lumbar spondylosis     Nasal obstruction     PAD (peripheral artery disease) 04/25/2023    Rheumatoid arthritis     Sinusitis     Special screening for malignant neoplasm of colon 06/29/2016    Trouble in sleeping        Past Surgical History:   Procedure Laterality Date    ATHERECTOMY Left 6/21/2023    Procedure: Atherectomy;  Surgeon: Abbe Vilchis MD;  Location: [facility] CATH LAB;  Service: Cardiology;  Laterality: Left;    CHALAZION EXCISION      COLONOSCOPY N/A 6/29/2016    Procedure: COLONOSCOPY;  Surgeon: Partha Saucedo MD;  Location: [facility] ENDO;  Service: Endoscopy;  Laterality: N/A;    COLONOSCOPY N/A 7/24/2020    Procedure: COLONOSCOPY;  Surgeon: Ian Martinez MD;  Location: [facility]  Service: Endoscopy;  Laterality: N/A;  4/16/20 - removed from 5/1/20, not called yet due to acute pain issues being addressed today - pg    CORONARY ARTERY BYPASS GRAFT      ESOPHAGOGASTRODUODENOSCOPY N/A 7/24/2020    Procedure: ESOPHAGOGASTRODUODENOSCOPY (EGD);  Surgeon: Ian Martinez MD;  Location: [facility]  Service: Endoscopy;  Laterality: N/A;  4/16/20 - removed from 5/1/20, not called yet due to acute pain issues being addressed today.  4/17/20 - rescheduled 7/24/20 - pg    EXCISION TURBINATE, SUBMUCOUS      KNEE ARTHROSCOPY W/ DEBRIDEMENT      NASAL SEPTUM SURGERY      PERIPHERAL ANGIOGRAPHY N/A 6/21/2023    Procedure: Peripheral angiography;  Surgeon: Abbe Vilchis MD;  Location: [facility] CATH LAB;  Service: Cardiology;  Laterality: N/A;  right radial access--- RN PREOP 6/16----JANA     TONSILLECTOMY         Review of patient's allergies indicates:   Allergen Reactions    Aspirin Nausea And Vomiting    Astelin [azelastine] Other (See Comments)     Dry mouth    Flomax [tamsulosin] Other (See Comments)     Nosebleed       Current Facility-Administered Medications on File Prior to Encounter   Medication    sodium chloride 0.9% flush 10 mL     Current Outpatient Medications on File Prior to Encounter   Medication Sig    apixaban (ELIQUIS) 5 mg Tab Take 1 tablet (5 mg total) by mouth 2 (two) times daily.    aspirin (ECOTRIN) 81 MG EC tablet Take 1 tablet (81 mg total) by mouth once daily.    clopidogreL (PLAVIX) 75 mg tablet Take 1 tablet (75 mg total) by mouth once daily.    alfuzosin (UROXATRAL) 10 mg Tb24 Take 1 tablet (10 mg total) by mouth daily with breakfast.    artificial tears,hypromellose,,GENTEAL/SUSTANE, (SYSTANE GEL) 0.3 % Gel 1 drop as needed.    atorvastatin (LIPITOR) 40 MG tablet Take 1 tablet (40 mg total) by mouth once daily.    budesonide (PULMICORT) 0.25 mg/2 mL nebulizer solution Take 2 mLs (0.25 mg total) by nebulization 2 (two) times daily. Controller    cinnamon bark 500 mg capsule Take 500 mg by mouth once daily.    docusate sodium (COLACE) 100 MG capsule Take 1 capsule (100 mg total) by mouth 2 (two) times daily.    hydroCHLOROthiazide (MICROZIDE) 12.5 mg capsule Take 1 capsule (12.5 mg total) by mouth once daily.    lisinopriL 10 MG tablet Take 1 tablet (10 mg total) by mouth once daily.    multivitamin (THERAGRAN) per tablet Take by mouth.  Tablet Oral Every day    omega-3 fatty acids 1,250 mg Cap Take by mouth.    omeprazole (PRILOSEC) 20 MG capsule Take 1 capsule (20 mg total) by mouth once daily.    propylene glycoL 0.6 % Drop Apply to eye.    sodium bicarb-sodium chloride Pack by sinus irrigation route.     Family History       Problem Relation (Age of Onset)    Alzheimer's disease Mother    Cataracts Other    Colon cancer Father    Hyperlipidemia Mother    No Known  Problems Maternal Aunt, Maternal Uncle, Paternal Aunt, Paternal Uncle, Maternal Grandmother, Maternal Grandfather, Paternal Grandmother, Paternal Grandfather    Stomach cancer Father          Tobacco Use    Smoking status: Former     Packs/day: 1.00     Years: 20.00     Pack years: 20.00     Types: Cigarettes     Quit date: 1991     Years since quittin.5    Smokeless tobacco: Never    Tobacco comments:     Quit .   Substance and Sexual Activity    Alcohol use: No    Drug use: No    Sexual activity: Yes     Partners: Female     Review of Systems   Constitutional: Negative.   HENT: Negative.     Eyes: Negative.    Cardiovascular: Negative.    Respiratory: Negative.     Endocrine: Negative.    Hematologic/Lymphatic: Negative.    Skin: Negative.    Musculoskeletal: Negative.    Gastrointestinal: Negative.    Genitourinary: Negative.    Neurological: Negative.    Psychiatric/Behavioral: Negative.     Allergic/Immunologic: Negative.    Objective:     Vital Signs (Most Recent):  Temp: 98.1 °F (36.7 °C) (23 1217)  Pulse: (!) 47 (23 1132)  Resp: 16 (23 0656)  BP: (!) 157/80 (23 1132)  SpO2: 95 % (23 1132) Vital Signs (24h Range):  Temp:  [98.1 °F (36.7 °C)-98.4 °F (36.9 °C)] 98.1 °F (36.7 °C)  Pulse:  [47-64] 47  Resp:  [16] 16  SpO2:  [95 %-99 %] 95 %  BP: (152-170)/(79-92) 157/80     Weight: 93 kg (205 lb)  Body mass index is 27.05 kg/m².    SpO2: 95 %       No intake or output data in the 24 hours ending 23 1233    Lines/Drains/Airways       None                    Physical Exam  Vitals reviewed.   Constitutional:       Appearance: He is well-developed.   HENT:      Head: Normocephalic.   Eyes:      Conjunctiva/sclera: Conjunctivae normal.      Pupils: Pupils are equal, round, and reactive to light.   Cardiovascular:      Rate and Rhythm: Normal rate and regular rhythm.      Heart sounds: Normal heart sounds.   Pulmonary:      Effort: Pulmonary effort is normal.      Breath  sounds: Normal breath sounds.   Abdominal:      General: Bowel sounds are normal.      Palpations: Abdomen is soft.   Musculoskeletal:      Cervical back: Normal range of motion and neck supple.      Comments: Mild bruising noted on left common femoral access site.  No hematoma or pseudoaneurysm noted.   Skin:     General: Skin is warm.   Neurological:      Mental Status: He is alert and oriented to person, place, and time.        Significant Labs: BMP: No results for input(s): GLU, NA, K, CL, CO2, BUN, CREATININE, CALCIUM, MG in the last 48 hours., CMP No results for input(s): NA, K, CL, CO2, GLU, BUN, CREATININE, CALCIUM, PROT, ALBUMIN, BILITOT, ALKPHOS, AST, ALT, ANIONGAP, ESTGFRAFRICA, EGFRNONAA in the last 48 hours., CBC No results for input(s): WBC, HGB, HCT, PLT in the last 48 hours., INR No results for input(s): INR, PROTIME in the last 48 hours., Lipid Panel No results for input(s): CHOL, HDL, LDLCALC, TRIG, CHOLHDL in the last 48 hours., Troponin No results for input(s): TROPONINI in the last 48 hours., and All pertinent lab results from the last 24 hours have been reviewed.    Significant Imaging: Echocardiogram: Transthoracic echo (TTE) complete (Cupid Only):   Results for orders placed or performed during the hospital encounter of 01/26/23   Echo   Result Value Ref Range    BSA 2.2 m2    TDI SEPTAL 0.11 m/s    LV LATERAL E/E' RATIO 7.92 m/s    LV SEPTAL E/E' RATIO 8.64 m/s    LA WIDTH 3.86 cm    TDI LATERAL 0.12 m/s    LVIDd 4.49 3.5 - 6.0 cm    IVS 0.92 0.6 - 1.1 cm    Posterior Wall 1.12 (A) 0.6 - 1.1 cm    LVIDs 2.88 2.1 - 4.0 cm    FS 36 28 - 44 %    LA volume 63.88 cm3    Sinus 2.87 cm    STJ 2.67 cm    Ascending aorta 4.09 cm    LV mass 156.95 g    LA size 3.80 cm    RVDD 4.79 cm    TAPSE 2.05 cm    RV S' 10.33 cm/s    Left Ventricle Relative Wall Thickness 0.50 cm    AV mean gradient 5 mmHg    AV valve area 2.78 cm2    AV Velocity Ratio 0.65     AV index (prosthetic) 0.71     Mean e' 0.12 m/s     LVOT diameter 2.24 cm    LVOT area 3.9 cm2    LVOT peak [place] 1.02 m/s    LVOT peak VTI 21.30 cm    Ao peak [place] 1.58 m/s    Ao VTI 30.18 cm    Radius 0.29 cm    Mr max [place] 0.05 m/s    LVOT stroke volume 83.90 cm3    AV peak gradient 10 mmHg    E/E' ratio 8.26 m/s    MV Peak E [place] 0.95 m/s    PISA TR VN Nyquist 0.00 m/s    TR Max [place] 2.50 m/s    LV Systolic Volume 31.66 mL    LV Systolic Volume Index 14.5 mL/m2    LV Diastolic Volume 92.07 mL    LV Diastolic Volume Index 42.04 mL/m2    LA Volume Index 29.2 mL/m2    LV Mass Index 72 g/m2    RA Major Axis 5.09 cm    Left Atrium Minor Axis 5.05 cm    Left Atrium Major Axis 5.20 cm    Triscuspid Valve Regurgitation Peak Gradient 25 mmHg    LA Volume Index (Mod) 24.3 mL/m2    LA volume (mod) 53.28 cm3    RA Width 3.25 cm    Right Atrial Pressure (from IVC) 8 mmHg    EF 65 %    TV rest pulmonary artery pressure 33 mmHg    Narrative    · The left ventricle is normal in size with concentric remodeling and   normal systolic function. The estimated ejection fraction is 65%.  · Normal right ventricular size with normal right ventricular systolic   function.  · Normal left ventricular diastolic function.  · The estimated PA systolic pressure is 33 mmHg.  · Intermediate central venous pressure (8 mmHg).  · The ascending aorta is mildly dilated.

## 2023-06-26 NOTE — ED TRIAGE NOTES
"Pt arrives to ED via personal transportation with c/o waking up aprox 1hr ago and "feeling a lump" to the angiogram puncture site (performed 5 days ago) on his left groin area; pt denies pain; pt reports he was told to come to ED and be evaluated for possible blood clot if "lump" formed; pt takes Eliquis, Plavix, & ASA 81mg daily; pt denies any other complaints; pt AAOx4; wife at bedside  "

## 2023-06-26 NOTE — DISCHARGE INSTRUCTIONS
Please follow-up with your cardiologist as directed.  Return immediately if you get worse or if new problems develop.  Usual medicines.

## 2023-06-26 NOTE — PROGRESS NOTES
CHW - Initial Contact    This Community Health Worker updated and verified the Social Determinant of Health questionnaire with patient  and spouse/caregiver at bedside  today.    Pt identified barriers of most importance are: pt has no needs at this time.   Referrals to community agencies completed with patient/caregiver consent outside of [facility] include: none at this time.  Referrals were put through [facility] - no: none at this time.  Support and Services: has support with spouse/caregiver.  Other information discussed the patient needs / wants help with: Updated and verified SDOH with patient and spouse/caregiver at bedside today, patient has no needs at this time, will follow up in one week for possible case closure.   Follow up required: yes.  Follow-up Outreach - Due: 7/2/2023

## 2023-06-26 NOTE — HPI
Patient is a Pleasant 73-year-old man.  Past medical history significant for peripheral artery disease.  Follows with me in the clinic.  Last week underwent peripheral angiogram performed by me via the left common femoral artery approach.  At that point he had laser atherectomy and balloon angioplasty of right SFA performed.  States that he has not walk around much since his angiogram, but does not have right leg pain on doing minor walking around the house.  Had a slight bruise and felt a knot at the puncture site and hence decided to come in to the hospital.  No hematoma noted on examination.  Peripheral ultrasound done did not reveal any pseudoaneurysm.  Patient does not have any pain or tenderness of fever or chills or discharge from the puncture site.  On examination punch ascites seen with slight thickening of skin around that, but no hematoma noted.  Mild bruising around the left common femoral access site    Peripheral ultrasound done today:     FINDINGS:  Left lower extremity     CFA 90 cm/sec biphasic     Prox SFA 66 cm/sec biphasic     Mid SFA 66 cm/sec  biphasic     Dist SFA 44 cm/sec  biphasic     Pop A 96 cm/sec monophasic     LEIGH 39 cm/sec biphasic     PTA 99 cm/sec monophasic     DPA 31 cm/sec monophasic     Impression:     #1. Diffuse PVD with hemodynamically significant distal SFA stenosis.     Monophasic flow in the posterior tibial and dorsalis pedis artery suggesting diffuse multifocal stenosis of these vessels.        Electronically signed by: Kiko Guzman Jr  Date:                                            06/26/2023  Time:                                           08:29

## 2023-06-26 NOTE — ED PROVIDER NOTES
"Encounter Date: 6/26/2023       History     Chief Complaint   Patient presents with    Wound Check     Pt presents to the ED to have incision site from angiogram done on Wednesday evaluated. States he was told to come in for assessment of blood clot in site if a"bump" formed and he believes one may have. No stents were placed. Pt was already on elequis and plavix was started.     73-year-old male past medical history of  CAD, hypertension, hyperlipidemia, peripheral artery disease, status post laser atherectomy and dcb balloon angioplasty of rt SFA on 6/12, who presents to the emergency department after palpating a knot in his left groin.  Patient reports if he noticed any abnormalities who presents to the emergency department for evaluation.  Patient denies any drainage from the area or bleeding.  He denies any numbness or tingling of the lower extremity.  Denies any injury to the area.  Denies any swelling to lower extremities.  Denies any pain.    The history is provided by the patient.   Review of patient's allergies indicates:   Allergen Reactions    Aspirin Nausea And Vomiting    Astelin [azelastine] Other (See Comments)     Dry mouth    Flomax [tamsulosin] Other (See Comments)     Nosebleed     Past Medical History:   Diagnosis Date    Allergy     Anticoagulant long-term use     Arthritis     Rheumatoid arthritis    Back pain     Blood clotting tendency     BPH (benign prostatic hypertrophy)     Cervical spondylosis     Colon polyp 2010    adenoma    Coronary artery disease     Depression 05/08/2015    Dry eyes     Dry mouth     GERD (gastroesophageal reflux disease)     Hyperlipidemia     Hypertension     Lumbar spondylosis     Nasal obstruction     PAD (peripheral artery disease) 04/25/2023    Rheumatoid arthritis     Sinusitis     Special screening for malignant neoplasm of colon 06/29/2016    Trouble in sleeping      Past Surgical History:   Procedure Laterality Date    ATHERECTOMY Left 6/21/2023    " Procedure: Atherectomy;  Surgeon: Abbe Vilchis MD;  Location: [facility] CATH LAB;  Service: Cardiology;  Laterality: Left;    CHALAZION EXCISION      COLONOSCOPY N/A 6/29/2016    Procedure: COLONOSCOPY;  Surgeon: Partha Saucedo MD;  Location: [facility] ENDO;  Service: Endoscopy;  Laterality: N/A;    COLONOSCOPY N/A 7/24/2020    Procedure: COLONOSCOPY;  Surgeon: Ian Martinez MD;  [facility]  Service: Endoscopy;  Laterality: N/A;  4/16/20 - removed from 5/1/20, not called yet due to acute pain issues being addressed today - pg    CORONARY ARTERY BYPASS GRAFT      ESOPHAGOGASTRODUODENOSCOPY N/A 7/24/2020    Procedure: ESOPHAGOGASTRODUODENOSCOPY (EGD);  Surgeon: Ian Martinez MD;  [facility]  Service: Endoscopy;  Laterality: N/A;  4/16/20 - removed from 5/1/20, not called yet due to acute pain issues being addressed today.  4/17/20 - rescheduled 7/24/20 - pg    EXCISION TURBINATE, SUBMUCOUS      KNEE ARTHROSCOPY W/ DEBRIDEMENT      NASAL SEPTUM SURGERY      PERIPHERAL ANGIOGRAPHY N/A 6/21/2023    Procedure: Peripheral angiography;  Surgeon: Abbe Vilchis MD;  Location: [facility] CATH LAB;  Service: Cardiology;  Laterality: N/A;  right radial access--- RN PREOP 6/16----JM    TONSILLECTOMY       Family History   Problem Relation Age of Onset    Hyperlipidemia Mother     Alzheimer's disease Mother     Stomach cancer Father     Colon cancer Father         from wife--he is not unsure     Cataracts Other     No Known Problems Maternal Aunt     No Known Problems Maternal Uncle     No Known Problems Paternal Aunt     No Known Problems Paternal Uncle     No Known Problems Maternal Grandmother     No Known Problems Maternal Grandfather     No Known Problems Paternal Grandmother     No Known Problems Paternal Grandfather     Blindness Neg Hx     Cancer Neg Hx     Diabetes Neg Hx     Glaucoma Neg Hx     Rheum arthritis Neg Hx     Psoriasis Neg Hx     Lupus Neg Hx     Amblyopia Neg Hx     Hypertension  Neg Hx     Macular degeneration Neg Hx     Retinal detachment Neg Hx     Strabismus Neg Hx     Stroke Neg Hx     Thyroid disease Neg Hx     Esophageal cancer Neg Hx      Social History     Tobacco Use    Smoking status: Former     Packs/day: 1.00     Years: 20.00     Pack years: 20.00     Types: Cigarettes     Quit date: 1991     Years since quittin.5    Smokeless tobacco: Never    Tobacco comments:     Quit .   Substance Use Topics    Alcohol use: No    Drug use: No     Review of Systems   Constitutional:  Negative for chills and fever.   HENT:  Negative for congestion and rhinorrhea.    Eyes:  Negative for pain.   Respiratory:  Negative for cough and shortness of breath.    Cardiovascular:  Negative for chest pain and leg swelling.   Gastrointestinal:  Negative for abdominal pain, nausea and vomiting.   Endocrine: Negative for polyuria.   Genitourinary:  Negative for dysuria and hematuria.   Musculoskeletal:  Negative for gait problem and neck pain.   Skin:  Negative for rash.   Allergic/Immunologic: Negative for immunocompromised state.   Neurological:  Negative for weakness and headaches.     Physical Exam     Initial Vitals [23 0412]   BP Pulse Resp Temp SpO2   (!) 167/92 (!) 54 16 98.4 °F (36.9 °C) 96 %      MAP       --         Physical Exam    Constitutional: He appears well-developed and well-nourished. He is not diaphoretic. No distress.   HENT:   Head: Normocephalic and atraumatic.   Eyes: Conjunctivae and EOM are normal. Pupils are equal, round, and reactive to light.   Neck:   Normal range of motion.  Cardiovascular:  Regular rhythm.           Pulses:       Radial pulses are 1+ on the right side and 1+ on the left side.   Patient with bilateral dopplerable DP and PT pulses.  Lower extremity warm and without signs of edema.   Pulmonary/Chest: Breath sounds normal. No respiratory distress.   Abdominal: Abdomen is soft. Bowel sounds are normal. He exhibits no distension. There is no  abdominal tenderness. There is no rebound and no guarding.   Musculoskeletal:         General: No tenderness. Normal range of motion.      Cervical back: Normal range of motion.        Legs:      Lymphadenopathy:     He has no cervical adenopathy.   Neurological: He is alert and oriented to person, place, and time.   Skin: Skin is warm. Capillary refill takes less than 2 seconds.   Psychiatric: His behavior is normal.       ED Course   Procedures  Labs Reviewed - No data to display       Imaging Results              US Lower Extremity Arteries Left (Final result)  Result time 06/26/23 08:29:06      Final result by Kiko Tolbert Jr., MD (06/26/23 08:29:06)                   Impression:      #1. Diffuse PVD with hemodynamically significant distal SFA stenosis.    Monophasic flow in the posterior tibial and dorsalis pedis artery suggesting diffuse multifocal stenosis of these vessels.      Electronically signed by: Kiko Guzman Jr  Date:    06/26/2023  Time:    08:29               Narrative:    EXAMINATION:  US LOWER EXTREMITY ARTERIES LEFT    CLINICAL HISTORY:  Pain, unspecified    TECHNIQUE:  real-time ultrasound as well as Doppler spectral waveform analysis and color flow imaging was performed of the large vessels of left lower extremity.    COMPARISON:  None.    FINDINGS:  Left lower extremity    CFA 90 cm/sec biphasic    Prox SFA 66 cm/sec biphasic    Mid SFA 66 cm/sec  biphasic    Dist SFA 44 cm/sec  biphasic    Pop A 96 cm/sec monophasic    LEIGH 39 cm/sec biphasic    PTA 99 cm/sec monophasic    DPA 31 cm/sec monophasic                                       Medications - No data to display  Medical Decision Making:   History:   Old Medical Records: I decided to obtain old medical records.  Initial Assessment:   73-year-old male past medical history of  CAD, hypertension, hyperlipidemia, peripheral artery disease, status post laser atherectomy and dcb balloon angioplasty of rt SFA on 6/12, who presents  to the emergency department after palpating a knot in his left groin.  Patient in no acute distress exam notable bilateral dopplerable pulses and lower extremity, warm with sensation intact.  No evidence of edema.  Palpable bilateral femoral pulses and a nodule close to the femoral artery.  Low suspicion for an aneurysm as the mass is not pulsatile.  Will obtain ultrasound of the arteries to assess for patency for any acute abnormalities.  Currently the patient pain-free.  Clinical Tests:   Radiological Study: Ordered and Reviewed           ED Course as of 06/26/23 1156   Mon Jun 26, 2023   0640 Patient care signed out to Dr. Haley pending results of the ultrasound. [AS]      ED Course User Index  [AS] Joseline Armando MD         The above this patient presents emergency room with a left inguinal mass       DISCLAIMER: This note was prepared with [employer] voice recognition transcription software. Garbled syntax, mangled pronouns, and other bizarre constructions may be attributed to that software system.        Clinical Impression:   Final diagnoses:  [R19.09] Mass of left inguinal region - Angiography and stent (Primary)        ED Disposition Condition    Discharge Stable          ED Prescriptions    None       Follow-up Information       Follow up With Specialties Details Why Contact Info    Abbe Vilchis MD Cardiology, Interventional Cardiology  As directed [address]  [address]  [facility] [address]  [phone]               Smith Haley MD  06/26/23 0248

## 2023-06-26 NOTE — ASSESSMENT & PLAN NOTE
Patient with known peripheral artery disease status post recent right SFA angioplasty.  No symptoms in the right leg.  Had some bruising in the left groin area and hence came to the ER.  No hematoma noted.  Ultrasound did not show any pseudoaneurysm.  Doing fine.  Patient is asymptomatic, states that he wanted to be careful since he is on multiple blood thinners as well as Eliquis.  Reassurance provided.  No further workup indicated during this hospital stay.  Follow-up in Cardiology Clinic with me

## 2023-07-11 ENCOUNTER — OFFICE VISIT (OUTPATIENT)
Dept: CARDIOLOGY | Facility: CLINIC | Age: 73
End: 2023-07-11
Payer: MEDICARE

## 2023-07-11 VITALS
DIASTOLIC BLOOD PRESSURE: 76 MMHG | OXYGEN SATURATION: 98 % | RESPIRATION RATE: 15 BRPM | BODY MASS INDEX: 26.97 KG/M2 | HEIGHT: 73 IN | HEART RATE: 60 BPM | SYSTOLIC BLOOD PRESSURE: 124 MMHG | WEIGHT: 203.5 LBS

## 2023-07-11 DIAGNOSIS — I25.10 ARTERIOSCLEROSIS OF CORONARY ARTERY: ICD-10-CM

## 2023-07-11 DIAGNOSIS — I73.9 PAD (PERIPHERAL ARTERY DISEASE): Primary | ICD-10-CM

## 2023-07-11 DIAGNOSIS — R09.89 POOR CIRCULATION: ICD-10-CM

## 2023-07-11 DIAGNOSIS — E78.2 MIXED HYPERLIPIDEMIA: Chronic | ICD-10-CM

## 2023-07-11 DIAGNOSIS — I70.0 ATHEROSCLEROSIS OF AORTA: ICD-10-CM

## 2023-07-11 DIAGNOSIS — E78.5 HYPERLIPIDEMIA, UNSPECIFIED HYPERLIPIDEMIA TYPE: ICD-10-CM

## 2023-07-11 DIAGNOSIS — I10 ESSENTIAL HYPERTENSION: ICD-10-CM

## 2023-07-11 PROCEDURE — 99214 OFFICE O/P EST MOD 30 MIN: CPT | Mod: S$PBB,,, | Performed by: INTERNAL MEDICINE

## 2023-07-11 PROCEDURE — 99999 PR PBB SHADOW E&M-EST. PATIENT-LVL IV: CPT | Mod: PBBFAC,,, | Performed by: INTERNAL MEDICINE

## 2023-07-11 PROCEDURE — 99214 PR OFFICE/OUTPT VISIT, EST, LEVL IV, 30-39 MIN: ICD-10-PCS | Mod: S$PBB,,, | Performed by: INTERNAL MEDICINE

## 2023-07-11 PROCEDURE — 99214 OFFICE O/P EST MOD 30 MIN: CPT | Mod: PBBFAC | Performed by: INTERNAL MEDICINE

## 2023-07-11 PROCEDURE — 99999 PR PBB SHADOW E&M-EST. PATIENT-LVL IV: ICD-10-PCS | Mod: PBBFAC,,, | Performed by: INTERNAL MEDICINE

## 2023-07-11 RX ORDER — EZETIMIBE 10 MG/1
10 TABLET ORAL DAILY
Qty: 90 TABLET | Refills: 3 | Status: SHIPPED | OUTPATIENT
Start: 2023-07-11 | End: 2023-12-06 | Stop reason: ALTCHOICE

## 2023-07-11 NOTE — PROGRESS NOTES
CARDIOVASCULAR CONSULTATION    REASON FOR CONSULT:   Rizwan Demarco Jr. is a 73 y.o. male who presents for evaluation    HISTORY OF PRESENT ILLNESS:     Patient is a pleasant 72-year-old man.  Here for evaluation.  Past medical history significant for atrial fibrillation, on Eliquis.  Coronary artery bypass grafting in 2003.  Quadruple bypass as per patient, but unknown anatomy.  States had it done at [facility].  Dyslipidemia, hypertension, Sjogren's disease.  Also peripheral artery disease with abnormal ABIs in 2021.  Denies any orthopnea, PND.  Main complaint is right leg, right calf claudication and cramping on walking.  Also complains of occasional heaviness in the chest area states that happens when he gets constipated.      2023:      The left ventricle is normal in size with concentric remodeling and normal systolic function. The estimated ejection fraction is 65%.  Normal right ventricular size with normal right ventricular systolic function.  Normal left ventricular diastolic function.  The estimated PA systolic pressure is 33 mmHg.  Intermediate central venous pressure (8 mmHg).  The ascending aorta is mildly dilated.      2021:      Moderately decreased right ORESTES, 0.68.  Moderately dampened PVR waveforms.  Mildly decreased left ORESTES, 0.89.  Moderately dampened PVR waveforms.      May 23:  Patient here for follow-up.  Peripheral ultrasound revealed 60 90% mid left SFA stenosis.  Discussed initiating Pletal with the patient.  Patient not interested.  Would like to proceed with the peripheral angiogram for further evaluation.  Lifestyle limiting calf claudication right more than left        Right ORESTES 0.73 suggesting mild arteial flow resriction. Left ORESTES normal 1.01     Mild bilateral carotid plaque with no flow limiting stenosis     Normal abdominal aortic size and flow. No AAA       Mild right SFA plaque with no flow limiting arterial stenosis  Moderate left SFA plaque with 60-90% mis SFA  stenosis. Occluded left posterial tibial artery        Notes from July 23:  Patient here for follow-up.  Claudication in the right leg has gone away.  States he recently walk 2 miles without any claudication.  Has a stenosis in his left SFA also, but states that there is no claudication in the left leg.      Successful laser atherectomy, balloon angioplasty of the distal left SFA and proximal popliteal artery.  With excellent angiographic results      Procedures performed:     1. Ultrasound-guided left common femoral artery access next     2. Abdominal aortogram with pigtail placed in the suprarenal position      3. Selective right lower extremity angiogram     4. Selective left lower artery angiogram      5. Laser atherectomy of right SFA and proximal popliteal artery      6. Drug coated balloon angioplasty of right SFA and proximal popliteal artery      7. IVUS of right SFA, popliteal and TP trunk.        Procedural details      Ultrasound-guided access of left common femoral artery was obtained.  After that we inserted a pigtail in the suprarenal position and abdominal aortogram was obtained.  It demonstrated no significant iliac artery stenosis on both sides.  Then we selectively engaged the right iliac artery and angiogram of the right iliac artery was obtained.  It revealed mild 10-20% stenosis.  After that we obtained angiogram of the right SFA which revealed severe 90-95% stenosis in the distal SFA.  There was also another 90% stenosis in the right TP trunk.  Proximal  of right anterior tibial artery and right posterior tibial arteries was present.  Both these arteries filled with collaterals.  Peroneal artery provides the most robust flow to the foot.  After that we crossed these wires with the lesion and decision was made to fix the SFA and popliteal lesions because patient has lifestyle limiting claudication, but no resting pain or CLI.  IVUS was used for vessel sizing After that laser atherectomy of  these 2 lesions was performed followed by balloon angioplasty.  Angiogram post revealed excellent angiographic results.       Then we did angiogram of the left lower extremity from the sheath and it revealed distal SFA severe stenosis.  Anterior tibial artery is diffusely diseased and there appears to be atypical takeoff of the posterior tibial artery.  No significant stenosis was noted in the peroneal artery        Assessment plan     Continue medical management      Resume Eliquis in a.m..  Continue Plavix 75 mg qd     Aspirin 81 mg qd x 1 m     statins         PAST MEDICAL HISTORY:     Past Medical History:   Diagnosis Date    Allergy     Anticoagulant long-term use     Arthritis     Rheumatoid arthritis    Back pain     Blood clotting tendency     BPH (benign prostatic hypertrophy)     Cervical spondylosis     Colon polyp 2010    adenoma    Coronary artery disease     Depression 05/08/2015    Dry eyes     Dry mouth     GERD (gastroesophageal reflux disease)     Hyperlipidemia     Hypertension     Lumbar spondylosis     Nasal obstruction     PAD (peripheral artery disease) 04/25/2023    Rheumatoid arthritis     Sinusitis     Special screening for malignant neoplasm of colon 06/29/2016    Trouble in sleeping        PAST SURGICAL HISTORY:     Past Surgical History:   Procedure Laterality Date    ATHERECTOMY Left 6/21/2023    Procedure: Atherectomy;  Surgeon: Abbe Vilchis MD;  Location: [facility] CATH LAB;  Service: Cardiology;  Laterality: Left;    CHALAZION EXCISION      COLONOSCOPY N/A 6/29/2016    Procedure: COLONOSCOPY;  Surgeon: Partha Saucedo MD;  Location: [facility] ENDO;  Service: Endoscopy;  Laterality: N/A;    COLONOSCOPY N/A 7/24/2020    Procedure: COLONOSCOPY;  Surgeon: Ian Martinez MD;  Location: [facility] ENDO (4TH FLR);  Service: Endoscopy;  Laterality: N/A;  4/16/20 - removed from 5/1/20, not called yet due to acute pain issues being addressed today - pg    CORONARY ARTERY BYPASS GRAFT       ESOPHAGOGASTRODUODENOSCOPY N/A 7/24/2020    Procedure: ESOPHAGOGASTRODUODENOSCOPY (EGD);  Surgeon: Ian Martinez MD;  Location: [facility] ([address] [place] [address]);  Service: Endoscopy;  Laterality: N/A;  4/16/20 - removed from 5/1/20, not called yet due to acute pain issues being addressed today.  4/17/20 - rescheduled 7/24/20 - pg    EXCISION TURBINATE, SUBMUCOUS      KNEE ARTHROSCOPY W/ DEBRIDEMENT      NASAL SEPTUM SURGERY      PERIPHERAL ANGIOGRAPHY N/A 6/21/2023    Procedure: Peripheral angiography;  Surgeon: Abbe Vilchis MD;  Location: [facility] CATH LAB;  Service: Cardiology;  Laterality: N/A;  right radial access--- RN PREOP 6/16----JM    TONSILLECTOMY         ALLERGIES AND MEDICATION:     Review of patient's allergies indicates:   Allergen Reactions    Aspirin Nausea And Vomiting    Astelin [azelastine] Other (See Comments)     Dry mouth    Flomax [tamsulosin] Other (See Comments)     Nosebleed        Medication List            Accurate as of July 11, 2023 11:15 AM. If you have any questions, ask your nurse or doctor.                CONTINUE taking these medications      alfuzosin 10 mg Tb24  Commonly known as: UROXATRAL  Take 1 tablet (10 mg total) by mouth daily with breakfast.     apixaban 5 mg Tab  Commonly known as: ELIQUIS  Take 1 tablet (5 mg total) by mouth 2 (two) times daily.     aspirin 81 MG EC tablet  Commonly known as: ECOTRIN  Take 1 tablet (81 mg total) by mouth once daily.     atorvastatin 40 MG tablet  Commonly known as: LIPITOR  Take 1 tablet (40 mg total) by mouth once daily.     budesonide 0.25 mg/2 mL nebulizer solution  Commonly known as: PULMICORT  Take 2 mLs (0.25 mg total) by nebulization 2 (two) times daily. Controller     cinnamon bark 500 mg capsule     clopidogreL 75 mg tablet  Commonly known as: PLAVIX  Take 1 tablet (75 mg total) by mouth once daily.     docusate sodium 100 MG capsule  Commonly known as: COLACE  Take 1 capsule (100 mg total) by mouth 2 (two) times daily.     hydroCHLOROthiazide  12.5 mg capsule  Commonly known as: MICROZIDE  Take 1 capsule (12.5 mg total) by mouth once daily.     lisinopriL 10 MG tablet  Take 1 tablet (10 mg total) by mouth once daily.     multivitamin per tablet  Commonly known as: THERAGRAN     omega-3 fatty acids 1,250 mg Cap     omeprazole 20 MG capsule  Commonly known as: PRILOSEC  Take 1 capsule (20 mg total) by mouth once daily.     propylene glycoL 0.6 % Drop     sodium bicarb-sodium chloride Pack     Systane GeL 0.3 % Gel  Generic drug: artificial tears(hypromellose)(GENTEAL/SUSTANE)              SOCIAL HISTORY:     Social History     Socioeconomic History    Marital status:    Tobacco Use    Smoking status: Former     Packs/day: 1.00     Years: 20.00     Pack years: 20.00     Types: Cigarettes     Quit date: 1991     Years since quittin.5    Smokeless tobacco: Never    Tobacco comments:     Quit .   Substance and Sexual Activity    Alcohol use: No    Drug use: No    Sexual activity: Yes     Partners: Female     Social Determinants of Health     Financial Resource Strain: Low Risk     Difficulty of Paying Living Expenses: Not hard at all   Food Insecurity: No Food Insecurity    Worried About Running Out of Food in the Last Year: Never true    Ran Out of Food in the Last Year: Never true   Transportation Needs: No Transportation Needs    Lack of Transportation (Medical): No    Lack of Transportation (Non-Medical): No   Physical Activity: Insufficiently Active    Days of Exercise per Week: 3 days    Minutes of Exercise per Session: 30 min   Stress: No Stress Concern Present    Feeling of Stress : Not at all   Social Connections: Moderately Integrated    Frequency of Communication with Friends and Family: More than three times a week    Frequency of Social Gatherings with Friends and Family: More than three times a week    Attends Synagogue Services: More than 4 times per year    Active Member of Clubs or Organizations: No    Attends Club or  "Organization Meetings: Never    Marital Status:    Housing Stability: Low Risk     Unable to Pay for Housing in the Last Year: No    Number of Places Lived in the Last Year: 1    Unstable Housing in the Last Year: No       FAMILY HISTORY:     Family History   Problem Relation Age of Onset    Hyperlipidemia Mother     Alzheimer's disease Mother     Stomach cancer Father     Colon cancer Father         from wife--he is not unsure     Cataracts Other     No Known Problems Maternal Aunt     No Known Problems Maternal Uncle     No Known Problems Paternal Aunt     No Known Problems Paternal Uncle     No Known Problems Maternal Grandmother     No Known Problems Maternal Grandfather     No Known Problems Paternal Grandmother     No Known Problems Paternal Grandfather     Blindness Neg Hx     Cancer Neg Hx     Diabetes Neg Hx     Glaucoma Neg Hx     Rheum arthritis Neg Hx     Psoriasis Neg Hx     Lupus Neg Hx     Amblyopia Neg Hx     Hypertension Neg Hx     Macular degeneration Neg Hx     Retinal detachment Neg Hx     Strabismus Neg Hx     Stroke Neg Hx     Thyroid disease Neg Hx     Esophageal cancer Neg Hx        REVIEW OF SYSTEMS:   Review of Systems   Constitutional: Negative.   HENT: Negative.     Eyes: Negative.    Respiratory: Negative.     Endocrine: Negative.    Hematologic/Lymphatic: Negative.    Skin: Negative.    Musculoskeletal: Negative.    Gastrointestinal: Negative.    Genitourinary: Negative.    Neurological: Negative.    Psychiatric/Behavioral: Negative.     Allergic/Immunologic: Negative.      A 10 point review of systems was performed and all the pertinent positives have been mentioned. Rest of review of systems was negative.        PHYSICAL EXAM:     Vitals:    07/11/23 1010   BP: 124/76   Pulse: 60   Resp: 15    Body mass index is 26.85 kg/m².  Weight: 92.3 kg (203 lb 7.8 oz)   Height: 6' 1" (185.4 cm)     Physical Exam  Vitals reviewed.   Constitutional:       Appearance: He is well-developed. "   HENT:      Head: Normocephalic.   Eyes:      Conjunctiva/sclera: Conjunctivae normal.      Pupils: Pupils are equal, round, and reactive to light.   Cardiovascular:      Rate and Rhythm: Normal rate and regular rhythm.      Heart sounds: Normal heart sounds.   Pulmonary:      Effort: Pulmonary effort is normal.      Breath sounds: Normal breath sounds.   Abdominal:      General: Bowel sounds are normal.      Palpations: Abdomen is soft.   Musculoskeletal:      Cervical back: Normal range of motion and neck supple.   Skin:     General: Skin is warm.   Neurological:      Mental Status: He is alert and oriented to person, place, and time.         DATA:     Laboratory:  CBC:  Recent Labs   Lab 02/08/22  1024 02/13/23  0818 06/16/23  1150   WBC 5.81 5.34 5.32   Hemoglobin 16.6 15.6 15.5   Hematocrit 53.0 49.6 48.3   Platelets 197 162 176         CHEMISTRIES:  Recent Labs   Lab 01/21/21  0957 02/08/22  1024 02/13/23  0818 06/16/23  1150   Glucose 94 92 100 71   Sodium 140 142 143 140   Potassium 4.0 4.8 4.0 3.9   BUN 12 13 8 10   Creatinine 1.1 1.2 1.2 0.9   eGFR if  >60 >60  --   --    eGFR if non  >60 60  --   --    Calcium 9.8 10.2 9.4 9.8         CARDIAC BIOMARKERS:        COAGS:        LIPIDS/LFTS:  Recent Labs   Lab 01/21/21  0957 02/08/22  1024 02/13/23  0818   Cholesterol 215 H 238 H 155   Triglycerides 83 76 45   HDL 41 42 40   LDL Cholesterol 157.4 180.8 H 106.0   Non-HDL Cholesterol 174 196 115   AST 14 13 15   ALT 12 13 14         Hemoglobin A1C   Date Value Ref Range Status   02/13/2023 5.6 4.0 - 5.6 % Final     Comment:     ADA Screening Guidelines:  5.7-6.4%  Consistent with prediabetes  >or=6.5%  Consistent with diabetes    High levels of fetal hemoglobin interfere with the HbA1C  assay. Heterozygous hemoglobin variants (HbS, HgC, etc)do  not significantly interfere with this assay.   However, presence of multiple variants may affect accuracy.     02/08/2022 5.6 4.0 - 5.6  % Final     Comment:     ADA Screening Guidelines:  5.7-6.4%  Consistent with prediabetes  >or=6.5%  Consistent with diabetes    High levels of fetal hemoglobin interfere with the HbA1C  assay. Heterozygous hemoglobin variants (HbS, HgC, etc)do  not significantly interfere with this assay.   However, presence of multiple variants may affect accuracy.     05/24/2019 5.7 (H) 4.0 - 5.6 % Final     Comment:     ADA Screening Guidelines:  5.7-6.4%  Consistent with prediabetes  >or=6.5%  Consistent with diabetes  High levels of fetal hemoglobin interfere with the HbA1C  assay. Heterozygous hemoglobin variants (HbS, HgC, etc)do  not significantly interfere with this assay.   However, presence of multiple variants may affect accuracy.         TSH  Recent Labs   Lab 02/13/23  0818   TSH 0.641         The 10-year ASCVD risk score (Emmanuelle CORREA, et al., 2019) is: 20.8%    Values used to calculate the score:      Age: 73 years      Sex: Male      Is Non- : Yes      Diabetic: No      Tobacco smoker: No      Systolic Blood Pressure: 124 mmHg      Is BP treated: Yes      HDL Cholesterol: 40 mg/dL      Total Cholesterol: 155 mg/dL       BNP    Lab Results   Component Value Date/Time    BNP 25 05/07/2015 05:30 PM             ASSESSMENT AND PLAN     Patient Active Problem List   Diagnosis    Sjogren's disease    GERD (gastroesophageal reflux disease)    Arteriosclerosis of coronary artery    Hyperlipidemia    Cervical spondylosis    Degeneration of intervertebral disc    Rheumatoid arthritis    Benign prostatic hyperplasia with weak urinary stream    Perennial allergic rhinitis    Essential hypertension    High frequency hearing loss    Nasal obstruction    Dysfunctions associated with sleep stages or arousal from sleep    Primary insomnia    Wears dentures    Atherosclerosis of aorta    Other personal history presenting hazards to health    Complete loss of teeth, unspecified cause, unspecified class    Cervical  pain (neck)    Impaired range of motion of cervical spine    Posture imbalance    PAD (peripheral artery disease)    Poor circulation    Chest pain       Peripheral artery disease:  Now status post revascularization of left SFA and popliteal artery.  Also has residual infrapopliteal disease.  Is claudication after revascularization of the SFA disease has gone away and he can now walk 2 miles.  He also has flow-limiting stenosis in the distal left SFA, but states that he does not have any pain in the left leg.  Continue medical management.  Aspirin 81 mg daily for 1 month, Plavix 75 mg daily uninterrupted for at least 1 year, continue Eliquis 5 mg b.i.d.      Coronary artery disease status post CABG in 2003.  Stress test in May of 2023 did not show any significant ischemia.  Recent echo showed normal left ventricle systolic function     Carotid ultrasound in May of 2023 showed mild bilateral carotid artery block with no flow-limiting stenosis.    Hypertension:  Uncontrolled in clinic today, but states at home stays well controlled around 120 mmHg.      Atrial fibrillation:  On Eliquis.  Rate controlled.     Dyslipidemia:  LDL not at goal.  Add ezetimibe    Follow-up after 3 m with peripheral US prior.          Thank you very much for involving me in the care of your patient.  Please do not hesitate to contact me if there are any questions.      Abbe Vilchis MD, FACC, [facility]  Interventional Cardiologist, [facility].           This note was dictated with the help of speech recognition software.  There might be un-intended errors and/or substitutions.

## 2023-07-11 NOTE — DISCHARGE SUMMARY
[facility] - Surgery  Discharge Note  Short Stay    Procedure(s) (LRB):  Peripheral angiography (N/A)  Atherectomy (Left)      OUTCOME: Patient tolerated treatment/procedure well without complication and is now ready for discharge.    DISPOSITION: Home or Self Care    FINAL DIAGNOSIS:  <principal problem not specified>    FOLLOWUP: In clinic    DISCHARGE INSTRUCTIONS:  No discharge procedures on file.      Clinical Reference Documents Added to Patient Instructions         Document    ANGIOGRAPHY (ENGLISH)            TIME SPENT ON DISCHARGE: 35 minutes

## 2023-07-14 ENCOUNTER — PATIENT OUTREACH (OUTPATIENT)
Dept: ADMINISTRATIVE | Facility: OTHER | Age: 73
End: 2023-07-14
Payer: MEDICARE

## 2023-07-14 NOTE — PROGRESS NOTES
CHW - Case Closure    This Community Health Worker spoke to patient via telephone today.   Pt reported: Patient does not have identified needs nor requests assistance.  Pt denied any additional needs at this time and agrees with episode closure at this time.  Provided patient with Community Health Worker's contact information and encouraged him to contact this Community Health Worker if additional needs arise.

## 2023-07-24 ENCOUNTER — TELEPHONE (OUTPATIENT)
Dept: ENDOSCOPY | Facility: HOSPITAL | Age: 73
End: 2023-07-24
Payer: MEDICARE

## 2023-07-25 ENCOUNTER — TELEPHONE (OUTPATIENT)
Dept: ENDOSCOPY | Facility: HOSPITAL | Age: 73
End: 2023-07-25
Payer: MEDICARE

## 2023-07-25 NOTE — TELEPHONE ENCOUNTER
Dear Dr Vilchis-    Patient has a scheduled procedure and is currently taking a blood thinner prescribed by your office. In order to ensure patient safety, we would like to confirm that the patient can place their blood thinner medication on hold for the procedure. Can he/she discontinue Plavix (clopidogrel) for a minimum of 5 days prior to the procedure?     Thank you for your prompt reply.    [facility] Endoscopy Scheduling

## 2023-07-25 NOTE — TELEPHONE ENCOUNTER
Patient called to let us know that he is on another blood thinner and wants to know if he needs to come off of it.  On 6/21/23 he was prescribed Plavix. This was started after he scheduled his procedure.  Informed him that a message would be sent to his doctor, but he may have to be rescheduled because his colonoscopy is in 2 days.  Will follow up with him tomorrow.

## 2023-07-26 ENCOUNTER — TELEPHONE (OUTPATIENT)
Dept: ENDOSCOPY | Facility: HOSPITAL | Age: 73
End: 2023-07-26
Payer: MEDICARE

## 2023-07-26 NOTE — TELEPHONE ENCOUNTER
Contacted patient regarding follow up on colonoscopy.  Informed him that I was still awaiting a response from Dr Vilchis.  Explained that I spoke with Dr Martinez and he recommends coming off the schedule for tomorrow and follow the advice of Dr Vilchis.  Instructed patient to start taking Eliquis and continue the Plavix.  Stated understanding.  Removed from snapboard.  Will call back after receiving message from Dr Vilchis.

## 2023-07-31 ENCOUNTER — TELEPHONE (OUTPATIENT)
Dept: ENDOSCOPY | Facility: HOSPITAL | Age: 73
End: 2023-07-31
Payer: MEDICARE

## 2023-07-31 NOTE — TELEPHONE ENCOUNTER
Spoke to patient to schedule procedure(s) Colonoscopy       Physician to perform procedure(s) Dr. VERNON Martinez  Date of Procedure (s) 10/19/23  Arrival Time 9:30 AM  Time of Procedure(s) 10:30 AM   Location of Procedure(s) [address]  Type of Rx Prep sent to patient: PEG  Instructions provided to patient via [facility]    Patient was informed on the following information and verbalized understanding. Screening questionnaire reviewed with patient and complete. If procedure requires anesthesia, a responsible adult needs to be present to accompany the patient home, patient cannot drive after receiving anesthesia. Appointment details are tentative, especially check-in time. Patient will receive a prep-op call 4 days prior to confirm check-in time for procedure. If applicable the patient should contact their pharmacy to verify Rx for procedure prep is ready for pick-up. Patient was advised to call the scheduling department at [phone] if pharmacy states no Rx is available. Patient was advised to call the endoscopy scheduling department if any questions or concerns arise.      [facility]oscopy Scheduling Department

## 2023-08-07 ENCOUNTER — OFFICE VISIT (OUTPATIENT)
Dept: RHEUMATOLOGY | Facility: CLINIC | Age: 73
End: 2023-08-07
Payer: MEDICARE

## 2023-08-07 VITALS
OXYGEN SATURATION: 96 % | HEIGHT: 73 IN | DIASTOLIC BLOOD PRESSURE: 60 MMHG | WEIGHT: 206 LBS | SYSTOLIC BLOOD PRESSURE: 119 MMHG | BODY MASS INDEX: 27.3 KG/M2 | HEART RATE: 50 BPM

## 2023-08-07 DIAGNOSIS — M06.9 RHEUMATOID ARTHRITIS INVOLVING SHOULDER, UNSPECIFIED LATERALITY, UNSPECIFIED WHETHER RHEUMATOID FACTOR PRESENT: Primary | ICD-10-CM

## 2023-08-07 DIAGNOSIS — Z71.89 COUNSELING AND COORDINATION OF CARE: ICD-10-CM

## 2023-08-07 DIAGNOSIS — M15.9 PRIMARY OSTEOARTHRITIS INVOLVING MULTIPLE JOINTS: ICD-10-CM

## 2023-08-07 DIAGNOSIS — M35.00 SECONDARY SJOGREN'S SYNDROME: ICD-10-CM

## 2023-08-07 PROCEDURE — 99214 OFFICE O/P EST MOD 30 MIN: CPT | Mod: PBBFAC,PO | Performed by: INTERNAL MEDICINE

## 2023-08-07 PROCEDURE — 99999 PR PBB SHADOW E&M-EST. PATIENT-LVL IV: ICD-10-PCS | Mod: PBBFAC,,, | Performed by: INTERNAL MEDICINE

## 2023-08-07 PROCEDURE — 99214 PR OFFICE/OUTPT VISIT, EST, LEVL IV, 30-39 MIN: ICD-10-PCS | Mod: S$PBB,,, | Performed by: INTERNAL MEDICINE

## 2023-08-07 PROCEDURE — 99999 PR PBB SHADOW E&M-EST. PATIENT-LVL IV: CPT | Mod: PBBFAC,,, | Performed by: INTERNAL MEDICINE

## 2023-08-07 PROCEDURE — 99214 OFFICE O/P EST MOD 30 MIN: CPT | Mod: S$PBB,,, | Performed by: INTERNAL MEDICINE

## 2023-08-07 NOTE — PROGRESS NOTES
Answers submitted by the patient for this visit:  Rheumatology Questionnaire (Submitted on 7/31/2023)  fever: No  eye redness: Yes  mouth sores: No  headaches: No  shortness of breath: No  chest pain: No  trouble swallowing: No  diarrhea: No  constipation: Yes  unexpected weight change: No  genital sore: No  During the last 3 days, have you had a skin rash?: No  Bruises or bleeds easily: No  cough: Yes             RHEUMATOLOGY OUTPATIENT CLINIC NOTE    8/7/2023    Attending Rheumatologist: Adrian Gallegos  Primary Care Provider: Raúl Perkins MD   Physician Requesting Consultation: Manuel Moon, NP  [id number] [facility]  [place] [address]  Chief Complaint/Reason For Consultation:  Joint Pain      Subjective:       HPI  Rizwan Demarco Jr. is a 73 y.o. Black or  male with medical history noted below who presents for evaluation of RA.   Patient last seen Dr. Lugo in 2016, at which point no evidence of active RA.   Patient notes overall feeling well, denies chronic joint pain, occasional back pain if he over exerts himself, otherwise no issues. Does not take anything for pain. He notes overall he is fine, just came as his PCP urged him to see Rheumatology.     Today  Patient returns for follow up.   Last visit noted he was doing well, using NSAIDs PRN for pain control. Notes this has not changed, followed up with his EYE doctor likely allergies. Notes some constipation, +back pain with it.     Review of Systems   Constitutional:  Negative for chills, fatigue, fever and unexpected weight change.   HENT:  Negative for mouth sores.    Eyes:  Positive for eye dryness. Negative for redness.   Respiratory:  Negative for cough and shortness of breath.    Cardiovascular:  Negative for chest pain.   Gastrointestinal:  Negative for abdominal distention, constipation, diarrhea, nausea, vomiting and reflux.   Musculoskeletal:  Negative for arthralgias, back pain, gait problem, joint swelling, leg pain,  myalgias, neck pain, neck stiffness and joint deformity.   Integumentary:  Negative for rash.   Neurological:  Negative for weakness, numbness, headaches and memory loss.   Hematological:  Negative for adenopathy. Does not bruise/bleed easily.   Psychiatric/Behavioral:  Negative for confusion, decreased concentration, sleep disturbance and suicidal ideas. The patient is not nervous/anxious.    All other systems reviewed and are negative.       Chronic comorbid conditions affecting medical decision making today:  Past Medical History:   Diagnosis Date    Allergy     Anticoagulant long-term use     Arthritis     Rheumatoid arthritis    Back pain     Blood clotting tendency     BPH (benign prostatic hypertrophy)     Cervical spondylosis     Colon polyp 2010    adenoma    Coronary artery disease     Depression 05/08/2015    Dry eyes     Dry mouth     GERD (gastroesophageal reflux disease)     Hyperlipidemia     Hypertension     Lumbar spondylosis     Nasal obstruction     PAD (peripheral artery disease) 04/25/2023    Rheumatoid arthritis     Sinusitis     Special screening for malignant neoplasm of colon 06/29/2016    Trouble in sleeping      Past Surgical History:   Procedure Laterality Date    ATHERECTOMY Left 6/21/2023    Procedure: Atherectomy;  Surgeon: Abbe Vilchis MD;  Location: [facility] CATH LAB;  Service: Cardiology;  Laterality: Left;    CHALAZION EXCISION      COLONOSCOPY N/A 6/29/2016    Procedure: COLONOSCOPY;  Surgeon: Partha Saucedo MD;  Location: [facility] ENDO;  Service: Endoscopy;  Laterality: N/A;    COLONOSCOPY N/A 7/24/2020    Procedure: COLONOSCOPY;  Surgeon: Ian Martinez MD;  Location: [address]);  Service: Endoscopy;  Laterality: N/A;  4/16/20 - removed from 5/1/20, not called yet due to acute pain issues being addressed today - pg    CORONARY ARTERY BYPASS GRAFT      ESOPHAGOGASTRODUODENOSCOPY N/A 7/24/2020    Procedure: ESOPHAGOGASTRODUODENOSCOPY (EGD);  Surgeon: Ian Martinez MD;  Location:  [facility] (4TH FLR);  Service: Endoscopy;  Laterality: N/A;  20 - removed from 20, not called yet due to acute pain issues being addressed today.  20 - rescheduled 20 - pg    EXCISION TURBINATE, SUBMUCOUS      KNEE ARTHROSCOPY W/ DEBRIDEMENT      NASAL SEPTUM SURGERY      PERIPHERAL ANGIOGRAPHY N/A 2023    Procedure: Peripheral angiography;  Surgeon: Abbe Vilchis MD;  Location: [facility] CATH LAB;  Service: Cardiology;  Laterality: N/A;  right radial access--- RN PREOP ----JM    TONSILLECTOMY       Family History   Problem Relation Age of Onset    Hyperlipidemia Mother     Alzheimer's disease Mother     Stomach cancer Father     Colon cancer Father         from wife--he is not unsure     Cataracts Other     No Known Problems Maternal Aunt     No Known Problems Maternal Uncle     No Known Problems Paternal Aunt     No Known Problems Paternal Uncle     No Known Problems Maternal Grandmother     No Known Problems Maternal Grandfather     No Known Problems Paternal Grandmother     No Known Problems Paternal Grandfather     Blindness Neg Hx     Cancer Neg Hx     Diabetes Neg Hx     Glaucoma Neg Hx     Rheum arthritis Neg Hx     Psoriasis Neg Hx     Lupus Neg Hx     Amblyopia Neg Hx     Hypertension Neg Hx     Macular degeneration Neg Hx     Retinal detachment Neg Hx     Strabismus Neg Hx     Stroke Neg Hx     Thyroid disease Neg Hx     Esophageal cancer Neg Hx      Social History     Substance and Sexual Activity   Alcohol Use No     Social History     Tobacco Use   Smoking Status Former    Current packs/day: 0.00    Average packs/day: 1 pack/day for 20.0 years (20.0 ttl pk-yrs)    Types: Cigarettes    Start date: 1971    Quit date: 1991    Years since quittin.6   Smokeless Tobacco Never   Tobacco Comments    Quit .     Social History     Substance and Sexual Activity   Drug Use No       Current Outpatient Medications:     alfuzosin (UROXATRAL) 10 mg Tb24, Take 1 tablet (10 mg  total) by mouth daily with breakfast., Disp: 90 tablet, Rfl: 3    apixaban (ELIQUIS) 5 mg Tab, Take 1 tablet (5 mg total) by mouth 2 (two) times daily., Disp: 60 tablet, Rfl: 11    artificial tears,hypromellose,,GENTEAL/SUSTANE, (SYSTANE GEL) 0.3 % Gel, 1 drop as needed., Disp: , Rfl:     atorvastatin (LIPITOR) 40 MG tablet, Take 1 tablet (40 mg total) by mouth once daily., Disp: 90 tablet, Rfl: 3    budesonide (PULMICORT) 0.25 mg/2 mL nebulizer solution, Take 2 mLs (0.25 mg total) by nebulization 2 (two) times daily. Controller, Disp: 90 each, Rfl: 5    cinnamon bark 500 mg capsule, Take 500 mg by mouth once daily., Disp: , Rfl:     clopidogreL (PLAVIX) 75 mg tablet, Take 1 tablet (75 mg total) by mouth once daily., Disp: 90 tablet, Rfl: 3    docusate sodium (COLACE) 100 MG capsule, Take 1 capsule (100 mg total) by mouth 2 (two) times daily., Disp: 60 capsule, Rfl: 0    ezetimibe (ZETIA) 10 mg tablet, Take 1 tablet (10 mg total) by mouth once daily., Disp: 90 tablet, Rfl: 3    hydroCHLOROthiazide (MICROZIDE) 12.5 mg capsule, Take 1 capsule (12.5 mg total) by mouth once daily., Disp: 90 capsule, Rfl: 3    lisinopriL 10 MG tablet, Take 1 tablet (10 mg total) by mouth once daily., Disp: 90 tablet, Rfl: 3    multivitamin (THERAGRAN) per tablet, Take by mouth.  Tablet Oral Every day, Disp: , Rfl:     omega-3 fatty acids 1,250 mg Cap, Take by mouth., Disp: , Rfl:     omeprazole (PRILOSEC) 20 MG capsule, Take 1 capsule (20 mg total) by mouth once daily., Disp: 30 capsule, Rfl: 5    propylene glycoL 0.6 % Drop, Apply to eye., Disp: , Rfl:     sodium bicarb-sodium chloride Pack, by sinus irrigation route., Disp: , Rfl:     aspirin (ECOTRIN) 81 MG EC tablet, Take 1 tablet (81 mg total) by mouth once daily., Disp: , Rfl: 0    Current Facility-Administered Medications:     sodium chloride 0.9% flush 10 mL, 10 mL, Intravenous, PRN, Abbe Vilchis MD     Objective:         Vitals:    08/07/23 1156   BP: 119/60   Pulse: (!) 50  "    Physical Exam   Musculoskeletal:      Comments: Can make fist, no synovitis  Knee crepitus  Negative ankle/MTP  No tender points        Reviewed old and all outside pertinent medical records available.    All lab results personally reviewed and interpreted by me.  Lab Results   Component Value Date    WBC 5.32 06/16/2023    HGB 15.5 06/16/2023    HCT 48.3 06/16/2023    MCV 92 06/16/2023    MCH 29.5 06/16/2023    MCHC 32.1 06/16/2023    RDW 12.6 06/16/2023     06/16/2023    MPV 10.5 06/16/2023    PLTEST Adequate 09/29/2011       Lab Results   Component Value Date     06/16/2023    K 3.9 06/16/2023     06/16/2023    CO2 28 06/16/2023    GLU 71 06/16/2023    BUN 10 06/16/2023    CALCIUM 9.8 06/16/2023    PROT 6.6 02/13/2023    ALBUMIN 3.5 02/13/2023    BILITOT 0.6 02/13/2023    AST 15 02/13/2023    ALKPHOS 53 (L) 02/13/2023    ALT 14 02/13/2023       Lab Results   Component Value Date    COLORU Yellow 02/13/2023    APPEARANCEUA Clear 02/13/2023    SPECGRAV 1.010 02/13/2023    PHUR 7.0 02/13/2023    PROTEINUA Negative 02/13/2023    KETONESU Negative 02/13/2023    LEUKOCYTESUR Negative 02/13/2023    NITRITE Negative 02/13/2023    UROBILINOGEN Negative 02/13/2023       Lab Results   Component Value Date    CRP 6.8 05/11/2016       Lab Results   Component Value Date    SEDRATE 1 01/21/2021       Lab Results   Component Value Date    SEDRATE 1 01/21/2021       No components found for: "25OHVITDTOT", "32CLNZLQ9", "64AZCKPF8", "METHODNOTE"    Lab Results   Component Value Date    URICACID 7.8 (H) 05/24/2019       No components found for: "TSPOTTB"        Imaging:  All imaging reviewed and independently interpreted by me.         ASSESSMENT / PLAN:     Rizwan Demarco Jr. is a 73 y.o. Black or  male with:      1. Rheumatoid arthritis involving shoulder, unspecified laterality, unspecified whether rheumatoid factor present  - patient with Hx of RA  - prior HCQ use, stopped due to skin " hyperpigmentation  - doing well  - continue NSAIDs PRN  - reassurance    2. Sjogren's syndrome, with unspecified organ involvement  - patient with 2/2 Sjogrens  - artificial tear use encouraged  - reassurance     3. Primary osteoarthritis involving multiple joints  - patient does have OA  - well controlled  -reassurance and exercise     4. Other specified counseling  - over 10 minutes spent regarding below topics:  - Immunization counseling done.  - Weight loss counseling done.  - Nutrition and exercise counseling.  - Limitation of alcohol consumption.  - Regular exercise:  Aerobic and resistance.  - Medication counseling provided.      Follow up in about 6 months (around 2/7/2024).    Method of contact with patient concerns: [facility]    Disclaimer:  This note is prepared using voice recognition software and as such is likely to have errors and has not been proof read. Please contact me for questions.     Time spent: 30 minutes in face to face discussion concerning diagnosis, prognosis, review of lab and test results, benefits of treatment as well as management of disease, counseling of patient and coordination of care between various health care providers.  Greater than half the time spent was used for coordination of care and counseling of patient.    Adrian Gallegos M.D.  Rheumatology Department   [facility]

## 2023-08-08 DIAGNOSIS — R06.02 SHORTNESS OF BREATH: ICD-10-CM

## 2023-08-08 RX ORDER — BUDESONIDE 0.25 MG/2ML
0.25 INHALANT ORAL 2 TIMES DAILY
Qty: 90 EACH | Refills: 5 | Status: SHIPPED | OUTPATIENT
Start: 2023-08-08 | End: 2024-08-07

## 2023-08-08 NOTE — TELEPHONE ENCOUNTER
----- Message from Didi Salcedoens sent at 8/8/2023  1:31 PM CDT -----  Regarding: refill  Contact: @  [phone]  Pt called in regards to budesonide (PULMICORT) 0.25 mg/2 mL nebulizer solution he is having a hard time getting it refilled due to being out of stock what do you recommend .....Please call and adv @[phone]

## 2023-08-08 NOTE — TELEPHONE ENCOUNTER
Patient would like Rx transferred to [facility]. He states they are holding a box for him.

## 2023-08-08 NOTE — TELEPHONE ENCOUNTER
No care due was identified.  [facility] Embedded Care Due Messages. Reference number: [id number].   8/08/2023 2:00:42 PM CDT

## 2023-08-18 ENCOUNTER — NURSE TRIAGE (OUTPATIENT)
Dept: ADMINISTRATIVE | Facility: CLINIC | Age: 73
End: 2023-08-18
Payer: MEDICARE

## 2023-08-18 NOTE — TELEPHONE ENCOUNTER
OOC RN Patient calling about having loose stools.  Taking a supplement from store to have a bowel movement.  Bloated and Ab bloating.   Went to  On 8/7/23  stated he is probably constipation. BM are watery.   Been over a week since Normal BM.  Denies ab pain  on Stool softener dulculax   Care dispo is to go to ED.  For any new or worsening to callback [facility] RN  Number given and pt VU.   Reason for Disposition   MODERATE - SEVERE SWELLING of abdomen (e.g., looks very distended or swollen) that is NEW-ONSET or much worse    Additional Information   Negative: Sounds like a life-threatening emergency to the triager    Protocols used: Abdomen Bloating and Swelling-A-OH

## 2023-10-03 ENCOUNTER — TELEPHONE (OUTPATIENT)
Dept: CARDIOLOGY | Facility: CLINIC | Age: 73
End: 2023-10-03
Payer: MEDICARE

## 2023-10-03 NOTE — TELEPHONE ENCOUNTER
----- Message from Slade Niall sent at 10/3/2023  9:22 AM CDT -----  Regarding: Self [phone]  Type:  Patient Returning Call    Who Called:  Self     Who Left Message for Patient:  unknown     Does the patient know what this is regarding?: yes     Would the patient rather a call back or a response via [facility]?  Call back     Best Call Back Number: [phone]     Additional Information:     Thank you.

## 2023-10-03 NOTE — TELEPHONE ENCOUNTER
Patient's call was returned and informed of testing scheduled as well as follow up with Dr. Vilchis.

## 2023-10-11 ENCOUNTER — HOSPITAL ENCOUNTER (OUTPATIENT)
Dept: CARDIOLOGY | Facility: HOSPITAL | Age: 73
Discharge: HOME | End: 2023-10-11
Attending: INTERNAL MEDICINE
Payer: MEDICARE

## 2023-10-11 DIAGNOSIS — I73.9 PAD (PERIPHERAL ARTERY DISEASE): ICD-10-CM

## 2023-10-11 PROCEDURE — 93925 LOWER EXTREMITY STUDY: CPT

## 2023-10-11 PROCEDURE — 93925 CV US DOPPLER ARTERIAL LEGS BILATERAL (CUPID ONLY): ICD-10-PCS | Mod: 26,,, | Performed by: INTERNAL MEDICINE

## 2023-10-11 PROCEDURE — 93925 LOWER EXTREMITY STUDY: CPT | Mod: 26,,, | Performed by: INTERNAL MEDICINE

## 2023-10-12 LAB
LEFT ANT TIBIAL SYS PSV: 62 CM/S
LEFT CFA PSV: 113 CM/S
LEFT EXTERNAL ILIAC PSV: 105 CM/S
LEFT PERONEAL SYS PSV: 58 CM/S
LEFT POPLITEAL PSV: 94 CM/S
LEFT POST TIBIAL SYS PSV: 19 CM/S
LEFT PROFUNDA SYS PSV: 280 CM/S
LEFT SUPER FEMORAL DIST SYS PSV: 73 CM/S
LEFT SUPER FEMORAL MID SYS PSV: 327 CM/S
LEFT SUPER FEMORAL OSTIAL SYS PSV: 92 CM/S
LEFT SUPER FEMORAL PROX SYS PSV: 81 CM/S
LEFT TIB/PER TRUNK SYS PSV: 71 CM/S
OHS CV LEFT LOWER EXTREMITY ABI (NO CALC): 0.9
OHS CV RIGHT ABI LOWER EXTREMITY (NO CALC): 0.8
RIGHT ANT TIBIAL SYS PSV: 52 CM/S
RIGHT CFA PSV: 123 CM/S
RIGHT EXTERNAL ILLIAC PSV: 106 CM/S
RIGHT PERONEAL SYS PSV: 58 CM/S
RIGHT POPLITEAL PSV: 70 CM/S
RIGHT POST TIBIAL SYS PSV: 15 CM/S
RIGHT PROFUNDA SYS PSV: 202 CM/S
RIGHT SUPER FEMORAL DIST SYS PSV: 78 CM/S
RIGHT SUPER FEMORAL MID SYS PSV: 92 CM/S
RIGHT SUPER FEMORAL OSTIAL SYS PSV: 111 CM/S
RIGHT SUPER FEMORAL PROX SYS PSV: 106 CM/S
RIGHT TIB/PER TRUNK SYS PSV: 57 CM/S

## 2023-10-19 ENCOUNTER — TELEPHONE (OUTPATIENT)
Dept: GASTROENTEROLOGY | Facility: CLINIC | Age: 73
End: 2023-10-19
Payer: MEDICARE

## 2023-10-19 ENCOUNTER — HOSPITAL ENCOUNTER (OUTPATIENT)
Facility: HOSPITAL | Age: 73
Discharge: HOME | End: 2023-10-19
Attending: INTERNAL MEDICINE | Admitting: INTERNAL MEDICINE
Payer: MEDICARE

## 2023-10-19 ENCOUNTER — ANESTHESIA (OUTPATIENT)
Dept: ENDOSCOPY | Facility: HOSPITAL | Age: 73
End: 2023-10-19
Payer: MEDICARE

## 2023-10-19 ENCOUNTER — ANESTHESIA EVENT (OUTPATIENT)
Dept: ENDOSCOPY | Facility: HOSPITAL | Age: 73
End: 2023-10-19
Payer: MEDICARE

## 2023-10-19 VITALS
SYSTOLIC BLOOD PRESSURE: 146 MMHG | RESPIRATION RATE: 20 BRPM | DIASTOLIC BLOOD PRESSURE: 78 MMHG | OXYGEN SATURATION: 96 % | TEMPERATURE: 98 F | HEART RATE: 50 BPM

## 2023-10-19 DIAGNOSIS — Z12.11 ENCOUNTER FOR SCREENING COLONOSCOPY: ICD-10-CM

## 2023-10-19 PROCEDURE — 45385 COLONOSCOPY W/LESION REMOVAL: CPT | Mod: PT,,, | Performed by: INTERNAL MEDICINE

## 2023-10-19 PROCEDURE — 45385 COLONOSCOPY W/LESION REMOVAL: CPT | Mod: PT | Performed by: INTERNAL MEDICINE

## 2023-10-19 PROCEDURE — D9220A PRA ANESTHESIA: Mod: PT,ANES,, | Performed by: STUDENT IN AN ORGANIZED HEALTH CARE EDUCATION/TRAINING PROGRAM

## 2023-10-19 PROCEDURE — 27201089 HC SNARE, DISP (ANY): Performed by: INTERNAL MEDICINE

## 2023-10-19 PROCEDURE — 88305 TISSUE EXAM BY PATHOLOGIST: ICD-10-PCS | Mod: 26,,, | Performed by: PATHOLOGY

## 2023-10-19 PROCEDURE — 45385 PR COLONOSCOPY,REMV LESN,SNARE: ICD-10-PCS | Mod: PT,,, | Performed by: INTERNAL MEDICINE

## 2023-10-19 PROCEDURE — 88305 TISSUE EXAM BY PATHOLOGIST: CPT | Mod: 59 | Performed by: PATHOLOGY

## 2023-10-19 PROCEDURE — 63600175 PHARM REV CODE 636 W HCPCS: Performed by: STUDENT IN AN ORGANIZED HEALTH CARE EDUCATION/TRAINING PROGRAM

## 2023-10-19 PROCEDURE — 25000003 PHARM REV CODE 250: Performed by: STUDENT IN AN ORGANIZED HEALTH CARE EDUCATION/TRAINING PROGRAM

## 2023-10-19 PROCEDURE — D9220A PRA ANESTHESIA: Mod: PT,CRNA,, | Performed by: STUDENT IN AN ORGANIZED HEALTH CARE EDUCATION/TRAINING PROGRAM

## 2023-10-19 PROCEDURE — D9220A PRA ANESTHESIA: ICD-10-PCS | Mod: PT,CRNA,, | Performed by: STUDENT IN AN ORGANIZED HEALTH CARE EDUCATION/TRAINING PROGRAM

## 2023-10-19 PROCEDURE — 37000009 HC ANESTHESIA EA ADD 15 MINS: Performed by: INTERNAL MEDICINE

## 2023-10-19 PROCEDURE — 88305 TISSUE EXAM BY PATHOLOGIST: CPT | Mod: 26,,, | Performed by: PATHOLOGY

## 2023-10-19 PROCEDURE — D9220A PRA ANESTHESIA: ICD-10-PCS | Mod: PT,ANES,, | Performed by: STUDENT IN AN ORGANIZED HEALTH CARE EDUCATION/TRAINING PROGRAM

## 2023-10-19 PROCEDURE — 37000008 HC ANESTHESIA 1ST 15 MINUTES: Performed by: INTERNAL MEDICINE

## 2023-10-19 RX ORDER — SODIUM CHLORIDE 9 MG/ML
INJECTION, SOLUTION INTRAVENOUS CONTINUOUS
Status: DISCONTINUED | OUTPATIENT
Start: 2023-10-19 | End: 2023-10-19 | Stop reason: HOSPADM

## 2023-10-19 RX ORDER — PROPOFOL 10 MG/ML
VIAL (ML) INTRAVENOUS
Status: DISCONTINUED | OUTPATIENT
Start: 2023-10-19 | End: 2023-10-19

## 2023-10-19 RX ORDER — LIDOCAINE HYDROCHLORIDE 20 MG/ML
INJECTION, SOLUTION EPIDURAL; INFILTRATION; INTRACAUDAL; PERINEURAL
Status: DISCONTINUED
Start: 2023-10-19 | End: 2023-10-19 | Stop reason: HOSPADM

## 2023-10-19 RX ORDER — PROPOFOL 10 MG/ML
INJECTION, EMULSION INTRAVENOUS
Status: DISCONTINUED
Start: 2023-10-19 | End: 2023-10-19 | Stop reason: HOSPADM

## 2023-10-19 RX ORDER — LIDOCAINE HYDROCHLORIDE 20 MG/ML
INJECTION INTRAVENOUS
Status: DISCONTINUED | OUTPATIENT
Start: 2023-10-19 | End: 2023-10-19

## 2023-10-19 RX ADMIN — PROPOFOL 20 MG: 10 INJECTION, EMULSION INTRAVENOUS at 10:10

## 2023-10-19 RX ADMIN — PROPOFOL 40 MG: 10 INJECTION, EMULSION INTRAVENOUS at 10:10

## 2023-10-19 RX ADMIN — LIDOCAINE HYDROCHLORIDE 100 MG: 20 INJECTION, SOLUTION INTRAVENOUS at 10:10

## 2023-10-19 RX ADMIN — PROPOFOL 20 MG: 10 INJECTION, EMULSION INTRAVENOUS at 11:10

## 2023-10-19 RX ADMIN — SODIUM CHLORIDE: 0.9 INJECTION, SOLUTION INTRAVENOUS at 10:10

## 2023-10-19 NOTE — PROVATION PATIENT INSTRUCTIONS
Discharge Summary/Instructions after an Endoscopic Procedure  Patient Name: Rizwan Demarco  Patient MRN: [id number]  Patient YOB: 1950 Thursday, October 19, 2023  Ian Martinez MD  Dear patient,  As a result of recent federal legislation (The Federal Cures Act), you may   receive lab or pathology results from your procedure in your [facility]   account before your physician is able to contact you. Your physician or   their representative will relay the results to you with their   recommendations at their soonest availability.  Thank you,  RESTRICTIONS:  During your procedure today, you received medications for sedation.  These   medications may affect your judgment, balance and coordination.  Therefore,   for 24 hours, you have the following restrictions:   - DO NOT drive a car, operate machinery, make legal/financial decisions,   sign important papers or drink alcohol.    ACTIVITY:  Today: no heavy lifting, straining or running due to procedural   sedation/anesthesia.  The following day: return to full activity including work.  DIET:  Eat and drink normally unless instructed otherwise.     TREATMENT FOR COMMON SIDE EFFECTS:  - Mild abdominal pain, nausea, belching, bloating or excessive gas:  rest,   eat lightly and use a heating pad.  - Sore Throat: treat with throat lozenges and/or gargle with warm salt   water.  - Because air was used during the procedure, expelling large amounts of air   from your rectum or belching is normal.  - If a bowel prep was taken, you may not have a bowel movement for 1-3 days.    This is normal.  SYMPTOMS TO WATCH FOR AND REPORT TO YOUR PHYSICIAN:  1. Abdominal pain or bloating, other than gas cramps.  2. Chest pain.  3. Back pain.  4. Signs of infection such as: chills or fever occurring within 24 hours   after the procedure.  5. Rectal bleeding, which would show as bright red, maroon, or black stools.   (A tablespoon of blood from the rectum is not serious, especially if    hemorrhoids are present.)  6. Vomiting.  7. Weakness or dizziness.  GO DIRECTLY TO THE NEAREST EMERGENCY ROOM IF YOU HAVE ANY OF THE FOLLOWING:      Difficulty breathing              Chills and/or fever over 101 F   Persistent vomiting and/or vomiting blood   Severe abdominal pain   Severe chest pain   Black, tarry stools   Bleeding- more than one tablespoon   Any other symptom or condition that you feel may need urgent attention  Your doctor recommends these additional instructions:  If any biopsies were taken, your doctors clinic will contact you in 1 to 2   weeks with any results.  - Discharge patient to home (ambulatory).   - Patient has a contact number available for emergencies.  The signs and   symptoms of potential delayed complications were discussed with the   patient.  Return to normal activities tomorrow.  Written discharge   instructions were provided to the patient.   - Resume previous diet.   - Continue present medications.   - Return to primary care physician as previously scheduled.   - Repeat colonoscopy in 3 years for surveillance.   - Resume Eliquis (apixaban) in 2 days and Plavix (clopidogrel) in 2 days at   prior doses.  For questions, problems or results please call your physician - Ian Martinez MD at Work:  [phone].  [facility] can be reached at [phone]     IF A COMPLICATION OR EMERGENCY SITUATION ARISES AND YOU ARE UNABLE TO REACH   YOUR PHYSICIAN - GO DIRECTLY TO THE EMERGENCY ROOM.  Ian Martinez MD  10/19/2023 11:06:29 AM  This report has been verified and signed electronically.  Dear patient,  As a result of recent federal legislation (The Federal Cures Act), you may   receive lab or pathology results from your procedure in your [facility]   account before your physician is able to contact you. Your physician or   their representative will relay the results to you with their   recommendations at their soonest availability.  Thank  you,  PROVATION

## 2023-10-19 NOTE — PLAN OF CARE
Procedure and recovery complete. Pt awake and alert. MD and spouse at bedside, procedure findings and suggestions discussed. Discharge instructions given, pt verbalized understanding of instruction. Gait steady able to ambulate without assistance. Pt walked out accompanied by spouse.

## 2023-10-19 NOTE — TRANSFER OF CARE
Anesthesia Transfer of Care Note    Patient: Rizwan Demarco Jr.    Procedure(s) Performed: Procedure(s) (LRB):  COLONOSCOPY (N/A)    Patient location: GI    Anesthesia Type: general    Transport from OR: Transported from OR on room air with adequate spontaneous ventilation    Post pain: adequate analgesia    Post assessment: no apparent anesthetic complications and tolerated procedure well    Post vital signs: stable    Level of consciousness: sedated    Nausea/Vomiting: no nausea/vomiting    Complications: none    Transfer of care protocol was followed      Last vitals:   Visit Vitals  /60 (BP Location: Left arm, Patient Position: Lying)   Pulse (!) 51   Temp 36.4 °C (97.5 °F) (Oral)   Resp 15   SpO2 (!) 94%

## 2023-10-19 NOTE — ANESTHESIA POSTPROCEDURE EVALUATION
Anesthesia Post Evaluation    Patient: Rizwan Demarco Jr.    Procedure(s) Performed: Procedure(s) (LRB):  COLONOSCOPY (N/A)    Final Anesthesia Type: general      Patient location during evaluation: GI PACU  Patient participation: Yes- Able to Participate  Level of consciousness: awake and alert, oriented and awake  Post-procedure vital signs: reviewed and stable  Pain management: adequate  Airway patency: patent  DAGO mitigation strategies: Multimodal analgesia  PONV status at discharge: No PONV  Anesthetic complications: no      Cardiovascular status: blood pressure returned to baseline and hemodynamically stable  Respiratory status: unassisted and spontaneous ventilation  Hydration status: euvolemic  Follow-up not needed.          Vitals Value Taken Time   /78 10/19/23 1136   Temp 36.4 °C (97.5 °F) 10/19/23 1106   Pulse 50 10/19/23 1136   Resp 20 10/19/23 1136   SpO2 96 % 10/19/23 1136         Event Time   Out of Recovery 11:48:53         Pain/Prashanth Score: Prashanth Score: 10 (10/19/2023 11:36 AM)

## 2023-10-19 NOTE — H&P
Short Stay Endoscopy History and Physical    PCP - Raúl Perkins MD    Procedure - Colonoscopy  ASA - per anesthesia  Mallampati - per anesthesia  History of Anesthesia problems - no  Family history Anesthesia problems - no   Plan of anesthesia - General    HPI:  This is a 73 y.o. male here for evaluation of : personal history of colon polyps      ROS:  Constitutional: No fevers, chills, No weight loss  CV: No chest pain  Pulm: No cough, No shortness of breath  GI: see HPI  Derm: No rash    Medical History:  has a past medical history of Allergy, Anticoagulant long-term use, Arthritis, Back pain, Blood clotting tendency, BPH (benign prostatic hypertrophy), Cervical spondylosis, Colon polyp (2010), Coronary artery disease, Depression (05/08/2015), Dry eyes, Dry mouth, GERD (gastroesophageal reflux disease), Hyperlipidemia, Hypertension, Lumbar spondylosis, Nasal obstruction, PAD (peripheral artery disease) (04/25/2023), Rheumatoid arthritis, Sinusitis, Special screening for malignant neoplasm of colon (06/29/2016), and Trouble in sleeping.    Surgical History:  has a past surgical history that includes Coronary artery bypass graft; Tonsillectomy; Knee arthroscopy w/ debridement; Excision turbinate, submucous; Nasal septum surgery; Colonoscopy (N/A, 6/29/2016); Esophagogastroduodenoscopy (N/A, 7/24/2020); Colonoscopy (N/A, 7/24/2020); Chalazion excision; Peripheral angiography (N/A, 6/21/2023); and Atherectomy (Left, 6/21/2023).    Family History: family history includes Alzheimer's disease in his mother; Cataracts in an other family member; Colon cancer in his father; Hyperlipidemia in his mother; No Known Problems in his maternal aunt, maternal grandfather, maternal grandmother, maternal uncle, paternal aunt, paternal grandfather, paternal grandmother, and paternal uncle; Stomach cancer in his father.. Otherwise no colon cancer, inflammatory bowel disease, or GI malignancies.    Social History:  reports that he  quit smoking about 32 years ago. His smoking use included cigarettes. He started smoking about 52 years ago. He has a 20.0 pack-year smoking history. He has never used smokeless tobacco. He reports that he does not drink alcohol and does not use drugs.    Review of patient's allergies indicates:   Allergen Reactions    Aspirin Nausea And Vomiting    Astelin [azelastine] Other (See Comments)     Dry mouth    Flomax [tamsulosin] Other (See Comments)     Nosebleed       Medications:   Facility-Administered Medications Prior to Admission   Medication Dose Route Frequency Provider Last Rate Last Admin    sodium chloride 0.9% flush 10 mL  10 mL Intravenous PRN Abbe Vilchis MD         Medications Prior to Admission   Medication Sig Dispense Refill Last Dose    artificial tears,hypromellose,,GENTEAL/SUSTANE, (SYSTANE GEL) 0.3 % Gel 1 drop as needed.   10/18/2023    atorvastatin (LIPITOR) 40 MG tablet Take 1 tablet (40 mg total) by mouth once daily. 90 tablet 3 10/18/2023    docusate sodium (COLACE) 100 MG capsule Take 1 capsule (100 mg total) by mouth 2 (two) times daily. 60 capsule 0 10/18/2023    hydroCHLOROthiazide (MICROZIDE) 12.5 mg capsule Take 1 capsule (12.5 mg total) by mouth once daily. 90 capsule 3 10/19/2023    lisinopriL 10 MG tablet Take 1 tablet (10 mg total) by mouth once daily. 90 tablet 3 10/19/2023    multivitamin (THERAGRAN) per tablet Take by mouth.  Tablet Oral Every day   10/18/2023    omega-3 fatty acids 1,250 mg Cap Take by mouth.   10/18/2023    omeprazole (PRILOSEC) 20 MG capsule Take 1 capsule (20 mg total) by mouth once daily. 30 capsule 5 10/18/2023    propylene glycoL 0.6 % Drop Apply to eye.   10/18/2023    sodium bicarb-sodium chloride Pack by sinus irrigation route.   10/18/2023    alfuzosin (UROXATRAL) 10 mg Tb24 Take 1 tablet (10 mg total) by mouth daily with breakfast. 90 tablet 3     apixaban (ELIQUIS) 5 mg Tab Take 1 tablet (5 mg total) by mouth 2 (two) times daily. 60 tablet 11  10/16/2023    budesonide (PULMICORT) 0.25 mg/2 mL nebulizer solution Take 2 mLs (0.25 mg total) by nebulization 2 (two) times daily. Controller 90 each 5     cinnamon bark 500 mg capsule Take 500 mg by mouth once daily.       clopidogreL (PLAVIX) 75 mg tablet Take 1 tablet (75 mg total) by mouth once daily. 90 tablet 3 10/13/2023    ezetimibe (ZETIA) 10 mg tablet Take 1 tablet (10 mg total) by mouth once daily. 90 tablet 3          Physical Exam:    Vital Signs: There were no vitals filed for this visit.    General Appearance: Well appearing in no acute distress  Eyes:    No scleral icterus  ENT: Neck supple, Lips, mucosa, and tongue normal; teeth and gums normal  Abdomen: Soft, non tender, non distended with positive bowel sounds. No hepatosplenomegaly, ascites, or mass.  Extremities: 2+ pulses, no clubbing, cyanosis or edema  Skin: No rash      Labs:  Lab Results   Component Value Date    WBC 5.32 06/16/2023    HGB 15.5 06/16/2023    HCT 48.3 06/16/2023     06/16/2023    CHOL 155 02/13/2023    TRIG 45 02/13/2023    HDL 40 02/13/2023    ALT 14 02/13/2023    AST 15 02/13/2023     06/16/2023    K 3.9 06/16/2023     06/16/2023    CREATININE 0.9 06/16/2023    BUN 10 06/16/2023    CO2 28 06/16/2023    TSH 0.641 02/13/2023    PSA 1.2 05/24/2019    INR 1.0 04/16/2020    HGBA1C 5.6 02/13/2023       I have explained the risks and benefits of endoscopy procedures to the patient including but not limited to bleeding, perforation, infection, and death.  The patient was asked if they understand and allowed to ask any further questions to their satisfaction.    Ian Martinez MD

## 2023-10-19 NOTE — ANESTHESIA PREPROCEDURE EVALUATION
10/19/2023  [facility]  Anesthesia Pre-Operative Evaluation         Patient Name: Rizwan Demarco Jr.  YOB: 1950  MRN: [id number]    SUBJECTIVE:     Pre-operative evaluation for Procedure(s) (LRB):  COLONOSCOPY (N/A)     10/19/2023    Rizwan Demarco Jr. is a 73 y.o. male w/ a significant PMHx of PAD, HTN, HLD, CAD s/p CABG in 2003, afib  Per chart review, patient has tolerated previous anesthetics for endo procedures without issue.   Patient now presents for the above procedure(s).    METs > 4 (cuts his own grass at home)    Nuclear Stress Test 4/25/23:    Normal myocardial perfusion scan. There is no evidence of myocardial ischemia or infarction.    There is a  mild intensity fixed perfusion abnormality in the inferior wall of the left ventricle secondary to diaphragm attenuation.    The gated perfusion images showed an ejection fraction of 56% post stress.    The ECG portion of the study is negative for ischemia.    The patient reported no chest pain during the stress test.    There were no arrhythmias during stress.      Patient Active Problem List   Diagnosis    Sjogren's disease    GERD (gastroesophageal reflux disease)    Arteriosclerosis of coronary artery    Hyperlipidemia    Cervical spondylosis    Degeneration of intervertebral disc    Rheumatoid arthritis    Benign prostatic hyperplasia with weak urinary stream    Perennial allergic rhinitis    Essential hypertension    High frequency hearing loss    Nasal obstruction    Dysfunctions associated with sleep stages or arousal from sleep    Primary insomnia    Wears dentures    Atherosclerosis of aorta    Other personal history presenting hazards to health    Complete loss of teeth, unspecified cause, unspecified class    Cervical pain (neck)    Impaired range of motion of cervical spine     Posture imbalance    PAD (peripheral artery disease)    Poor circulation    Chest pain       Review of patient's allergies indicates:   Allergen Reactions    Aspirin Nausea And Vomiting    Astelin [azelastine] Other (See Comments)     Dry mouth    Flomax [tamsulosin] Other (See Comments)     Nosebleed       Current Inpatient Medications:      No current facility-administered medications on file prior to encounter.     Current Outpatient Medications on File Prior to Encounter   Medication Sig Dispense Refill    alfuzosin (UROXATRAL) 10 mg Tb24 Take 1 tablet (10 mg total) by mouth daily with breakfast. 90 tablet 3    apixaban (ELIQUIS) 5 mg Tab Take 1 tablet (5 mg total) by mouth 2 (two) times daily. 60 tablet 11    artificial tears,hypromellose,,GENTEAL/SUSTANE, (SYSTANE GEL) 0.3 % Gel 1 drop as needed.      aspirin (ECOTRIN) 81 MG EC tablet Take 1 tablet (81 mg total) by mouth once daily.  0    atorvastatin (LIPITOR) 40 MG tablet Take 1 tablet (40 mg total) by mouth once daily. 90 tablet 3    cinnamon bark 500 mg capsule Take 500 mg by mouth once daily.      clopidogreL (PLAVIX) 75 mg tablet Take 1 tablet (75 mg total) by mouth once daily. 90 tablet 3    docusate sodium (COLACE) 100 MG capsule Take 1 capsule (100 mg total) by mouth 2 (two) times daily. 60 capsule 0    hydroCHLOROthiazide (MICROZIDE) 12.5 mg capsule Take 1 capsule (12.5 mg total) by mouth once daily. 90 capsule 3    lisinopriL 10 MG tablet Take 1 tablet (10 mg total) by mouth once daily. 90 tablet 3    multivitamin (THERAGRAN) per tablet Take by mouth.  Tablet Oral Every day      omega-3 fatty acids 1,250 mg Cap Take by mouth.      omeprazole (PRILOSEC) 20 MG capsule Take 1 capsule (20 mg total) by mouth once daily. 30 capsule 5    propylene glycoL 0.6 % Drop Apply to eye.      sodium bicarb-sodium chloride Pack by sinus irrigation route.         Past Surgical History:   Procedure Laterality Date    ATHERECTOMY Left 6/21/2023     Procedure: Atherectomy;  Surgeon: Abbe Vilchis MD;  Location: [facility] CATH LAB;  Service: Cardiology;  Laterality: Left;    CHALAZION EXCISION      COLONOSCOPY N/A 6/29/2016    Procedure: COLONOSCOPY;  Surgeon: Partha Saucedo MD;  Location: [facility] ENDO;  Service: Endoscopy;  Laterality: N/A;    COLONOSCOPY N/A 7/24/2020    Procedure: COLONOSCOPY;  Surgeon: Ian Martinez MD;  Location: [facility] (4TH FLR);  Service: Endoscopy;  Laterality: N/A;  4/16/20 - removed from 5/1/20, not called yet due to acute pain issues being addressed today - pg    CORONARY ARTERY BYPASS GRAFT      ESOPHAGOGASTRODUODENOSCOPY N/A 7/24/2020    Procedure: ESOPHAGOGASTRODUODENOSCOPY (EGD);  Surgeon: Ian Martinez MD;  Location: [facility] (4TH FLR);  Service: Endoscopy;  Laterality: N/A;  4/16/20 - removed from 5/1/20, not called yet due to acute pain issues being addressed today.  4/17/20 - rescheduled 7/24/20 - pg    EXCISION TURBINATE, SUBMUCOUS      KNEE ARTHROSCOPY W/ DEBRIDEMENT      NASAL SEPTUM SURGERY      PERIPHERAL ANGIOGRAPHY N/A 6/21/2023    Procedure: Peripheral angiography;  Surgeon: Abbe Vilchis MD;  Location: [facility] CATH LAB;  Service: Cardiology;  Laterality: N/A;  right radial access--- RN PREOP 6/16----JM    TONSILLECTOMY         OBJECTIVE:     Vital Signs Range (Last 24H):         Significant Labs:  Lab Results   Component Value Date    WBC 5.32 06/16/2023    HGB 15.5 06/16/2023    HCT 48.3 06/16/2023     06/16/2023    CHOL 155 02/13/2023    TRIG 45 02/13/2023    HDL 40 02/13/2023    ALT 14 02/13/2023    AST 15 02/13/2023     06/16/2023    K 3.9 06/16/2023     06/16/2023    CREATININE 0.9 06/16/2023    BUN 10 06/16/2023    CO2 28 06/16/2023    TSH 0.641 02/13/2023    PSA 1.2 05/24/2019    INR 1.0 04/16/2020    HGBA1C 5.6 02/13/2023       Diagnostic Studies: No relevant studies.    EKG:   Results for orders placed or performed in visit on 05/25/23   IN OFFICE EKG 12-LEAD (to [place])    Collection  Time: 05/25/23  9:14 AM    Narrative    Test Reason : I10,    Vent. Rate : 054 BPM     Atrial Rate : 312 BPM     P-R Int : 000 ms          QRS Dur : 102 ms      QT Int : 388 ms       P-R-T Axes : 000 067 064 degrees     QTc Int : 367 ms    Atrial fibrillation with slow ventricular response  Abnormal ECG  When compared with ECG of 16-JAN-2023 08:58,  No significant change was found  Confirmed by Ruslan Lozano MD ([id number]) on 5/26/2023 6:53:23 AM    Referred By:  DARRYL           Confirmed By:Ruslan Lozano MD       ASSESSMENT/PLAN:           Pre-op Assessment    I have reviewed the Patient Summary Reports.     I have reviewed the Nursing Notes.    I have reviewed the Medications.     Review of Systems  Anesthesia Hx:  No problems with previous Anesthesia  History of prior surgery of interest to airway management or planning: Denies Family Hx of Anesthesia complications.   Denies Personal Hx of Anesthesia complications.   Social:  Non-Smoker    Cardiovascular:   Hypertension CAD asymptomatic Dysrhythmias atrial fibrillation PVD    Pulmonary:  Pulmonary Normal    Hepatic/GI:   GERD    Musculoskeletal:   Arthritis     Neurological:  Neurology Normal    Psych:   depression          Physical Exam  General: Cooperative, Alert and Oriented    Airway:  Mallampati: III   Mouth Opening: Normal  TM Distance: Normal  Tongue: Normal  Neck ROM: Normal ROM    Dental:  Intact        Anesthesia Plan  Type of Anesthesia, risks & benefits discussed:    Anesthesia Type: Gen Natural Airway  Intra-op Monitoring Plan: Standard ASA Monitors  Post Op Pain Control Plan: multimodal analgesia and IV/PO Opioids PRN  Induction:  IV  Informed Consent: Informed consent signed with the Patient and all parties understand the risks and agree with anesthesia plan.  All questions answered.   ASA Score: 3  Day of Surgery Review of History & Physical: H&P Update referred to the surgeon/provider.    Ready For Surgery From Anesthesia Perspective.     .

## 2023-10-23 NOTE — TELEPHONE ENCOUNTER
Patient scheduled (Wednesday slot)2/7/24 at 10:30 am because there were no medicare slots available in February.

## 2023-10-24 LAB
FINAL PATHOLOGIC DIAGNOSIS: NORMAL
GROSS: NORMAL
Lab: NORMAL

## 2023-10-30 NOTE — PLAN OF CARE
Pt verbalized readiness to go home and understanding of discharge instructions. Dressings CDI.   Vital Signs Last 24 Hrs  T(C): 36.4 (10-29-23 @ 20:58), Max: 36.4 (10-29-23 @ 20:58)  T(F): 97.6 (10-29-23 @ 20:58), Max: 97.6 (10-29-23 @ 20:58)  HR: --  BP: --  BP(mean): --  RR: --  SpO2: --    Orthostatic VS  10-29-23 @ 20:58  Lying BP: --/-- HR: --  Sitting BP: 147/90 HR: 95  Standing BP: 140/88 HR: 95  Site: --  Mode: --  Orthostatic VS  10-28-23 @ 09:04  Lying BP: --/-- HR: --  Sitting BP: 105/78 HR: --  Standing BP: --/-- HR: 68  Site: --  Mode: --   Vital Signs Last 24 Hrs  T(C): 36.4 (10-29-23 @ 20:58), Max: 36.4 (10-29-23 @ 20:58)  T(F): 97.6 (10-29-23 @ 20:58), Max: 97.6 (10-29-23 @ 20:58)  HR: --  BP: --  BP(mean): --  RR: --  SpO2: --    Orthostatic VS  10-29-23 @ 20:58  Lying BP: --/-- HR: --  Sitting BP: 147/90 HR: 95  Standing BP: 140/88 HR: 95  Site: --  Mode: --

## 2023-11-10 ENCOUNTER — OFFICE VISIT (OUTPATIENT)
Dept: CARDIOLOGY | Facility: CLINIC | Age: 73
End: 2023-11-10
Payer: MEDICARE

## 2023-11-10 VITALS
BODY MASS INDEX: 28.01 KG/M2 | SYSTOLIC BLOOD PRESSURE: 150 MMHG | HEIGHT: 73 IN | OXYGEN SATURATION: 95 % | WEIGHT: 211.31 LBS | HEART RATE: 91 BPM | RESPIRATION RATE: 18 BRPM | DIASTOLIC BLOOD PRESSURE: 76 MMHG

## 2023-11-10 DIAGNOSIS — E78.2 MIXED HYPERLIPIDEMIA: Chronic | ICD-10-CM

## 2023-11-10 DIAGNOSIS — R09.89 POOR CIRCULATION: ICD-10-CM

## 2023-11-10 DIAGNOSIS — I10 ESSENTIAL HYPERTENSION: Primary | ICD-10-CM

## 2023-11-10 DIAGNOSIS — I25.10 ARTERIOSCLEROSIS OF CORONARY ARTERY: ICD-10-CM

## 2023-11-10 DIAGNOSIS — I70.0 ATHEROSCLEROSIS OF AORTA: ICD-10-CM

## 2023-11-10 DIAGNOSIS — I73.9 PAD (PERIPHERAL ARTERY DISEASE): ICD-10-CM

## 2023-11-10 PROCEDURE — 99214 OFFICE O/P EST MOD 30 MIN: CPT | Mod: PBBFAC | Performed by: INTERNAL MEDICINE

## 2023-11-10 PROCEDURE — 93010 EKG 12-LEAD: ICD-10-PCS | Mod: S$PBB,,, | Performed by: INTERNAL MEDICINE

## 2023-11-10 PROCEDURE — 99214 OFFICE O/P EST MOD 30 MIN: CPT | Mod: S$PBB,,, | Performed by: INTERNAL MEDICINE

## 2023-11-10 PROCEDURE — 93010 ELECTROCARDIOGRAM REPORT: CPT | Mod: S$PBB,,, | Performed by: INTERNAL MEDICINE

## 2023-11-10 PROCEDURE — 99999 PR PBB SHADOW E&M-EST. PATIENT-LVL IV: ICD-10-PCS | Mod: PBBFAC,,, | Performed by: INTERNAL MEDICINE

## 2023-11-10 PROCEDURE — 99214 PR OFFICE/OUTPT VISIT, EST, LEVL IV, 30-39 MIN: ICD-10-PCS | Mod: S$PBB,,, | Performed by: INTERNAL MEDICINE

## 2023-11-10 PROCEDURE — 93005 ELECTROCARDIOGRAM TRACING: CPT | Mod: PBBFAC | Performed by: INTERNAL MEDICINE

## 2023-11-10 PROCEDURE — 99999 PR PBB SHADOW E&M-EST. PATIENT-LVL IV: CPT | Mod: PBBFAC,,, | Performed by: INTERNAL MEDICINE

## 2023-11-10 NOTE — PROGRESS NOTES
CARDIOVASCULAR CONSULTATION    REASON FOR CONSULT:   Rizwan Demarco Jr. is a 73 y.o. male who presents for evaluation    HISTORY OF PRESENT ILLNESS:     Patient is a pleasant 72-year-old man.  Here for evaluation.  Past medical history significant for atrial fibrillation, on Eliquis.  Coronary artery bypass grafting in 2003.  Quadruple bypass as per patient, but unknown anatomy.  States had it done at [facility].  Dyslipidemia, hypertension, Sjogren's disease.  Also peripheral artery disease with abnormal ABIs in 2021.  Denies any orthopnea, PND.  Main complaint is right leg, right calf claudication and cramping on walking.  Also complains of occasional heaviness in the chest area states that happens when he gets constipated.      2023:      The left ventricle is normal in size with concentric remodeling and normal systolic function. The estimated ejection fraction is 65%.  Normal right ventricular size with normal right ventricular systolic function.  Normal left ventricular diastolic function.  The estimated PA systolic pressure is 33 mmHg.  Intermediate central venous pressure (8 mmHg).  The ascending aorta is mildly dilated.      2021:      Moderately decreased right ORESTES, 0.68.  Moderately dampened PVR waveforms.  Mildly decreased left ORESTES, 0.89.  Moderately dampened PVR waveforms.      May 23:  Patient here for follow-up.  Peripheral ultrasound revealed 60 90% mid left SFA stenosis.  Discussed initiating Pletal with the patient.  Patient not interested.  Would like to proceed with the peripheral angiogram for further evaluation.  Lifestyle limiting calf claudication right more than left        Right ORESTES 0.73 suggesting mild arteial flow resriction. Left ORESTES normal 1.01     Mild bilateral carotid plaque with no flow limiting stenosis     Normal abdominal aortic size and flow. No AAA       Mild right SFA plaque with no flow limiting arterial stenosis  Moderate left SFA plaque with 60-90% mis SFA  stenosis. Occluded left posterial tibial artery        Notes from July 23:  Patient here for follow-up.  Claudication in the right leg has gone away.  States he recently walk 2 miles without any claudication.  Has a stenosis in his left SFA also, but states that there is no claudication in the left leg.      Successful laser atherectomy, balloon angioplasty of the distal left SFA and proximal popliteal artery.  With excellent angiographic results      Procedures performed:     1. Ultrasound-guided left common femoral artery access next     2. Abdominal aortogram with pigtail placed in the suprarenal position      3. Selective right lower extremity angiogram     4. Selective left lower artery angiogram      5. Laser atherectomy of right SFA and proximal popliteal artery      6. Drug coated balloon angioplasty of right SFA and proximal popliteal artery      7. IVUS of right SFA, popliteal and TP trunk.        Procedural details      Ultrasound-guided access of left common femoral artery was obtained.  After that we inserted a pigtail in the suprarenal position and abdominal aortogram was obtained.  It demonstrated no significant iliac artery stenosis on both sides.  Then we selectively engaged the right iliac artery and angiogram of the right iliac artery was obtained.  It revealed mild 10-20% stenosis.  After that we obtained angiogram of the right SFA which revealed severe 90-95% stenosis in the distal SFA.  There was also another 90% stenosis in the right TP trunk.  Proximal  of right anterior tibial artery and right posterior tibial arteries was present.  Both these arteries filled with collaterals.  Peroneal artery provides the most robust flow to the foot.  After that we crossed these wires with the lesion and decision was made to fix the SFA and popliteal lesions because patient has lifestyle limiting claudication, but no resting pain or CLI.  IVUS was used for vessel sizing After that laser atherectomy of  these 2 lesions was performed followed by balloon angioplasty.  Angiogram post revealed excellent angiographic results.       Then we did angiogram of the left lower extremity from the sheath and it revealed distal SFA severe stenosis.  Anterior tibial artery is diffusely diseased and there appears to be atypical takeoff of the posterior tibial artery.  No significant stenosis was noted in the peroneal artery        Assessment plan     Continue medical management      Resume Eliquis in a.m..  Continue Plavix 75 mg qd     Aspirin 81 mg qd x 1 m     statins         Nov 23: doing fine. No more claudication. No cp, orthopnea, pnd    PAST MEDICAL HISTORY:     Past Medical History:   Diagnosis Date    Allergy     Anticoagulant long-term use     Arthritis     Rheumatoid arthritis    Back pain     Blood clotting tendency     BPH (benign prostatic hypertrophy)     Cervical spondylosis     Colon polyp 2010    adenoma    Coronary artery disease     Depression 05/08/2015    Dry eyes     Dry mouth     GERD (gastroesophageal reflux disease)     Hyperlipidemia     Hypertension     Lumbar spondylosis     Nasal obstruction     PAD (peripheral artery disease) 04/25/2023    Rheumatoid arthritis     Sinusitis     Special screening for malignant neoplasm of colon 06/29/2016    Trouble in sleeping        PAST SURGICAL HISTORY:     Past Surgical History:   Procedure Laterality Date    ATHERECTOMY Left 6/21/2023    Procedure: Atherectomy;  Surgeon: Abbe Vilchis MD;  Location: [facility] CATH LAB;  Service: Cardiology;  Laterality: Left;    CHALAZION EXCISION      COLONOSCOPY N/A 6/29/2016    Procedure: COLONOSCOPY;  Surgeon: Partha Saucedo MD;  Location: [facility] ENDO;  Service: Endoscopy;  Laterality: N/A;    COLONOSCOPY N/A 7/24/2020    Procedure: COLONOSCOPY;  Surgeon: Ian Martinez MD;  [facility]  Service: Endoscopy;  Laterality: N/A;  4/16/20 - removed from 5/1/20, not called yet due to acute pain issues being addressed  today - pg    COLONOSCOPY N/A 10/19/2023    Procedure: COLONOSCOPY;  Surgeon: Ian Martinez MD;  Location: [facility] ENDO;  Service: Endoscopy;  Laterality: N/A;  order created from Dr. Martinez's previous colonoscopy report 7/24/2020-3 year surveillance.   ok to hold Eliquis 2 days and Plavix 5 days per Dr Vilchis-GT  PEG instr portal -ml  10/12- pre call complete.  DBM    CORONARY ARTERY BYPASS GRAFT      ESOPHAGOGASTRODUODENOSCOPY N/A 7/24/2020    Procedure: ESOPHAGOGASTRODUODENOSCOPY (EGD);  Surgeon: Ian Martinez MD;  Location: [facility] ENDO (4TH FLR);  Service: Endoscopy;  Laterality: N/A;  4/16/20 - removed from 5/1/20, not called yet due to acute pain issues being addressed today.  4/17/20 - rescheduled 7/24/20 - pg    EXCISION TURBINATE, SUBMUCOUS      KNEE ARTHROSCOPY W/ DEBRIDEMENT      NASAL SEPTUM SURGERY      PERIPHERAL ANGIOGRAPHY N/A 6/21/2023    Procedure: Peripheral angiography;  Surgeon: Abbe Vilchis MD;  Location: [facility] CATH LAB;  Service: Cardiology;  Laterality: N/A;  right radial access--- RN PREOP 6/16----JM    TONSILLECTOMY         ALLERGIES AND MEDICATION:     Review of patient's allergies indicates:   Allergen Reactions    Aspirin Nausea And Vomiting    Astelin [azelastine] Other (See Comments)     Dry mouth    Flomax [tamsulosin] Other (See Comments)     Nosebleed        Medication List            Accurate as of November 10, 2023  2:21 PM. If you have any questions, ask your nurse or doctor.                CONTINUE taking these medications      alfuzosin 10 mg Tb24  Commonly known as: UROXATRAL  Take 1 tablet (10 mg total) by mouth daily with breakfast.     apixaban 5 mg Tab  Commonly known as: ELIQUIS  Take 1 tablet (5 mg total) by mouth 2 (two) times daily.     atorvastatin 40 MG tablet  Commonly known as: LIPITOR  Take 1 tablet (40 mg total) by mouth once daily.     budesonide 0.25 mg/2 mL nebulizer solution  Commonly known as: PULMICORT  Take 2 mLs (0.25 mg total) by nebulization 2 (two) times daily.  Controller     cinnamon bark 500 mg capsule     clopidogreL 75 mg tablet  Commonly known as: PLAVIX  Take 1 tablet (75 mg total) by mouth once daily.     docusate sodium 100 MG capsule  Commonly known as: COLACE  Take 1 capsule (100 mg total) by mouth 2 (two) times daily.     ezetimibe 10 mg tablet  Commonly known as: ZETIA  Take 1 tablet (10 mg total) by mouth once daily.     hydroCHLOROthiazide 12.5 mg capsule  Commonly known as: MICROZIDE  Take 1 capsule (12.5 mg total) by mouth once daily.     lisinopriL 10 MG tablet  Take 1 tablet (10 mg total) by mouth once daily.     multivitamin per tablet  Commonly known as: THERAGRAN     omega-3 fatty acids 1,250 mg Cap     omeprazole 20 MG capsule  Commonly known as: PRILOSEC  Take 1 capsule (20 mg total) by mouth once daily.     propylene glycoL 0.6 % Drop     sodium bicarb-sodium chloride Pack     Systane GeL 0.3 % Gel  Generic drug: artificial tears(hypromellose)(GENTEAL/SUSTANE)              SOCIAL HISTORY:     Social History     Socioeconomic History    Marital status:    Tobacco Use    Smoking status: Former     Current packs/day: 0.00     Average packs/day: 1 pack/day for 20.0 years (20.0 ttl pk-yrs)     Types: Cigarettes     Start date: 1971     Quit date: 1991     Years since quittin.8    Smokeless tobacco: Never    Tobacco comments:     Quit .   Substance and Sexual Activity    Alcohol use: No    Drug use: No    Sexual activity: Yes     Partners: Female     Social Determinants of Health     Financial Resource Strain: Low Risk  (2023)    Overall Financial Resource Strain (CARDIA)     Difficulty of Paying Living Expenses: Not hard at all   Food Insecurity: No Food Insecurity (2023)    Hunger Vital Sign     Worried About Running Out of Food in the Last Year: Never true     Ran Out of Food in the Last Year: Never true   Transportation Needs: No Transportation Needs (2023)    PRAPARE - Transportation     Lack of Transportation  (Medical): No     Lack of Transportation (Non-Medical): No   Physical Activity: Insufficiently Active (6/7/2023)    Exercise Vital Sign     Days of Exercise per Week: 3 days     Minutes of Exercise per Session: 30 min   Stress: No Stress Concern Present (6/7/2023)    [employer] [place] [employer] - Occupational Stress Questionnaire     Feeling of Stress : Not at all   Social Connections: Moderately Integrated (6/7/2023)    Social Connection and Isolation Panel [NHANES]     Frequency of Communication with Friends and Family: More than three times a week     Frequency of Social Gatherings with Friends and Family: More than three times a week     Attends Zoroastrianism Services: More than 4 times per year     Active Member of Clubs or Organizations: No     Attends Club or Organization Meetings: Never     Marital Status:    Housing Stability: Low Risk  (6/7/2023)    Housing Stability Vital Sign     Unable to Pay for Housing in the Last Year: No     Number of Places Lived in the Last Year: 1     Unstable Housing in the Last Year: No       FAMILY HISTORY:     Family History   Problem Relation Age of Onset    Hyperlipidemia Mother     Alzheimer's disease Mother     Stomach cancer Father     Colon cancer Father         from wife--he is not unsure     Cataracts Other     No Known Problems Maternal Aunt     No Known Problems Maternal Uncle     No Known Problems Paternal Aunt     No Known Problems Paternal Uncle     No Known Problems Maternal Grandmother     No Known Problems Maternal Grandfather     No Known Problems Paternal Grandmother     No Known Problems Paternal Grandfather     Blindness Neg Hx     Cancer Neg Hx     Diabetes Neg Hx     Glaucoma Neg Hx     Rheum arthritis Neg Hx     Psoriasis Neg Hx     Lupus Neg Hx     Amblyopia Neg Hx     Hypertension Neg Hx     Macular degeneration Neg Hx     Retinal detachment Neg Hx     Strabismus Neg Hx     Stroke Neg Hx     Thyroid disease Neg Hx     Esophageal cancer  "Neg Hx        REVIEW OF SYSTEMS:   Review of Systems   Constitutional: Negative.   HENT: Negative.     Eyes: Negative.    Respiratory: Negative.     Endocrine: Negative.    Hematologic/Lymphatic: Negative.    Skin: Negative.    Musculoskeletal: Negative.    Gastrointestinal: Negative.    Genitourinary: Negative.    Neurological: Negative.    Psychiatric/Behavioral: Negative.     Allergic/Immunologic: Negative.        A 10 point review of systems was performed and all the pertinent positives have been mentioned. Rest of review of systems was negative.        PHYSICAL EXAM:     Vitals:    11/10/23 1402   BP: (!) 150/76   Pulse: 91   Resp: 18    Body mass index is 27.88 kg/m².  Weight: 95.8 kg (211 lb 5 oz)   Height: 6' 1" (185.4 cm)     Physical Exam  Vitals reviewed.   Constitutional:       Appearance: He is well-developed.   HENT:      Head: Normocephalic.   Eyes:      Conjunctiva/sclera: Conjunctivae normal.      Pupils: Pupils are equal, round, and reactive to light.   Cardiovascular:      Rate and Rhythm: Normal rate and regular rhythm.      Heart sounds: Normal heart sounds.   Pulmonary:      Effort: Pulmonary effort is normal.      Breath sounds: Normal breath sounds.   Abdominal:      General: Bowel sounds are normal.      Palpations: Abdomen is soft.   Musculoskeletal:      Cervical back: Normal range of motion and neck supple.   Skin:     General: Skin is warm.   Neurological:      Mental Status: He is alert and oriented to person, place, and time.           DATA:     Laboratory:  CBC:  Recent Labs   Lab 02/08/22  1024 02/13/23  0818 06/16/23  1150   WBC 5.81 5.34 5.32   Hemoglobin 16.6 15.6 15.5   Hematocrit 53.0 49.6 48.3   Platelets 197 162 176         CHEMISTRIES:  Recent Labs   Lab 01/21/21  0957 02/08/22  1024 02/13/23  0818 06/16/23  1150   Glucose 94 92 100 71   Sodium 140 142 143 140   Potassium 4.0 4.8 4.0 3.9   BUN 12 13 8 10   Creatinine 1.1 1.2 1.2 0.9   eGFR if  >60 >60  --   -- "    eGFR if non  >60 60  --   --    Calcium 9.8 10.2 9.4 9.8         CARDIAC BIOMARKERS:        COAGS:        LIPIDS/LFTS:  Recent Labs   Lab 01/21/21  0957 02/08/22  1024 02/13/23  0818   Cholesterol 215 H 238 H 155   Triglycerides 83 76 45   HDL 41 42 40   LDL Cholesterol 157.4 180.8 H 106.0   Non-HDL Cholesterol 174 196 115   AST 14 13 15   ALT 12 13 14         Hemoglobin A1C   Date Value Ref Range Status   02/13/2023 5.6 4.0 - 5.6 % Final     Comment:     ADA Screening Guidelines:  5.7-6.4%  Consistent with prediabetes  >or=6.5%  Consistent with diabetes    High levels of fetal hemoglobin interfere with the HbA1C  assay. Heterozygous hemoglobin variants (HbS, HgC, etc)do  not significantly interfere with this assay.   However, presence of multiple variants may affect accuracy.     02/08/2022 5.6 4.0 - 5.6 % Final     Comment:     ADA Screening Guidelines:  5.7-6.4%  Consistent with prediabetes  >or=6.5%  Consistent with diabetes    High levels of fetal hemoglobin interfere with the HbA1C  assay. Heterozygous hemoglobin variants (HbS, HgC, etc)do  not significantly interfere with this assay.   However, presence of multiple variants may affect accuracy.     05/24/2019 5.7 (H) 4.0 - 5.6 % Final     Comment:     ADA Screening Guidelines:  5.7-6.4%  Consistent with prediabetes  >or=6.5%  Consistent with diabetes  High levels of fetal hemoglobin interfere with the HbA1C  assay. Heterozygous hemoglobin variants (HbS, HgC, etc)do  not significantly interfere with this assay.   However, presence of multiple variants may affect accuracy.         TSH  Recent Labs   Lab 02/13/23 0818   TSH 0.641         The 10-year ASCVD risk score (Emmanuelle CORREA, et al., 2019) is: 28.5%    Values used to calculate the score:      Age: 73 years      Sex: Male      Is Non- : Yes      Diabetic: No      Tobacco smoker: No      Systolic Blood Pressure: 150 mmHg      Is BP treated: Yes      HDL Cholesterol: 40  mg/dL      Total Cholesterol: 155 mg/dL       BNP    Lab Results   Component Value Date/Time    BNP 25 05/07/2015 05:30 PM             ASSESSMENT AND PLAN     Patient Active Problem List   Diagnosis    Sjogren's disease    GERD (gastroesophageal reflux disease)    Arteriosclerosis of coronary artery    Hyperlipidemia    Cervical spondylosis    Degeneration of intervertebral disc    Rheumatoid arthritis    Benign prostatic hyperplasia with weak urinary stream    Perennial allergic rhinitis    Essential hypertension    High frequency hearing loss    Nasal obstruction    Dysfunctions associated with sleep stages or arousal from sleep    Primary insomnia    Wears dentures    Atherosclerosis of aorta    Other personal history presenting hazards to health    Complete loss of teeth, unspecified cause, unspecified class    Cervical pain (neck)    Impaired range of motion of cervical spine    Posture imbalance    PAD (peripheral artery disease)    Poor circulation    Chest pain       Peripheral artery disease:  Now status post revascularization of right SFA and popliteal artery.  Also has residual infrapopliteal disease.  Is claudication after revascularization of the SFA disease has gone away and he can now walk 2 miles.  He also has flow-limiting stenosis in the distal left SFA, but states that he does not have any pain in the left leg.  Continue medical management.  Aspirin 81 mg daily for 1 month, Plavix 75 mg daily uninterrupted for at least 1 year, continue Eliquis 5 mg b.i.d.      Coronary artery disease status post CABG in 2003.  Stress test in May of 2023 did not show any significant ischemia.  Recent echo showed normal left ventricle systolic function     Carotid ultrasound in May of 2023 showed mild bilateral carotid artery block with no flow-limiting stenosis.    Hypertension:  Uncontrolled in clinic today, but states at home stays well controlled around 120 mmHg.      Atrial fibrillation:  On Eliquis.  Rate  controlled.     Dyslipidemia:  LDL not at goal.  Add ezetimibe    Follow-up after 3 m           Thank you very much for involving me in the care of your patient.  Please do not hesitate to contact me if there are any questions.      Abbe Vilchis MD, FACC, [facility]  Interventional Cardiologist, [facility].           This note was dictated with the help of speech recognition software.  There might be un-intended errors and/or substitutions.

## 2023-11-14 ENCOUNTER — TELEPHONE (OUTPATIENT)
Dept: OPTOMETRY | Facility: CLINIC | Age: 73
End: 2023-11-14
Payer: MEDICARE

## 2023-11-27 ENCOUNTER — OFFICE VISIT (OUTPATIENT)
Dept: FAMILY MEDICINE | Facility: CLINIC | Age: 73
End: 2023-11-27
Payer: MEDICARE

## 2023-11-27 VITALS
TEMPERATURE: 98 F | WEIGHT: 214.94 LBS | HEIGHT: 73 IN | HEART RATE: 46 BPM | OXYGEN SATURATION: 98 % | BODY MASS INDEX: 28.49 KG/M2 | DIASTOLIC BLOOD PRESSURE: 72 MMHG | SYSTOLIC BLOOD PRESSURE: 132 MMHG

## 2023-11-27 DIAGNOSIS — R00.1 BRADYCARDIA: ICD-10-CM

## 2023-11-27 DIAGNOSIS — I48.91 ATRIAL FIBRILLATION, UNSPECIFIED TYPE: ICD-10-CM

## 2023-11-27 DIAGNOSIS — M17.12 PRIMARY OSTEOARTHRITIS OF ONE KNEE, LEFT: ICD-10-CM

## 2023-11-27 DIAGNOSIS — I10 ESSENTIAL HYPERTENSION: Primary | ICD-10-CM

## 2023-11-27 PROCEDURE — 93010 EKG 12-LEAD: ICD-10-PCS | Mod: S$PBB,,, | Performed by: INTERNAL MEDICINE

## 2023-11-27 PROCEDURE — 99215 OFFICE O/P EST HI 40 MIN: CPT | Mod: PBBFAC,PO | Performed by: FAMILY MEDICINE

## 2023-11-27 PROCEDURE — 99999 PR PBB SHADOW E&M-EST. PATIENT-LVL V: ICD-10-PCS | Mod: PBBFAC,,, | Performed by: FAMILY MEDICINE

## 2023-11-27 PROCEDURE — 99214 OFFICE O/P EST MOD 30 MIN: CPT | Mod: S$PBB,,, | Performed by: FAMILY MEDICINE

## 2023-11-27 PROCEDURE — 93010 ELECTROCARDIOGRAM REPORT: CPT | Mod: S$PBB,,, | Performed by: INTERNAL MEDICINE

## 2023-11-27 PROCEDURE — 93005 ELECTROCARDIOGRAM TRACING: CPT | Mod: PBBFAC,PO | Performed by: INTERNAL MEDICINE

## 2023-11-27 PROCEDURE — 99999 PR PBB SHADOW E&M-EST. PATIENT-LVL V: CPT | Mod: PBBFAC,,, | Performed by: FAMILY MEDICINE

## 2023-11-27 PROCEDURE — 99214 PR OFFICE/OUTPT VISIT, EST, LEVL IV, 30-39 MIN: ICD-10-PCS | Mod: S$PBB,,, | Performed by: FAMILY MEDICINE

## 2023-11-27 RX ORDER — LOSARTAN POTASSIUM 25 MG/1
25 TABLET ORAL DAILY
Qty: 90 TABLET | Refills: 3 | Status: SHIPPED | OUTPATIENT
Start: 2023-11-27 | End: 2024-02-09 | Stop reason: SDUPTHER

## 2023-11-27 NOTE — Clinical Note
Patient was seen here today. He is in afib and his heart rate is irregular,but ranging between 37-54. He is asymptomatic. Not sure if he needs a pacemaker. Switching lisinopril to losartan today due to chronic dry cough.

## 2023-11-27 NOTE — PROGRESS NOTES
"Subjective     Patient ID: Rizwan Demarco Jr. is a 73 y.o. male.    Chief Complaint: Cough (Off and on, since the beginning of the year. ) and Knee Pain (Left knee pain when bending leg. Patient says it can get up to a 10. )    73 year old with a cough that is on and off since January. He states he has some sinus drip. He has no fever or chills. He has no acid reflux or nausea. He has no chest tightness or wheezing. He is not taking anything for the cough as it doesn't last long.     He has some stiffness in his knee and would like to do some pool exercises. He is trying to see if he can get some improvement. He has no swelling or giving out of his knee.       He has some bradycardia and his heart bounces between 37 to 54. He was seen by cardiology recently, but hear rate was not down into the 30's. He has no light headedness, dizziness or SOB. His heart rate has gotten progressively lower over the last 6 months.     Knee Pain       Review of Systems   Constitutional:  Negative for activity change, appetite change and fatigue.            Objective   Vitals:    11/27/23 0907   BP: 132/72   Pulse: (!) 46   Temp: 97.7 °F (36.5 °C)   TempSrc: Oral   SpO2: 98%   Weight: 97.5 kg (214 lb 15.2 oz)   Height: 6' 1" (1.854 m)       Physical Exam  Constitutional:       General: He is not in acute distress.     Appearance: Normal appearance. He is not ill-appearing, toxic-appearing or diaphoretic.   HENT:      Head: Normocephalic and atraumatic.   Cardiovascular:      Rate and Rhythm: Bradycardia present. Rhythm irregular.      Heart sounds: Normal heart sounds. No murmur heard.     No friction rub. No gallop.   Pulmonary:      Effort: Pulmonary effort is normal. No respiratory distress.      Breath sounds: Normal breath sounds. No stridor. No wheezing or rhonchi.   Musculoskeletal:      Right lower leg: No edema.   Neurological:      Mental Status: He is alert.            Assessment and Plan     1. Essential hypertension  -  "    losartan (COZAAR) 25 MG tablet; Take 1 tablet (25 mg total) by mouth once daily.  Dispense: 90 tablet; Refill: 3  Stop lisinopril and start losartan due to chronic dry cough    2. Primary osteoarthritis of one knee, left  -     Ambulatory referral/consult to Physical/Occupational Therapy; Future; Expected date: 12/04/2023  Referral to access [facility] for pool therapy    Atrial fibrillation, unspecified type  -     EKG 12-lead  -     Ambulatory referral/consult to Cardiology; Future    Bradycardia  -     EKG 12-lead  -     Ambulatory referral/consult to Cardiology; Future  EKG reviewed and will send message for cardiology as pt. May need pacemaker.              No follow-ups on file.

## 2023-11-29 ENCOUNTER — HOSPITAL ENCOUNTER (EMERGENCY)
Facility: HOSPITAL | Age: 73
Discharge: HOME | End: 2023-11-29
Attending: EMERGENCY MEDICINE
Payer: MEDICARE

## 2023-11-29 VITALS
BODY MASS INDEX: 27.57 KG/M2 | HEIGHT: 73 IN | WEIGHT: 208 LBS | DIASTOLIC BLOOD PRESSURE: 73 MMHG | SYSTOLIC BLOOD PRESSURE: 149 MMHG | TEMPERATURE: 99 F | HEART RATE: 61 BPM | RESPIRATION RATE: 18 BRPM | OXYGEN SATURATION: 98 %

## 2023-11-29 DIAGNOSIS — U07.1 COVID-19 VIRUS DETECTED: ICD-10-CM

## 2023-11-29 DIAGNOSIS — U07.1 COVID: Primary | ICD-10-CM

## 2023-11-29 LAB
CTP QC/QA: YES
INFLUENZA A ANTIGEN, POC: NEGATIVE
INFLUENZA B ANTIGEN, POC: NEGATIVE
SARS-COV-2 RDRP RESP QL NAA+PROBE: POSITIVE

## 2023-11-29 PROCEDURE — 99282 EMERGENCY DEPT VISIT SF MDM: CPT | Mod: ER

## 2023-11-29 PROCEDURE — 87635 SARS-COV-2 COVID-19 AMP PRB: CPT | Mod: ER

## 2023-11-29 PROCEDURE — 87804 INFLUENZA ASSAY W/OPTIC: CPT | Mod: ER

## 2023-11-29 RX ORDER — CETIRIZINE HYDROCHLORIDE 10 MG/1
10 TABLET ORAL DAILY
Qty: 30 TABLET | Refills: 0 | Status: SHIPPED | OUTPATIENT
Start: 2023-11-29 | End: 2023-11-29 | Stop reason: SDUPTHER

## 2023-11-29 RX ORDER — FLUTICASONE PROPIONATE 50 MCG
1 SPRAY, SUSPENSION (ML) NASAL 2 TIMES DAILY PRN
Qty: 15 G | Refills: 0 | OUTPATIENT
Start: 2023-11-29 | End: 2024-01-27

## 2023-11-29 RX ORDER — FLUTICASONE PROPIONATE 50 MCG
1 SPRAY, SUSPENSION (ML) NASAL 2 TIMES DAILY PRN
Qty: 15 G | Refills: 0 | Status: SHIPPED | OUTPATIENT
Start: 2023-11-29 | End: 2023-11-29 | Stop reason: SDUPTHER

## 2023-11-29 RX ORDER — CETIRIZINE HYDROCHLORIDE 10 MG/1
10 TABLET ORAL DAILY
Qty: 30 TABLET | Refills: 0 | OUTPATIENT
Start: 2023-11-29 | End: 2024-01-27

## 2023-11-29 NOTE — Clinical Note
"Rizwan Demarco (Samuel) was seen and treated in our emergency department on 11/29/2023.     COVID-19 is present in our communities across the state. There is limited testing for COVID at this time, so not all patients can be tested. In this situation, your employee meets the following criteria:    Rizwan Demarco Jr. has met the criteria for COVID-19 testing and has a POSITIVE result. He can return to work once they are asymptomatic for 24 hours without the use of fever reducing medications AND at least five days from the first positive result. A mask is recommended for 5 days post quarantine.     If you have any questions or concerns, or if I can be of further assistance, please do not hesitate to contact me.    Sincerely,             Melissa Sadler, DO"

## 2023-11-30 NOTE — ED PROVIDER NOTES
Encounter Date: 11/29/2023    SCRIBE #1 NOTE: IRAFIA am scribing for, and in the presence of,  Cynthia Vail PA-C. I have scribed the following portions of the note - Other sections scribed: HPI, ROS, PE.       History     Chief Complaint   Patient presents with    Cough     A 74 y/o male presents to the ER c/o dry cough since yesterday. Pt reports taking OTC DM medication without relief.      73 y.o. male with PMHx of HTN and HLD, presents for emergent evaluation of a cough which started 1 day ago. Associated symptoms include generalized myalgias and sore throat. Patient has not taken any medications at this time. Patient denies fever/chills, headache, chest pain, shortness of breath, abdominal pain, nausea/vomiting/diarrhea, urinary concerns, neck stiffness, or any other complaints at this time.  Patient recently returned from a cruise.       The history is provided by the patient. No  was used.     Review of patient's allergies indicates:   Allergen Reactions    Aspirin Nausea And Vomiting    Astelin [azelastine] Other (See Comments)     Dry mouth    Flomax [tamsulosin] Other (See Comments)     Nosebleed     Past Medical History:   Diagnosis Date    Allergy     Anticoagulant long-term use     Arthritis     Rheumatoid arthritis    Back pain     Blood clotting tendency     BPH (benign prostatic hypertrophy)     Cervical spondylosis     Colon polyp 2010    adenoma    Coronary artery disease     Depression 05/08/2015    Dry eyes     Dry mouth     GERD (gastroesophageal reflux disease)     Hyperlipidemia     Hypertension     Lumbar spondylosis     Nasal obstruction     PAD (peripheral artery disease) 04/25/2023    Rheumatoid arthritis     Sinusitis     Special screening for malignant neoplasm of colon 06/29/2016    Trouble in sleeping      Past Surgical History:   Procedure Laterality Date    ATHERECTOMY Left 6/21/2023    Procedure: Atherectomy;  Surgeon: Abbe Vilchis MD;   Location: [facility] CATH LAB;  Service: Cardiology;  Laterality: Left;    CHALAZION EXCISION      COLONOSCOPY N/A 6/29/2016    Procedure: COLONOSCOPY;  Surgeon: Partha Saucedo MD;  Location: [facility] ENDO;  Service: Endoscopy;  Laterality: N/A;    COLONOSCOPY N/A 7/24/2020    Procedure: COLONOSCOPY;  Surgeon: Ian Martinez MD;  Location: [facility] (4TH FLR);  Service: Endoscopy;  Laterality: N/A;  4/16/20 - removed from 5/1/20, not called yet due to acute pain issues being addressed today - pg    COLONOSCOPY N/A 10/19/2023    Procedure: COLONOSCOPY;  Surgeon: Ian Martinez MD;  Location: [facility] ENDO;  Service: Endoscopy;  Laterality: N/A;  order created from Dr. Martinez's previous colonoscopy report 7/24/2020-3 year surveillance.   ok to hold Eliquis 2 days and Plavix 5 days per Dr Vilchis-GT  PEG instr portal -ml  10/12- pre call complete.  DBM    CORONARY ARTERY BYPASS GRAFT      ESOPHAGOGASTRODUODENOSCOPY N/A 7/24/2020    Procedure: ESOPHAGOGASTRODUODENOSCOPY (EGD);  Surgeon: Ian Martinez MD;  Location: [facility] (4TH FLR);  Service: Endoscopy;  Laterality: N/A;  4/16/20 - removed from 5/1/20, not called yet due to acute pain issues being addressed today.  4/17/20 - rescheduled 7/24/20 - pg    EXCISION TURBINATE, SUBMUCOUS      KNEE ARTHROSCOPY W/ DEBRIDEMENT      NASAL SEPTUM SURGERY      PERIPHERAL ANGIOGRAPHY N/A 6/21/2023    Procedure: Peripheral angiography;  Surgeon: Abbe Vilchis MD;  Location: [facility] CATH LAB;  Service: Cardiology;  Laterality: N/A;  right radial access--- RN PREOP 6/16----JM    TONSILLECTOMY       Family History   Problem Relation Age of Onset    Hyperlipidemia Mother     Alzheimer's disease Mother     Stomach cancer Father     Colon cancer Father         from wife--he is not unsure     Cataracts Other     No Known Problems Maternal Aunt     No Known Problems Maternal Uncle     No Known Problems Paternal Aunt     No Known Problems Paternal Uncle     No Known Problems Maternal Grandmother     No Known  Problems Maternal Grandfather     No Known Problems Paternal Grandmother     No Known Problems Paternal Grandfather     Blindness Neg Hx     Cancer Neg Hx     Diabetes Neg Hx     Glaucoma Neg Hx     Rheum arthritis Neg Hx     Psoriasis Neg Hx     Lupus Neg Hx     Amblyopia Neg Hx     Hypertension Neg Hx     Macular degeneration Neg Hx     Retinal detachment Neg Hx     Strabismus Neg Hx     Stroke Neg Hx     Thyroid disease Neg Hx     Esophageal cancer Neg Hx      Social History     Tobacco Use    Smoking status: Former     Current packs/day: 0.00     Average packs/day: 1 pack/day for 20.0 years (20.0 ttl pk-yrs)     Types: Cigarettes     Start date: 1971     Quit date: 1991     Years since quittin.9    Smokeless tobacco: Never    Tobacco comments:     Quit .   Substance Use Topics    Alcohol use: No    Drug use: No     Review of Systems   Constitutional:  Negative for chills and fever.   HENT:  Positive for sore throat. Negative for congestion, ear pain, rhinorrhea, trouble swallowing and voice change.    Eyes:  Negative for redness.   Respiratory:  Positive for cough. Negative for chest tightness, shortness of breath and wheezing.    Cardiovascular:  Negative for chest pain.   Gastrointestinal:  Negative for abdominal pain, constipation, diarrhea, nausea and vomiting.   Genitourinary:  Negative for decreased urine volume, difficulty urinating, dysuria, frequency, hematuria and urgency.   Musculoskeletal:  Positive for myalgias. Negative for back pain, gait problem and neck pain.   Skin:  Negative for rash.   Neurological:  Negative for syncope and headaches.   Psychiatric/Behavioral:  Negative for confusion.        Physical Exam     Initial Vitals [23 1657]   BP Pulse Resp Temp SpO2   (!) 154/74 (!) 55 18 99.3 °F (37.4 °C) 98 %      MAP       --         Physical Exam    Nursing note and vitals reviewed.  Constitutional: He appears well-developed and well-nourished. He is not diaphoretic. No  distress.   HENT:   Head: Normocephalic and atraumatic.   Right Ear: External ear normal.   Left Ear: External ear normal.   Posterior oropharynx erythematous without tonsillar swelling, oropharyngeal exudates. Uvula is midline.  No trismus.  No muffled voice.  No tripod posturing. Patient is tolerating secretions without difficulty.  Patient is speaking in full sentences on exam without difficulty.  Bilateral tympanic membranes are pearly gray without erythema, bulging, perforation.  There is no postauricular swelling, or overlying erythema or tenderness to palpation over mastoids bilaterally. Nares patent with bilateral enlarged nasal turbinates.    Eyes: Conjunctivae and EOM are normal. Pupils are equal, round, and reactive to light. Right eye exhibits no discharge. Left eye exhibits no discharge.   Neck: Neck supple. No JVD present.   Normal range of motion.  Cardiovascular:  Normal rate, regular rhythm and normal heart sounds.           Pulmonary/Chest: Breath sounds normal. No respiratory distress. He has no wheezes. He has no rhonchi. He has no rales. He exhibits no tenderness.   Bilateral lungs clear on auscultation.   Abdominal: Abdomen is soft. Bowel sounds are normal. He exhibits no distension. There is no abdominal tenderness.   Musculoskeletal:         General: Normal range of motion.      Cervical back: Normal range of motion and neck supple.     Lymphadenopathy:     He has no cervical adenopathy.   Neurological: He is alert and oriented to person, place, and time. He has normal strength. No cranial nerve deficit. GCS score is 15. GCS eye subscore is 4. GCS verbal subscore is 5. GCS motor subscore is 6.   Skin: Skin is warm. Capillary refill takes less than 2 seconds. No rash noted. No erythema.   Psychiatric: He has a normal mood and affect. Thought content normal.         ED Course   Procedures  Labs Reviewed   SARS-COV-2 RDRP GENE - Abnormal; Notable for the following components:       Result Value     POC Rapid COVID Positive (*)     All other components within normal limits    Narrative:     This test utilizes isothermal nucleic acid amplification technology to detect the SARS-CoV-2 RdRp nucleic acid segment. The analytical sensitivity (limit of detection) is 500 copies/swab.     A POSITIVE result is indicative of the presence of SARS-CoV-2 RNA; clinical correlation with patient history and other diagnostic information is necessary to determine patient infection status.    A NEGATIVE result means that SARS-CoV-2 nucleic acids are not present above the limit of detection. A NEGATIVE result should be treated as presumptive. It does not rule out the possibility of COVID-19 and should not be the sole basis for treatment decisions. If COVID-19 is strongly suspected based on clinical and exposure history, re-testing using an alternate molecular assay should be considered.     This test is only for use under the Food and Drug Administration s Emergency Use Authorization (EUA).     Commercial kits are provided by [employer]. Performance characteristics of the EUA have been independently verified by [facility] Department of Pathology and Laboratory Medicine.   _________________________________________________________________   The authorized Fact Sheet for Healthcare Providers and the authorized Fact Sheet for Patients of the ID NOW COVID-19 are available on the FDA website:    https://www.fda.gov/media/053731/download      https://www.fda.gov/media/321097/download      POCT RAPID INFLUENZA A/B          Imaging Results    None          Medications - No data to display  Medical Decision Making  This is an emergent evaluation of a 73 y.o. male presenting to the ED for URI symptoms. Rapid COVID19 positive. No SOB or respiratory distress. No hypoxia. Low suspicion for bacterial PNA. Remains well appearing while in ED.     We discuss home quarantine. Declines want for EUA therapies. Advising follow-up  with PCP. Strict return precautions discussed with patient who is agreeable to the plan. Drink lots of fluids, stay well hydrated. Alternate Tylenol/Ibuprofen as needed for pain/ fever; go back and forth between these two medications every 4 hrs as needed for temp greater than or equal to 100.4F.  You may take 800 ibuprofen and 500 to a 1000 mg of Tylenol at 1 time.  Maximum dose of Tylenol and 1 day is 3000 mg in the maximum dose of ibuprofen and 1 day is 1200mg.      I discussed the diagnosis, treatment plan, indications for return to the emergency department, and for expected follow-up. Patient/caretaker verbalized an understanding. I have provided an opportunity to discuss questions and concerns. We discuss the case, until all issues are addressed to the patient's/caretaker's satisfaction. Patient/caretaker understands and is agreeable to the plan.      Amount and/or Complexity of Data Reviewed  External Data Reviewed: labs and notes.  Labs: ordered. Decision-making details documented in ED Course.    Risk  OTC drugs.  Diagnosis or treatment significantly limited by social determinants of health.            Scribe Attestation:   Scribe #1: I performed the above scribed service and the documentation accurately describes the services I performed. I attest to the accuracy of the note.        ED Course as of 12/01/23 0019 Wed Nov 29, 2023 1810 SARS-CoV-2 RNA, Amplification, Qual(!): Positive [CC]   1820 Influenza B Ag: negative [CC]   1820 Inflenza A Ag: negative [CC]      ED Course User Index  [CC] Cynthia Vail PA-C I, C. Caballero, Emergency Medicine LILIANA , personally performed the services described in this documentation.  All medical record entries made by the scribe were at my direction and in my presence.  I have reviewed the chart and agree that the record reflects my personal performance and is accurate and complete.  Clinical Impression:  Final diagnoses:  [U07.1] COVID  (Primary)          ED Disposition Condition    Discharge           ED Prescriptions       Medication Sig Dispense Start Date End Date Auth. Provider    fluticasone propionate (FLONASE) 50 mcg/actuation nasal spray  (Status: Discontinued) 1 spray (50 mcg total) by Each Nostril route 2 (two) times daily as needed for Rhinitis. 15 g 11/29/2023 11/29/2023 Cynthia Vail PA-C    cetirizine (ZYRTEC) 10 MG tablet  (Status: Discontinued) Take 1 tablet (10 mg total) by mouth once daily. 30 tablet 11/29/2023 11/29/2023 Cynthia Vail PA-C    cetirizine (ZYRTEC) 10 MG tablet Take 1 tablet (10 mg total) by mouth once daily. 30 tablet 11/29/2023 12/29/2023 Cynthia Vail PA-C    fluticasone propionate (FLONASE) 50 mcg/actuation nasal spray 1 spray (50 mcg total) by Each Nostril route 2 (two) times daily as needed for Rhinitis. 15 g 11/29/2023 -- Cynthia Vail PA-C          Follow-up Information       Follow up With Specialties Details Why Contact Info    [facility] ED Emergency Medicine Go to  For new or worsening symptoms [address] [id number] [phone]    Raúl Perkins MD Family Medicine   [address] [facility]  [place] [address]  [phone]               Cytnhia Vail PA-C  12/01/23 0019

## 2023-11-30 NOTE — DISCHARGE INSTRUCTIONS
Drink lots of fluids, stay well hydrated. Alternate Tylenol/Ibuprofen as needed for pain/ fever; go back and forth between these two medications every 4 hrs as needed for temp greater than or equal to 100.4F.  You may take 800 ibuprofen and 500 to a 1000 mg of Tylenol at 1 time.  Maximum dose of Tylenol and 1 day is 3000 mg in the maximum dose of ibuprofen and 1 day is 1200mg.      Thank you for coming to our Emergency Department today. It is important to remember that some problems or medical conditions are difficult to diagnose and may not be found or addressed during your Emergency Department visit.  These conditions often start with non-specific symptoms and can only be diagnosed on follow up visits with your primary care physician or specialist when the symptoms continue or change. Please remember that all medical conditions can change, and we cannot predict how you will be feeling tomorrow or the next day. Return to the ER with any questions/concerns, new/concerning symptoms, worsening or failure to improve.     Please return to ER if you experience severe dizziness, fever higher then 100.4 that persist after medication administration, uncontrolled nausea/vomiting or diarrhea or any other major concern like increased pain, chest pain, shortness of breath, inability to pass stool or gas, or difficulty breathing or swelling of the throat/mouth/tongue.    Be sure to follow up with your primary care doctor and review all labs/imaging/tests that were performed during your ER visit with them. It is very common for us to identify non-emergent incidental findings which must be followed up with your primary care physician.  Some labs/imaging/tests may be outside of the normal range, and require non-emergent follow-up and/or further investigation/treatment/procedures/testing to help diagnose/exclude/prevent complications or other potentially serious medical conditions. Some abnormalities may not have been discussed or  addressed during your ER visit.     An ER visit does not replace a primary care visit, and many screening tests or follow-up tests cannot be ordered by an ER doctor or performed by the ER. Some tests may even require pre-approval.    If you do not have a primary care doctor, you may contact the one listed on your discharge paperwork or you may also call the [facility] Appointment Desk at [phone] , or [facility] at  [phone] to schedule an appointment, or establish care with a primary care doctor or even a specialist and to obtain information about local resources. It is important to your health that you have a primary care doctor.    Please take all medications as directed. We have done our best to select a medication for you that will treat your condition however, all medications may potentially have side-effects and it is impossible to predict which medications may give you side-effects or what those side-effects (if any) those medications may give you.  If you feel that you are having a negative effect or side-effect of any medication you should stop taking those medications immediately and seek medical attention. If you feel that you are having a life-threatening reaction call 911.      Do not drive, swim, climb to height, take a bath, operate heavy machinery, drink alcohol or take potentially sedating medications, sign any legal documents or make any important decisions for 24 hours if you have received any pain medications, sedatives or mood altering drugs during your ER visit or within 24 hours of taking them if they have been prescribed to you.     You can find additional resources for Dentists, hearing aids, durable medical equipment, low cost pharmacies and other resources at [email or web address]

## 2023-12-04 ENCOUNTER — PATIENT MESSAGE (OUTPATIENT)
Dept: FAMILY MEDICINE | Facility: CLINIC | Age: 73
End: 2023-12-04
Payer: MEDICARE

## 2023-12-06 RX ORDER — LISINOPRIL 10 MG/1
10 TABLET ORAL DAILY
Qty: 90 TABLET | Refills: 3 | Status: SHIPPED | OUTPATIENT
Start: 2023-12-06 | End: 2024-02-09 | Stop reason: ALTCHOICE

## 2023-12-15 ENCOUNTER — PATIENT MESSAGE (OUTPATIENT)
Dept: GASTROENTEROLOGY | Facility: CLINIC | Age: 73
End: 2023-12-15
Payer: MEDICARE

## 2024-01-06 DIAGNOSIS — K21.00 GASTROESOPHAGEAL REFLUX DISEASE WITH ESOPHAGITIS WITHOUT HEMORRHAGE: ICD-10-CM

## 2024-01-06 NOTE — TELEPHONE ENCOUNTER
Care Due:                  Date            Visit Type   Department     Provider  --------------------------------------------------------------------------------                                [facility]                              PRIMARY      MEDICINE/INTERN  Last Visit: 11-      CARE ([facility])   AL MED         Lisa Mcknight                              [facility]                              PRIMARY      MEDICINE/INTERN  Next Visit: 02-      CARE ([facility])   [facility]         Raúl Perkins                                                            Last  Test          Frequency    Reason                     Performed    Due Date  --------------------------------------------------------------------------------    CMP.........  12 months..  atorvastatin.............  02- 02-    Lipid Panel.  12 months..  atorvastatin.............  02- 02-    [facility] Embedded Care Due Messages. Reference number: [id number].   1/06/2024 12:48:48 PM CST

## 2024-01-08 RX ORDER — OMEPRAZOLE 20 MG/1
20 CAPSULE, DELAYED RELEASE ORAL DAILY
Qty: 30 CAPSULE | Refills: 5 | Status: SHIPPED | OUTPATIENT
Start: 2024-01-08 | End: 2024-02-09 | Stop reason: SDUPTHER

## 2024-01-22 ENCOUNTER — TELEPHONE (OUTPATIENT)
Dept: FAMILY MEDICINE | Facility: CLINIC | Age: 74
End: 2024-01-22
Payer: MEDICARE

## 2024-01-22 NOTE — TELEPHONE ENCOUNTER
----- Message from Narda Vallejo sent at 1/22/2024 10:27 AM CST -----    Patient Returning Call        Who Called:pt  Does the patient know what this is regarding?:having nose bleeds asking for nurse to call because pt is on blood thinners  Would the patient rather a call back or a response via [facility]? call  Best Call Back Number:[phone]  Additional Information: call back

## 2024-01-22 NOTE — TELEPHONE ENCOUNTER
Call returned. Patient reports that he has been doing nasal saline flushes bid and has experienced nose bleed last night and again this morning. Bleeding has now stopped. Patient informed of increased risk of bleeding due to being on blood thinners and having dry nasal cavity. Care advice given regarding carefully cleaning and moisturizing nasal cavity and to try to decrease frequency of saline flushes until condition has improved. Advised to follow up with appointment if condition worsens or fails to improve. He verbalized understanding.

## 2024-01-27 ENCOUNTER — HOSPITAL ENCOUNTER (EMERGENCY)
Facility: HOSPITAL | Age: 74
Discharge: HOME | End: 2024-01-27
Attending: EMERGENCY MEDICINE
Payer: MEDICARE

## 2024-01-27 VITALS
TEMPERATURE: 98 F | OXYGEN SATURATION: 96 % | SYSTOLIC BLOOD PRESSURE: 143 MMHG | RESPIRATION RATE: 34 BRPM | HEIGHT: 73 IN | BODY MASS INDEX: 26.51 KG/M2 | HEART RATE: 76 BPM | WEIGHT: 200 LBS | DIASTOLIC BLOOD PRESSURE: 74 MMHG

## 2024-01-27 DIAGNOSIS — I10 HTN (HYPERTENSION): ICD-10-CM

## 2024-01-27 DIAGNOSIS — R09.82 ALLERGIC RHINITIS WITH POSTNASAL DRIP: ICD-10-CM

## 2024-01-27 DIAGNOSIS — H10.33 ACUTE CONJUNCTIVITIS OF BOTH EYES, UNSPECIFIED ACUTE CONJUNCTIVITIS TYPE: ICD-10-CM

## 2024-01-27 DIAGNOSIS — R04.0 EPISTAXIS: Primary | ICD-10-CM

## 2024-01-27 DIAGNOSIS — J30.9 ALLERGIC RHINITIS WITH POSTNASAL DRIP: ICD-10-CM

## 2024-01-27 LAB
ALBUMIN SERPL BCP-MCNC: 3.4 G/DL (ref 3.5–5.2)
ALP SERPL-CCNC: 56 U/L (ref 55–135)
ALT SERPL W/O P-5'-P-CCNC: 13 U/L (ref 10–44)
ANION GAP SERPL CALC-SCNC: 11 MMOL/L (ref 8–16)
AST SERPL-CCNC: 15 U/L (ref 10–40)
BASOPHILS # BLD AUTO: 0.06 K/UL (ref 0–0.2)
BASOPHILS NFR BLD: 1 % (ref 0–1.9)
BILIRUB SERPL-MCNC: 0.8 MG/DL (ref 0.1–1)
BNP SERPL-MCNC: 161 PG/ML (ref 0–99)
BUN SERPL-MCNC: 11 MG/DL (ref 8–23)
CALCIUM SERPL-MCNC: 9.9 MG/DL (ref 8.7–10.5)
CHLORIDE SERPL-SCNC: 107 MMOL/L (ref 95–110)
CO2 SERPL-SCNC: 20 MMOL/L (ref 23–29)
CREAT SERPL-MCNC: 1 MG/DL (ref 0.5–1.4)
DIFFERENTIAL METHOD BLD: NORMAL
EOSINOPHIL # BLD AUTO: 0.3 K/UL (ref 0–0.5)
EOSINOPHIL NFR BLD: 4.5 % (ref 0–8)
ERYTHROCYTE [DISTWIDTH] IN BLOOD BY AUTOMATED COUNT: 12.5 % (ref 11.5–14.5)
EST. GFR  (NO RACE VARIABLE): >60 ML/MIN/1.73 M^2
GLUCOSE SERPL-MCNC: 89 MG/DL (ref 70–110)
HCT VFR BLD AUTO: 49 % (ref 40–54)
HGB BLD-MCNC: 15.9 G/DL (ref 14–18)
IMM GRANULOCYTES # BLD AUTO: 0.02 K/UL (ref 0–0.04)
IMM GRANULOCYTES NFR BLD AUTO: 0.3 % (ref 0–0.5)
INR PPP: 1.1 (ref 0.8–1.2)
LYMPHOCYTES # BLD AUTO: 2.5 K/UL (ref 1–4.8)
LYMPHOCYTES NFR BLD: 43.9 % (ref 18–48)
MCH RBC QN AUTO: 29.6 PG (ref 27–31)
MCHC RBC AUTO-ENTMCNC: 32.4 G/DL (ref 32–36)
MCV RBC AUTO: 91 FL (ref 82–98)
MONOCYTES # BLD AUTO: 0.7 K/UL (ref 0.3–1)
MONOCYTES NFR BLD: 11.4 % (ref 4–15)
NEUTROPHILS # BLD AUTO: 2.3 K/UL (ref 1.8–7.7)
NEUTROPHILS NFR BLD: 38.9 % (ref 38–73)
NRBC BLD-RTO: 0 /100 WBC
PLATELET # BLD AUTO: 171 K/UL (ref 150–450)
PMV BLD AUTO: 9.8 FL (ref 9.2–12.9)
POTASSIUM SERPL-SCNC: 3.8 MMOL/L (ref 3.5–5.1)
PROT SERPL-MCNC: 6.9 G/DL (ref 6–8.4)
PROTHROMBIN TIME: 11.9 SEC (ref 9–12.5)
RBC # BLD AUTO: 5.38 M/UL (ref 4.6–6.2)
SODIUM SERPL-SCNC: 138 MMOL/L (ref 136–145)
TROPONIN I SERPL DL<=0.01 NG/ML-MCNC: <0.006 NG/ML (ref 0–0.03)
WBC # BLD AUTO: 5.79 K/UL (ref 3.9–12.7)

## 2024-01-27 PROCEDURE — 83880 ASSAY OF NATRIURETIC PEPTIDE: CPT | Performed by: EMERGENCY MEDICINE

## 2024-01-27 PROCEDURE — 85025 COMPLETE CBC W/AUTO DIFF WBC: CPT | Performed by: EMERGENCY MEDICINE

## 2024-01-27 PROCEDURE — 25000003 PHARM REV CODE 250: Performed by: EMERGENCY MEDICINE

## 2024-01-27 PROCEDURE — 85610 PROTHROMBIN TIME: CPT | Performed by: EMERGENCY MEDICINE

## 2024-01-27 PROCEDURE — 93010 ELECTROCARDIOGRAM REPORT: CPT | Mod: ,,, | Performed by: INTERNAL MEDICINE

## 2024-01-27 PROCEDURE — 84484 ASSAY OF TROPONIN QUANT: CPT | Performed by: EMERGENCY MEDICINE

## 2024-01-27 PROCEDURE — 96374 THER/PROPH/DIAG INJ IV PUSH: CPT | Mod: 59

## 2024-01-27 PROCEDURE — 99285 EMERGENCY DEPT VISIT HI MDM: CPT | Mod: 25

## 2024-01-27 PROCEDURE — 93005 ELECTROCARDIOGRAM TRACING: CPT

## 2024-01-27 PROCEDURE — 63600175 PHARM REV CODE 636 W HCPCS: Performed by: EMERGENCY MEDICINE

## 2024-01-27 PROCEDURE — 80053 COMPREHEN METABOLIC PANEL: CPT | Performed by: EMERGENCY MEDICINE

## 2024-01-27 PROCEDURE — 25500020 PHARM REV CODE 255: Performed by: EMERGENCY MEDICINE

## 2024-01-27 RX ORDER — HYDRALAZINE HYDROCHLORIDE 20 MG/ML
10 INJECTION INTRAMUSCULAR; INTRAVENOUS
Status: COMPLETED | OUTPATIENT
Start: 2024-01-27 | End: 2024-01-27

## 2024-01-27 RX ORDER — CETIRIZINE HYDROCHLORIDE 10 MG/1
10 TABLET ORAL DAILY
Qty: 30 TABLET | Refills: 0 | Status: SHIPPED | OUTPATIENT
Start: 2024-01-27 | End: 2025-01-26

## 2024-01-27 RX ORDER — ERYTHROMYCIN 5 MG/G
OINTMENT OPHTHALMIC
Status: COMPLETED | OUTPATIENT
Start: 2024-01-27 | End: 2024-01-27

## 2024-01-27 RX ORDER — FLUTICASONE PROPIONATE 50 MCG
1 SPRAY, SUSPENSION (ML) NASAL 2 TIMES DAILY
Qty: 16 G | Refills: 0 | Status: SHIPPED | OUTPATIENT
Start: 2024-01-27

## 2024-01-27 RX ORDER — SODIUM CHLORIDE 0.65 %
1 AEROSOL, SPRAY (ML) NASAL
Qty: 30 ML | Refills: 0 | Status: SHIPPED | OUTPATIENT
Start: 2024-01-27

## 2024-01-27 RX ORDER — ERYTHROMYCIN 5 MG/G
OINTMENT OPHTHALMIC
Qty: 3.5 G | Refills: 0 | Status: SHIPPED | OUTPATIENT
Start: 2024-01-27 | End: 2024-01-30

## 2024-01-27 RX ORDER — ACETAMINOPHEN 325 MG/1
650 TABLET ORAL
Status: COMPLETED | OUTPATIENT
Start: 2024-01-27 | End: 2024-01-27

## 2024-01-27 RX ORDER — PETROLATUM,WHITE
1 OINTMENT IN PACKET (GRAM) TOPICAL 2 TIMES DAILY
Qty: 28 G | Refills: 0 | Status: SHIPPED | OUTPATIENT
Start: 2024-01-27 | End: 2024-02-10

## 2024-01-27 RX ADMIN — ACETAMINOPHEN 650 MG: 325 TABLET ORAL at 06:01

## 2024-01-27 RX ADMIN — IOHEXOL 80 ML: 350 INJECTION, SOLUTION INTRAVENOUS at 04:01

## 2024-01-27 RX ADMIN — ERYTHROMYCIN: 5 OINTMENT OPHTHALMIC at 07:01

## 2024-01-27 RX ADMIN — HYDRALAZINE HYDROCHLORIDE 10 MG: 20 INJECTION INTRAMUSCULAR; INTRAVENOUS at 06:01

## 2024-01-27 NOTE — ED TRIAGE NOTES
"Pt presents to the ER c/o nose bleed that started 10 days ago. Pt states, "When I lean forward, my nose bleed extensively." Pt denies weakness, dizziness, or near syncopal episode.   "

## 2024-01-27 NOTE — ED PROVIDER NOTES
"Encounter Date: 1/27/2024    SCRIBE #1 NOTE: I, Tony Bolivar, am scribing for, and in the presence of,  Melissa Sadler DO.       History     Chief Complaint   Patient presents with    Epistaxis     Pt reports nose been bleeding for 10 days intermittently. Pt states, "When I lean forward, my nose bleed extensively." Pt denies weakness, dizziness, or near syncopal episode.      73 yr old male with a PMHx of HTN, Nasal obstruction, and Sinusitis presents to the ED with a chief complaint of nose bleeds. Patient reports experiencing intermittent nose bleeds for 10 days with positional change described as "leaning over to tie his shoe." He is able to stop the bleeding eventually with reverting back to standing position. Endorses recently increased sneezing. Denies fever and chills. He states that he is supposed to perform a sinus flush every night but has been unable to due to his symptoms. Patient has an upcoming appointment with his otolaryngologist in 10 days. PSHx of nasal cauterization approximately 8 years ago.    The history is provided by the patient.     Review of patient's allergies indicates:   Allergen Reactions    Aspirin Nausea And Vomiting    Astelin [azelastine] Other (See Comments)     Dry mouth    Flomax [tamsulosin] Other (See Comments)     Nosebleed     Past Medical History:   Diagnosis Date    Allergy     Anticoagulant long-term use     Arthritis     Rheumatoid arthritis    Back pain     Blood clotting tendency     BPH (benign prostatic hypertrophy)     Cervical spondylosis     Colon polyp 2010    adenoma    Coronary artery disease     Depression 05/08/2015    Dry eyes     Dry mouth     GERD (gastroesophageal reflux disease)     Hyperlipidemia     Hypertension     Lumbar spondylosis     Nasal obstruction     PAD (peripheral artery disease) 04/25/2023    Rheumatoid arthritis     Sinusitis     Special screening for malignant neoplasm of colon 06/29/2016    Trouble in sleeping      Past Surgical History: "   Procedure Laterality Date    ATHERECTOMY Left 6/21/2023    Procedure: Atherectomy;  Surgeon: Abbe Vilchis MD;  Location: [facility] CATH LAB;  Service: Cardiology;  Laterality: Left;    CHALAZION EXCISION      COLONOSCOPY N/A 6/29/2016    Procedure: COLONOSCOPY;  Surgeon: Partha Saucedo MD;  Location: [facility] ENDO;  Service: Endoscopy;  Laterality: N/A;    COLONOSCOPY N/A 7/24/2020    Procedure: COLONOSCOPY;  Surgeon: Ian Martinez MD;  Location: [facility] ENDO (4TH FLR);  Service: Endoscopy;  Laterality: N/A;  4/16/20 - removed from 5/1/20, not called yet due to acute pain issues being addressed today - pg    COLONOSCOPY N/A 10/19/2023    Procedure: COLONOSCOPY;  Surgeon: Ian Martinez MD;  Location: [facility] ENDO;  Service: Endoscopy;  Laterality: N/A;  order created from Dr. Martinez's previous colonoscopy report 7/24/2020-3 year surveillance.   ok to hold Eliquis 2 days and Plavix 5 days per Dr Vilchis-GT  PEG instr portal -ml  10/12- pre call complete.  DBM    CORONARY ARTERY BYPASS GRAFT      ESOPHAGOGASTRODUODENOSCOPY N/A 7/24/2020    Procedure: ESOPHAGOGASTRODUODENOSCOPY (EGD);  Surgeon: Ian Martinez MD;  Location: [facility] (4TH FLR);  Service: Endoscopy;  Laterality: N/A;  4/16/20 - removed from 5/1/20, not called yet due to acute pain issues being addressed today.  4/17/20 - rescheduled 7/24/20 - pg    EXCISION TURBINATE, SUBMUCOUS      KNEE ARTHROSCOPY W/ DEBRIDEMENT      NASAL SEPTUM SURGERY      PERIPHERAL ANGIOGRAPHY N/A 6/21/2023    Procedure: Peripheral angiography;  Surgeon: Abbe Vilchis MD;  Location: [facility] CATH LAB;  Service: Cardiology;  Laterality: N/A;  right radial access--- RN PREOP 6/16----JM    TONSILLECTOMY       Family History   Problem Relation Age of Onset    Hyperlipidemia Mother     Alzheimer's disease Mother     Stomach cancer Father     Colon cancer Father         from wife--he is not unsure     Cataracts Other     No Known Problems Maternal Aunt     No Known Problems Maternal Uncle     No Known  Problems Paternal Aunt     No Known Problems Paternal Uncle     No Known Problems Maternal Grandmother     No Known Problems Maternal Grandfather     No Known Problems Paternal Grandmother     No Known Problems Paternal Grandfather     Blindness Neg Hx     Cancer Neg Hx     Diabetes Neg Hx     Glaucoma Neg Hx     Rheum arthritis Neg Hx     Psoriasis Neg Hx     Lupus Neg Hx     Amblyopia Neg Hx     Hypertension Neg Hx     Macular degeneration Neg Hx     Retinal detachment Neg Hx     Strabismus Neg Hx     Stroke Neg Hx     Thyroid disease Neg Hx     Esophageal cancer Neg Hx      Social History     Tobacco Use    Smoking status: Former     Current packs/day: 0.00     Average packs/day: 1 pack/day for 20.0 years (20.0 ttl pk-yrs)     Types: Cigarettes     Start date: 1971     Quit date: 1991     Years since quittin.0    Smokeless tobacco: Never    Tobacco comments:     Quit .   Substance Use Topics    Alcohol use: No    Drug use: No     Review of Systems   Constitutional:  Negative for chills and fever.   HENT:  Positive for nosebleeds. Negative for congestion.    Respiratory:  Negative for cough and shortness of breath.    Cardiovascular:  Negative for chest pain.   Gastrointestinal:  Negative for abdominal pain, diarrhea and nausea.   Genitourinary:  Negative for dysuria.   Musculoskeletal:  Negative for back pain.   Skin:  Negative for rash.   Neurological:  Negative for headaches.       Physical Exam     Initial Vitals [24 1434]   BP Pulse Resp Temp SpO2   (!) 185/96 76 17 97.8 °F (36.6 °C) 97 %      MAP       --         Physical Exam    Nursing note and vitals reviewed.  Constitutional: He appears well-developed and well-nourished.     Patient gave consent to have physical exam performed.   HENT:   Head: Normocephalic and atraumatic.   Right Ear: Tympanic membrane and external ear normal.   Left Ear: Tympanic membrane and external ear normal.   Nose: Mucosal edema and rhinorrhea present.  Right sinus exhibits frontal sinus tenderness. Right sinus exhibits no maxillary sinus tenderness. Left sinus exhibits frontal sinus tenderness. Left sinus exhibits no maxillary sinus tenderness.   There is dried red blood in the left nostril.  .   Eyes: EOM are normal. Pupils are equal, round, and reactive to light. Right conjunctiva is injected. Left conjunctiva is injected.   Neck: Neck supple.   Bilateral JVD.   Normal range of motion.  Cardiovascular:  Normal rate, regular rhythm, normal heart sounds and intact distal pulses.     Exam reveals no gallop and no friction rub.       No murmur heard.  Pulmonary/Chest: Breath sounds normal. No respiratory distress. He has no wheezes. He has no rhonchi. He has no rales.   Abdominal: Abdomen is soft. There is no rebound and no guarding.   Musculoskeletal:         General: Normal range of motion.      Cervical back: Normal range of motion and neck supple.     Neurological: He is alert and oriented to person, place, and time. No cranial nerve deficit.   Skin: Skin is warm and dry.   Psychiatric: He has a normal mood and affect.         ED Course   Procedures  Labs Reviewed   COMPREHENSIVE METABOLIC PANEL - Abnormal; Notable for the following components:       Result Value    CO2 20 (*)     Albumin 3.4 (*)     All other components within normal limits   B-TYPE NATRIURETIC PEPTIDE - Abnormal; Notable for the following components:     (*)     All other components within normal limits   CBC W/ AUTO DIFFERENTIAL   TROPONIN I   PROTIME-INR          Imaging Results              X-Ray Chest PA And Lateral (Final result)  Result time 01/27/24 17:51:18      Final result by Armani Maher MD (01/27/24 17:51:18)                   Impression:      No detrimental change when compared with examination obtained earlier the same day.      Electronically signed by: Armani Maher MD  Date:    01/27/2024  Time:    17:51               Narrative:    EXAMINATION:  XR CHEST PA AND  "LATERAL    CLINICAL HISTORY:  Provided history is "htn;  ".    TECHNIQUE:  Frontal and lateral views of the chest were performed.    COMPARISON:  01/27/2024.    FINDINGS:  Cardiomediastinal silhouette is stable.  Postoperative changes again identified in the chest.  Coarse interstitial lung markings but no large focal area of consolidation.  No sizable pleural effusion.  No pneumothorax.                                       CT Maxillofacial With Contrast (Final result)  Result time 01/27/24 17:07:23      Final result by Armani Maher MD (01/27/24 17:07:23)                   Impression:      No acute abnormality.    Other findings discussed in the body of the report.      Electronically signed by: Armani Maher MD  Date:    01/27/2024  Time:    17:07               Narrative:    EXAMINATION:  CT MAXILLOFACIAL WITH CONTRAST    CLINICAL HISTORY:  Maxillofacial pain;Patient with sinus pain pressure with persistent nosebleeds;    TECHNIQUE:  CT images of the maxillofacial bones obtained after the administration of 80 mL Omnipaque 350 IV contrast.  Axial, coronal, and sagittal reconstructions were created from the source data.    COMPARISON:  CT sinuses, 06/29/2020.    FINDINGS:  No acute facial fracture.    Patient is edentulous.  There are scattered periapical lucencies which could be related to periodontal disease and/or prior dental extractions.    No drainable fluid collection identified in the maxillofacial region.    Minimal mucosal thickening in the paranasal sinuses with probable operative changes in the maxillary sinuses.  No air-fluid levels.  Mastoid air cells are essentially clear.    Globes are symmetric.  Retrobulbar fat is preserved.  Mild left nasal mucosal thickening.  No bulky lymphadenopathy identified.    Atherosclerosis of the bilateral carotid arteries.  Major vessels in the neck are grossly patent.    Limited intracranial evaluation is negative for acute finding.                           " "            X-Ray Chest AP Portable (Final result)  Result time 01/27/24 15:58:43      Final result by Mitchell Herr MD (01/27/24 15:58:43)                   Impression:      Mild increased lung opacities, possible mild interstitial edema and/or pneumonitis.  Short-term PA and lateral chest follow-up could be performed to ensure stability/resolution.      Electronically signed by: Mitchell Herr MD  Date:    01/27/2024  Time:    15:58               Narrative:    EXAMINATION:  XR CHEST AP PORTABLE    CLINICAL HISTORY:  htn;    TECHNIQUE:  Single frontal view of the chest was performed.    COMPARISON:  04/16/2020    FINDINGS:  Enlarged cardiac silhouette.Mild increased interstitial perihilar and bibasilar lung opacities.  No pneumothorax.No pleural effusions.                                       Medications   iohexoL (OMNIPAQUE 350) injection 80 mL (80 mLs Intravenous Given 1/27/24 1645)   acetaminophen tablet 650 mg (650 mg Oral Given 1/27/24 1844)   hydrALAZINE injection 10 mg (10 mg Intravenous Given 1/27/24 1844)   erythromycin 5 mg/gram (0.5 %) ophthalmic ointment ( Both Eyes Given 1/27/24 1921)     Medical Decision Making  Amount and/or Complexity of Data Reviewed  Labs: ordered.  Radiology: ordered.    Risk  OTC drugs.  Prescription drug management.        This is an evaluation of a 73 y.o. male that presents to the Emergency Department for   Chief Complaint   Patient presents with    Epistaxis     Pt reports nose been bleeding for 10 days intermittently. Pt states, "When I lean forward, my nose bleed extensively." Pt denies weakness, dizziness, or near syncopal episode.          The patient is a non-toxic and well appearing patient. On physical exam, patient appears well hydrated with moist mucus membranes. Breath sounds are clear and equal bilaterally with no adventitious breath sounds, tachypnea or respiratory distress. Regular rate and rhythm. No murmurs. Abdomen soft and non tender. Patient is tolerating " PO without difficulty. Physical exam otherwise as above.     I have reviewed vital signs and nursing notes.   Vital Signs Are Reassuring.     Based on the patient's symptoms, I am considering and evaluating for the following differential diagnoses:  Epistaxis, conjunctivitis, hypertension, uncontrolled hypertension, sinusitis, and anemia    Consider hospitalization for:  Uncontrolled hypertension    Patient is agreeable to admission to [facility] if necessary    ED Course:Treatment in the ED included Physical Exam and medications given in ED  Medications   iohexoL (OMNIPAQUE 350) injection 80 mL (80 mLs Intravenous Given 1/27/24 1645)   acetaminophen tablet 650 mg (650 mg Oral Given 1/27/24 1844)   hydrALAZINE injection 10 mg (10 mg Intravenous Given 1/27/24 1844)   erythromycin 5 mg/gram (0.5 %) ophthalmic ointment ( Both Eyes Given 1/27/24 1921)   .   Patient reports feeling better after treatment in the ER.       External Data/Documents Reviewed: Previous medical records and vital signs reviewed, see HPI and Physical exam.   Labs: ordered and reviewed.  Hemoglobin normal at 15.9.  Hematocrit normal at 49.  Radiology: ordered as indicated and reviewed.  No pneumothorax  ECG/medicine tests: ordered and independent interpretation performed by Dr. Melissa Sadler DO. Decision-making details documented in ED Course.   Cardiac monitor placed for high blood pressure. Monitor shows Normal Sinus Rhythm. Interpreted by Dr. Melissa Sadler DO.    Risk  Diagnosis or treatment significantly limited by the following social determinants of health: Body mass index is 26.39 kg/m².     In shared decision making with the patient, we discussed treatment, prescriptions, labs, and imaging results.    Discharge home with   ED Prescriptions       Medication Sig Dispense Start Date End Date Auth. Provider    fluticasone propionate (FLONASE) 50 mcg/actuation nasal spray 1 spray (50 mcg total) by Each Nostril route 2 (two) times  daily. 16 g 1/27/2024 -- Melissa Sadler DO    sodium chloride (OCEAN NASAL) 0.65 % nasal spray 1 spray by Nasal route every 3 (three) hours as needed for Congestion. 30 mL 1/27/2024 -- Melissa Sadler DO    cetirizine (ZYRTEC) 10 MG tablet Take 1 tablet (10 mg total) by mouth once daily. 30 tablet 1/27/2024 1/26/2025 Melissa Sadler DO    erythromycin (ROMYCIN) ophthalmic ointment Place a 1/2 inch ribbon of ointment into each lower eyelid 5 times a day for 7 days 3.5 g 1/27/2024 -- Melissa Sadler DO    white petrolatum (VASELINE WHITE PETROLEUM) OiPk Apply 1 g topically 2 (two) times daily. Apply to each nostril twice a day for 14 days to moisturize the nostrils for 14 days 28 g 1/27/2024 2/10/2024 Melissa Sadler DO          Fill and take prescriptions as directed.  Return to the ED if symptoms worsen or do not resolve.   Answered questions and discussed discharge plan.    Patient feels better and is ready for discharge.  Follow up with PCP/specialist in 1 day      At time of discharge patient is awake alert oriented x4 speaking clearly in full sentences and moving all 4 extremities.     The following labs and imaging were reviewed:        Admission on 01/27/2024, Discharged on 01/27/2024   Component Date Value Ref Range Status    WBC 01/27/2024 5.79  3.90 - 12.70 K/uL Final    RBC 01/27/2024 5.38  4.60 - 6.20 M/uL Final    Hemoglobin 01/27/2024 15.9  14.0 - 18.0 g/dL Final    Hematocrit 01/27/2024 49.0  40.0 - 54.0 % Final    MCV 01/27/2024 91  82 - 98 fL Final    MCH 01/27/2024 29.6  27.0 - 31.0 pg Final    MCHC 01/27/2024 32.4  32.0 - 36.0 g/dL Final    RDW 01/27/2024 12.5  11.5 - 14.5 % Final    Platelets 01/27/2024 171  150 - 450 K/uL Final    MPV 01/27/2024 9.8  9.2 - 12.9 fL Final    Immature Granulocytes 01/27/2024 0.3  0.0 - 0.5 % Final    Gran # (ANC) 01/27/2024 2.3  1.8 - 7.7 K/uL Final    Immature Grans (Abs) 01/27/2024 0.02  0.00 - 0.04 K/uL Final    Comment: Mild elevation in immature granulocytes is non  specific and   can be seen in a variety of conditions including stress response,   acute inflammation, trauma and pregnancy. Correlation with other   laboratory and clinical findings is essential.      Lymph # 01/27/2024 2.5  1.0 - 4.8 K/uL Final    Mono # 01/27/2024 0.7  0.3 - 1.0 K/uL Final    Eos # 01/27/2024 0.3  0.0 - 0.5 K/uL Final    Baso # 01/27/2024 0.06  0.00 - 0.20 K/uL Final    nRBC 01/27/2024 0  0 /100 WBC Final    Gran % 01/27/2024 38.9  38.0 - 73.0 % Final    Lymph % 01/27/2024 43.9  18.0 - 48.0 % Final    Mono % 01/27/2024 11.4  4.0 - 15.0 % Final    Eosinophil % 01/27/2024 4.5  0.0 - 8.0 % Final    Basophil % 01/27/2024 1.0  0.0 - 1.9 % Final    Differential Method 01/27/2024 Automated   Final    Sodium 01/27/2024 138  136 - 145 mmol/L Final    Potassium 01/27/2024 3.8  3.5 - 5.1 mmol/L Final    Chloride 01/27/2024 107  95 - 110 mmol/L Final    CO2 01/27/2024 20 (L)  23 - 29 mmol/L Final    Glucose 01/27/2024 89  70 - 110 mg/dL Final    BUN 01/27/2024 11  8 - 23 mg/dL Final    Creatinine 01/27/2024 1.0  0.5 - 1.4 mg/dL Final    Calcium 01/27/2024 9.9  8.7 - 10.5 mg/dL Final    Total Protein 01/27/2024 6.9  6.0 - 8.4 g/dL Final    Albumin 01/27/2024 3.4 (L)  3.5 - 5.2 g/dL Final    Total Bilirubin 01/27/2024 0.8  0.1 - 1.0 mg/dL Final    Comment: For infants and newborns, interpretation of results should be based  on gestational age, weight and in agreement with clinical  observations.    Premature Infant recommended reference ranges:  Up to 24 hours.............<8.0 mg/dL  Up to 48 hours............<12.0 mg/dL  3-5 days..................<15.0 mg/dL  6-29 days.................<15.0 mg/dL      Alkaline Phosphatase 01/27/2024 56  55 - 135 U/L Final    AST 01/27/2024 15  10 - 40 U/L Final    ALT 01/27/2024 13  10 - 44 U/L Final    eGFR 01/27/2024 >60  >60 mL/min/1.73 m^2 Final    Anion Gap 01/27/2024 11  8 - 16 mmol/L Final    Troponin I 01/27/2024 <0.006  0.000 - 0.026 ng/mL Final    Comment: The  "reference interval for Troponin I represents the 99th percentile   cutoff   for our facility and is consistent with 3rd generation assay   performance.      BNP 01/27/2024 161 (H)  0 - 99 pg/mL Final    Values of less than 100 pg/ml are consistent with non-CHF populations.    Prothrombin Time 01/27/2024 11.9  9.0 - 12.5 sec Final    INR 01/27/2024 1.1  0.8 - 1.2 Final    Comment: Coumadin Therapy:  2.0 - 3.0 for INR for all indicators except mechanical heart valves  and antiphospholipid syndromes which should use 2.5 - 3.5.          Imaging Results              X-Ray Chest PA And Lateral (Final result)  Result time 01/27/24 17:51:18      Final result by Armani Maher MD (01/27/24 17:51:18)                   Impression:      No detrimental change when compared with examination obtained earlier the same day.      Electronically signed by: Armani Maher MD  Date:    01/27/2024  Time:    17:51               Narrative:    EXAMINATION:  XR CHEST PA AND LATERAL    CLINICAL HISTORY:  Provided history is "htn;  ".    TECHNIQUE:  Frontal and lateral views of the chest were performed.    COMPARISON:  01/27/2024.    FINDINGS:  Cardiomediastinal silhouette is stable.  Postoperative changes again identified in the chest.  Coarse interstitial lung markings but no large focal area of consolidation.  No sizable pleural effusion.  No pneumothorax.                                       CT Maxillofacial With Contrast (Final result)  Result time 01/27/24 17:07:23      Final result by Armani Maher MD (01/27/24 17:07:23)                   Impression:      No acute abnormality.    Other findings discussed in the body of the report.      Electronically signed by: Armani Maher MD  Date:    01/27/2024  Time:    17:07               Narrative:    EXAMINATION:  CT MAXILLOFACIAL WITH CONTRAST    CLINICAL HISTORY:  Maxillofacial pain;Patient with sinus pain pressure with persistent nosebleeds;    TECHNIQUE:  CT images of the " maxillofacial bones obtained after the administration of 80 mL Omnipaque 350 IV contrast.  Axial, coronal, and sagittal reconstructions were created from the source data.    COMPARISON:  CT sinuses, 06/29/2020.    FINDINGS:  No acute facial fracture.    Patient is edentulous.  There are scattered periapical lucencies which could be related to periodontal disease and/or prior dental extractions.    No drainable fluid collection identified in the maxillofacial region.    Minimal mucosal thickening in the paranasal sinuses with probable operative changes in the maxillary sinuses.  No air-fluid levels.  Mastoid air cells are essentially clear.    Globes are symmetric.  Retrobulbar fat is preserved.  Mild left nasal mucosal thickening.  No bulky lymphadenopathy identified.    Atherosclerosis of the bilateral carotid arteries.  Major vessels in the neck are grossly patent.    Limited intracranial evaluation is negative for acute finding.                                       X-Ray Chest AP Portable (Final result)  Result time 01/27/24 15:58:43      Final result by Mitchell Herr MD (01/27/24 15:58:43)                   Impression:      Mild increased lung opacities, possible mild interstitial edema and/or pneumonitis.  Short-term PA and lateral chest follow-up could be performed to ensure stability/resolution.      Electronically signed by: Mitchell Herr MD  Date:    01/27/2024  Time:    15:58               Narrative:    EXAMINATION:  XR CHEST AP PORTABLE    CLINICAL HISTORY:  htn;    TECHNIQUE:  Single frontal view of the chest was performed.    COMPARISON:  04/16/2020    FINDINGS:  Enlarged cardiac silhouette.Mild increased interstitial perihilar and bibasilar lung opacities.  No pneumothorax.No pleural effusions.                                                                     I, Dr. Melissa Sadler, personally performed the services described in this documentation. This document was produced by a scribe under my  direction and in my presence. All medical record entries made by the scribe were at my direction and in my presence.  I have reviewed the chart and agree that the record reflects my personal performance and is accurate and complete. Melissa Sadler DO.     01/27/2024 11:34 PM    Clinical Impression:  Final diagnoses:  [I10] HTN (hypertension)  [R04.0] Epistaxis (Primary)  [H10.33] Acute conjunctivitis of both eyes, unspecified acute conjunctivitis type  [J30.9, R09.82] Allergic rhinitis with postnasal drip          ED Disposition Condition    Discharge Stable          ED Prescriptions       Medication Sig Dispense Start Date End Date Auth. Provider    fluticasone propionate (FLONASE) 50 mcg/actuation nasal spray 1 spray (50 mcg total) by Each Nostril route 2 (two) times daily. 16 g 1/27/2024 -- Melissa Sadlre DO    sodium chloride (OCEAN NASAL) 0.65 % nasal spray 1 spray by Nasal route every 3 (three) hours as needed for Congestion. 30 mL 1/27/2024 -- Melissa Sadler DO    cetirizine (ZYRTEC) 10 MG tablet Take 1 tablet (10 mg total) by mouth once daily. 30 tablet 1/27/2024 1/26/2025 Melissa Sadler DO    erythromycin (ROMYCIN) ophthalmic ointment Place a 1/2 inch ribbon of ointment into each lower eyelid 5 times a day for 7 days 3.5 g 1/27/2024 -- Melissa Sadler DO    white petrolatum (VASELINE WHITE PETROLEUM) OiPk Apply 1 g topically 2 (two) times daily. Apply to each nostril twice a day for 14 days to moisturize the nostrils for 14 days 28 g 1/27/2024 2/10/2024 Melissa Sadler DO          Follow-up Information    None          Melissa Sadler DO  01/27/24 [id number]

## 2024-01-28 NOTE — DISCHARGE INSTRUCTIONS
Please monitor your blood pressure daily and follow up with your primary care within 2 days for further evaluation of your blood pressure and your blood pressure medications.

## 2024-01-30 ENCOUNTER — OFFICE VISIT (OUTPATIENT)
Dept: OPTOMETRY | Facility: CLINIC | Age: 74
End: 2024-01-30
Payer: MEDICARE

## 2024-01-30 DIAGNOSIS — H25.13 NUCLEAR SCLEROSIS, BILATERAL: ICD-10-CM

## 2024-01-30 DIAGNOSIS — Z13.5 GLAUCOMA SCREENING: ICD-10-CM

## 2024-01-30 DIAGNOSIS — H16.203 KERATOCONJUNCTIVITIS, BILATERAL: Primary | ICD-10-CM

## 2024-01-30 PROCEDURE — 99214 OFFICE O/P EST MOD 30 MIN: CPT | Mod: S$PBB,,, | Performed by: OPTOMETRIST

## 2024-01-30 PROCEDURE — 99999 PR PBB SHADOW E&M-EST. PATIENT-LVL III: CPT | Mod: PBBFAC,,, | Performed by: OPTOMETRIST

## 2024-01-30 PROCEDURE — 99213 OFFICE O/P EST LOW 20 MIN: CPT | Mod: PBBFAC,PO | Performed by: OPTOMETRIST

## 2024-01-30 RX ORDER — TOBRAMYCIN AND DEXAMETHASONE 3; 1 MG/ML; MG/ML
1 SUSPENSION/ DROPS OPHTHALMIC
Qty: 10 ML | Refills: 0 | Status: SHIPPED | OUTPATIENT
Start: 2024-01-30 | End: 2024-02-09

## 2024-01-30 NOTE — PROGRESS NOTES
HPI    Dry eye, painful eyes. Gave ointment in ER   Pt states vision is blurry/ cloudy    Pt denies F/F     Pt reports Dry/ Itchy/ Burning  Gtt: No  Pt was using pataday and no longer using    Conjunctivitis   Pt reports went to ER Sunday for separate incident    Pt reports eyes were redness/ FB sensation/ itchy  Pt was given erythromycin 5 times     Last edited by Osman Ordoñez, OD on 1/30/2024 11:50 AM.            Assessment /Plan     For exam results, see Encounter Report.    Keratoconjunctivitis, bilateral  -     tobramycin-dexAMETHasone 0.3-0.1% (TOBRADEX) 0.3-0.1 % DrpS; Place 1 drop into both eyes every 4 (four) hours while awake. for 10 days  Dispense: 10 mL; Refill: 0  -Tobradex QID OU x 10 days  -long standing dry eye, continued Systane BID+    Nuclear sclerosis, bilateral  -Educated patient on presence of cataracts at today's exam, monitor at annual dilated fundus exam. 5+ years surgical estimate.    Glaucoma screening  -Monitor with annual eye exam and IOP check      RTC 1 yr

## 2024-02-02 ENCOUNTER — HOSPITAL ENCOUNTER (EMERGENCY)
Facility: HOSPITAL | Age: 74
Discharge: HOME | End: 2024-02-02
Attending: EMERGENCY MEDICINE
Payer: MEDICARE

## 2024-02-02 ENCOUNTER — OFFICE VISIT (OUTPATIENT)
Dept: CARDIOLOGY | Facility: CLINIC | Age: 74
End: 2024-02-02
Payer: MEDICARE

## 2024-02-02 ENCOUNTER — NURSE TRIAGE (OUTPATIENT)
Dept: ADMINISTRATIVE | Facility: CLINIC | Age: 74
End: 2024-02-02
Payer: MEDICARE

## 2024-02-02 VITALS
SYSTOLIC BLOOD PRESSURE: 138 MMHG | TEMPERATURE: 99 F | HEIGHT: 73 IN | OXYGEN SATURATION: 97 % | WEIGHT: 200 LBS | BODY MASS INDEX: 26.51 KG/M2 | RESPIRATION RATE: 18 BRPM | HEART RATE: 61 BPM | DIASTOLIC BLOOD PRESSURE: 76 MMHG

## 2024-02-02 VITALS
OXYGEN SATURATION: 97 % | DIASTOLIC BLOOD PRESSURE: 80 MMHG | HEIGHT: 73 IN | BODY MASS INDEX: 27.96 KG/M2 | HEART RATE: 53 BPM | WEIGHT: 211 LBS | RESPIRATION RATE: 18 BRPM | SYSTOLIC BLOOD PRESSURE: 149 MMHG

## 2024-02-02 DIAGNOSIS — R51.9 ACUTE NONINTRACTABLE HEADACHE, UNSPECIFIED HEADACHE TYPE: Primary | ICD-10-CM

## 2024-02-02 DIAGNOSIS — E78.5 DYSLIPIDEMIA: ICD-10-CM

## 2024-02-02 DIAGNOSIS — I10 HYPERTENSION: ICD-10-CM

## 2024-02-02 DIAGNOSIS — I48.91 ATRIAL FIBRILLATION, UNSPECIFIED TYPE: Primary | ICD-10-CM

## 2024-02-02 DIAGNOSIS — I48.91 ATRIAL FIBRILLATION, UNSPECIFIED TYPE: ICD-10-CM

## 2024-02-02 LAB
ALBUMIN SERPL-MCNC: 3.8 G/DL (ref 3.3–5.5)
ALP SERPL-CCNC: 59 U/L (ref 42–141)
BILIRUB SERPL-MCNC: 1 MG/DL (ref 0.2–1.6)
BUN SERPL-MCNC: 14 MG/DL (ref 7–22)
CALCIUM SERPL-MCNC: 10.2 MG/DL (ref 8–10.3)
CHLORIDE SERPL-SCNC: 104 MMOL/L (ref 98–108)
CREAT SERPL-MCNC: 0.9 MG/DL (ref 0.6–1.2)
GLUCOSE SERPL-MCNC: 97 MG/DL (ref 73–118)
POC ALT (SGPT): 23 U/L (ref 10–47)
POC AST (SGOT): 27 U/L (ref 11–38)
POC B-TYPE NATRIURETIC PEPTIDE: 103 PG/ML (ref 0–100)
POC CARDIAC TROPONIN I: 0 NG/ML (ref 0–0.08)
POC PTINR: 1.2 (ref 0.9–1.2)
POC PTWBT: 14.3 SEC (ref 9.7–14.3)
POC TCO2: 34 MMOL/L (ref 18–33)
POTASSIUM BLD-SCNC: 4.7 MMOL/L (ref 3.6–5.1)
PROTEIN, POC: 7.3 G/DL (ref 6.4–8.1)
SAMPLE: NORMAL
SAMPLE: NORMAL
SODIUM BLD-SCNC: 142 MMOL/L (ref 128–145)

## 2024-02-02 PROCEDURE — 93005 ELECTROCARDIOGRAM TRACING: CPT | Mod: ER

## 2024-02-02 PROCEDURE — 80053 COMPREHEN METABOLIC PANEL: CPT | Mod: ER

## 2024-02-02 PROCEDURE — 99285 EMERGENCY DEPT VISIT HI MDM: CPT | Mod: 25,27,ER

## 2024-02-02 PROCEDURE — 83880 ASSAY OF NATRIURETIC PEPTIDE: CPT | Mod: ER

## 2024-02-02 PROCEDURE — 93010 ELECTROCARDIOGRAM REPORT: CPT | Mod: ,,, | Performed by: INTERNAL MEDICINE

## 2024-02-02 PROCEDURE — 99215 OFFICE O/P EST HI 40 MIN: CPT | Mod: PBBFAC,25 | Performed by: INTERNAL MEDICINE

## 2024-02-02 PROCEDURE — 99214 OFFICE O/P EST MOD 30 MIN: CPT | Mod: S$PBB,,, | Performed by: INTERNAL MEDICINE

## 2024-02-02 PROCEDURE — 84484 ASSAY OF TROPONIN QUANT: CPT | Mod: ER

## 2024-02-02 PROCEDURE — 99999 PR PBB SHADOW E&M-EST. PATIENT-LVL V: CPT | Mod: PBBFAC,,, | Performed by: INTERNAL MEDICINE

## 2024-02-02 PROCEDURE — 85025 COMPLETE CBC W/AUTO DIFF WBC: CPT | Mod: ER

## 2024-02-02 PROCEDURE — 25000003 PHARM REV CODE 250: Mod: ER | Performed by: EMERGENCY MEDICINE

## 2024-02-02 RX ORDER — PROMETHAZINE HYDROCHLORIDE 25 MG/1
25 TABLET ORAL EVERY 6 HOURS PRN
Qty: 15 TABLET | Refills: 0 | Status: SHIPPED | OUTPATIENT
Start: 2024-02-02 | End: 2024-02-09

## 2024-02-02 RX ORDER — ACETAMINOPHEN 500 MG
1000 TABLET ORAL EVERY 6 HOURS PRN
Qty: 30 TABLET | Refills: 0 | Status: SHIPPED | OUTPATIENT
Start: 2024-02-02 | End: 2024-06-17 | Stop reason: CLARIF

## 2024-02-02 RX ORDER — DIPHENHYDRAMINE HCL 25 MG
25 CAPSULE ORAL EVERY 6 HOURS PRN
Qty: 20 CAPSULE | Refills: 0 | Status: SHIPPED | OUTPATIENT
Start: 2024-02-02

## 2024-02-02 RX ORDER — ACETAMINOPHEN 500 MG
1000 TABLET ORAL
Status: COMPLETED | OUTPATIENT
Start: 2024-02-02 | End: 2024-02-02

## 2024-02-02 RX ADMIN — ACETAMINOPHEN 1000 MG: 500 TABLET ORAL at 12:02

## 2024-02-02 NOTE — PROGRESS NOTES
CARDIOVASCULAR CONSULTATION    REASON FOR CONSULT:   Rizwan Demarco Jr. is a 73 y.o. male who presents for evaluation    HISTORY OF PRESENT ILLNESS:     Patient is a pleasant 72-year-old man.  Here for evaluation.  Past medical history significant for atrial fibrillation, on Eliquis.  Coronary artery bypass grafting in 2003.  Quadruple bypass as per patient, but unknown anatomy.  States had it done at [facility].  Dyslipidemia, hypertension, Sjogren's disease.  Also peripheral artery disease with abnormal ABIs in 2021.  Denies any orthopnea, PND.  Main complaint is right leg, right calf claudication and cramping on walking.  Also complains of occasional heaviness in the chest area states that happens when he gets constipated.      2023:      The left ventricle is normal in size with concentric remodeling and normal systolic function. The estimated ejection fraction is 65%.  Normal right ventricular size with normal right ventricular systolic function.  Normal left ventricular diastolic function.  The estimated PA systolic pressure is 33 mmHg.  Intermediate central venous pressure (8 mmHg).  The ascending aorta is mildly dilated.      2021:      Moderately decreased right ORESTES, 0.68.  Moderately dampened PVR waveforms.  Mildly decreased left ORESTES, 0.89.  Moderately dampened PVR waveforms.      May 23:  Patient here for follow-up.  Peripheral ultrasound revealed 60 90% mid left SFA stenosis.  Discussed initiating Pletal with the patient.  Patient not interested.  Would like to proceed with the peripheral angiogram for further evaluation.  Lifestyle limiting calf claudication right more than left        Right ORESTES 0.73 suggesting mild arteial flow resriction. Left ORESTES normal 1.01     Mild bilateral carotid plaque with no flow limiting stenosis     Normal abdominal aortic size and flow. No AAA       Mild right SFA plaque with no flow limiting arterial stenosis  Moderate left SFA plaque with 60-90% mis SFA  stenosis. Occluded left posterial tibial artery        Notes from July 23:  Patient here for follow-up.  Claudication in the right leg has gone away.  States he recently walk 2 miles without any claudication.  Has a stenosis in his left SFA also, but states that there is no claudication in the left leg.      Successful laser atherectomy, balloon angioplasty of the distal left SFA and proximal popliteal artery.  With excellent angiographic results      Procedures performed:     1. Ultrasound-guided left common femoral artery access next     2. Abdominal aortogram with pigtail placed in the suprarenal position      3. Selective right lower extremity angiogram     4. Selective left lower artery angiogram      5. Laser atherectomy of right SFA and proximal popliteal artery      6. Drug coated balloon angioplasty of right SFA and proximal popliteal artery      7. IVUS of right SFA, popliteal and TP trunk.        Procedural details      Ultrasound-guided access of left common femoral artery was obtained.  After that we inserted a pigtail in the suprarenal position and abdominal aortogram was obtained.  It demonstrated no significant iliac artery stenosis on both sides.  Then we selectively engaged the right iliac artery and angiogram of the right iliac artery was obtained.  It revealed mild 10-20% stenosis.  After that we obtained angiogram of the right SFA which revealed severe 90-95% stenosis in the distal SFA.  There was also another 90% stenosis in the right TP trunk.  Proximal  of right anterior tibial artery and right posterior tibial arteries was present.  Both these arteries filled with collaterals.  Peroneal artery provides the most robust flow to the foot.  After that we crossed these wires with the lesion and decision was made to fix the SFA and popliteal lesions because patient has lifestyle limiting claudication, but no resting pain or CLI.  IVUS was used for vessel sizing After that laser atherectomy of  these 2 lesions was performed followed by balloon angioplasty.  Angiogram post revealed excellent angiographic results.       Then we did angiogram of the left lower extremity from the sheath and it revealed distal SFA severe stenosis.  Anterior tibial artery is diffusely diseased and there appears to be atypical takeoff of the posterior tibial artery.  No significant stenosis was noted in the peroneal artery        Assessment plan     Continue medical management      Resume Eliquis in a.m..  Continue Plavix 75 mg qd     Aspirin 81 mg qd x 1 m     statins         Nov 23: doing fine. No more claudication. No cp, orthopnea, pnd    Notes from February 24: Patient here for follow-up.  Doing fine.  Denies any chest pains at rest on exertion, orthopnea, PND.    PAST MEDICAL HISTORY:     Past Medical History:   Diagnosis Date    Allergy     Anticoagulant long-term use     Arthritis     Rheumatoid arthritis    Back pain     Blood clotting tendency     BPH (benign prostatic hypertrophy)     Cervical spondylosis     Colon polyp 2010    adenoma    Coronary artery disease     Depression 05/08/2015    Dry eyes     Dry mouth     GERD (gastroesophageal reflux disease)     Hyperlipidemia     Hypertension     Lumbar spondylosis     Nasal obstruction     PAD (peripheral artery disease) 04/25/2023    Rheumatoid arthritis     Sinusitis     Special screening for malignant neoplasm of colon 06/29/2016    Trouble in sleeping        PAST SURGICAL HISTORY:     Past Surgical History:   Procedure Laterality Date    ATHERECTOMY Left 6/21/2023    Procedure: Atherectomy;  Surgeon: Abbe Vilchis MD;  Location: [facility] CATH LAB;  Service: Cardiology;  Laterality: Left;    CHALAZION EXCISION      COLONOSCOPY N/A 6/29/2016    Procedure: COLONOSCOPY;  Surgeon: Partha Saucedo MD;  Location: [facility] ENDO;  Service: Endoscopy;  Laterality: N/A;    COLONOSCOPY N/A 7/24/2020    Procedure: COLONOSCOPY;  Surgeon: Ian Martinez MD;  Location: [address]  FLR);  Service: Endoscopy;  Laterality: N/A;  4/16/20 - removed from 5/1/20, not called yet due to acute pain issues being addressed today - pg    COLONOSCOPY N/A 10/19/2023    Procedure: COLONOSCOPY;  Surgeon: Ian Martinez MD;  Location: [facility] ENDO;  Service: Endoscopy;  Laterality: N/A;  order created from Dr. Martinez's previous colonoscopy report 7/24/2020-3 year surveillance.   ok to hold Eliquis 2 days and Plavix 5 days per Dr Vilchis-GT  PEG instr portal -ml  10/12- pre call complete.  DBM    CORONARY ARTERY BYPASS GRAFT      ESOPHAGOGASTRODUODENOSCOPY N/A 7/24/2020    Procedure: ESOPHAGOGASTRODUODENOSCOPY (EGD);  Surgeon: Ian Martinez MD;  Location: [facility] (4TH FLR);  Service: Endoscopy;  Laterality: N/A;  4/16/20 - removed from 5/1/20, not called yet due to acute pain issues being addressed today.  4/17/20 - rescheduled 7/24/20 - pg    EXCISION TURBINATE, SUBMUCOUS      KNEE ARTHROSCOPY W/ DEBRIDEMENT      NASAL SEPTUM SURGERY      PERIPHERAL ANGIOGRAPHY N/A 6/21/2023    Procedure: Peripheral angiography;  Surgeon: Abbe Vilchis MD;  Location: [facility] CATH LAB;  Service: Cardiology;  Laterality: N/A;  right radial access--- RN PREOP 6/16----JM    TONSILLECTOMY         ALLERGIES AND MEDICATION:     Review of patient's allergies indicates:   Allergen Reactions    Aspirin Nausea And Vomiting    Astelin [azelastine] Other (See Comments)     Dry mouth    Flomax [tamsulosin] Other (See Comments)     Nosebleed        Medication List            Accurate as of February 2, 2024  2:49 PM. If you have any questions, ask your nurse or doctor.                CONTINUE taking these medications      acetaminophen 500 MG tablet  Commonly known as: TYLENOL  Take 2 tablets (1,000 mg total) by mouth every 6 (six) hours as needed for Pain.     alfuzosin 10 mg Tb24  Commonly known as: UROXATRAL  Take 1 tablet (10 mg total) by mouth daily with breakfast.     apixaban 5 mg Tab  Commonly known as: ELIQUIS  Take 1 tablet (5 mg total) by mouth  2 (two) times daily.     atorvastatin 40 MG tablet  Commonly known as: LIPITOR  Take 1 tablet (40 mg total) by mouth once daily.     budesonide 0.25 mg/2 mL nebulizer solution  Commonly known as: PULMICORT  Take 2 mLs (0.25 mg total) by nebulization 2 (two) times daily. Controller     cetirizine 10 MG tablet  Commonly known as: ZYRTEC  Take 1 tablet (10 mg total) by mouth once daily.     cinnamon bark 500 mg capsule     clopidogreL 75 mg tablet  Commonly known as: PLAVIX  Take 1 tablet (75 mg total) by mouth once daily.     diphenhydrAMINE 25 mg capsule  Commonly known as: BENADRYL  Take 1 capsule (25 mg total) by mouth every 6 (six) hours as needed for Itching or Allergies (Take if headache does not resolve).     docusate sodium 100 MG capsule  Commonly known as: COLACE  Take 1 capsule (100 mg total) by mouth 2 (two) times daily.     fluticasone propionate 50 mcg/actuation nasal spray  Commonly known as: FLONASE  1 spray (50 mcg total) by Each Nostril route 2 (two) times daily.     hydroCHLOROthiazide 12.5 mg capsule  Commonly known as: MICROZIDE  Take 1 capsule (12.5 mg total) by mouth once daily.     lisinopriL 10 MG tablet  Take 1 tablet (10 mg total) by mouth once daily.     losartan 25 MG tablet  Commonly known as: COZAAR  Take 1 tablet (25 mg total) by mouth once daily.     multivitamin per tablet  Commonly known as: THERAGRAN     OCEAN NASAL 0.65 % nasal spray  Generic drug: sodium chloride  1 spray by Nasal route every 3 (three) hours as needed for Congestion.     omega-3 fatty acids 1,250 mg Cap     omeprazole 20 MG capsule  Commonly known as: PRILOSEC  Take 1 capsule (20 mg total) by mouth once daily.     promethazine 25 MG tablet  Commonly known as: PHENERGAN  Take 1 tablet (25 mg total) by mouth every 6 (six) hours as needed for Nausea (Taken headache does not resolve).     propylene glycoL 0.6 % Drop     sodium bicarb-sodium chloride Pack     Systane GeL 0.3 % Gel  Generic drug: artificial  tears(hypromellose)(GENTEAL/SUSTANE)     tobramycin-dexAMETHasone 0.3-0.1% 0.3-0.1 % Drps  Commonly known as: TOBRADEX  Place 1 drop into both eyes every 4 (four) hours while awake. for 10 days     white petrolatum [place]  Commonly known as: VASELINE WHITE PETROLEUM  Apply 1 g topically 2 (two) times daily. Apply to each nostril twice a day for 14 days to moisturize the nostrils for 14 days              SOCIAL HISTORY:     Social History     Socioeconomic History    Marital status:    Tobacco Use    Smoking status: Former     Current packs/day: 0.00     Average packs/day: 1 pack/day for 20.0 years (20.0 ttl pk-yrs)     Types: Cigarettes     Start date: 1971     Quit date: 1991     Years since quittin.1    Smokeless tobacco: Never    Tobacco comments:     Quit .   Substance and Sexual Activity    Alcohol use: No    Drug use: No    Sexual activity: Yes     Partners: Female     Social Determinants of Health     Financial Resource Strain: Low Risk  (2023)    Overall Financial Resource Strain (CARDIA)     Difficulty of Paying Living Expenses: Not hard at all   Food Insecurity: No Food Insecurity (2023)    Hunger Vital Sign     Worried About Running Out of Food in the Last Year: Never true     Ran Out of Food in the Last Year: Never true   Transportation Needs: No Transportation Needs (2023)    PRAPARE - Transportation     Lack of Transportation (Medical): No     Lack of Transportation (Non-Medical): No   Physical Activity: Insufficiently Active (2023)    Exercise Vital Sign     Days of Exercise per Week: 3 days     Minutes of Exercise per Session: 30 min   Stress: No Stress Concern Present (2023)    [employer] [place] [employer] - Occupational Stress Questionnaire     Feeling of Stress : Not at all   Social Connections: Moderately Integrated (2023)    Social Connection and Isolation Panel [NHANES]     Frequency of Communication with Friends and Family: More than  three times a week     Frequency of Social Gatherings with Friends and Family: More than three times a week     Attends [employer] Services: More than 4 times per year     Active Member of Clubs or Organizations: No     Attends Club or Organization Meetings: Never     Marital Status:    Housing Stability: Low Risk  (6/7/2023)    Housing Stability Vital Sign     Unable to Pay for Housing in the Last Year: No     Number of Places Lived in the Last Year: 1     Unstable Housing in the Last Year: No       FAMILY HISTORY:     Family History   Problem Relation Age of Onset    Hyperlipidemia Mother     Alzheimer's disease Mother     Stomach cancer Father     Colon cancer Father         from wife--he is not unsure     Cataracts Other     No Known Problems Maternal Aunt     No Known Problems Maternal Uncle     No Known Problems Paternal Aunt     No Known Problems Paternal Uncle     No Known Problems Maternal Grandmother     No Known Problems Maternal Grandfather     No Known Problems Paternal Grandmother     No Known Problems Paternal Grandfather     Blindness Neg Hx     Cancer Neg Hx     Diabetes Neg Hx     Glaucoma Neg Hx     Rheum arthritis Neg Hx     Psoriasis Neg Hx     Lupus Neg Hx     Amblyopia Neg Hx     Hypertension Neg Hx     Macular degeneration Neg Hx     Retinal detachment Neg Hx     Strabismus Neg Hx     Stroke Neg Hx     Thyroid disease Neg Hx     Esophageal cancer Neg Hx        REVIEW OF SYSTEMS:   Review of Systems   Constitutional: Negative.   HENT: Negative.     Eyes: Negative.    Respiratory: Negative.     Endocrine: Negative.    Hematologic/Lymphatic: Negative.    Skin: Negative.    Musculoskeletal: Negative.    Gastrointestinal: Negative.    Genitourinary: Negative.    Neurological: Negative.    Psychiatric/Behavioral: Negative.     Allergic/Immunologic: Negative.        A 10 point review of systems was performed and all the pertinent positives have been mentioned. Rest of review of systems was  "negative.        PHYSICAL EXAM:     Vitals:    02/02/24 1428   BP: (!) 149/80   Pulse: (!) 53   Resp: 18    Body mass index is 27.84 kg/m².  Weight: 95.7 kg (210 lb 15.7 oz)   Height: 6' 1" (185.4 cm)     Physical Exam  Vitals reviewed.   Constitutional:       Appearance: He is well-developed.   HENT:      Head: Normocephalic.   Eyes:      Conjunctiva/sclera: Conjunctivae normal.      Pupils: Pupils are equal, round, and reactive to light.   Cardiovascular:      Rate and Rhythm: Normal rate and regular rhythm.      Heart sounds: Normal heart sounds.   Pulmonary:      Effort: Pulmonary effort is normal.      Breath sounds: Normal breath sounds.   Abdominal:      General: Bowel sounds are normal.      Palpations: Abdomen is soft.   Musculoskeletal:      Cervical back: Normal range of motion and neck supple.   Skin:     General: Skin is warm.   Neurological:      Mental Status: He is alert and oriented to person, place, and time.           DATA:     Laboratory:  CBC:  Recent Labs   Lab 02/13/23  0818 06/16/23  1150 01/27/24  1549   WBC 5.34 5.32 5.79   Hemoglobin 15.6 15.5 15.9   Hematocrit 49.6 48.3 49.0   Platelets 162 176 171         CHEMISTRIES:  Recent Labs   Lab 02/08/22  1024 02/13/23  0818 06/16/23  1150 01/27/24  1549   Glucose 92 100 71 89   Sodium 142 143 140 138   Potassium 4.8 4.0 3.9 3.8   BUN 13 8 10 11   Creatinine 1.2 1.2 0.9 1.0   eGFR if  >60  --   --   --    eGFR if non  60  --   --   --    Calcium 10.2 9.4 9.8 9.9         CARDIAC BIOMARKERS:  Recent Labs   Lab 01/27/24  1549   Troponin I <0.006         COAGS:  Recent Labs   Lab 01/27/24  1549 02/02/24  1200   INR 1.1 1.2         LIPIDS/LFTS:  Recent Labs   Lab 02/08/22  1024 02/13/23  0818 01/27/24  1549   Cholesterol 238 H 155  --    Triglycerides 76 45  --    HDL 42 40  --    LDL Cholesterol 180.8 H 106.0  --    Non-HDL Cholesterol 196 115  --    AST 13 15 15   ALT 13 14 13         Hemoglobin A1C   Date Value Ref " Range Status   02/13/2023 5.6 4.0 - 5.6 % Final     Comment:     ADA Screening Guidelines:  5.7-6.4%  Consistent with prediabetes  >or=6.5%  Consistent with diabetes    High levels of fetal hemoglobin interfere with the HbA1C  assay. Heterozygous hemoglobin variants (HbS, HgC, etc)do  not significantly interfere with this assay.   However, presence of multiple variants may affect accuracy.     02/08/2022 5.6 4.0 - 5.6 % Final     Comment:     ADA Screening Guidelines:  5.7-6.4%  Consistent with prediabetes  >or=6.5%  Consistent with diabetes    High levels of fetal hemoglobin interfere with the HbA1C  assay. Heterozygous hemoglobin variants (HbS, HgC, etc)do  not significantly interfere with this assay.   However, presence of multiple variants may affect accuracy.     05/24/2019 5.7 (H) 4.0 - 5.6 % Final     Comment:     ADA Screening Guidelines:  5.7-6.4%  Consistent with prediabetes  >or=6.5%  Consistent with diabetes  High levels of fetal hemoglobin interfere with the HbA1C  assay. Heterozygous hemoglobin variants (HbS, HgC, etc)do  not significantly interfere with this assay.   However, presence of multiple variants may affect accuracy.         TSH  Recent Labs   Lab 02/13/23  0818   TSH 0.641         The 10-year ASCVD risk score (Emmanuelle CORREA, et al., 2019) is: 28.2%    Values used to calculate the score:      Age: 73 years      Sex: Male      Is Non- : Yes      Diabetic: No      Tobacco smoker: No      Systolic Blood Pressure: 149 mmHg      Is BP treated: Yes      HDL Cholesterol: 40 mg/dL      Total Cholesterol: 155 mg/dL       BNP    Lab Results   Component Value Date/Time     (H) 01/27/2024 03:49 PM    BNP 25 05/07/2015 05:30 PM             ASSESSMENT AND PLAN     Patient Active Problem List   Diagnosis    Sjogren's disease    GERD (gastroesophageal reflux disease)    Arteriosclerosis of coronary artery    Hyperlipidemia    Cervical spondylosis    Degeneration of intervertebral  disc    Rheumatoid arthritis    Benign prostatic hyperplasia with weak urinary stream    Perennial allergic rhinitis    Essential hypertension    High frequency hearing loss    Nasal obstruction    Dysfunctions associated with sleep stages or arousal from sleep    Primary insomnia    Wears dentures    Atherosclerosis of aorta    Other personal history presenting hazards to health    Complete loss of teeth, unspecified cause, unspecified class    Cervical pain (neck)    Impaired range of motion of cervical spine    Posture imbalance    PAD (peripheral artery disease)    Poor circulation    Chest pain       Peripheral artery disease:  Now status post revascularization of right SFA and popliteal artery.  Also has residual infrapopliteal disease.  Is claudication after revascularization of the SFA disease has gone away and he can now walk 2 miles.  He also has flow-limiting stenosis in the distal left SFA, but states that he does not have any pain in the left leg.  Continue medical management.  Aspirin 81 mg daily for 1 month, Plavix 75 mg daily uninterrupted for at least 1 year, continue Eliquis 5 mg b.i.d.      Coronary artery disease status post CABG in 2003.  Stress test in May of 2023 did not show any significant ischemia.  Recent echo showed normal left ventricle systolic function     Carotid ultrasound in May of 2023 showed mild bilateral carotid artery block with no flow-limiting stenosis.    Hypertension:  Uncontrolled in clinic today, but states at home stays well controlled around 120 mmHg.      Atrial fibrillation:  On Eliquis.  Rate controlled.     Dyslipidemia:  Continue ezetimibe and statins.  Recheck lipid profile    Lab Results   Component Value Date    LDLCALC 106.0 02/13/2023         Follow-up after 3 m           Thank you very much for involving me in the care of your patient.  Please do not hesitate to contact me if there are any questions.      Abbe Vilchis MD, FACC, [facility]  Interventional  Cardiologist, [facility].           This note was dictated with the help of speech recognition software.  There might be un-intended errors and/or substitutions.

## 2024-02-02 NOTE — Clinical Note
"Rizwan"Ana Demarco was seen and treated in our emergency department on 2/2/2024.  He may return to work on 02/04/2024.       If you have any questions or concerns, please don't hesitate to call.      Melissa Sadler, DO"

## 2024-02-02 NOTE — ED NOTES
Patient stated to radiology he has a Cardiology appt at 1430 and he does not want to miss it. MD notified

## 2024-02-02 NOTE — TELEPHONE ENCOUNTER
Rizwan reports high blood pressure 165/87 and severe left sided headache. Pt reports he took 2 tylenol approximately 15 mins ago. Advised pt per triage protocol to go to nearest ED now for physician maki. Instructed to call  now if no immediate . V/u.   Reason for Disposition   SEVERE headache, states 'worst headache' of life    Additional Information   Negative: Difficult to awaken or acting confused (e.g., disoriented, slurred speech)   Negative: Weakness of the face, arm or leg on one side of the body and new-onset   Negative: Numbness of the face, arm or leg on one side of the body and new-onset   Negative: Loss of speech or garbled speech and new-onset   Negative: Passed out (i.e., fainted, collapsed and was not responding)   Negative: Sounds like a life-threatening emergency to the triager   Negative: Followed a head injury within last 3 days   Negative: Unable to walk without falling   Negative: Stiff neck (can't touch chin to chest)   Negative: Possibility of carbon monoxide exposure    Protocols used: Headache-A-OH

## 2024-02-02 NOTE — ED PROVIDER NOTES
Encounter Date: 2/2/2024    SCRIBE #1 NOTE: I, Pooja Orourke, am scribing for, and in the presence of,  Melissa Sadler DO.       History     Chief Complaint   Patient presents with    Hypertension     Patient arrived to ED with complaint of high blood pressure readings and headache, reports taking his blood pressure medication PTA.     Headache     Riwzan Demarco Jr. is a 73 y.o. male, with a PMHx of HTN, Anticoagulant long term use, CAD, HLD, GERD, who presents to the ED with a chief complaint of a severe headache which woke him up from sleep at 0600 this morning. Attempted Tx w/ tylenol, w/ some relief. Reports an elevated BP this AM. Reports an intermittent nosebleed x10 days. Patient reports he has chronic dry eyes and nose. Endorses compliance with hydrochlorothiazide, and taking it this AM PTA. Reports he has a doctor's appointment at 1440 today. No other exacerbating or alleviating factors. Denies fever, sore throat, visual disturbances, cough, abdominal pain, nausea, vomiting, weakness, numbness, paresthesia  or other associated symptoms.       The history is provided by the patient. No  was used.     Review of patient's allergies indicates:   Allergen Reactions    Aspirin Nausea And Vomiting    Astelin [azelastine] Other (See Comments)     Dry mouth    Flomax [tamsulosin] Other (See Comments)     Nosebleed     Past Medical History:   Diagnosis Date    Allergy     Anticoagulant long-term use     Arthritis     Rheumatoid arthritis    Back pain     Blood clotting tendency     BPH (benign prostatic hypertrophy)     Cervical spondylosis     Colon polyp 2010    adenoma    Coronary artery disease     Depression 05/08/2015    Dry eyes     Dry mouth     GERD (gastroesophageal reflux disease)     Hyperlipidemia     Hypertension     Lumbar spondylosis     Nasal obstruction     PAD (peripheral artery disease) 04/25/2023    Rheumatoid arthritis     Sinusitis     Special screening for malignant  neoplasm of colon 06/29/2016    Trouble in sleeping      Past Surgical History:   Procedure Laterality Date    ATHERECTOMY Left 6/21/2023    Procedure: Atherectomy;  Surgeon: Abbe Vilchis MD;  Location: [facility] CATH LAB;  Service: Cardiology;  Laterality: Left;    CHALAZION EXCISION      COLONOSCOPY N/A 6/29/2016    Procedure: COLONOSCOPY;  Surgeon: Partha Saucedo MD;  Location: [facility] ENDO;  Service: Endoscopy;  Laterality: N/A;    COLONOSCOPY N/A 7/24/2020    Procedure: COLONOSCOPY;  Surgeon: Ian Martinez MD;  Location: [facility] (4TH FLR);  Service: Endoscopy;  Laterality: N/A;  4/16/20 - removed from 5/1/20, not called yet due to acute pain issues being addressed today - pg    COLONOSCOPY N/A 10/19/2023    Procedure: COLONOSCOPY;  Surgeon: Ian Martinez MD;  Location: [facility] ENDO;  Service: Endoscopy;  Laterality: N/A;  order created from Dr. Martinez's previous colonoscopy report 7/24/2020-3 year surveillance.   ok to hold Eliquis 2 days and Plavix 5 days per Dr Vilchis-GT  PEG instr portal -ml  10/12- pre call complete.  DBM    CORONARY ARTERY BYPASS GRAFT      ESOPHAGOGASTRODUODENOSCOPY N/A 7/24/2020    Procedure: ESOPHAGOGASTRODUODENOSCOPY (EGD);  Surgeon: Ian Martinez MD;  Location: [facility] (4TH FLR);  Service: Endoscopy;  Laterality: N/A;  4/16/20 - removed from 5/1/20, not called yet due to acute pain issues being addressed today.  4/17/20 - rescheduled 7/24/20 - pg    EXCISION TURBINATE, SUBMUCOUS      KNEE ARTHROSCOPY W/ DEBRIDEMENT      NASAL SEPTUM SURGERY      PERIPHERAL ANGIOGRAPHY N/A 6/21/2023    Procedure: Peripheral angiography;  Surgeon: Abbe Vilchis MD;  Location: [facility] CATH LAB;  Service: Cardiology;  Laterality: N/A;  right radial access--- RN PREOP 6/16----JM    TONSILLECTOMY       Family History   Problem Relation Age of Onset    Hyperlipidemia Mother     Alzheimer's disease Mother     Stomach cancer Father     Colon cancer Father         from wife--he is not unsure     Cataracts Other     No Known  Problems Maternal Aunt     No Known Problems Maternal Uncle     No Known Problems Paternal Aunt     No Known Problems Paternal Uncle     No Known Problems Maternal Grandmother     No Known Problems Maternal Grandfather     No Known Problems Paternal Grandmother     No Known Problems Paternal Grandfather     Blindness Neg Hx     Cancer Neg Hx     Diabetes Neg Hx     Glaucoma Neg Hx     Rheum arthritis Neg Hx     Psoriasis Neg Hx     Lupus Neg Hx     Amblyopia Neg Hx     Hypertension Neg Hx     Macular degeneration Neg Hx     Retinal detachment Neg Hx     Strabismus Neg Hx     Stroke Neg Hx     Thyroid disease Neg Hx     Esophageal cancer Neg Hx      Social History     Tobacco Use    Smoking status: Former     Current packs/day: 0.00     Average packs/day: 1 pack/day for 20.0 years (20.0 ttl pk-yrs)     Types: Cigarettes     Start date: 1971     Quit date: 1991     Years since quittin.1    Smokeless tobacco: Never    Tobacco comments:     Quit .   Substance Use Topics    Alcohol use: No    Drug use: No     Review of Systems   Constitutional:  Negative for fever.   HENT:  Positive for nosebleeds. Negative for sore throat.    Eyes:  Negative for visual disturbance.   Respiratory:  Negative for cough.    Cardiovascular:         (+) elevated BP   Gastrointestinal:  Negative for abdominal pain, nausea and vomiting.   Neurological:  Positive for headaches. Negative for weakness and numbness.        (-) paresthesia   All other systems reviewed and are negative.      Physical Exam     Initial Vitals [24 1012]   BP Pulse Resp Temp SpO2   (!) 168/75 63 18 98.5 °F (36.9 °C) 97 %      MAP       --         Physical Exam    Nursing note and vitals reviewed.  Constitutional: He appears well-developed and well-nourished.   HENT:   Head: Normocephalic and atraumatic.   Right Ear: External ear normal.   Left Ear: External ear normal.   Nose: Mucosal edema present.   Mouth/Throat: Oropharynx is clear and moist.    Clear nasal discharge.   Eyes: Conjunctivae and EOM are normal. Pupils are equal, round, and reactive to light.   Neck: Neck supple. JVD (bilateral) present.   Normal range of motion.  Cardiovascular:  Regular rhythm and normal heart sounds.   Bradycardia present.   Exam reveals no gallop and no friction rub.       No murmur heard.  Pulmonary/Chest: Effort normal and breath sounds normal. No respiratory distress. He has no decreased breath sounds. He has no wheezes. He has no rhonchi. He has no rales.   Abdominal: Abdomen is soft. Bowel sounds are normal. There is no abdominal tenderness. There is no rebound and no guarding.   Musculoskeletal:         General: No tenderness or edema. Normal range of motion.      Cervical back: Normal range of motion and neck supple.     Neurological: He is alert and oriented to person, place, and time. He has normal strength. No sensory deficit.   Skin: Skin is warm and dry. Capillary refill takes less than 2 seconds. No rash noted.   Psychiatric: He has a normal mood and affect. His behavior is normal.         Patient gave consent to have physical exam performed.    ED Course   Procedures  Labs Reviewed   POCT CMP - Abnormal; Notable for the following components:       Result Value    POC TCO2 34 (*)     All other components within normal limits   POCT B-TYPE NATRIURETIC PEPTIDE (BNP) - Abnormal; Notable for the following components:    POC B-Type Natriuretic Peptide 103 (*)     All other components within normal limits   TROPONIN ISTAT   POCT CBC   POCT CMP   POCT PROTIME-INR   POCT TROPONIN   POCT B-TYPE NATRIURETIC PEPTIDE (BNP)   ISTAT PROCEDURE     EKG Readings: (Independently Interpreted)   A-fib with SVR. Rate of 47. Abnormal EKG. QTc at 400. When compared to prior EKG dated 11/27/2023, A-fib with SVR was present, ventricular rate has increased by 12 bpm today.        Imaging Results              X-Ray Chest PA And Lateral (Final result)  Result time 02/02/24 11:48:42       Final result by Jayant Estevez DO (02/02/24 11:48:42)                   Impression:      See above      Electronically signed by: Jayant Estevez DO  Date:    02/02/2024  Time:    11:48               Narrative:    EXAMINATION:  XR CHEST PA AND LATERAL    CLINICAL HISTORY:  Hypertension;    TECHNIQUE:  PA and lateral views of the chest were performed.    COMPARISON:  01/27/2024    FINDINGS:  No significant change from prior.  Sternotomy wires and surgical clips overlying the cardiomediastinal silhouette unchanged.  There is no new lung opacity.  No lung consolidation.  No pleural effusion or pneumothorax.  Heart size relatively stable continued atherosclerotic aorta.  Visualized osseous structures grossly intact                                       CT Head Without Contrast (Final result)  Result time 02/02/24 11:54:09      Final result by Jayant Estevez DO (02/02/24 11:54:09)                   Impression:      No acute intracranial findings specifically without evidence for acute intracranial hemorrhage or sulcal effacement to suggest large territory recent infarction.  Further evaluation as warranted clinically.      Electronically signed by: Jayant Estevez DO  Date:    02/02/2024  Time:    11:54               Narrative:    EXAMINATION:  CT HEAD WITHOUT CONTRAST    CLINICAL HISTORY:  Headache, new or worsening (Age >= 50y);    TECHNIQUE:  Multiple sequential 5 mm axial images of the head without contrast.  Coronal and sagittal reformatted imaging from the axial acquisition.    COMPARISON:  CT head 05/06/2014    FINDINGS:  Mild cerebral volume loss with compensatory enlargement ventricles sulci and cisterns without hydrocephalus.  There is no evidence for acute intracranial hemorrhage or sulcal effacement.  No midline shift or mass effect.  No significant opacification visualized paranasal sinuses or mastoid air cells.  Incidental partially visualized incomplete ossification posterior arch of C1 compatible with  developmental variant.                                       Medications   acetaminophen tablet 1,000 mg (1,000 mg Oral Given 2/2/24 1254)     Medical Decision Making  Amount and/or Complexity of Data Reviewed  External Data Reviewed: ECG.     Details: External documents reviewed for previous EKG comparison: see ED course.      Labs: ordered.  Radiology: ordered.  ECG/medicine tests: ordered and independent interpretation performed. Decision-making details documented in ED Course.     Details: EKGs independently interpreted by Dr. Sadler reads: see ED course. External documents reviewed for previous EKG comparison: see ED course.       Risk  OTC drugs.  Prescription drug management.    Medical Decision Making:    This is an evaluation of a 73 y.o. male that presents to the Emergency Department for headache  Chief Complaint   Patient presents with    Hypertension     Patient arrived to ED with complaint of high blood pressure readings and headache, reports taking his blood pressure medication PTA.     Headache     The patient is a non-toxic and well appearing patient. On physical exam, patient appears well hydrated with moist mucus membranes. Breath sounds are clear and equal bilaterally with no adventitious breath sounds, tachypnea or respiratory distress. Sinus bradycardia. Abdomen soft and non tender. Bilateral JVD. Mucosal edema. Clear nasal discharge. Patient is tolerating PO without difficulty. Physical exam otherwise as above.     I have reviewed vital signs and nursing notes.   Vital Signs Are Reassuring.     Based on the patient's symptoms, I am considering and evaluating for the following differential diagnoses: HTN, uncontrolled HTN, headache, intractable headache, epistaxis, intractable epistaxis, cardiac arrhythmia, A-fib, atrial flutter    Consider hospitalization for:  Hypertension and headache    Patient is NOT agreeable to transfer and admission to any hospital at this time.  Patient reports he has  an appointment for getting his pacemaker at 2:40 a.m. today and nothing will make him miss that appointment.    ED Course:Treatment in the ED included Physical Exam and medications given in ED  Medications   acetaminophen tablet 1,000 mg (1,000 mg Oral Given 2/2/24 1254)   .   Patient reports PTA he put vaseline in his nose to control dryness.   Patient reports feeling better after treatment in the ER.       External Data/Documents Reviewed: Previous medical records and vital signs reviewed, see HPI and Physical exam.   Labs: ordered and reviewed.  Troponin within normal limits at 0.00.  Radiology: ordered as indicated and reviewed.  No pneumothorax  ECG/medicine tests: ordered and independent interpretation performed by Dr. Melissa Sadler DO. Decision-making details documented in ED Course.   Cardiac monitor placed for hypertension. Monitor shows atrial fibrillation Interpreted by Dr. Melissa Sadler DO.    Risk  Diagnosis or treatment significantly limited by the following social determinants of health: Body mass index is 26.39 kg/m².     In shared decision making with the patient, we discussed treatment, prescriptions, labs, and imaging results.    Discharge home with   ED Prescriptions       Medication Sig Dispense Start Date End Date Auth. Provider    acetaminophen (TYLENOL) 500 MG tablet Take 2 tablets (1,000 mg total) by mouth every 6 (six) hours as needed for Pain. 30 tablet 2/2/2024 -- Melissa Sadler DO    diphenhydrAMINE (BENADRYL) 25 mg capsule Take 1 capsule (25 mg total) by mouth every 6 (six) hours as needed for Itching or Allergies (Take if headache does not resolve). 20 capsule 2/2/2024 -- Meilssa Sadler DO    promethazine (PHENERGAN) 25 MG tablet Take 1 tablet (25 mg total) by mouth every 6 (six) hours as needed for Nausea (Taken headache does not resolve). 15 tablet 2/2/2024 -- Melissa Sadler DO          Fill and take prescriptions as directed.  Return to the ED if symptoms worsen or do not resolve.    Answered questions and discussed discharge plan.    Patient feels better and is ready for discharge.  Follow up with PCP/specialist in 1 day   patient will be transferred & admitted.  Patient will be transferred via EMS to accepting facility.      At time of discharge patient is awake alert oriented x4 speaking clearly in full sentences and moving all 4 extremities.     The following labs and imaging were reviewed:    Insert CBC here     Admission on 02/02/2024   Component Date Value Ref Range Status    Albumin, POC 02/02/2024 3.8  3.3 - 5.5 g/dL Final    Alkaline Phosphatase, POC 02/02/2024 59  42 - 141 U/L Final    ALT (SGPT), POC 02/02/2024 23  10 - 47 U/L Final    AST (SGOT), POC 02/02/2024 27  11 - 38 U/L Final    POC BUN 02/02/2024 14  7 - 22 mg/dL Final    Calcium, POC 02/02/2024 10.2  8.0 - 10.3 mg/dL Final    POC Chloride 02/02/2024 104  98 - 108 mmol/L Final    POC Creatinine 02/02/2024 0.9  0.6 - 1.2 mg/dL Final    POC Glucose 02/02/2024 97  73 - 118 mg/dL Final    POC Potassium 02/02/2024 4.7  3.6 - 5.1 mmol/L Final    POC Sodium 02/02/2024 142  128 - 145 mmol/L Final    Bilirubin, POC 02/02/2024 1.0  0.2 - 1.6 mg/dL Final    POC TCO2 02/02/2024 34 (H)  18 - 33 mmol/L Final    Protein, POC 02/02/2024 7.3  6.4 - 8.1 g/dL Final    POC PTWBT 02/02/2024 14.3  9.7 - 14.3 sec Final    POC PTINR 02/02/2024 1.2  0.9 - 1.2 Final    Sample 02/02/2024 unknown   Final    POC Cardiac Troponin I 02/02/2024 0.00  0.00 - 0.08 ng/mL Final    Sample 02/02/2024 unknown   Final    Comment: A single negative troponin is insufficient to rule out myocardial infarction.  The use of a serial sampling protocol is recommended practice. Correlate results with reference intervals established for methodology used. Point of care and core laboratory   troponin results are not interchangeable.      POC B-Type Natriuretic Peptide 02/02/2024 103 (H)  0.0 - 100.0 pg/mL Final        Imaging Results              X-Ray Chest PA And Lateral  (Final result)  Result time 02/02/24 11:48:42      Final result by Jayant Estevez DO (02/02/24 11:48:42)                   Impression:      See above      Electronically signed by: Jayant Estevez DO  Date:    02/02/2024  Time:    11:48               Narrative:    EXAMINATION:  XR CHEST PA AND LATERAL    CLINICAL HISTORY:  Hypertension;    TECHNIQUE:  PA and lateral views of the chest were performed.    COMPARISON:  01/27/2024    FINDINGS:  No significant change from prior.  Sternotomy wires and surgical clips overlying the cardiomediastinal silhouette unchanged.  There is no new lung opacity.  No lung consolidation.  No pleural effusion or pneumothorax.  Heart size relatively stable continued atherosclerotic aorta.  Visualized osseous structures grossly intact                                       CT Head Without Contrast (Final result)  Result time 02/02/24 11:54:09      Final result by Jayant Estevez DO (02/02/24 11:54:09)                   Impression:      No acute intracranial findings specifically without evidence for acute intracranial hemorrhage or sulcal effacement to suggest large territory recent infarction.  Further evaluation as warranted clinically.      Electronically signed by: Jayant Estevez DO  Date:    02/02/2024  Time:    11:54               Narrative:    EXAMINATION:  CT HEAD WITHOUT CONTRAST    CLINICAL HISTORY:  Headache, new or worsening (Age >= 50y);    TECHNIQUE:  Multiple sequential 5 mm axial images of the head without contrast.  Coronal and sagittal reformatted imaging from the axial acquisition.    COMPARISON:  CT head 05/06/2014    FINDINGS:  Mild cerebral volume loss with compensatory enlargement ventricles sulci and cisterns without hydrocephalus.  There is no evidence for acute intracranial hemorrhage or sulcal effacement.  No midline shift or mass effect.  No significant opacification visualized paranasal sinuses or mastoid air cells.  Incidental partially visualized incomplete  ossification posterior arch of C1 compatible with developmental variant.                                            Scribe Attestation:   Scribe #1: I performed the above scribed service and the documentation accurately describes the services I performed. I attest to the accuracy of the note.                          I, Dr. Melissa Sadler, personally performed the services described in this documentation. This document was produced by a scribe under my direction and in my presence. All medical record entries made by the scribe were at my direction and in my presence.  I have reviewed the chart and agree that the record reflects my personal performance and is accurate and complete. Melissa Sadler DO.     02/02/2024 1:04 PM        Clinical Impression:  Final diagnoses:  [I10] Hypertension  [R51.9] Acute nonintractable headache, unspecified headache type (Primary)  [I48.91] Atrial fibrillation, unspecified type          ED Disposition Condition    Discharge Stable          ED Prescriptions       Medication Sig Dispense Start Date End Date Auth. Provider    acetaminophen (TYLENOL) 500 MG tablet Take 2 tablets (1,000 mg total) by mouth every 6 (six) hours as needed for Pain. 30 tablet 2/2/2024 -- Melissa Sadler DO    diphenhydrAMINE (BENADRYL) 25 mg capsule Take 1 capsule (25 mg total) by mouth every 6 (six) hours as needed for Itching or Allergies (Take if headache does not resolve). 20 capsule 2/2/2024 -- Melissa Sadler DO    promethazine (PHENERGAN) 25 MG tablet Take 1 tablet (25 mg total) by mouth every 6 (six) hours as needed for Nausea (Taken headache does not resolve). 15 tablet 2/2/2024 -- Melissa Sadler DO          Follow-up Information    None          Melissa Sadler DO  02/02/24 [id number]

## 2024-02-02 NOTE — DISCHARGE INSTRUCTIONS
Go directly to your cardiology appointment as scheduled.  Return to the ED immediately if your symptoms return or worsen.

## 2024-02-09 ENCOUNTER — OFFICE VISIT (OUTPATIENT)
Dept: FAMILY MEDICINE | Facility: CLINIC | Age: 74
End: 2024-02-09
Payer: MEDICARE

## 2024-02-09 VITALS
SYSTOLIC BLOOD PRESSURE: 132 MMHG | WEIGHT: 210.13 LBS | TEMPERATURE: 99 F | BODY MASS INDEX: 27.85 KG/M2 | HEART RATE: 63 BPM | DIASTOLIC BLOOD PRESSURE: 76 MMHG | HEIGHT: 73 IN | OXYGEN SATURATION: 97 %

## 2024-02-09 DIAGNOSIS — K21.00 GASTROESOPHAGEAL REFLUX DISEASE WITH ESOPHAGITIS WITHOUT HEMORRHAGE: ICD-10-CM

## 2024-02-09 DIAGNOSIS — I70.0 ATHEROSCLEROSIS OF AORTA: ICD-10-CM

## 2024-02-09 DIAGNOSIS — I48.91 NEW ONSET A-FIB: ICD-10-CM

## 2024-02-09 DIAGNOSIS — M06.9 RHEUMATOID ARTHRITIS INVOLVING SHOULDER, UNSPECIFIED LATERALITY, UNSPECIFIED WHETHER RHEUMATOID FACTOR PRESENT: ICD-10-CM

## 2024-02-09 DIAGNOSIS — E78.5 HYPERLIPIDEMIA, UNSPECIFIED HYPERLIPIDEMIA TYPE: ICD-10-CM

## 2024-02-09 DIAGNOSIS — M35.00 SECONDARY SJOGREN'S SYNDROME: ICD-10-CM

## 2024-02-09 DIAGNOSIS — I10 ESSENTIAL HYPERTENSION: ICD-10-CM

## 2024-02-09 DIAGNOSIS — R04.0 EPISTAXIS: Primary | ICD-10-CM

## 2024-02-09 DIAGNOSIS — R39.9 LOWER URINARY TRACT SYMPTOMS (LUTS): ICD-10-CM

## 2024-02-09 PROCEDURE — 99999 PR PBB SHADOW E&M-EST. PATIENT-LVL IV: CPT | Mod: PBBFAC,,, | Performed by: FAMILY MEDICINE

## 2024-02-09 PROCEDURE — 99214 OFFICE O/P EST MOD 30 MIN: CPT | Mod: PBBFAC,PO | Performed by: FAMILY MEDICINE

## 2024-02-09 PROCEDURE — 99215 OFFICE O/P EST HI 40 MIN: CPT | Mod: S$PBB,,, | Performed by: FAMILY MEDICINE

## 2024-02-09 RX ORDER — OMEPRAZOLE 20 MG/1
20 CAPSULE, DELAYED RELEASE ORAL DAILY
Qty: 30 CAPSULE | Refills: 5 | Status: SHIPPED | OUTPATIENT
Start: 2024-02-09 | End: 2024-05-22

## 2024-02-09 RX ORDER — OXYMETAZOLINE HCL 0.05 %
2 SPRAY, NON-AEROSOL (ML) NASAL 2 TIMES DAILY
Qty: 30 ML | Refills: 2 | Status: SHIPPED | OUTPATIENT
Start: 2024-02-09 | End: 2024-02-12

## 2024-02-09 RX ORDER — LOSARTAN POTASSIUM 25 MG/1
25 TABLET ORAL DAILY
Qty: 90 TABLET | Refills: 3 | Status: SHIPPED | OUTPATIENT
Start: 2024-02-09 | End: 2025-02-08

## 2024-02-09 RX ORDER — HYDROCHLOROTHIAZIDE 12.5 MG/1
12.5 CAPSULE ORAL DAILY
Qty: 90 CAPSULE | Refills: 3 | Status: SHIPPED | OUTPATIENT
Start: 2024-02-09

## 2024-02-09 RX ORDER — ATORVASTATIN CALCIUM 40 MG/1
40 TABLET, FILM COATED ORAL DAILY
Qty: 90 TABLET | Refills: 3 | Status: SHIPPED | OUTPATIENT
Start: 2024-02-09 | End: 2025-02-08

## 2024-02-09 RX ORDER — ALFUZOSIN HYDROCHLORIDE 10 MG/1
10 TABLET, EXTENDED RELEASE ORAL
Qty: 90 TABLET | Refills: 3 | Status: SHIPPED | OUTPATIENT
Start: 2024-02-09 | End: 2024-04-23 | Stop reason: SDUPTHER

## 2024-02-09 NOTE — PROGRESS NOTES
Chief Complaint   Patient presents with    Annual Exam       SUBJECTIVE:   Rizwan Demarco Jr. is a 73 y.o. male presenting for his annual checkup.   Current Outpatient Medications   Medication Sig Dispense Refill    acetaminophen (TYLENOL) 500 MG tablet Take 2 tablets (1,000 mg total) by mouth every 6 (six) hours as needed for Pain. 30 tablet 0    artificial tears,hypromellose,,GENTEAL/SUSTANE, (SYSTANE GEL) 0.3 % Gel 1 drop as needed.      budesonide (PULMICORT) 0.25 mg/2 mL nebulizer solution Take 2 mLs (0.25 mg total) by nebulization 2 (two) times daily. Controller 90 each 5    cetirizine (ZYRTEC) 10 MG tablet Take 1 tablet (10 mg total) by mouth once daily. 30 tablet 0    cinnamon bark 500 mg capsule Take 500 mg by mouth once daily.      clopidogreL (PLAVIX) 75 mg tablet Take 1 tablet (75 mg total) by mouth once daily. 90 tablet 3    docusate sodium (COLACE) 100 MG capsule Take 1 capsule (100 mg total) by mouth 2 (two) times daily. 60 capsule 0    fluticasone propionate (FLONASE) 50 mcg/actuation nasal spray 1 spray (50 mcg total) by Each Nostril route 2 (two) times daily. 16 g 0    multivitamin (THERAGRAN) per tablet Take by mouth.  Tablet Oral Every day      omega-3 fatty acids 1,250 mg Cap Take by mouth.      propylene glycoL 0.6 % Drop Apply to eye.      sodium bicarb-sodium chloride Pack by sinus irrigation route.      sodium chloride (OCEAN NASAL) 0.65 % nasal spray 1 spray by Nasal route every 3 (three) hours as needed for Congestion. 30 mL 0    alfuzosin (UROXATRAL) 10 mg Tb24 Take 1 tablet (10 mg total) by mouth daily with breakfast. 90 tablet 3    apixaban (ELIQUIS) 5 mg Tab Take 1 tablet (5 mg total) by mouth 2 (two) times daily. 60 tablet 11    atorvastatin (LIPITOR) 40 MG tablet Take 1 tablet (40 mg total) by mouth once daily. 90 tablet 3    diphenhydrAMINE (BENADRYL) 25 mg capsule Take 1 capsule (25 mg total) by mouth every 6 (six) hours as needed for Itching or Allergies (Take if headache does  not resolve). (Patient not taking: Reported on 2/9/2024) 20 capsule 0    hydroCHLOROthiazide (MICROZIDE) 12.5 mg capsule Take 1 capsule (12.5 mg total) by mouth once daily. 90 capsule 3    losartan (COZAAR) 25 MG tablet Take 1 tablet (25 mg total) by mouth once daily. 90 tablet 3    omeprazole (PRILOSEC) 20 MG capsule Take 1 capsule (20 mg total) by mouth once daily. 30 capsule 5     Current Facility-Administered Medications   Medication Dose Route Frequency Provider Last Rate Last Admin    sodium chloride 0.9% flush 10 mL  10 mL Intravenous PRN Abbe Vilchis MD         Allergies: Aspirin, Astelin [azelastine], and Flomax [tamsulosin]   Patient Active Problem List    Diagnosis Date Noted    PAD (peripheral artery disease) 04/25/2023    Poor circulation 04/25/2023    Chest pain 04/25/2023    Cervical pain (neck) 03/02/2023    Impaired range of motion of cervical spine 03/02/2023    Posture imbalance 03/02/2023    Complete loss of teeth, unspecified cause, unspecified class 01/24/2023    Other personal history presenting hazards to health 01/20/2021    Atherosclerosis of aorta 01/24/2019    Wears dentures 05/21/2018    Dysfunctions associated with sleep stages or arousal from sleep     Primary insomnia     Nasal obstruction 09/18/2015    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 No dyspnea or chest pain on exertion. No abdominal pain, change in bowel habits, black or bloody stools. No urinary tract or prostatic symptoms. No neurological complaints.  Has dryness in the  "throat  OBJECTIVE:   The patient appears well, alert, oriented x 3, in no distress.   /76   Pulse 63   Temp 98.5 °F (36.9 °C) (Oral)   Ht 6' 1" (1.854 m)   Wt 95.3 kg (210 lb 1.6 oz)   SpO2 97%   BMI 27.72 kg/m²   Wt Readings from Last 5 Encounters:   02/09/24 95.3 kg (210 lb 1.6 oz)   02/02/24 95.7 kg (210 lb 15.7 oz)   02/02/24 90.7 kg (200 lb)   01/27/24 90.7 kg (200 lb)   11/29/23 94.3 kg (208 lb)       ENT abnormal.  Neck supple.   Chronic joint changes noted.    ASSESSMENT:   1. Epistaxis    2. Essential hypertension    3. Gastroesophageal reflux disease with esophagitis without hemorrhage    4. Hyperlipidemia, unspecified hyperlipidemia type    5. Lower urinary tract symptoms (LUTS)    6. New onset a-fib    7. Rheumatoid arthritis involving shoulder, unspecified laterality, unspecified whether rheumatoid factor present    8. Secondary Sjogren's syndrome    9. Atherosclerosis of aorta          PLAN:   Counseled on age appropriate medical preventative services, including age appropriate cancer screenings, over all nutritional health, need for a consistent exercise regimen and an over all push towards maintaining a vigorous and active lifestyle.  Counseled on age appropriate vaccines and discussed upcoming health care needs based on age/gender.  Spent time with patient counseling on need for a good patient/doctor relationship moving forward.  Discussed use of common OTC medications and supplements.  Discussed common dietary aids and use of caffeine and the need for good sleep hygiene and stress management.    Problem List Items Addressed This Visit       GERD (gastroesophageal reflux disease) (Chronic)    Relevant Medications    omeprazole (PRILOSEC) 20 MG capsule    Hyperlipidemia (Chronic)    Overview     Patient refuse statin therapy         Relevant Medications    atorvastatin (LIPITOR) 40 MG tablet    Sjogren's disease    Current Assessment & Plan     Watch for dryness  Eyes are the " "worst  Continue to monitor         Rheumatoid arthritis    Overview     Has remained off Plaquenil  Has not seen rheum since 2016    No symptoms         Current Assessment & Plan     Monitor for progression  Stable currently  He is reluctant to take more medication         Essential hypertension    Relevant Medications    losartan (COZAAR) 25 MG tablet    hydroCHLOROthiazide (MICROZIDE) 12.5 mg capsule    Atherosclerosis of aorta    Overview     "THE HEART IS NOT ENLARGED, AND THE AORTA TAPERS NORMALLY WITH ATHEROSCLEROTIC   CALCIFICATIONS" - CT Abdomen with contrast 9-         Current Assessment & Plan     The 10-year ASCVD risk score (Emmanuelle CORREA, et al., 2019) is: 23.1%    Values used to calculate the score:      Age: 73 years      Sex: Male      Is Non- : Yes      Diabetic: No      Tobacco smoker: No      Systolic Blood Pressure: 132 mmHg      Is BP treated: Yes      HDL Cholesterol: 40 mg/dL      Total Cholesterol: 155 mg/dL  Continue to watch this hawk          Other Visit Diagnoses       Epistaxis    -  Primary    Lower urinary tract symptoms (LUTS)        Relevant Medications    alfuzosin (UROXATRAL) 10 mg Tb24    New onset a-fib        Relevant Medications    apixaban (ELIQUIS) 5 mg Tab              "

## 2024-02-12 ENCOUNTER — TELEPHONE (OUTPATIENT)
Dept: RHEUMATOLOGY | Facility: CLINIC | Age: 74
End: 2024-02-12
Payer: MEDICARE

## 2024-02-14 ENCOUNTER — PATIENT MESSAGE (OUTPATIENT)
Dept: OTOLARYNGOLOGY | Facility: CLINIC | Age: 74
End: 2024-02-14
Payer: MEDICARE

## 2024-02-14 NOTE — ASSESSMENT & PLAN NOTE
The 10-year ASCVD risk score (Emmanuelle CORREA, et al., 2019) is: 23.1%    Values used to calculate the score:      Age: 73 years      Sex: Male      Is Non- : Yes      Diabetic: No      Tobacco smoker: No      Systolic Blood Pressure: 132 mmHg      Is BP treated: Yes      HDL Cholesterol: 40 mg/dL      Total Cholesterol: 155 mg/dL  Continue to watch this closley

## 2024-02-20 ENCOUNTER — OFFICE VISIT (OUTPATIENT)
Dept: OTOLARYNGOLOGY | Facility: CLINIC | Age: 74
End: 2024-02-20
Payer: MEDICARE

## 2024-02-20 VITALS
WEIGHT: 209 LBS | BODY MASS INDEX: 27.57 KG/M2 | HEART RATE: 68 BPM | DIASTOLIC BLOOD PRESSURE: 82 MMHG | SYSTOLIC BLOOD PRESSURE: 154 MMHG

## 2024-02-20 DIAGNOSIS — J34.2 NASAL SEPTAL DEVIATION: ICD-10-CM

## 2024-02-20 DIAGNOSIS — J34.3 HYPERTROPHY OF BOTH INFERIOR NASAL TURBINATES: ICD-10-CM

## 2024-02-20 DIAGNOSIS — J01.90 ACUTE SINUSITIS, RECURRENCE NOT SPECIFIED, UNSPECIFIED LOCATION: ICD-10-CM

## 2024-02-20 DIAGNOSIS — J32.9 CHRONIC SINUSITIS, UNSPECIFIED LOCATION: Primary | ICD-10-CM

## 2024-02-20 DIAGNOSIS — Z98.890 S/P FESS (FUNCTIONAL ENDOSCOPIC SINUS SURGERY): ICD-10-CM

## 2024-02-20 PROCEDURE — 99214 OFFICE O/P EST MOD 30 MIN: CPT | Mod: PBBFAC,25 | Performed by: STUDENT IN AN ORGANIZED HEALTH CARE EDUCATION/TRAINING PROGRAM

## 2024-02-20 PROCEDURE — 99999 PR PBB SHADOW E&M-EST. PATIENT-LVL IV: CPT | Mod: PBBFAC,,, | Performed by: STUDENT IN AN ORGANIZED HEALTH CARE EDUCATION/TRAINING PROGRAM

## 2024-02-20 PROCEDURE — 31231 NASAL ENDOSCOPY DX: CPT | Mod: PBBFAC | Performed by: STUDENT IN AN ORGANIZED HEALTH CARE EDUCATION/TRAINING PROGRAM

## 2024-02-20 PROCEDURE — 31231 NASAL ENDOSCOPY DX: CPT | Mod: S$PBB,,, | Performed by: STUDENT IN AN ORGANIZED HEALTH CARE EDUCATION/TRAINING PROGRAM

## 2024-02-20 PROCEDURE — 99214 OFFICE O/P EST MOD 30 MIN: CPT | Mod: 25,S$PBB,, | Performed by: STUDENT IN AN ORGANIZED HEALTH CARE EDUCATION/TRAINING PROGRAM

## 2024-02-20 RX ORDER — LEVOFLOXACIN 500 MG/1
500 TABLET, FILM COATED ORAL DAILY
Qty: 7 TABLET | Refills: 0 | Status: SHIPPED | OUTPATIENT
Start: 2024-02-20 | End: 2024-02-27

## 2024-02-20 NOTE — PROGRESS NOTES
Subjective:      Rizwan is a 73 y.o. male who comes for follow-up of sinusitis.  His last visit with me was on 3/28/2023.  He reports that over the last 2 weeks has been having a foul smell mostly in the left side. Reoprts smell is like garbage. Has been decreasing in intensity over the last week or so. Using irrigations.     His current sinus regime consists of: Saline irrigations.     SNOT-22 score: : (P) 18   %       The patient's medications, allergies, past medical, surgical, social and family histories were reviewed and updated as appropriate.    Review of Systems   Constitutional:  Positive for chills and malaise/fatigue.   HENT:  Positive for nosebleeds and sore throat.    Eyes:  Positive for photophobia.   Respiratory:  Positive for cough, shortness of breath and wheezing.    Gastrointestinal:  Positive for abdominal pain and constipation.   Musculoskeletal:  Positive for neck pain.   Skin: Negative.    Neurological:  Positive for headaches.   Endo/Heme/Allergies:  Bruises/bleeds easily.   Psychiatric/Behavioral:  The patient is nervous/anxious.       Answers submitted by the patient for this visit:  Review of Symptoms Questionnaire  (Submitted on 2/15/2024)  Sinus infection(s)?: Yes  Voice Change?: Yes  eye itching: Yes  Snoring?: Yes  Irregular heartbeat?: Yes  Foot swelling?: Yes  Acid Reflux?: Yes  Urinating too frequently?: Yes  Muscle aches / pain?: Yes  Seasonal Allergies?: Yes  Cold all of the time? : Yes  sleep disturbance: Yes    A detailed review of systems was obtained with pertinent positives as per the above HPI, and otherwise negative.        Objective:     BP (!) 154/82 (Patient Position: Sitting)   Pulse 68   Wt 94.8 kg (209 lb)   BMI 27.57 kg/m²        Constitutional:   Vital signs are normal. He appears well-developed and well-nourished.     Head:  Normocephalic and atraumatic.     Ears:  Hearing normal to normal and whispered voice; external ear normal without scars, lesions, or  masses; ear canal, tympanic membrane, and middle ear normal..   Right Ear: No swelling. Tympanic membrane is not perforated and not bulging. No middle ear effusion.   Left Ear: No swelling. Tympanic membrane is not perforated and not bulging.  No middle ear effusion.     Nose:  Nose normal including turbinates, nasal mucosa, sinuses and nasal septum. No epistaxis.     Mouth/Throat  Oropharynx clear and moist without lesions or asymmetry and normal uvula midline. Normal dentition. No tonsillar abscesses. Tonsillar exudate.      Neck:  Neck normal without thyromegaly masses, asymmetry, normal tracheal structure, crepitus, and tenderness, thyroid normal, trachea normal, phonation normal, full range of motion with neck supple and no adenopathy. No stridor present.        Head (right side): No submental adenopathy present.        Head (left side): No submental adenopathy present.     He has no cervical adenopathy.     Pulmonary/Chest:   No stridor.     Procedure    Nasal endoscopy performed.  See procedure note.    Nasal Endoscopy:  2/20/2024    The use of diagnostic nasal endoscopy was considered medically necessary for the evaluation and visualization of the nasal anatomy for symptoms suggestive of nasal or sinus origin. Physical examination (including a nasal speculum evaluation) did not provide sufficient clinical information to establish a diagnosis, or symptoms did not improve or worsened following treatment.     The nasal cavity was decongested with topical 1% phenylephrine and anesthetized with 4% lidocaine.  A rigid 0-degree endoscope was introduced into the nasal cavity.    The patient was seated in the examination chair. After discussion of risks and benefits, a nasal endoscope was inserted into the nose the endoscope was passed along the left nasal floor to the nasopharynx. It was then passed between the middle and superior meatus, nasal turbinates, nasal septum, nasopharynx and sphenoethmoid region. The  nasal endoscope was withdrawn and there was no complications. An identical procedure was performed on the right side. I was present for the entire procedure.The patient tolerated the above procedure well. The findings of this procedure can be found in the dictated note from 2/20/2024 visit.                              Patient notes point cavities bilaterally.  Maxillary antrostomies bilaterally.  Small submucosal pocket of purulence noted within the left maxillary sinus.  No other surrounding erythema or edema noted.    Data Reviewed    WBC (K/uL)   Date Value   01/27/2024 5.79     Eosinophil % (%)   Date Value   01/27/2024 4.5     Eos # (K/uL)   Date Value   01/27/2024 0.3     Platelets (K/uL)   Date Value   01/27/2024 171     Glucose (mg/dL)   Date Value   01/27/2024 89     Total IgE (IU/mL)   Date Value   09/30/2015 302 (H)       No sinus imaging available.    Assessment:     No diagnosis found.     Plan:     - Levaquin x 7 days.  - Cont irrigations.  - RTC 1 months.    Ward Schmitt MD

## 2024-04-11 ENCOUNTER — PATIENT MESSAGE (OUTPATIENT)
Dept: GASTROENTEROLOGY | Facility: CLINIC | Age: 74
End: 2024-04-11
Payer: MEDICARE

## 2024-04-12 ENCOUNTER — OFFICE VISIT (OUTPATIENT)
Dept: OTOLARYNGOLOGY | Facility: CLINIC | Age: 74
End: 2024-04-12
Payer: MEDICARE

## 2024-04-12 VITALS
DIASTOLIC BLOOD PRESSURE: 63 MMHG | SYSTOLIC BLOOD PRESSURE: 131 MMHG | HEIGHT: 73 IN | WEIGHT: 213.19 LBS | BODY MASS INDEX: 28.25 KG/M2 | HEART RATE: 48 BPM

## 2024-04-12 DIAGNOSIS — J01.90 ACUTE SINUSITIS, RECURRENCE NOT SPECIFIED, UNSPECIFIED LOCATION: ICD-10-CM

## 2024-04-12 DIAGNOSIS — Z98.890 S/P FESS (FUNCTIONAL ENDOSCOPIC SINUS SURGERY): Primary | ICD-10-CM

## 2024-04-12 DIAGNOSIS — J34.2 NASAL SEPTAL DEVIATION: ICD-10-CM

## 2024-04-12 DIAGNOSIS — J34.3 HYPERTROPHY OF BOTH INFERIOR NASAL TURBINATES: ICD-10-CM

## 2024-04-12 PROCEDURE — 31231 NASAL ENDOSCOPY DX: CPT | Mod: S$PBB,,, | Performed by: STUDENT IN AN ORGANIZED HEALTH CARE EDUCATION/TRAINING PROGRAM

## 2024-04-12 PROCEDURE — 99999 PR PBB SHADOW E&M-EST. PATIENT-LVL IV: CPT | Mod: PBBFAC,,, | Performed by: STUDENT IN AN ORGANIZED HEALTH CARE EDUCATION/TRAINING PROGRAM

## 2024-04-12 PROCEDURE — 31231 NASAL ENDOSCOPY DX: CPT | Mod: PBBFAC | Performed by: STUDENT IN AN ORGANIZED HEALTH CARE EDUCATION/TRAINING PROGRAM

## 2024-04-12 PROCEDURE — 99214 OFFICE O/P EST MOD 30 MIN: CPT | Mod: PBBFAC,25 | Performed by: STUDENT IN AN ORGANIZED HEALTH CARE EDUCATION/TRAINING PROGRAM

## 2024-04-12 PROCEDURE — 99213 OFFICE O/P EST LOW 20 MIN: CPT | Mod: 25,S$PBB,, | Performed by: STUDENT IN AN ORGANIZED HEALTH CARE EDUCATION/TRAINING PROGRAM

## 2024-04-12 NOTE — PROGRESS NOTES
"    Subjective:      Rizwan is a 73 y.o. male who comes for follow-up of sinusitis.  His last visit with me was on 2/20/2024.  Doing well, no purulent nasal drainage noted. Foul smell resolved. Finished course of levaquin.     His current sinus regime consists of: Saline.    The assessment of quality and severity of symptoms as measured by the SNOT-22 score is deferred.    The patient's medications, allergies, past medical, surgical, social and family histories were reviewed and updated as appropriate.    ROS     A detailed review of systems was obtained with pertinent positives as per the above HPI, and otherwise negative.        Objective:     /63 (BP Location: Left arm, Patient Position: Sitting, BP Method: Large (Automatic))   Pulse (!) 48   Ht 6' 1" (1.854 m)   Wt 96.7 kg (213 lb 3 oz)   BMI 28.13 kg/m²        Constitutional:   Vital signs are normal. He appears well-developed and well-nourished.     Head:  Normocephalic and atraumatic.     Ears:  Hearing normal to normal and whispered voice; external ear normal without scars, lesions, or masses; ear canal, tympanic membrane, and middle ear normal..   Right Ear: No swelling. Tympanic membrane is not perforated and not bulging. No middle ear effusion.   Left Ear: No swelling. Tympanic membrane is not perforated and not bulging.  No middle ear effusion.     Nose:  Nose normal including turbinates, nasal mucosa, sinuses and nasal septum. No epistaxis.     Mouth/Throat  Oropharynx clear and moist without lesions or asymmetry and normal uvula midline. Normal dentition. No tonsillar abscesses. Tonsillar exudate.      Neck:  Neck normal without thyromegaly masses, asymmetry, normal tracheal structure, crepitus, and tenderness, thyroid normal, trachea normal, phonation normal, full range of motion with neck supple and no adenopathy. No stridor present.        Head (right side): No submental adenopathy present.        Head (left side): No submental adenopathy " present.     He has no cervical adenopathy.     Pulmonary/Chest:   No stridor.       Procedure    Nasal endoscopy performed.  See procedure note.    Nasal Endoscopy:  4/12/2024    The use of diagnostic nasal endoscopy was considered medically necessary for the evaluation and visualization of the nasal anatomy for symptoms suggestive of nasal or sinus origin. Physical examination (including a nasal speculum evaluation) did not provide sufficient clinical information to establish a diagnosis, or symptoms did not improve or worsened following treatment.     The nasal cavity was decongested with topical 1% phenylephrine and anesthetized with 4% lidocaine.  A rigid 0-degree endoscope was introduced into the nasal cavity.    The patient was seated in the examination chair. After discussion of risks and benefits, a nasal endoscope was inserted into the nose the endoscope was passed along the left nasal floor to the nasopharynx. It was then passed between the middle and superior meatus, nasal turbinates, nasal septum, nasopharynx and sphenoethmoid region. The nasal endoscope was withdrawn and there was no complications. An identical procedure was performed on the right side. I was present for the entire procedure.The patient tolerated the above procedure well. The findings of this procedure can be found in the dictated note from 4/12/2024 visit.                              Complete resolution of purulent nasal drainage. Previous right ESS noted, patient maxillary.     Data Reviewed    WBC (K/uL)   Date Value   01/27/2024 5.79     Eosinophil % (%)   Date Value   01/27/2024 4.5     Eos # (K/uL)   Date Value   01/27/2024 0.3     Platelets (K/uL)   Date Value   01/27/2024 171     Glucose (mg/dL)   Date Value   01/27/2024 89     Total IgE (IU/mL)   Date Value   09/30/2015 302 (H)     No sinus imaging available.    Assessment:     1. S/P FESS (functional endoscopic sinus surgery)    2. Acute sinusitis, recurrence not specified,  unspecified location    3. Hypertrophy of both inferior nasal turbinates    4. Nasal septal deviation      Plan:     - Sinusitis resolved.  - RTC 6 months or PRN.    - COnt nasal saline.     Ward Schmitt MD

## 2024-04-23 ENCOUNTER — OFFICE VISIT (OUTPATIENT)
Dept: UROLOGY | Facility: CLINIC | Age: 74
End: 2024-04-23
Payer: MEDICARE

## 2024-04-23 VITALS — BODY MASS INDEX: 28.05 KG/M2 | HEIGHT: 73 IN | WEIGHT: 211.63 LBS

## 2024-04-23 DIAGNOSIS — R39.9 LOWER URINARY TRACT SYMPTOMS (LUTS): ICD-10-CM

## 2024-04-23 DIAGNOSIS — N52.9 ERECTILE DYSFUNCTION, UNSPECIFIED ERECTILE DYSFUNCTION TYPE: Primary | ICD-10-CM

## 2024-04-23 PROCEDURE — 99213 OFFICE O/P EST LOW 20 MIN: CPT | Mod: S$PBB,,, | Performed by: UROLOGY

## 2024-04-23 PROCEDURE — 99213 OFFICE O/P EST LOW 20 MIN: CPT | Mod: PBBFAC | Performed by: UROLOGY

## 2024-04-23 PROCEDURE — 99999 PR PBB SHADOW E&M-EST. PATIENT-LVL III: CPT | Mod: PBBFAC,,, | Performed by: UROLOGY

## 2024-04-23 RX ORDER — SILDENAFIL 100 MG/1
100 TABLET, FILM COATED ORAL DAILY PRN
Qty: 10 TABLET | Refills: 11 | Status: SHIPPED | OUTPATIENT
Start: 2024-04-23 | End: 2025-04-23

## 2024-04-23 RX ORDER — ALFUZOSIN HYDROCHLORIDE 10 MG/1
10 TABLET, EXTENDED RELEASE ORAL
Qty: 90 TABLET | Refills: 3 | Status: SHIPPED | OUTPATIENT
Start: 2024-04-23 | End: 2025-04-23

## 2024-04-23 NOTE — PROGRESS NOTES
Subjective:       Patient ID: Rizwan Demarco Jr. is a 73 y.o. male.    Chief Complaint: Follow-up (Pt here for f/u. )    HPI patient is here for a trial of Viagra and a refill on alfuzosin.  Overall he is doing well voiding well no significant complaints he has not taking nitroglycerin    Past Medical History:   Diagnosis Date    Allergy     Anticoagulant long-term use     Arthritis     Rheumatoid arthritis    Back pain     Blood clotting tendency     BPH (benign prostatic hypertrophy)     Cervical spondylosis     Colon polyp 2010    adenoma    Coronary artery disease     Depression 05/08/2015    Dry eyes     Dry mouth     GERD (gastroesophageal reflux disease)     Hyperlipidemia     Hypertension     Lumbar spondylosis     Nasal obstruction     PAD (peripheral artery disease) 04/25/2023    Rheumatoid arthritis     Sinusitis     Special screening for malignant neoplasm of colon 06/29/2016    Trouble in sleeping        Past Surgical History:   Procedure Laterality Date    ATHERECTOMY Left 6/21/2023    Procedure: Atherectomy;  Surgeon: Abbe Vilchis MD;  Location: [facility] CATH LAB;  Service: Cardiology;  Laterality: Left;    CHALAZION EXCISION      COLONOSCOPY N/A 6/29/2016    Procedure: COLONOSCOPY;  Surgeon: Partha Saucedo MD;  Location: [facility] ENDO;  Service: Endoscopy;  Laterality: N/A;    COLONOSCOPY N/A 7/24/2020    Procedure: COLONOSCOPY;  Surgeon: Ian Martinez MD;  Location: [facility] ([address] [place] [address]);  Service: Endoscopy;  Laterality: N/A;  4/16/20 - removed from 5/1/20, not called yet due to acute pain issues being addressed today - pg    COLONOSCOPY N/A 10/19/2023    Procedure: COLONOSCOPY;  Surgeon: Ian Martinez MD;  Location: [facility];  Service: Endoscopy;  Laterality: N/A;  order created from Dr. Martinez's previous colonoscopy report 7/24/2020-3 year surveillance.   ok to hold Eliquis 2 days and Plavix 5 days per Dr Vilchis-GT  PEG instr portal -ml  10/12- pre call complete.  DBM    CORONARY ARTERY BYPASS GRAFT       ESOPHAGOGASTRODUODENOSCOPY N/A 2020    Procedure: ESOPHAGOGASTRODUODENOSCOPY (EGD);  Surgeon: Ian Martinez MD;  Location: [facility] ([address] [place] [address]);  Service: Endoscopy;  Laterality: N/A;  20 - removed from 20, not called yet due to acute pain issues being addressed today.  20 - rescheduled 20 - pg    EXCISION TURBINATE, SUBMUCOUS      KNEE ARTHROSCOPY W/ DEBRIDEMENT      NASAL SEPTUM SURGERY      PERIPHERAL ANGIOGRAPHY N/A 2023    Procedure: Peripheral angiography;  Surgeon: Abbe Vilchis MD;  Location: [facility] CATH LAB;  Service: Cardiology;  Laterality: N/A;  right radial access--- RN PREOP ----JM    TONSILLECTOMY         Family History   Problem Relation Name Age of Onset    Hyperlipidemia Mother      Alzheimer's disease Mother      Stomach cancer Father      Colon cancer Father          from wife--he is not unsure     Cataracts Other      No Known Problems Maternal Aunt      No Known Problems Maternal Uncle      No Known Problems Paternal Aunt      No Known Problems Paternal Uncle      No Known Problems Maternal Grandmother      No Known Problems Maternal Grandfather      No Known Problems Paternal Grandmother      No Known Problems Paternal Grandfather      Blindness Neg Hx      Cancer Neg Hx      Diabetes Neg Hx      Glaucoma Neg Hx      Rheum arthritis Neg Hx      Psoriasis Neg Hx      Lupus Neg Hx      Amblyopia Neg Hx      Hypertension Neg Hx      Macular degeneration Neg Hx      Retinal detachment Neg Hx      Strabismus Neg Hx      Stroke Neg Hx      Thyroid disease Neg Hx      Esophageal cancer Neg Hx         Social History     Socioeconomic History    Marital status:    Tobacco Use    Smoking status: Former     Current packs/day: 0.00     Average packs/day: 1 pack/day for 20.0 years (20.0 ttl pk-yrs)     Types: Cigarettes     Start date: 1971     Quit date: 1991     Years since quittin.3    Smokeless tobacco: Never    Tobacco comments:     Quit .    Substance and Sexual Activity    Alcohol use: No    Drug use: No    Sexual activity: Yes     Partners: Female     Social Determinants of Health     Financial Resource Strain: Low Risk  (6/7/2023)    Overall Financial Resource Strain (CARDIA)     Difficulty of Paying Living Expenses: Not hard at all   Food Insecurity: No Food Insecurity (6/7/2023)    Hunger Vital Sign     Worried About Running Out of Food in the Last Year: Never true     Ran Out of Food in the Last Year: Never true   Transportation Needs: No Transportation Needs (6/7/2023)    PRAPARE - Transportation     Lack of Transportation (Medical): No     Lack of Transportation (Non-Medical): No   Physical Activity: Insufficiently Active (6/7/2023)    Exercise Vital Sign     Days of Exercise per Week: 3 days     Minutes of Exercise per Session: 30 min   Stress: No Stress Concern Present (6/7/2023)    [employer] - Occupational Stress Questionnaire     Feeling of Stress : Not at all   Social Connections: Moderately Integrated (6/7/2023)    Social Connection and Isolation Panel [NHANES]     Frequency of Communication with Friends and Family: More than three times a week     Frequency of Social Gatherings with Friends and Family: More than three times a week     Attends Taoist Services: More than 4 times per year     Active Member of Clubs or Organizations: No     Attends Club or Organization Meetings: Never     Marital Status:    Housing Stability: Low Risk  (6/7/2023)    Housing Stability Vital Sign     Unable to Pay for Housing in the Last Year: No     Number of Places Lived in the Last Year: 1     Unstable Housing in the Last Year: No       Allergies:  Aspirin, Astelin [azelastine], and Flomax [tamsulosin]    Medications:    Current Outpatient Medications:     acetaminophen (TYLENOL) 500 MG tablet, Take 2 tablets (1,000 mg total) by mouth every 6 (six) hours as needed for Pain., Disp: 30 tablet, Rfl: 0    alfuzosin  (UROXATRAL) 10 mg Tb24, Take 1 tablet (10 mg total) by mouth daily with breakfast., Disp: 90 tablet, Rfl: 3    apixaban (ELIQUIS) 5 mg Tab, Take 1 tablet (5 mg total) by mouth 2 (two) times daily., Disp: 60 tablet, Rfl: 11    artificial tears,hypromellose,,GENTEAL/SUSTANE, (SYSTANE GEL) 0.3 % Gel, 1 drop as needed., Disp: , Rfl:     atorvastatin (LIPITOR) 40 MG tablet, Take 1 tablet (40 mg total) by mouth once daily., Disp: 90 tablet, Rfl: 3    budesonide (PULMICORT) 0.25 mg/2 mL nebulizer solution, Take 2 mLs (0.25 mg total) by nebulization 2 (two) times daily. Controller (Patient not taking: Reported on 4/12/2024), Disp: 90 each, Rfl: 5    cetirizine (ZYRTEC) 10 MG tablet, Take 1 tablet (10 mg total) by mouth once daily., Disp: 30 tablet, Rfl: 0    cinnamon bark 500 mg capsule, Take 500 mg by mouth once daily., Disp: , Rfl:     clopidogreL (PLAVIX) 75 mg tablet, Take 1 tablet (75 mg total) by mouth once daily., Disp: 90 tablet, Rfl: 3    diphenhydrAMINE (BENADRYL) 25 mg capsule, Take 1 capsule (25 mg total) by mouth every 6 (six) hours as needed for Itching or Allergies (Take if headache does not resolve). (Patient not taking: Reported on 2/9/2024), Disp: 20 capsule, Rfl: 0    docusate sodium (COLACE) 100 MG capsule, Take 1 capsule (100 mg total) by mouth 2 (two) times daily., Disp: 60 capsule, Rfl: 0    fluticasone propionate (FLONASE) 50 mcg/actuation nasal spray, 1 spray (50 mcg total) by Each Nostril route 2 (two) times daily., Disp: 16 g, Rfl: 0    hydroCHLOROthiazide (MICROZIDE) 12.5 mg capsule, Take 1 capsule (12.5 mg total) by mouth once daily., Disp: 90 capsule, Rfl: 3    losartan (COZAAR) 25 MG tablet, Take 1 tablet (25 mg total) by mouth once daily., Disp: 90 tablet, Rfl: 3    multivitamin (THERAGRAN) per tablet, Take by mouth.  Tablet Oral Every day, Disp: , Rfl:     omega-3 fatty acids 1,250 mg Cap, Take by mouth., Disp: , Rfl:     omeprazole (PRILOSEC) 20 MG capsule, Take 1 capsule (20 mg total) by  mouth once daily., Disp: 30 capsule, Rfl: 5    propylene glycoL 0.6 % Drop, Apply to eye., Disp: , Rfl:     sildenafiL (VIAGRA) 100 MG tablet, Take 1 tablet (100 mg total) by mouth daily as needed., Disp: 10 tablet, Rfl: 11    sodium bicarb-sodium chloride Pack, by sinus irrigation route., Disp: , Rfl:     sodium chloride (OCEAN NASAL) 0.65 % nasal spray, 1 spray by Nasal route every 3 (three) hours as needed for Congestion., Disp: 30 mL, Rfl: 0    Current Facility-Administered Medications:     sodium chloride 0.9% flush 10 mL, 10 mL, Intravenous, PRN, Deng, Abbe GREWAL MD    Review of Systems   Constitutional:  Negative for activity change, appetite change, chills, diaphoresis, fatigue, fever and unexpected weight change.   HENT:  Negative for congestion, dental problem, hearing loss, mouth sores, postnasal drip, rhinorrhea, sinus pressure and trouble swallowing.    Eyes:  Negative for pain, discharge and itching.   Respiratory:  Negative for apnea, cough, choking, chest tightness, shortness of breath and wheezing.    Cardiovascular:  Negative for chest pain, palpitations and leg swelling.   Gastrointestinal:  Negative for abdominal distention, abdominal pain, anal bleeding, blood in stool, constipation, diarrhea, nausea, rectal pain and vomiting.   Endocrine: Negative for polydipsia and polyuria.   Genitourinary:  Negative for decreased urine volume, difficulty urinating, dysuria, enuresis, flank pain, frequency, genital sores, hematuria, penile discharge, penile pain, penile swelling, scrotal swelling, testicular pain and urgency.   Musculoskeletal:  Negative for arthralgias, back pain and myalgias.   Skin:  Negative for color change, rash and wound.   Neurological:  Negative for dizziness, syncope, speech difficulty, light-headedness and headaches.   Hematological:  Negative for adenopathy. Does not bruise/bleed easily.   Psychiatric/Behavioral:  Negative for behavioral problems, confusion, hallucinations and  sleep disturbance.        Objective:      Physical Exam  Constitutional:       Appearance: He is well-developed.   HENT:      Head: Normocephalic.   Cardiovascular:      Rate and Rhythm: Normal rate.   Pulmonary:      Effort: Pulmonary effort is normal.   Abdominal:      Palpations: Abdomen is soft.   Genitourinary:     Prostate: Normal.   Skin:     General: Skin is warm.   Neurological:      Mental Status: He is alert.         Assessment:       1. Erectile dysfunction, unspecified erectile dysfunction type    2. Lower urinary tract symptoms (LUTS)        Plan:       Rizwan was seen today for follow-up.    Diagnoses and all orders for this visit:    Erectile dysfunction, unspecified erectile dysfunction type    Lower urinary tract symptoms (LUTS)  -     alfuzosin (UROXATRAL) 10 mg Tb24; Take 1 tablet (10 mg total) by mouth daily with breakfast.    Other orders  -     sildenafiL (VIAGRA) 100 MG tablet; Take 1 tablet (100 mg total) by mouth daily as needed.    Return to clinic 1 year for refills

## 2024-04-26 ENCOUNTER — OFFICE VISIT (OUTPATIENT)
Dept: PODIATRY | Facility: CLINIC | Age: 74
End: 2024-04-26
Payer: MEDICARE

## 2024-04-26 VITALS
SYSTOLIC BLOOD PRESSURE: 148 MMHG | DIASTOLIC BLOOD PRESSURE: 67 MMHG | WEIGHT: 211.63 LBS | BODY MASS INDEX: 28.05 KG/M2 | HEIGHT: 73 IN | HEART RATE: 48 BPM

## 2024-04-26 DIAGNOSIS — M77.9 CAPSULITIS: Primary | ICD-10-CM

## 2024-04-26 DIAGNOSIS — B35.1 ONYCHOMYCOSIS DUE TO DERMATOPHYTE: ICD-10-CM

## 2024-04-26 DIAGNOSIS — M79.672 FOOT PAIN, BILATERAL: ICD-10-CM

## 2024-04-26 DIAGNOSIS — M24.573 EQUINUS CONTRACTURE OF ANKLE: ICD-10-CM

## 2024-04-26 DIAGNOSIS — B35.3 TINEA PEDIS, UNSPECIFIED LATERALITY: ICD-10-CM

## 2024-04-26 DIAGNOSIS — M79.671 FOOT PAIN, BILATERAL: ICD-10-CM

## 2024-04-26 PROCEDURE — 99999 PR PBB SHADOW E&M-EST. PATIENT-LVL IV: CPT | Mod: PBBFAC,,, | Performed by: PODIATRIST

## 2024-04-26 PROCEDURE — 29540 STRAPPING ANKLE &/FOOT: CPT | Mod: 50,S$PBB,, | Performed by: PODIATRIST

## 2024-04-26 PROCEDURE — 29540 STRAPPING ANKLE &/FOOT: CPT | Mod: 50,PBBFAC,PO | Performed by: PODIATRIST

## 2024-04-26 PROCEDURE — 99214 OFFICE O/P EST MOD 30 MIN: CPT | Mod: PBBFAC,PO,25 | Performed by: PODIATRIST

## 2024-04-26 PROCEDURE — 99214 OFFICE O/P EST MOD 30 MIN: CPT | Mod: 25,S$PBB,, | Performed by: PODIATRIST

## 2024-04-26 RX ORDER — CICLOPIROX OLAMINE 7.7 MG/G
CREAM TOPICAL 2 TIMES DAILY
Qty: 90 G | Refills: 2 | Status: SHIPPED | OUTPATIENT
Start: 2024-04-26

## 2024-04-26 RX ORDER — CICLOPIROX 80 MG/ML
SOLUTION TOPICAL NIGHTLY
Qty: 6.6 ML | Refills: 11 | Status: SHIPPED | OUTPATIENT
Start: 2024-04-26

## 2024-04-26 NOTE — PROGRESS NOTES
Subjective:      Patient ID: Rizwan Demarco Jr. is a 73 y.o. male.    Chief Complaint: Nail Problem (L great toenail deformed)    Sharp throbbing pain bottom forefoot right and left.  Gradual onset, worsening over past several weeks, aggravated by increased weight bearing, shoe gear, pressure.  No previous medical treatment.  OTC inserts few years old are now not helping.    Denies trauma, surgery both feet.    Cc2 thick discolored misshapen toenails .  Gradual onset, worsening over past several weeks, aggravated by increased weight bearing, shoe gear, pressure.  No previous medical treatment.  OTC med not helping.      Cc3 dry flaking itching skin arches both feet.  Gradual onset, worsening over past several weeks, aggravated by increased weight bearing, shoe gear, pressure.  No previous medical treatment.  OTC pain med not helping.     Review of Systems   Constitutional: Negative for chills, diaphoresis, fever, malaise/fatigue and night sweats.   Cardiovascular:  Negative for claudication, cyanosis, leg swelling and syncope.   Skin:  Positive for nail changes and suspicious lesions. Negative for color change, dry skin, rash and unusual hair distribution.   Musculoskeletal:  Positive for joint pain. Negative for falls, joint swelling, muscle cramps, muscle weakness and stiffness.   Gastrointestinal:  Negative for constipation, diarrhea, nausea and vomiting.   Neurological:  Negative for brief paralysis, disturbances in coordination, focal weakness, numbness, paresthesias, sensory change and tremors.         Objective:      Physical Exam  Constitutional:       General: He is not in acute distress.     Appearance: He is well-developed. He is not diaphoretic.   Cardiovascular:      Pulses:           Popliteal pulses are 2+ on the right side and 2+ on the left side.        Dorsalis pedis pulses are 2+ on the right side and 2+ on the left side.        Posterior tibial pulses are 1+ on the right side and 1+ on the  left side.      Comments: Capillary refill 3 seconds all toes/distal feet, all toes/both feet warm to touch.      Negative lymphadenopathy bilateral popliteal fossa and tarsal tunnel.      Negavie lower extremity edema bilateral.    Musculoskeletal:      Right ankle: No swelling, deformity, ecchymosis or lacerations. Normal range of motion. Normal pulse.      Right Achilles Tendon: Normal. No defects. Menard's test negative.      Comments: Pain to palpation inferior mtpj 2,3,4 bilateral without evidence of trauma or infection.    Ankle dorsiflexion decreased at <10 degrees bilateral with moderate increase with knee flexion bilateral.    Patient has hammertoes of digits   2-5 bilateral                partially reducible without symptom today.     Lymphadenopathy:      Lower Body: No right inguinal adenopathy. No left inguinal adenopathy.      Comments: Negative lymphadenopathy bilateral popliteal fossa and tarsal tunnel.    Negative lymphangitic streaking bilateral feet/ankles/legs.   Skin:     General: Skin is warm and dry.      Capillary Refill: Capillary refill takes 2 to 3 seconds.      Coloration: Skin is not pale.      Findings: No abrasion, bruising, burn, ecchymosis, erythema, laceration, lesion or rash.      Nails: There is no clubbing.      Comments:   Dry scale with superficial flakes over an erythematous base  without ulceration, drainage, pus, tracking, fluctuance, malodor, or cardinal signs infection.    Toenails 1st, 2nd, 3rd, 4th, 5th  bilateral are hypertrophic thickened 2-3 mm, dystrophic, discolored tanish brown with tan, gray crumbly subungual debris.  Tender to distal nail plate pressure, without periungual skin abnormality of each.    Otherwise, Skin thin, shiny, atrophic, with decreased density and distribution of pedal hair bilateral, but without hyperpigmentation, tia discoloration,  ulcers, masses, nodules or cords palpated bilateral feet and legs.    Neurological:      Mental Status: He  is alert and oriented to person, place, and time.      Sensory: No sensory deficit.      Motor: No tremor, atrophy or abnormal muscle tone.      Gait: Gait normal.      Comments: Negative tinel sign to percussion sural, superficial peroneal, deep peroneal, saphenous, and posterior tibial nerves right and left ankles and feet.     Psychiatric:         Behavior: Behavior is cooperative.           Assessment:       Encounter Diagnoses   Name Primary?    Capsulitis Yes    Foot pain, bilateral     Equinus contracture of ankle     Onychomycosis due to dermatophyte     Tinea pedis, unspecified laterality          Plan:       Rizwan was seen today for nail problem.    Diagnoses and all orders for this visit:    Capsulitis    Foot pain, bilateral    Equinus contracture of ankle    Onychomycosis due to dermatophyte    Tinea pedis, unspecified laterality    Other orders  -     ciclopirox (PENLAC) 8 % Soln; Apply topically nightly.  -     ciclopirox (LOPROX) 0.77 % Crea; Apply topically 2 (two) times daily.      I counseled the patient on his conditions, their implications and medical management.        Patient will stretch the tendo achilles complex three times daily as demonstrated in the office.  Literature was dispensed illustrating proper stretching technique.    I applied a plantar rest strapping to the patient's right and left foot to offload symptomatic area, support the arch, and relieve pain.    Patient will obtain over the counter arch supports and wear them in shoes whenever possible.  Athletic shoes intended for walking or running are usually best.    The patient was advised that NSAID-type medications have two very important potential side effects: gastrointestinal irritation including hemorrhage and renal injuries. He was asked to take the medication with food and to stop if he experiences any GI upset. I asked him to call for vomiting, abdominal pain or black/bloody stools. The patient expresses  understanding of these issues and questions were answered.    Discussed conservative treatment with shoes of adequate dimensions, material, and style to alleviate symptoms and delay or prevent surgical intervention.    Penlac, ketaconazole cream.          Follow up if symptoms worsen or fail to improve.

## 2024-05-03 ENCOUNTER — OFFICE VISIT (OUTPATIENT)
Dept: CARDIOLOGY | Facility: CLINIC | Age: 74
End: 2024-05-03
Payer: MEDICARE

## 2024-05-03 VITALS
HEIGHT: 73 IN | WEIGHT: 213.75 LBS | HEART RATE: 37 BPM | DIASTOLIC BLOOD PRESSURE: 68 MMHG | BODY MASS INDEX: 28.33 KG/M2 | SYSTOLIC BLOOD PRESSURE: 118 MMHG | OXYGEN SATURATION: 97 % | RESPIRATION RATE: 18 BRPM

## 2024-05-03 DIAGNOSIS — R06.02 SOB (SHORTNESS OF BREATH): ICD-10-CM

## 2024-05-03 DIAGNOSIS — E78.5 DYSLIPIDEMIA: ICD-10-CM

## 2024-05-03 DIAGNOSIS — R06.09 DOE (DYSPNEA ON EXERTION): ICD-10-CM

## 2024-05-03 DIAGNOSIS — E78.2 MIXED HYPERLIPIDEMIA: ICD-10-CM

## 2024-05-03 DIAGNOSIS — I10 ESSENTIAL HYPERTENSION: Primary | ICD-10-CM

## 2024-05-03 DIAGNOSIS — E78.5 HYPERLIPIDEMIA, UNSPECIFIED HYPERLIPIDEMIA TYPE: ICD-10-CM

## 2024-05-03 DIAGNOSIS — I25.10 ARTERIOSCLEROSIS OF CORONARY ARTERY: ICD-10-CM

## 2024-05-03 DIAGNOSIS — I73.9 PAD (PERIPHERAL ARTERY DISEASE): ICD-10-CM

## 2024-05-03 DIAGNOSIS — I48.91 ATRIAL FIBRILLATION, UNSPECIFIED TYPE: ICD-10-CM

## 2024-05-03 DIAGNOSIS — I70.0 ATHEROSCLEROSIS OF AORTA: ICD-10-CM

## 2024-05-03 DIAGNOSIS — R09.89 POOR CIRCULATION: ICD-10-CM

## 2024-05-03 PROCEDURE — 99214 OFFICE O/P EST MOD 30 MIN: CPT | Mod: S$PBB,,, | Performed by: INTERNAL MEDICINE

## 2024-05-03 PROCEDURE — 93010 ELECTROCARDIOGRAM REPORT: CPT | Mod: S$PBB,,, | Performed by: INTERNAL MEDICINE

## 2024-05-03 PROCEDURE — 99999 PR PBB SHADOW E&M-EST. PATIENT-LVL V: CPT | Mod: PBBFAC,,, | Performed by: INTERNAL MEDICINE

## 2024-05-03 PROCEDURE — 93005 ELECTROCARDIOGRAM TRACING: CPT | Mod: PBBFAC | Performed by: INTERNAL MEDICINE

## 2024-05-03 PROCEDURE — 99215 OFFICE O/P EST HI 40 MIN: CPT | Mod: PBBFAC | Performed by: INTERNAL MEDICINE

## 2024-05-03 NOTE — PROGRESS NOTES
CARDIOVASCULAR CONSULTATION    REASON FOR CONSULT:   Rizwan Demarco Jr. is a 73 y.o. male who presents for evaluation    HISTORY OF PRESENT ILLNESS:     Patient is a pleasant 72-year-old man.  Here for evaluation.  Past medical history significant for atrial fibrillation, on Eliquis.  Coronary artery bypass grafting in 2003.  Quadruple bypass as per patient, but unknown anatomy.  States had it done at [facility].  Dyslipidemia, hypertension, Sjogren's disease.  Also peripheral artery disease with abnormal ABIs in 2021.  Denies any orthopnea, PND.  Main complaint is right leg, right calf claudication and cramping on walking.  Also complains of occasional heaviness in the chest area states that happens when he gets constipated.      2023:      The left ventricle is normal in size with concentric remodeling and normal systolic function. The estimated ejection fraction is 65%.  Normal right ventricular size with normal right ventricular systolic function.  Normal left ventricular diastolic function.  The estimated PA systolic pressure is 33 mmHg.  Intermediate central venous pressure (8 mmHg).  The ascending aorta is mildly dilated.      2021:      Moderately decreased right ORESTES, 0.68.  Moderately dampened PVR waveforms.  Mildly decreased left ORESTES, 0.89.  Moderately dampened PVR waveforms.      May 23:  Patient here for follow-up.  Peripheral ultrasound revealed 60 90% mid left SFA stenosis.  Discussed initiating Pletal with the patient.  Patient not interested.  Would like to proceed with the peripheral angiogram for further evaluation.  Lifestyle limiting calf claudication right more than left        Right ORESTES 0.73 suggesting mild arteial flow resriction. Left ORESTES normal 1.01     Mild bilateral carotid plaque with no flow limiting stenosis     Normal abdominal aortic size and flow. No AAA       Mild right SFA plaque with no flow limiting arterial stenosis  Moderate left SFA plaque with 60-90% mis SFA  stenosis. Occluded left posterial tibial artery        Notes from July 23:  Patient here for follow-up.  Claudication in the right leg has gone away.  States he recently walk 2 miles without any claudication.  Has a stenosis in his left SFA also, but states that there is no claudication in the left leg.      Successful laser atherectomy, balloon angioplasty of the distal left SFA and proximal popliteal artery.  With excellent angiographic results      Procedures performed:     1. Ultrasound-guided left common femoral artery access next     2. Abdominal aortogram with pigtail placed in the suprarenal position      3. Selective right lower extremity angiogram     4. Selective left lower artery angiogram      5. Laser atherectomy of right SFA and proximal popliteal artery      6. Drug coated balloon angioplasty of right SFA and proximal popliteal artery      7. IVUS of right SFA, popliteal and TP trunk.        Procedural details      Ultrasound-guided access of left common femoral artery was obtained.  After that we inserted a pigtail in the suprarenal position and abdominal aortogram was obtained.  It demonstrated no significant iliac artery stenosis on both sides.  Then we selectively engaged the right iliac artery and angiogram of the right iliac artery was obtained.  It revealed mild 10-20% stenosis.  After that we obtained angiogram of the right SFA which revealed severe 90-95% stenosis in the distal SFA.  There was also another 90% stenosis in the right TP trunk.  Proximal  of right anterior tibial artery and right posterior tibial arteries was present.  Both these arteries filled with collaterals.  Peroneal artery provides the most robust flow to the foot.  After that we crossed these wires with the lesion and decision was made to fix the SFA and popliteal lesions because patient has lifestyle limiting claudication, but no resting pain or CLI.  IVUS was used for vessel sizing After that laser atherectomy of  these 2 lesions was performed followed by balloon angioplasty.  Angiogram post revealed excellent angiographic results.       Then we did angiogram of the left lower extremity from the sheath and it revealed distal SFA severe stenosis.  Anterior tibial artery is diffusely diseased and there appears to be atypical takeoff of the posterior tibial artery.  No significant stenosis was noted in the peroneal artery        Assessment plan     Continue medical management      Resume Eliquis in a.m..  Continue Plavix 75 mg qd     Aspirin 81 mg qd x 1 m     statins         Nov 23: doing fine. No more claudication. No cp, orthopnea, pnd    Notes from February 24: Patient here for follow-up.  Doing fine.  Denies any chest pains at rest on exertion, orthopnea, PND.      Notes from May 24: Patient here for follow-up.  Lately has been experiencing dyspnea on exertion and chest tightness on exertion    PAST MEDICAL HISTORY:     Past Medical History:   Diagnosis Date    Allergy     Anticoagulant long-term use     Arthritis     Rheumatoid arthritis    Back pain     Blood clotting tendency     BPH (benign prostatic hypertrophy)     Cervical spondylosis     Colon polyp 2010    adenoma    Coronary artery disease     Depression 05/08/2015    Dry eyes     Dry mouth     GERD (gastroesophageal reflux disease)     Hyperlipidemia     Hypertension     Lumbar spondylosis     Nasal obstruction     PAD (peripheral artery disease) 04/25/2023    Rheumatoid arthritis     Sinusitis     Special screening for malignant neoplasm of colon 06/29/2016    Trouble in sleeping        PAST SURGICAL HISTORY:     Past Surgical History:   Procedure Laterality Date    ATHERECTOMY Left 6/21/2023    Procedure: Atherectomy;  Surgeon: Abbe Vilchis MD;  Location: [facility] CATH LAB;  Service: Cardiology;  Laterality: Left;    CHALAZION EXCISION      COLONOSCOPY N/A 6/29/2016    Procedure: COLONOSCOPY;  Surgeon: Partha Saucedo MD;  Location: [facility] ENDO;  Service:  Endoscopy;  Laterality: N/A;    COLONOSCOPY N/A 7/24/2020    Procedure: COLONOSCOPY;  Surgeon: Ian Martinez MD;  Location: [facility] (4TH FLR);  Service: Endoscopy;  Laterality: N/A;  4/16/20 - removed from 5/1/20, not called yet due to acute pain issues being addressed today - pg    COLONOSCOPY N/A 10/19/2023    Procedure: COLONOSCOPY;  Surgeon: Ian Martinez MD;  Location: [facility] ENDO;  Service: Endoscopy;  Laterality: N/A;  order created from Dr. Martinez's previous colonoscopy report 7/24/2020-3 year surveillance.   ok to hold Eliquis 2 days and Plavix 5 days per Dr Vilchis-GT  PEG instr portal -ml  10/12- pre call complete.  DBM    CORONARY ARTERY BYPASS GRAFT      ESOPHAGOGASTRODUODENOSCOPY N/A 7/24/2020    Procedure: ESOPHAGOGASTRODUODENOSCOPY (EGD);  Surgeon: Ian Martinez MD;  Location: [facility] (4TH FLR);  Service: Endoscopy;  Laterality: N/A;  4/16/20 - removed from 5/1/20, not called yet due to acute pain issues being addressed today.  4/17/20 - rescheduled 7/24/20 - pg    EXCISION TURBINATE, SUBMUCOUS      KNEE ARTHROSCOPY W/ DEBRIDEMENT      NASAL SEPTUM SURGERY      PERIPHERAL ANGIOGRAPHY N/A 6/21/2023    Procedure: Peripheral angiography;  Surgeon: Abbe Vilchis MD;  Location: [facility] CATH LAB;  Service: Cardiology;  Laterality: N/A;  right radial access--- RN PREOP 6/16----JM    TONSILLECTOMY         ALLERGIES AND MEDICATION:     Review of patient's allergies indicates:   Allergen Reactions    Aspirin Nausea And Vomiting    Astelin [azelastine] Other (See Comments)     Dry mouth    Flomax [tamsulosin] Other (See Comments)     Nosebleed        Medication List            Accurate as of May 3, 2024 11:10 AM. If you have any questions, ask your nurse or doctor.                CONTINUE taking these medications      acetaminophen 500 MG tablet  Commonly known as: TYLENOL  Take 2 tablets (1,000 mg total) by mouth every 6 (six) hours as needed for Pain.     alfuzosin 10 mg Tb24  Commonly known as: UROXATRAL  Take 1 tablet  (10 mg total) by mouth daily with breakfast.     apixaban 5 mg Tab  Commonly known as: ELIQUIS  Take 1 tablet (5 mg total) by mouth 2 (two) times daily.     atorvastatin 40 MG tablet  Commonly known as: LIPITOR  Take 1 tablet (40 mg total) by mouth once daily.     budesonide 0.25 mg/2 mL nebulizer solution  Commonly known as: PULMICORT  Take 2 mLs (0.25 mg total) by nebulization 2 (two) times daily. Controller     cetirizine 10 MG tablet  Commonly known as: ZYRTEC  Take 1 tablet (10 mg total) by mouth once daily.     ciclopirox 0.77 % Crea  Commonly known as: LOPROX  Apply topically 2 (two) times daily.     ciclopirox 8 % Soln  Commonly known as: PENLAC  Apply topically nightly.     cinnamon bark 500 mg capsule     clopidogreL 75 mg tablet  Commonly known as: PLAVIX  Take 1 tablet (75 mg total) by mouth once daily.     diphenhydrAMINE 25 mg capsule  Commonly known as: BENADRYL  Take 1 capsule (25 mg total) by mouth every 6 (six) hours as needed for Itching or Allergies (Take if headache does not resolve).     docusate sodium 100 MG capsule  Commonly known as: COLACE  Take 1 capsule (100 mg total) by mouth 2 (two) times daily.     fluticasone propionate 50 mcg/actuation nasal spray  Commonly known as: FLONASE  1 spray (50 mcg total) by Each Nostril route 2 (two) times daily.     hydroCHLOROthiazide 12.5 mg capsule  Commonly known as: MICROZIDE  Take 1 capsule (12.5 mg total) by mouth once daily.     losartan 25 MG tablet  Commonly known as: COZAAR  Take 1 tablet (25 mg total) by mouth once daily.     multivitamin per tablet  Commonly known as: THERAGRAN     OCEAN NASAL 0.65 % nasal spray  Generic drug: sodium chloride  1 spray by Nasal route every 3 (three) hours as needed for Congestion.     omega-3 fatty acids 1,250 mg Cap     omeprazole 20 MG capsule  Commonly known as: PRILOSEC  Take 1 capsule (20 mg total) by mouth once daily.     propylene glycoL 0.6 % Drop     sildenafiL 100 MG tablet  Commonly known as:  VIAGRA  Take 1 tablet (100 mg total) by mouth daily as needed.     sodium bicarb-sodium chloride Pack     Systane GeL 0.3 % Gel  Generic drug: artificial tears(hypromellose)(GENTEAL/SUSTANE)              SOCIAL HISTORY:     Social History     Socioeconomic History    Marital status:    Tobacco Use    Smoking status: Former     Current packs/day: 0.00     Average packs/day: 1 pack/day for 20.0 years (20.0 ttl pk-yrs)     Types: Cigarettes     Start date: 1971     Quit date: 1991     Years since quittin.3    Smokeless tobacco: Never    Tobacco comments:     Quit .   Substance and Sexual Activity    Alcohol use: No    Drug use: No    Sexual activity: Yes     Partners: Female     Social Determinants of Health     Financial Resource Strain: Low Risk  (2023)    Overall Financial Resource Strain (CARDIA)     Difficulty of Paying Living Expenses: Not hard at all   Food Insecurity: No Food Insecurity (2023)    Hunger Vital Sign     Worried About Running Out of Food in the Last Year: Never true     Ran Out of Food in the Last Year: Never true   Transportation Needs: No Transportation Needs (2023)    PRAPARE - Transportation     Lack of Transportation (Medical): No     Lack of Transportation (Non-Medical): No   Physical Activity: Insufficiently Active (2023)    Exercise Vital Sign     Days of Exercise per Week: 3 days     Minutes of Exercise per Session: 30 min   Stress: No Stress Concern Present (2023)    [employer] - Occupational Stress Questionnaire     Feeling of Stress : Not at all   Housing Stability: Low Risk  (2023)    Housing Stability Vital Sign     Unable to Pay for Housing in the Last Year: No     Number of Places Lived in the Last Year: 1     Unstable Housing in the Last Year: No       FAMILY HISTORY:     Family History   Problem Relation Name Age of Onset    Hyperlipidemia Mother      Alzheimer's disease Mother      Stomach cancer  "Father      Colon cancer Father          from wife--he is not unsure     Cataracts Other      No Known Problems Maternal Aunt      No Known Problems Maternal Uncle      No Known Problems Paternal Aunt      No Known Problems Paternal Uncle      No Known Problems Maternal Grandmother      No Known Problems Maternal Grandfather      No Known Problems Paternal Grandmother      No Known Problems Paternal Grandfather      Blindness Neg Hx      Cancer Neg Hx      Diabetes Neg Hx      Glaucoma Neg Hx      Rheum arthritis Neg Hx      Psoriasis Neg Hx      Lupus Neg Hx      Amblyopia Neg Hx      Hypertension Neg Hx      Macular degeneration Neg Hx      Retinal detachment Neg Hx      Strabismus Neg Hx      Stroke Neg Hx      Thyroid disease Neg Hx      Esophageal cancer Neg Hx         REVIEW OF SYSTEMS:   Review of Systems   Constitutional: Negative.   HENT: Negative.     Eyes: Negative.    Respiratory: Negative.     Endocrine: Negative.    Hematologic/Lymphatic: Negative.    Skin: Negative.    Musculoskeletal: Negative.    Gastrointestinal: Negative.    Genitourinary: Negative.    Neurological: Negative.    Psychiatric/Behavioral: Negative.     Allergic/Immunologic: Negative.        A 10 point review of systems was performed and all the pertinent positives have been mentioned. Rest of review of systems was negative.        PHYSICAL EXAM:     Vitals:    05/03/24 1030   BP: 118/68   Pulse: (!) 37   Resp: 18    Body mass index is 28.2 kg/m².  Weight: 97 kg (213 lb 11.8 oz)   Height: 6' 1" (185.4 cm)     Physical Exam  Vitals reviewed.   Constitutional:       Appearance: He is well-developed.   HENT:      Head: Normocephalic.   Eyes:      Conjunctiva/sclera: Conjunctivae normal.      Pupils: Pupils are equal, round, and reactive to light.   Cardiovascular:      Rate and Rhythm: Normal rate and regular rhythm.      Heart sounds: Normal heart sounds.   Pulmonary:      Effort: Pulmonary effort is normal.      Breath sounds: Normal " breath sounds.   Abdominal:      General: Bowel sounds are normal.      Palpations: Abdomen is soft.   Musculoskeletal:      Cervical back: Normal range of motion and neck supple.   Skin:     General: Skin is warm.   Neurological:      Mental Status: He is alert and oriented to person, place, and time.           DATA:     Laboratory:  CBC:  Recent Labs   Lab 02/13/23  0818 06/16/23  1150 01/27/24  1549   WBC 5.34 5.32 5.79   Hemoglobin 15.6 15.5 15.9   Hematocrit 49.6 48.3 49.0   Platelets 162 176 171       CHEMISTRIES:  Recent Labs   Lab 02/08/22  1024 02/13/23  0818 06/16/23  1150 01/27/24  1549   Glucose 92 100 71 89   Sodium 142 143 140 138   Potassium 4.8 4.0 3.9 3.8   BUN 13 8 10 11   Creatinine 1.2 1.2 0.9 1.0   eGFR if  >60  --   --   --    eGFR if non  60  --   --   --    Calcium 10.2 9.4 9.8 9.9       CARDIAC BIOMARKERS:  Recent Labs   Lab 01/27/24  1549   Troponin I <0.006       COAGS:  Recent Labs   Lab 01/27/24  1549 02/02/24  1200   INR 1.1 1.2       LIPIDS/LFTS:  Recent Labs   Lab 02/08/22  1024 02/13/23  0818 01/27/24  1549   Cholesterol 238 H 155  --    Triglycerides 76 45  --    HDL 42 40  --    LDL Cholesterol 180.8 H 106.0  --    Non-HDL Cholesterol 196 115  --    AST 13 15 15   ALT 13 14 13       Hemoglobin A1C   Date Value Ref Range Status   02/13/2023 5.6 4.0 - 5.6 % Final     Comment:     ADA Screening Guidelines:  5.7-6.4%  Consistent with prediabetes  >or=6.5%  Consistent with diabetes    High levels of fetal hemoglobin interfere with the HbA1C  assay. Heterozygous hemoglobin variants (HbS, HgC, etc)do  not significantly interfere with this assay.   However, presence of multiple variants may affect accuracy.     02/08/2022 5.6 4.0 - 5.6 % Final     Comment:     ADA Screening Guidelines:  5.7-6.4%  Consistent with prediabetes  >or=6.5%  Consistent with diabetes    High levels of fetal hemoglobin interfere with the HbA1C  assay. Heterozygous hemoglobin variants  (HbS, HgC, etc)do  not significantly interfere with this assay.   However, presence of multiple variants may affect accuracy.     05/24/2019 5.7 (H) 4.0 - 5.6 % Final     Comment:     ADA Screening Guidelines:  5.7-6.4%  Consistent with prediabetes  >or=6.5%  Consistent with diabetes  High levels of fetal hemoglobin interfere with the HbA1C  assay. Heterozygous hemoglobin variants (HbS, HgC, etc)do  not significantly interfere with this assay.   However, presence of multiple variants may affect accuracy.         TSH  Recent Labs   Lab 02/13/23  0818   TSH 0.641       The 10-year ASCVD risk score (Emmanuelle CORREA, et al., 2019) is: 19.1%    Values used to calculate the score:      Age: 73 years      Sex: Male      Is Non- : Yes      Diabetic: No      Tobacco smoker: No      Systolic Blood Pressure: 118 mmHg      Is BP treated: Yes      HDL Cholesterol: 40 mg/dL      Total Cholesterol: 155 mg/dL       BNP    Lab Results   Component Value Date/Time     (H) 01/27/2024 03:49 PM    BNP 25 05/07/2015 05:30 PM             ASSESSMENT AND PLAN     Patient Active Problem List   Diagnosis    Sjogren's disease    GERD (gastroesophageal reflux disease)    Arteriosclerosis of coronary artery    Hyperlipidemia    Cervical spondylosis    Degeneration of intervertebral disc    Rheumatoid arthritis    Benign prostatic hyperplasia with weak urinary stream    Perennial allergic rhinitis    Essential hypertension    High frequency hearing loss    Nasal obstruction    Dysfunctions associated with sleep stages or arousal from sleep    Primary insomnia    Wears dentures    Atherosclerosis of aorta    Other personal history presenting hazards to health    Complete loss of teeth, unspecified cause, unspecified class    Cervical pain (neck)    Impaired range of motion of cervical spine    Posture imbalance    PAD (peripheral artery disease)    Poor circulation    Chest pain       Peripheral artery disease:  Now status  post revascularization of right SFA and popliteal artery.  Also has residual infrapopliteal disease.  Is claudication after revascularization of the SFA disease has gone away and he can now walk 2 miles.  He also has flow-limiting stenosis in the distal left SFA, but states that he does not have any pain in the left leg.  Continue medical management.  Aspirin 81 mg daily for 1 month, Plavix 75 mg daily uninterrupted for at least 1 year, continue Eliquis 5 mg b.i.d.      Coronary artery disease status post CABG in 2003.  Stress test in May of 2023 did not show any significant ischemia.  Recent echo showed normal left ventricle systolic function     Carotid ultrasound in May of 2023 showed mild bilateral carotid artery block with no flow-limiting stenosis.    Hypertension:  Uncontrolled in clinic today, but states at home stays well controlled around 120 mmHg.      Atrial fibrillation:  On Eliquis.  Rate controlled.     Dyslipidemia:  Continue ezetimibe and statins.  Recheck lipid profile    Lab Results   Component Value Date    LDLCALC 106.0 02/13/2023     Dyspnea on exertion and chest tightness on exertion.  Check stress test and echocardiogram    Follow-up after 3 m           Thank you very much for involving me in the care of your patient.  Please do not hesitate to contact me if there are any questions.      Abbe Vilchis MD, FACC, [facility]  Interventional Cardiologist, [facility].           This note was dictated with the help of speech recognition software.  There might be un-intended errors and/or substitutions.

## 2024-05-04 LAB
OHS QRS DURATION: 98 MS
OHS QTC CALCULATION: 410 MS

## 2024-05-22 ENCOUNTER — OFFICE VISIT (OUTPATIENT)
Dept: GASTROENTEROLOGY | Facility: CLINIC | Age: 74
End: 2024-05-22
Payer: MEDICARE

## 2024-05-22 ENCOUNTER — LAB VISIT (OUTPATIENT)
Dept: LAB | Facility: HOSPITAL | Age: 74
End: 2024-05-22
Payer: MEDICARE

## 2024-05-22 VITALS
DIASTOLIC BLOOD PRESSURE: 69 MMHG | WEIGHT: 216.69 LBS | HEIGHT: 73 IN | BODY MASS INDEX: 28.72 KG/M2 | SYSTOLIC BLOOD PRESSURE: 157 MMHG | HEART RATE: 56 BPM

## 2024-05-22 DIAGNOSIS — K59.00 CONSTIPATION, UNSPECIFIED CONSTIPATION TYPE: ICD-10-CM

## 2024-05-22 DIAGNOSIS — R10.13 EPIGASTRIC PAIN: ICD-10-CM

## 2024-05-22 DIAGNOSIS — R10.13 EPIGASTRIC PAIN: Primary | ICD-10-CM

## 2024-05-22 DIAGNOSIS — K21.00 GASTROESOPHAGEAL REFLUX DISEASE WITH ESOPHAGITIS WITHOUT HEMORRHAGE: ICD-10-CM

## 2024-05-22 LAB
ANION GAP SERPL CALC-SCNC: 7 MMOL/L (ref 8–16)
BUN SERPL-MCNC: 10 MG/DL (ref 8–23)
CALCIUM SERPL-MCNC: 9.9 MG/DL (ref 8.7–10.5)
CHLORIDE SERPL-SCNC: 105 MMOL/L (ref 95–110)
CO2 SERPL-SCNC: 31 MMOL/L (ref 23–29)
CREAT SERPL-MCNC: 1.3 MG/DL (ref 0.5–1.4)
EST. GFR  (NO RACE VARIABLE): 57.3 ML/MIN/1.73 M^2
GLUCOSE SERPL-MCNC: 106 MG/DL (ref 70–110)
POTASSIUM SERPL-SCNC: 3.8 MMOL/L (ref 3.5–5.1)
SODIUM SERPL-SCNC: 143 MMOL/L (ref 136–145)

## 2024-05-22 PROCEDURE — 99215 OFFICE O/P EST HI 40 MIN: CPT | Mod: PBBFAC

## 2024-05-22 PROCEDURE — 36415 COLL VENOUS BLD VENIPUNCTURE: CPT

## 2024-05-22 PROCEDURE — 80048 BASIC METABOLIC PNL TOTAL CA: CPT

## 2024-05-22 PROCEDURE — 99214 OFFICE O/P EST MOD 30 MIN: CPT | Mod: S$PBB,,,

## 2024-05-22 PROCEDURE — 99999 PR PBB SHADOW E&M-EST. PATIENT-LVL V: CPT | Mod: PBBFAC,,,

## 2024-05-22 RX ORDER — OMEPRAZOLE 40 MG/1
40 CAPSULE, DELAYED RELEASE ORAL DAILY
Qty: 90 CAPSULE | Refills: 0 | Status: SHIPPED | OUTPATIENT
Start: 2024-05-22 | End: 2024-08-20

## 2024-05-22 NOTE — PATIENT INSTRUCTIONS
-Start daily fiber. Take 1 tsp of fiber powder (psyllium or other sugar-free powder).  Mix in 8 oz of water.  Take x 3-5 days.  Then, increase fiber by 1 tsp every 3-5 days until stool is easy to pass.  Stop and continue at that dose.   Do not exceed 6 tsps/day. GOAL:  More well-formed stool (one continuous well-formed piece vs. Pellets) and minimize straining with initiation. Can cause increased gas.     Start miralax daily (17g per dose). Start at once per day and can titrate up to twice daily. Decrease and/or stop Miralax if stools become softer/liquid in consistency.    [facility]  High Fiber Diet    20-30 grams of fiber per day is recommended    Fiber cereal = 5 grams (Raisin Bran, Shredded Wheat, Grape Nuts)  Konsyl 1 teaspoon = 6 grams  Metamucil 1 tablespoon = 3 grams  Citrucel 1 tablespoon = 2 grams  Fiber Choice = 3-4 per day    Drink at least 4-5 glasses of liquids per day or the fiber can be constipating rather then stimulating to your gut.  Boil and bake potatoes in their skin. Eat the skin, too.  Include fresh fruits and raw vegetables in your daily diet. Raw fruits and vegetables have more useful fiber than those that have been peeled, cooked, pureed, or otherwise processed.  Eat a wide variety of fibrous foods in reasonable amounts. Increase fiber intake slowly especially if you have been on a low-fiber diet.  Eat more legumes-peas, beans, soybeans.  For snacks, try dried fruit, whole wheat and rye crackers.  Avoid instant-cook hot cereals. Use the longer cooking cereals.  Use bran whenever possible. Sprinkle it on top of cereal, mix it into mashed potatoes or hamburger meat, or use it in combination dishes such as meat loaf.   Substitute whole grain, whole wheat and bran products for white flour products.  Eat slowly and chew your food thoroughly.    Start daily fiber.  Take 1 tsp of fiber powder (psyllium or other sugar-free powder).  Mix in 8 oz of water.  Take x 3-5 days.  Then,  increase fiber by 1 tsp every 3-5 days until stool is easy to pass.  Stop and continue at that dose.   Do not exceed 6 tsps/day. GOAL:  More well-formed stool (one continuous well-formed piece vs. Pellets) and minimize straining with initiation.    Foods High in Fiber    This diet furnishes adequate amounts of all the essential nutrients needed by the body and a very liberal fiber or roughage content. Roughage is indigestible fiber found in fruits, vegetables and whole grain cereals. It provides bulk to the large intestine and, accompanied by an adequate fluid intake, is a stimulant to elimination. Regular eating and elimination habits are vital to good health.     Fruits:  Use all fruits and juices liberally; fresh, cooked, dried or canned. Eat fruit raw and with skins when possible. Have at least four servings of fruit daily including a citrus fruit and a stewed dried fruit. Hard seeds of fruits (berries, figs, Grapes, mangoes, tomatoes) etc. may be removed.    Vegetables: Use all vegetables liberally. Green leafy vegetables, such as cabbage, spinach, lettuce, broccoli, and other greens are particularly good.    Potato: As desired. Serve baked frequently and eat the skin. Other starchy foods such as rice, macaroni, etc., may be occasionally substituted. Chew popcorn well and do not eat hard kernels.    Meat, Fish, Poultry: One or two servings daily.    Eggs: One daily if you are not on a low cholesterol diet.    Milk: Include at least one-half pint daily. Whole milk or skimmed may be used.     Cereals: Use whole grain breads and cereals such as bran, bran flakes, grape nut flakes, puffed wheat, oatmeal, Wheaties, etc., as much as possible. Refined breaks and cereals are not restricted; however, they do not contain the bulk necessary for this diet.     Sugars, Sweets: Use very moderately. Depend on fruit as dessert.    Fats: Use butter or margarine as desired. Oil or dressing on salads as desired. Fried foods may  Zoë Collazo) be used in moderation. Nuts may be eaten if you chew them well and may be ground or finely chopped for cooking.   Sample Menu                                                                                 Breakfast                          Lunch  Orange juice, 4 ounces                                                Vegetable soup                    Stewed fruit                                                                 Fresh fruit plate with cottage cheese  Shredded wheat                                                           Whole wheat toast  Scrambled eggs                                                           Butter  Whole wheat toast                                                       Coffee or tea  Dinner                                                                         Bedtime  Large glass tomato juice                                             1 glass milk  Roast beef                                                                   stewed fruit  Baked potato with skin  Buttered spinach  Raw vegetable salad  Baked apple with skin   Coffee or tea

## 2024-05-22 NOTE — PROGRESS NOTES
"GENERAL GI PATIENT INTAKE:    COVID symptoms in the last 7 days (runny nose, sore throat, congestion, cough, fever): No  PCP: Raúl Perkins  If not PCP-  number given to establish [phone]: N/A    ALLERGIES REVIEWED:  No    CHIEF COMPLAINT:    Chief Complaint   Patient presents with    Follow-up       VITAL SIGNS:  BP (!) 157/69   Pulse (!) 56   Ht 6' 1" (1.854 m)   Wt 98.3 kg (216 lb 11.4 oz)   BMI 28.59 kg/m²      Change in medical, surgical, family or social history: No      REVIEWED MEDICATION LIST RECONCILED INCLUDING ABOVE MEDS:  Yes     "

## 2024-05-22 NOTE — PROGRESS NOTES
Gastroenterology Clinic Consultation Note    Reason for Visit:  The primary encounter diagnosis was Epigastric pain. Diagnoses of Gastroesophageal reflux disease with esophagitis without hemorrhage and Constipation, unspecified constipation type were also pertinent to this visit.    PCP:   Raúl Perkins.   No address on file      Initial HPI   This is a 73 y.o. male presenting for GI followup for her GERD and intermittent abdominal pain. This is chronic for him and wishes to discuss further. Patient reports intermittent periumbilical pain. Very dull discomfort that he experiences intermittently. Worst when constipated. Reports feeling better after moving his bowels. Trying to incorporate more fruits and veggies to move his bowels regularly. This has been helping him. Prior to his diet change, he was only having a BM every other day to sometimes every 3rd day. Denies melena, blood in stool, unintentional weight loss. Will have worsening reflux symptoms if he indulges in tea, coffee, spicy foods. Taking his Prilosec once daily. This helps him typically. Denies nausea, vomiting, odynophagia, dysphagia.    -LOV on 3/19/2021 w/Dr. Martinez for her reflux. Has a hx of Sjogren's disease. Had some symptom improvement and was recommended to decrease back down to Prilosec 20mg on demand.     ROS:  Review of Systems   Constitutional:  Negative for chills, diaphoresis, fever, malaise/fatigue and weight loss.   HENT:  Negative for sore throat.    Eyes:  Negative for redness.   Gastrointestinal:  Positive for abdominal pain, constipation and heartburn. Negative for blood in stool, diarrhea, melena, nausea and vomiting.   Skin:  Negative for itching and rash.   Neurological:  Negative for dizziness, loss of consciousness and weakness.        Medical History:  has a past medical history of Allergy, Anticoagulant long-term use, Arthritis, Back pain, Blood clotting  tendency, BPH (benign prostatic hypertrophy), Cervical spondylosis, Colon polyp (2010), Coronary artery disease, Depression (05/08/2015), Dry eyes, Dry mouth, GERD (gastroesophageal reflux disease), Hyperlipidemia, Hypertension, Lumbar spondylosis, Nasal obstruction, PAD (peripheral artery disease) (04/25/2023), Rheumatoid arthritis, Sinusitis, Special screening for malignant neoplasm of colon (06/29/2016), and Trouble in sleeping.    Surgical History:  has a past surgical history that includes Coronary artery bypass graft; Tonsillectomy; Knee arthroscopy w/ debridement; Excision turbinate, submucous; Nasal septum surgery; Colonoscopy (N/A, 6/29/2016); Esophagogastroduodenoscopy (N/A, 7/24/2020); Colonoscopy (N/A, 7/24/2020); Chalazion excision; Peripheral angiography (N/A, 6/21/2023); Atherectomy (Left, 6/21/2023); and Colonoscopy (N/A, 10/19/2023).    Family History: family history includes Alzheimer's disease in his mother; Cataracts in an other family member; Colon cancer in his father; Hyperlipidemia in his mother; No Known Problems in his maternal aunt, maternal grandfather, maternal grandmother, maternal uncle, paternal aunt, paternal grandfather, paternal grandmother, and paternal uncle; Stomach cancer in his father..       Review of patient's allergies indicates:   Allergen Reactions    Aspirin Nausea And Vomiting    Astelin [azelastine] Other (See Comments)     Dry mouth    Flomax [tamsulosin] Other (See Comments)     Nosebleed       Current Outpatient Medications on File Prior to Visit   Medication Sig Dispense Refill    acetaminophen (TYLENOL) 500 MG tablet Take 2 tablets (1,000 mg total) by mouth every 6 (six) hours as needed for Pain. 30 tablet 0    alfuzosin (UROXATRAL) 10 mg Tb24 Take 1 tablet (10 mg total) by mouth daily with breakfast. 90 tablet 3    apixaban (ELIQUIS) 5 mg Tab Take 1 tablet (5 mg total) by mouth 2 (two) times daily. 60 tablet 11    artificial tears,hypromellose,,GENTEAL/SUSTANE,  (SYSTANE GEL) 0.3 % Gel 1 drop as needed.      atorvastatin (LIPITOR) 40 MG tablet Take 1 tablet (40 mg total) by mouth once daily. 90 tablet 3    budesonide (PULMICORT) 0.25 mg/2 mL nebulizer solution Take 2 mLs (0.25 mg total) by nebulization 2 (two) times daily. Controller 90 each 5    cetirizine (ZYRTEC) 10 MG tablet Take 1 tablet (10 mg total) by mouth once daily. 30 tablet 0    ciclopirox (LOPROX) 0.77 % Crea Apply topically 2 (two) times daily. 90 g 2    ciclopirox (PENLAC) 8 % Soln Apply topically nightly. 6.6 mL 11    cinnamon bark 500 mg capsule Take 500 mg by mouth once daily.      clopidogreL (PLAVIX) 75 mg tablet Take 1 tablet (75 mg total) by mouth once daily. 90 tablet 3    diphenhydrAMINE (BENADRYL) 25 mg capsule Take 1 capsule (25 mg total) by mouth every 6 (six) hours as needed for Itching or Allergies (Take if headache does not resolve). 20 capsule 0    docusate sodium (COLACE) 100 MG capsule Take 1 capsule (100 mg total) by mouth 2 (two) times daily. 60 capsule 0    fluticasone propionate (FLONASE) 50 mcg/actuation nasal spray 1 spray (50 mcg total) by Each Nostril route 2 (two) times daily. 16 g 0    hydroCHLOROthiazide (MICROZIDE) 12.5 mg capsule Take 1 capsule (12.5 mg total) by mouth once daily. 90 capsule 3    losartan (COZAAR) 25 MG tablet Take 1 tablet (25 mg total) by mouth once daily. 90 tablet 3    multivitamin (THERAGRAN) per tablet Take by mouth.  Tablet Oral Every day      omega-3 fatty acids 1,250 mg Cap Take by mouth.      propylene glycoL 0.6 % Drop Apply to eye.      sildenafiL (VIAGRA) 100 MG tablet Take 1 tablet (100 mg total) by mouth daily as needed. 10 tablet 11    sodium bicarb-sodium chloride Pack by sinus irrigation route.      sodium chloride (OCEAN NASAL) 0.65 % nasal spray 1 spray by Nasal route every 3 (three) hours as needed for Congestion. 30 mL 0    [DISCONTINUED] omeprazole (PRILOSEC) 20 MG capsule Take 1 capsule (20 mg total) by mouth once daily. 30 capsule 5  "    Current Facility-Administered Medications on File Prior to Visit   Medication Dose Route Frequency Provider Last Rate Last Admin    sodium chloride 0.9% flush 10 mL  10 mL Intravenous PRN Abbe Vilchis MD             Objective Findings:    Vital Signs:  BP (!) 157/69   Pulse (!) 56   Ht 6' 1" (1.854 m)   Wt 98.3 kg (216 lb 11.4 oz)   BMI 28.59 kg/m²   Body mass index is 28.59 kg/m².    Physical Exam:  Physical Exam  Vitals reviewed.   Constitutional:       Appearance: He is normal weight. He is not ill-appearing.   HENT:      Mouth/Throat:      Mouth: Mucous membranes are moist.      Pharynx: Oropharynx is clear.   Eyes:      General: No scleral icterus.  Abdominal:      General: Bowel sounds are normal. There is no distension.      Palpations: Abdomen is soft. There is no mass.   Skin:     General: Skin is warm and dry.      Capillary Refill: Capillary refill takes less than 2 seconds.      Coloration: Skin is not jaundiced or pale.   Neurological:      Mental Status: He is alert and oriented to person, place, and time. Mental status is at baseline.             Labs:  Lab Results   Component Value Date    WBC 5.79 01/27/2024    HGB 15.9 01/27/2024    HCT 49.0 01/27/2024     01/27/2024    CRP 6.8 05/11/2016    CHOL 155 02/13/2023    TRIG 45 02/13/2023    HDL 40 02/13/2023    ALKPHOS 56 01/27/2024    ALT 13 01/27/2024    AST 15 01/27/2024     01/27/2024    K 3.8 01/27/2024     01/27/2024    CREATININE 1.0 01/27/2024    BUN 11 01/27/2024    CO2 20 (L) 01/27/2024    TSH 0.641 02/13/2023    PSA 1.2 05/24/2019    INR 1.2 02/02/2024    HGBA1C 5.6 02/13/2023       Imaging reviewed: No pertinent imaging reviewed      Endoscopy reviewed: Colonoscopy 10/19/2023  Impression:            - One 3 mm polyp in the ascending colon, removed                          with a cold snare. Resected and retrieved.                          - Two 5 mm polyps in the descending colon, removed                        "   with a cold snare. Resected and retrieved.                          - Melanosis in the colon.                          - The examination was otherwise normal on direct                          and retroflexion views.   Recommendation:        - Discharge patient to home (ambulatory).                          - Patient has a contact number available for                          emergencies. The signs and symptoms of potential                          delayed complications were discussed with the                          patient. Return to normal activities tomorrow.                          Written discharge instructions were provided to                          the patient.                          - Resume previous diet.                          - Continue present medications.                          - Return to primary care physician as previously                          scheduled.                          - Repeat colonoscopy in 3 years for surveillance.                          - Resume Eliquis (apixaban) in 2 days and Plavix                          (clopidogrel) in 2 days at prior doses.   Ian Martinez MD   10/19/2023 11:06:29 AM       Assessment:  1. Epigastric pain    2. Gastroesophageal reflux disease with esophagitis without hemorrhage    3. Constipation, unspecified constipation type      Orders Placed This Encounter    CT Abdomen Pelvis W Wo Contrast    BASIC METABOLIC PANEL    omeprazole (PRILOSEC) 40 MG capsule         Plan:  Orders Placed This Encounter    CT Abdomen Pelvis W Wo Contrast    BASIC METABOLIC PANEL    omeprazole (PRILOSEC) 40 MG capsule     CT A/P to further evaluate. Increased PPI.  Increased PPI to 40mg for 8 weeks then can decrease back down.  Symptoms seem exacerbated by constipation. Will obtain CT A/P to further evaluate. Recommended fiber and miralax to improve his bowel movements.  RTC 3 months for symptom reassessment.      Thank you for allowing me to participate in this  patient's care.    Sincerely,     Shauna Aquino NP  Gastroenterology Department  [facility]

## 2024-06-01 ENCOUNTER — HOSPITAL ENCOUNTER (OUTPATIENT)
Dept: RADIOLOGY | Facility: HOSPITAL | Age: 74
Discharge: HOME | End: 2024-06-01
Payer: MEDICARE

## 2024-06-01 DIAGNOSIS — R10.13 EPIGASTRIC PAIN: ICD-10-CM

## 2024-06-01 PROCEDURE — 74177 CT ABD & PELVIS W/CONTRAST: CPT | Mod: TC

## 2024-06-01 PROCEDURE — 25500020 PHARM REV CODE 255

## 2024-06-01 PROCEDURE — 74177 CT ABD & PELVIS W/CONTRAST: CPT | Mod: 26,,, | Performed by: RADIOLOGY

## 2024-06-01 PROCEDURE — A9698 NON-RAD CONTRAST MATERIALNOC: HCPCS

## 2024-06-01 RX ADMIN — IOHEXOL 100 ML: 350 INJECTION, SOLUTION INTRAVENOUS at 12:06

## 2024-06-01 RX ADMIN — BARIUM SULFATE 450 ML: 20 SUSPENSION ORAL at 12:06

## 2024-06-04 ENCOUNTER — PATIENT MESSAGE (OUTPATIENT)
Dept: GASTROENTEROLOGY | Facility: CLINIC | Age: 74
End: 2024-06-04
Payer: MEDICARE

## 2024-06-06 ENCOUNTER — HOSPITAL ENCOUNTER (OUTPATIENT)
Dept: RADIOLOGY | Facility: HOSPITAL | Age: 74
Discharge: HOME | End: 2024-06-06
Attending: INTERNAL MEDICINE
Payer: MEDICARE

## 2024-06-06 ENCOUNTER — HOSPITAL ENCOUNTER (OUTPATIENT)
Dept: CARDIOLOGY | Facility: HOSPITAL | Age: 74
Discharge: HOME | End: 2024-06-06
Attending: INTERNAL MEDICINE
Payer: MEDICARE

## 2024-06-06 DIAGNOSIS — R06.09 DOE (DYSPNEA ON EXERTION): ICD-10-CM

## 2024-06-06 DIAGNOSIS — R06.02 SOB (SHORTNESS OF BREATH): ICD-10-CM

## 2024-06-06 LAB
ASCENDING AORTA: 3.85 CM
AV INDEX (PROSTH): 0.88
AV MEAN GRADIENT: 6 MMHG
AV PEAK GRADIENT: 10 MMHG
AV VALVE AREA BY VELOCITY RATIO: 2.45 CM²
AV VALVE AREA: 2.83 CM²
AV VELOCITY RATIO: 0.76
CV ECHO LV RWT: 0.53 CM
CV STRESS BASE HR: 50 BPM
DIASTOLIC BLOOD PRESSURE: 94 MMHG
DOP CALC AO PEAK VEL: 1.57 M/S
DOP CALC AO VTI: 33.1 CM
DOP CALC LVOT AREA: 3.2 CM2
DOP CALC LVOT DIAMETER: 2.03 CM
DOP CALC LVOT PEAK VEL: 1.19 M/S
DOP CALC LVOT STROKE VOLUME: 93.81 CM3
DOP CALCLVOT PEAK VEL VTI: 29 CM
E WAVE DECELERATION TIME: 273.66 MSEC
E/E' RATIO: 12 M/S
ECHO LV POSTERIOR WALL: 1.3 CM (ref 0.6–1.1)
FRACTIONAL SHORTENING: 35 % (ref 28–44)
INTERVENTRICULAR SEPTUM: 1.32 CM (ref 0.6–1.1)
IVC DIAMETER: 2.21 CM
LA MAJOR: 5.16 CM
LA MINOR: 5.61 CM
LA WIDTH: 3.7 CM
LEFT ATRIUM SIZE: 3.79 CM
LEFT ATRIUM VOLUME: 64.07 CM3
LEFT INTERNAL DIMENSION IN SYSTOLE: 3.2 CM (ref 2.1–4)
LEFT VENTRICLE DIASTOLIC VOLUME: 112.68 ML
LEFT VENTRICLE SYSTOLIC VOLUME: 41 ML
LEFT VENTRICULAR INTERNAL DIMENSION IN DIASTOLE: 4.9 CM (ref 3.5–6)
LEFT VENTRICULAR MASS: 256.53 G
LV LATERAL E/E' RATIO: 11.33 M/S
LV SEPTAL E/E' RATIO: 12.75 M/S
LVOT MG: 2.89 MMHG
LVOT MV: 0.79 CM/S
MV PEAK E VEL: 1.02 M/S
MV STENOSIS PRESSURE HALF TIME: 79.36 MS
MV VALVE AREA P 1/2 METHOD: 2.77 CM2
NUC REST EJECTION FRACTION: 68
OHS CV CPX 85 PERCENT MAX PREDICTED HEART RATE MALE: 123
OHS CV CPX MAX PREDICTED HEART RATE: 145
OHS CV CPX PATIENT IS FEMALE: 0
OHS CV CPX PATIENT IS MALE: 1
OHS CV CPX PEAK DIASTOLIC BLOOD PRESSURE: 89 MMHG
OHS CV CPX PEAK HEAR RATE: 75 BPM
OHS CV CPX PEAK RATE PRESSURE PRODUCT: NORMAL
OHS CV CPX PEAK SYSTOLIC BLOOD PRESSURE: 150 MMHG
OHS CV CPX PERCENT MAX PREDICTED HEART RATE ACHIEVED: 52
OHS CV CPX RATE PRESSURE PRODUCT PRESENTING: 6700
OHS CV RV/LV RATIO: 0.88 CM
PISA TR MAX VEL: 3.18 M/S
PV PEAK GRADIENT: 5 MMHG
PV PEAK VELOCITY: 1.15 M/S
RA MAJOR: 4.85 CM
RA PRESSURE ESTIMATED: 8 MMHG
RA WIDTH: 3.9 CM
RIGHT VENTRICULAR END-DIASTOLIC DIMENSION: 4.33 CM
RV TB RVSP: 11 MMHG
RV TISSUE DOPPLER FREE WALL SYSTOLIC VELOCITY 1 (APICAL 4 CHAMBER VIEW): 10.29 CM/S
SINUS: 2.94 CM
STJ: 2.99 CM
SYSTOLIC BLOOD PRESSURE: 134 MMHG
TDI LATERAL: 0.09 M/S
TDI SEPTAL: 0.08 M/S
TDI: 0.09 M/S
TR MAX PG: 40 MMHG
TRICUSPID ANNULAR PLANE SYSTOLIC EXCURSION: 1.39 CM
TV REST PULMONARY ARTERY PRESSURE: 48 MMHG

## 2024-06-06 PROCEDURE — 93017 CV STRESS TEST TRACING ONLY: CPT

## 2024-06-06 PROCEDURE — A9502 TC99M TETROFOSMIN: HCPCS | Performed by: INTERNAL MEDICINE

## 2024-06-06 PROCEDURE — 93018 CV STRESS TEST I&R ONLY: CPT | Mod: ,,, | Performed by: INTERNAL MEDICINE

## 2024-06-06 PROCEDURE — 93306 TTE W/DOPPLER COMPLETE: CPT | Mod: 26,,, | Performed by: INTERNAL MEDICINE

## 2024-06-06 PROCEDURE — 93016 CV STRESS TEST SUPVJ ONLY: CPT | Mod: ,,, | Performed by: INTERNAL MEDICINE

## 2024-06-06 PROCEDURE — 63600175 PHARM REV CODE 636 W HCPCS: Performed by: INTERNAL MEDICINE

## 2024-06-06 PROCEDURE — 93306 TTE W/DOPPLER COMPLETE: CPT

## 2024-06-06 PROCEDURE — 78452 HT MUSCLE IMAGE SPECT MULT: CPT | Mod: 26,,, | Performed by: INTERNAL MEDICINE

## 2024-06-06 PROCEDURE — 78452 HT MUSCLE IMAGE SPECT MULT: CPT

## 2024-06-06 RX ORDER — REGADENOSON 0.08 MG/ML
0.4 INJECTION, SOLUTION INTRAVENOUS ONCE
Status: COMPLETED | OUTPATIENT
Start: 2024-06-06 | End: 2024-06-06

## 2024-06-06 RX ADMIN — TETROFOSMIN 10.4 MILLICURIE: 1.38 INJECTION, POWDER, LYOPHILIZED, FOR SOLUTION INTRAVENOUS at 07:06

## 2024-06-06 RX ADMIN — REGADENOSON 0.4 MG: 0.08 INJECTION, SOLUTION INTRAVENOUS at 08:06

## 2024-06-06 RX ADMIN — TETROFOSMIN 30.8 MILLICURIE: 1.38 INJECTION, POWDER, LYOPHILIZED, FOR SOLUTION INTRAVENOUS at 08:06

## 2024-06-07 ENCOUNTER — TELEPHONE (OUTPATIENT)
Dept: GASTROENTEROLOGY | Facility: CLINIC | Age: 74
End: 2024-06-07
Payer: MEDICARE

## 2024-06-07 DIAGNOSIS — K59.00 CONSTIPATION, UNSPECIFIED CONSTIPATION TYPE: Primary | ICD-10-CM

## 2024-06-07 NOTE — TELEPHONE ENCOUNTER
----- Message from Rebeca Gilmore sent at 6/7/2024 10:36 AM CDT -----  Regarding: Results  Contact: [phone]  Calling to speak with provider regarding recent lab and imaging results and plan of care going forward. Please call and discuss.    [phone]

## 2024-06-07 NOTE — TELEPHONE ENCOUNTER
Please let patient know that his scan showed a moderate amount of stool. I would like him to try Linzess. I will be sending that to his pharmacy. Please stop all other stool softeners and laxatives while initiating this. Take 30 minutes prior to breakfast.

## 2024-06-11 ENCOUNTER — TELEPHONE (OUTPATIENT)
Dept: GASTROENTEROLOGY | Facility: CLINIC | Age: 74
End: 2024-06-11
Payer: MEDICARE

## 2024-06-11 NOTE — TELEPHONE ENCOUNTER
Tried calling. No answer. Left message. Would like him to take the Linzess and we will arrange for clinic followup to see how he is doing. It did show an enlarged prostate that I routed to his PCP to determine if additional workup needed. The renal cyst are not of concern. We will reassess his GI symptoms at followup and determine if additional workup is indicated.

## 2024-06-11 NOTE — TELEPHONE ENCOUNTER
----- Message from Mesfin Teague sent at 6/11/2024  7:51 AM CDT -----  Name Of Caller: Rizwan        Provider Name: Shauna Aquino        Does patient feel the need to be seen today? no        Relationship to the Pt?: patient        Contact Preference?: [phone]        What is the nature of the call?:  Patient states that he would like to speak with someone in the office in regards to some questions that he has regarding his medications omeprazole (PRILOSEC) 40 MG capsule & linaCLOtide (LINZESS) 72 mcg Cap capsule.

## 2024-06-13 ENCOUNTER — OFFICE VISIT (OUTPATIENT)
Dept: CARDIOLOGY | Facility: CLINIC | Age: 74
End: 2024-06-13
Payer: MEDICARE

## 2024-06-13 VITALS
HEIGHT: 73 IN | HEART RATE: 67 BPM | OXYGEN SATURATION: 98 % | DIASTOLIC BLOOD PRESSURE: 76 MMHG | BODY MASS INDEX: 28.89 KG/M2 | RESPIRATION RATE: 18 BRPM | SYSTOLIC BLOOD PRESSURE: 152 MMHG | WEIGHT: 217.94 LBS

## 2024-06-13 DIAGNOSIS — E78.5 DYSLIPIDEMIA: ICD-10-CM

## 2024-06-13 DIAGNOSIS — I73.9 PAD (PERIPHERAL ARTERY DISEASE): ICD-10-CM

## 2024-06-13 DIAGNOSIS — I10 ESSENTIAL HYPERTENSION: ICD-10-CM

## 2024-06-13 DIAGNOSIS — R09.89 POOR CIRCULATION: ICD-10-CM

## 2024-06-13 DIAGNOSIS — R06.09 DOE (DYSPNEA ON EXERTION): ICD-10-CM

## 2024-06-13 DIAGNOSIS — E78.2 MIXED HYPERLIPIDEMIA: ICD-10-CM

## 2024-06-13 DIAGNOSIS — R06.02 SOB (SHORTNESS OF BREATH): ICD-10-CM

## 2024-06-13 DIAGNOSIS — I25.10 ARTERIOSCLEROSIS OF CORONARY ARTERY: ICD-10-CM

## 2024-06-13 DIAGNOSIS — R94.31 ABNORMAL EKG: ICD-10-CM

## 2024-06-13 DIAGNOSIS — I25.810 CAD (CORONARY ARTERY DISEASE) OF ARTERY BYPASS GRAFT: ICD-10-CM

## 2024-06-13 DIAGNOSIS — I70.0 ATHEROSCLEROSIS OF AORTA: ICD-10-CM

## 2024-06-13 DIAGNOSIS — I25.118 CORONARY ARTERY DISEASE INVOLVING NATIVE CORONARY ARTERY OF NATIVE HEART WITH OTHER FORM OF ANGINA PECTORIS: Primary | ICD-10-CM

## 2024-06-13 PROCEDURE — 99213 OFFICE O/P EST LOW 20 MIN: CPT | Mod: PBBFAC | Performed by: INTERNAL MEDICINE

## 2024-06-13 PROCEDURE — 99214 OFFICE O/P EST MOD 30 MIN: CPT | Mod: S$PBB,,, | Performed by: INTERNAL MEDICINE

## 2024-06-13 PROCEDURE — 99999 PR PBB SHADOW E&M-EST. PATIENT-LVL III: CPT | Mod: PBBFAC,,, | Performed by: INTERNAL MEDICINE

## 2024-06-13 RX ORDER — SODIUM CHLORIDE 0.9 % (FLUSH) 0.9 %
10 SYRINGE (ML) INJECTION
Status: SHIPPED | OUTPATIENT
Start: 2024-06-13

## 2024-06-13 RX ORDER — DIPHENHYDRAMINE HCL 50 MG
50 CAPSULE ORAL ONCE
Status: CANCELLED | OUTPATIENT
Start: 2024-06-13 | End: 2024-06-13

## 2024-06-13 RX ORDER — SODIUM CHLORIDE 9 MG/ML
INJECTION, SOLUTION INTRAVENOUS CONTINUOUS
Status: CANCELLED | OUTPATIENT
Start: 2024-06-13 | End: 2024-06-13

## 2024-06-13 NOTE — PROGRESS NOTES
CARDIOVASCULAR CONSULTATION    REASON FOR CONSULT:   Rizwan Demarco Jr. is a 74 y.o. male who presents for evaluation    HISTORY OF PRESENT ILLNESS:     Patient is a pleasant 72-year-old man.  Here for evaluation.  Past medical history significant for atrial fibrillation, on Eliquis.  Coronary artery bypass grafting in 2003.  Quadruple bypass as per patient, but unknown anatomy.  States had it done at [facility].  Dyslipidemia, hypertension, Sjogren's disease.  Also peripheral artery disease with abnormal ABIs in 2021.  Denies any orthopnea, PND.  Main complaint is right leg, right calf claudication and cramping on walking.  Also complains of occasional heaviness in the chest area states that happens when he gets constipated.      2023:      The left ventricle is normal in size with concentric remodeling and normal systolic function. The estimated ejection fraction is 65%.  Normal right ventricular size with normal right ventricular systolic function.  Normal left ventricular diastolic function.  The estimated PA systolic pressure is 33 mmHg.  Intermediate central venous pressure (8 mmHg).  The ascending aorta is mildly dilated.      2021:      Moderately decreased right ORESTES, 0.68.  Moderately dampened PVR waveforms.  Mildly decreased left ORESTES, 0.89.  Moderately dampened PVR waveforms.      May 23:  Patient here for follow-up.  Peripheral ultrasound revealed 60 90% mid left SFA stenosis.  Discussed initiating Pletal with the patient.  Patient not interested.  Would like to proceed with the peripheral angiogram for further evaluation.  Lifestyle limiting calf claudication right more than left        Right ORESTES 0.73 suggesting mild arteial flow resriction. Left ORESTES normal 1.01     Mild bilateral carotid plaque with no flow limiting stenosis     Normal abdominal aortic size and flow. No AAA       Mild right SFA plaque with no flow limiting arterial stenosis  Moderate left SFA plaque with 60-90% mis SFA  stenosis. Occluded left posterial tibial artery        Notes from July 23:  Patient here for follow-up.  Claudication in the right leg has gone away.  States he recently walk 2 miles without any claudication.  Has a stenosis in his left SFA also, but states that there is no claudication in the left leg.      Successful laser atherectomy, balloon angioplasty of the distal left SFA and proximal popliteal artery.  With excellent angiographic results      Procedures performed:     1. Ultrasound-guided left common femoral artery access next     2. Abdominal aortogram with pigtail placed in the suprarenal position      3. Selective right lower extremity angiogram     4. Selective left lower artery angiogram      5. Laser atherectomy of right SFA and proximal popliteal artery      6. Drug coated balloon angioplasty of right SFA and proximal popliteal artery      7. IVUS of right SFA, popliteal and TP trunk.        Procedural details      Ultrasound-guided access of left common femoral artery was obtained.  After that we inserted a pigtail in the suprarenal position and abdominal aortogram was obtained.  It demonstrated no significant iliac artery stenosis on both sides.  Then we selectively engaged the right iliac artery and angiogram of the right iliac artery was obtained.  It revealed mild 10-20% stenosis.  After that we obtained angiogram of the right SFA which revealed severe 90-95% stenosis in the distal SFA.  There was also another 90% stenosis in the right TP trunk.  Proximal  of right anterior tibial artery and right posterior tibial arteries was present.  Both these arteries filled with collaterals.  Peroneal artery provides the most robust flow to the foot.  After that we crossed these wires with the lesion and decision was made to fix the SFA and popliteal lesions because patient has lifestyle limiting claudication, but no resting pain or CLI.  IVUS was used for vessel sizing After that laser atherectomy of  these 2 lesions was performed followed by balloon angioplasty.  Angiogram post revealed excellent angiographic results.       Then we did angiogram of the left lower extremity from the sheath and it revealed distal SFA severe stenosis.  Anterior tibial artery is diffusely diseased and there appears to be atypical takeoff of the posterior tibial artery.  No significant stenosis was noted in the peroneal artery        Assessment plan     Continue medical management      Resume Eliquis in a.m..  Continue Plavix 75 mg qd     Aspirin 81 mg qd x 1 m     statins         Nov 23: doing fine. No more claudication. No cp, orthopnea, pnd    Notes from February 24: Patient here for follow-up.  Doing fine.  Denies any chest pains at rest on exertion, orthopnea, PND.      Notes from May 24: Patient here for follow-up.  Lately has been experiencing dyspnea on exertion and chest tightness on exertion    Notes from June 24: Patient here for follow-up.  Stress test abnormal    Results for orders placed or performed in visit on 05/03/24   IN OFFICE EKG 12-LEAD (to [place])    Collection Time: 05/03/24 10:58 AM   Result Value Ref Range    QRS Duration 98 ms    OHS QTC Calculation 410 ms    Narrative    Test Reason : I10,    Vent. Rate : 049 BPM     Atrial Rate : 136 BPM     P-R Int : 000 ms          QRS Dur : 098 ms      QT Int : 454 ms       P-R-T Axes : 000 073 075 degrees     QTc Int : 410 ms    Atrial fibrillation with slow ventricular response with a competing  junctional pacemaker  Nonspecific T wave abnormality  Abnormal ECG  When compared with ECG of 02-FEB-2024 11:50,  No significant change was found  Confirmed by Ruslan Lozano MD ([id number]) on 5/4/2024 4:54:26 PM    Referred By:  DARRYL           Confirmed By:Ruslan Lozano MD       Results for orders placed during the hospital encounter of 06/06/24    Echo    Interpretation Summary    Left Ventricle: The left ventricle is normal in size. Mildly increased wall thickness. There is  normal systolic function with a visually estimated ejection fraction of 55 - 60%. Unable to assess diastolic function due to atrial fibrillation.    Right Ventricle: Normal right ventricular cavity size. Systolic function is normal.    Left Atrium: Left atrium is moderately dilated.    Right Atrium: Right atrium is moderately dilated.    Mitral Valve: There is mild regurgitation.    Tricuspid Valve: There is moderate regurgitation.    Pulmonary Artery: The estimated pulmonary artery systolic pressure is 48 mmHg.    IVC/SVC: Intermediate venous pressure at 8 mmHg.      Results for orders placed during the hospital encounter of 06/06/24    Nuclear Stress - Cardiology Interpreted    Interpretation Summary    Abnormal myocardial perfusion scan.    There is a moderate intensity, moderate to large sized, mostly reversible perfusion abnormality with some fixed areas in the inferior wall(s).    There are no other significant perfusion abnormalities.    The gated perfusion images showed an ejection fraction of 68% at rest.    There is normal wall motion at rest and post stress.    The ECG portion of the study is negative for ischemia.    The patient reported no chest pain during the stress test.    There were no arrhythmias during stress.      Results for orders placed during the hospital encounter of 06/21/23    Cardiac catheterization    Conclusion    The estimated blood loss was <50 mL.    Successful laser atherectomy, balloon angioplasty of the distal left SFA and proximal popliteal artery.  With excellent angiographic results    Procedures performed:    1. Ultrasound-guided left common femoral artery access next    2. Abdominal aortogram with pigtail placed in the suprarenal position    3. Selective right lower extremity angiogram    4. Selective left lower artery angiogram    5. Laser atherectomy of right SFA and proximal popliteal artery    6. Drug coated balloon angioplasty of right SFA and proximal popliteal  artery    7. IVUS of right SFA, popliteal and TP trunk.      Procedural details    Ultrasound-guided access of left common femoral artery was obtained.  After that we inserted a pigtail in the suprarenal position and abdominal aortogram was obtained.  It demonstrated no significant iliac artery stenosis on both sides.  Then we selectively engaged the right iliac artery and angiogram of the right iliac artery was obtained.  It revealed mild 10-20% stenosis.  After that we obtained angiogram of the right SFA which revealed severe 90-95% stenosis in the distal SFA.  There was also another 90% stenosis in the right TP trunk.  Proximal  of right anterior tibial artery and right posterior tibial arteries was present.  Both these arteries filled with collaterals.  Peroneal artery provides the most robust flow to the foot.  After that we crossed these wires with the lesion and decision was made to fix the SFA and popliteal lesions because patient has lifestyle limiting claudication, but no resting pain or CLI.  IVUS was used for vessel sizing After that laser atherectomy of these 2 lesions was performed followed by balloon angioplasty.  Angiogram post revealed excellent angiographic results.    Then we did angiogram of the left lower extremity from the sheath and it revealed distal SFA severe stenosis.  Anterior tibial artery is diffusely diseased and there appears to be atypical takeoff of the posterior tibial artery.  No significant stenosis was noted in the peroneal artery      Assessment plan    Continue medical management    Resume Eliquis in a.m..  Continue Plavix 75 mg qd    Aspirin 81 mg qd x 1 m    statins                The procedure log was documented by Documenter: Prerna Cherry RN and verified by Abbe Vilchis MD.    Date: 6/30/2023  Time: 1:18 PM          PAST MEDICAL HISTORY:     Past Medical History:   Diagnosis Date    Allergy     Anticoagulant long-term use     Arthritis     Rheumatoid arthritis    Back  pain     Blood clotting tendency     BPH (benign prostatic hypertrophy)     Cervical spondylosis     Colon polyp 2010    adenoma    Coronary artery disease     Depression 05/08/2015    Dry eyes     Dry mouth     GERD (gastroesophageal reflux disease)     Hyperlipidemia     Hypertension     Lumbar spondylosis     Nasal obstruction     PAD (peripheral artery disease) 04/25/2023    Rheumatoid arthritis     Sinusitis     Special screening for malignant neoplasm of colon 06/29/2016    Trouble in sleeping        PAST SURGICAL HISTORY:     Past Surgical History:   Procedure Laterality Date    ATHERECTOMY Left 6/21/2023    Procedure: Atherectomy;  Surgeon: Abbe Vilchis MD;  Location: [facility] CATH LAB;  Service: Cardiology;  Laterality: Left;    CHALAZION EXCISION      COLONOSCOPY N/A 6/29/2016    Procedure: COLONOSCOPY;  Surgeon: Partha Saucedo MD;  Location: [facility] ENDO;  Service: Endoscopy;  Laterality: N/A;    COLONOSCOPY N/A 7/24/2020    Procedure: COLONOSCOPY;  Surgeon: Ian Martinez MD;  Location: [facility] (4TH FLR);  Service: Endoscopy;  Laterality: N/A;  4/16/20 - removed from 5/1/20, not called yet due to acute pain issues being addressed today - pg    COLONOSCOPY N/A 10/19/2023    Procedure: COLONOSCOPY;  Surgeon: Ian Martinez MD;  Location: [facility];  Service: Endoscopy;  Laterality: N/A;  order created from Dr. Martinez's previous colonoscopy report 7/24/2020-3 year surveillance.   ok to hold Eliquis 2 days and Plavix 5 days per Dr Vilchis-GT  PEG instr portal -ml  10/12- pre call complete.  DBM    CORONARY ARTERY BYPASS GRAFT      ESOPHAGOGASTRODUODENOSCOPY N/A 7/24/2020    Procedure: ESOPHAGOGASTRODUODENOSCOPY (EGD);  Surgeon: Ian Martinez MD;  Location: [facility] (4TH FLR);  Service: Endoscopy;  Laterality: N/A;  4/16/20 - removed from 5/1/20, not called yet due to acute pain issues being addressed today.  4/17/20 - rescheduled 7/24/20 - pg    EXCISION TURBINATE, SUBMUCOUS      KNEE ARTHROSCOPY W/ DEBRIDEMENT       NASAL SEPTUM SURGERY      PERIPHERAL ANGIOGRAPHY N/A 6/21/2023    Procedure: Peripheral angiography;  Surgeon: Abbe Vilchis MD;  Location: [facility] CATH LAB;  Service: Cardiology;  Laterality: N/A;  right radial access--- RN PREOP 6/16----JANA    TONSILLECTOMY         ALLERGIES AND MEDICATION:     Review of patient's allergies indicates:   Allergen Reactions    Aspirin Nausea And Vomiting    Astelin [azelastine] Other (See Comments)     Dry mouth    Flomax [tamsulosin] Other (See Comments)     Nosebleed        Medication List            Accurate as of June 13, 2024  8:59 AM. If you have any questions, ask your nurse or doctor.                CONTINUE taking these medications      acetaminophen 500 MG tablet  Commonly known as: TYLENOL  Take 2 tablets (1,000 mg total) by mouth every 6 (six) hours as needed for Pain.     alfuzosin 10 mg Tb24  Commonly known as: UROXATRAL  Take 1 tablet (10 mg total) by mouth daily with breakfast.     apixaban 5 mg Tab  Commonly known as: ELIQUIS  Take 1 tablet (5 mg total) by mouth 2 (two) times daily.     atorvastatin 40 MG tablet  Commonly known as: LIPITOR  Take 1 tablet (40 mg total) by mouth once daily.     budesonide 0.25 mg/2 mL nebulizer solution  Commonly known as: PULMICORT  Take 2 mLs (0.25 mg total) by nebulization 2 (two) times daily. Controller     cetirizine 10 MG tablet  Commonly known as: ZYRTEC  Take 1 tablet (10 mg total) by mouth once daily.     ciclopirox 0.77 % Crea  Commonly known as: LOPROX  Apply topically 2 (two) times daily.     ciclopirox 8 % Soln  Commonly known as: PENLAC  Apply topically nightly.     cinnamon bark 500 mg capsule     clopidogreL 75 mg tablet  Commonly known as: PLAVIX  Take 1 tablet (75 mg total) by mouth once daily.     diphenhydrAMINE 25 mg capsule  Commonly known as: BENADRYL  Take 1 capsule (25 mg total) by mouth every 6 (six) hours as needed for Itching or Allergies (Take if headache does not resolve).     docusate sodium 100 MG  capsule  Commonly known as: COLACE  Take 1 capsule (100 mg total) by mouth 2 (two) times daily.     fluticasone propionate 50 mcg/actuation nasal spray  Commonly known as: FLONASE  1 spray (50 mcg total) by Each Nostril route 2 (two) times daily.     hydroCHLOROthiazide 12.5 mg capsule  Commonly known as: MICROZIDE  Take 1 capsule (12.5 mg total) by mouth once daily.     linaCLOtide 72 mcg Cap capsule  Commonly known as: LINZESS  Take 1 capsule (72 mcg total) by mouth before breakfast.     losartan 25 MG tablet  Commonly known as: COZAAR  Take 1 tablet (25 mg total) by mouth once daily.     multivitamin per tablet  Commonly known as: THERAGRAN     OCEAN NASAL 0.65 % nasal spray  Generic drug: sodium chloride  1 spray by Nasal route every 3 (three) hours as needed for Congestion.     omega-3 fatty acids 1,250 mg Cap     omeprazole 40 MG capsule  Commonly known as: PRILOSEC  Take 1 capsule (40 mg total) by mouth once daily.     propylene glycoL 0.6 % Drop     sildenafiL 100 MG tablet  Commonly known as: VIAGRA  Take 1 tablet (100 mg total) by mouth daily as needed.     sodium bicarb-sodium chloride Pack     Systane GeL 0.3 % Gel  Generic drug: artificial tears(hypromellose)(GENTEAL/SUSTANE)              SOCIAL HISTORY:     Social History     Socioeconomic History    Marital status:    Tobacco Use    Smoking status: Former     Current packs/day: 0.00     Average packs/day: 1 pack/day for 20.0 years (20.0 ttl pk-yrs)     Types: Cigarettes     Start date: 1971     Quit date: 1991     Years since quittin.4    Smokeless tobacco: Never    Tobacco comments:     Quit .   Substance and Sexual Activity    Alcohol use: No    Drug use: No    Sexual activity: Yes     Partners: Female     Social Determinants of Health     Financial Resource Strain: Patient Declined (6/10/2024)    Overall Financial Resource Strain (CARDIA)     Difficulty of Paying Living Expenses: Patient declined   Food Insecurity: No Food  Insecurity (6/10/2024)    Hunger Vital Sign     Worried About Running Out of Food in the Last Year: Never true     Ran Out of Food in the Last Year: Never true   Transportation Needs: No Transportation Needs (6/7/2023)    PRAPARE - Transportation     Lack of Transportation (Medical): No     Lack of Transportation (Non-Medical): No   Physical Activity: Sufficiently Active (6/10/2024)    Exercise Vital Sign     Days of Exercise per Week: 3 days     Minutes of Exercise per Session: 50 min   Stress: No Stress Concern Present (6/10/2024)    [employer] - Occupational Stress Questionnaire     Feeling of Stress : Not at all   Housing Stability: Low Risk  (6/7/2023)    Housing Stability Vital Sign     Unable to Pay for Housing in the Last Year: No     Number of Places Lived in the Last Year: 1     Unstable Housing in the Last Year: No       FAMILY HISTORY:     Family History   Problem Relation Name Age of Onset    Hyperlipidemia Mother      Alzheimer's disease Mother      Stomach cancer Father      Colon cancer Father          from wife--he is not unsure     Cataracts Other      No Known Problems Maternal Aunt      No Known Problems Maternal Uncle      No Known Problems Paternal Aunt      No Known Problems Paternal Uncle      No Known Problems Maternal Grandmother      No Known Problems Maternal Grandfather      No Known Problems Paternal Grandmother      No Known Problems Paternal Grandfather      Blindness Neg Hx      Cancer Neg Hx      Diabetes Neg Hx      Glaucoma Neg Hx      Rheum arthritis Neg Hx      Psoriasis Neg Hx      Lupus Neg Hx      Amblyopia Neg Hx      Hypertension Neg Hx      Macular degeneration Neg Hx      Retinal detachment Neg Hx      Strabismus Neg Hx      Stroke Neg Hx      Thyroid disease Neg Hx      Esophageal cancer Neg Hx         REVIEW OF SYSTEMS:   Review of Systems   Constitutional: Negative.   HENT: Negative.     Eyes: Negative.    Respiratory: Negative.    "  Endocrine: Negative.    Hematologic/Lymphatic: Negative.    Skin: Negative.    Musculoskeletal: Negative.    Gastrointestinal: Negative.    Genitourinary: Negative.    Neurological: Negative.    Psychiatric/Behavioral: Negative.     Allergic/Immunologic: Negative.        A 10 point review of systems was performed and all the pertinent positives have been mentioned. Rest of review of systems was negative.        PHYSICAL EXAM:     Vitals:    06/13/24 0850   BP: (!) 160/74   Pulse: 67   Resp: 18    Body mass index is 28.75 kg/m².  Weight: 98.9 kg (217 lb 14.8 oz)   Height: 6' 1" (185.4 cm)     Physical Exam  Vitals reviewed.   Constitutional:       Appearance: He is well-developed.   HENT:      Head: Normocephalic.   Eyes:      Conjunctiva/sclera: Conjunctivae normal.      Pupils: Pupils are equal, round, and reactive to light.   Cardiovascular:      Rate and Rhythm: Normal rate and regular rhythm.      Heart sounds: Normal heart sounds.   Pulmonary:      Effort: Pulmonary effort is normal.      Breath sounds: Normal breath sounds.   Abdominal:      General: Bowel sounds are normal.      Palpations: Abdomen is soft.   Musculoskeletal:      Cervical back: Normal range of motion and neck supple.   Skin:     General: Skin is warm.   Neurological:      Mental Status: He is alert and oriented to person, place, and time.           DATA:     Laboratory:  CBC:  Recent Labs   Lab 02/13/23  0818 06/16/23  1150 01/27/24  1549   WBC 5.34 5.32 5.79   Hemoglobin 15.6 15.5 15.9   Hematocrit 49.6 48.3 49.0   Platelets 162 176 171       CHEMISTRIES:  Recent Labs   Lab 02/08/22  1024 02/13/23  0818 06/16/23  1150 01/27/24  1549 05/22/24  1406   Glucose 92   < > 71 89 106   Sodium 142   < > 140 138 143   Potassium 4.8   < > 3.9 3.8 3.8   BUN 13   < > 10 11 10   Creatinine 1.2   < > 0.9 1.0 1.3   eGFR if  >60  --   --   --   --    eGFR if non  60  --   --   --   --    Calcium 10.2   < > 9.8 9.9 9.9    < " > = values in this interval not displayed.       CARDIAC BIOMARKERS:  Recent Labs   Lab 01/27/24  1549   Troponin I <0.006       COAGS:  Recent Labs   Lab 01/27/24  1549 02/02/24  1200   INR 1.1 1.2       LIPIDS/LFTS:  Recent Labs   Lab 02/08/22  1024 02/13/23  0818 01/27/24  1549   Cholesterol 238 H 155  --    Triglycerides 76 45  --    HDL 42 40  --    LDL Cholesterol 180.8 H 106.0  --    Non-HDL Cholesterol 196 115  --    AST 13 15 15   ALT 13 14 13       Hemoglobin A1C   Date Value Ref Range Status   02/13/2023 5.6 4.0 - 5.6 % Final     Comment:     ADA Screening Guidelines:  5.7-6.4%  Consistent with prediabetes  >or=6.5%  Consistent with diabetes    High levels of fetal hemoglobin interfere with the HbA1C  assay. Heterozygous hemoglobin variants (HbS, HgC, etc)do  not significantly interfere with this assay.   However, presence of multiple variants may affect accuracy.     02/08/2022 5.6 4.0 - 5.6 % Final     Comment:     ADA Screening Guidelines:  5.7-6.4%  Consistent with prediabetes  >or=6.5%  Consistent with diabetes    High levels of fetal hemoglobin interfere with the HbA1C  assay. Heterozygous hemoglobin variants (HbS, HgC, etc)do  not significantly interfere with this assay.   However, presence of multiple variants may affect accuracy.     05/24/2019 5.7 (H) 4.0 - 5.6 % Final     Comment:     ADA Screening Guidelines:  5.7-6.4%  Consistent with prediabetes  >or=6.5%  Consistent with diabetes  High levels of fetal hemoglobin interfere with the HbA1C  assay. Heterozygous hemoglobin variants (HbS, HgC, etc)do  not significantly interfere with this assay.   However, presence of multiple variants may affect accuracy.         TSH  Recent Labs   Lab 02/13/23  0818   TSH 0.641       The 10-year ASCVD risk score (Emmanuelle CORREA, et al., 2019) is: 32.5%    Values used to calculate the score:      Age: 74 years      Sex: Male      Is Non- : Yes      Diabetic: No      Tobacco smoker: No       Systolic Blood Pressure: 160 mmHg      Is BP treated: Yes      HDL Cholesterol: 40 mg/dL      Total Cholesterol: 155 mg/dL       BNP    Lab Results   Component Value Date/Time     (H) 01/27/2024 03:49 PM    BNP 25 05/07/2015 05:30 PM             ASSESSMENT AND PLAN     Patient Active Problem List   Diagnosis    Sjogren's disease    GERD (gastroesophageal reflux disease)    Arteriosclerosis of coronary artery    Hyperlipidemia    Cervical spondylosis    Degeneration of intervertebral disc    Rheumatoid arthritis    Benign prostatic hyperplasia with weak urinary stream    Perennial allergic rhinitis    Essential hypertension    High frequency hearing loss    Nasal obstruction    Dysfunctions associated with sleep stages or arousal from sleep    Primary insomnia    Wears dentures    Atherosclerosis of aorta    Other personal history presenting hazards to health    Complete loss of teeth, unspecified cause, unspecified class    Cervical pain (neck)    Impaired range of motion of cervical spine    Posture imbalance    PAD (peripheral artery disease)    Poor circulation    Chest pain       Peripheral artery disease:  Now status post revascularization of right SFA and popliteal artery.  Also has residual infrapopliteal disease.  Is claudication after revascularization of the SFA disease has gone away and he can now walk 2 miles.  He also has flow-limiting stenosis in the distal left SFA, but states that he does not have any pain in the left leg.  Continue medical management.  Aspirin 81 mg daily for 1 month, Plavix 75 mg daily uninterrupted for at least 1 year, continue Eliquis 5 mg b.i.d.      Coronary artery disease status post CABG in 2003.  Stress test in May of 2023 did not show any significant ischemia.  Recent echo showed normal left ventricle systolic function     Carotid ultrasound in May of 2023 showed mild bilateral carotid artery block with no flow-limiting stenosis.    Hypertension:  Uncontrolled in  clinic today, but states at home stays well controlled around 120 mmHg.      Atrial fibrillation:  On Eliquis.  Rate controlled.     Dyslipidemia:  Continue ezetimibe and statins.  Recheck lipid profile    Lab Results   Component Value Date    LDLCALC 106.0 02/13/2023     Dyspnea on exertion and chest tightness on exertion.  Check stress test and echocardiogram.  Stress test abnormal.  Discussed medical management versus angiogram.  Will repeat an angiogram    Risks, benefits and alternatives of the catheterization procedure were discussed with the patient.The risks of coronary angiography include but are not limited to: bleeding, infection, death, heart attack, arrhythmia, kidney injury or failure, potential need for dialysis, allergic reactions, stroke, need for emergency surgery, hematoma, pseudoaneurysm etc.  Should stenting be indicated, the patient has agreed to dual anti-platelet therapy for 1-consecutive year with a drug-eluting stent and a minimum of 1-month with the use of a bare metal stent. Additionally, pt is aware that non-compliance is likely to result in stent clotting with heart attack, heart failure, and/or death  The risks of moderate sedation include hypotension, respiratory depression, arrhythmias, bronchospasm, and death. Informed consent was obtained and the  patient is agreeable to proceed with the procedure. Consent was placed on the chart.    Right common femoral artery access.  Patient has been told to hold his Eliquis for 2 days prior to angiogram    Follow-up after 3 m           Thank you very much for involving me in the care of your patient.  Please do not hesitate to contact me if there are any questions.      Abbe Vilchis MD, FAC, [facility]  Interventional Cardiologist, [facility].           This note was dictated with the help of speech recognition software.  There might be un-intended errors and/or substitutions.

## 2024-06-13 NOTE — H&P (VIEW-ONLY)
CARDIOVASCULAR CONSULTATION    REASON FOR CONSULT:   Rizwan Demarco Jr. is a 74 y.o. male who presents for evaluation    HISTORY OF PRESENT ILLNESS:     Patient is a pleasant 72-year-old man.  Here for evaluation.  Past medical history significant for atrial fibrillation, on Eliquis.  Coronary artery bypass grafting in 2003.  Quadruple bypass as per patient, but unknown anatomy.  States had it done at [facility].  Dyslipidemia, hypertension, Sjogren's disease.  Also peripheral artery disease with abnormal ABIs in 2021.  Denies any orthopnea, PND.  Main complaint is right leg, right calf claudication and cramping on walking.  Also complains of occasional heaviness in the chest area states that happens when he gets constipated.      2023:      The left ventricle is normal in size with concentric remodeling and normal systolic function. The estimated ejection fraction is 65%.  Normal right ventricular size with normal right ventricular systolic function.  Normal left ventricular diastolic function.  The estimated PA systolic pressure is 33 mmHg.  Intermediate central venous pressure (8 mmHg).  The ascending aorta is mildly dilated.      2021:      Moderately decreased right ORESTES, 0.68.  Moderately dampened PVR waveforms.  Mildly decreased left ORESTES, 0.89.  Moderately dampened PVR waveforms.      May 23:  Patient here for follow-up.  Peripheral ultrasound revealed 60 90% mid left SFA stenosis.  Discussed initiating Pletal with the patient.  Patient not interested.  Would like to proceed with the peripheral angiogram for further evaluation.  Lifestyle limiting calf claudication right more than left        Right ORESTES 0.73 suggesting mild arteial flow resriction. Left ORESTES normal 1.01     Mild bilateral carotid plaque with no flow limiting stenosis     Normal abdominal aortic size and flow. No AAA       Mild right SFA plaque with no flow limiting arterial stenosis  Moderate left SFA plaque with 60-90% mis SFA  stenosis. Occluded left posterial tibial artery        Notes from July 23:  Patient here for follow-up.  Claudication in the right leg has gone away.  States he recently walk 2 miles without any claudication.  Has a stenosis in his left SFA also, but states that there is no claudication in the left leg.      Successful laser atherectomy, balloon angioplasty of the distal left SFA and proximal popliteal artery.  With excellent angiographic results      Procedures performed:     1. Ultrasound-guided left common femoral artery access next     2. Abdominal aortogram with pigtail placed in the suprarenal position      3. Selective right lower extremity angiogram     4. Selective left lower artery angiogram      5. Laser atherectomy of right SFA and proximal popliteal artery      6. Drug coated balloon angioplasty of right SFA and proximal popliteal artery      7. IVUS of right SFA, popliteal and TP trunk.        Procedural details      Ultrasound-guided access of left common femoral artery was obtained.  After that we inserted a pigtail in the suprarenal position and abdominal aortogram was obtained.  It demonstrated no significant iliac artery stenosis on both sides.  Then we selectively engaged the right iliac artery and angiogram of the right iliac artery was obtained.  It revealed mild 10-20% stenosis.  After that we obtained angiogram of the right SFA which revealed severe 90-95% stenosis in the distal SFA.  There was also another 90% stenosis in the right TP trunk.  Proximal  of right anterior tibial artery and right posterior tibial arteries was present.  Both these arteries filled with collaterals.  Peroneal artery provides the most robust flow to the foot.  After that we crossed these wires with the lesion and decision was made to fix the SFA and popliteal lesions because patient has lifestyle limiting claudication, but no resting pain or CLI.  IVUS was used for vessel sizing After that laser atherectomy of  these 2 lesions was performed followed by balloon angioplasty.  Angiogram post revealed excellent angiographic results.       Then we did angiogram of the left lower extremity from the sheath and it revealed distal SFA severe stenosis.  Anterior tibial artery is diffusely diseased and there appears to be atypical takeoff of the posterior tibial artery.  No significant stenosis was noted in the peroneal artery        Assessment plan     Continue medical management      Resume Eliquis in a.m..  Continue Plavix 75 mg qd     Aspirin 81 mg qd x 1 m     statins         Nov 23: doing fine. No more claudication. No cp, orthopnea, pnd    Notes from February 24: Patient here for follow-up.  Doing fine.  Denies any chest pains at rest on exertion, orthopnea, PND.      Notes from May 24: Patient here for follow-up.  Lately has been experiencing dyspnea on exertion and chest tightness on exertion    Notes from June 24: Patient here for follow-up.  Stress test abnormal    Results for orders placed or performed in visit on 05/03/24   IN OFFICE EKG 12-LEAD (to [place])    Collection Time: 05/03/24 10:58 AM   Result Value Ref Range    QRS Duration 98 ms    OHS QTC Calculation 410 ms    Narrative    Test Reason : I10,    Vent. Rate : 049 BPM     Atrial Rate : 136 BPM     P-R Int : 000 ms          QRS Dur : 098 ms      QT Int : 454 ms       P-R-T Axes : 000 073 075 degrees     QTc Int : 410 ms    Atrial fibrillation with slow ventricular response with a competing  junctional pacemaker  Nonspecific T wave abnormality  Abnormal ECG  When compared with ECG of 02-FEB-2024 11:50,  No significant change was found  Confirmed by Ruslan Lozano MD ([id number]) on 5/4/2024 4:54:26 PM    Referred By:  DARRYL           Confirmed By:Ruslan Lozano MD       Results for orders placed during the hospital encounter of 06/06/24    Echo    Interpretation Summary    Left Ventricle: The left ventricle is normal in size. Mildly increased wall thickness. There is  normal systolic function with a visually estimated ejection fraction of 55 - 60%. Unable to assess diastolic function due to atrial fibrillation.    Right Ventricle: Normal right ventricular cavity size. Systolic function is normal.    Left Atrium: Left atrium is moderately dilated.    Right Atrium: Right atrium is moderately dilated.    Mitral Valve: There is mild regurgitation.    Tricuspid Valve: There is moderate regurgitation.    Pulmonary Artery: The estimated pulmonary artery systolic pressure is 48 mmHg.    IVC/SVC: Intermediate venous pressure at 8 mmHg.      Results for orders placed during the hospital encounter of 06/06/24    Nuclear Stress - Cardiology Interpreted    Interpretation Summary    Abnormal myocardial perfusion scan.    There is a moderate intensity, moderate to large sized, mostly reversible perfusion abnormality with some fixed areas in the inferior wall(s).    There are no other significant perfusion abnormalities.    The gated perfusion images showed an ejection fraction of 68% at rest.    There is normal wall motion at rest and post stress.    The ECG portion of the study is negative for ischemia.    The patient reported no chest pain during the stress test.    There were no arrhythmias during stress.      Results for orders placed during the hospital encounter of 06/21/23    Cardiac catheterization    Conclusion    The estimated blood loss was <50 mL.    Successful laser atherectomy, balloon angioplasty of the distal left SFA and proximal popliteal artery.  With excellent angiographic results    Procedures performed:    1. Ultrasound-guided left common femoral artery access next    2. Abdominal aortogram with pigtail placed in the suprarenal position    3. Selective right lower extremity angiogram    4. Selective left lower artery angiogram    5. Laser atherectomy of right SFA and proximal popliteal artery    6. Drug coated balloon angioplasty of right SFA and proximal popliteal  artery    7. IVUS of right SFA, popliteal and TP trunk.      Procedural details    Ultrasound-guided access of left common femoral artery was obtained.  After that we inserted a pigtail in the suprarenal position and abdominal aortogram was obtained.  It demonstrated no significant iliac artery stenosis on both sides.  Then we selectively engaged the right iliac artery and angiogram of the right iliac artery was obtained.  It revealed mild 10-20% stenosis.  After that we obtained angiogram of the right SFA which revealed severe 90-95% stenosis in the distal SFA.  There was also another 90% stenosis in the right TP trunk.  Proximal  of right anterior tibial artery and right posterior tibial arteries was present.  Both these arteries filled with collaterals.  Peroneal artery provides the most robust flow to the foot.  After that we crossed these wires with the lesion and decision was made to fix the SFA and popliteal lesions because patient has lifestyle limiting claudication, but no resting pain or CLI.  IVUS was used for vessel sizing After that laser atherectomy of these 2 lesions was performed followed by balloon angioplasty.  Angiogram post revealed excellent angiographic results.    Then we did angiogram of the left lower extremity from the sheath and it revealed distal SFA severe stenosis.  Anterior tibial artery is diffusely diseased and there appears to be atypical takeoff of the posterior tibial artery.  No significant stenosis was noted in the peroneal artery      Assessment plan    Continue medical management    Resume Eliquis in a.m..  Continue Plavix 75 mg qd    Aspirin 81 mg qd x 1 m    statins                The procedure log was documented by Documenter: Prenra Cherry RN and verified by Abbe Vilchis MD.    Date: 6/30/2023  Time: 1:18 PM          PAST MEDICAL HISTORY:     Past Medical History:   Diagnosis Date    Allergy     Anticoagulant long-term use     Arthritis     Rheumatoid arthritis    Back  pain     Blood clotting tendency     BPH (benign prostatic hypertrophy)     Cervical spondylosis     Colon polyp 2010    adenoma    Coronary artery disease     Depression 05/08/2015    Dry eyes     Dry mouth     GERD (gastroesophageal reflux disease)     Hyperlipidemia     Hypertension     Lumbar spondylosis     Nasal obstruction     PAD (peripheral artery disease) 04/25/2023    Rheumatoid arthritis     Sinusitis     Special screening for malignant neoplasm of colon 06/29/2016    Trouble in sleeping        PAST SURGICAL HISTORY:     Past Surgical History:   Procedure Laterality Date    ATHERECTOMY Left 6/21/2023    Procedure: Atherectomy;  Surgeon: Abbe Vilchis MD;  Location: [facility] CATH LAB;  Service: Cardiology;  Laterality: Left;    CHALAZION EXCISION      COLONOSCOPY N/A 6/29/2016    Procedure: COLONOSCOPY;  Surgeon: Partha Saucedo MD;  Location: [facility] ENDO;  Service: Endoscopy;  Laterality: N/A;    COLONOSCOPY N/A 7/24/2020    Procedure: COLONOSCOPY;  Surgeon: Ian Martinez MD;  Location: [facility] (4TH FLR);  Service: Endoscopy;  Laterality: N/A;  4/16/20 - removed from 5/1/20, not called yet due to acute pain issues being addressed today - pg    COLONOSCOPY N/A 10/19/2023    Procedure: COLONOSCOPY;  Surgeon: Ian Martinez MD;  Location: [facility];  Service: Endoscopy;  Laterality: N/A;  order created from Dr. Martinez's previous colonoscopy report 7/24/2020-3 year surveillance.   ok to hold Eliquis 2 days and Plavix 5 days per Dr Vilchis-GT  PEG instr portal -ml  10/12- pre call complete.  DBM    CORONARY ARTERY BYPASS GRAFT      ESOPHAGOGASTRODUODENOSCOPY N/A 7/24/2020    Procedure: ESOPHAGOGASTRODUODENOSCOPY (EGD);  Surgeon: Ian Martinez MD;  Location: [facility] (4TH FLR);  Service: Endoscopy;  Laterality: N/A;  4/16/20 - removed from 5/1/20, not called yet due to acute pain issues being addressed today.  4/17/20 - rescheduled 7/24/20 - pg    EXCISION TURBINATE, SUBMUCOUS      KNEE ARTHROSCOPY W/ DEBRIDEMENT       NASAL SEPTUM SURGERY      PERIPHERAL ANGIOGRAPHY N/A 6/21/2023    Procedure: Peripheral angiography;  Surgeon: Abbe Vilchis MD;  Location: [facility] CATH LAB;  Service: Cardiology;  Laterality: N/A;  right radial access--- RN PREOP 6/16----JANA    TONSILLECTOMY         ALLERGIES AND MEDICATION:     Review of patient's allergies indicates:   Allergen Reactions    Aspirin Nausea And Vomiting    Astelin [azelastine] Other (See Comments)     Dry mouth    Flomax [tamsulosin] Other (See Comments)     Nosebleed        Medication List            Accurate as of June 13, 2024  8:59 AM. If you have any questions, ask your nurse or doctor.                CONTINUE taking these medications      acetaminophen 500 MG tablet  Commonly known as: TYLENOL  Take 2 tablets (1,000 mg total) by mouth every 6 (six) hours as needed for Pain.     alfuzosin 10 mg Tb24  Commonly known as: UROXATRAL  Take 1 tablet (10 mg total) by mouth daily with breakfast.     apixaban 5 mg Tab  Commonly known as: ELIQUIS  Take 1 tablet (5 mg total) by mouth 2 (two) times daily.     atorvastatin 40 MG tablet  Commonly known as: LIPITOR  Take 1 tablet (40 mg total) by mouth once daily.     budesonide 0.25 mg/2 mL nebulizer solution  Commonly known as: PULMICORT  Take 2 mLs (0.25 mg total) by nebulization 2 (two) times daily. Controller     cetirizine 10 MG tablet  Commonly known as: ZYRTEC  Take 1 tablet (10 mg total) by mouth once daily.     ciclopirox 0.77 % Crea  Commonly known as: LOPROX  Apply topically 2 (two) times daily.     ciclopirox 8 % Soln  Commonly known as: PENLAC  Apply topically nightly.     cinnamon bark 500 mg capsule     clopidogreL 75 mg tablet  Commonly known as: PLAVIX  Take 1 tablet (75 mg total) by mouth once daily.     diphenhydrAMINE 25 mg capsule  Commonly known as: BENADRYL  Take 1 capsule (25 mg total) by mouth every 6 (six) hours as needed for Itching or Allergies (Take if headache does not resolve).     docusate sodium 100 MG  capsule  Commonly known as: COLACE  Take 1 capsule (100 mg total) by mouth 2 (two) times daily.     fluticasone propionate 50 mcg/actuation nasal spray  Commonly known as: FLONASE  1 spray (50 mcg total) by Each Nostril route 2 (two) times daily.     hydroCHLOROthiazide 12.5 mg capsule  Commonly known as: MICROZIDE  Take 1 capsule (12.5 mg total) by mouth once daily.     linaCLOtide 72 mcg Cap capsule  Commonly known as: LINZESS  Take 1 capsule (72 mcg total) by mouth before breakfast.     losartan 25 MG tablet  Commonly known as: COZAAR  Take 1 tablet (25 mg total) by mouth once daily.     multivitamin per tablet  Commonly known as: THERAGRAN     OCEAN NASAL 0.65 % nasal spray  Generic drug: sodium chloride  1 spray by Nasal route every 3 (three) hours as needed for Congestion.     omega-3 fatty acids 1,250 mg Cap     omeprazole 40 MG capsule  Commonly known as: PRILOSEC  Take 1 capsule (40 mg total) by mouth once daily.     propylene glycoL 0.6 % Drop     sildenafiL 100 MG tablet  Commonly known as: VIAGRA  Take 1 tablet (100 mg total) by mouth daily as needed.     sodium bicarb-sodium chloride Pack     Systane GeL 0.3 % Gel  Generic drug: artificial tears(hypromellose)(GENTEAL/SUSTANE)              SOCIAL HISTORY:     Social History     Socioeconomic History    Marital status:    Tobacco Use    Smoking status: Former     Current packs/day: 0.00     Average packs/day: 1 pack/day for 20.0 years (20.0 ttl pk-yrs)     Types: Cigarettes     Start date: 1971     Quit date: 1991     Years since quittin.4    Smokeless tobacco: Never    Tobacco comments:     Quit .   Substance and Sexual Activity    Alcohol use: No    Drug use: No    Sexual activity: Yes     Partners: Female     Social Determinants of Health     Financial Resource Strain: Patient Declined (6/10/2024)    Overall Financial Resource Strain (CARDIA)     Difficulty of Paying Living Expenses: Patient declined   Food Insecurity: No Food  Insecurity (6/10/2024)    Hunger Vital Sign     Worried About Running Out of Food in the Last Year: Never true     Ran Out of Food in the Last Year: Never true   Transportation Needs: No Transportation Needs (6/7/2023)    PRAPARE - Transportation     Lack of Transportation (Medical): No     Lack of Transportation (Non-Medical): No   Physical Activity: Sufficiently Active (6/10/2024)    Exercise Vital Sign     Days of Exercise per Week: 3 days     Minutes of Exercise per Session: 50 min   Stress: No Stress Concern Present (6/10/2024)    [employer] [place] [employer] - Occupational Stress Questionnaire     Feeling of Stress : Not at all   Housing Stability: Low Risk  (6/7/2023)    Housing Stability Vital Sign     Unable to Pay for Housing in the Last Year: No     Number of Places Lived in the Last Year: 1     Unstable Housing in the Last Year: No       FAMILY HISTORY:     Family History   Problem Relation Name Age of Onset    Hyperlipidemia Mother      Alzheimer's disease Mother      Stomach cancer Father      Colon cancer Father          from wife--he is not unsure     Cataracts Other      No Known Problems Maternal Aunt      No Known Problems Maternal Uncle      No Known Problems Paternal Aunt      No Known Problems Paternal Uncle      No Known Problems Maternal Grandmother      No Known Problems Maternal Grandfather      No Known Problems Paternal Grandmother      No Known Problems Paternal Grandfather      Blindness Neg Hx      Cancer Neg Hx      Diabetes Neg Hx      Glaucoma Neg Hx      Rheum arthritis Neg Hx      Psoriasis Neg Hx      Lupus Neg Hx      Amblyopia Neg Hx      Hypertension Neg Hx      Macular degeneration Neg Hx      Retinal detachment Neg Hx      Strabismus Neg Hx      Stroke Neg Hx      Thyroid disease Neg Hx      Esophageal cancer Neg Hx         REVIEW OF SYSTEMS:   Review of Systems   Constitutional: Negative.   HENT: Negative.     Eyes: Negative.    Respiratory: Negative.    "  Endocrine: Negative.    Hematologic/Lymphatic: Negative.    Skin: Negative.    Musculoskeletal: Negative.    Gastrointestinal: Negative.    Genitourinary: Negative.    Neurological: Negative.    Psychiatric/Behavioral: Negative.     Allergic/Immunologic: Negative.        A 10 point review of systems was performed and all the pertinent positives have been mentioned. Rest of review of systems was negative.        PHYSICAL EXAM:     Vitals:    06/13/24 0850   BP: (!) 160/74   Pulse: 67   Resp: 18    Body mass index is 28.75 kg/m².  Weight: 98.9 kg (217 lb 14.8 oz)   Height: 6' 1" (185.4 cm)     Physical Exam  Vitals reviewed.   Constitutional:       Appearance: He is well-developed.   HENT:      Head: Normocephalic.   Eyes:      Conjunctiva/sclera: Conjunctivae normal.      Pupils: Pupils are equal, round, and reactive to light.   Cardiovascular:      Rate and Rhythm: Normal rate and regular rhythm.      Heart sounds: Normal heart sounds.   Pulmonary:      Effort: Pulmonary effort is normal.      Breath sounds: Normal breath sounds.   Abdominal:      General: Bowel sounds are normal.      Palpations: Abdomen is soft.   Musculoskeletal:      Cervical back: Normal range of motion and neck supple.   Skin:     General: Skin is warm.   Neurological:      Mental Status: He is alert and oriented to person, place, and time.           DATA:     Laboratory:  CBC:  Recent Labs   Lab 02/13/23  0818 06/16/23  1150 01/27/24  1549   WBC 5.34 5.32 5.79   Hemoglobin 15.6 15.5 15.9   Hematocrit 49.6 48.3 49.0   Platelets 162 176 171       CHEMISTRIES:  Recent Labs   Lab 02/08/22  1024 02/13/23  0818 06/16/23  1150 01/27/24  1549 05/22/24  1406   Glucose 92   < > 71 89 106   Sodium 142   < > 140 138 143   Potassium 4.8   < > 3.9 3.8 3.8   BUN 13   < > 10 11 10   Creatinine 1.2   < > 0.9 1.0 1.3   eGFR if  >60  --   --   --   --    eGFR if non  60  --   --   --   --    Calcium 10.2   < > 9.8 9.9 9.9    < " > = values in this interval not displayed.       CARDIAC BIOMARKERS:  Recent Labs   Lab 01/27/24  1549   Troponin I <0.006       COAGS:  Recent Labs   Lab 01/27/24  1549 02/02/24  1200   INR 1.1 1.2       LIPIDS/LFTS:  Recent Labs   Lab 02/08/22  1024 02/13/23  0818 01/27/24  1549   Cholesterol 238 H 155  --    Triglycerides 76 45  --    HDL 42 40  --    LDL Cholesterol 180.8 H 106.0  --    Non-HDL Cholesterol 196 115  --    AST 13 15 15   ALT 13 14 13       Hemoglobin A1C   Date Value Ref Range Status   02/13/2023 5.6 4.0 - 5.6 % Final     Comment:     ADA Screening Guidelines:  5.7-6.4%  Consistent with prediabetes  >or=6.5%  Consistent with diabetes    High levels of fetal hemoglobin interfere with the HbA1C  assay. Heterozygous hemoglobin variants (HbS, HgC, etc)do  not significantly interfere with this assay.   However, presence of multiple variants may affect accuracy.     02/08/2022 5.6 4.0 - 5.6 % Final     Comment:     ADA Screening Guidelines:  5.7-6.4%  Consistent with prediabetes  >or=6.5%  Consistent with diabetes    High levels of fetal hemoglobin interfere with the HbA1C  assay. Heterozygous hemoglobin variants (HbS, HgC, etc)do  not significantly interfere with this assay.   However, presence of multiple variants may affect accuracy.     05/24/2019 5.7 (H) 4.0 - 5.6 % Final     Comment:     ADA Screening Guidelines:  5.7-6.4%  Consistent with prediabetes  >or=6.5%  Consistent with diabetes  High levels of fetal hemoglobin interfere with the HbA1C  assay. Heterozygous hemoglobin variants (HbS, HgC, etc)do  not significantly interfere with this assay.   However, presence of multiple variants may affect accuracy.         TSH  Recent Labs   Lab 02/13/23  0818   TSH 0.641       The 10-year ASCVD risk score (Emmanuelle CORREA, et al., 2019) is: 32.5%    Values used to calculate the score:      Age: 74 years      Sex: Male      Is Non- : Yes      Diabetic: No      Tobacco smoker: No       Systolic Blood Pressure: 160 mmHg      Is BP treated: Yes      HDL Cholesterol: 40 mg/dL      Total Cholesterol: 155 mg/dL       BNP    Lab Results   Component Value Date/Time     (H) 01/27/2024 03:49 PM    BNP 25 05/07/2015 05:30 PM             ASSESSMENT AND PLAN     Patient Active Problem List   Diagnosis    Sjogren's disease    GERD (gastroesophageal reflux disease)    Arteriosclerosis of coronary artery    Hyperlipidemia    Cervical spondylosis    Degeneration of intervertebral disc    Rheumatoid arthritis    Benign prostatic hyperplasia with weak urinary stream    Perennial allergic rhinitis    Essential hypertension    High frequency hearing loss    Nasal obstruction    Dysfunctions associated with sleep stages or arousal from sleep    Primary insomnia    Wears dentures    Atherosclerosis of aorta    Other personal history presenting hazards to health    Complete loss of teeth, unspecified cause, unspecified class    Cervical pain (neck)    Impaired range of motion of cervical spine    Posture imbalance    PAD (peripheral artery disease)    Poor circulation    Chest pain       Peripheral artery disease:  Now status post revascularization of right SFA and popliteal artery.  Also has residual infrapopliteal disease.  Is claudication after revascularization of the SFA disease has gone away and he can now walk 2 miles.  He also has flow-limiting stenosis in the distal left SFA, but states that he does not have any pain in the left leg.  Continue medical management.  Aspirin 81 mg daily for 1 month, Plavix 75 mg daily uninterrupted for at least 1 year, continue Eliquis 5 mg b.i.d.      Coronary artery disease status post CABG in 2003.  Stress test in May of 2023 did not show any significant ischemia.  Recent echo showed normal left ventricle systolic function     Carotid ultrasound in May of 2023 showed mild bilateral carotid artery block with no flow-limiting stenosis.    Hypertension:  Uncontrolled in  clinic today, but states at home stays well controlled around 120 mmHg.      Atrial fibrillation:  On Eliquis.  Rate controlled.     Dyslipidemia:  Continue ezetimibe and statins.  Recheck lipid profile    Lab Results   Component Value Date    LDLCALC 106.0 02/13/2023     Dyspnea on exertion and chest tightness on exertion.  Check stress test and echocardiogram.  Stress test abnormal.  Discussed medical management versus angiogram.  Will repeat an angiogram    Risks, benefits and alternatives of the catheterization procedure were discussed with the patient.The risks of coronary angiography include but are not limited to: bleeding, infection, death, heart attack, arrhythmia, kidney injury or failure, potential need for dialysis, allergic reactions, stroke, need for emergency surgery, hematoma, pseudoaneurysm etc.  Should stenting be indicated, the patient has agreed to dual anti-platelet therapy for 1-consecutive year with a drug-eluting stent and a minimum of 1-month with the use of a bare metal stent. Additionally, pt is aware that non-compliance is likely to result in stent clotting with heart attack, heart failure, and/or death  The risks of moderate sedation include hypotension, respiratory depression, arrhythmias, bronchospasm, and death. Informed consent was obtained and the  patient is agreeable to proceed with the procedure. Consent was placed on the chart.    Right common femoral artery access.  Patient has been told to hold his Eliquis for 2 days prior to angiogram    Follow-up after 3 m           Thank you very much for involving me in the care of your patient.  Please do not hesitate to contact me if there are any questions.      Abbe Vilchis MD, FAC, [facility]  Interventional Cardiologist, [facility].           This note was dictated with the help of speech recognition software.  There might be un-intended errors and/or substitutions.

## 2024-06-17 ENCOUNTER — HOSPITAL ENCOUNTER (OUTPATIENT)
Dept: PREADMISSION TESTING | Facility: HOSPITAL | Age: 74
Discharge: HOME | End: 2024-06-17
Attending: INTERNAL MEDICINE
Payer: MEDICARE

## 2024-06-17 VITALS
SYSTOLIC BLOOD PRESSURE: 167 MMHG | BODY MASS INDEX: 28.58 KG/M2 | WEIGHT: 216.63 LBS | OXYGEN SATURATION: 99 % | HEART RATE: 54 BPM | TEMPERATURE: 98 F | DIASTOLIC BLOOD PRESSURE: 83 MMHG

## 2024-06-17 DIAGNOSIS — Z01.818 PREOP TESTING: Primary | ICD-10-CM

## 2024-06-17 DIAGNOSIS — I25.10 CAD (CORONARY ARTERY DISEASE): ICD-10-CM

## 2024-06-17 DIAGNOSIS — I25.118 CORONARY ARTERY DISEASE INVOLVING NATIVE CORONARY ARTERY OF NATIVE HEART WITH OTHER FORM OF ANGINA PECTORIS: ICD-10-CM

## 2024-06-17 LAB
ANION GAP SERPL CALC-SCNC: 7 MMOL/L (ref 8–16)
BASOPHILS # BLD AUTO: 0.02 K/UL (ref 0–0.2)
BASOPHILS NFR BLD: 0.3 % (ref 0–1.9)
BUN SERPL-MCNC: 11 MG/DL (ref 8–23)
CALCIUM SERPL-MCNC: 10.1 MG/DL (ref 8.7–10.5)
CHLORIDE SERPL-SCNC: 107 MMOL/L (ref 95–110)
CO2 SERPL-SCNC: 29 MMOL/L (ref 23–29)
CREAT SERPL-MCNC: 1.1 MG/DL (ref 0.5–1.4)
DIFFERENTIAL METHOD BLD: ABNORMAL
EOSINOPHIL # BLD AUTO: 0.2 K/UL (ref 0–0.5)
EOSINOPHIL NFR BLD: 3.5 % (ref 0–8)
ERYTHROCYTE [DISTWIDTH] IN BLOOD BY AUTOMATED COUNT: 12.4 % (ref 11.5–14.5)
EST. GFR  (NO RACE VARIABLE): >60 ML/MIN/1.73 M^2
GLUCOSE SERPL-MCNC: 69 MG/DL (ref 70–110)
HCT VFR BLD AUTO: 49.4 % (ref 40–54)
HGB BLD-MCNC: 15.7 G/DL (ref 14–18)
IMM GRANULOCYTES # BLD AUTO: 0.01 K/UL (ref 0–0.04)
IMM GRANULOCYTES NFR BLD AUTO: 0.2 % (ref 0–0.5)
LYMPHOCYTES # BLD AUTO: 3.3 K/UL (ref 1–4.8)
LYMPHOCYTES NFR BLD: 55.7 % (ref 18–48)
MCH RBC QN AUTO: 29.8 PG (ref 27–31)
MCHC RBC AUTO-ENTMCNC: 31.8 G/DL (ref 32–36)
MCV RBC AUTO: 94 FL (ref 82–98)
MONOCYTES # BLD AUTO: 0.7 K/UL (ref 0.3–1)
MONOCYTES NFR BLD: 11.7 % (ref 4–15)
NEUTROPHILS # BLD AUTO: 1.7 K/UL (ref 1.8–7.7)
NEUTROPHILS NFR BLD: 28.6 % (ref 38–73)
NRBC BLD-RTO: 0 /100 WBC
PLATELET # BLD AUTO: 138 K/UL (ref 150–450)
PMV BLD AUTO: 10.8 FL (ref 9.2–12.9)
POTASSIUM SERPL-SCNC: 4.7 MMOL/L (ref 3.5–5.1)
RBC # BLD AUTO: 5.27 M/UL (ref 4.6–6.2)
SODIUM SERPL-SCNC: 143 MMOL/L (ref 136–145)
WBC # BLD AUTO: 5.96 K/UL (ref 3.9–12.7)

## 2024-06-17 PROCEDURE — 80048 BASIC METABOLIC PNL TOTAL CA: CPT | Performed by: INTERNAL MEDICINE

## 2024-06-17 PROCEDURE — 85025 COMPLETE CBC W/AUTO DIFF WBC: CPT | Performed by: INTERNAL MEDICINE

## 2024-06-17 PROCEDURE — 93005 ELECTROCARDIOGRAM TRACING: CPT

## 2024-06-17 PROCEDURE — 93010 ELECTROCARDIOGRAM REPORT: CPT | Mod: ,,, | Performed by: INTERNAL MEDICINE

## 2024-06-17 NOTE — DISCHARGE INSTRUCTIONS
YOUR PROCEDURE WILL BE AT [facility] at [address] [place] [address]                  Before 7 AM, enter through the Emergency Entrance..   After 7 AM enter through the Main Entrance.                 Report to the Same Day Surgery Registration Desk in the hallway.(Just beside the Same Day Surgery Unit)      Your procedure  is scheduled for __06/19/2024________.    Call [phone] between 2pm and 5pm on ___06/18/2024____to find out your arrival time for the day of surgery.    You may have two visitors.  No children under 12 years old.     You will be going to the Same Day Surgery Unit on the 2nd floor of the hospital.    Important instructions:  Do not eat anything after midnight.  You may have plain water, non carbonated.  You may also have Gatorade or Powerade after midnight.    Stop all fluids 2 hours before your surgery.    It is okay to brush your teeth.  Do not have gum, candy or mints.    SEE MEDICATION SHEET.   TAKE MEDICATIONS AS DIRECTED WITH SIPS OF WATER.      Do not take any diabetic medication on the morning of surgery unless instructed to do so by your doctor or pre op nurse.      All GLP-1 weekly diabetic/weight loss medications must not be taken for one week before your surgery, or your surgery could be canceled.      STOP taking Aspirin, Ibuprofen,  Advil, Motrin, Mobic(meloxicam), Aleve (naproxen), Fish oil, and Vitamin E for at least 7 days before your surgery.     You may take Tylenol if needed which is not a blood thinner.    Please shower the night before and the morning of your surgery.      Follow any Prep Instructions given by your surgeon.    Use Chlorhexidine soap as instructed by your pre op nurse.   Please place clean linens on your bed the night before surgery. Please wear fresh clean clothing after each shower.    No shaving of procedural area at least 4-5 days before surgery due to increased risk of skin irritation and/or possible  infection.    Female patients may be asked for a urine specimen on the morning of the surgery.  Please check with your nurse before using the restroom.    Contact lenses and removable denture work may not be worn during your procedure.    You may wear deodorant only. If you are having breast surgery, do not wear deodorant on the operative side.    Do not wear powder, body lotion, perfume/cologne, oils, creams, or rubs.    Do not wear any jewelry or have any metal on your body.    You will be asked to remove any dentures or partials for the procedure.    If you are going home on the same day of surgery, you must arrange for a family member or a friend to drive you home.  Public transportation is prohibited.  You will not be able to drive home if you were given anesthesia or sedation.    Patients who want to have their Post-op prescriptions filled from our in-house [facility], bring a Credit/Debit Card or cash with you. A co-pay may be required.  The pharmacy closes at 5:30 pm.    Wear loose fitting clothes allowing for bandages.    Please leave money and valuables home.      You may bring your cell phone.    Call the doctor if fever or illness should occur before your surgery.    Call [phone] to contact us here if needed.                            CLOTHES ON DAY OF SURGERY    SHOULDER surgery:  you must have a very oversized shirt.  Very, Very large.  You will probably have a large sling on with your arm strapped to your chest.  You will not be able to put the arm of the operated shoulder into a sleeve.  You can put the arm of the un-operated shoulder into the sleeve, but the shirt will need to be draped over the operated shoulder.       ARM or HAND surgery:  make sure that your sleeves are large and loose enough to pass over large dressings or cast.      BREAST or UNDERARM surgery:  wear a loose, button down shirt so that you can dress without raising your arms over your head.    ABDOMINAL surgery:  wear  loose, comfortable clothing.  Nothing tight around the abdomen.  NO JEANS    PENIS or SCROTAL surgery:  loose comfortable clothing.  Large sweat pants, pajama pants or a robe.  ABSOLUTELY NO JEANS      LEG or FOOT surgery:  wear large loose pants that are able to pass over any large dressings or casts.  You could also wear loose shorts or a skirt.

## 2024-06-19 ENCOUNTER — HOSPITAL ENCOUNTER (OUTPATIENT)
Facility: HOSPITAL | Age: 74
Discharge: HOME | End: 2024-06-19
Attending: INTERNAL MEDICINE | Admitting: INTERNAL MEDICINE
Payer: MEDICARE

## 2024-06-19 VITALS
DIASTOLIC BLOOD PRESSURE: 72 MMHG | OXYGEN SATURATION: 97 % | TEMPERATURE: 98 F | SYSTOLIC BLOOD PRESSURE: 156 MMHG | HEART RATE: 82 BPM | RESPIRATION RATE: 16 BRPM | WEIGHT: 216.88 LBS | BODY MASS INDEX: 28.62 KG/M2

## 2024-06-19 DIAGNOSIS — R07.9 CHEST PAIN, UNSPECIFIED TYPE: Primary | ICD-10-CM

## 2024-06-19 DIAGNOSIS — J34.89 NASAL OBSTRUCTION: ICD-10-CM

## 2024-06-19 DIAGNOSIS — I70.0 ATHEROSCLEROSIS OF AORTA: ICD-10-CM

## 2024-06-19 DIAGNOSIS — R09.89 POOR CIRCULATION: ICD-10-CM

## 2024-06-19 DIAGNOSIS — I25.10 CAD (CORONARY ARTERY DISEASE): ICD-10-CM

## 2024-06-19 DIAGNOSIS — M35.00 SJOGREN'S SYNDROME, WITH UNSPECIFIED ORGAN INVOLVEMENT: ICD-10-CM

## 2024-06-19 DIAGNOSIS — K21.00 GASTROESOPHAGEAL REFLUX DISEASE WITH ESOPHAGITIS WITHOUT HEMORRHAGE: Chronic | ICD-10-CM

## 2024-06-19 DIAGNOSIS — K08.109 COMPLETE LOSS OF TEETH, UNSPECIFIED CAUSE, UNSPECIFIED CLASS: ICD-10-CM

## 2024-06-19 DIAGNOSIS — Z97.2 WEARS DENTURES: ICD-10-CM

## 2024-06-19 DIAGNOSIS — I25.118 CORONARY ARTERY DISEASE INVOLVING NATIVE CORONARY ARTERY OF NATIVE HEART WITH OTHER FORM OF ANGINA PECTORIS: ICD-10-CM

## 2024-06-19 DIAGNOSIS — M06.9 RHEUMATOID ARTHRITIS INVOLVING SHOULDER, UNSPECIFIED LATERALITY, UNSPECIFIED WHETHER RHEUMATOID FACTOR PRESENT: ICD-10-CM

## 2024-06-19 DIAGNOSIS — M53.82 IMPAIRED RANGE OF MOTION OF CERVICAL SPINE: ICD-10-CM

## 2024-06-19 DIAGNOSIS — I73.9 PAD (PERIPHERAL ARTERY DISEASE): ICD-10-CM

## 2024-06-19 DIAGNOSIS — R39.12 BENIGN PROSTATIC HYPERPLASIA WITH WEAK URINARY STREAM: ICD-10-CM

## 2024-06-19 DIAGNOSIS — I25.810 CAD (CORONARY ARTERY DISEASE) OF ARTERY BYPASS GRAFT: ICD-10-CM

## 2024-06-19 DIAGNOSIS — N40.1 BENIGN PROSTATIC HYPERPLASIA WITH WEAK URINARY STREAM: ICD-10-CM

## 2024-06-19 DIAGNOSIS — I25.10 ARTERIOSCLEROSIS OF CORONARY ARTERY: ICD-10-CM

## 2024-06-19 DIAGNOSIS — J30.89 PERENNIAL ALLERGIC RHINITIS: ICD-10-CM

## 2024-06-19 DIAGNOSIS — I10 ESSENTIAL HYPERTENSION: ICD-10-CM

## 2024-06-19 DIAGNOSIS — F51.01 PRIMARY INSOMNIA: ICD-10-CM

## 2024-06-19 DIAGNOSIS — M47.812 CERVICAL SPONDYLOSIS: Chronic | ICD-10-CM

## 2024-06-19 DIAGNOSIS — H91.93 HIGH FREQUENCY HEARING LOSS OF BOTH EARS: ICD-10-CM

## 2024-06-19 DIAGNOSIS — E78.2 MIXED HYPERLIPIDEMIA: Chronic | ICD-10-CM

## 2024-06-19 DIAGNOSIS — R29.3 POSTURE IMBALANCE: ICD-10-CM

## 2024-06-19 DIAGNOSIS — M54.2 CERVICAL PAIN (NECK): ICD-10-CM

## 2024-06-19 DIAGNOSIS — G47.20 DYSFUNCTIONS ASSOCIATED WITH SLEEP STAGES OR AROUSAL FROM SLEEP: ICD-10-CM

## 2024-06-19 LAB
OHS QRS DURATION: 94 MS
OHS QRS DURATION: 98 MS
OHS QTC CALCULATION: 397 MS
OHS QTC CALCULATION: 430 MS

## 2024-06-19 PROCEDURE — C1894 INTRO/SHEATH, NON-LASER: HCPCS | Performed by: INTERNAL MEDICINE

## 2024-06-19 PROCEDURE — 93010 ELECTROCARDIOGRAM REPORT: CPT | Mod: ,,, | Performed by: INTERNAL MEDICINE

## 2024-06-19 PROCEDURE — C1887 CATHETER, GUIDING: HCPCS | Performed by: INTERNAL MEDICINE

## 2024-06-19 PROCEDURE — 93005 ELECTROCARDIOGRAM TRACING: CPT

## 2024-06-19 PROCEDURE — 99153 MOD SED SAME PHYS/QHP EA: CPT | Performed by: INTERNAL MEDICINE

## 2024-06-19 PROCEDURE — 93459 L HRT ART/GRFT ANGIO: CPT | Performed by: INTERNAL MEDICINE

## 2024-06-19 PROCEDURE — C1769 GUIDE WIRE: HCPCS | Performed by: INTERNAL MEDICINE

## 2024-06-19 PROCEDURE — 93459 L HRT ART/GRFT ANGIO: CPT | Mod: 26,,, | Performed by: INTERNAL MEDICINE

## 2024-06-19 PROCEDURE — 63600175 PHARM REV CODE 636 W HCPCS: Performed by: INTERNAL MEDICINE

## 2024-06-19 PROCEDURE — 99152 MOD SED SAME PHYS/QHP 5/>YRS: CPT | Mod: ,,, | Performed by: INTERNAL MEDICINE

## 2024-06-19 PROCEDURE — 99152 MOD SED SAME PHYS/QHP 5/>YRS: CPT | Performed by: INTERNAL MEDICINE

## 2024-06-19 PROCEDURE — 25500020 PHARM REV CODE 255: Performed by: INTERNAL MEDICINE

## 2024-06-19 PROCEDURE — 25000003 PHARM REV CODE 250: Performed by: INTERNAL MEDICINE

## 2024-06-19 RX ORDER — ACETAMINOPHEN 325 MG/1
650 TABLET ORAL EVERY 4 HOURS PRN
Status: DISCONTINUED | OUTPATIENT
Start: 2024-06-19 | End: 2024-06-19 | Stop reason: HOSPADM

## 2024-06-19 RX ORDER — FENTANYL CITRATE 50 UG/ML
INJECTION, SOLUTION INTRAMUSCULAR; INTRAVENOUS
Status: DISCONTINUED | OUTPATIENT
Start: 2024-06-19 | End: 2024-06-19 | Stop reason: HOSPADM

## 2024-06-19 RX ORDER — SODIUM CHLORIDE 9 MG/ML
INJECTION, SOLUTION INTRAVENOUS CONTINUOUS
Status: DISCONTINUED | OUTPATIENT
Start: 2024-06-19 | End: 2024-06-19 | Stop reason: HOSPADM

## 2024-06-19 RX ORDER — MIDAZOLAM HYDROCHLORIDE 1 MG/ML
INJECTION, SOLUTION INTRAMUSCULAR; INTRAVENOUS
Status: DISCONTINUED | OUTPATIENT
Start: 2024-06-19 | End: 2024-06-19 | Stop reason: HOSPADM

## 2024-06-19 RX ORDER — ASPIRIN 325 MG
TABLET ORAL
Status: DISCONTINUED | OUTPATIENT
Start: 2024-06-19 | End: 2024-06-19 | Stop reason: HOSPADM

## 2024-06-19 RX ORDER — ONDANSETRON HYDROCHLORIDE 2 MG/ML
4 INJECTION, SOLUTION INTRAVENOUS EVERY 12 HOURS PRN
Status: DISCONTINUED | OUTPATIENT
Start: 2024-06-19 | End: 2024-06-19 | Stop reason: HOSPADM

## 2024-06-19 RX ORDER — DIPHENHYDRAMINE HCL 25 MG
50 CAPSULE ORAL ONCE
Status: COMPLETED | OUTPATIENT
Start: 2024-06-19 | End: 2024-06-19

## 2024-06-19 RX ORDER — MORPHINE SULFATE 4 MG/ML
3 INJECTION, SOLUTION INTRAMUSCULAR; INTRAVENOUS
Status: DISCONTINUED | OUTPATIENT
Start: 2024-06-19 | End: 2024-06-19 | Stop reason: HOSPADM

## 2024-06-19 RX ORDER — SODIUM CHLORIDE 9 MG/ML
INJECTION, SOLUTION INTRAVENOUS CONTINUOUS
Status: ACTIVE | OUTPATIENT
Start: 2024-06-19 | End: 2024-06-19

## 2024-06-19 RX ORDER — OXYCODONE HYDROCHLORIDE 5 MG/1
5 TABLET ORAL EVERY 4 HOURS PRN
Status: DISCONTINUED | OUTPATIENT
Start: 2024-06-19 | End: 2024-06-19 | Stop reason: HOSPADM

## 2024-06-19 RX ORDER — LIDOCAINE HYDROCHLORIDE 10 MG/ML
INJECTION, SOLUTION EPIDURAL; INFILTRATION; INTRACAUDAL; PERINEURAL
Status: DISCONTINUED | OUTPATIENT
Start: 2024-06-19 | End: 2024-06-19 | Stop reason: HOSPADM

## 2024-06-19 RX ADMIN — SODIUM CHLORIDE: 9 INJECTION, SOLUTION INTRAVENOUS at 09:06

## 2024-06-19 RX ADMIN — SODIUM CHLORIDE: 9 INJECTION, SOLUTION INTRAVENOUS at 07:06

## 2024-06-19 RX ADMIN — DIPHENHYDRAMINE HYDROCHLORIDE 50 MG: 25 CAPSULE ORAL at 07:06

## 2024-06-19 NOTE — Clinical Note
The catheter was inserted into the, was removed from the and was inserted over the wire into the LIMA graft. An angiography was performed of the graft. LIMA to LAD graft visualized.

## 2024-06-19 NOTE — DISCHARGE INSTRUCTIONS
Drink plenty of fluids for the next 48 hours and follow your doctor's diet orders.  Rest for the next 72 hours. Try not to keep the injected leg bent for a long period of time.  Remove the dressing in 24 hours, and you may shower. Clean the area with soap and water, and apply a band aid for the  next 5 days.        No Lifting over 5-10 lbs., that is, not more than 1 gallon of water, or straining for 72 hours.    No driving, no drinking alcohol, and no signing legal documents for the next 24 hours.    Call your doctor for elevated temperature, shortness of breath, chest pain, or cold discolored leg.   If oozing occurs at the injections site, lie down. Apply pressure with a clean wash cloth for 20 to 30 minutes and call  your doctor.  If severe bleeding occurs, lie down, apply pressure. Call 911 and request an ambulance to take you to the nearest  hospital emergency room.        Fall Prevention  Millions of people fall every year and injure themselves. You may have had anesthesia or sedation which may increase your risk of falling. You may have health issues that put you at an increased risk of falling.     Here are ways to reduce your risk of falling.    Make your home safe by keeping walkways clear of objects you may trip over.  Use non-slip pads under rugs. Do not use area rugs or small throw rugs.  Use non-slip mats in bathtubs and showers.  Install handrails and lights on staircases.  Do not walk in poorly lit areas.  Do not stand on chairs or wobbly ladders.  Use caution when reaching overhead or looking upward. This position can cause a loss of balance.  Be sure your shoes fit properly, have non-slip bottoms and are in good condition.   Wear shoes both inside and out. Avoid going barefoot or wearing slippers.  Be cautious when going up and down stairs, curbs, and when walking on uneven sidewalks.  If your balance is poor, consider using a cane or walker.  If your fall was related to alcohol use, stop or limit  alcohol intake.   If your fall was related to use of sleeping medicines, talk to your doctor about this. You may need to reduce your dosage at bedtime if you awaken during the night to go to the bathroom.    To reduce the need for nighttime bathroom trips:  Avoid drinking fluids for several hours before going to bed  Empty your bladder before going to bed  Men can keep a urinal at the bedside  Stay as active as you can. Balance, flexibility, strength, and endurance all come from exercise. They all play a role in preventing falls. Ask your healthcare provider which types of activity are right for you.  Get your vision checked on a regular basis.  If you have pets, know where they are before you stand up or walk so you don't trip over them.  Use night lights.

## 2024-06-19 NOTE — PLAN OF CARE
1426-Heart rate 82 bpm while ambulating in the [facility]. NAD noted.     1447-Pt verbalized a readiness to go home. VSS. Written and verbal discharge instructions given. Pt aware of follow-up appt.  Pt verbalized an understanding to stop Plavix and resume Eliquis tomorrow as instructed by Dr. Vilchis.

## 2024-06-25 DIAGNOSIS — Z00.00 ENCOUNTER FOR MEDICARE ANNUAL WELLNESS EXAM: ICD-10-CM

## 2024-07-11 ENCOUNTER — OFFICE VISIT (OUTPATIENT)
Dept: CARDIOLOGY | Facility: CLINIC | Age: 74
End: 2024-07-11
Payer: MEDICARE

## 2024-07-11 VITALS
HEIGHT: 73 IN | SYSTOLIC BLOOD PRESSURE: 148 MMHG | OXYGEN SATURATION: 97 % | DIASTOLIC BLOOD PRESSURE: 80 MMHG | HEART RATE: 54 BPM | BODY MASS INDEX: 28.78 KG/M2 | WEIGHT: 217.13 LBS | RESPIRATION RATE: 18 BRPM

## 2024-07-11 DIAGNOSIS — R06.09 DOE (DYSPNEA ON EXERTION): ICD-10-CM

## 2024-07-11 DIAGNOSIS — I10 ESSENTIAL HYPERTENSION: ICD-10-CM

## 2024-07-11 DIAGNOSIS — R94.31 ABNORMAL EKG: Primary | ICD-10-CM

## 2024-07-11 DIAGNOSIS — R29.3 POSTURE IMBALANCE: ICD-10-CM

## 2024-07-11 DIAGNOSIS — M53.82 IMPAIRED RANGE OF MOTION OF CERVICAL SPINE: ICD-10-CM

## 2024-07-11 DIAGNOSIS — I25.10 CORONARY ARTERY DISEASE, UNSPECIFIED VESSEL OR LESION TYPE, UNSPECIFIED WHETHER ANGINA PRESENT, UNSPECIFIED WHETHER NATIVE OR TRANSPLANTED HEART: ICD-10-CM

## 2024-07-11 DIAGNOSIS — I25.118 CORONARY ARTERY DISEASE INVOLVING NATIVE CORONARY ARTERY OF NATIVE HEART WITH OTHER FORM OF ANGINA PECTORIS: ICD-10-CM

## 2024-07-11 DIAGNOSIS — K21.00 GASTROESOPHAGEAL REFLUX DISEASE WITH ESOPHAGITIS WITHOUT HEMORRHAGE: ICD-10-CM

## 2024-07-11 DIAGNOSIS — R09.89 POOR CIRCULATION: ICD-10-CM

## 2024-07-11 DIAGNOSIS — R06.02 SOB (SHORTNESS OF BREATH): ICD-10-CM

## 2024-07-11 DIAGNOSIS — R07.9 CHEST PAIN, UNSPECIFIED TYPE: ICD-10-CM

## 2024-07-11 DIAGNOSIS — I73.9 PAD (PERIPHERAL ARTERY DISEASE): ICD-10-CM

## 2024-07-11 PROCEDURE — 99999 PR PBB SHADOW E&M-EST. PATIENT-LVL V: CPT | Mod: PBBFAC,,, | Performed by: INTERNAL MEDICINE

## 2024-07-11 PROCEDURE — 99215 OFFICE O/P EST HI 40 MIN: CPT | Mod: PBBFAC,PO | Performed by: INTERNAL MEDICINE

## 2024-07-11 PROCEDURE — 93005 ELECTROCARDIOGRAM TRACING: CPT | Mod: PBBFAC,PO | Performed by: INTERNAL MEDICINE

## 2024-07-11 RX ORDER — OXYMETAZOLINE HCL 0.05 %
2 SPRAY, NON-AEROSOL (ML) NASAL 2 TIMES DAILY
COMMUNITY

## 2024-07-11 NOTE — PROGRESS NOTES
CARDIOVASCULAR CONSULTATION    REASON FOR CONSULT:   Rizwan Demarco Jr. is a 74 y.o. male who presents for evaluation    HISTORY OF PRESENT ILLNESS:     Patient is a pleasant 72-year-old man.  Here for evaluation.  Past medical history significant for atrial fibrillation, on Eliquis.  Coronary artery bypass grafting in 2003.  Quadruple bypass as per patient, but unknown anatomy.  States had it done at [facility].  Dyslipidemia, hypertension, Sjogren's disease.  Also peripheral artery disease with abnormal ABIs in 2021.  Denies any orthopnea, PND.  Main complaint is right leg, right calf claudication and cramping on walking.  Also complains of occasional heaviness in the chest area states that happens when he gets constipated.      2023:      The left ventricle is normal in size with concentric remodeling and normal systolic function. The estimated ejection fraction is 65%.  Normal right ventricular size with normal right ventricular systolic function.  Normal left ventricular diastolic function.  The estimated PA systolic pressure is 33 mmHg.  Intermediate central venous pressure (8 mmHg).  The ascending aorta is mildly dilated.      2021:      Moderately decreased right ORESTES, 0.68.  Moderately dampened PVR waveforms.  Mildly decreased left ORESTES, 0.89.  Moderately dampened PVR waveforms.      May 23:  Patient here for follow-up.  Peripheral ultrasound revealed 60 90% mid left SFA stenosis.  Discussed initiating Pletal with the patient.  Patient not interested.  Would like to proceed with the peripheral angiogram for further evaluation.  Lifestyle limiting calf claudication right more than left        Right ORESTES 0.73 suggesting mild arteial flow resriction. Left ORESTES normal 1.01     Mild bilateral carotid plaque with no flow limiting stenosis     Normal abdominal aortic size and flow. No AAA       Mild right SFA plaque with no flow limiting arterial stenosis  Moderate left SFA plaque with 60-90% mis SFA  stenosis. Occluded left posterial tibial artery        Notes from July 23:  Patient here for follow-up.  Claudication in the right leg has gone away.  States he recently walk 2 miles without any claudication.  Has a stenosis in his left SFA also, but states that there is no claudication in the left leg.      Successful laser atherectomy, balloon angioplasty of the distal left SFA and proximal popliteal artery.  With excellent angiographic results      Procedures performed:     1. Ultrasound-guided left common femoral artery access next     2. Abdominal aortogram with pigtail placed in the suprarenal position      3. Selective right lower extremity angiogram     4. Selective left lower artery angiogram      5. Laser atherectomy of right SFA and proximal popliteal artery      6. Drug coated balloon angioplasty of right SFA and proximal popliteal artery      7. IVUS of right SFA, popliteal and TP trunk.        Procedural details      Ultrasound-guided access of left common femoral artery was obtained.  After that we inserted a pigtail in the suprarenal position and abdominal aortogram was obtained.  It demonstrated no significant iliac artery stenosis on both sides.  Then we selectively engaged the right iliac artery and angiogram of the right iliac artery was obtained.  It revealed mild 10-20% stenosis.  After that we obtained angiogram of the right SFA which revealed severe 90-95% stenosis in the distal SFA.  There was also another 90% stenosis in the right TP trunk.  Proximal  of right anterior tibial artery and right posterior tibial arteries was present.  Both these arteries filled with collaterals.  Peroneal artery provides the most robust flow to the foot.  After that we crossed these wires with the lesion and decision was made to fix the SFA and popliteal lesions because patient has lifestyle limiting claudication, but no resting pain or CLI.  IVUS was used for vessel sizing After that laser atherectomy of  these 2 lesions was performed followed by balloon angioplasty.  Angiogram post revealed excellent angiographic results.       Then we did angiogram of the left lower extremity from the sheath and it revealed distal SFA severe stenosis.  Anterior tibial artery is diffusely diseased and there appears to be atypical takeoff of the posterior tibial artery.  No significant stenosis was noted in the peroneal artery        Assessment plan     Continue medical management      Resume Eliquis in a.m..  Continue Plavix 75 mg qd     Aspirin 81 mg qd x 1 m     statins         Nov 23: doing fine. No more claudication. No cp, orthopnea, pnd    Notes from February 24: Patient here for follow-up.  Doing fine.  Denies any chest pains at rest on exertion, orthopnea, PND.      Notes from May 24: Patient here for follow-up.  Lately has been experiencing dyspnea on exertion and chest tightness on exertion    Notes from June 24: Patient here for follow-up.  Stress test abnormal    Results for orders placed or performed during the hospital encounter of 06/19/24   EKG 12-LEAD on arrival to floor    Collection Time: 06/19/24  9:35 AM   Result Value Ref Range    QRS Duration 94 ms    OHS QTC Calculation 430 ms    Narrative    Test Reason : I25.10,    Vent. Rate : 044 BPM     Atrial Rate : 000 BPM     P-R Int : 000 ms          QRS Dur : 094 ms      QT Int : 504 ms       P-R-T Axes : 000 072 108 degrees     QTc Int : 430 ms    Atrial fibrillation with slow ventricular response  T wave abnormality, consider anterior ischemia  Abnormal ECG  When compared with ECG of 17-JUN-2024 11:28,  Significant changes have occurred  Confirmed by Ruslan Lozano MD ([id number]) on 6/19/2024 9:18:19 PM    Referred By: BRITNEY ANDREWS           Confirmed By:Ruslan Lozano MD       Results for orders placed during the hospital encounter of 06/06/24    Echo    Interpretation Summary    Left Ventricle: The left ventricle is normal in size. Mildly increased wall thickness.  There is normal systolic function with a visually estimated ejection fraction of 55 - 60%. Unable to assess diastolic function due to atrial fibrillation.    Right Ventricle: Normal right ventricular cavity size. Systolic function is normal.    Left Atrium: Left atrium is moderately dilated.    Right Atrium: Right atrium is moderately dilated.    Mitral Valve: There is mild regurgitation.    Tricuspid Valve: There is moderate regurgitation.    Pulmonary Artery: The estimated pulmonary artery systolic pressure is 48 mmHg.    IVC/SVC: Intermediate venous pressure at 8 mmHg.      Results for orders placed during the hospital encounter of 06/06/24    Nuclear Stress - Cardiology Interpreted    Interpretation Summary    Abnormal myocardial perfusion scan.    There is a moderate intensity, moderate to large sized, mostly reversible perfusion abnormality with some fixed areas in the inferior wall(s).    There are no other significant perfusion abnormalities.    The gated perfusion images showed an ejection fraction of 68% at rest.    There is normal wall motion at rest and post stress.    The ECG portion of the study is negative for ischemia.    The patient reported no chest pain during the stress test.    There were no arrhythmias during stress.      Results for orders placed during the hospital encounter of 06/21/23    Cardiac catheterization    Conclusion    The estimated blood loss was <50 mL.    Successful laser atherectomy, balloon angioplasty of the distal left SFA and proximal popliteal artery.  With excellent angiographic results    Procedures performed:    1. Ultrasound-guided left common femoral artery access next    2. Abdominal aortogram with pigtail placed in the suprarenal position    3. Selective right lower extremity angiogram    4. Selective left lower artery angiogram    5. Laser atherectomy of right SFA and proximal popliteal artery    6. Drug coated balloon angioplasty of right SFA and proximal  popliteal artery    7. IVUS of right SFA, popliteal and TP trunk.      Procedural details    Ultrasound-guided access of left common femoral artery was obtained.  After that we inserted a pigtail in the suprarenal position and abdominal aortogram was obtained.  It demonstrated no significant iliac artery stenosis on both sides.  Then we selectively engaged the right iliac artery and angiogram of the right iliac artery was obtained.  It revealed mild 10-20% stenosis.  After that we obtained angiogram of the right SFA which revealed severe 90-95% stenosis in the distal SFA.  There was also another 90% stenosis in the right TP trunk.  Proximal  of right anterior tibial artery and right posterior tibial arteries was present.  Both these arteries filled with collaterals.  Peroneal artery provides the most robust flow to the foot.  After that we crossed these wires with the lesion and decision was made to fix the SFA and popliteal lesions because patient has lifestyle limiting claudication, but no resting pain or CLI.  IVUS was used for vessel sizing After that laser atherectomy of these 2 lesions was performed followed by balloon angioplasty.  Angiogram post revealed excellent angiographic results.    Then we did angiogram of the left lower extremity from the sheath and it revealed distal SFA severe stenosis.  Anterior tibial artery is diffusely diseased and there appears to be atypical takeoff of the posterior tibial artery.  No significant stenosis was noted in the peroneal artery      Assessment plan    Continue medical management    Resume Eliquis in a.m..  Continue Plavix 75 mg qd    Aspirin 81 mg qd x 1 m    statins                The procedure log was documented by Documenter: Prerna Cherry RN and verified by Abbe Vilchis MD.    Date: 6/30/2023  Time: 1:18 PM      Notes from July 24: Patient here for follow-up.  Denies chest pains at rest on exertion, orthopnea, PND.    PAST MEDICAL HISTORY:     Past Medical  History:   Diagnosis Date    Allergy     Anticoagulant long-term use     Arthritis     Rheumatoid arthritis    Back pain     Blood clotting tendency     BPH (benign prostatic hypertrophy)     Cervical spondylosis     Colon polyp 2010    adenoma    Coronary artery disease     Depression 05/08/2015    Dry eyes     Dry mouth     GERD (gastroesophageal reflux disease)     Hyperlipidemia     Hypertension     Lumbar spondylosis     Nasal obstruction     PAD (peripheral artery disease) 04/25/2023    Rheumatoid arthritis     Sinusitis     Special screening for malignant neoplasm of colon 06/29/2016    Trouble in sleeping        PAST SURGICAL HISTORY:     Past Surgical History:   Procedure Laterality Date    AORTOGRAPHY N/A 6/19/2024    Procedure: AORTOGRAM;  Surgeon: Abbe Vilchis MD;  Location: [facility] CATH LAB;  Service: Cardiology;  Laterality: N/A;    ATHERECTOMY Left 6/21/2023    Procedure: Atherectomy;  Surgeon: Abbe Vilchis MD;  Location: [facility] CATH LAB;  Service: Cardiology;  Laterality: Left;    CHALAZION EXCISION      COLONOSCOPY N/A 6/29/2016    Procedure: COLONOSCOPY;  Surgeon: Partha Saucedo MD;  Location: [facility] ENDO;  Service: Endoscopy;  Laterality: N/A;    COLONOSCOPY N/A 7/24/2020    Procedure: COLONOSCOPY;  Surgeon: Ian Martinez MD;  Location: [facility] ENDO ([facility] FLR);  Service: Endoscopy;  Laterality: N/A;  4/16/20 - removed from 5/1/20, not called yet due to acute pain issues being addressed today - pg    COLONOSCOPY N/A 10/19/2023    Procedure: COLONOSCOPY;  Surgeon: Ian Martinez MD;  Location: [facility];  Service: Endoscopy;  Laterality: N/A;  order created from Dr. Martinez's previous colonoscopy report 7/24/2020-3 year surveillance.   ok to hold Eliquis 2 days and Plavix 5 days per Dr Vilchis-GT  PEG instr portal -ml  10/12- pre call complete.  DBM    CORONARY ANGIOGRAPHY INCLUDING BYPASS GRAFTS WITH CATHETERIZATION OF LEFT HEART N/A 6/19/2024    Procedure: ANGIOGRAM, CORONARY, INCLUDING BYPASS GRAFT, WITH LEFT  HEART CATHETERIZATION;  Surgeon: Abbe Vilchis MD;  Location: [facility] CATH LAB;  Service: Cardiology;  Laterality: N/A;  RN PREOP 06/17/2024    CORONARY ARTERY BYPASS GRAFT      ESOPHAGOGASTRODUODENOSCOPY N/A 7/24/2020    Procedure: ESOPHAGOGASTRODUODENOSCOPY (EGD);  Surgeon: Ian Martinez MD;  Location: [address]);  Service: Endoscopy;  Laterality: N/A;  4/16/20 - removed from 5/1/20, not called yet due to acute pain issues being addressed today.  4/17/20 - rescheduled 7/24/20 - pg    EXCISION TURBINATE, SUBMUCOUS      KNEE ARTHROSCOPY W/ DEBRIDEMENT      NASAL SEPTUM SURGERY      PERIPHERAL ANGIOGRAPHY N/A 6/21/2023    Procedure: Peripheral angiography;  Surgeon: Abbe Vilchis MD;  Location: [facility] CATH LAB;  Service: Cardiology;  Laterality: N/A;  right radial access--- RN PREOP 6/16----JM    TONSILLECTOMY         ALLERGIES AND MEDICATION:     Review of patient's allergies indicates:   Allergen Reactions    Aspirin Nausea And Vomiting    Astelin [azelastine] Other (See Comments)     Dry mouth    Flomax [tamsulosin] Other (See Comments)     Nosebleed        Medication List            Accurate as of July 11, 2024  2:56 PM. If you have any questions, ask your nurse or doctor.                CONTINUE taking these medications      alfuzosin 10 mg Tb24  Commonly known as: UROXATRAL  Take 1 tablet (10 mg total) by mouth daily with breakfast.     apixaban 5 mg Tab  Commonly known as: ELIQUIS  Take 1 tablet (5 mg total) by mouth 2 (two) times daily.     atorvastatin 40 MG tablet  Commonly known as: LIPITOR  Take 1 tablet (40 mg total) by mouth once daily.     cetirizine 10 MG tablet  Commonly known as: ZYRTEC  Take 1 tablet (10 mg total) by mouth once daily.     ciclopirox 0.77 % Crea  Commonly known as: LOPROX  Apply topically 2 (two) times daily.     ciclopirox 8 % Soln  Commonly known as: PENLAC  Apply topically nightly.     cinnamon bark 500 mg capsule     diphenhydrAMINE 25 mg capsule  Commonly known as:  BENADRYL  Take 1 capsule (25 mg total) by mouth every 6 (six) hours as needed for Itching or Allergies (Take if headache does not resolve).     fluticasone propionate 50 mcg/actuation nasal spray  Commonly known as: FLONASE  1 spray (50 mcg total) by Each Nostril route 2 (two) times daily.     hydroCHLOROthiazide 12.5 mg capsule  Commonly known as: MICROZIDE  Take 1 capsule (12.5 mg total) by mouth once daily.     linaCLOtide 72 mcg Cap capsule  Commonly known as: LINZESS  Take 1 capsule (72 mcg total) by mouth before breakfast.     losartan 25 MG tablet  Commonly known as: COZAAR  Take 1 tablet (25 mg total) by mouth once daily.     multivitamin per tablet  Commonly known as: THERAGRAN     OCEAN NASAL 0.65 % nasal spray  Generic drug: sodium chloride  1 spray by Nasal route every 3 (three) hours as needed for Congestion.     omega-3 fatty acids 1,250 mg Cap     omeprazole 40 MG capsule  Commonly known as: PRILOSEC  Take 1 capsule (40 mg total) by mouth once daily.     oxymetazoline 0.05 % nasal spray  Commonly known as: AFRIN     propylene glycoL 0.6 % Drop     sildenafiL 100 MG tablet  Commonly known as: VIAGRA  Take 1 tablet (100 mg total) by mouth daily as needed.     sodium bicarb-sodium chloride Pack     Systane GeL 0.3 % Gel  Generic drug: artificial tears(hypromellose)(GENTEAL/SUSTANE)              SOCIAL HISTORY:     Social History     Socioeconomic History    Marital status:    Tobacco Use    Smoking status: Former     Current packs/day: 0.00     Average packs/day: 1 pack/day for 20.0 years (20.0 ttl pk-yrs)     Types: Cigarettes     Start date: 1971     Quit date: 1991     Years since quittin.5    Smokeless tobacco: Never    Tobacco comments:     Quit .   Substance and Sexual Activity    Alcohol use: No    Drug use: No    Sexual activity: Yes     Partners: Female     Social Determinants of Health     Financial Resource Strain: Patient Declined (6/10/2024)    Overall Financial  Resource Strain (CARDIA)     Difficulty of Paying Living Expenses: Patient declined   Food Insecurity: No Food Insecurity (6/10/2024)    Hunger Vital Sign     Worried About Running Out of Food in the Last Year: Never true     Ran Out of Food in the Last Year: Never true   Transportation Needs: No Transportation Needs (6/7/2023)    PRAPARE - Transportation     Lack of Transportation (Medical): No     Lack of Transportation (Non-Medical): No   Physical Activity: Sufficiently Active (6/10/2024)    Exercise Vital Sign     Days of Exercise per Week: 3 days     Minutes of Exercise per Session: 50 min   Stress: No Stress Concern Present (6/10/2024)    [employer] [place] - Occupational Stress Questionnaire     Feeling of Stress : Not at all   Housing Stability: Low Risk  (6/7/2023)    Housing Stability Vital Sign     Unable to Pay for Housing in the Last Year: No     Number of Places Lived in the Last Year: 1     Unstable Housing in the Last Year: No       FAMILY HISTORY:     Family History   Problem Relation Name Age of Onset    Hyperlipidemia Mother      Alzheimer's disease Mother      Stomach cancer Father      Colon cancer Father          from wife--he is not unsure     Cataracts Other      No Known Problems Maternal Aunt      No Known Problems Maternal Uncle      No Known Problems Paternal Aunt      No Known Problems Paternal Uncle      No Known Problems Maternal Grandmother      No Known Problems Maternal Grandfather      No Known Problems Paternal Grandmother      No Known Problems Paternal Grandfather      Blindness Neg Hx      Cancer Neg Hx      Diabetes Neg Hx      Glaucoma Neg Hx      Rheum arthritis Neg Hx      Psoriasis Neg Hx      Lupus Neg Hx      Amblyopia Neg Hx      Hypertension Neg Hx      Macular degeneration Neg Hx      Retinal detachment Neg Hx      Strabismus Neg Hx      Stroke Neg Hx      Thyroid disease Neg Hx      Esophageal cancer Neg Hx         REVIEW OF SYSTEMS:   Review of  "Systems   Constitutional: Negative.   HENT: Negative.     Eyes: Negative.    Respiratory: Negative.     Endocrine: Negative.    Hematologic/Lymphatic: Negative.    Skin: Negative.    Musculoskeletal: Negative.    Gastrointestinal: Negative.    Genitourinary: Negative.    Neurological: Negative.    Psychiatric/Behavioral: Negative.     Allergic/Immunologic: Negative.        A 10 point review of systems was performed and all the pertinent positives have been mentioned. Rest of review of systems was negative.        PHYSICAL EXAM:     Vitals:    07/11/24 1310   BP: (!) 148/80   Pulse: (!) 54   Resp: 18    Body mass index is 28.65 kg/m².  Weight: 98.5 kg (217 lb 2.5 oz)   Height: 6' 1" (185.4 cm)     Physical Exam  Vitals reviewed.   Constitutional:       Appearance: He is well-developed.   HENT:      Head: Normocephalic.   Eyes:      Conjunctiva/sclera: Conjunctivae normal.      Pupils: Pupils are equal, round, and reactive to light.   Cardiovascular:      Rate and Rhythm: Normal rate and regular rhythm.      Heart sounds: Normal heart sounds.   Pulmonary:      Effort: Pulmonary effort is normal.      Breath sounds: Normal breath sounds.   Abdominal:      General: Bowel sounds are normal.      Palpations: Abdomen is soft.   Musculoskeletal:      Cervical back: Normal range of motion and neck supple.   Skin:     General: Skin is warm.   Neurological:      Mental Status: He is alert and oriented to person, place, and time.           DATA:     Laboratory:  CBC:  Recent Labs   Lab 06/16/23  1150 01/27/24  1549 06/17/24  1130   WBC 5.32 5.79 5.96   Hemoglobin 15.5 15.9 15.7   Hematocrit 48.3 49.0 49.4   Platelets 176 171 138 L       CHEMISTRIES:  Recent Labs   Lab 02/08/22  1024 02/13/23  0818 01/27/24  1549 05/22/24  1406 06/17/24  1130   Glucose 92   < > 89 106 69 L   Sodium 142   < > 138 143 143   Potassium 4.8   < > 3.8 3.8 4.7   BUN 13   < > 11 10 11   Creatinine 1.2   < > 1.0 1.3 1.1   eGFR if  >60  -- "   --   --   --    eGFR if non  60  --   --   --   --    Calcium 10.2   < > 9.9 9.9 10.1    < > = values in this interval not displayed.       CARDIAC BIOMARKERS:  Recent Labs   Lab 01/27/24  1549   Troponin I <0.006       COAGS:  Recent Labs   Lab 01/27/24  1549 02/02/24  1200   INR 1.1 1.2       LIPIDS/LFTS:  Recent Labs   Lab 02/08/22  1024 02/13/23  0818 01/27/24  1549   Cholesterol 238 H 155  --    Triglycerides 76 45  --    HDL 42 40  --    LDL Cholesterol 180.8 H 106.0  --    Non-HDL Cholesterol 196 115  --    AST 13 15 15   ALT 13 14 13       Hemoglobin A1C   Date Value Ref Range Status   02/13/2023 5.6 4.0 - 5.6 % Final     Comment:     ADA Screening Guidelines:  5.7-6.4%  Consistent with prediabetes  >or=6.5%  Consistent with diabetes    High levels of fetal hemoglobin interfere with the HbA1C  assay. Heterozygous hemoglobin variants (HbS, HgC, etc)do  not significantly interfere with this assay.   However, presence of multiple variants may affect accuracy.     02/08/2022 5.6 4.0 - 5.6 % Final     Comment:     ADA Screening Guidelines:  5.7-6.4%  Consistent with prediabetes  >or=6.5%  Consistent with diabetes    High levels of fetal hemoglobin interfere with the HbA1C  assay. Heterozygous hemoglobin variants (HbS, HgC, etc)do  not significantly interfere with this assay.   However, presence of multiple variants may affect accuracy.     05/24/2019 5.7 (H) 4.0 - 5.6 % Final     Comment:     ADA Screening Guidelines:  5.7-6.4%  Consistent with prediabetes  >or=6.5%  Consistent with diabetes  High levels of fetal hemoglobin interfere with the HbA1C  assay. Heterozygous hemoglobin variants (HbS, HgC, etc)do  not significantly interfere with this assay.   However, presence of multiple variants may affect accuracy.         TSH  Recent Labs   Lab 02/13/23  0818   TSH 0.641       The 10-year ASCVD risk score (Emmanuelle CORREA, et al., 2019) is: 28.7%    Values used to calculate the score:      Age: 74  years      Sex: Male      Is Non- : Yes      Diabetic: No      Tobacco smoker: No      Systolic Blood Pressure: 148 mmHg      Is BP treated: Yes      HDL Cholesterol: 40 mg/dL      Total Cholesterol: 155 mg/dL       BNP    Lab Results   Component Value Date/Time     (H) 01/27/2024 03:49 PM    BNP 25 05/07/2015 05:30 PM             ASSESSMENT AND PLAN     Patient Active Problem List   Diagnosis    Sjogren's disease    GERD (gastroesophageal reflux disease)    Arteriosclerosis of coronary artery    Hyperlipidemia    Cervical spondylosis    Degeneration of intervertebral disc    Rheumatoid arthritis    Benign prostatic hyperplasia with weak urinary stream    Perennial allergic rhinitis    Essential hypertension    High frequency hearing loss    Nasal obstruction    Dysfunctions associated with sleep stages or arousal from sleep    Primary insomnia    Wears dentures    Atherosclerosis of aorta    Other personal history presenting hazards to health    Complete loss of teeth, unspecified cause, unspecified class    Cervical pain (neck)    Impaired range of motion of cervical spine    Posture imbalance    PAD (peripheral artery disease)    Poor circulation    Chest pain       Peripheral artery disease:  Now status post revascularization of right SFA and popliteal artery.  Also has residual infrapopliteal disease.  Is claudication after revascularization of the SFA disease has gone away and he can now walk 2 miles.  He also has flow-limiting stenosis in the distal left SFA, but states that he does not have any pain in the left leg.  Continue medical management.  Aspirin 81 mg daily for 1 month, Plavix 75 mg daily uninterrupted for at least 1 year, continue Eliquis 5 mg b.i.d.      Coronary artery disease status post CABG in 2003.  Stress test in May of 2023 did not show any significant ischemia.  Recent echo showed normal left ventricle systolic function     Carotid ultrasound in May of 2023  showed mild bilateral carotid artery block with no flow-limiting stenosis.    Hypertension:  Uncontrolled in clinic today, but states at home stays well controlled around 120 mmHg.      Atrial fibrillation:  On Eliquis.  Rate controlled.     Dyslipidemia:  Continue ezetimibe and statins.  Recheck lipid profile    Lab Results   Component Value Date    LDLCALC 106.0 02/13/2023     Dyspnea on exertion and chest tightness on exertion.  Angiogram revealed patent LIMA to LAD and severe triple-vessel disease with occluded SVG to OM and circumflex.  Patient states his symptoms are not lifestyle limiting and occasionally gets left-sided pain without any correlation to activity.  Continue medical management.    Follow-up after 3 m           Thank you very much for involving me in the care of your patient.  Please do not hesitate to contact me if there are any questions.      Abbe Vilchis MD, FACC, [facility]  Interventional Cardiologist, [facility].           This note was dictated with the help of speech recognition software.  There might be un-intended errors and/or substitutions.

## 2024-07-12 LAB
OHS QRS DURATION: 94 MS
OHS QTC CALCULATION: 395 MS

## 2024-07-18 ENCOUNTER — OFFICE VISIT (OUTPATIENT)
Dept: FAMILY MEDICINE | Facility: CLINIC | Age: 74
End: 2024-07-18
Payer: MEDICARE

## 2024-07-18 VITALS
DIASTOLIC BLOOD PRESSURE: 80 MMHG | TEMPERATURE: 98 F | BODY MASS INDEX: 28.81 KG/M2 | OXYGEN SATURATION: 97 % | HEIGHT: 73 IN | HEART RATE: 54 BPM | WEIGHT: 217.38 LBS | SYSTOLIC BLOOD PRESSURE: 142 MMHG

## 2024-07-18 DIAGNOSIS — I77.9 CAROTID ARTERY DISORDER: ICD-10-CM

## 2024-07-18 DIAGNOSIS — I73.9 PAD (PERIPHERAL ARTERY DISEASE): ICD-10-CM

## 2024-07-18 DIAGNOSIS — I70.0 ATHEROSCLEROSIS OF AORTA: ICD-10-CM

## 2024-07-18 DIAGNOSIS — N40.1 BENIGN PROSTATIC HYPERPLASIA WITH WEAK URINARY STREAM: ICD-10-CM

## 2024-07-18 DIAGNOSIS — Z00.00 ENCOUNTER FOR MEDICARE ANNUAL WELLNESS EXAM: Primary | ICD-10-CM

## 2024-07-18 DIAGNOSIS — M35.00 SJOGREN'S SYNDROME, WITH UNSPECIFIED ORGAN INVOLVEMENT: ICD-10-CM

## 2024-07-18 DIAGNOSIS — M05.79 RHEUMATOID ARTHRITIS INVOLVING MULTIPLE SITES WITH POSITIVE RHEUMATOID FACTOR: ICD-10-CM

## 2024-07-18 DIAGNOSIS — I10 ESSENTIAL HYPERTENSION: ICD-10-CM

## 2024-07-18 DIAGNOSIS — I25.709 ATHEROSCLEROSIS OF CORONARY ARTERY BYPASS GRAFT OF NATIVE HEART WITH ANGINA PECTORIS: ICD-10-CM

## 2024-07-18 DIAGNOSIS — E78.2 MIXED HYPERLIPIDEMIA: Chronic | ICD-10-CM

## 2024-07-18 DIAGNOSIS — I27.9 PULMONARY HEART DISEASE: ICD-10-CM

## 2024-07-18 DIAGNOSIS — I48.91 ATRIAL FIBRILLATION WITH SLOW VENTRICULAR RESPONSE: ICD-10-CM

## 2024-07-18 DIAGNOSIS — R39.12 BENIGN PROSTATIC HYPERPLASIA WITH WEAK URINARY STREAM: ICD-10-CM

## 2024-07-18 PROBLEM — R29.3 POSTURE IMBALANCE: Status: RESOLVED | Noted: 2023-03-02 | Resolved: 2024-07-18

## 2024-07-18 PROBLEM — M53.82 IMPAIRED RANGE OF MOTION OF CERVICAL SPINE: Status: RESOLVED | Noted: 2023-03-02 | Resolved: 2024-07-18

## 2024-07-18 PROBLEM — M54.2 CERVICAL PAIN (NECK): Status: RESOLVED | Noted: 2023-03-02 | Resolved: 2024-07-18

## 2024-07-18 PROBLEM — R07.9 CHEST PAIN: Status: RESOLVED | Noted: 2023-04-25 | Resolved: 2024-07-18

## 2024-07-18 PROBLEM — K08.109 COMPLETE LOSS OF TEETH, UNSPECIFIED CAUSE, UNSPECIFIED CLASS: Status: RESOLVED | Noted: 2023-01-24 | Resolved: 2024-07-18

## 2024-07-18 PROBLEM — I48.20 CHRONIC A-FIB: Status: ACTIVE | Noted: 2024-07-18

## 2024-07-18 PROBLEM — L72.3 SEBACEOUS CYST: Status: ACTIVE | Noted: 2024-07-18

## 2024-07-18 PROBLEM — Z91.89 OTHER PERSONAL HISTORY PRESENTING HAZARDS TO HEALTH: Status: RESOLVED | Noted: 2021-01-20 | Resolved: 2024-07-18

## 2024-07-18 PROCEDURE — 99999 PR PBB SHADOW E&M-EST. PATIENT-LVL V: CPT | Mod: PBBFAC,,, | Performed by: NURSE PRACTITIONER

## 2024-07-18 PROCEDURE — G0439 PPPS, SUBSEQ VISIT: HCPCS | Mod: ,,, | Performed by: NURSE PRACTITIONER

## 2024-07-18 PROCEDURE — 99215 OFFICE O/P EST HI 40 MIN: CPT | Mod: PBBFAC,PO | Performed by: NURSE PRACTITIONER

## 2024-07-18 NOTE — PATIENT INSTRUCTIONS
Counseling and Referral of Other Preventative  (Italic type indicates deductible and co-insurance are waived)    Patient Name: Rizwan Demarco  Today's Date: 7/18/2024    Health Maintenance       Date Due Completion Date    Shingles Vaccine (1 of 2) Never done ---    RSV Vaccine (Age 60+ and Pregnant patients) (1 - 1-dose 60+ series) Never done ---    COVID-19 Vaccine (2 - 2023-24 season) 09/01/2023 3/6/2021    Influenza Vaccine (1) 09/01/2024 4/25/2023 (Declined)    Override on 4/25/2023: Declined (Declined for season)    Override on 12/18/2019: Declined (Declines until next season)    Lipid Panel 02/13/2028 2/13/2023    TETANUS VACCINE 04/21/2030 4/21/2020        No orders of the defined types were placed in this encounter.      The following information is provided to all patients.  This information is to help you find resources for any of the problems found today that may be affecting your health:                  Living healthy guide: www.[facility].[place].gov      Understanding Diabetes: www.diabetes.org      Eating healthy: www.cdc.gov/healthyweight      CDC home safety checklist: www.cdc.gov/steadi/patient.html      Agency on Aging: www.goea.[place].gov      Alcoholics anonymous (AA): www.aa.org      Physical Activity: www.jimenez.nih.gov/mx1qfre      Tobacco use: www.quitwithusla.org

## 2024-07-18 NOTE — PROGRESS NOTES
"  Rizwan Demarco presented for a  Medicare AWV and comprehensive Health Risk Assessment today. The following components were reviewed and updated:    Medical history  Family History  Social history  Allergies and Current Medications  Health Risk Assessment  Health Maintenance  Care Team         ** See Completed Assessments for Annual Wellness Visit within the encounter summary.**         The following assessments were completed:  Living Situation  CAGE  Depression Screening  Timed Get Up and Go  Whisper Test  Cognitive Function Screening  Nutrition Screening  ADL Screening  PAQ Screening      Opioid documentation:  Patient does not have a current opioid prescription.        Vitals:    07/18/24 1003   BP: (!) 142/80   Pulse: (!) 54   Temp: 98.3 °F (36.8 °C)   TempSrc: Oral   SpO2: 97%   Weight: 98.6 kg (217 lb 6 oz)   Height: 6' 1" (1.854 m)     Body mass index is 28.68 kg/m².  Physical Exam  Vitals and nursing note reviewed.   Constitutional:       General: He is not in acute distress.     Appearance: Normal appearance. He is well-developed and well-groomed.   HENT:      Head: Normocephalic and atraumatic.      Right Ear: External ear normal.      Left Ear: External ear normal.      Nose: Nose normal.      Mouth/Throat:      Lips: Pink.      Mouth: Mucous membranes are moist.   Eyes:      General: Lids are normal. Vision grossly intact. Gaze aligned appropriately. No scleral icterus.        Right eye: No discharge.         Left eye: No discharge.      Conjunctiva/sclera: Conjunctivae normal.   Neck:      Trachea: Phonation normal.   Pulmonary:      Effort: Pulmonary effort is normal. No accessory muscle usage or respiratory distress.   Musculoskeletal:      Cervical back: Neck supple.   Skin:     General: Skin is warm and dry.      Findings: No rash.   Neurological:      General: No focal deficit present.      Mental Status: He is alert and oriented to person, place, and time. Mental status is at baseline.      " Motor: No abnormal muscle tone.      Gait: Gait normal.   Psychiatric:         Attention and Perception: Attention and perception normal.         Mood and Affect: Mood and affect normal.         Speech: Speech normal.         Behavior: Behavior normal. Behavior is cooperative.         Thought Content: Thought content normal.         Cognition and Memory: Cognition and memory normal.         Judgment: Judgment normal.             Diagnoses and health risks identified today and associated recommendations/orders:    1. Encounter for Medicare annual wellness exam  Health maintenance reviewed and up to date, will f/u with PCP for routine preventative care  Review for Opioid Screening: Pt does not have Rx for Opioids   Review for Substance Use Disorders: Patient does not use substance   - Ambulatory Referral/Consult to Enhanced Annual Wellness Visit (eAWV)    2. Atrial fibrillation with slow ventricular response  Active a.fib without rate control, anticoagulated on eliquis, CV intact and neuro precautions discussed, continue cards plan     3. 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  Reviewed diet, exercise and weight control.  Recommended sodium restriction.  Reviewed medications and side effects in detail.  Use of aspirin to prevent MI and TIA's discussed.  Use and side effects of nitroglycerine reviewed, call 911 if chest pain unrelieved by 3 tablets.    4. Atherosclerosis of aorta  BP mildly elevated though uncomplicated and asymptomatic, continues on statin and chronic anticoagulation therapy, continue cards plan      5. Carotid artery disorder  Neurovascular intact and CV stable, continue cardiology care     6. PAD (peripheral artery disease)  Doing well with chronic anticoagulation  For claudication, exercise to point of pain, rest, then continue.    7. Pulmonary heart disease  Reports chronic dyspnea with  exertion without pulmonary complication, continue cards plan     8. Sjogren's syndrome, with unspecified organ involvement  Doing well off of biologic and immunosuppressive therapy, continue rheum f/u    9. Rheumatoid arthritis involving multiple sites with positive rheumatoid factor  Doing well off of biologic and immunosuppressive therapy, continue rheum f/u    10. Essential hypertension  Continue current treatment regimen.  Dietary sodium restriction.  Regular aerobic exercise.  Weight loss.  Patient Education: Reviewed risks of hypertension and principles of treatment.    11. Mixed hyperlipidemia  Continue dietary measures.  Continue regular exercise.  Lipid-lowering medications:  continue statin daily .    12. Benign prostatic hyperplasia with weak urinary stream  Voiding stable with uroxatrol, continue urology plan       Provided Rizwan with a 5-10 year written screening schedule and personal prevention plan. Recommendations were developed using the USPSTF age appropriate recommendations. Education, counseling, and referrals were provided as needed. After Visit Summary printed and given to patient which includes a list of additional screenings\tests needed.    Follow up in about 1 year (around 7/18/2025).    Wendi Lopez NP  I offered to discuss advanced care planning, including how to pick a person who would make decisions for you if you were unable to make them for yourself, called a health care power of , and what kind of decisions you might make such as use of life sustaining treatments such as ventilators and tube feeding when faced with a life limiting illness recorded on a living will that they will need to know. (How you want to be cared for as you near the end of your natural life)     X Patient is interested in learning more about how to make advanced directives.  I provided them paperwork and offered to discuss this with them.

## 2024-07-22 PROBLEM — I27.9 PULMONARY HEART DISEASE: Status: ACTIVE | Noted: 2024-07-22

## 2024-08-15 ENCOUNTER — TELEPHONE (OUTPATIENT)
Dept: FAMILY MEDICINE | Facility: CLINIC | Age: 74
End: 2024-08-15
Payer: MEDICARE

## 2024-08-15 NOTE — TELEPHONE ENCOUNTER
----- Message from Karen Gustavo sent at 8/15/2024 11:22 AM CDT -----  Regarding: SELF  Type:  Needs Medical Advice/Symptom-based Call    Who Called:  SELF    Symptoms (please be specific):   swelling in both ankles    How long has patient had these symptoms:   1 week     Pharmacy:    [facility] - [place], [place] - [address] [address]  Phone: [phone] Fax: [phone]    Would the patient rather a call back or a response via [facility]? Call back    Best Call Back Number:  [phone]    Additional Information: pt says if he doesn't get a chance to receive call back today, a prescription can be sent to [facility] as secondary pharmacy    [id number] - [place] - [address]  [address] [place] [address]  Phone: [phone] Fax: [phone]

## 2024-08-15 NOTE — TELEPHONE ENCOUNTER
Spoke w/ PT for more information.     SYMPTOMS:  Swelling ankles x 1 week in the am and all day. Not sure if swelling worsens during the day.    DIET:  Ate steven crab a week ago.  Eats whatever is cooked by family that day.    SELF TX:  Consistently taking Losartan and HCTZ daily in the am.    Please advise.

## 2024-08-16 NOTE — TELEPHONE ENCOUNTER
Just dependent edema it sounds like  Elevate and avoid the salt and have to walk more and get that fluid moving.  JR

## 2024-08-16 NOTE — TELEPHONE ENCOUNTER
Spoke w/ PT to notify of Dr. Perkins's advice. PT reports that he walks 2-3 miles daily. Advised to continue current walking regimen. Notify us if any symptoms worsen.  PT verbalized understanding.

## 2024-08-22 ENCOUNTER — OFFICE VISIT (OUTPATIENT)
Dept: GASTROENTEROLOGY | Facility: CLINIC | Age: 74
End: 2024-08-22
Payer: MEDICARE

## 2024-08-22 VITALS
HEART RATE: 40 BPM | BODY MASS INDEX: 28.81 KG/M2 | DIASTOLIC BLOOD PRESSURE: 63 MMHG | HEIGHT: 73 IN | WEIGHT: 217.38 LBS | SYSTOLIC BLOOD PRESSURE: 150 MMHG

## 2024-08-22 DIAGNOSIS — K59.00 CONSTIPATION, UNSPECIFIED CONSTIPATION TYPE: Primary | ICD-10-CM

## 2024-08-22 DIAGNOSIS — K21.00 GASTROESOPHAGEAL REFLUX DISEASE WITH ESOPHAGITIS WITHOUT HEMORRHAGE: ICD-10-CM

## 2024-08-22 PROCEDURE — 99213 OFFICE O/P EST LOW 20 MIN: CPT | Mod: PBBFAC

## 2024-08-22 PROCEDURE — 99214 OFFICE O/P EST MOD 30 MIN: CPT | Mod: S$PBB,,,

## 2024-08-22 PROCEDURE — 99999 PR PBB SHADOW E&M-EST. PATIENT-LVL III: CPT | Mod: PBBFAC,,,

## 2024-08-22 RX ORDER — OMEPRAZOLE 20 MG/1
20 CAPSULE, DELAYED RELEASE ORAL EVERY MORNING
Qty: 90 CAPSULE | Refills: 3 | Status: ON HOLD | OUTPATIENT
Start: 2024-08-22

## 2024-08-22 NOTE — PROGRESS NOTES
"GENERAL GI PATIENT INTAKE:    COVID symptoms in the last 7 days (runny nose, sore throat, congestion, cough, fever): No  PCP: Raúl Perkins  If not PCP-  number given to establish [phone]: No    ALLERGIES REVIEWED:  Yes    CHIEF COMPLAINT:    Chief Complaint   Patient presents with    Gastroesophageal Reflux       VITAL SIGNS:  Ht 6' 1" (1.854 m)   Wt 98.6 kg (217 lb 6 oz)   BMI 28.68 kg/m²      Change in medical, surgical, family or social history: No      REVIEWED MEDICATION LIST RECONCILED INCLUDING ABOVE MEDS:  Yes     "

## 2024-08-22 NOTE — PROGRESS NOTES
Gastroenterology Clinic Consultation Note    Reason for Visit:  The primary encounter diagnosis was Constipation, unspecified constipation type. A diagnosis of Gastroesophageal reflux disease with esophagitis without hemorrhage was also pertinent to this visit.    PCP:   Raúl Perkins.         Initial HPI   This is a 74 y.o. male presenting for GI Followup for his constipation and GERD. Presented on 5/22/2024 to myself for epigastric pain and constipation. Abdominal thought to be exacerbated by constipation as CT revealed moderate stool burden. Otherwise normal. Started on Linzess 72mcg PO daily. This is working for him and has improved his constipation and abdominal pain. States that his upper GI symptoms have improved as well and would like to discuss decreasing his Prilosec to 20mg.       ROS:  Review of Systems   Constitutional:  Negative for chills, fever, malaise/fatigue and weight loss.   HENT:  Negative for sore throat.    Eyes:  Negative for redness.   Respiratory:  Negative for cough and wheezing.    Cardiovascular:  Negative for chest pain.   Gastrointestinal:  Negative for abdominal pain, blood in stool, constipation, diarrhea, heartburn, melena, nausea and vomiting.   Skin:  Negative for rash.   Neurological:  Negative for dizziness, seizures, loss of consciousness and weakness.        Medical History:  has a past medical history of Allergy, Anticoagulant long-term use, Arthritis, Back pain, Blood clotting tendency, BPH (benign prostatic hypertrophy), Cervical spondylosis, Chronic a-fib (7/18/2024), Colon polyp (2010), Coronary artery disease, Depression (05/08/2015), Dry eyes, Dry mouth, GERD (gastroesophageal reflux disease), Hyperlipidemia, Hypertension, Lumbar spondylosis, Nasal obstruction, PAD (peripheral artery disease) (04/25/2023), Rheumatoid arthritis, Sinusitis, Special screening for malignant neoplasm of colon  (06/29/2016), and Trouble in sleeping.    Surgical History:  has a past surgical history that includes Coronary artery bypass graft; Tonsillectomy; Knee arthroscopy w/ debridement; Excision turbinate, submucous; Nasal septum surgery; Colonoscopy (N/A, 6/29/2016); Esophagogastroduodenoscopy (N/A, 7/24/2020); Colonoscopy (N/A, 7/24/2020); Chalazion excision; Peripheral angiography (N/A, 6/21/2023); Atherectomy (Left, 6/21/2023); Colonoscopy (N/A, 10/19/2023); Coronary angiography including bypass grafts with catheterization of left heart (N/A, 6/19/2024); and Aortography (N/A, 6/19/2024).    Family History: family history includes Alzheimer's disease in his mother; Cataracts in an other family member; Colon cancer in his father; Hyperlipidemia in his mother; No Known Problems in his maternal aunt, maternal grandfather, maternal grandmother, maternal uncle, paternal aunt, paternal grandfather, paternal grandmother, and paternal uncle; Stomach cancer in his father..       Review of patient's allergies indicates:   Allergen Reactions    Aspirin Nausea And Vomiting    Astelin [azelastine] Other (See Comments)     Dry mouth    Flomax [tamsulosin] Other (See Comments)     Nosebleed       Current Outpatient Medications on File Prior to Visit   Medication Sig Dispense Refill    alfuzosin (UROXATRAL) 10 mg Tb24 Take 1 tablet (10 mg total) by mouth daily with breakfast. 90 tablet 3    apixaban (ELIQUIS) 5 mg Tab Take 1 tablet (5 mg total) by mouth 2 (two) times daily. 60 tablet 11    artificial tears,hypromellose,,GENTEAL/SUSTANE, (SYSTANE GEL) 0.3 % Gel 1 drop as needed.      atorvastatin (LIPITOR) 40 MG tablet Take 1 tablet (40 mg total) by mouth once daily. 90 tablet 3    cetirizine (ZYRTEC) 10 MG tablet Take 1 tablet (10 mg total) by mouth once daily. 30 tablet 0    ciclopirox (LOPROX) 0.77 % Crea Apply topically 2 (two) times daily. 90 g 2    ciclopirox (PENLAC) 8 % Soln Apply topically nightly. 6.6 mL 11    cinnamon bark  "500 mg capsule Take 500 mg by mouth once daily.      hydroCHLOROthiazide (MICROZIDE) 12.5 mg capsule Take 1 capsule (12.5 mg total) by mouth once daily. 90 capsule 3    linaCLOtide (LINZESS) 72 mcg Cap capsule Take 1 capsule (72 mcg total) by mouth before breakfast. 30 capsule 11    losartan (COZAAR) 25 MG tablet Take 1 tablet (25 mg total) by mouth once daily. 90 tablet 3    multivitamin (THERAGRAN) per tablet Take by mouth.  Tablet Oral Every day      omega-3 fatty acids 1,250 mg Cap Take by mouth.      oxymetazoline (AFRIN) 0.05 % nasal spray 2 sprays by Nasal route 2 (two) times daily.      propylene glycoL 0.6 % Drop Apply to eye. Not currently using      sildenafiL (VIAGRA) 100 MG tablet Take 1 tablet (100 mg total) by mouth daily as needed. 10 tablet 11    sodium bicarb-sodium chloride Pack by sinus irrigation route.      [DISCONTINUED] omeprazole (PRILOSEC) 40 MG capsule Take 1 capsule (40 mg total) by mouth once daily. 90 capsule 0     No current facility-administered medications on file prior to visit.         Objective Findings:    Vital Signs:  BP (!) 150/63   Pulse (!) 40   Ht 6' 1" (1.854 m)   Wt 98.6 kg (217 lb 6 oz)   BMI 28.68 kg/m²   Body mass index is 28.68 kg/m².    Physical Exam:  Physical Exam  Vitals and nursing note reviewed.   Constitutional:       Appearance: He is normal weight. He is not ill-appearing.   HENT:      Mouth/Throat:      Mouth: Mucous membranes are moist.      Pharynx: Oropharynx is clear.   Eyes:      General: No scleral icterus.  Abdominal:      General: Abdomen is flat. Bowel sounds are normal. There is no distension.      Palpations: Abdomen is soft. There is no mass.      Tenderness: There is no abdominal tenderness.      Hernia: No hernia is present.   Skin:     General: Skin is warm and dry.      Capillary Refill: Capillary refill takes less than 2 seconds.      Coloration: Skin is not jaundiced or pale.   Neurological:      Mental Status: He is alert and oriented " to person, place, and time. Mental status is at baseline.             Labs:  Lab Results   Component Value Date    WBC 5.96 06/17/2024    HGB 15.7 06/17/2024    HCT 49.4 06/17/2024     (L) 06/17/2024    CRP 6.8 05/11/2016    CHOL 155 02/13/2023    TRIG 45 02/13/2023    HDL 40 02/13/2023    ALKPHOS 56 01/27/2024    ALT 13 01/27/2024    AST 15 01/27/2024     06/17/2024    K 4.7 06/17/2024     06/17/2024    CREATININE 1.1 06/17/2024    BUN 11 06/17/2024    CO2 29 06/17/2024    TSH 0.641 02/13/2023    PSA 1.2 05/24/2019    INR 1.2 02/02/2024    HGBA1C 5.6 02/13/2023       Imaging reviewed: CT A/P 6/1/2024  FINDINGS:  Abdomen: No liver masses.  The main portal veins are patent.  The gallbladder is unremarkable.  No biliary or pancreatic ductal dilatation.  Splenic size within normal limits.  Adrenal glands unremarkable.  Bilateral renal cysts noted.  No renal masses or hydronephrosis..  Abdominal aorta tapers normally.  There is moderate scattered calcific atherosclerosis.  No periaortic lymphadenopathy.     Pelvis: Bladder is unremarkable.  Prostate is enlarged measuring 6.0 cm (series 2, image 161).  No inguinal or pelvic lymphadenopathy.  No fluid collections.     Bowel/mesentery: Moderate amount of stool throughout the colon.  The appendix and terminal ileum are unremarkable.  No dilated loops of bowel.  No mesenteric lymphadenopathy.     Bones: No marrow replacement process.     Lungs: Small diaphragmatic fat containing hernias.  Trace amount of dependent interstitial thickening.     Impression:     No etiology for epigastric pain identified.     Moderate scattered calcific atherosclerosis.     Enlarged prostate.     Bilateral renal cysts.        Electronically signed by:Mitchell Herr MD  Date:                                            06/01/2024  Time:                                           14:35      Endoscopy reviewed: Colonoscopy 10/19/2023  Impression:            - One 3 mm polyp in the  ascending colon, removed                          with a cold snare. Resected and retrieved.                          - Two 5 mm polyps in the descending colon, removed                          with a cold snare. Resected and retrieved.                          - Melanosis in the colon.                          - The examination was otherwise normal on direct                          and retroflexion views.   Recommendation:        - Discharge patient to home (ambulatory).                          - Patient has a contact number available for                          emergencies. The signs and symptoms of potential                          delayed complications were discussed with the                          patient. Return to normal activities tomorrow.                          Written discharge instructions were provided to                          the patient.                          - Resume previous diet.                          - Continue present medications.                          - Return to primary care physician as previously                          scheduled.                          - Repeat colonoscopy in 3 years for surveillance.                          - Resume Eliquis (apixaban) in 2 days and Plavix                          (clopidogrel) in 2 days at prior doses.   Ian Martinez MD   10/19/2023 11:06:29 AM     EGD 7/24/2020  Impression:           - [place] Grade B reflux esophagitis.                         - Normal stomach.                         - Normal examined duodenum.                         - No specimens collected.   Recommendation:       - Discharge patient to home.                         - Perform a colonoscopy today.                         - The findings and recommendations were discussed                         with the designated responsible adult.   Attending Participation:        I was present from insertion to withdraw and performed procedure        with the fellow.   Ian Martinez MD    7/24/2020 7:43:39 AM       Assessment:  1. Constipation, unspecified constipation type    2. Gastroesophageal reflux disease with esophagitis without hemorrhage      Orders Placed This Encounter    omeprazole (PRILOSEC) 20 MG capsule         Plan:  Currently relieved on once daily Linzess.   PPI decreased to 20mg. Can increase back up to 40mg if symptoms return.       Thank you for allowing me to participate in this patient's care.    Sincerely,     Shauna Aquino NP  Gastroenterology Department  [facility]

## 2024-08-24 ENCOUNTER — HOSPITAL ENCOUNTER (INPATIENT)
Facility: HOSPITAL | Age: 74
LOS: 2 days | Discharge: HOME | DRG: 243 | End: 2024-08-27
Attending: EMERGENCY MEDICINE | Admitting: HOSPITALIST
Payer: MEDICARE

## 2024-08-24 DIAGNOSIS — I49.5 SSS (SICK SINUS SYNDROME): ICD-10-CM

## 2024-08-24 DIAGNOSIS — R09.89 DECREASED RIGHT FEMORAL PULSE: ICD-10-CM

## 2024-08-24 DIAGNOSIS — I48.4 ATYPICAL ATRIAL FLUTTER: ICD-10-CM

## 2024-08-24 DIAGNOSIS — E87.6 HYPOKALEMIA: ICD-10-CM

## 2024-08-24 DIAGNOSIS — R00.1 SYMPTOMATIC BRADYCARDIA: Primary | ICD-10-CM

## 2024-08-24 LAB
ALBUMIN SERPL BCP-MCNC: 3.2 G/DL (ref 3.5–5.2)
ALLENS TEST: ABNORMAL
ALLENS TEST: NORMAL
ALP SERPL-CCNC: 71 U/L (ref 55–135)
ALT SERPL W/O P-5'-P-CCNC: 46 U/L (ref 10–44)
ANION GAP SERPL CALC-SCNC: 17 MMOL/L (ref 8–16)
ANION GAP SERPL CALC-SCNC: 8 MMOL/L (ref 8–16)
APTT PPP: 29.9 SEC (ref 21–32)
AST SERPL-CCNC: 31 U/L (ref 10–40)
BASOPHILS # BLD AUTO: 0.04 K/UL (ref 0–0.2)
BASOPHILS NFR BLD: 0.9 % (ref 0–1.9)
BILIRUB SERPL-MCNC: 0.6 MG/DL (ref 0.1–1)
BUN SERPL-MCNC: 12 MG/DL (ref 6–30)
BUN SERPL-MCNC: 12 MG/DL (ref 8–23)
CALCIUM SERPL-MCNC: 8.5 MG/DL (ref 8.7–10.5)
CHLORIDE SERPL-SCNC: 104 MMOL/L (ref 95–110)
CHLORIDE SERPL-SCNC: 110 MMOL/L (ref 95–110)
CK SERPL-CCNC: 84 U/L (ref 20–200)
CO2 SERPL-SCNC: 22 MMOL/L (ref 23–29)
CREAT SERPL-MCNC: 0.9 MG/DL (ref 0.5–1.4)
CREAT SERPL-MCNC: 1 MG/DL (ref 0.5–1.4)
D DIMER PPP IA.FEU-MCNC: 0.3 MG/L FEU
DELSYS: NORMAL
DIFFERENTIAL METHOD BLD: ABNORMAL
EOSINOPHIL # BLD AUTO: 0.1 K/UL (ref 0–0.5)
EOSINOPHIL NFR BLD: 2.9 % (ref 0–8)
ERYTHROCYTE [DISTWIDTH] IN BLOOD BY AUTOMATED COUNT: 12.8 % (ref 11.5–14.5)
EST. GFR  (NO RACE VARIABLE): >60 ML/MIN/1.73 M^2
GLUCOSE SERPL-MCNC: 100 MG/DL (ref 70–110)
GLUCOSE SERPL-MCNC: 99 MG/DL (ref 70–110)
HCO3 UR-SCNC: 26.7 MMOL/L (ref 24–28)
HCT VFR BLD AUTO: 43.7 % (ref 40–54)
HCT VFR BLD CALC: 41 %PCV (ref 36–54)
HGB BLD-MCNC: 14.6 G/DL (ref 14–18)
IMM GRANULOCYTES # BLD AUTO: 0 K/UL (ref 0–0.04)
IMM GRANULOCYTES NFR BLD AUTO: 0 % (ref 0–0.5)
INR PPP: 1.1 (ref 0.8–1.2)
LYMPHOCYTES # BLD AUTO: 2.1 K/UL (ref 1–4.8)
LYMPHOCYTES NFR BLD: 48.2 % (ref 18–48)
MAGNESIUM SERPL-MCNC: 1.7 MG/DL (ref 1.6–2.6)
MCH RBC QN AUTO: 31.3 PG (ref 27–31)
MCHC RBC AUTO-ENTMCNC: 33.4 G/DL (ref 32–36)
MCV RBC AUTO: 94 FL (ref 82–98)
MONOCYTES # BLD AUTO: 0.5 K/UL (ref 0.3–1)
MONOCYTES NFR BLD: 11.5 % (ref 4–15)
NEUTROPHILS # BLD AUTO: 1.6 K/UL (ref 1.8–7.7)
NEUTROPHILS NFR BLD: 36.5 % (ref 38–73)
NRBC BLD-RTO: 0 /100 WBC
PCO2 BLDA: 43.4 MMHG (ref 35–45)
PH SMN: 7.4 [PH] (ref 7.35–7.45)
PLATELET # BLD AUTO: 125 K/UL (ref 150–450)
PMV BLD AUTO: 10.9 FL (ref 9.2–12.9)
PO2 BLDA: 41 MMHG (ref 40–60)
POC BE: 1 MMOL/L
POC IONIZED CALCIUM: 1.2 MMOL/L (ref 1.06–1.42)
POC SATURATED O2: 76 % (ref 95–100)
POC TCO2 (MEASURED): 26 MMOL/L (ref 23–29)
POC TCO2: 28 MMOL/L (ref 24–29)
POTASSIUM BLD-SCNC: 3.1 MMOL/L (ref 3.5–5.1)
POTASSIUM SERPL-SCNC: 3.2 MMOL/L (ref 3.5–5.1)
PROT SERPL-MCNC: 5.8 G/DL (ref 6–8.4)
PROTHROMBIN TIME: 12.4 SEC (ref 9–12.5)
RBC # BLD AUTO: 4.67 M/UL (ref 4.6–6.2)
SAMPLE: ABNORMAL
SAMPLE: NORMAL
SITE: ABNORMAL
SITE: NORMAL
SODIUM BLD-SCNC: 143 MMOL/L (ref 136–145)
SODIUM SERPL-SCNC: 140 MMOL/L (ref 136–145)
TROPONIN I SERPL DL<=0.01 NG/ML-MCNC: 0.03 NG/ML (ref 0–0.03)
TROPONIN I SERPL DL<=0.01 NG/ML-MCNC: 0.04 NG/ML (ref 0–0.03)
TSH SERPL DL<=0.005 MIU/L-ACNC: 0.41 UIU/ML (ref 0.4–4)
WBC # BLD AUTO: 4.42 K/UL (ref 3.9–12.7)

## 2024-08-24 PROCEDURE — 99285 EMERGENCY DEPT VISIT HI MDM: CPT | Mod: 25

## 2024-08-24 PROCEDURE — 99900035 HC TECH TIME PER 15 MIN (STAT)

## 2024-08-24 PROCEDURE — 84443 ASSAY THYROID STIM HORMONE: CPT | Performed by: EMERGENCY MEDICINE

## 2024-08-24 PROCEDURE — 96361 HYDRATE IV INFUSION ADD-ON: CPT

## 2024-08-24 PROCEDURE — 84132 ASSAY OF SERUM POTASSIUM: CPT

## 2024-08-24 PROCEDURE — 85379 FIBRIN DEGRADATION QUANT: CPT | Performed by: EMERGENCY MEDICINE

## 2024-08-24 PROCEDURE — 63600175 PHARM REV CODE 636 W HCPCS: Performed by: PHYSICIAN ASSISTANT

## 2024-08-24 PROCEDURE — 83735 ASSAY OF MAGNESIUM: CPT | Performed by: EMERGENCY MEDICINE

## 2024-08-24 PROCEDURE — 25000003 PHARM REV CODE 250: Performed by: EMERGENCY MEDICINE

## 2024-08-24 PROCEDURE — 82550 ASSAY OF CK (CPK): CPT | Performed by: EMERGENCY MEDICINE

## 2024-08-24 PROCEDURE — 82565 ASSAY OF CREATININE: CPT

## 2024-08-24 PROCEDURE — 96372 THER/PROPH/DIAG INJ SC/IM: CPT | Performed by: PHYSICIAN ASSISTANT

## 2024-08-24 PROCEDURE — 82330 ASSAY OF CALCIUM: CPT

## 2024-08-24 PROCEDURE — 80053 COMPREHEN METABOLIC PANEL: CPT | Performed by: EMERGENCY MEDICINE

## 2024-08-24 PROCEDURE — 85025 COMPLETE CBC W/AUTO DIFF WBC: CPT | Performed by: EMERGENCY MEDICINE

## 2024-08-24 PROCEDURE — 84295 ASSAY OF SERUM SODIUM: CPT

## 2024-08-24 PROCEDURE — 96360 HYDRATION IV INFUSION INIT: CPT

## 2024-08-24 PROCEDURE — 84484 ASSAY OF TROPONIN QUANT: CPT | Mod: 91 | Performed by: EMERGENCY MEDICINE

## 2024-08-24 PROCEDURE — 85610 PROTHROMBIN TIME: CPT | Performed by: EMERGENCY MEDICINE

## 2024-08-24 PROCEDURE — 93005 ELECTROCARDIOGRAM TRACING: CPT

## 2024-08-24 PROCEDURE — G0378 HOSPITAL OBSERVATION PER HR: HCPCS

## 2024-08-24 PROCEDURE — 85730 THROMBOPLASTIN TIME PARTIAL: CPT | Performed by: EMERGENCY MEDICINE

## 2024-08-24 PROCEDURE — 93010 ELECTROCARDIOGRAM REPORT: CPT | Mod: ,,, | Performed by: INTERNAL MEDICINE

## 2024-08-24 PROCEDURE — 84484 ASSAY OF TROPONIN QUANT: CPT | Performed by: PHYSICIAN ASSISTANT

## 2024-08-24 PROCEDURE — 85014 HEMATOCRIT: CPT

## 2024-08-24 PROCEDURE — 82803 BLOOD GASES ANY COMBINATION: CPT

## 2024-08-24 RX ORDER — ACETAMINOPHEN 325 MG/1
650 TABLET ORAL EVERY 4 HOURS PRN
Status: DISCONTINUED | OUTPATIENT
Start: 2024-08-24 | End: 2024-08-27 | Stop reason: HOSPADM

## 2024-08-24 RX ORDER — GLUCAGON 1 MG
1 KIT INJECTION
Status: DISCONTINUED | OUTPATIENT
Start: 2024-08-24 | End: 2024-08-27 | Stop reason: HOSPADM

## 2024-08-24 RX ORDER — AMOXICILLIN 250 MG
1 CAPSULE ORAL DAILY PRN
Status: DISCONTINUED | OUTPATIENT
Start: 2024-08-24 | End: 2024-08-27 | Stop reason: HOSPADM

## 2024-08-24 RX ORDER — TALC
6 POWDER (GRAM) TOPICAL NIGHTLY PRN
Status: DISCONTINUED | OUTPATIENT
Start: 2024-08-24 | End: 2024-08-27 | Stop reason: HOSPADM

## 2024-08-24 RX ORDER — IBUPROFEN 200 MG
24 TABLET ORAL
Status: DISCONTINUED | OUTPATIENT
Start: 2024-08-24 | End: 2024-08-27 | Stop reason: HOSPADM

## 2024-08-24 RX ORDER — IBUPROFEN 200 MG
16 TABLET ORAL
Status: DISCONTINUED | OUTPATIENT
Start: 2024-08-24 | End: 2024-08-27 | Stop reason: HOSPADM

## 2024-08-24 RX ORDER — ENOXAPARIN SODIUM 100 MG/ML
1 INJECTION SUBCUTANEOUS ONCE
Status: DISCONTINUED | OUTPATIENT
Start: 2024-08-24 | End: 2024-08-24

## 2024-08-24 RX ORDER — LANOLIN ALCOHOL/MO/W.PET/CERES
800 CREAM (GRAM) TOPICAL
Status: DISCONTINUED | OUTPATIENT
Start: 2024-08-24 | End: 2024-08-27 | Stop reason: HOSPADM

## 2024-08-24 RX ORDER — SODIUM CHLORIDE 0.9 % (FLUSH) 0.9 %
10 SYRINGE (ML) INJECTION EVERY 8 HOURS
Status: DISCONTINUED | OUTPATIENT
Start: 2024-08-24 | End: 2024-08-24

## 2024-08-24 RX ORDER — ENOXAPARIN SODIUM 100 MG/ML
1 INJECTION SUBCUTANEOUS EVERY 12 HOURS
Status: COMPLETED | OUTPATIENT
Start: 2024-08-24 | End: 2024-08-25

## 2024-08-24 RX ORDER — SODIUM CHLORIDE 0.9 % (FLUSH) 0.9 %
10 SYRINGE (ML) INJECTION
Status: DISCONTINUED | OUTPATIENT
Start: 2024-08-24 | End: 2024-08-27 | Stop reason: HOSPADM

## 2024-08-24 RX ORDER — ATORVASTATIN CALCIUM 40 MG/1
40 TABLET, FILM COATED ORAL DAILY
Status: DISCONTINUED | OUTPATIENT
Start: 2024-08-25 | End: 2024-08-27 | Stop reason: HOSPADM

## 2024-08-24 RX ORDER — NALOXONE HCL 0.4 MG/ML
0.02 VIAL (ML) INJECTION
Status: DISCONTINUED | OUTPATIENT
Start: 2024-08-24 | End: 2024-08-27 | Stop reason: HOSPADM

## 2024-08-24 RX ORDER — ASPIRIN 325 MG
325 TABLET ORAL
Status: COMPLETED | OUTPATIENT
Start: 2024-08-24 | End: 2024-08-24

## 2024-08-24 RX ADMIN — ENOXAPARIN SODIUM 90 MG: 100 INJECTION SUBCUTANEOUS at 09:08

## 2024-08-24 RX ADMIN — POTASSIUM BICARBONATE 40 MEQ: 391 TABLET, EFFERVESCENT ORAL at 06:08

## 2024-08-24 RX ADMIN — ASPIRIN 325 MG ORAL TABLET 325 MG: 325 PILL ORAL at 07:08

## 2024-08-24 RX ADMIN — SODIUM CHLORIDE 500 ML: 9 INJECTION, SOLUTION INTRAVENOUS at 05:08

## 2024-08-24 NOTE — ED TRIAGE NOTES
Pt reports feeling weak, fatigued, light headed that started approx 1 hours ago.  BIB [facility].  Hx: atrial fibb, HTN.  They report HR in 40-80's.

## 2024-08-24 NOTE — ED PROVIDER NOTES
Encounter Date: 8/24/2024    SCRIBE #1 NOTE: I, RAFIA Eng, am scribing for, and in the presence of,  Smith Wahl MD. I have scribed the following portions of the note - Other sections scribed: HPI, ROS, PE.       History     Chief Complaint   Patient presents with    Fatigue     Pt BIB EMS complaining of feeling lighthead and fatigue x 1 hr PTA. Pt states his HR has been low throughout day 40-50's. Pt hx of AFIB      Rizwan Demarco Jr. is a 74 y.o. male, with a PMHx of Sjogren's disease, rheumatoid arthritis, hypertension, AFib currently on Eliquis, CAD, and peripheral arterial disease, who presents to the ED with lightheadedness/weakness which started earlier today. Patient reports symptoms started while he was sitting on the couch watching TV. He also reports he went on a 2 mile bike ride prior to symptoms' onset. Patient states he usually goes on ~2 mile bike rides every morning and ~2 mile walks every evening. Symptoms are exacerbated with movement. No other exacerbating or alleviating factors. Denies chest pain, SOB, diarrhea, nausea, vomiting, or other associated symptoms. Patient endorses compliance with daily medication. Daily medication also includes Plavix.    The history is provided by the patient and the spouse. No  was used.     Review of patient's allergies indicates:   Allergen Reactions    Aspirin Nausea And Vomiting    Astelin [azelastine] Other (See Comments)     Dry mouth    Flomax [tamsulosin] Other (See Comments)     Nosebleed     Past Medical History:   Diagnosis Date    Allergy     Anticoagulant long-term use     Arthritis     Rheumatoid arthritis    Back pain     Blood clotting tendency     BPH (benign prostatic hypertrophy)     Cervical spondylosis     Chronic a-fib 7/18/2024    Colon polyp 2010    adenoma    Coronary artery disease     Depression 05/08/2015    Dry eyes     Dry mouth     GERD (gastroesophageal reflux disease)     Hyperlipidemia     Hypertension      Lumbar spondylosis     Nasal obstruction     PAD (peripheral artery disease) 04/25/2023    Rheumatoid arthritis     Sinusitis     Special screening for malignant neoplasm of colon 06/29/2016    Trouble in sleeping      Past Surgical History:   Procedure Laterality Date    AORTOGRAPHY N/A 6/19/2024    Procedure: AORTOGRAM;  Surgeon: Abbe Vilchis MD;  Location: [facility] CATH LAB;  Service: Cardiology;  Laterality: N/A;    ATHERECTOMY Left 6/21/2023    Procedure: Atherectomy;  Surgeon: Abbe Vilchis MD;  Location: [facility] CATH LAB;  Service: Cardiology;  Laterality: Left;    CHALAZION EXCISION      COLONOSCOPY N/A 6/29/2016    Procedure: COLONOSCOPY;  Surgeon: Partha Saucedo MD;  Location: [facility] ENDO;  Service: Endoscopy;  Laterality: N/A;    COLONOSCOPY N/A 7/24/2020    Procedure: COLONOSCOPY;  Surgeon: Ian Martinez MD;  [facility]  Service: Endoscopy;  Laterality: N/A;  4/16/20 - removed from 5/1/20, not called yet due to acute pain issues being addressed today - pg    COLONOSCOPY N/A 10/19/2023    Procedure: COLONOSCOPY;  Surgeon: Ian Martinez MD;  Location: [facility];  Service: Endoscopy;  Laterality: N/A;  order created from Dr. Martinez's previous colonoscopy report 7/24/2020-3 year surveillance.   ok to hold Eliquis 2 days and Plavix 5 days per Dr Vilchis-GT  PEG instr portal -ml  10/12- pre call complete.  DBM    CORONARY ANGIOGRAPHY INCLUDING BYPASS GRAFTS WITH CATHETERIZATION OF LEFT HEART N/A 6/19/2024    Procedure: ANGIOGRAM, CORONARY, INCLUDING BYPASS GRAFT, WITH LEFT HEART CATHETERIZATION;  Surgeon: Abbe Vilchis MD;  Location: [facility] CATH LAB;  Service: Cardiology;  Laterality: N/A;  RN PREOP 06/17/2024    CORONARY ARTERY BYPASS GRAFT      ESOPHAGOGASTRODUODENOSCOPY N/A 7/24/2020    Procedure: ESOPHAGOGASTRODUODENOSCOPY (EGD);  Surgeon: Ian Martinez MD;  Location: [facility] ([facility]);  Service: Endoscopy;  Laterality: N/A;  4/16/20 - removed from 5/1/20, not called yet due to acute pain  issues being addressed today.  20 - rescheduled 20 - pg    EXCISION TURBINATE, SUBMUCOUS      KNEE ARTHROSCOPY W/ DEBRIDEMENT      NASAL SEPTUM SURGERY      PERIPHERAL ANGIOGRAPHY N/A 2023    Procedure: Peripheral angiography;  Surgeon: Abbe Vilchis MD;  Location: [facility] CATH LAB;  Service: Cardiology;  Laterality: N/A;  right radial access--- RN PREOP ----JM    TONSILLECTOMY       Family History   Problem Relation Name Age of Onset    Hyperlipidemia Mother      Alzheimer's disease Mother      Stomach cancer Father      Colon cancer Father          from wife--he is not unsure     Cataracts Other      No Known Problems Maternal Aunt      No Known Problems Maternal Uncle      No Known Problems Paternal Aunt      No Known Problems Paternal Uncle      No Known Problems Maternal Grandmother      No Known Problems Maternal Grandfather      No Known Problems Paternal Grandmother      No Known Problems Paternal Grandfather      Blindness Neg Hx      Cancer Neg Hx      Diabetes Neg Hx      Glaucoma Neg Hx      Rheum arthritis Neg Hx      Psoriasis Neg Hx      Lupus Neg Hx      Amblyopia Neg Hx      Hypertension Neg Hx      Macular degeneration Neg Hx      Retinal detachment Neg Hx      Strabismus Neg Hx      Stroke Neg Hx      Thyroid disease Neg Hx      Esophageal cancer Neg Hx       Social History     Tobacco Use    Smoking status: Former     Current packs/day: 0.00     Average packs/day: 1 pack/day for 20.0 years (20.0 ttl pk-yrs)     Types: Cigarettes     Start date: 1971     Quit date: 1991     Years since quittin.6    Smokeless tobacco: Never    Tobacco comments:     Quit .   Substance Use Topics    Alcohol use: No    Drug use: No     Review of Systems   Constitutional:  Negative for chills, fatigue and fever.   HENT:  Negative for congestion.    Respiratory:  Negative for cough and shortness of breath.    Cardiovascular:  Negative for chest pain.   Gastrointestinal:  Negative for  abdominal pain, diarrhea, nausea and vomiting.   Skin:  Negative for rash.   Neurological:  Positive for weakness and light-headedness. Negative for headaches.       Physical Exam     Initial Vitals [08/24/24 1647]   BP Pulse Resp Temp SpO2   (!) 174/80 (!) 53 16 97.8 °F (36.6 °C) 97 %      MAP       --         Physical Exam    Nursing note and vitals reviewed.  Constitutional: He appears well-developed. He is not diaphoretic. No distress.   HENT:   Head: Normocephalic.   Eyes: EOM are normal.   Cardiovascular:  Regular rhythm.   Bradycardia present.         No murmur heard.  2+ DP on left side. Unable to auscultate DP on right side.   Faint right PT biphasic.   Pulmonary/Chest: Effort normal and breath sounds normal. He has no wheezes.   Abdominal: Abdomen is soft. He exhibits no distension, no pulsatile midline mass and no mass. There is no abdominal tenderness.   No rash on torso.   Musculoskeletal:         General: Normal range of motion.      Right upper leg: No edema.      Left upper leg: No edema.      Right lower leg: No edema.      Left lower leg: No edema.      Comments: Feet equal temperature to touch.  No wounds on bilateral feet. No dusky, dark spots.     Neurological: He is alert.   No drift in bilateral upper extremities. Good mobility in all extremities.   Skin: Skin is warm.         ED Course   Procedures  Labs Reviewed   CBC W/ AUTO DIFFERENTIAL - Abnormal       Result Value    WBC 4.42      RBC 4.67      Hemoglobin 14.6      Hematocrit 43.7      MCV 94      MCH 31.3 (*)     MCHC 33.4      RDW 12.8      Platelets 125 (*)     MPV 10.9      Immature Granulocytes 0.0      Gran # (ANC) 1.6 (*)     Immature Grans (Abs) 0.00      Lymph # 2.1      Mono # 0.5      Eos # 0.1      Baso # 0.04      nRBC 0      Gran % 36.5 (*)     Lymph % 48.2 (*)     Mono % 11.5      Eosinophil % 2.9      Basophil % 0.9      Differential Method Automated     COMPREHENSIVE METABOLIC PANEL - Abnormal    Sodium 140       Potassium 3.2 (*)     Chloride 110      CO2 22 (*)     Glucose 99      BUN 12      Creatinine 0.9      Calcium 8.5 (*)     Total Protein 5.8 (*)     Albumin 3.2 (*)     Total Bilirubin 0.6      Alkaline Phosphatase 71      AST 31      ALT 46 (*)     eGFR >60      Anion Gap 8     TROPONIN I - Abnormal    Troponin I 0.033 (*)    TROPONIN I - Abnormal    Troponin I 0.036 (*)    ISTAT PROCEDURE - Abnormal    POC Glucose 100      POC BUN 12      POC Creatinine 1.0      POC Sodium 143      POC Potassium 3.1 (*)     POC Chloride 104      POC TCO2 (MEASURED) 26      POC Anion Gap 17 (*)     POC Ionized Calcium 1.20      POC Hematocrit 41      Sample VENOUS      Site Other      Allens Test N/A     D DIMER, QUANTITATIVE    D-Dimer 0.30     MAGNESIUM    Magnesium 1.7     APTT   PROTIME-INR   APTT    aPTT 29.9     PROTIME-INR    Prothrombin Time 12.4      INR 1.1     TSH   CK   CK    CPK 84     TSH    TSH 0.407     ISTAT PROCEDURE    POC PH 7.396      POC PCO2 43.4      POC PO2 41      POC HCO3 26.7      POC BE 1      POC SATURATED O2 76      POC TCO2 28      Sample VENOUS      Site Other      Allens Test N/A      DelSys Room Air     ISTAT CHEM8          Imaging Results              US Lower Extremity Arteries Right (Final result)  Result time 08/24/24 20:27:40      Final result by Violeta Escalante MD (08/24/24 20:27:40)                   Impression:      No evidence of hemodynamically significant arterial stenosis or occlusion within the right lower extremity      Electronically signed by: Violeta Escalante MD  Date:    08/24/2024  Time:    20:27               Narrative:    EXAMINATION:  US LOWER EXTREMITY ARTERIES RIGHT    CLINICAL HISTORY:  Other specified symptoms and signs involving the circulatory and respiratory systems    TECHNIQUE:  Spectral, color and grayscale images of the large arteries of the right lower extremity were performed.    COMPARISON:  None    FINDINGS:  Right lower extremity:    Common femoral artery:  136 cm/sec, biphasic    Deep femoral artery: 135 cm/sec, triphasic    SFA proximal: 122 cm/sec, biphasic    SFA mid: 111 cm/sec, biphasic    SFA distal: 118 cm/sec, biphasic    Popliteal artery: 67 cm/sec, biphasic    Posterior tibial artery: 11 cm/sec, biphasic    Anterior tibial artery: 37 cm/sec, biphasic    Peroneal artery: 63 cm/sec, biphasic                                       X-Ray Chest AP Portable (Final result)  Result time 08/24/24 18:18:35      Final result by Violeta Escalante MD (08/24/24 18:18:35)                   Impression:      No acute cardiopulmonary process identified.      Electronically signed by: Violeta Escalante MD  Date:    08/24/2024  Time:    18:18               Narrative:    EXAMINATION:  XR CHEST AP PORTABLE    CLINICAL HISTORY:  Bradycardia, unspecified    TECHNIQUE:  Single frontal view of the chest was performed.    COMPARISON:  02/02/2024.    FINDINGS:  Cardiac silhouette is stable in size.  Postsurgical sternotomy changes are seen.  Support pads overlie the left chest wall.  Lungs are symmetrically expanded.  No evidence of focal consolidative process, pneumothorax, or significant pleural effusion.  No acute osseous abnormality identified.                                       Medications   melatonin tablet 6 mg (has no administration in time range)   senna-docusate 8.6-50 mg per tablet 1 tablet (has no administration in time range)   acetaminophen tablet 650 mg (has no administration in time range)   naloxone 0.4 mg/mL injection 0.02 mg (has no administration in time range)   magnesium oxide tablet 800 mg (has no administration in time range)   magnesium oxide tablet 800 mg (has no administration in time range)   glucose chewable tablet 16 g (has no administration in time range)   glucose chewable tablet 24 g (has no administration in time range)   glucagon (human recombinant) injection 1 mg (has no administration in time range)   sodium chloride 0.9% flush 10 mL (has no  administration in time range)   atorvastatin tablet 40 mg (has no administration in time range)   enoxaparin injection 90 mg (90 mg Subcutaneous Given 8/24/24 2119)   sodium chloride 0.9% bolus 500 mL 500 mL (0 mLs Intravenous Stopped 8/24/24 1910)   potassium bicarbonate disintegrating tablet 40 mEq (40 mEq Oral Given 8/24/24 1822)   aspirin tablet 325 mg (325 mg Oral Given 8/24/24 1909)     Medical Decision Making      74-year-old male presenting for generalized weakness throughout the day.  History of three-vessel CAD.  Patient denied any chest pain.  On arrival the patient appeared to be fluctuating between the upper 20 and low 40s of heart rate.  Patient appeared to be in his slow atrial flutter.  The patient denied any symptoms while in the emergency room.  The patient did not have any neuro deficits.  Patient reported feeling well otherwise.  There appeared to be no chronotropic medications on his home med list.  No changes to his recent medications.  Patient denies any recent illnesses.  Denies any volume losses.  The patient's blood pressure appears to be appropriately compensating.      Consultation with Cardiology, Dr. Elise.  No need for intervention at this time if the patient is asymptomatic.  Cardiology to evaluate the patient in the morning.      Of note a right leg arterial ultrasound was obtained due to decrease pulses however the patient denied any pain in the right leg.  Ultrasound shows no decrease pulses in the right leg.      Medical Decision Making:     A. Problem List:  1. Symptomatic bradycardia  2. Slow atrial flutter     B. Differential diagnosis:    Electrolyte deficiencies, ACS, arrhythmia, iatrogenic      ECG:  Please check workup area for ECG interpretation.    Part of the note was done using electronic dictation services.           Amount and/or Complexity of Data Reviewed  Labs: ordered. Decision-making details documented in ED Course.  Radiology: ordered.  ECG/medicine tests:   Decision-making details documented in ED Course.    Risk  OTC drugs.  Prescription drug management.            Scribe Attestation:   Scribe #1: I performed the above scribed service and the documentation accurately describes the services I performed. I attest to the accuracy of the note.        ED Course as of 08/25/24 0341   Sat Aug 24, 2024   1739 6/13/2024    ·  There was three vessel coronary artery disease.  ·  The pre-procedure left ventricular end diastolic pressure was 13.  ·  The estimated blood loss was <50 mL.     Main:  Mid 30-40% stenosis.     Lad:  .  Fills via ABREU to LAD      Ramus:  Ostial severe heavily calcified severe 90% stenosis.  Tortuous vessel.  Mid 70-80% stenosis     Circumflex: Ostial severe stenosis and diffusely diseased vessel .  Distal OM fills via collaterals     RCA:      Grafts:     Abreu to LAD is patent and supplies the LAD without any significant stenosis     All the other SVG grafts are occluded.  Aortogram was performed and no other grafts seen     Assessment and plan      Continue medical management      [JM]   1739 Patient and wife state that the patient is full code. [JM]   1740 Differential includes adverse effect secondary to medication, primary [JM]   1741 EKG 12-lead  Time 5:37 p.m.     Rate 25-35 bpm, not sinus with flutter waves, regular, right axis deviation.  QRS 86 .  No obvious ST changes.    Atrial flutter with bradycardia. [JM]   1751 The patient is asymptomatic at this time but did report generalized weakness earlier in the day.  Given that patient is asymptomatic and blood pressure appears appropriately compensated no medication intervention needed at this time however if the patient worsens or begins to become symptomatic plan to provide atropine and external pacing. [JM]   1838 D-Dimer: 0.30 [JM]   2002 Dr. Elise    Admit to tele. No intervention if asymptomatic.  [JM]      ED Course User Index  [JM] Smith Wahl MD                            I, Smith Wahl, personally performed the services described in this documentation. All medical record entries made by the scribe were at my direction and in my presence. I have reviewed the chart and agree that the record reflects my personal performance and is accurate and complete.      DISCLAIMER: This note was prepared with [employer] voice recognition transcription software. Garbled syntax, mangled pronouns, and other bizarre constructions may be attributed to that software system.    Clinical Impression:  Final diagnoses:  [R09.89] Decreased right femoral pulse  [I48.4] Atypical atrial flutter  [R00.1] Symptomatic bradycardia (Primary)  [E87.6] Hypokalemia          ED Disposition Condition    Observation Stable                Smith Wahl MD  08/25/24 4003

## 2024-08-25 PROBLEM — I49.5 SSS (SICK SINUS SYNDROME): Status: ACTIVE | Noted: 2024-08-25

## 2024-08-25 LAB
ALBUMIN SERPL BCP-MCNC: 3.3 G/DL (ref 3.5–5.2)
ALP SERPL-CCNC: 68 U/L (ref 55–135)
ALT SERPL W/O P-5'-P-CCNC: 43 U/L (ref 10–44)
ANION GAP SERPL CALC-SCNC: 7 MMOL/L (ref 8–16)
AST SERPL-CCNC: 28 U/L (ref 10–40)
BASOPHILS # BLD AUTO: 0.02 K/UL (ref 0–0.2)
BASOPHILS NFR BLD: 0.3 % (ref 0–1.9)
BILIRUB SERPL-MCNC: 0.6 MG/DL (ref 0.1–1)
BUN SERPL-MCNC: 13 MG/DL (ref 8–23)
CALCIUM SERPL-MCNC: 9.6 MG/DL (ref 8.7–10.5)
CHLORIDE SERPL-SCNC: 107 MMOL/L (ref 95–110)
CO2 SERPL-SCNC: 28 MMOL/L (ref 23–29)
CREAT SERPL-MCNC: 1.1 MG/DL (ref 0.5–1.4)
DIFFERENTIAL METHOD BLD: ABNORMAL
EOSINOPHIL # BLD AUTO: 0.2 K/UL (ref 0–0.5)
EOSINOPHIL NFR BLD: 2.9 % (ref 0–8)
ERYTHROCYTE [DISTWIDTH] IN BLOOD BY AUTOMATED COUNT: 12.9 % (ref 11.5–14.5)
EST. GFR  (NO RACE VARIABLE): >60 ML/MIN/1.73 M^2
GLUCOSE SERPL-MCNC: 114 MG/DL (ref 70–110)
HCT VFR BLD AUTO: 45.4 % (ref 40–54)
HGB BLD-MCNC: 14.5 G/DL (ref 14–18)
IMM GRANULOCYTES # BLD AUTO: 0 K/UL (ref 0–0.04)
IMM GRANULOCYTES NFR BLD AUTO: 0 % (ref 0–0.5)
LYMPHOCYTES # BLD AUTO: 3.2 K/UL (ref 1–4.8)
LYMPHOCYTES NFR BLD: 55.6 % (ref 18–48)
MAGNESIUM SERPL-MCNC: 1.9 MG/DL (ref 1.6–2.6)
MCH RBC QN AUTO: 30.6 PG (ref 27–31)
MCHC RBC AUTO-ENTMCNC: 31.9 G/DL (ref 32–36)
MCV RBC AUTO: 96 FL (ref 82–98)
MONOCYTES # BLD AUTO: 0.7 K/UL (ref 0.3–1)
MONOCYTES NFR BLD: 11.7 % (ref 4–15)
NEUTROPHILS # BLD AUTO: 1.7 K/UL (ref 1.8–7.7)
NEUTROPHILS NFR BLD: 29.5 % (ref 38–73)
NRBC BLD-RTO: 0 /100 WBC
OHS QRS DURATION: 86 MS
OHS QTC CALCULATION: 388 MS
PLATELET # BLD AUTO: 129 K/UL (ref 150–450)
PMV BLD AUTO: 10.5 FL (ref 9.2–12.9)
POTASSIUM SERPL-SCNC: 3.8 MMOL/L (ref 3.5–5.1)
PROT SERPL-MCNC: 5.8 G/DL (ref 6–8.4)
RBC # BLD AUTO: 4.74 M/UL (ref 4.6–6.2)
SODIUM SERPL-SCNC: 142 MMOL/L (ref 136–145)
TROPONIN I SERPL DL<=0.01 NG/ML-MCNC: 0.04 NG/ML (ref 0–0.03)
WBC # BLD AUTO: 5.81 K/UL (ref 3.9–12.7)

## 2024-08-25 PROCEDURE — 63600175 PHARM REV CODE 636 W HCPCS: Performed by: INTERNAL MEDICINE

## 2024-08-25 PROCEDURE — 25000003 PHARM REV CODE 250: Performed by: PHYSICIAN ASSISTANT

## 2024-08-25 PROCEDURE — 94761 N-INVAS EAR/PLS OXIMETRY MLT: CPT

## 2024-08-25 PROCEDURE — 83735 ASSAY OF MAGNESIUM: CPT | Performed by: PHYSICIAN ASSISTANT

## 2024-08-25 PROCEDURE — 96372 THER/PROPH/DIAG INJ SC/IM: CPT | Performed by: INTERNAL MEDICINE

## 2024-08-25 PROCEDURE — 80053 COMPREHEN METABOLIC PANEL: CPT | Performed by: PHYSICIAN ASSISTANT

## 2024-08-25 PROCEDURE — 02HK3JZ INSERTION OF PACEMAKER LEAD INTO RIGHT VENTRICLE, PERCUTANEOUS APPROACH: ICD-10-PCS | Performed by: INTERNAL MEDICINE

## 2024-08-25 PROCEDURE — 25000003 PHARM REV CODE 250: Performed by: NURSE PRACTITIONER

## 2024-08-25 PROCEDURE — 11000001 HC ACUTE MED/SURG PRIVATE ROOM

## 2024-08-25 PROCEDURE — 36415 COLL VENOUS BLD VENIPUNCTURE: CPT | Performed by: PHYSICIAN ASSISTANT

## 2024-08-25 PROCEDURE — 0JH604Z INSERTION OF PACEMAKER, SINGLE CHAMBER INTO CHEST SUBCUTANEOUS TISSUE AND FASCIA, OPEN APPROACH: ICD-10-PCS | Performed by: INTERNAL MEDICINE

## 2024-08-25 PROCEDURE — 84484 ASSAY OF TROPONIN QUANT: CPT | Performed by: PHYSICIAN ASSISTANT

## 2024-08-25 PROCEDURE — 85025 COMPLETE CBC W/AUTO DIFF WBC: CPT | Performed by: PHYSICIAN ASSISTANT

## 2024-08-25 PROCEDURE — 99223 1ST HOSP IP/OBS HIGH 75: CPT | Mod: ,,, | Performed by: INTERNAL MEDICINE

## 2024-08-25 RX ORDER — ALFUZOSIN HYDROCHLORIDE 10 MG/1
10 TABLET, EXTENDED RELEASE ORAL DAILY
Status: DISCONTINUED | OUTPATIENT
Start: 2024-08-26 | End: 2024-08-27 | Stop reason: HOSPADM

## 2024-08-25 RX ORDER — HYDRALAZINE HYDROCHLORIDE 20 MG/ML
10 INJECTION INTRAMUSCULAR; INTRAVENOUS EVERY 6 HOURS PRN
Status: DISCONTINUED | OUTPATIENT
Start: 2024-08-25 | End: 2024-08-27 | Stop reason: HOSPADM

## 2024-08-25 RX ORDER — LOSARTAN POTASSIUM 25 MG/1
25 TABLET ORAL DAILY
Status: DISCONTINUED | OUTPATIENT
Start: 2024-08-26 | End: 2024-08-27 | Stop reason: HOSPADM

## 2024-08-25 RX ORDER — LIDOCAINE 50 MG/G
1 PATCH TOPICAL
Status: DISCONTINUED | OUTPATIENT
Start: 2024-08-25 | End: 2024-08-27 | Stop reason: HOSPADM

## 2024-08-25 RX ADMIN — ATORVASTATIN CALCIUM 40 MG: 40 TABLET, FILM COATED ORAL at 08:08

## 2024-08-25 RX ADMIN — ENOXAPARIN SODIUM 90 MG: 100 INJECTION SUBCUTANEOUS at 11:08

## 2024-08-25 RX ADMIN — LIDOCAINE 1 PATCH: 700 PATCH TOPICAL at 01:08

## 2024-08-25 RX ADMIN — ENOXAPARIN SODIUM 90 MG: 100 INJECTION SUBCUTANEOUS at 10:08

## 2024-08-25 NOTE — SUBJECTIVE & OBJECTIVE
"Interval History: currently denies light headedness, chest pain and sob. Feels like "I'm coming around."     Review of Systems   Constitutional:  Positive for fatigue. Negative for chills and fever.   HENT:  Negative for nosebleeds and tinnitus.    Eyes:  Negative for photophobia and visual disturbance.   Respiratory:  Negative for shortness of breath and wheezing.    Cardiovascular:  Negative for chest pain, palpitations and leg swelling.   Gastrointestinal:  Negative for abdominal distention, nausea and vomiting.   Genitourinary:  Negative for dysuria, flank pain and hematuria.   Musculoskeletal:  Negative for gait problem and joint swelling.   Skin:  Negative for rash and wound.   Neurological:  Positive for weakness and light-headedness. Negative for seizures and syncope.     Objective:     Vital Signs (Most Recent):  Temp: 98.5 °F (36.9 °C) (08/25/24 0747)  Pulse: (!) 50 (08/25/24 0747)  Resp: 18 (08/25/24 0747)  BP: (!) 174/82 (08/25/24 0747)  SpO2: 98 % (08/25/24 0747) Vital Signs (24h Range):  Temp:  [97.7 °F (36.5 °C)-98.5 °F (36.9 °C)] 98.5 °F (36.9 °C)  Pulse:  [29-53] 50  Resp:  [16-23] 18  SpO2:  [95 %-98 %] 98 %  BP: (132-212)/() 174/82     Weight: 96.4 kg (212 lb 8.4 oz)  Body mass index is 28.04 kg/m².    Intake/Output Summary (Last 24 hours) at 8/25/2024 0803  Last data filed at 8/24/2024 1958  Gross per 24 hour   Intake 500 ml   Output 1800 ml   Net -1300 ml         Physical Exam  Vitals and nursing note reviewed.   Constitutional:       General: He is not in acute distress.     Appearance: He is well-developed. He is not diaphoretic.   Eyes:      General:         Right eye: No discharge.         Left eye: No discharge.      Conjunctiva/sclera: Conjunctivae normal.   Neck:      Thyroid: No thyromegaly.   Cardiovascular:      Rate and Rhythm: Bradycardia present. Rhythm irregular.      Heart sounds: No murmur heard.  Pulmonary:      Effort: Pulmonary effort is normal. No respiratory distress. "      Breath sounds: Normal breath sounds.   Abdominal:      General: Bowel sounds are normal. There is no distension.      Palpations: Abdomen is soft. There is no mass.      Tenderness: There is no abdominal tenderness.   Musculoskeletal:         General: No deformity.      Cervical back: Normal range of motion and neck supple.      Right lower leg: No edema.      Left lower leg: No edema.   Skin:     General: Skin is warm and dry.   Neurological:      Mental Status: He is alert and oriented to person, place, and time.      Sensory: No sensory deficit.   Psychiatric:         Mood and Affect: Mood normal.         Behavior: Behavior normal.             Significant Labs: All pertinent labs within the past 24 hours have been reviewed.    Significant Imaging: I have reviewed all pertinent imaging results/findings within the past 24 hours.

## 2024-08-25 NOTE — ASSESSMENT & PLAN NOTE
Patient with known CAD s/p CABG, which is controlled Will continue Statin and monitor for S/Sx of angina/ACS. Continue to monitor on telemetry.

## 2024-08-25 NOTE — NURSING
[facility], [facility]  Nurses Note -- 4 Eyes      8/25/2024       Skin assessed on: Q Shift      [x] No Pressure Injuries Present    []Prevention Measures Documented    [] Yes LDA  for Pressure Injury Previously documented     [] Yes New Pressure Injury Discovered   [] LDA for New Pressure Injury Added      Attending RN:  Emmanuel Vega RN     Second RN:  TRE Sheikh         Subjective:   Ms. Patience Meigs is a 23 y.o. who is now 6 weeks postpartum. OB History      Para Term  AB TAB SAB Ectopic Multiple Living    2 1 1  1    0 1        Method of delivery: normal spontaneous vaginal delivery  She is breast-feeding and is not experiencing problems. Pregnancy complications: none. She is feeling well and happy. EPDS 0. She currently uses no method for contraception. She plans to use no method for contraception. Patient Active Problem List   Diagnosis Code    Post term pregnancy O48.0    Pregnancy Z33.1     Current Outpatient Prescriptions   Medication Sig Dispense Refill    ibuprofen (MOTRIN) 800 mg tablet Take 1 Tab by mouth every eight (8) hours as needed. 60 Tab 3    oxyCODONE-acetaminophen (PERCOCET) 5-325 mg per tablet Take 2 Tabs by mouth every four (4) hours as needed. Max Daily Amount: 12 Tabs. 20 Tab 0    calcium-cholecalciferol, d3, (CALCIUM 600 + D) 600-125 mg-unit tab Take  by mouth.  AMBULATORY BREAST PUMP Use as needed 1 Pump(s) 0    prenatal multivit-ca-min-fe-fa tab Take 1 Tab by mouth.  Indications: PREGNANCY       No Known Allergies  Past Medical History:   Diagnosis Date    Anemia     Arthritis     Chlamydia         Objective:   Physical Exam:  Date of last Pap smear: 17  Physical Exam: GENERAL APPEARANCE: alert, well appearing, in no apparent distress, oriented to person, place and time  BREASTS: no masses noted, no significant tenderness, no palpable axillary nodes, no skin changes  LUNGS: clear to auscultation, no wheezes, rales or rhonchi, symmetric air entry  HEART: regular rate and rhythm, no murmurs  ABDOMEN POSTPARTUM: benign non-tender, without masses or organomegaly palpable  SKIN: normal coloration and turgor, no rashes, no suspicious skin lesions noted  EXTERNAL GENITALIA POSTPARTUM: normal, well-healed, without lesions or masses  VAGINA POSTPARTUM: normal, well-healed, physiologic discharge, without lesions  CERVIX POSTPARTUM: normal, well-healed, without lesions  UTERUS POSTPARTUM: normal size, well involuted, firm, non-tender  ADNEXA POSTPARTUM: no masses palpable and nontender    Assessment/Plan:   normal postpartum exam  able to resume normal activities  Pap collected  See orders and Patient Instructions   RTC annually and prn    ICD-10-CM ICD-9-CM    1.  Postpartum care and examination Z39.2 V24.2

## 2024-08-25 NOTE — PROGRESS NOTES
"[facility] Medicine  Progress Note    Patient Name: Rizwan Demarco Jr.  MRN: [id number]  Patient Class: OP- Observation   Admission Date: 8/24/2024  Length of Stay: 0 days  Attending Physician: Ori Almonte MD  Primary Care Provider: Raúl Perkins MD        Subjective:     Principal Problem:Atrial fibrillation with slow ventricular response        HPI:  Rizwan Demarco Jr. 74 y.o. male with CAD, afib on eliquis, PAD, HTN, HLD presents to the hospital with a chief complaint of lightheadedness.  Reports today he noticed he was fatigued weak and lightheaded and when he attempted to stand.  The symptoms improved with sitting at rest.  He reported his heart rate has been low throughout the day.  He reported he had had previous episodes of low heart rate which typically improved with standing and attempting to walk, but he felt they did not improved today.  He denies fever nausea vomiting abdominal pain leg swelling melena hematuria hematemesis syncope chest pain shortness of breath.    In the ED, afebrile without leukocytosis found to be in AFib with slow ventricular response INR within normal limits D-dimer within normal limits potassium 3.2 magnesium within normal limits troponin 0.033 without hypotension.    Overview/Hospital Course:  Admission to hospital for near syncope. EKG AF 43 Qtc 388. Telemetry 30-40's. Not on AVN blocking agent. BP okay. Cardiology consult.     Interval History: currently denies light headedness, chest pain and sob. Feels like "I'm coming around."     Review of Systems   Constitutional:  Positive for fatigue. Negative for chills and fever.   HENT:  Negative for nosebleeds and tinnitus.    Eyes:  Negative for photophobia and visual disturbance.   Respiratory:  Negative for shortness of breath and wheezing.    Cardiovascular:  Negative for chest pain, palpitations and leg swelling.   Gastrointestinal:  Negative for abdominal distention, nausea and vomiting. "   Genitourinary:  Negative for dysuria, flank pain and hematuria.   Musculoskeletal:  Negative for gait problem and joint swelling.   Skin:  Negative for rash and wound.   Neurological:  Positive for weakness and light-headedness. Negative for seizures and syncope.     Objective:     Vital Signs (Most Recent):  Temp: 98.5 °F (36.9 °C) (08/25/24 0747)  Pulse: (!) 50 (08/25/24 0747)  Resp: 18 (08/25/24 0747)  BP: (!) 174/82 (08/25/24 0747)  SpO2: 98 % (08/25/24 0747) Vital Signs (24h Range):  Temp:  [97.7 °F (36.5 °C)-98.5 °F (36.9 °C)] 98.5 °F (36.9 °C)  Pulse:  [29-53] 50  Resp:  [16-23] 18  SpO2:  [95 %-98 %] 98 %  BP: (132-212)/() 174/82     Weight: 96.4 kg (212 lb 8.4 oz)  Body mass index is 28.04 kg/m².    Intake/Output Summary (Last 24 hours) at 8/25/2024 0803  Last data filed at 8/24/2024 1958  Gross per 24 hour   Intake 500 ml   Output 1800 ml   Net -1300 ml         Physical Exam  Vitals and nursing note reviewed.   Constitutional:       General: He is not in acute distress.     Appearance: He is well-developed. He is not diaphoretic.   Eyes:      General:         Right eye: No discharge.         Left eye: No discharge.      Conjunctiva/sclera: Conjunctivae normal.   Neck:      Thyroid: No thyromegaly.   Cardiovascular:      Rate and Rhythm: Bradycardia present. Rhythm irregular.      Heart sounds: No murmur heard.  Pulmonary:      Effort: Pulmonary effort is normal. No respiratory distress.      Breath sounds: Normal breath sounds.   Abdominal:      General: Bowel sounds are normal. There is no distension.      Palpations: Abdomen is soft. There is no mass.      Tenderness: There is no abdominal tenderness.   Musculoskeletal:         General: No deformity.      Cervical back: Normal range of motion and neck supple.      Right lower leg: No edema.      Left lower leg: No edema.   Skin:     General: Skin is warm and dry.   Neurological:      Mental Status: He is alert and oriented to person, place, and  time.      Sensory: No sensory deficit.   Psychiatric:         Mood and Affect: Mood normal.         Behavior: Behavior normal.             Significant Labs: All pertinent labs within the past 24 hours have been reviewed.    Significant Imaging: I have reviewed all pertinent imaging results/findings within the past 24 hours.    Assessment/Plan:      * Atrial fibrillation with slow ventricular response  Presents with fatigue, lightheadedness, and found to have afib vs flutter with slow ventricular response. Has had previous episodes of slow ventriclar response. His HR does increase with fluttering of legs in bed. Not on BB or CCB outpt.   Telemetry 30-40's. Not on AVN blocking agent. BP okay.  Cardiology consult.     Patient with Paroxysmal (<7 days) atrial fibrillation which is uncontrolled currently with  not on rate control due to bradycardia . Patient is currently in atrial fibrillation.[id number] Score: 3. Anticoagulation indicated. Anticoagulation done with lovenox, eliquis held pending cardiology eval .    Hypokalemia  Patient's most recent potassium results are listed below.   Recent Labs     08/24/24  1722   K 3.2*     Plan  - Replete potassium per protocol  - Monitor potassium Daily  - Patient's hypokalemia is stable  - suspect related to HCTZ use. Will supplement oral. Mg within normal limits    PAD (peripheral artery disease)  Stable continue home statin    Essential hypertension  Chronic, controlled. Latest blood pressure and vitals reviewed-     Temp:  [97.8 °F (36.6 °C)]   Pulse:  [39-53]   Resp:  [16-23]   BP: (146-174)/(69-80)   SpO2:  [95 %-97 %] .   Home meds for hypertension were reviewed and noted below.   Hypertension Medications               hydroCHLOROthiazide (MICROZIDE) 12.5 mg capsule Take 1 capsule (12.5 mg total) by mouth once daily.    losartan (COZAAR) 25 MG tablet Take 1 tablet (25 mg total) by mouth once daily.            While in the hospital, will manage blood pressure as follows;  Adjust home antihypertensive regimen as follows- home losartan/hctz held to prevent lowering BP given bradycardia    Will utilize p.r.n. blood pressure medication only if patient's blood pressure greater than 180/110 and he develops symptoms such as worsening chest pain or shortness of breath.    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 Continue to monitor on telemetry.       VTE Risk Mitigation (From admission, onward)           Ordered     enoxaparin injection 90 mg  Every 12 hours         08/24/24 2109     IP VTE HIGH RISK PATIENT  Once         08/24/24 2019     Place sequential compression device  Until discontinued         08/24/24 2019     Place BORIS hose  Until discontinued         08/24/24 2019                    Discharge Planning   CHERIE:      Code Status: Full Code   Is the patient medically ready for discharge?:     Reason for patient still in hospital (select all that apply): Treatment  Discharge Plan A: Home with family (Follow-ups)              Angelique Simmons NP  Department of Hospital Medicine   [facility]

## 2024-08-25 NOTE — SUBJECTIVE & OBJECTIVE
Past Medical History:   Diagnosis Date    Allergy     Anticoagulant long-term use     Arthritis     Rheumatoid arthritis    Back pain     Blood clotting tendency     BPH (benign prostatic hypertrophy)     Cervical spondylosis     Chronic a-fib 7/18/2024    Colon polyp 2010    adenoma    Coronary artery disease     Depression 05/08/2015    Dry eyes     Dry mouth     GERD (gastroesophageal reflux disease)     Hyperlipidemia     Hypertension     Lumbar spondylosis     Nasal obstruction     PAD (peripheral artery disease) 04/25/2023    Rheumatoid arthritis     Sinusitis     Special screening for malignant neoplasm of colon 06/29/2016    Trouble in sleeping        Past Surgical History:   Procedure Laterality Date    AORTOGRAPHY N/A 6/19/2024    Procedure: AORTOGRAM;  Surgeon: Abbe Vilchis MD;  Location: [facility] CATH LAB;  Service: Cardiology;  Laterality: N/A;    ATHERECTOMY Left 6/21/2023    Procedure: Atherectomy;  Surgeon: Abbe Vilchis MD;  Location: [facility] CATH LAB;  Service: Cardiology;  Laterality: Left;    CHALAZION EXCISION      COLONOSCOPY N/A 6/29/2016    Procedure: COLONOSCOPY;  Surgeon: Partha Saucedo MD;  Location: [facility] ENDO;  Service: Endoscopy;  Laterality: N/A;    COLONOSCOPY N/A 7/24/2020    Procedure: COLONOSCOPY;  Surgeon: Ian Martinez MD;  [facility]  Service: Endoscopy;  Laterality: N/A;  4/16/20 - removed from 5/1/20, not called yet due to acute pain issues being addressed today - pg    COLONOSCOPY N/A 10/19/2023    Procedure: COLONOSCOPY;  Surgeon: Ian Martinez MD;  Location: [facility];  Service: Endoscopy;  Laterality: N/A;  order created from Dr. Martinez's previous colonoscopy report 7/24/2020-3 year surveillance.   ok to hold Eliquis 2 days and Plavix 5 days per Dr Vilchis-GT  PEG instr portal -ml  10/12- pre call complete.  DBM    CORONARY ANGIOGRAPHY INCLUDING BYPASS GRAFTS WITH CATHETERIZATION OF LEFT HEART N/A 6/19/2024    Procedure: ANGIOGRAM, CORONARY, INCLUDING BYPASS  GRAFT, WITH LEFT HEART CATHETERIZATION;  Surgeon: Abbe Vilchis MD;  Location: [facility] CATH LAB;  Service: Cardiology;  Laterality: N/A;  RN PREOP 06/17/2024    CORONARY ARTERY BYPASS GRAFT      ESOPHAGOGASTRODUODENOSCOPY N/A 7/24/2020    Procedure: ESOPHAGOGASTRODUODENOSCOPY (EGD);  Surgeon: Ian Martinez MD;  Location: [address]);  Service: Endoscopy;  Laterality: N/A;  4/16/20 - removed from 5/1/20, not called yet due to acute pain issues being addressed today.  4/17/20 - rescheduled 7/24/20 - pg    EXCISION TURBINATE, SUBMUCOUS      KNEE ARTHROSCOPY W/ DEBRIDEMENT      NASAL SEPTUM SURGERY      PERIPHERAL ANGIOGRAPHY N/A 6/21/2023    Procedure: Peripheral angiography;  Surgeon: Abbe Vilchis MD;  Location: [facility] CATH LAB;  Service: Cardiology;  Laterality: N/A;  right radial access--- RN PREOP 6/16----    TONSILLECTOMY         Review of patient's allergies indicates:   Allergen Reactions    Aspirin Nausea And Vomiting    Astelin [azelastine] Other (See Comments)     Dry mouth    Flomax [tamsulosin] Other (See Comments)     Nosebleed       No current facility-administered medications on file prior to encounter.     Current Outpatient Medications on File Prior to Encounter   Medication Sig    alfuzosin (UROXATRAL) 10 mg Tb24 Take 1 tablet (10 mg total) by mouth daily with breakfast.    apixaban (ELIQUIS) 5 mg Tab Take 1 tablet (5 mg total) by mouth 2 (two) times daily.    atorvastatin (LIPITOR) 40 MG tablet Take 1 tablet (40 mg total) by mouth once daily.    ciclopirox (LOPROX) 0.77 % Crea Apply topically 2 (two) times daily.    ciclopirox (PENLAC) 8 % Soln Apply topically nightly.    cinnamon bark 500 mg capsule Take 500 mg by mouth once daily.    hydroCHLOROthiazide (MICROZIDE) 12.5 mg capsule Take 1 capsule (12.5 mg total) by mouth once daily.    linaCLOtide (LINZESS) 72 mcg Cap capsule Take 1 capsule (72 mcg total) by mouth before breakfast.    losartan (COZAAR) 25 MG tablet Take 1 tablet (25 mg  total) by mouth once daily.    multivitamin (THERAGRAN) per tablet Take by mouth.  Tablet Oral Every day    omega-3 fatty acids 1,250 mg Cap Take by mouth.    omeprazole (PRILOSEC) 20 MG capsule Take 1 capsule (20 mg total) by mouth every morning.    propylene glycoL 0.6 % Drop Apply to eye. Not currently using    artificial tears,hypromellose,,GENTEAL/SUSTANE, (SYSTANE GEL) 0.3 % Gel 1 drop as needed.    cetirizine (ZYRTEC) 10 MG tablet Take 1 tablet (10 mg total) by mouth once daily.    oxymetazoline (AFRIN) 0.05 % nasal spray 2 sprays by Nasal route 2 (two) times daily.    sildenafiL (VIAGRA) 100 MG tablet Take 1 tablet (100 mg total) by mouth daily as needed.    sodium bicarb-sodium chloride Pack by sinus irrigation route.     Family History       Problem Relation (Age of Onset)    Alzheimer's disease Mother    Cataracts Other    Colon cancer Father    Hyperlipidemia Mother    No Known Problems Maternal Aunt, Maternal Uncle, Paternal Aunt, Paternal Uncle, Maternal Grandmother, Maternal Grandfather, Paternal Grandmother, Paternal Grandfather    Stomach cancer Father          Tobacco Use    Smoking status: Former     Current packs/day: 0.00     Average packs/day: 1 pack/day for 20.0 years (20.0 ttl pk-yrs)     Types: Cigarettes     Start date: 1971     Quit date: 1991     Years since quittin.6    Smokeless tobacco: Never    Tobacco comments:     Quit .   Substance and Sexual Activity    Alcohol use: No    Drug use: No    Sexual activity: Yes     Partners: Female     Review of Systems   Constitutional: Negative for decreased appetite.   HENT:  Negative for ear discharge.    Eyes:  Negative for blurred vision.   Endocrine: Negative for polyphagia.   Skin:  Negative for nail changes.   Genitourinary:  Negative for bladder incontinence.   Neurological:  Negative for aphonia.   Psychiatric/Behavioral:  Negative for hallucinations.    Allergic/Immunologic: Negative for hives.     Objective:     Vital  Signs (Most Recent):  Temp: 98.5 °F (36.9 °C) (08/25/24 0747)  Pulse: (!) 50 (08/25/24 0747)  Resp: 18 (08/25/24 0747)  BP: (!) 174/82 (08/25/24 0747)  SpO2: 98 % (08/25/24 0747) Vital Signs (24h Range):  Temp:  [97.7 °F (36.5 °C)-98.5 °F (36.9 °C)] 98.5 °F (36.9 °C)  Pulse:  [29-53] 50  Resp:  [16-23] 18  SpO2:  [95 %-98 %] 98 %  BP: (132-212)/() 174/82     Weight: 96.4 kg (212 lb 8.4 oz)  Body mass index is 28.04 kg/m².    SpO2: 98 %         Intake/Output Summary (Last 24 hours) at 8/25/2024 1027  Last data filed at 8/24/2024 1958  Gross per 24 hour   Intake 500 ml   Output 1800 ml   Net -1300 ml       Lines/Drains/Airways       Peripheral Intravenous Line  Duration                  Peripheral IV - Single Lumen 08/24/24 2033 20 G Anterior;Right Forearm <1 day                     Physical Exam  Constitutional:       Appearance: He is well-developed.   HENT:      Head: Normocephalic and atraumatic.   Eyes:      Conjunctiva/sclera: Conjunctivae normal.      Pupils: Pupils are equal, round, and reactive to light.   Cardiovascular:      Rate and Rhythm: Bradycardia present. Rhythm irregular.      Pulses: Intact distal pulses.      Heart sounds: Normal heart sounds.   Pulmonary:      Effort: Pulmonary effort is normal.      Breath sounds: Normal breath sounds.   Abdominal:      General: Bowel sounds are normal.      Palpations: Abdomen is soft.   Musculoskeletal:         General: Normal range of motion.      Cervical back: Normal range of motion and neck supple.   Skin:     General: Skin is warm and dry.   Neurological:      Mental Status: He is alert and oriented to person, place, and time.          Significant Labs: All pertinent lab results from the last 24 hours have been reviewed.    Significant Imaging: Echocardiogram: 2D echo with color flow doppler:   Results for orders placed or performed during the hospital encounter of 05/07/15   2D echo with color flow doppler   Result Value Ref Range    EF + QEF 60 55  - 65    Mitral Valve Regurgitation MILD     Diastolic Dysfunction No     Est. PA Systolic Pressure 22.18     Tricuspid Valve Regurgitation TRIVIAL     Narrative    TEST DESCRIPTION       Aorta: The aortic root is normal in size, measuring 2.3 cm at sinotubular junction and 3.1 cm at Sinuses of Valsalva. The proximal ascending aorta is normal in size, measuring 3.0 cm across.     Left Atrium: The left atrial volume index is mildly enlarged, measuring 39.07 cc/m2.     Left Ventricle: The left ventricle is normal in size, with an end-diastolic diameter of 4.8 cm, and an end-systolic diameter of 3.5 cm. Wall thickness is mildly increased, with the septum and the posterior wall each measuring 1.1 cm across. Relative wall   thickness was increased at 0.46, and the LV mass index was 108.1 g/m2 consistent with concentric remodeling. Global left ventricular systolic function appears normal. Visually estimated ejection fraction is 60-65%. The LV Doppler derived stroke volume   equals 84.0 ccs.   The E/e'(lat) is 7, consistent with normal diastolic function.     Right Atrium: The right atrium is normal in size, measuring 4.9 cm in length and 4.0 cm in width in the apical view.     Right Ventricle: The right ventricle is normal in size measuring 3.4 cm at the base in the apical right ventricle-focused view. Global right ventricular systolic function appears normal. Tricuspid annular plane systolic excursion (TAPSE) is 1.9 cm. The   estimated PA systolic pressure is 22 mmHg.     Aortic Valve:  The aortic valve is mildly sclerotic.     Mitral Valve:  There is mild mitral regurgitation.     Tricuspid Valve:  There is trivial tricuspid regurgitation.     Pulmonary Valve:  There is trivial pulmonic regurgitation.     IVC: IVC is normal in size and collapses > 50% with a sniff, suggesting normal right atrial pressure of 3 mmHg.     Intracavitary: There is no evidence of pericardial effusion, intracavity mass, thrombi, or vegetation.          CONCLUSIONS     1 - Normal left ventricular systolic function (EF 60-65%).     2 - Concentric remodeling.     3 - Mild left atrial enlargement.     4 - Mild mitral regurgitation.     5 - Trivial tricuspid regurgitation.     6 - Trivial pulmonic regurgitation.         This document has been electronically    SIGNED BY: Rizwan Elise MD On: 05/08/2015 15:16

## 2024-08-25 NOTE — ASSESSMENT & PLAN NOTE
Presents with fatigue, lightheadedness, and found to have afib vs flutter with slow ventricular response. Has had previous episodes of slow ventriclar response. His HR does increase with fluttering of legs in bed. Not on BB or CCB outpt.   Telemetry 30-40's. Not on AVN blocking agent. BP okay.  Cardiology consult.     Patient with Paroxysmal (<7 days) atrial fibrillation which is uncontrolled currently with  not on rate control due to bradycardia . Patient is currently in atrial fibrillation.[id number] Score: 3. Anticoagulation indicated. Anticoagulation done with lovenox, eliquis held pending cardiology eval .

## 2024-08-25 NOTE — ASSESSMENT & PLAN NOTE
Patient's most recent potassium results are listed below.   Recent Labs     08/24/24  1722   K 3.2*     Plan  - Replete potassium per protocol  - Monitor potassium Daily  - Patient's hypokalemia is stable  - suspect related to HCTZ use. Will supplement oral. Mg within normal limits

## 2024-08-25 NOTE — CONSULTS
[facility] - Telemetry  Cardiology  Consult Note    Patient Name: Rizwan Demarco Jr.  MRN: [id number]  Admission Date: 8/24/2024  Hospital Length of Stay: 0 days  Code Status: Full Code   Attending Provider: Ori Almonte MD   Consulting Provider: Rizwan Elise MD  Primary Care Physician: Raúl Perkins MD  Principal Problem:Atrial fibrillation with slow ventricular response    Patient information was obtained from patient and ER records.     Inpatient consult to Cardiology  Consult performed by: Rizwan Elise MD  Consult ordered by: Smith Wahl MD        Subjective:     Chief Complaint:  SSS, symptomatic bradycardia            HPI: Rizwan Demarco Jr. 74 y.o. male with CAD, afib on eliquis, PAD, HTN, HLD presents to the hospital with a chief complaint of lightheadedness.  Reports today he noticed he was fatigued weak and lightheaded and when he attempted to stand.  The symptoms improved with sitting at rest.  He reported his heart rate has been low throughout the day.  He reported he had had previous episodes of low heart rate which typically improved with standing and attempting to walk, but he felt they did not improved today.  He denies fever nausea vomiting abdominal pain leg swelling melena hematuria hematemesis syncope chest pain shortness of breath.     In the ED, afebrile without leukocytosis found to be in AFib with slow ventricular response INR within normal limits D-dimer within normal limits potassium 3.2 magnesium within normal limits troponin 0.033 without hypotension.       Denies CP or SOB  Recent increased fatigue but denies syncope or near syncope  Telemetry A-flutter - HR 30-40s - increases with activity. 4 second pause overnight  A-fib/flutter is chronic dating back to 1/2023 by EKGs in epic  EKG A-flutter 43  Troponin 0.03 flat pattern  Last took eliquis yesterday AM    Echo 6/6/24    Left Ventricle: The left ventricle is normal in size. Mildly increased wall thickness. There  is normal systolic function with a visually estimated ejection fraction of 55 - 60%. Unable to assess diastolic function due to atrial fibrillation.    Right Ventricle: Normal right ventricular cavity size. Systolic function is normal.    Left Atrium: Left atrium is moderately dilated.    Right Atrium: Right atrium is moderately dilated.    Mitral Valve: There is mild regurgitation.    Tricuspid Valve: There is moderate regurgitation.    Pulmonary Artery: The estimated pulmonary artery systolic pressure is 48 mmHg.    IVC/SVC: Intermediate venous pressure at 8 mmHg.    [facility] 6/19/24  Main:  Mid 30-40% stenosis.  Lad:  .  Fills via LIMA to LAD   Ramus:  Ostial severe heavily calcified severe 90% stenosis.  Tortuous vessel.  Mid 70-80% stenosis  Circumflex: Ostial severe stenosis and diffusely diseased vessel .  Distal OM fills via collaterals  RCA:   Grafts:  [place] to LAD is patent and supplies the LAD without any significant stenosis  All the other SVG grafts are occluded.  Aortogram was performed and no other grafts seen    Followed by Dr Vilchis       Peripheral artery disease:  Now status post revascularization of right SFA and popliteal artery.  Also has residual infrapopliteal disease.  Is claudication after revascularization of the SFA disease has gone away and he can now walk 2 miles.  He also has flow-limiting stenosis in the distal left SFA, but states that he does not have any pain in the left leg.  Continue medical management.  Aspirin 81 mg daily for 1 month, Plavix 75 mg daily uninterrupted for at least 1 year, continue Eliquis 5 mg b.i.d.       Coronary artery disease status post CABG in 2003.  Stress test in May of 2023 did not show any significant ischemia.  Recent echo showed normal left ventricle systolic function     Carotid ultrasound in May of 2023 showed mild bilateral carotid artery block with no flow-limiting stenosis.     Hypertension:  Uncontrolled in clinic today, but states at home stays  well controlled around 120 mmHg.       Atrial fibrillation:  On Eliquis.  Rate controlled.      Dyslipidemia:  Continue ezetimibe and statins.  Recheck lipid profile           Lab Results   Component Value Date     LDLCALC 106.0 02/13/2023      Dyspnea on exertion and chest tightness on exertion.  Angiogram revealed patent LIMA to LAD and severe triple-vessel disease with occluded SVG to OM and circumflex.  Patient states his symptoms are not lifestyle limiting and occasionally gets left-sided pain without any correlation to activity.  Continue medical management.       Past Medical History:   Diagnosis Date    Allergy     Anticoagulant long-term use     Arthritis     Rheumatoid arthritis    Back pain     Blood clotting tendency     BPH (benign prostatic hypertrophy)     Cervical spondylosis     Chronic a-fib 7/18/2024    Colon polyp 2010    adenoma    Coronary artery disease     Depression 05/08/2015    Dry eyes     Dry mouth     GERD (gastroesophageal reflux disease)     Hyperlipidemia     Hypertension     Lumbar spondylosis     Nasal obstruction     PAD (peripheral artery disease) 04/25/2023    Rheumatoid arthritis     Sinusitis     Special screening for malignant neoplasm of colon 06/29/2016    Trouble in sleeping        Past Surgical History:   Procedure Laterality Date    AORTOGRAPHY N/A 6/19/2024    Procedure: AORTOGRAM;  Surgeon: Abbe Vilchis MD;  Location: [facility] CATH LAB;  Service: Cardiology;  Laterality: N/A;    ATHERECTOMY Left 6/21/2023    Procedure: Atherectomy;  Surgeon: Abbe Vilchis MD;  Location: [facility] CATH LAB;  Service: Cardiology;  Laterality: Left;    CHALAZION EXCISION      COLONOSCOPY N/A 6/29/2016    Procedure: COLONOSCOPY;  Surgeon: Partha Saucedo MD;  Location: [facility] ENDO;  Service: Endoscopy;  Laterality: N/A;    COLONOSCOPY N/A 7/24/2020    Procedure: COLONOSCOPY;  Surgeon: Ian Martinez MD;  Location: [facility] ENDO ([address] [place] [address]);  Service: Endoscopy;  Laterality: N/A;  4/16/20 - removed  from 5/1/20, not called yet due to acute pain issues being addressed today - pg    COLONOSCOPY N/A 10/19/2023    Procedure: COLONOSCOPY;  Surgeon: Ian Martinez MD;  Location: [facility];  Service: Endoscopy;  Laterality: N/A;  order created from Dr. Martinez's previous colonoscopy report 7/24/2020-3 year surveillance.   ok to hold Eliquis 2 days and Plavix 5 days per Dr Vilchis-GT  PEG instr portal -ml  10/12- pre call complete.  DBM    CORONARY ANGIOGRAPHY INCLUDING BYPASS GRAFTS WITH CATHETERIZATION OF LEFT HEART N/A 6/19/2024    Procedure: ANGIOGRAM, CORONARY, INCLUDING BYPASS GRAFT, WITH LEFT HEART CATHETERIZATION;  Surgeon: Abbe Vilchis MD;  Location: [facility] CATH LAB;  Service: Cardiology;  Laterality: N/A;  RN PREOP 06/17/2024    CORONARY ARTERY BYPASS GRAFT      ESOPHAGOGASTRODUODENOSCOPY N/A 7/24/2020    Procedure: ESOPHAGOGASTRODUODENOSCOPY (EGD);  Surgeon: Ian Martinez MD;  Location: [facility] ([address] [place] [address]);  Service: Endoscopy;  Laterality: N/A;  4/16/20 - removed from 5/1/20, not called yet due to acute pain issues being addressed today.  4/17/20 - rescheduled 7/24/20 - pg    EXCISION TURBINATE, SUBMUCOUS      KNEE ARTHROSCOPY W/ DEBRIDEMENT      NASAL SEPTUM SURGERY      PERIPHERAL ANGIOGRAPHY N/A 6/21/2023    Procedure: Peripheral angiography;  Surgeon: Abbe Vilchis MD;  Location: [facility] CATH LAB;  Service: Cardiology;  Laterality: N/A;  right radial access--- RN PREOP 6/16----    TONSILLECTOMY         Review of patient's allergies indicates:   Allergen Reactions    Aspirin Nausea And Vomiting    Astelin [azelastine] Other (See Comments)     Dry mouth    Flomax [tamsulosin] Other (See Comments)     Nosebleed       No current facility-administered medications on file prior to encounter.     Current Outpatient Medications on File Prior to Encounter   Medication Sig    alfuzosin (UROXATRAL) 10 mg Tb24 Take 1 tablet (10 mg total) by mouth daily with breakfast.    apixaban (ELIQUIS) 5 mg Tab Take 1 tablet (5 mg total)  by mouth 2 (two) times daily.    atorvastatin (LIPITOR) 40 MG tablet Take 1 tablet (40 mg total) by mouth once daily.    ciclopirox (LOPROX) 0.77 % Crea Apply topically 2 (two) times daily.    ciclopirox (PENLAC) 8 % Soln Apply topically nightly.    cinnamon bark 500 mg capsule Take 500 mg by mouth once daily.    hydroCHLOROthiazide (MICROZIDE) 12.5 mg capsule Take 1 capsule (12.5 mg total) by mouth once daily.    linaCLOtide (LINZESS) 72 mcg Cap capsule Take 1 capsule (72 mcg total) by mouth before breakfast.    losartan (COZAAR) 25 MG tablet Take 1 tablet (25 mg total) by mouth once daily.    multivitamin (THERAGRAN) per tablet Take by mouth.  Tablet Oral Every day    omega-3 fatty acids 1,250 mg Cap Take by mouth.    omeprazole (PRILOSEC) 20 MG capsule Take 1 capsule (20 mg total) by mouth every morning.    propylene glycoL 0.6 % Drop Apply to eye. Not currently using    artificial tears,hypromellose,,GENTEAL/SUSTANE, (SYSTANE GEL) 0.3 % Gel 1 drop as needed.    cetirizine (ZYRTEC) 10 MG tablet Take 1 tablet (10 mg total) by mouth once daily.    oxymetazoline (AFRIN) 0.05 % nasal spray 2 sprays by Nasal route 2 (two) times daily.    sildenafiL (VIAGRA) 100 MG tablet Take 1 tablet (100 mg total) by mouth daily as needed.    sodium bicarb-sodium chloride Pack by sinus irrigation route.     Family History       Problem Relation (Age of Onset)    Alzheimer's disease Mother    Cataracts Other    Colon cancer Father    Hyperlipidemia Mother    No Known Problems Maternal Aunt, Maternal Uncle, Paternal Aunt, Paternal Uncle, Maternal Grandmother, Maternal Grandfather, Paternal Grandmother, Paternal Grandfather    Stomach cancer Father          Tobacco Use    Smoking status: Former     Current packs/day: 0.00     Average packs/day: 1 pack/day for 20.0 years (20.0 ttl pk-yrs)     Types: Cigarettes     Start date: 1971     Quit date: 1991     Years since quittin.6    Smokeless tobacco: Never    Tobacco comments:      Quit 1991.   Substance and Sexual Activity    Alcohol use: No    Drug use: No    Sexual activity: Yes     Partners: Female     Review of Systems   Constitutional: Negative for decreased appetite.   HENT:  Negative for ear discharge.    Eyes:  Negative for blurred vision.   Endocrine: Negative for polyphagia.   Skin:  Negative for nail changes.   Genitourinary:  Negative for bladder incontinence.   Neurological:  Negative for aphonia.   Psychiatric/Behavioral:  Negative for hallucinations.    Allergic/Immunologic: Negative for hives.     Objective:     Vital Signs (Most Recent):  Temp: 98.5 °F (36.9 °C) (08/25/24 0747)  Pulse: (!) 50 (08/25/24 0747)  Resp: 18 (08/25/24 0747)  BP: (!) 174/82 (08/25/24 0747)  SpO2: 98 % (08/25/24 0747) Vital Signs (24h Range):  Temp:  [97.7 °F (36.5 °C)-98.5 °F (36.9 °C)] 98.5 °F (36.9 °C)  Pulse:  [29-53] 50  Resp:  [16-23] 18  SpO2:  [95 %-98 %] 98 %  BP: (132-212)/() 174/82     Weight: 96.4 kg (212 lb 8.4 oz)  Body mass index is 28.04 kg/m².    SpO2: 98 %         Intake/Output Summary (Last 24 hours) at 8/25/2024 1027  Last data filed at 8/24/2024 1958  Gross per 24 hour   Intake 500 ml   Output 1800 ml   Net -1300 ml       Lines/Drains/Airways       Peripheral Intravenous Line  Duration                  Peripheral IV - Single Lumen 08/24/24 2033 20 G Anterior;Right Forearm <1 day                     Physical Exam  Constitutional:       Appearance: He is well-developed.   HENT:      Head: Normocephalic and atraumatic.   Eyes:      Conjunctiva/sclera: Conjunctivae normal.      Pupils: Pupils are equal, round, and reactive to light.   Cardiovascular:      Rate and Rhythm: Bradycardia present. Rhythm irregular.      Pulses: Intact distal pulses.      Heart sounds: Normal heart sounds.   Pulmonary:      Effort: Pulmonary effort is normal.      Breath sounds: Normal breath sounds.   Abdominal:      General: Bowel sounds are normal.      Palpations: Abdomen is soft.    Musculoskeletal:         General: Normal range of motion.      Cervical back: Normal range of motion and neck supple.   Skin:     General: Skin is warm and dry.   Neurological:      Mental Status: He is alert and oriented to person, place, and time.          Significant Labs: All pertinent lab results from the last 24 hours have been reviewed.    Significant Imaging: Echocardiogram: 2D echo with color flow doppler:   Results for orders placed or performed during the hospital encounter of 05/07/15   2D echo with color flow doppler   Result Value Ref Range    EF + QEF 60 55 - 65    Mitral Valve Regurgitation MILD     Diastolic Dysfunction No     Est. PA Systolic Pressure 22.18     Tricuspid Valve Regurgitation TRIVIAL     Narrative    TEST DESCRIPTION       Aorta: The aortic root is normal in size, measuring 2.3 cm at sinotubular junction and 3.1 cm at Sinuses of Valsalva. The proximal ascending aorta is normal in size, measuring 3.0 cm across.     Left Atrium: The left atrial volume index is mildly enlarged, measuring 39.07 cc/m2.     Left Ventricle: The left ventricle is normal in size, with an end-diastolic diameter of 4.8 cm, and an end-systolic diameter of 3.5 cm. Wall thickness is mildly increased, with the septum and the posterior wall each measuring 1.1 cm across. Relative wall   thickness was increased at 0.46, and the LV mass index was 108.1 g/m2 consistent with concentric remodeling. Global left ventricular systolic function appears normal. Visually estimated ejection fraction is 60-65%. The LV Doppler derived stroke volume   equals 84.0 ccs.   The E/e'(lat) is 7, consistent with normal diastolic function.     Right Atrium: The right atrium is normal in size, measuring 4.9 cm in length and 4.0 cm in width in the apical view.     Right Ventricle: The right ventricle is normal in size measuring 3.4 cm at the base in the apical right ventricle-focused view. Global right ventricular systolic function appears  normal. Tricuspid annular plane systolic excursion (TAPSE) is 1.9 cm. The   estimated PA systolic pressure is 22 mmHg.     Aortic Valve:  The aortic valve is mildly sclerotic.     Mitral Valve:  There is mild mitral regurgitation.     Tricuspid Valve:  There is trivial tricuspid regurgitation.     Pulmonary Valve:  There is trivial pulmonic regurgitation.     IVC: IVC is normal in size and collapses > 50% with a sniff, suggesting normal right atrial pressure of 3 mmHg.     Intracavitary: There is no evidence of pericardial effusion, intracavity mass, thrombi, or vegetation.         CONCLUSIONS     1 - Normal left ventricular systolic function (EF 60-65%).     2 - Concentric remodeling.     3 - Mild left atrial enlargement.     4 - Mild mitral regurgitation.     5 - Trivial tricuspid regurgitation.     6 - Trivial pulmonic regurgitation.         This document has been electronically    SIGNED BY: Rizwan Elise MD On: 05/08/2015 15:16     Assessment and Plan:     * Atrial fibrillation with slow ventricular response  Chronic A-fib/flutter. Now with fatigue and HR 30-40s on no rate lowering medication. 4 second pause overnight. Consistent with SSS. Eliquis on hold. Discussed single pacemaker in AM - risks/benefits explained - he agrees - consent on chart. Currently BP stable and asymptomatic - ok to monitor on telemetry floor. No indication for ICU or temp pacing at this time    PAD (peripheral artery disease)  Stable. 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 Last LHC with patent LIMA to LAD and all other SVGs closed. EF normal on recent echo. Continue medical Rx        VTE Risk Mitigation (From admission, onward)           Ordered     enoxaparin injection 90 mg  Every 12 hours         08/24/24 2109     IP VTE HIGH RISK PATIENT  Once         08/24/24 2019     Place sequential compression device  Until  discontinued         08/24/24 2019     Place BORIS hose  Until discontinued         08/24/24 2019                    Thank you for your consult. I will follow-up with patient. Please contact us if you have any additional questions.    Rizwan Elise MD  Cardiology   [facility]

## 2024-08-25 NOTE — CONSULTS
Patient to discharge home with family and follow-up. See full discharge assessment for more comprehensive patient information.

## 2024-08-25 NOTE — HPI
Rizwan Demarco Jr. 74 y.o. male with CAD, afib on eliquis, PAD, HTN, HLD presents to the hospital with a chief complaint of lightheadedness.  Reports today he noticed he was fatigued weak and lightheaded and when he attempted to stand.  The symptoms improved with sitting at rest.  He reported his heart rate has been low throughout the day.  He reported he had had previous episodes of low heart rate which typically improved with standing and attempting to walk, but he felt they did not improved today.  He denies fever nausea vomiting abdominal pain leg swelling melena hematuria hematemesis syncope chest pain shortness of breath.    In the ED, afebrile without leukocytosis found to be in AFib with slow ventricular response INR within normal limits D-dimer within normal limits potassium 3.2 magnesium within normal limits troponin 0.033 without hypotension.

## 2024-08-25 NOTE — H&P
[facility]  [facility] Medicine  History & Physical    Patient Name: Rizwan Demarco Jr.  MRN: [id number]  Patient Class: OP- Observation  Admission Date: 8/24/2024  Attending Physician: Ori Almonte MD   Primary Care Provider: Ralú Perkins MD         Patient information was obtained from patient, past medical records, and ER records.     Subjective:     Principal Problem:Atrial fibrillation with slow ventricular response    Chief Complaint:   Chief Complaint   Patient presents with    Fatigue     Pt BIB EMS complaining of feeling lighthead and fatigue x 1 hr PTA. Pt states his HR has been low throughout day 40-50's. Pt hx of AFIB         HPI: Rizwan Demarco Jr. 74 y.o. male with CAD, afib on eliquis, PAD, HTN, HLD presents to the hospital with a chief complaint of lightheadedness.  Reports today he noticed he was fatigued weak and lightheaded and when he attempted to stand.  The symptoms improved with sitting at rest.  He reported his heart rate has been low throughout the day.  He reported he had had previous episodes of low heart rate which typically improved with standing and attempting to walk, but he felt they did not improved today.  He denies fever nausea vomiting abdominal pain leg swelling melena hematuria hematemesis syncope chest pain shortness of breath.    In the ED, afebrile without leukocytosis found to be in AFib with slow ventricular response INR within normal limits D-dimer within normal limits potassium 3.2 magnesium within normal limits troponin 0.033 without hypotension.    Past Medical History:   Diagnosis Date    Allergy     Anticoagulant long-term use     Arthritis     Rheumatoid arthritis    Back pain     Blood clotting tendency     BPH (benign prostatic hypertrophy)     Cervical spondylosis     Chronic a-fib 7/18/2024    Colon polyp 2010    adenoma    Coronary artery disease     Depression 05/08/2015    Dry eyes     Dry mouth     GERD (gastroesophageal reflux  disease)     Hyperlipidemia     Hypertension     Lumbar spondylosis     Nasal obstruction     PAD (peripheral artery disease) 04/25/2023    Rheumatoid arthritis     Sinusitis     Special screening for malignant neoplasm of colon 06/29/2016    Trouble in sleeping        Past Surgical History:   Procedure Laterality Date    AORTOGRAPHY N/A 6/19/2024    Procedure: AORTOGRAM;  Surgeon: Abbe Vilchis MD;  Location: [facility] CATH LAB;  Service: Cardiology;  Laterality: N/A;    ATHERECTOMY Left 6/21/2023    Procedure: Atherectomy;  Surgeon: Abbe Vilchis MD;  Location: [facility] CATH LAB;  Service: Cardiology;  Laterality: Left;    CHALAZION EXCISION      COLONOSCOPY N/A 6/29/2016    Procedure: COLONOSCOPY;  Surgeon: Partha Saucedo MD;  Location: [facility] ENDO;  Service: Endoscopy;  Laterality: N/A;    COLONOSCOPY N/A 7/24/2020    Procedure: COLONOSCOPY;  Surgeon: Ian Martinez MD;  [facility]  Service: Endoscopy;  Laterality: N/A;  4/16/20 - removed from 5/1/20, not called yet due to acute pain issues being addressed today - pg    COLONOSCOPY N/A 10/19/2023    Procedure: COLONOSCOPY;  Surgeon: Ian Martinez MD;  Location: [facility];  Service: Endoscopy;  Laterality: N/A;  order created from Dr. Martinez's previous colonoscopy report 7/24/2020-3 year surveillance.   ok to hold Eliquis 2 days and Plavix 5 days per Dr Vilchis-GT  PEG instr portal -ml  10/12- pre call complete.  DBM    CORONARY ANGIOGRAPHY INCLUDING BYPASS GRAFTS WITH CATHETERIZATION OF LEFT HEART N/A 6/19/2024    Procedure: ANGIOGRAM, CORONARY, INCLUDING BYPASS GRAFT, WITH LEFT HEART CATHETERIZATION;  Surgeon: Abbe Vilchis MD;  Location: [facility] CATH LAB;  Service: Cardiology;  Laterality: N/A;  RN PREOP 06/17/2024    CORONARY ARTERY BYPASS GRAFT      ESOPHAGOGASTRODUODENOSCOPY N/A 7/24/2020    Procedure: ESOPHAGOGASTRODUODENOSCOPY (EGD);  Surgeon: Ian Martinez MD;  [facility]  Service: Endoscopy;  Laterality: N/A;  4/16/20 - removed  from 5/1/20, not called yet due to acute pain issues being addressed today.  4/17/20 - rescheduled 7/24/20 - pg    EXCISION TURBINATE, SUBMUCOUS      KNEE ARTHROSCOPY W/ DEBRIDEMENT      NASAL SEPTUM SURGERY      PERIPHERAL ANGIOGRAPHY N/A 6/21/2023    Procedure: Peripheral angiography;  Surgeon: Abbe Vilchis MD;  Location: [facility] CATH LAB;  Service: Cardiology;  Laterality: N/A;  right radial access--- RN PREOP 6/16----JM    TONSILLECTOMY         Review of patient's allergies indicates:   Allergen Reactions    Aspirin Nausea And Vomiting    Astelin [azelastine] Other (See Comments)     Dry mouth    Flomax [tamsulosin] Other (See Comments)     Nosebleed       No current facility-administered medications on file prior to encounter.     Current Outpatient Medications on File Prior to Encounter   Medication Sig    alfuzosin (UROXATRAL) 10 mg Tb24 Take 1 tablet (10 mg total) by mouth daily with breakfast.    apixaban (ELIQUIS) 5 mg Tab Take 1 tablet (5 mg total) by mouth 2 (two) times daily.    atorvastatin (LIPITOR) 40 MG tablet Take 1 tablet (40 mg total) by mouth once daily.    ciclopirox (LOPROX) 0.77 % Crea Apply topically 2 (two) times daily.    ciclopirox (PENLAC) 8 % Soln Apply topically nightly.    cinnamon bark 500 mg capsule Take 500 mg by mouth once daily.    hydroCHLOROthiazide (MICROZIDE) 12.5 mg capsule Take 1 capsule (12.5 mg total) by mouth once daily.    linaCLOtide (LINZESS) 72 mcg Cap capsule Take 1 capsule (72 mcg total) by mouth before breakfast.    losartan (COZAAR) 25 MG tablet Take 1 tablet (25 mg total) by mouth once daily.    multivitamin (THERAGRAN) per tablet Take by mouth.  Tablet Oral Every day    omega-3 fatty acids 1,250 mg Cap Take by mouth.    omeprazole (PRILOSEC) 20 MG capsule Take 1 capsule (20 mg total) by mouth every morning.    propylene glycoL 0.6 % Drop Apply to eye. Not currently using    artificial tears,hypromellose,,GENTEAL/SUSTANE, (SYSTANE GEL) 0.3 % Gel 1 drop as  needed.    cetirizine (ZYRTEC) 10 MG tablet Take 1 tablet (10 mg total) by mouth once daily.    oxymetazoline (AFRIN) 0.05 % nasal spray 2 sprays by Nasal route 2 (two) times daily.    sildenafiL (VIAGRA) 100 MG tablet Take 1 tablet (100 mg total) by mouth daily as needed.    sodium bicarb-sodium chloride Pack by sinus irrigation route.     Family History       Problem Relation (Age of Onset)    Alzheimer's disease Mother    Cataracts Other    Colon cancer Father    Hyperlipidemia Mother    No Known Problems Maternal Aunt, Maternal Uncle, Paternal Aunt, Paternal Uncle, Maternal Grandmother, Maternal Grandfather, Paternal Grandmother, Paternal Grandfather    Stomach cancer Father          Tobacco Use    Smoking status: Former     Current packs/day: 0.00     Average packs/day: 1 pack/day for 20.0 years (20.0 ttl pk-yrs)     Types: Cigarettes     Start date: 1971     Quit date: 1991     Years since quittin.6    Smokeless tobacco: Never    Tobacco comments:     Quit .   Substance and Sexual Activity    Alcohol use: No    Drug use: No    Sexual activity: Yes     Partners: Female     Review of Systems   Constitutional:  Positive for fatigue. Negative for chills and fever.   HENT:  Negative for nosebleeds and tinnitus.    Eyes:  Negative for photophobia and visual disturbance.   Respiratory:  Negative for shortness of breath and wheezing.    Cardiovascular:  Negative for chest pain, palpitations and leg swelling.   Gastrointestinal:  Negative for abdominal distention, nausea and vomiting.   Genitourinary:  Negative for dysuria, flank pain and hematuria.   Musculoskeletal:  Negative for gait problem and joint swelling.   Skin:  Negative for rash and wound.   Neurological:  Positive for weakness and light-headedness. Negative for seizures and syncope.     Objective:     Vital Signs (Most Recent):  Temp: 97.8 °F (36.6 °C) (24 1647)  Pulse: (!) 43 (24 1902)  Resp: 19 (24)  BP: (!) 146/74  (08/24/24 2017)  SpO2: 95 % (08/24/24 2017) Vital Signs (24h Range):  Temp:  [97.8 °F (36.6 °C)] 97.8 °F (36.6 °C)  Pulse:  [39-53] 43  Resp:  [16-23] 19  SpO2:  [95 %-97 %] 95 %  BP: (146-174)/(69-80) 146/74     Weight: 93.9 kg (207 lb)  Body mass index is 27.31 kg/m².     Physical Exam  Vitals and nursing note reviewed.   Constitutional:       General: He is not in acute distress.     Appearance: He is well-developed. He is not diaphoretic.   HENT:      Head: Normocephalic and atraumatic.      Right Ear: External ear normal.      Left Ear: External ear normal.   Eyes:      General:         Right eye: No discharge.         Left eye: No discharge.      Conjunctiva/sclera: Conjunctivae normal.   Neck:      Thyroid: No thyromegaly.   Cardiovascular:      Rate and Rhythm: Bradycardia present. Rhythm irregular.      Heart sounds: No murmur heard.  Pulmonary:      Effort: Pulmonary effort is normal. No respiratory distress.      Breath sounds: Normal breath sounds.   Abdominal:      General: Bowel sounds are normal. There is no distension.      Palpations: Abdomen is soft. There is no mass.      Tenderness: There is no abdominal tenderness.   Musculoskeletal:         General: No deformity.      Cervical back: Normal range of motion and neck supple.      Right lower leg: No edema.      Left lower leg: No edema.   Skin:     General: Skin is warm and dry.   Neurological:      Mental Status: He is alert and oriented to person, place, and time.      Sensory: No sensory deficit.   Psychiatric:         Mood and Affect: Mood normal.         Behavior: Behavior normal.                Significant Labs: CBC:   Recent Labs   Lab 08/24/24  1722 08/24/24  1731   WBC 4.42  --    HGB 14.6  --    HCT 43.7 41   *  --      CMP:   Recent Labs   Lab 08/24/24  1722      K 3.2*      CO2 22*   GLU 99   BUN 12   CREATININE 0.9   CALCIUM 8.5*   PROT 5.8*   ALBUMIN 3.2*   BILITOT 0.6   ALKPHOS 71   AST 31   ALT 46*   ANIONGAP 8  "    Cardiac Markers: No results for input(s): "CKMB", "MYOGLOBIN", "BNP", "TROPISTAT" in the last 48 hours.  Coagulation:   Recent Labs   Lab 08/24/24  1722   INR 1.1   APTT 29.9     Troponin:   Recent Labs   Lab 08/24/24  1722   TROPONINI 0.033*       Significant Imaging:   Imaging Results              US Lower Extremity Arteries Right (Final result)  Result time 08/24/24 20:27:40      Final result by Violeta Escalante MD (08/24/24 20:27:40)                   Impression:      No evidence of hemodynamically significant arterial stenosis or occlusion within the right lower extremity      Electronically signed by: Violeta Escalante MD  Date:    08/24/2024  Time:    20:27               Narrative:    EXAMINATION:  US LOWER EXTREMITY ARTERIES RIGHT    CLINICAL HISTORY:  Other specified symptoms and signs involving the circulatory and respiratory systems    TECHNIQUE:  Spectral, color and grayscale images of the large arteries of the right lower extremity were performed.    COMPARISON:  None    FINDINGS:  Right lower extremity:    Common femoral artery: 136 cm/sec, biphasic    Deep femoral artery: 135 cm/sec, triphasic    SFA proximal: 122 cm/sec, biphasic    SFA mid: 111 cm/sec, biphasic    SFA distal: 118 cm/sec, biphasic    Popliteal artery: 67 cm/sec, biphasic    Posterior tibial artery: 11 cm/sec, biphasic    Anterior tibial artery: 37 cm/sec, biphasic    Peroneal artery: 63 cm/sec, biphasic                                       X-Ray Chest AP Portable (Final result)  Result time 08/24/24 18:18:35      Final result by Violeta Escalante MD (08/24/24 18:18:35)                   Impression:      No acute cardiopulmonary process identified.      Electronically signed by: Violeta Escalante MD  Date:    08/24/2024  Time:    18:18               Narrative:    EXAMINATION:  XR CHEST AP PORTABLE    CLINICAL HISTORY:  Bradycardia, unspecified    TECHNIQUE:  Single frontal view of the chest was " performed.    COMPARISON:  02/02/2024.    FINDINGS:  Cardiac silhouette is stable in size.  Postsurgical sternotomy changes are seen.  Support pads overlie the left chest wall.  Lungs are symmetrically expanded.  No evidence of focal consolidative process, pneumothorax, or significant pleural effusion.  No acute osseous abnormality identified.                                      Assessment/Plan:     * Atrial fibrillation with slow ventricular response  Presents with fatigue, lightheadedness, and found to have afib vs flutter with slow ventricular response. Has had previous episodes of slow ventriclar response. His HR does increase with fluttering of legs in bed. Not on BB or CCB outpt.   Cardiology consulted  Monitor on telemetry  Troponin trend    Patient with Paroxysmal (<7 days) atrial fibrillation which is uncontrolled currently with  not on rate control due to bradycardia . Patient is currently in atrial fibrillation.[id number] Score: 3. Anticoagulation indicated. Anticoagulation done with lovenox, eliquis held pending cardiology eval .    Hypokalemia  Patient's most recent potassium results are listed below.   Recent Labs     08/24/24  1722   K 3.2*     Plan  - Replete potassium per protocol  - Monitor potassium Daily  - Patient's hypokalemia is stable  - suspect related to HCTZ use. Will supplement oral. Mg within normal limits    PAD (peripheral artery disease)  Stable continue home statin    Essential hypertension  Chronic, controlled. Latest blood pressure and vitals reviewed-     Temp:  [97.8 °F (36.6 °C)]   Pulse:  [39-53]   Resp:  [16-23]   BP: (146-174)/(69-80)   SpO2:  [95 %-97 %] .   Home meds for hypertension were reviewed and noted below.   Hypertension Medications               hydroCHLOROthiazide (MICROZIDE) 12.5 mg capsule Take 1 capsule (12.5 mg total) by mouth once daily.    losartan (COZAAR) 25 MG tablet Take 1 tablet (25 mg total) by mouth once daily.            While in the hospital, will  manage blood pressure as follows; Adjust home antihypertensive regimen as follows- home losartan/hctz held to prevent lowering BP given bradycardia    Will utilize p.r.n. blood pressure medication only if patient's blood pressure greater than 180/110 and he develops symptoms such as worsening chest pain or shortness of breath.    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 Continue to monitor on telemetry.       VTE Risk Mitigation (From admission, onward)           Ordered     enoxaparin injection 90 mg  Once         08/24/24 2051     IP VTE HIGH RISK PATIENT  Once         08/24/24 2019     Place sequential compression device  Until discontinued         08/24/24 2019     Place SANTI hose  Until discontinued         08/24/24 2019                     Discussed with ED physician.    VTE: santi/scd  Code: full  Diet: cardiac   Dispo: pending cardiology eval    On 08/24/2024, patient should be placed in hospital observation services under my care in collaboration with Ori Almonte MD.           Rajeev Hall PA-C  Department of Hospital Medicine  [facility] - Emergency Dept

## 2024-08-25 NOTE — ASSESSMENT & PLAN NOTE
CAD/CABG. Last LHC with patent LIMA to LAD and all other SVGs closed. EF normal on recent echo. Continue medical Rx

## 2024-08-25 NOTE — PHARMACY MED REC
"Admission Medication History     The home medication history was taken by Mariama Carlin.    You may go to "Admission" then "Reconcile Home Medications" tabs to review and/or act upon these items.     The home medication list has been updated by the Pharmacy department.   Please read ALL comments highlighted in yellow.   Please address this information as you see fit.    Feel free to contact us if you have any questions or require assistance.    Medications listed below were obtained from: Patient/family and Analytic software- [employer]    The medication reconciliation was completed by the patient's bedside.      Mariama Carlin  [phone]                    .          "

## 2024-08-25 NOTE — SUBJECTIVE & OBJECTIVE
Past Medical History:   Diagnosis Date    Allergy     Anticoagulant long-term use     Arthritis     Rheumatoid arthritis    Back pain     Blood clotting tendency     BPH (benign prostatic hypertrophy)     Cervical spondylosis     Chronic a-fib 7/18/2024    Colon polyp 2010    adenoma    Coronary artery disease     Depression 05/08/2015    Dry eyes     Dry mouth     GERD (gastroesophageal reflux disease)     Hyperlipidemia     Hypertension     Lumbar spondylosis     Nasal obstruction     PAD (peripheral artery disease) 04/25/2023    Rheumatoid arthritis     Sinusitis     Special screening for malignant neoplasm of colon 06/29/2016    Trouble in sleeping        Past Surgical History:   Procedure Laterality Date    AORTOGRAPHY N/A 6/19/2024    Procedure: AORTOGRAM;  Surgeon: Abbe Vilchis MD;  Location: [facility] CATH LAB;  Service: Cardiology;  Laterality: N/A;    ATHERECTOMY Left 6/21/2023    Procedure: Atherectomy;  Surgeon: Abbe Vilchis MD;  Location: [facility] CATH LAB;  Service: Cardiology;  Laterality: Left;    CHALAZION EXCISION      COLONOSCOPY N/A 6/29/2016    Procedure: COLONOSCOPY;  Surgeon: Partha Saucedo MD;  Location: [facility] ENDO;  Service: Endoscopy;  Laterality: N/A;    COLONOSCOPY N/A 7/24/2020    Procedure: COLONOSCOPY;  Surgeon: Ian Martinez MD;  [facility]  Service: Endoscopy;  Laterality: N/A;  4/16/20 - removed from 5/1/20, not called yet due to acute pain issues being addressed today - pg    COLONOSCOPY N/A 10/19/2023    Procedure: COLONOSCOPY;  Surgeon: Ian Martinez MD;  Location: [facility];  Service: Endoscopy;  Laterality: N/A;  order created from Dr. Martinez's previous colonoscopy report 7/24/2020-3 year surveillance.   ok to hold Eliquis 2 days and Plavix 5 days per Dr Vilchis-GT  PEG instr portal -ml  10/12- pre call complete.  DBM    CORONARY ANGIOGRAPHY INCLUDING BYPASS GRAFTS WITH CATHETERIZATION OF LEFT HEART N/A 6/19/2024    Procedure: ANGIOGRAM, CORONARY, INCLUDING BYPASS  GRAFT, WITH LEFT HEART CATHETERIZATION;  Surgeon: Abbe Vilchis MD;  Location: [facility] CATH LAB;  Service: Cardiology;  Laterality: N/A;  RN PREOP 06/17/2024    CORONARY ARTERY BYPASS GRAFT      ESOPHAGOGASTRODUODENOSCOPY N/A 7/24/2020    Procedure: ESOPHAGOGASTRODUODENOSCOPY (EGD);  Surgeon: Ian Martinez MD;  Location: [address]);  Service: Endoscopy;  Laterality: N/A;  4/16/20 - removed from 5/1/20, not called yet due to acute pain issues being addressed today.  4/17/20 - rescheduled 7/24/20 - pg    EXCISION TURBINATE, SUBMUCOUS      KNEE ARTHROSCOPY W/ DEBRIDEMENT      NASAL SEPTUM SURGERY      PERIPHERAL ANGIOGRAPHY N/A 6/21/2023    Procedure: Peripheral angiography;  Surgeon: Abbe Vilchis MD;  Location: [facility] CATH LAB;  Service: Cardiology;  Laterality: N/A;  right radial access--- RN PREOP 6/16----    TONSILLECTOMY         Review of patient's allergies indicates:   Allergen Reactions    Aspirin Nausea And Vomiting    Astelin [azelastine] Other (See Comments)     Dry mouth    Flomax [tamsulosin] Other (See Comments)     Nosebleed       No current facility-administered medications on file prior to encounter.     Current Outpatient Medications on File Prior to Encounter   Medication Sig    alfuzosin (UROXATRAL) 10 mg Tb24 Take 1 tablet (10 mg total) by mouth daily with breakfast.    apixaban (ELIQUIS) 5 mg Tab Take 1 tablet (5 mg total) by mouth 2 (two) times daily.    atorvastatin (LIPITOR) 40 MG tablet Take 1 tablet (40 mg total) by mouth once daily.    ciclopirox (LOPROX) 0.77 % Crea Apply topically 2 (two) times daily.    ciclopirox (PENLAC) 8 % Soln Apply topically nightly.    cinnamon bark 500 mg capsule Take 500 mg by mouth once daily.    hydroCHLOROthiazide (MICROZIDE) 12.5 mg capsule Take 1 capsule (12.5 mg total) by mouth once daily.    linaCLOtide (LINZESS) 72 mcg Cap capsule Take 1 capsule (72 mcg total) by mouth before breakfast.    losartan (COZAAR) 25 MG tablet Take 1 tablet (25 mg  total) by mouth once daily.    multivitamin (THERAGRAN) per tablet Take by mouth.  Tablet Oral Every day    omega-3 fatty acids 1,250 mg Cap Take by mouth.    omeprazole (PRILOSEC) 20 MG capsule Take 1 capsule (20 mg total) by mouth every morning.    propylene glycoL 0.6 % Drop Apply to eye. Not currently using    artificial tears,hypromellose,,GENTEAL/SUSTANE, (SYSTANE GEL) 0.3 % Gel 1 drop as needed.    cetirizine (ZYRTEC) 10 MG tablet Take 1 tablet (10 mg total) by mouth once daily.    oxymetazoline (AFRIN) 0.05 % nasal spray 2 sprays by Nasal route 2 (two) times daily.    sildenafiL (VIAGRA) 100 MG tablet Take 1 tablet (100 mg total) by mouth daily as needed.    sodium bicarb-sodium chloride Pack by sinus irrigation route.     Family History       Problem Relation (Age of Onset)    Alzheimer's disease Mother    Cataracts Other    Colon cancer Father    Hyperlipidemia Mother    No Known Problems Maternal Aunt, Maternal Uncle, Paternal Aunt, Paternal Uncle, Maternal Grandmother, Maternal Grandfather, Paternal Grandmother, Paternal Grandfather    Stomach cancer Father          Tobacco Use    Smoking status: Former     Current packs/day: 0.00     Average packs/day: 1 pack/day for 20.0 years (20.0 ttl pk-yrs)     Types: Cigarettes     Start date: 1971     Quit date: 1991     Years since quittin.6    Smokeless tobacco: Never    Tobacco comments:     Quit .   Substance and Sexual Activity    Alcohol use: No    Drug use: No    Sexual activity: Yes     Partners: Female     Review of Systems   Constitutional:  Positive for fatigue. Negative for chills and fever.   HENT:  Negative for nosebleeds and tinnitus.    Eyes:  Negative for photophobia and visual disturbance.   Respiratory:  Negative for shortness of breath and wheezing.    Cardiovascular:  Negative for chest pain, palpitations and leg swelling.   Gastrointestinal:  Negative for abdominal distention, nausea and vomiting.   Genitourinary:  Negative  for dysuria, flank pain and hematuria.   Musculoskeletal:  Negative for gait problem and joint swelling.   Skin:  Negative for rash and wound.   Neurological:  Positive for weakness and light-headedness. Negative for seizures and syncope.     Objective:     Vital Signs (Most Recent):  Temp: 97.8 °F (36.6 °C) (08/24/24 1647)  Pulse: (!) 43 (08/24/24 1902)  Resp: 19 (08/24/24 2017)  BP: (!) 146/74 (08/24/24 2017)  SpO2: 95 % (08/24/24 2017) Vital Signs (24h Range):  Temp:  [97.8 °F (36.6 °C)] 97.8 °F (36.6 °C)  Pulse:  [39-53] 43  Resp:  [16-23] 19  SpO2:  [95 %-97 %] 95 %  BP: (146-174)/(69-80) 146/74     Weight: 93.9 kg (207 lb)  Body mass index is 27.31 kg/m².     Physical Exam  Vitals and nursing note reviewed.   Constitutional:       General: He is not in acute distress.     Appearance: He is well-developed. He is not diaphoretic.   HENT:      Head: Normocephalic and atraumatic.      Right Ear: External ear normal.      Left Ear: External ear normal.   Eyes:      General:         Right eye: No discharge.         Left eye: No discharge.      Conjunctiva/sclera: Conjunctivae normal.   Neck:      Thyroid: No thyromegaly.   Cardiovascular:      Rate and Rhythm: Bradycardia present. Rhythm irregular.      Heart sounds: No murmur heard.  Pulmonary:      Effort: Pulmonary effort is normal. No respiratory distress.      Breath sounds: Normal breath sounds.   Abdominal:      General: Bowel sounds are normal. There is no distension.      Palpations: Abdomen is soft. There is no mass.      Tenderness: There is no abdominal tenderness.   Musculoskeletal:         General: No deformity.      Cervical back: Normal range of motion and neck supple.      Right lower leg: No edema.      Left lower leg: No edema.   Skin:     General: Skin is warm and dry.   Neurological:      Mental Status: He is alert and oriented to person, place, and time.      Sensory: No sensory deficit.   Psychiatric:         Mood and Affect: Mood normal.     "     Behavior: Behavior normal.                Significant Labs: CBC:   Recent Labs   Lab 08/24/24  1722 08/24/24  1731   WBC 4.42  --    HGB 14.6  --    HCT 43.7 41   *  --      CMP:   Recent Labs   Lab 08/24/24  1722      K 3.2*      CO2 22*   GLU 99   BUN 12   CREATININE 0.9   CALCIUM 8.5*   PROT 5.8*   ALBUMIN 3.2*   BILITOT 0.6   ALKPHOS 71   AST 31   ALT 46*   ANIONGAP 8     Cardiac Markers: No results for input(s): "CKMB", "MYOGLOBIN", "BNP", "TROPISTAT" in the last 48 hours.  Coagulation:   Recent Labs   Lab 08/24/24  1722   INR 1.1   APTT 29.9     Troponin:   Recent Labs   Lab 08/24/24  1722   TROPONINI 0.033*       Significant Imaging:   Imaging Results              US Lower Extremity Arteries Right (Final result)  Result time 08/24/24 20:27:40      Final result by Violeta Escalante MD (08/24/24 20:27:40)                   Impression:      No evidence of hemodynamically significant arterial stenosis or occlusion within the right lower extremity      Electronically signed by: Violeta Escalante MD  Date:    08/24/2024  Time:    20:27               Narrative:    EXAMINATION:  US LOWER EXTREMITY ARTERIES RIGHT    CLINICAL HISTORY:  Other specified symptoms and signs involving the circulatory and respiratory systems    TECHNIQUE:  Spectral, color and grayscale images of the large arteries of the right lower extremity were performed.    COMPARISON:  None    FINDINGS:  Right lower extremity:    Common femoral artery: 136 cm/sec, biphasic    Deep femoral artery: 135 cm/sec, triphasic    SFA proximal: 122 cm/sec, biphasic    SFA mid: 111 cm/sec, biphasic    SFA distal: 118 cm/sec, biphasic    Popliteal artery: 67 cm/sec, biphasic    Posterior tibial artery: 11 cm/sec, biphasic    Anterior tibial artery: 37 cm/sec, biphasic    Peroneal artery: 63 cm/sec, biphasic                                       X-Ray Chest AP Portable (Final result)  Result time 08/24/24 18:18:35      Final result by Ava, " Violeta LONGORIA MD (08/24/24 18:18:35)                   Impression:      No acute cardiopulmonary process identified.      Electronically signed by: Violeta Escalante MD  Date:    08/24/2024  Time:    18:18               Narrative:    EXAMINATION:  XR CHEST AP PORTABLE    CLINICAL HISTORY:  Bradycardia, unspecified    TECHNIQUE:  Single frontal view of the chest was performed.    COMPARISON:  02/02/2024.    FINDINGS:  Cardiac silhouette is stable in size.  Postsurgical sternotomy changes are seen.  Support pads overlie the left chest wall.  Lungs are symmetrically expanded.  No evidence of focal consolidative process, pneumothorax, or significant pleural effusion.  No acute osseous abnormality identified.

## 2024-08-25 NOTE — HOSPITAL COURSE
Admitted to hospital for near syncope. EKG showed A. Flutter 43 Qtc 388. Telemetry 30-40's with a 4 second pause noted overnight. Not on AVN blocking agent. BP WNL. Cardiology consulted and recommended pacemaker. Eliquis held and bridged with Lovenox. St[employer] Jhony single pacemaker placed in left SC vein. HR increased to low 60's s/p procedure. Patient reports mild pain post procedure. Pacemaker site without bruising, hematoma or tenderness to palpation. Restarted on Eliquis prior to d/c. Patient to be discharged home on a 4 day course of Keflex 500mg QID.

## 2024-08-25 NOTE — ED NOTES
PA Showers notified that patient HR in the 30s with average of 35. Patient BP is stable and patient denies SOB, dizziness, weakness, chest pain. Patient in no acute distress. Per PA Showers patient is appropriate to go to Telemetry. Patient placed on telebox.

## 2024-08-25 NOTE — NURSING
Patient heart rate noted to be in 30s to 40s. Angelique Simmons is aware and Dr. Elise notified. No new orders.

## 2024-08-25 NOTE — ASSESSMENT & PLAN NOTE
Chronic, controlled. Latest blood pressure and vitals reviewed-     Temp:  [97.8 °F (36.6 °C)]   Pulse:  [39-53]   Resp:  [16-23]   BP: (146-174)/(69-80)   SpO2:  [95 %-97 %] .   Home meds for hypertension were reviewed and noted below.   Hypertension Medications               hydroCHLOROthiazide (MICROZIDE) 12.5 mg capsule Take 1 capsule (12.5 mg total) by mouth once daily.    losartan (COZAAR) 25 MG tablet Take 1 tablet (25 mg total) by mouth once daily.            While in the hospital, will manage blood pressure as follows; Adjust home antihypertensive regimen as follows- home losartan/hctz held to prevent lowering BP given bradycardia    Will utilize p.r.n. blood pressure medication only if patient's blood pressure greater than 180/110 and he develops symptoms such as worsening chest pain or shortness of breath.

## 2024-08-25 NOTE — ASSESSMENT & PLAN NOTE
Chronic A-fib/flutter. Now with fatigue and HR 30-40s on no rate lowering medication. 4 second pause overnight. Consistent with SSS. Eliquis on hold. Discussed single pacemaker in AM - risks/benefits explained - he agrees - consent on chart. Currently BP stable and asymptomatic - ok to monitor on telemetry floor. No indication for ICU or temp pacing at this time

## 2024-08-25 NOTE — HPI
HPI: Rizwan Demarco Jr. 74 y.o. male with CAD, afib on eliquis, PAD, HTN, HLD presents to the hospital with a chief complaint of lightheadedness.  Reports today he noticed he was fatigued weak and lightheaded and when he attempted to stand.  The symptoms improved with sitting at rest.  He reported his heart rate has been low throughout the day.  He reported he had had previous episodes of low heart rate which typically improved with standing and attempting to walk, but he felt they did not improved today.  He denies fever nausea vomiting abdominal pain leg swelling melena hematuria hematemesis syncope chest pain shortness of breath.     In the ED, afebrile without leukocytosis found to be in AFib with slow ventricular response INR within normal limits D-dimer within normal limits potassium 3.2 magnesium within normal limits troponin 0.033 without hypotension.       Denies CP or SOB  Recent increased fatigue but denies syncope or near syncope  Telemetry A-flutter - HR 30-40s - increases with activity. 4 second pause overnight  A-fib/flutter is chronic dating back to 1/2023 by EKGs in epic  EKG A-flutter 43  Troponin 0.03 flat pattern  Last took eliquis yesterday AM    Echo 6/6/24    Left Ventricle: The left ventricle is normal in size. Mildly increased wall thickness. There is normal systolic function with a visually estimated ejection fraction of 55 - 60%. Unable to assess diastolic function due to atrial fibrillation.    Right Ventricle: Normal right ventricular cavity size. Systolic function is normal.    Left Atrium: Left atrium is moderately dilated.    Right Atrium: Right atrium is moderately dilated.    Mitral Valve: There is mild regurgitation.    Tricuspid Valve: There is moderate regurgitation.    Pulmonary Artery: The estimated pulmonary artery systolic pressure is 48 mmHg.    IVC/SVC: Intermediate venous pressure at 8 mmHg.    [facility] 6/19/24  Main:  Mid 30-40% stenosis.  Lad:  .  Fills via LIMA to  LAD   Ramus:  Ostial severe heavily calcified severe 90% stenosis.  Tortuous vessel.  Mid 70-80% stenosis  Circumflex: Ostial severe stenosis and diffusely diseased vessel .  Distal OM fills via collaterals  RCA:   Grafts:  Lima to LAD is patent and supplies the LAD without any significant stenosis  All the other SVG grafts are occluded.  Aortogram was performed and no other grafts seen    Followed by Dr Vilchis       Peripheral artery disease:  Now status post revascularization of right SFA and popliteal artery.  Also has residual infrapopliteal disease.  Is claudication after revascularization of the SFA disease has gone away and he can now walk 2 miles.  He also has flow-limiting stenosis in the distal left SFA, but states that he does not have any pain in the left leg.  Continue medical management.  Aspirin 81 mg daily for 1 month, Plavix 75 mg daily uninterrupted for at least 1 year, continue Eliquis 5 mg b.i.d.       Coronary artery disease status post CABG in 2003.  Stress test in May of 2023 did not show any significant ischemia.  Recent echo showed normal left ventricle systolic function     Carotid ultrasound in May of 2023 showed mild bilateral carotid artery block with no flow-limiting stenosis.     Hypertension:  Uncontrolled in clinic today, but states at home stays well controlled around 120 mmHg.       Atrial fibrillation:  On Eliquis.  Rate controlled.      Dyslipidemia:  Continue ezetimibe and statins.  Recheck lipid profile           Lab Results   Component Value Date     LDLCALC 106.0 02/13/2023      Dyspnea on exertion and chest tightness on exertion.  Angiogram revealed patent LIMA to LAD and severe triple-vessel disease with occluded SVG to OM and circumflex.  Patient states his symptoms are not lifestyle limiting and occasionally gets left-sided pain without any correlation to activity.  Continue medical management.

## 2024-08-25 NOTE — PLAN OF CARE
Case Management Assessment     PCP: Raúl Perkins MD; no preference for appointment time  Ph[facility]: [facility]  Patient Arrived From: Home with familyl  Existing Help at Home: Nery-spouse  Barriers to Discharge: None    Discharge Plan:    A. Home with family; follow-ups   B. TBD    Independent at home with spouse, who assists if needed; no assist needed; no current medical services or equipment; no discharge needs anticipated at this time.      08/25/24 0721   Discharge Assessment   Assessment Type Discharge Planning Assessment   Confirmed/corrected address, phone number and insurance Yes   Confirmed Demographics Correct on Facesheet   Source of Information patient;health record   Does patient/caregiver understand observation status Yes   Communicated CHERIE with patient/caregiver Date not available/Unable to determine   Reason For Admission Hypokalemia   People in Home spouse   Facility Arrived From: Home   Do you expect to return to your current living situation? Yes   Do you have help at home or someone to help you manage your care at home? Yes   Who are your caregiver(s) and their phone number(s)? Nery-spouse: [phone]; Bozena-daughter: [phone]   Prior to hospitilization cognitive status: Alert/Oriented   Current cognitive status: Alert/Oriented   Walking or Climbing Stairs Difficulty no   Dressing/Bathing Difficulty no   Home Accessibility wheelchair accessible   Home Layout Able to live on 1st floor   Equipment Currently Used at Home none   Readmission within 30 days? No   Patient currently being followed by outpatient case management? No   Do you currently have service(s) that help you manage your care at home? No   Do you take prescription medications? Yes   Do you have prescription coverage? Yes   Coverage Medicare A,B;    Do you have any problems affording any of your prescribed medications? No   Is the patient taking medications as prescribed? yes   Who is going to help you get  home at discharge? Nery-spouse   How do you get to doctors appointments? car, drives self   Are you on dialysis? No   Do you take coumadin? No  (Eliquis)   Discharge Plan A Home with family  (Follow-ups)   Discharge Plan B Other  (TBD)   DME Needed Upon Discharge  other (see comments)  (TBD)   Discharge Plan discussed with: Patient   Transition of Care Barriers None     SW Role explained to patient; two patient identifiers recognized; SW contact information placed on Communication board. Discussed patient managing health care at home and discussed discharge plans A and B; determined who would be helping patient at home with recovery: Nery-spouse, will help with recovery at home.

## 2024-08-25 NOTE — ASSESSMENT & PLAN NOTE
Presents with fatigue, lightheadedness, and found to have afib vs flutter with slow ventricular response. Has had previous episodes of slow ventriclar response. His HR does increase with fluttering of legs in bed. Not on BB or CCB outpt.   Cardiology consulted  Monitor on telemetry  Troponin trend    Patient with Paroxysmal (<7 days) atrial fibrillation which is uncontrolled currently with  not on rate control due to bradycardia . Patient is currently in atrial fibrillation.[id number] Score: 3. Anticoagulation indicated. Anticoagulation done with lovenox, eliquis held pending cardiology eval .

## 2024-08-26 PROBLEM — Z95.0 PACEMAKER: Status: ACTIVE | Noted: 2024-08-26

## 2024-08-26 LAB
ALBUMIN SERPL BCP-MCNC: 3.2 G/DL (ref 3.5–5.2)
ALP SERPL-CCNC: 64 U/L (ref 55–135)
ALT SERPL W/O P-5'-P-CCNC: 32 U/L (ref 10–44)
ANION GAP SERPL CALC-SCNC: 6 MMOL/L (ref 8–16)
AST SERPL-CCNC: 21 U/L (ref 10–40)
BASOPHILS # BLD AUTO: 0.04 K/UL (ref 0–0.2)
BASOPHILS NFR BLD: 0.7 % (ref 0–1.9)
BILIRUB SERPL-MCNC: 0.7 MG/DL (ref 0.1–1)
BUN SERPL-MCNC: 12 MG/DL (ref 8–23)
CALCIUM SERPL-MCNC: 9.4 MG/DL (ref 8.7–10.5)
CHLORIDE SERPL-SCNC: 106 MMOL/L (ref 95–110)
CO2 SERPL-SCNC: 28 MMOL/L (ref 23–29)
CREAT SERPL-MCNC: 1.2 MG/DL (ref 0.5–1.4)
DIFFERENTIAL METHOD BLD: ABNORMAL
EOSINOPHIL # BLD AUTO: 0.2 K/UL (ref 0–0.5)
EOSINOPHIL NFR BLD: 3.3 % (ref 0–8)
ERYTHROCYTE [DISTWIDTH] IN BLOOD BY AUTOMATED COUNT: 12.9 % (ref 11.5–14.5)
EST. GFR  (NO RACE VARIABLE): >60 ML/MIN/1.73 M^2
GLUCOSE SERPL-MCNC: 96 MG/DL (ref 70–110)
HCT VFR BLD AUTO: 46.6 % (ref 40–54)
HGB BLD-MCNC: 14.8 G/DL (ref 14–18)
IMM GRANULOCYTES # BLD AUTO: 0.01 K/UL (ref 0–0.04)
IMM GRANULOCYTES NFR BLD AUTO: 0.2 % (ref 0–0.5)
LYMPHOCYTES # BLD AUTO: 3.6 K/UL (ref 1–4.8)
LYMPHOCYTES NFR BLD: 59.3 % (ref 18–48)
MAGNESIUM SERPL-MCNC: 1.9 MG/DL (ref 1.6–2.6)
MCH RBC QN AUTO: 30.6 PG (ref 27–31)
MCHC RBC AUTO-ENTMCNC: 31.8 G/DL (ref 32–36)
MCV RBC AUTO: 97 FL (ref 82–98)
MONOCYTES # BLD AUTO: 0.7 K/UL (ref 0.3–1)
MONOCYTES NFR BLD: 12.1 % (ref 4–15)
NEUTROPHILS # BLD AUTO: 1.5 K/UL (ref 1.8–7.7)
NEUTROPHILS NFR BLD: 24.4 % (ref 38–73)
NRBC BLD-RTO: 0 /100 WBC
OHS QRS DURATION: 166 MS
OHS QRS DURATION: 84 MS
OHS QTC CALCULATION: 388 MS
OHS QTC CALCULATION: 501 MS
PLATELET # BLD AUTO: 127 K/UL (ref 150–450)
PMV BLD AUTO: 11.3 FL (ref 9.2–12.9)
POTASSIUM SERPL-SCNC: 4.5 MMOL/L (ref 3.5–5.1)
PROT SERPL-MCNC: 5.8 G/DL (ref 6–8.4)
RBC # BLD AUTO: 4.83 M/UL (ref 4.6–6.2)
SODIUM SERPL-SCNC: 140 MMOL/L (ref 136–145)
WBC # BLD AUTO: 6.04 K/UL (ref 3.9–12.7)

## 2024-08-26 PROCEDURE — C1898 LEAD, PMKR, OTHER THAN TRANS: HCPCS | Performed by: INTERNAL MEDICINE

## 2024-08-26 PROCEDURE — C1786 PMKR, SINGLE, RATE-RESP: HCPCS | Performed by: INTERNAL MEDICINE

## 2024-08-26 PROCEDURE — 63600175 PHARM REV CODE 636 W HCPCS: Performed by: INTERNAL MEDICINE

## 2024-08-26 PROCEDURE — 80053 COMPREHEN METABOLIC PANEL: CPT | Performed by: NURSE PRACTITIONER

## 2024-08-26 PROCEDURE — 93010 ELECTROCARDIOGRAM REPORT: CPT | Mod: ,,, | Performed by: INTERNAL MEDICINE

## 2024-08-26 PROCEDURE — 33207 INSERT HEART PM VENTRICULAR: CPT | Performed by: INTERNAL MEDICINE

## 2024-08-26 PROCEDURE — 25000003 PHARM REV CODE 250: Performed by: INTERNAL MEDICINE

## 2024-08-26 PROCEDURE — 83735 ASSAY OF MAGNESIUM: CPT | Performed by: NURSE PRACTITIONER

## 2024-08-26 PROCEDURE — 93005 ELECTROCARDIOGRAM TRACING: CPT

## 2024-08-26 PROCEDURE — 25000003 PHARM REV CODE 250: Performed by: NURSE PRACTITIONER

## 2024-08-26 PROCEDURE — C1894 INTRO/SHEATH, NON-LASER: HCPCS | Performed by: INTERNAL MEDICINE

## 2024-08-26 PROCEDURE — 36415 COLL VENOUS BLD VENIPUNCTURE: CPT | Performed by: NURSE PRACTITIONER

## 2024-08-26 PROCEDURE — 85025 COMPLETE CBC W/AUTO DIFF WBC: CPT | Performed by: NURSE PRACTITIONER

## 2024-08-26 PROCEDURE — 11000001 HC ACUTE MED/SURG PRIVATE ROOM

## 2024-08-26 PROCEDURE — 25000003 PHARM REV CODE 250: Performed by: PHYSICIAN ASSISTANT

## 2024-08-26 PROCEDURE — 99152 MOD SED SAME PHYS/QHP 5/>YRS: CPT | Performed by: INTERNAL MEDICINE

## 2024-08-26 PROCEDURE — 99153 MOD SED SAME PHYS/QHP EA: CPT | Performed by: INTERNAL MEDICINE

## 2024-08-26 DEVICE — PULSE GENERATOR SSIR
Type: IMPLANTABLE DEVICE | Site: CHEST  WALL | Status: FUNCTIONAL
Brand: ASSURITY MRI™

## 2024-08-26 DEVICE — PACING LEAD
Type: IMPLANTABLE DEVICE | Site: CHEST  WALL | Status: FUNCTIONAL
Brand: TENDRIL™

## 2024-08-26 RX ORDER — HYDROCODONE BITARTRATE AND ACETAMINOPHEN 10; 325 MG/1; MG/1
1 TABLET ORAL EVERY 4 HOURS PRN
Status: DISCONTINUED | OUTPATIENT
Start: 2024-08-26 | End: 2024-08-27 | Stop reason: HOSPADM

## 2024-08-26 RX ORDER — AMLODIPINE BESYLATE 5 MG/1
5 TABLET ORAL DAILY
Status: DISCONTINUED | OUTPATIENT
Start: 2024-08-26 | End: 2024-08-27 | Stop reason: HOSPADM

## 2024-08-26 RX ORDER — CEPHALEXIN 500 MG/1
500 CAPSULE ORAL EVERY 6 HOURS
Status: DISCONTINUED | OUTPATIENT
Start: 2024-08-26 | End: 2024-08-27 | Stop reason: HOSPADM

## 2024-08-26 RX ORDER — LIDOCAINE HYDROCHLORIDE AND EPINEPHRINE 20; 10 MG/ML; UG/ML
INJECTION, SOLUTION INFILTRATION; PERINEURAL
Status: DISCONTINUED | OUTPATIENT
Start: 2024-08-26 | End: 2024-08-26 | Stop reason: HOSPADM

## 2024-08-26 RX ORDER — CEFAZOLIN SODIUM 1 G/3ML
INJECTION, POWDER, FOR SOLUTION INTRAMUSCULAR; INTRAVENOUS
Status: DISCONTINUED | OUTPATIENT
Start: 2024-08-26 | End: 2024-08-26 | Stop reason: HOSPADM

## 2024-08-26 RX ORDER — MIDAZOLAM HYDROCHLORIDE 1 MG/ML
INJECTION INTRAMUSCULAR; INTRAVENOUS
Status: DISCONTINUED | OUTPATIENT
Start: 2024-08-26 | End: 2024-08-26 | Stop reason: HOSPADM

## 2024-08-26 RX ORDER — AMLODIPINE BESYLATE 5 MG/1
5 TABLET ORAL DAILY
Start: 2024-08-27 | End: 2025-08-27

## 2024-08-26 RX ORDER — HYDROCODONE BITARTRATE AND ACETAMINOPHEN 5; 325 MG/1; MG/1
1 TABLET ORAL EVERY 4 HOURS PRN
Status: DISCONTINUED | OUTPATIENT
Start: 2024-08-26 | End: 2024-08-27 | Stop reason: HOSPADM

## 2024-08-26 RX ORDER — FENTANYL CITRATE 50 UG/ML
INJECTION, SOLUTION INTRAMUSCULAR; INTRAVENOUS
Status: DISCONTINUED | OUTPATIENT
Start: 2024-08-26 | End: 2024-08-26 | Stop reason: HOSPADM

## 2024-08-26 RX ORDER — ACETAMINOPHEN 325 MG/1
650 TABLET ORAL EVERY 4 HOURS PRN
Status: DISCONTINUED | OUTPATIENT
Start: 2024-08-26 | End: 2024-08-26

## 2024-08-26 RX ADMIN — ALFUZOSIN HYDROCHLORIDE 10 MG: 10 TABLET ORAL at 01:08

## 2024-08-26 RX ADMIN — ATORVASTATIN CALCIUM 40 MG: 40 TABLET, FILM COATED ORAL at 01:08

## 2024-08-26 RX ADMIN — CEPHALEXIN 500 MG: 500 CAPSULE ORAL at 01:08

## 2024-08-26 RX ADMIN — AMLODIPINE BESYLATE 5 MG: 5 TABLET ORAL at 01:08

## 2024-08-26 RX ADMIN — LOSARTAN POTASSIUM 25 MG: 25 TABLET, FILM COATED ORAL at 01:08

## 2024-08-26 RX ADMIN — LIDOCAINE 1 PATCH: 700 PATCH TOPICAL at 12:08

## 2024-08-26 RX ADMIN — ACETAMINOPHEN 650 MG: 325 TABLET ORAL at 10:08

## 2024-08-26 RX ADMIN — CEPHALEXIN 500 MG: 500 CAPSULE ORAL at 11:08

## 2024-08-26 RX ADMIN — ACETAMINOPHEN 650 MG: 325 TABLET ORAL at 02:08

## 2024-08-26 RX ADMIN — CEPHALEXIN 500 MG: 500 CAPSULE ORAL at 06:08

## 2024-08-26 NOTE — ASSESSMENT & PLAN NOTE
Presents with fatigue, lightheadedness, and found to have afib vs flutter with slow ventricular response. Has had previous episodes of slow ventriclar response. His HR does increase with fluttering of legs in bed. Not on BB or CCB outpt.   Telemetry 30-40's. Not on AVN blocking agent. BP okay.    S/p St. Jhony single pacemaker left SC vein. Resume home Eliquis 8/27/24. D/c with Keflex. Outpt visit for staple removal in 2 weeks.    Patient with Paroxysmal (<7 days) atrial fibrillation which is uncontrolled currently with  not on rate control due to bradycardia . Patient is currently in atrial fibrillation.[id number] Score: 3. Anticoagulation indicated. Anticoagulation done with lovenox in the setting of a procedure, eliquis to resume in AM .

## 2024-08-26 NOTE — NURSING
Nurses Note -- 4 Eyes      8/25/2024   7:15 PM      Skin assessed during: Admit      [x] No Altered Skin Integrity Present    []Prevention Measures Documented      [] Yes- Altered Skin Integrity Present or Discovered   [] LDA Added if Not in Epic (Describe Wound)   [] New Altered Skin Integrity was Present on Admit and Documented in LDA   [] Wound Image Taken    Wound Care Consulted? No    Attending Nurse:  Yohana Penaloza RN/Staff Member:  yes Catarina

## 2024-08-26 NOTE — ASSESSMENT & PLAN NOTE
Patient's most recent potassium results are listed below.   Recent Labs     08/24/24  1722 08/25/24  0213 08/26/24  0413   K 3.2* 3.8 4.5     Plan  - Replete potassium per protocol  - Monitor potassium Daily  - Patient's hypokalemia is resolved  - suspect related to HCTZ use. Will supplement oral. Mg within normal limits

## 2024-08-26 NOTE — SUBJECTIVE & OBJECTIVE
Interval History: Patient seen and examined. NAEON. Patient denied dizziness or lightheadedness overnight. S/p St. Jhony single pacemaker left SC vein. On telemetry HR in low 60's.     Review of Systems   Constitutional:  Negative for activity change, appetite change, chills, diaphoresis, fatigue and fever.   HENT:  Negative for congestion, postnasal drip, rhinorrhea, sneezing and sore throat.    Eyes:  Negative for photophobia and visual disturbance.   Respiratory:  Negative for cough and shortness of breath.    Cardiovascular:  Negative for chest pain, palpitations and leg swelling.   Gastrointestinal:  Negative for abdominal pain, constipation, diarrhea, nausea and vomiting.   Genitourinary:  Negative for dysuria, frequency and urgency.   Musculoskeletal:  Negative for arthralgias and myalgias.   Neurological:  Negative for dizziness, syncope, weakness and light-headedness.     Objective:     Vital Signs (Most Recent):  Temp: 97.3 °F (36.3 °C) (08/26/24 0845)  Pulse: 61 (08/26/24 0945)  Resp: 18 (08/26/24 0945)  BP: (!) 167/79 (08/26/24 0945)  SpO2: 97 % (08/26/24 0945) Vital Signs (24h Range):  Temp:  [97.3 °F (36.3 °C)-98.5 °F (36.9 °C)] 97.3 °F (36.3 °C)  Pulse:  [30-61] 61  Resp:  [16-24] 18  SpO2:  [95 %-98 %] 97 %  BP: (148-177)/(70-86) 167/79     Weight: 97.5 kg (214 lb 15.2 oz)  Body mass index is 28.36 kg/m².    Intake/Output Summary (Last 24 hours) at 8/26/2024 1027  Last data filed at 8/26/2024 0947  Gross per 24 hour   Intake 240 ml   Output 780 ml   Net -540 ml         Physical Exam  Vitals and nursing note reviewed.   HENT:      Head: Normocephalic.      Mouth/Throat:      Mouth: Mucous membranes are moist.      Pharynx: Oropharynx is clear.   Eyes:      Conjunctiva/sclera: Conjunctivae normal.   Cardiovascular:      Rate and Rhythm: Normal rate and regular rhythm.      Pulses: Normal pulses.      Heart sounds: Normal heart sounds.      Comments: Pacemaker incision site dressed without bruising or  hematoma.  Pulmonary:      Effort: Pulmonary effort is normal. No respiratory distress.      Breath sounds: Normal breath sounds. No wheezing.   Abdominal:      General: Bowel sounds are normal. There is no distension.      Palpations: Abdomen is soft.      Tenderness: There is no abdominal tenderness. There is no guarding.   Skin:     General: Skin is warm and dry.   Neurological:      General: No focal deficit present.      Mental Status: He is alert.   Psychiatric:         Mood and Affect: Mood normal.         Behavior: Behavior normal.             Significant Labs: All pertinent labs within the past 24 hours have been reviewed.    Significant Imaging: I have reviewed all pertinent imaging results/findings within the past 24 hours.

## 2024-08-26 NOTE — ASSESSMENT & PLAN NOTE
Patient consistently bradycardic with HR in 30's-40's during admission. 4 second pause noted on telemetry overnight. Cardiology consulted, recommended single pacemaker.

## 2024-08-26 NOTE — PROGRESS NOTES
[facility] Medicine  Progress Note    Patient Name: Rizwan Demarco Jr.  MRN: [id number]  Patient Class: IP- Inpatient   Admission Date: 8/24/2024  Length of Stay: 1 days  Attending Physician: Ori Almonte MD  Primary Care Provider: Raúl Perkins MD        Subjective:     Principal Problem:Atrial fibrillation with slow ventricular response        HPI:  Rizwan Demarco Jr. 74 y.o. male with CAD, afib on eliquis, PAD, HTN, HLD presents to the hospital with a chief complaint of lightheadedness.  Reports today he noticed he was fatigued weak and lightheaded and when he attempted to stand.  The symptoms improved with sitting at rest.  He reported his heart rate has been low throughout the day.  He reported he had had previous episodes of low heart rate which typically improved with standing and attempting to walk, but he felt they did not improved today.  He denies fever nausea vomiting abdominal pain leg swelling melena hematuria hematemesis syncope chest pain shortness of breath.    In the ED, afebrile without leukocytosis found to be in AFib with slow ventricular response INR within normal limits D-dimer within normal limits potassium 3.2 magnesium within normal limits troponin 0.033 without hypotension.    Overview/Hospital Course:  Admitted to hospital for near syncope. EKG showed A. Flutter 43 Qtc 388. Telemetry 30-40's with a 4 second pause noted overnight. Not on AVN blocking agent. BP WNL. Cardiology consulted and recommended pacemaker. Eliquis held and bridged with Lovenox. St. Jhony single pacemaker placed in left SC vein. HR increased to low 60's s/p procedure.    Interval History: Patient seen and examined. KAT. Patient denied dizziness or lightheadedness overnight. S/p St. Jhony single pacemaker left SC vein. On telemetry HR in low 60's.     Review of Systems   Constitutional:  Negative for activity change, appetite change, chills, diaphoresis, fatigue and fever.   HENT:  Negative  for congestion, postnasal drip, rhinorrhea, sneezing and sore throat.    Eyes:  Negative for photophobia and visual disturbance.   Respiratory:  Negative for cough and shortness of breath.    Cardiovascular:  Negative for chest pain, palpitations and leg swelling.   Gastrointestinal:  Negative for abdominal pain, constipation, diarrhea, nausea and vomiting.   Genitourinary:  Negative for dysuria, frequency and urgency.   Musculoskeletal:  Negative for arthralgias and myalgias.   Neurological:  Negative for dizziness, syncope, weakness and light-headedness.     Objective:     Vital Signs (Most Recent):  Temp: 97.3 °F (36.3 °C) (08/26/24 0845)  Pulse: 61 (08/26/24 0945)  Resp: 18 (08/26/24 0945)  BP: (!) 167/79 (08/26/24 0945)  SpO2: 97 % (08/26/24 0945) Vital Signs (24h Range):  Temp:  [97.3 °F (36.3 °C)-98.5 °F (36.9 °C)] 97.3 °F (36.3 °C)  Pulse:  [30-61] 61  Resp:  [16-24] 18  SpO2:  [95 %-98 %] 97 %  BP: (148-177)/(70-86) 167/79     Weight: 97.5 kg (214 lb 15.2 oz)  Body mass index is 28.36 kg/m².    Intake/Output Summary (Last 24 hours) at 8/26/2024 1027  Last data filed at 8/26/2024 0947  Gross per 24 hour   Intake 240 ml   Output 780 ml   Net -540 ml         Physical Exam  Vitals and nursing note reviewed.   HENT:      Head: Normocephalic.      Mouth/Throat:      Mouth: Mucous membranes are moist.      Pharynx: Oropharynx is clear.   Eyes:      Conjunctiva/sclera: Conjunctivae normal.   Cardiovascular:      Rate and Rhythm: Normal rate and regular rhythm.      Pulses: Normal pulses.      Heart sounds: Normal heart sounds.      Comments: Pacemaker incision site dressed without bruising or hematoma.  Pulmonary:      Effort: Pulmonary effort is normal. No respiratory distress.      Breath sounds: Normal breath sounds. No wheezing.   Abdominal:      General: Bowel sounds are normal. There is no distension.      Palpations: Abdomen is soft.      Tenderness: There is no abdominal tenderness. There is no guarding.    Skin:     General: Skin is warm and dry.   Neurological:      General: No focal deficit present.      Mental Status: He is alert.   Psychiatric:         Mood and Affect: Mood normal.         Behavior: Behavior normal.             Significant Labs: All pertinent labs within the past 24 hours have been reviewed.    Significant Imaging: I have reviewed all pertinent imaging results/findings within the past 24 hours.    Assessment/Plan:      * Atrial fibrillation with slow ventricular response  Presents with fatigue, lightheadedness, and found to have afib vs flutter with slow ventricular response. Has had previous episodes of slow ventriclar response. His HR does increase with fluttering of legs in bed. Not on BB or CCB outpt.   Telemetry 30-40's. Not on AVN blocking agent. BP okay.    S/p St. Jhony single pacemaker left SC vein. Resume home Eliquis 8/27/24. D/c with Keflex. Outpt visit for staple removal in 2 weeks.    Patient with Paroxysmal (<7 days) atrial fibrillation which is uncontrolled currently with  not on rate control due to bradycardia . Patient is currently in atrial fibrillation.[id number] Score: 3. Anticoagulation indicated. Anticoagulation done with lovenox in the setting of a procedure, eliquis to resume in AM .    Pacemaker  S/p St. Jhony single pacemaker - 8/26/24. Will monitor overnight.  D/c with Keflex. Outpt visit in 2 weeks for staple removal.      SSS (sick sinus syndrome)  Patient consistently bradycardic with HR in 30's-40's during admission. 4 second pause noted on telemetry overnight. Cardiology consulted, recommended single pacemaker.       Hypokalemia  Patient's most recent potassium results are listed below.   Recent Labs     08/24/24  1722 08/25/24  0213 08/26/24  0413   K 3.2* 3.8 4.5     Plan  - Replete potassium per protocol  - Monitor potassium Daily  - Patient's hypokalemia is resolved  - suspect related to HCTZ use. Will supplement oral. Mg within normal limits    PAD (peripheral  artery disease)  Stable continue home statin    Essential hypertension  Chronic, controlled. Latest blood pressure and vitals reviewed-     Temp:  [97.8 °F (36.6 °C)]   Pulse:  [39-53]   Resp:  [16-23]   BP: (146-174)/(69-80)   SpO2:  [95 %-97 %] .   Home meds for hypertension were reviewed and noted below.   Hypertension Medications               hydroCHLOROthiazide (MICROZIDE) 12.5 mg capsule Take 1 capsule (12.5 mg total) by mouth once daily.    losartan (COZAAR) 25 MG tablet Take 1 tablet (25 mg total) by mouth once daily.            While in the hospital, will manage blood pressure as follows; Adjust home antihypertensive regimen as follows- home losartan/hctz held to prevent lowering BP given bradycardia    Will utilize p.r.n. blood pressure medication only if patient's blood pressure greater than 180/110 and he develops symptoms such as worsening chest pain or shortness of breath.    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 Continue to monitor on telemetry.       VTE Risk Mitigation (From admission, onward)           Ordered     IP VTE HIGH RISK PATIENT  Once         08/24/24 2019     Place sequential compression device  Until discontinued         08/24/24 2019     Place BORIS hose  Until discontinued         08/24/24 2019                    Discharge Planning   CHERIE:      Code Status: Full Code   Is the patient medically ready for discharge?:     Reason for patient still in hospital (select all that apply): Patient trending condition  Discharge Plan A: Home with family (Follow-ups)                  Amelia Cárdenas PA-C  Department of Hospital Medicine   [facility]

## 2024-08-26 NOTE — PROCEDURES
"Rizwan Demarco Jr. is a 74 y.o. male patient.    Temp: 97.8 °F (36.6 °C) (08/26/24 0534)  Pulse: (!) 40 (08/26/24 0712)  Resp: 18 (08/26/24 0534)  BP: (!) 168/75 (08/26/24 0534)  SpO2: 97 % (08/26/24 0534)  Weight: 97.5 kg (214 lb 15.2 oz) (08/26/24 0657)  Height: 6' 1" (185.4 cm) (08/24/24 1647)       Procedures    Pacemaker   Dr Elise  Pre-op Dx SSS, symptomatic bradycardia  Post-op Dx same  Specimen none  EBL < 50 cc    8/26/24 St Jhony single pacemaker placed left SC vein    Post-op CXR and po keflex  Sling overnight then PRN  Resume eliquis in AM if site stable  OV 2 weeks for staple removal    8/26/2024    "

## 2024-08-26 NOTE — PROCEDURES
"Rizwan Demarco Jr. is a 74 y.o. male patient.    Temp: 97.8 °F (36.6 °C) (08/26/24 0534)  Pulse: (!) 40 (08/26/24 0712)  Resp: 18 (08/26/24 0534)  BP: (!) 168/75 (08/26/24 0534)  SpO2: 97 % (08/26/24 0534)  Weight: 96.4 kg (212 lb 8.4 oz) (08/25/24 0434)  Height: 6' 1" (185.4 cm) (08/24/24 1647)       Procedures    Pre-sedation Assessment:    1. ASA Score: ASA 3 - Patient with moderate systemic disease with functional limitations  2. Mallampati Class: II (hard and soft palate, upper portion of tonsils anduvula visible)  3. Patient or family history of any reaction to anesthesia or sedation: No  4. Plan for Sedation: Moderate  5. H&P within 30 days of the procedure and updated within 24 hrs of admission or registration: Yes      8/26/2024    "

## 2024-08-26 NOTE — NURSING
[facility] [place]  Nurses Note -- 4 Eyes      8/26/2024       Skin assessed on: Q Shift      [x] No Pressure Injuries Present    []Prevention Measures Documented    [] Yes LDA  for Pressure Injury Previously documented     [] Yes New Pressure Injury Discovered   [] LDA for New Pressure Injury Added      Attending RN:  Sera Lloyd LPN     Second RN:  TRE Sheikh

## 2024-08-26 NOTE — PLAN OF CARE
Problem: Wound  Goal: Optimal Coping  Outcome: Progressing  Intervention: Support Patient and Family Response  Flowsheets (Taken 8/26/2024 1822)  Supportive Measures:   active listening utilized   relaxation techniques promoted  Family/Support System Care:   caregiver stress acknowledged   involvement promoted   presence promoted     Problem: Fall Injury Risk  Goal: Absence of Fall and Fall-Related Injury  Outcome: Met  Intervention: Promote Injury-Free Environment  Flowsheets (Taken 8/26/2024 1822)  Safety Promotion/Fall Prevention:   assistive device/personal item within reach   commode/urinal/bedpan at bedside   nonskid shoes/socks when out of bed   side rails raised x 2   room near unit station

## 2024-08-26 NOTE — ASSESSMENT & PLAN NOTE
S/p St[employer] Jhony single pacemaker - 8/26/24. Will monitor overnight.  D/c with Keflex. Outpt visit in 2 weeks for staple removal.

## 2024-08-27 VITALS
HEART RATE: 60 BPM | DIASTOLIC BLOOD PRESSURE: 68 MMHG | WEIGHT: 222.88 LBS | BODY MASS INDEX: 29.54 KG/M2 | SYSTOLIC BLOOD PRESSURE: 143 MMHG | OXYGEN SATURATION: 97 % | RESPIRATION RATE: 18 BRPM | HEIGHT: 73 IN | TEMPERATURE: 98 F

## 2024-08-27 LAB
ALBUMIN SERPL BCP-MCNC: 3.6 G/DL (ref 3.5–5.2)
ALP SERPL-CCNC: 72 U/L (ref 55–135)
ALT SERPL W/O P-5'-P-CCNC: 28 U/L (ref 10–44)
ANION GAP SERPL CALC-SCNC: 6 MMOL/L (ref 8–16)
AST SERPL-CCNC: 19 U/L (ref 10–40)
BASOPHILS # BLD AUTO: 0.03 K/UL (ref 0–0.2)
BASOPHILS NFR BLD: 0.5 % (ref 0–1.9)
BILIRUB SERPL-MCNC: 0.8 MG/DL (ref 0.1–1)
BUN SERPL-MCNC: 11 MG/DL (ref 8–23)
CALCIUM SERPL-MCNC: 9.8 MG/DL (ref 8.7–10.5)
CHLORIDE SERPL-SCNC: 105 MMOL/L (ref 95–110)
CO2 SERPL-SCNC: 29 MMOL/L (ref 23–29)
CREAT SERPL-MCNC: 1 MG/DL (ref 0.5–1.4)
DIFFERENTIAL METHOD BLD: ABNORMAL
EOSINOPHIL # BLD AUTO: 0.2 K/UL (ref 0–0.5)
EOSINOPHIL NFR BLD: 2.7 % (ref 0–8)
ERYTHROCYTE [DISTWIDTH] IN BLOOD BY AUTOMATED COUNT: 13 % (ref 11.5–14.5)
EST. GFR  (NO RACE VARIABLE): >60 ML/MIN/1.73 M^2
GLUCOSE SERPL-MCNC: 96 MG/DL (ref 70–110)
HCT VFR BLD AUTO: 49.6 % (ref 40–54)
HGB BLD-MCNC: 16 G/DL (ref 14–18)
IMM GRANULOCYTES # BLD AUTO: 0.01 K/UL (ref 0–0.04)
IMM GRANULOCYTES NFR BLD AUTO: 0.2 % (ref 0–0.5)
LYMPHOCYTES # BLD AUTO: 2.8 K/UL (ref 1–4.8)
LYMPHOCYTES NFR BLD: 47.3 % (ref 18–48)
MCH RBC QN AUTO: 31.1 PG (ref 27–31)
MCHC RBC AUTO-ENTMCNC: 32.3 G/DL (ref 32–36)
MCV RBC AUTO: 96 FL (ref 82–98)
MONOCYTES # BLD AUTO: 0.7 K/UL (ref 0.3–1)
MONOCYTES NFR BLD: 11 % (ref 4–15)
NEUTROPHILS # BLD AUTO: 2.3 K/UL (ref 1.8–7.7)
NEUTROPHILS NFR BLD: 38.3 % (ref 38–73)
NRBC BLD-RTO: 0 /100 WBC
PLATELET # BLD AUTO: 127 K/UL (ref 150–450)
PMV BLD AUTO: 11.1 FL (ref 9.2–12.9)
POTASSIUM SERPL-SCNC: 4.5 MMOL/L (ref 3.5–5.1)
PROT SERPL-MCNC: 6.5 G/DL (ref 6–8.4)
RBC # BLD AUTO: 5.15 M/UL (ref 4.6–6.2)
SODIUM SERPL-SCNC: 140 MMOL/L (ref 136–145)
WBC # BLD AUTO: 6 K/UL (ref 3.9–12.7)

## 2024-08-27 PROCEDURE — 80053 COMPREHEN METABOLIC PANEL: CPT

## 2024-08-27 PROCEDURE — 25000003 PHARM REV CODE 250

## 2024-08-27 PROCEDURE — 36415 COLL VENOUS BLD VENIPUNCTURE: CPT

## 2024-08-27 PROCEDURE — 85025 COMPLETE CBC W/AUTO DIFF WBC: CPT

## 2024-08-27 PROCEDURE — 99232 SBSQ HOSP IP/OBS MODERATE 35: CPT | Mod: ,,, | Performed by: INTERNAL MEDICINE

## 2024-08-27 PROCEDURE — 25000003 PHARM REV CODE 250: Performed by: PHYSICIAN ASSISTANT

## 2024-08-27 PROCEDURE — 25000003 PHARM REV CODE 250: Performed by: NURSE PRACTITIONER

## 2024-08-27 PROCEDURE — 25000003 PHARM REV CODE 250: Performed by: INTERNAL MEDICINE

## 2024-08-27 RX ORDER — CETIRIZINE HYDROCHLORIDE 10 MG/1
10 TABLET ORAL DAILY
Qty: 30 TABLET | Refills: 0 | Status: SHIPPED | OUTPATIENT
Start: 2024-08-27 | End: 2024-08-27 | Stop reason: HOSPADM

## 2024-08-27 RX ORDER — AMLODIPINE BESYLATE 5 MG/1
5 TABLET ORAL DAILY
Qty: 90 TABLET | Refills: 3 | Status: SHIPPED | OUTPATIENT
Start: 2024-08-28 | End: 2025-08-28

## 2024-08-27 RX ORDER — CEPHALEXIN 500 MG/1
500 CAPSULE ORAL EVERY 6 HOURS
Qty: 12 CAPSULE | Refills: 0 | Status: SHIPPED | OUTPATIENT
Start: 2024-08-27

## 2024-08-27 RX ORDER — HYDROCODONE BITARTRATE AND ACETAMINOPHEN 5; 325 MG/1; MG/1
1 TABLET ORAL EVERY 4 HOURS PRN
Refills: 0 | Status: CANCELLED | OUTPATIENT
Start: 2024-08-27

## 2024-08-27 RX ORDER — CETIRIZINE HYDROCHLORIDE 10 MG/1
10 TABLET ORAL DAILY
Qty: 30 TABLET | Refills: 0 | Status: CANCELLED | OUTPATIENT
Start: 2024-08-27 | End: 2025-08-27

## 2024-08-27 RX ORDER — HYDROCODONE BITARTRATE AND ACETAMINOPHEN 5; 325 MG/1; MG/1
1 TABLET ORAL EVERY 12 HOURS PRN
Qty: 8 TABLET | Refills: 0 | Status: SHIPPED | OUTPATIENT
Start: 2024-08-27

## 2024-08-27 RX ORDER — CEPHALEXIN 500 MG/1
500 CAPSULE ORAL EVERY 6 HOURS
Qty: 16 CAPSULE | Refills: 0 | Status: CANCELLED | OUTPATIENT
Start: 2024-08-27 | End: 2024-08-31

## 2024-08-27 RX ORDER — AMLODIPINE BESYLATE 5 MG/1
5 TABLET ORAL DAILY
Status: CANCELLED | OUTPATIENT
Start: 2024-08-28

## 2024-08-27 RX ORDER — AMLODIPINE BESYLATE 5 MG/1
5 TABLET ORAL DAILY
Qty: 90 TABLET | Refills: 3 | Status: CANCELLED | OUTPATIENT
Start: 2024-08-28 | End: 2025-08-28

## 2024-08-27 RX ADMIN — CEPHALEXIN 500 MG: 500 CAPSULE ORAL at 02:08

## 2024-08-27 RX ADMIN — ALFUZOSIN HYDROCHLORIDE 10 MG: 10 TABLET ORAL at 09:08

## 2024-08-27 RX ADMIN — ACETAMINOPHEN 650 MG: 325 TABLET ORAL at 04:08

## 2024-08-27 RX ADMIN — LIDOCAINE 1 PATCH: 700 PATCH TOPICAL at 02:08

## 2024-08-27 RX ADMIN — ACETAMINOPHEN 650 MG: 325 TABLET ORAL at 05:08

## 2024-08-27 RX ADMIN — ATORVASTATIN CALCIUM 40 MG: 40 TABLET, FILM COATED ORAL at 09:08

## 2024-08-27 RX ADMIN — CEPHALEXIN 500 MG: 500 CAPSULE ORAL at 05:08

## 2024-08-27 RX ADMIN — AMLODIPINE BESYLATE 5 MG: 5 TABLET ORAL at 09:08

## 2024-08-27 RX ADMIN — APIXABAN 5 MG: 5 TABLET, FILM COATED ORAL at 02:08

## 2024-08-27 RX ADMIN — LOSARTAN POTASSIUM 25 MG: 25 TABLET, FILM COATED ORAL at 09:08

## 2024-08-27 NOTE — ASSESSMENT & PLAN NOTE
Dressing changed. Site C/D/I. Keflex and pain medication sent to pharmacy. Needs OV with me 2 weeks for staple removal. Ok to resume eliquis, ok to use sling PRN

## 2024-08-27 NOTE — PLAN OF CARE
08/27/24 1057   Medicare Message   Important Message from Medicare regarding Discharge Appeal Rights Given to patient/caregiver;Explained to patient/caregiver;Signed/date by patient/caregiver   Date IMM was signed 08/27/24   Time IMM was signed 0947

## 2024-08-27 NOTE — DISCHARGE SUMMARY
[facility] Medicine  Discharge Summary      Patient Name: Rizwan Demarco Jr.  MRN: [id number]  SHARON: [id number]  Patient Class: IP- Inpatient  Admission Date: 8/24/2024  Hospital Length of Stay: 2 days  Discharge Date and Time:  08/27/2024 10:52 AM  Attending Physician: Roberto Boles MD   Discharging Provider: Amelia Cárdenas PA-C  Primary Care Provider: Raúl Perkins MD    Primary Care Team: Networked reference to record PCT     HPI:   Rizwan Demarco Jr. 74 y.o. male with CAD, afib on eliquis, PAD, HTN, HLD presents to the hospital with a chief complaint of lightheadedness.  Reports today he noticed he was fatigued weak and lightheaded and when he attempted to stand.  The symptoms improved with sitting at rest.  He reported his heart rate has been low throughout the day.  He reported he had had previous episodes of low heart rate which typically improved with standing and attempting to walk, but he felt they did not improved today.  He denies fever nausea vomiting abdominal pain leg swelling melena hematuria hematemesis syncope chest pain shortness of breath.    In the ED, afebrile without leukocytosis found to be in AFib with slow ventricular response INR within normal limits D-dimer within normal limits potassium 3.2 magnesium within normal limits troponin 0.033 without hypotension.    Procedure(s) (LRB):  INSERTION, ELECTRODE LEAD, PACEMAKER, 1 ELECTRODE LEAD (Left)      Hospital Course:   Admitted to hospital for near syncope. EKG showed A. Flutter 43 Qtc 388. Telemetry 30-40's with a 4 second pause noted overnight. Not on AVN blocking agent. BP WNL. Cardiology consulted and recommended pacemaker. Eliquis held and bridged with Lovenox. St. Jhony single pacemaker placed in left SC vein. HR increased to low 60's s/p procedure. Patient reports mild pain post procedure. Pacemaker site without bruising, hematoma or tenderness to palpation. Restarted on Eliquis prior to d/c. Patient to be  discharged home on a 4 day course of Keflex 500mg QID.      Goals of Care Treatment Preferences:  Code Status: Full Code      SDOH Screening:  The patient declined to be screened for utility difficulties, food insecurity, transport difficulties, housing insecurity, and interpersonal safety, so no concerns could be identified this admission.     Consults:   Consults (From admission, onward)          Status Ordering Provider     Case Management/  Once        Provider:  (Not yet assigned)    Completed USMAN STEVEN     Inpatient consult to Cardiology  Once        Provider:  Rizwan Elise MD    Completed REBEKAH ANGUIANO            No new Assessment & Plan notes have been filed under this hospital service since the last note was generated.  Service: Hospital Medicine    Final Active Diagnoses:    Diagnosis Date Noted POA    PRINCIPAL PROBLEM:  Atrial fibrillation with slow ventricular response [I48.91] 07/18/2024 Yes    Pacemaker [Z95.0] 08/26/2024 No    SSS (sick sinus syndrome) [I49.5] 08/25/2024 Yes    Hypokalemia [E87.6] 08/24/2024 Yes    PAD (peripheral artery disease) [I73.9] 04/25/2023 Yes    Essential hypertension [I10] 05/08/2015 Yes    Hyperlipidemia [E78.5] 08/05/2012 Yes     Chronic    Atherosclerosis of coronary artery bypass graft of native heart with angina pectoris [I25.709] 08/05/2012 Yes      Problems Resolved During this Admission:       Discharged Condition: good    Disposition:     Follow Up:   Follow-up Information       Raúl Perkins MD. Schedule an appointment as soon as possible for a visit in 1 week(s).    Specialty: Family Medicine  Why: [facility] Follow-up needed in 1 week  Contact information:  [address] [place] [address]  [phone]                           Patient Instructions:   No discharge procedures on file.        Pending Diagnostic Studies:       Procedure Component Value Units Date/Time    EKG 12-lead [[id number]]      Order Status: Sent Lab Status: No result            Medications:  Reconciled Home Medications:      Medication List        START taking these medications      amLODIPine 5 MG tablet  Commonly known as: NORVASC  Take 1 tablet (5 mg total) by mouth once daily.  Start taking on: August 28, 2024     cephALEXin 500 MG capsule  Commonly known as: KEFLEX  Take 1 capsule (500 mg total) by mouth every 6 (six) hours.     HYDROcodone-acetaminophen 5-325 mg per tablet  Commonly known as: NORCO  Take 1 tablet by mouth every 12 (twelve) hours as needed for Pain.            CONTINUE taking these medications      alfuzosin 10 mg Tb24  Commonly known as: UROXATRAL  Take 1 tablet (10 mg total) by mouth daily with breakfast.     apixaban 5 mg Tab  Commonly known as: ELIQUIS  Take 1 tablet (5 mg total) by mouth 2 (two) times daily.     atorvastatin 40 MG tablet  Commonly known as: LIPITOR  Take 1 tablet (40 mg total) by mouth once daily.     cetirizine 10 MG tablet  Commonly known as: ZYRTEC  Take 1 tablet (10 mg total) by mouth once daily.     ciclopirox 0.77 % Crea  Commonly known as: LOPROX  Apply topically 2 (two) times daily.     ciclopirox 8 % Soln  Commonly known as: PENLAC  Apply topically nightly.     cinnamon bark 500 mg capsule  Take 500 mg by mouth once daily.     hydroCHLOROthiazide 12.5 mg capsule  Commonly known as: MICROZIDE  Take 1 capsule (12.5 mg total) by mouth once daily.     linaCLOtide 72 mcg Cap capsule  Commonly known as: LINZESS  Take 1 capsule (72 mcg total) by mouth before breakfast.     losartan 25 MG tablet  Commonly known as: COZAAR  Take 1 tablet (25 mg total) by mouth once daily.     multivitamin per tablet  Commonly known as: THERAGRAN  Take by mouth.  Tablet Oral Every day     omega-3 fatty acids 1,250 mg Cap  Take by mouth.     omeprazole 20 MG capsule  Commonly known as: PRILOSEC  Take 1 capsule (20 mg total) by mouth every morning.     oxymetazoline 0.05 % nasal spray  Commonly known as: AFRIN  2  sprays by Nasal route 2 (two) times daily.     propylene glycoL 0.6 % Drop  Apply to eye. Not currently using     sildenafiL 100 MG tablet  Commonly known as: VIAGRA  Take 1 tablet (100 mg total) by mouth daily as needed.     sodium bicarb-sodium chloride Pack  by sinus irrigation route.     Systane GeL 0.3 % Gel  Generic drug: artificial tears(hypromellose)(GENTEAL/SUSTANE)  1 drop as needed.              Indwelling Lines/Drains at time of discharge:   Lines/Drains/Airways       None                   Time spent on the discharge of patient: 35 minutes         Amelia Cárdenas PA-C  Department of Hospital Medicine  [facility] [place] - Telemetry

## 2024-08-27 NOTE — PROGRESS NOTES
[facility] - [place] Telemetry  Cardiology  Progress Note    Patient Name: Rizwan Demarco Jr.  MRN: [id number]  Admission Date: 8/24/2024  Hospital Length of Stay: 2 days  Code Status: Full Code   Attending Physician: Roberto Boles MD   Primary Care Physician: Raúl Perkins MD  Expected Discharge Date:   Principal Problem:Atrial fibrillation with slow ventricular response    Subjective:     Hospital Course:   8/27/24 V-paced 60. Dressing changed - site C/D/I. CXR ok.     Interval History:     Review of Systems   Constitutional: Negative for decreased appetite.   HENT:  Negative for ear discharge.    Eyes:  Negative for blurred vision.   Endocrine: Negative for polyphagia.   Skin:  Negative for nail changes.   Genitourinary:  Negative for bladder incontinence.   Neurological:  Negative for aphonia.   Psychiatric/Behavioral:  Negative for hallucinations.    Allergic/Immunologic: Negative for hives.     Objective:     Vital Signs (Most Recent):  Temp: 97.9 °F (36.6 °C) (08/27/24 0755)  Pulse: 60 (08/27/24 0755)  Resp: 18 (08/27/24 0755)  BP: 134/78 (08/27/24 0755)  SpO2: 98 % (08/27/24 0755) Vital Signs (24h Range):  Temp:  [97.7 °F (36.5 °C)-97.9 °F (36.6 °C)] 97.9 °F (36.6 °C)  Pulse:  [60-67] 60  Resp:  [18-28] 18  SpO2:  [96 %-98 %] 98 %  BP: (134-173)/(70-84) 134/78     Weight: 101.1 kg (222 lb 14.2 oz)  Body mass index is 29.41 kg/m².     SpO2: 98 %         Intake/Output Summary (Last 24 hours) at 8/27/2024 1014  Last data filed at 8/27/2024 0951  Gross per 24 hour   Intake 240 ml   Output 2100 ml   Net -1860 ml       Lines/Drains/Airways       Peripheral Intravenous Line  Duration                  Peripheral IV - Single Lumen 08/24/24 2033 20 G Anterior;Right Forearm 2 days         Peripheral IV - Single Lumen 08/26/24 0720 20 G Left Antecubital 1 day                       Physical Exam  Constitutional:       Appearance: He is well-developed.   HENT:      Head: Normocephalic and atraumatic.   Eyes:       Conjunctiva/sclera: Conjunctivae normal.      Pupils: Pupils are equal, round, and reactive to light.   Cardiovascular:      Rate and Rhythm: Normal rate.      Pulses: Intact distal pulses.      Heart sounds: Normal heart sounds.   Pulmonary:      Effort: Pulmonary effort is normal.      Breath sounds: Normal breath sounds.   Abdominal:      General: Bowel sounds are normal.      Palpations: Abdomen is soft.   Musculoskeletal:         General: Normal range of motion.      Cervical back: Normal range of motion and neck supple.   Skin:     General: Skin is warm and dry.   Neurological:      Mental Status: He is alert and oriented to person, place, and time.     PPM site C/D/I       Significant Labs: All pertinent lab results from the last 24 hours have been reviewed.    Significant Imaging: Echocardiogram: Transthoracic echo (TTE) complete (Cupid Only):   Results for orders placed or performed during the hospital encounter of 06/06/24   Echo   Result Value Ref Range    LVOT stroke volume 93.81 cm3    LVIDd 4.90 3.5 - 6.0 cm    LV Systolic Volume 41.00 mL    LVIDs 3.20 2.1 - 4.0 cm    LV Diastolic Volume 112.68 mL    IVS 1.32 (A) 0.6 - 1.1 cm    LVOT diameter 2.03 cm    LVOT area 3.2 cm2    FS 35 28 - 44 %    Left Ventricle Relative Wall Thickness 0.53 cm    Posterior Wall 1.30 (A) 0.6 - 1.1 cm    LV mass 256.53 g    MV Peak E [place] 1.02 m/s    TDI LATERAL 0.09 m/s    TDI SEPTAL 0.08 m/s    E/E' ratio 12.00 m/s    TR Max [place] 3.18 m/s    E wave deceleration time 273.66 msec    LV SEPTAL E/E' RATIO 12.75 m/s    LV LATERAL E/E' RATIO 11.33 m/s    LVOT peak [place] 1.19 m/s    Left Ventricular Outflow Tract Mean Velocity 0.79 cm/s    Left Ventricular Outflow Tract Mean Gradient 2.89 mmHg    RVDD 4.33 cm    RV S' 10.29 cm/s    TAPSE 1.39 cm    RV/LV Ratio 0.88 cm    LA size 3.79 cm    Left Atrium Minor Axis 5.61 cm    Left Atrium Major Axis 5.16 cm    RA Major Axis 4.85 cm    AV mean gradient 6 mmHg    AV peak gradient 10 mmHg     Ao peak shonda 1.57 m/s    Ao VTI 33.10 cm    LVOT peak VTI 29.00 cm    AV valve area 2.83 cm²    AV Velocity Ratio 0.76     AV index (prosthetic) 0.88     HANNA by Velocity Ratio 2.45 cm²    MV stenosis pressure 1/2 time 79.36 ms    MV valve area p 1/2 method 2.77 cm2    Triscuspid Valve Regurgitation Peak Gradient 40 mmHg    PV PEAK VELOCITY 1.15 m/s    PV peak gradient 5 mmHg    Sinus 2.94 cm    STJ 2.99 cm    Ascending aorta 3.85 cm    IVC diameter 2.21 cm    Mean e' 0.09 m/s    LA volume 64.07 cm3    LA WIDTH 3.7 cm    RA Width 3.9 cm    TV resting pulmonary artery pressure 48 mmHg    RV TB RVSP 11 mmHg    Est. RA pres 8 mmHg    Narrative      Left Ventricle: The left ventricle is normal in size. Mildly increased   wall thickness. There is normal systolic function with a visually   estimated ejection fraction of 55 - 60%. Unable to assess diastolic   function due to atrial fibrillation.    Right Ventricle: Normal right ventricular cavity size. Systolic   function is normal.    Left Atrium: Left atrium is moderately dilated.    Right Atrium: Right atrium is moderately dilated.    Mitral Valve: There is mild regurgitation.    Tricuspid Valve: There is moderate regurgitation.    Pulmonary Artery: The estimated pulmonary artery systolic pressure is   48 mmHg.    IVC/SVC: Intermediate venous pressure at 8 mmHg.       Assessment and Plan:     Brief HPI:     * Atrial fibrillation with slow ventricular response  Chronic A-fib/flutter. Now with fatigue and HR 30-40s on no rate lowering medication. 4 second pause overnight. Consistent with SSS. Eliquis on hold. Discussed single pacemaker in AM - risks/benefits explained - he agrees - consent on chart. Currently BP stable and asymptomatic - ok to monitor on telemetry floor. No indication for ICU or temp pacing at this time    Pacemaker  Dressing changed. Site C/D/I. Keflex and pain medication sent to pharmacy. Needs OV with me 2 weeks for staple removal. Ok to resume eliquis,  ok to use sling PRN    PAD (peripheral artery disease)  Stable. 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 Last LHC with patent LIMA to LAD and all other SVGs closed. EF normal on recent echo. Continue medical Rx        VTE Risk Mitigation (From admission, onward)           Ordered     IP VTE HIGH RISK PATIENT  Once         08/24/24 2019     Place sequential compression device  Until discontinued         08/24/24 2019     Place BORIS hose  Until discontinued         08/24/24 2019                    Rizwan Elise MD  Cardiology  [facility] - Telemetry

## 2024-08-27 NOTE — PLAN OF CARE
Case Management Final Discharge Note    Discharge Disposition: Home    New DME ordered / company name: None    Relevant SDOH / Transition of Care Barriers:  None    Primary Caretaker and contact information: Spouse, Nery [phone]    Scheduled followup appointment: PCP scheduled, Cardiology- scheduled    Transportation: Pt's family will provide transportation home.    Patient and family educated on discharge services and updated on DC plan. Bedside RN notified, patient clear to discharge from Case Management Perspective.       08/27/24 1254   Final Note   Assessment Type Final Discharge Note   Anticipated Discharge Disposition Home   What phone number can be called within the next 1-3 days to see how you are doing after discharge? [phone]   Hospital Resources/Appts/Education Provided Appointments scheduled and added to AVS   Post-Acute Status   Coverage Medicare Part A & B   Discharge Delays None known at this time

## 2024-08-27 NOTE — DISCHARGE INSTRUCTIONS
Our goal at [facility] is to always give you outstanding care and exceptional service. You may receive a survey by mail or e-mail in the next week regarding your stay with us. We would greatly appreciate your completing and returning the survey. Your feedback provides us with a way to recognize our staff who work tirelessly to provide the best care! Your responses also help us learn how to improve when your experience was below our aspiration of excellence. We want you to leave us today feeling like you can DEFINITELY recommend us to others! We look forward to your continued care with us! Thanks so much for choosing [facility] for your healthcare needs!

## 2024-08-27 NOTE — SUBJECTIVE & OBJECTIVE
Interval History:     Review of Systems   Constitutional: Negative for decreased appetite.   HENT:  Negative for ear discharge.    Eyes:  Negative for blurred vision.   Endocrine: Negative for polyphagia.   Skin:  Negative for nail changes.   Genitourinary:  Negative for bladder incontinence.   Neurological:  Negative for aphonia.   Psychiatric/Behavioral:  Negative for hallucinations.    Allergic/Immunologic: Negative for hives.     Objective:     Vital Signs (Most Recent):  Temp: 97.9 °F (36.6 °C) (08/27/24 0755)  Pulse: 60 (08/27/24 0755)  Resp: 18 (08/27/24 0755)  BP: 134/78 (08/27/24 0755)  SpO2: 98 % (08/27/24 0755) Vital Signs (24h Range):  Temp:  [97.7 °F (36.5 °C)-97.9 °F (36.6 °C)] 97.9 °F (36.6 °C)  Pulse:  [60-67] 60  Resp:  [18-28] 18  SpO2:  [96 %-98 %] 98 %  BP: (134-173)/(70-84) 134/78     Weight: 101.1 kg (222 lb 14.2 oz)  Body mass index is 29.41 kg/m².     SpO2: 98 %         Intake/Output Summary (Last 24 hours) at 8/27/2024 1014  Last data filed at 8/27/2024 0951  Gross per 24 hour   Intake 240 ml   Output 2100 ml   Net -1860 ml       Lines/Drains/Airways       Peripheral Intravenous Line  Duration                  Peripheral IV - Single Lumen 08/24/24 2033 20 G Anterior;Right Forearm 2 days         Peripheral IV - Single Lumen 08/26/24 0720 20 G Left Antecubital 1 day                       Physical Exam  Constitutional:       Appearance: He is well-developed.   HENT:      Head: Normocephalic and atraumatic.   Eyes:      Conjunctiva/sclera: Conjunctivae normal.      Pupils: Pupils are equal, round, and reactive to light.   Cardiovascular:      Rate and Rhythm: Normal rate.      Pulses: Intact distal pulses.      Heart sounds: Normal heart sounds.   Pulmonary:      Effort: Pulmonary effort is normal.      Breath sounds: Normal breath sounds.   Abdominal:      General: Bowel sounds are normal.      Palpations: Abdomen is soft.   Musculoskeletal:         General: Normal range of motion.       Cervical back: Normal range of motion and neck supple.   Skin:     General: Skin is warm and dry.   Neurological:      Mental Status: He is alert and oriented to person, place, and time.     PPM site C/D/I       Significant Labs: All pertinent lab results from the last 24 hours have been reviewed.    Significant Imaging: Echocardiogram: Transthoracic echo (TTE) complete (Cupid Only):   Results for orders placed or performed during the hospital encounter of 06/06/24   Echo   Result Value Ref Range    LVOT stroke volume 93.81 cm3    LVIDd 4.90 3.5 - 6.0 cm    LV Systolic Volume 41.00 mL    LVIDs 3.20 2.1 - 4.0 cm    LV Diastolic Volume 112.68 mL    IVS 1.32 (A) 0.6 - 1.1 cm    LVOT diameter 2.03 cm    LVOT area 3.2 cm2    FS 35 28 - 44 %    Left Ventricle Relative Wall Thickness 0.53 cm    Posterior Wall 1.30 (A) 0.6 - 1.1 cm    LV mass 256.53 g    MV Peak E [place] 1.02 m/s    TDI LATERAL 0.09 m/s    TDI SEPTAL 0.08 m/s    E/E' ratio 12.00 m/s    TR Max [place] 3.18 m/s    E wave deceleration time 273.66 msec    LV SEPTAL E/E' RATIO 12.75 m/s    LV LATERAL E/E' RATIO 11.33 m/s    LVOT peak [place] 1.19 m/s    Left Ventricular Outflow Tract Mean Velocity 0.79 cm/s    Left Ventricular Outflow Tract Mean Gradient 2.89 mmHg    RVDD 4.33 cm    RV S' 10.29 cm/s    TAPSE 1.39 cm    RV/LV Ratio 0.88 cm    LA size 3.79 cm    Left Atrium Minor Axis 5.61 cm    Left Atrium Major Axis 5.16 cm    RA Major Axis 4.85 cm    AV mean gradient 6 mmHg    AV peak gradient 10 mmHg    Ao peak [place] 1.57 m/s    Ao VTI 33.10 cm    LVOT peak VTI 29.00 cm    AV valve area 2.83 cm²    AV Velocity Ratio 0.76     AV index (prosthetic) 0.88     HANNA by Velocity Ratio 2.45 cm²    MV stenosis pressure 1/2 time 79.36 ms    MV valve area p 1/2 method 2.77 cm2    Triscuspid Valve Regurgitation Peak Gradient 40 mmHg    PV PEAK VELOCITY 1.15 m/s    PV peak gradient 5 mmHg    Sinus 2.94 cm    STJ 2.99 cm    Ascending aorta 3.85 cm    IVC diameter 2.21 cm    Mean e' 0.09  m/s    LA volume 64.07 cm3    LA WIDTH 3.7 cm    RA Width 3.9 cm    TV resting pulmonary artery pressure 48 mmHg    RV TB RVSP 11 mmHg    Est. RA pres 8 mmHg    Narrative      Left Ventricle: The left ventricle is normal in size. Mildly increased   wall thickness. There is normal systolic function with a visually   estimated ejection fraction of 55 - 60%. Unable to assess diastolic   function due to atrial fibrillation.    Right Ventricle: Normal right ventricular cavity size. Systolic   function is normal.    Left Atrium: Left atrium is moderately dilated.    Right Atrium: Right atrium is moderately dilated.    Mitral Valve: There is mild regurgitation.    Tricuspid Valve: There is moderate regurgitation.    Pulmonary Artery: The estimated pulmonary artery systolic pressure is   48 mmHg.    IVC/SVC: Intermediate venous pressure at 8 mmHg.

## 2024-08-27 NOTE — NURSING
[facility]  Nurses Note -- 4 Eyes      8/27/2024       Skin assessed on: Q Shift      [x] No Pressure Injuries Present    []Prevention Measures Documented    [] Yes LDA  for Pressure Injury Previously documented     [] Yes New Pressure Injury Discovered   [] LDA for New Pressure Injury Added      Attending RN:  Sera Lloyd LPN     Second RN:  TRE Sheikh

## 2024-08-27 NOTE — PROGRESS NOTES
AVS virtually reviewed with patient in its entirety with emphasis on diet, medications, follow-up appointments and reasons to return to the ED or contact the [facility] On Call Nurse Care Line. Patient also encouraged to utilize their patient portal. Ease and convenience of use reiterated. Education complete and patient voiced understanding. All questions answered. Discharge teaching complete. Encouraged to complete patient survey.  Active with patient portal.

## 2024-08-28 ENCOUNTER — OFFICE VISIT (OUTPATIENT)
Dept: FAMILY MEDICINE | Facility: CLINIC | Age: 74
End: 2024-08-28
Payer: MEDICARE

## 2024-08-28 ENCOUNTER — PATIENT OUTREACH (OUTPATIENT)
Dept: ADMINISTRATIVE | Facility: CLINIC | Age: 74
End: 2024-08-28
Payer: MEDICARE

## 2024-08-28 VITALS
WEIGHT: 220.44 LBS | BODY MASS INDEX: 29.22 KG/M2 | HEART RATE: 75 BPM | HEIGHT: 73 IN | DIASTOLIC BLOOD PRESSURE: 60 MMHG | OXYGEN SATURATION: 97 % | TEMPERATURE: 98 F | SYSTOLIC BLOOD PRESSURE: 120 MMHG

## 2024-08-28 DIAGNOSIS — D69.6 THROMBOCYTOPENIA, UNSPECIFIED: ICD-10-CM

## 2024-08-28 DIAGNOSIS — Z09 HOSPITAL DISCHARGE FOLLOW-UP: Primary | ICD-10-CM

## 2024-08-28 PROCEDURE — 99215 OFFICE O/P EST HI 40 MIN: CPT | Mod: PBBFAC,PO | Performed by: FAMILY MEDICINE

## 2024-08-28 PROCEDURE — 99999 PR PBB SHADOW E&M-EST. PATIENT-LVL V: CPT | Mod: PBBFAC,,, | Performed by: FAMILY MEDICINE

## 2024-08-28 PROCEDURE — 99214 OFFICE O/P EST MOD 30 MIN: CPT | Mod: S$PBB,,, | Performed by: FAMILY MEDICINE

## 2024-08-28 PROCEDURE — G2211 COMPLEX E/M VISIT ADD ON: HCPCS | Mod: S$PBB,,, | Performed by: FAMILY MEDICINE

## 2024-08-28 NOTE — PROGRESS NOTES
"HISTORY OF PRESENT ILLNESS:  Rizwan Demarco Jr. is a 74 y.o. male who presents to the clinic today for Transitional Care  .     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    Hyperlipidemia    Cervical spondylosis    Degeneration of intervertebral disc    Rheumatoid arthritis involving multiple sites    Benign prostatic hyperplasia with weak urinary stream    Perennial allergic rhinitis    Essential hypertension    High frequency hearing loss    Nasal obstruction    Dysfunctions associated with sleep stages or arousal from sleep    Primary insomnia    Wears dentures    Atherosclerosis of aorta    PAD (peripheral artery disease)    Poor circulation    Sebaceous cyst    Carotid artery disorder    Atrial fibrillation with slow ventricular response    Pulmonary heart disease    Hypokalemia    SSS (sick sinus syndrome)    Pacemaker    Thrombocytopenia, unspecified           CARE TEAM:  Patient Care Team:  Raúl Perkins MD as PCP - General (Family Medicine)  Jose Nascimento III, MD as Consulting Physician (Otolaryngology)  Kenzie Sandoval MA (Inactive) as Care Coordinator  Ian Martinez MD as Consulting Physician (Gastroenterology)  Angeles Harding OD as Consulting Physician (Optometry)         ROS        PHYSICAL EXAM:  /60   Pulse 75   Temp 97.8 °F (36.6 °C) (Oral)   Ht 6' 1" (1.854 m)   Wt 100 kg (220 lb 7.4 oz)   SpO2 97%   BMI 29.09 kg/m²   Wt Readings from Last 5 Encounters:   08/28/24 100 kg (220 lb 7.4 oz)   08/27/24 101.1 kg (222 lb 14.2 oz)   08/22/24 98.6 kg (217 lb 6 oz)   07/18/24 98.6 kg (217 lb 6 oz)   07/11/24 98.5 kg (217 lb 2.5 oz)     BP Readings from Last 5 Encounters:   08/28/24 120/60   08/27/24 (!) 143/68 "   08/22/24 (!) 150/63   07/18/24 (!) 142/80   07/11/24 (!) 148/80           He appears well, in no apparent distress.  Alert and oriented times three, pleasant and cooperative. Vital signs are as documented in vital signs section.  Healing left chest wall  Steady heart beat  Mild edema    Reviewed hospital stay and discharge      Medication List with Changes/Refills   Current Medications    ALFUZOSIN (UROXATRAL) 10 MG TB24    Take 1 tablet (10 mg total) by mouth daily with breakfast.    AMLODIPINE (NORVASC) 5 MG TABLET    Take 1 tablet (5 mg total) by mouth once daily.    APIXABAN (ELIQUIS) 5 MG TAB    Take 1 tablet (5 mg total) by mouth 2 (two) times daily.    ARTIFICIAL TEARS,HYPROMELLOSE,,GENTEAL/SUSTANE, (SYSTANE GEL) 0.3 % GEL    1 drop as needed.    ATORVASTATIN (LIPITOR) 40 MG TABLET    Take 1 tablet (40 mg total) by mouth once daily.    CEPHALEXIN (KEFLEX) 500 MG CAPSULE    Take 1 capsule (500 mg total) by mouth every 6 (six) hours.    CICLOPIROX (LOPROX) 0.77 % CREA    Apply topically 2 (two) times daily.    CICLOPIROX (PENLAC) 8 % SOLN    Apply topically nightly.    CINNAMON BARK 500 MG CAPSULE    Take 500 mg by mouth once daily.    HYDROCHLOROTHIAZIDE (MICROZIDE) 12.5 MG CAPSULE    Take 1 capsule (12.5 mg total) by mouth once daily.    HYDROCODONE-ACETAMINOPHEN (NORCO) 5-325 MG PER TABLET    Take 1 tablet by mouth every 12 (twelve) hours as needed for Pain.    LINACLOTIDE (LINZESS) 72 MCG CAP CAPSULE    Take 1 capsule (72 mcg total) by mouth before breakfast.    LOSARTAN (COZAAR) 25 MG TABLET    Take 1 tablet (25 mg total) by mouth once daily.    MULTIVITAMIN (THERAGRAN) PER TABLET    Take by mouth.  Tablet Oral Every day    OMEGA-3 FATTY ACIDS 1,250 MG CAP    Take by mouth.    OMEPRAZOLE (PRILOSEC) 20 MG CAPSULE    Take 1 capsule (20 mg total) by mouth every morning.    OXYMETAZOLINE (AFRIN) 0.05 % NASAL SPRAY    2 sprays by Nasal route 2 (two) times daily.    PROPYLENE GLYCOL 0.6 % DROP    Apply to eye.  Not currently using    SILDENAFIL (VIAGRA) 100 MG TABLET    Take 1 tablet (100 mg total) by mouth daily as needed.    SODIUM BICARB-SODIUM CHLORIDE PACK    by sinus irrigation route.       ASSESSMENT AND PLAN:    Problem List Items Addressed This Visit       Thrombocytopenia, unspecified    Current Assessment & Plan     The current medical regimen is effective;  continue present plan and medications.  Watch the bleeding          Other Visit Diagnoses       Hospital discharge follow-up    -  Primary        Potential medication side effects were discussed with the patient; let me know if any occur.      Future Appointments   Date Time Provider Department Center   9/9/2024  1:30 PM Rizwan Elise MD [facility] CARDIO [facility]   10/15/2024 11:00 AM Ward Schmitt MD [facility]       No follow-ups on file. or sooner as needed.

## 2024-08-28 NOTE — PROGRESS NOTES
2nd attempt made to reach patient and patient's spouse, Nery, for TCC call. Left voicemail please call [phone] leave first name, last name, and  and I will return your call.

## 2024-08-28 NOTE — PROGRESS NOTES
"C3 nurse spoke to Rizwan Demarco Jr., for a TCC post discharge hospital follow up call.  Phone reception was spotty and unable to hear patient, he stated he was in a store "in a bad spot".  C3 nurse reminded patient about his scheduled HOSFU appointment with Raúl Perkins MD on 8/28/2024 @ 1:40 PM, he voiced understanding.  C3 nurse requested to call patient back after his scheduled HOSFU appointment today, he agreed and voiced understanding.   "

## 2024-08-29 ENCOUNTER — OUTPATIENT CASE MANAGEMENT (OUTPATIENT)
Dept: ADMINISTRATIVE | Facility: OTHER | Age: 74
End: 2024-08-29
Payer: MEDICARE

## 2024-08-29 RX ORDER — IBUPROFEN 100 MG/5ML
1000 SUSPENSION, ORAL (FINAL DOSE FORM) ORAL DAILY
COMMUNITY

## 2024-08-29 NOTE — PROGRESS NOTES
C3 nurse spoke with Rizwan Demarco Jr.  for a TCC post hospital discharge follow up call. The patient confirmed he had his scheduled [facility] appointment with Raúl Perkins MD on 8/28/2024 @ 1:40 PM.

## 2024-08-29 NOTE — ASSESSMENT & PLAN NOTE
The current medical regimen is effective;  continue present plan and medications.  Watch the bleeding

## 2024-08-29 NOTE — PROGRESS NOTES
3rd attempt made to reach patient for TCC call. Left voicemail please call [phone] leave first name, last name, and  and I will return your call.

## 2024-08-30 ENCOUNTER — PATIENT MESSAGE (OUTPATIENT)
Dept: GASTROENTEROLOGY | Facility: CLINIC | Age: 74
End: 2024-08-30
Payer: MEDICARE

## 2024-08-30 ENCOUNTER — NURSE TRIAGE (OUTPATIENT)
Dept: ADMINISTRATIVE | Facility: CLINIC | Age: 74
End: 2024-08-30
Payer: MEDICARE

## 2024-08-30 NOTE — TELEPHONE ENCOUNTER
Three attempts to contact pt on contact number listed in pt's chart. Voice message left for pt.   Reason for Disposition   Third attempt to contact caller AND no contact made. Phone number verified.    Protocols used: No Contact or Duplicate Contact Call-A-OH

## 2024-09-09 ENCOUNTER — OFFICE VISIT (OUTPATIENT)
Dept: CARDIOLOGY | Facility: CLINIC | Age: 74
End: 2024-09-09
Payer: MEDICARE

## 2024-09-09 VITALS
SYSTOLIC BLOOD PRESSURE: 128 MMHG | WEIGHT: 220.44 LBS | HEIGHT: 73 IN | HEART RATE: 71 BPM | DIASTOLIC BLOOD PRESSURE: 74 MMHG | BODY MASS INDEX: 29.22 KG/M2 | OXYGEN SATURATION: 98 %

## 2024-09-09 DIAGNOSIS — I49.5 SSS (SICK SINUS SYNDROME): ICD-10-CM

## 2024-09-09 DIAGNOSIS — Z95.0 PACEMAKER: Primary | ICD-10-CM

## 2024-09-09 DIAGNOSIS — E78.2 MIXED HYPERLIPIDEMIA: Chronic | ICD-10-CM

## 2024-09-09 DIAGNOSIS — I25.709 ATHEROSCLEROSIS OF CORONARY ARTERY BYPASS GRAFT OF NATIVE HEART WITH ANGINA PECTORIS: ICD-10-CM

## 2024-09-09 PROCEDURE — 99999 PR PBB SHADOW E&M-EST. PATIENT-LVL IV: CPT | Mod: PBBFAC,,, | Performed by: INTERNAL MEDICINE

## 2024-09-09 PROCEDURE — 99214 OFFICE O/P EST MOD 30 MIN: CPT | Mod: PBBFAC | Performed by: INTERNAL MEDICINE

## 2024-09-09 NOTE — PROGRESS NOTES
Subjective   Patient ID:  Rizwan Demarco Jr. is a 74 y.o. male who presents for follow-up of No chief complaint on file.      HPI    SSS - s/p [employer] single pacemaker 8/26/24, CAD, HTN, HLD      8/26/24 [employer] single pacemaker placed left SC vein       Admitted 8/24/24  Rizwan Demarco Jr. 74 y.o. male with CAD, afib on eliquis, PAD, HTN, HLD presents to the hospital with a chief complaint of lightheadedness.  Reports today he noticed he was fatigued weak and lightheaded and when he attempted to stand.  The symptoms improved with sitting at rest.  He reported his heart rate has been low throughout the day.  He reported he had had previous episodes of low heart rate which typically improved with standing and attempting to walk, but he felt they did not improved today.  He denies fever nausea vomiting abdominal pain leg swelling melena hematuria hematemesis syncope chest pain shortness of breath.      Denies CP or SOB  Recent increased fatigue but denies syncope or near syncope  Telemetry A-flutter - HR 30-40s - increases with activity. 4 second pause overnight  A-fib/flutter is chronic dating back to 1/2023 by EKGs in epic  EKG A-flutter 43  Troponin 0.03 flat pattern  Last took eliquis yesterday AM     Echo 6/6/24    Left Ventricle: The left ventricle is normal in size. Mildly increased wall thickness. There is normal systolic function with a visually estimated ejection fraction of 55 - 60%. Unable to assess diastolic function due to atrial fibrillation.    Right Ventricle: Normal right ventricular cavity size. Systolic function is normal.    Left Atrium: Left atrium is moderately dilated.    Right Atrium: Right atrium is moderately dilated.    Mitral Valve: There is mild regurgitation.    Tricuspid Valve: There is moderate regurgitation.    Pulmonary Artery: The estimated pulmonary artery systolic pressure is 48 mmHg.    IVC/SVC: Intermediate venous pressure at 8 mmHg.     C 6/19/24  Main:  Mid 30-40%  stenosis.  Lad:  .  Fills via LIMA to LAD   Ramus:  Ostial severe heavily calcified severe 90% stenosis.  Tortuous vessel.  Mid 70-80% stenosis  Circumflex: Ostial severe stenosis and diffusely diseased vessel .  Distal OM fills via collaterals  RCA:   Grafts:  Lima to LAD is patent and supplies the LAD without any significant stenosis  All the other SVG grafts are occluded.  Aortogram was performed and no other grafts seen     9/9/24 Did well after PPM  Denies CP or SOB    Review of Systems   Constitutional: Negative for decreased appetite.   HENT:  Negative for ear discharge.    Eyes:  Negative for blurred vision.   Endocrine: Negative for polyphagia.   Skin:  Negative for nail changes.   Genitourinary:  Negative for bladder incontinence.   Neurological:  Negative for aphonia.   Psychiatric/Behavioral:  Negative for hallucinations.    Allergic/Immunologic: Negative for hives.          Objective     Physical Exam  Constitutional:       Appearance: He is well-developed.   HENT:      Head: Normocephalic and atraumatic.   Eyes:      Conjunctiva/sclera: Conjunctivae normal.      Pupils: Pupils are equal, round, and reactive to light.   Cardiovascular:      Rate and Rhythm: Normal rate.      Pulses: Intact distal pulses.      Heart sounds: Normal heart sounds.   Pulmonary:      Effort: Pulmonary effort is normal.      Breath sounds: Normal breath sounds.   Abdominal:      General: Bowel sounds are normal.      Palpations: Abdomen is soft.   Musculoskeletal:         General: Normal range of motion.      Cervical back: Normal range of motion and neck supple.   Skin:     General: Skin is warm and dry.   Neurological:      Mental Status: He is alert and oriented to person, place, and time.     Incision C/D/I       Assessment and Plan     1. Pacemaker    2. SSS (sick sinus syndrome)    3. Atherosclerosis of coronary artery bypass graft of native heart with angina pectoris    4. Mixed hyperlipidemia        Plan:      Staples removed  OV 2 weeks with [employer] pacemaker check with Dr Vilchis    Advance Care Planning     Date: 09/09/2024  Patient did not wish or was not able to name a surrogate decision maker or provide an Advance Care Plan.

## 2024-09-30 ENCOUNTER — OFFICE VISIT (OUTPATIENT)
Dept: CARDIOLOGY | Facility: CLINIC | Age: 74
End: 2024-09-30
Payer: MEDICARE

## 2024-09-30 VITALS
OXYGEN SATURATION: 96 % | WEIGHT: 216.94 LBS | HEIGHT: 73 IN | HEART RATE: 60 BPM | RESPIRATION RATE: 18 BRPM | DIASTOLIC BLOOD PRESSURE: 62 MMHG | SYSTOLIC BLOOD PRESSURE: 130 MMHG | BODY MASS INDEX: 28.75 KG/M2

## 2024-09-30 DIAGNOSIS — I10 ESSENTIAL HYPERTENSION: ICD-10-CM

## 2024-09-30 DIAGNOSIS — I25.709 ATHEROSCLEROSIS OF CORONARY ARTERY BYPASS GRAFT OF NATIVE HEART WITH ANGINA PECTORIS: ICD-10-CM

## 2024-09-30 DIAGNOSIS — I48.4 ATYPICAL ATRIAL FLUTTER: ICD-10-CM

## 2024-09-30 DIAGNOSIS — I25.118 CORONARY ARTERY DISEASE INVOLVING NATIVE CORONARY ARTERY OF NATIVE HEART WITH OTHER FORM OF ANGINA PECTORIS: ICD-10-CM

## 2024-09-30 DIAGNOSIS — R94.31 ABNORMAL EKG: ICD-10-CM

## 2024-09-30 DIAGNOSIS — E78.2 MIXED HYPERLIPIDEMIA: ICD-10-CM

## 2024-09-30 DIAGNOSIS — I49.5 SSS (SICK SINUS SYNDROME): ICD-10-CM

## 2024-09-30 DIAGNOSIS — Z95.0 PACEMAKER: Primary | ICD-10-CM

## 2024-09-30 DIAGNOSIS — I70.0 ATHEROSCLEROSIS OF AORTA: ICD-10-CM

## 2024-09-30 DIAGNOSIS — I73.9 PAD (PERIPHERAL ARTERY DISEASE): ICD-10-CM

## 2024-09-30 DIAGNOSIS — R00.1 SYMPTOMATIC BRADYCARDIA: ICD-10-CM

## 2024-09-30 DIAGNOSIS — R09.89 POOR CIRCULATION: ICD-10-CM

## 2024-09-30 PROCEDURE — 93288 INTERROG EVL PM/LDLS PM IP: CPT | Mod: PBBFAC | Performed by: INTERNAL MEDICINE

## 2024-09-30 PROCEDURE — 99214 OFFICE O/P EST MOD 30 MIN: CPT | Mod: 24,S$PBB,, | Performed by: INTERNAL MEDICINE

## 2024-09-30 PROCEDURE — 93288 INTERROG EVL PM/LDLS PM IP: CPT | Mod: 26,S$PBB,, | Performed by: INTERNAL MEDICINE

## 2024-09-30 PROCEDURE — 99214 OFFICE O/P EST MOD 30 MIN: CPT | Mod: PBBFAC | Performed by: INTERNAL MEDICINE

## 2024-09-30 PROCEDURE — G2211 COMPLEX E/M VISIT ADD ON: HCPCS | Mod: S$PBB,,, | Performed by: INTERNAL MEDICINE

## 2024-09-30 PROCEDURE — 99999 PR PBB SHADOW E&M-EST. PATIENT-LVL IV: CPT | Mod: PBBFAC,,, | Performed by: INTERNAL MEDICINE

## 2024-09-30 RX ORDER — ATORVASTATIN CALCIUM 40 MG/1
40 TABLET, FILM COATED ORAL NIGHTLY
Qty: 90 TABLET | Refills: 3 | Status: SHIPPED | OUTPATIENT
Start: 2024-09-30

## 2024-09-30 RX ORDER — EZETIMIBE 10 MG/1
10 TABLET ORAL DAILY
Qty: 90 TABLET | Refills: 3 | Status: SHIPPED | OUTPATIENT
Start: 2024-09-30

## 2024-09-30 NOTE — PROGRESS NOTES
CARDIOVASCULAR CONSULTATION    REASON FOR CONSULT:   Rizwan Demarco Jr. is a 74 y.o. male who presents for evaluation    HISTORY OF PRESENT ILLNESS:     Patient is a pleasant 72-year-old man.  Here for evaluation.  Past medical history significant for atrial fibrillation, on Eliquis.  Coronary artery bypass grafting in 2003.  Quadruple bypass as per patient, but unknown anatomy.  States had it done at [facility].  Dyslipidemia, hypertension, Sjogren's disease.  Also peripheral artery disease with abnormal ABIs in 2021.  Denies any orthopnea, PND.  Main complaint is right leg, right calf claudication and cramping on walking.  Also complains of occasional heaviness in the chest area states that happens when he gets constipated.      2023:      The left ventricle is normal in size with concentric remodeling and normal systolic function. The estimated ejection fraction is 65%.  Normal right ventricular size with normal right ventricular systolic function.  Normal left ventricular diastolic function.  The estimated PA systolic pressure is 33 mmHg.  Intermediate central venous pressure (8 mmHg).  The ascending aorta is mildly dilated.      2021:      Moderately decreased right ORESTES, 0.68.  Moderately dampened PVR waveforms.  Mildly decreased left ORESTES, 0.89.  Moderately dampened PVR waveforms.      May 23:  Patient here for follow-up.  Peripheral ultrasound revealed 60 90% mid left SFA stenosis.  Discussed initiating Pletal with the patient.  Patient not interested.  Would like to proceed with the peripheral angiogram for further evaluation.  Lifestyle limiting calf claudication right more than left        Right ORESTES 0.73 suggesting mild arteial flow resriction. Left ORESTES normal 1.01     Mild bilateral carotid plaque with no flow limiting stenosis     Normal abdominal aortic size and flow. No AAA       Mild right SFA plaque with no flow limiting arterial stenosis  Moderate left SFA plaque with 60-90% mis SFA  stenosis. Occluded left posterial tibial artery        Notes from July 23:  Patient here for follow-up.  Claudication in the right leg has gone away.  States he recently walk 2 miles without any claudication.  Has a stenosis in his left SFA also, but states that there is no claudication in the left leg.      Successful laser atherectomy, balloon angioplasty of the distal left SFA and proximal popliteal artery.  With excellent angiographic results      Procedures performed:     1. Ultrasound-guided left common femoral artery access next     2. Abdominal aortogram with pigtail placed in the suprarenal position      3. Selective right lower extremity angiogram     4. Selective left lower artery angiogram      5. Laser atherectomy of right SFA and proximal popliteal artery      6. Drug coated balloon angioplasty of right SFA and proximal popliteal artery      7. IVUS of right SFA, popliteal and TP trunk.        Procedural details      Ultrasound-guided access of left common femoral artery was obtained.  After that we inserted a pigtail in the suprarenal position and abdominal aortogram was obtained.  It demonstrated no significant iliac artery stenosis on both sides.  Then we selectively engaged the right iliac artery and angiogram of the right iliac artery was obtained.  It revealed mild 10-20% stenosis.  After that we obtained angiogram of the right SFA which revealed severe 90-95% stenosis in the distal SFA.  There was also another 90% stenosis in the right TP trunk.  Proximal  of right anterior tibial artery and right posterior tibial arteries was present.  Both these arteries filled with collaterals.  Peroneal artery provides the most robust flow to the foot.  After that we crossed these wires with the lesion and decision was made to fix the SFA and popliteal lesions because patient has lifestyle limiting claudication, but no resting pain or CLI.  IVUS was used for vessel sizing After that laser atherectomy of  these 2 lesions was performed followed by balloon angioplasty.  Angiogram post revealed excellent angiographic results.       Then we did angiogram of the left lower extremity from the sheath and it revealed distal SFA severe stenosis.  Anterior tibial artery is diffusely diseased and there appears to be atypical takeoff of the posterior tibial artery.  No significant stenosis was noted in the peroneal artery        Assessment plan     Continue medical management      Resume Eliquis in a.m..  Continue Plavix 75 mg qd     Aspirin 81 mg qd x 1 m     statins         Nov 23: doing fine. No more claudication. No cp, orthopnea, pnd    Notes from February 24: Patient here for follow-up.  Doing fine.  Denies any chest pains at rest on exertion, orthopnea, PND.      Notes from May 24: Patient here for follow-up.  Lately has been experiencing dyspnea on exertion and chest tightness on exertion    Notes from June 24: Patient here for follow-up.  Stress test abnormal    Results for orders placed or performed during the hospital encounter of 08/24/24   EKG 12-lead    Collection Time: 08/26/24  8:29 AM   Result Value Ref Range    QRS Duration 166 ms    OHS QTC Calculation 501 ms    Narrative    Test Reason : R00.1,    Vent. Rate : 063 BPM     Atrial Rate : 086 BPM     P-R Int : 000 ms          QRS Dur : 166 ms      QT Int : 490 ms       P-R-T Axes : 000 -81 089 degrees     QTc Int : 501 ms    Ventricular-paced rhythm  Abnormal ECG  When compared with ECG of 24-AUG-2024 19:49,  Significant changes have occurred  Confirmed by Rizwan Elise MD ([id number]) on 8/26/2024 2:41:51 PM    Referred By: [email or web address]   SELF           Confirmed By:Rizwan Elise MD       Results for orders placed during the hospital encounter of 06/06/24    Echo    Interpretation Summary    Left Ventricle: The left ventricle is normal in size. Mildly increased wall thickness. There is normal systolic function with a visually estimated ejection fraction of 55 - 60%.  Unable to assess diastolic function due to atrial fibrillation.    Right Ventricle: Normal right ventricular cavity size. Systolic function is normal.    Left Atrium: Left atrium is moderately dilated.    Right Atrium: Right atrium is moderately dilated.    Mitral Valve: There is mild regurgitation.    Tricuspid Valve: There is moderate regurgitation.    Pulmonary Artery: The estimated pulmonary artery systolic pressure is 48 mmHg.    IVC/SVC: Intermediate venous pressure at 8 mmHg.      Results for orders placed during the hospital encounter of 06/06/24    Nuclear Stress - Cardiology Interpreted    Interpretation Summary    Abnormal myocardial perfusion scan.    There is a moderate intensity, moderate to large sized, mostly reversible perfusion abnormality with some fixed areas in the inferior wall(s).    There are no other significant perfusion abnormalities.    The gated perfusion images showed an ejection fraction of 68% at rest.    There is normal wall motion at rest and post stress.    The ECG portion of the study is negative for ischemia.    The patient reported no chest pain during the stress test.    There were no arrhythmias during stress.      Results for orders placed during the hospital encounter of 06/21/23    Cardiac catheterization    Conclusion    The estimated blood loss was <50 mL.    Successful laser atherectomy, balloon angioplasty of the distal left SFA and proximal popliteal artery.  With excellent angiographic results    Procedures performed:    1. Ultrasound-guided left common femoral artery access next    2. Abdominal aortogram with pigtail placed in the suprarenal position    3. Selective right lower extremity angiogram    4. Selective left lower artery angiogram    5. Laser atherectomy of right SFA and proximal popliteal artery    6. Drug coated balloon angioplasty of right SFA and proximal popliteal artery    7. IVUS of right SFA, popliteal and TP trunk.      Procedural  details    Ultrasound-guided access of left common femoral artery was obtained.  After that we inserted a pigtail in the suprarenal position and abdominal aortogram was obtained.  It demonstrated no significant iliac artery stenosis on both sides.  Then we selectively engaged the right iliac artery and angiogram of the right iliac artery was obtained.  It revealed mild 10-20% stenosis.  After that we obtained angiogram of the right SFA which revealed severe 90-95% stenosis in the distal SFA.  There was also another 90% stenosis in the right TP trunk.  Proximal  of right anterior tibial artery and right posterior tibial arteries was present.  Both these arteries filled with collaterals.  Peroneal artery provides the most robust flow to the foot.  After that we crossed these wires with the lesion and decision was made to fix the SFA and popliteal lesions because patient has lifestyle limiting claudication, but no resting pain or CLI.  IVUS was used for vessel sizing After that laser atherectomy of these 2 lesions was performed followed by balloon angioplasty.  Angiogram post revealed excellent angiographic results.    Then we did angiogram of the left lower extremity from the sheath and it revealed distal SFA severe stenosis.  Anterior tibial artery is diffusely diseased and there appears to be atypical takeoff of the posterior tibial artery.  No significant stenosis was noted in the peroneal artery      Assessment plan    Continue medical management    Resume Eliquis in a.m..  Continue Plavix 75 mg qd    Aspirin 81 mg qd x 1 m    statins                The procedure log was documented by Documenter: Prerna Cherry RN and verified by Abbe Vilchis MD.    Date: 6/30/2023  Time: 1:18 PM      Notes from July 24: Patient here for follow-up.  Denies chest pains at rest on exertion, orthopnea, PND.    Notes from September 24: Patient here for follow-up.  In the interim had sick sinus syndrome requiring pacemaker  implantation.  Pacemaker check today.  Functioning fine.  [employer] pacemaker.  Denies orthopnea PND swelling of feet.    PAST MEDICAL HISTORY:     Past Medical History:   Diagnosis Date    Allergy     Anticoagulant long-term use     Arthritis     Rheumatoid arthritis    Back pain     Blood clotting tendency     BPH (benign prostatic hypertrophy)     Cervical spondylosis     Chronic a-fib 7/18/2024    Colon polyp 2010    adenoma    Coronary artery disease     Depression 05/08/2015    Dry eyes     Dry mouth     GERD (gastroesophageal reflux disease)     Hyperlipidemia     Hypertension     Lumbar spondylosis     Nasal obstruction     PAD (peripheral artery disease) 04/25/2023    Rheumatoid arthritis     Sinusitis     Special screening for malignant neoplasm of colon 06/29/2016    Trouble in sleeping        PAST SURGICAL HISTORY:     Past Surgical History:   Procedure Laterality Date    AORTOGRAPHY N/A 6/19/2024    Procedure: AORTOGRAM;  Surgeon: Abbe Vilchis MD;  Location: [facility] CATH LAB;  Service: Cardiology;  Laterality: N/A;    ATHERECTOMY Left 6/21/2023    Procedure: Atherectomy;  Surgeon: Abbe Vilchis MD;  Location: [facility] CATH LAB;  Service: Cardiology;  Laterality: Left;    CHALAZION EXCISION      COLONOSCOPY N/A 6/29/2016    Procedure: COLONOSCOPY;  Surgeon: Partha Saucedo MD;  Location: [facility] ENDO;  Service: Endoscopy;  Laterality: N/A;    COLONOSCOPY N/A 7/24/2020    Procedure: COLONOSCOPY;  Surgeon: Ian Martinez MD;  Location: [facility] ([address] [place] [address]);  Service: Endoscopy;  Laterality: N/A;  4/16/20 - removed from 5/1/20, not called yet due to acute pain issues being addressed today - pg    COLONOSCOPY N/A 10/19/2023    Procedure: COLONOSCOPY;  Surgeon: Ian Martinez MD;  Location: [facility] ENDO;  Service: Endoscopy;  Laterality: N/A;  order created from Dr. Martinez's previous colonoscopy report 7/24/2020-3 year surveillance.   ok to hold Eliquis 2 days and Plavix 5 days per Dr Vilchis-GT  PEG instr portal -ml  10/12-  pre call complete.  [facility]    CORONARY ANGIOGRAPHY INCLUDING BYPASS GRAFTS WITH CATHETERIZATION OF LEFT HEART N/A 6/19/2024    Procedure: ANGIOGRAM, CORONARY, INCLUDING BYPASS GRAFT, WITH LEFT HEART CATHETERIZATION;  Surgeon: Abbe Vilchis MD;  Location: [facility] CATH LAB;  Service: Cardiology;  Laterality: N/A;  RN PREOP 06/17/2024    CORONARY ARTERY BYPASS GRAFT      ESOPHAGOGASTRODUODENOSCOPY N/A 7/24/2020    Procedure: ESOPHAGOGASTRODUODENOSCOPY (EGD);  Surgeon: Ian Martinez MD;  Location: [address]);  Service: Endoscopy;  Laterality: N/A;  4/16/20 - removed from 5/1/20, not called yet due to acute pain issues being addressed today.  4/17/20 - rescheduled 7/24/20 - pg    EXCISION TURBINATE, SUBMUCOUS      INSERTION, ELECTRODE LEAD, CARDIAC PACEMAKER, 1 ELECTRODE LEAD Left 8/26/2024    Procedure: INSERTION, ELECTRODE LEAD, PACEMAKER, 1 ELECTRODE LEAD;  Surgeon: Rizwan Elise MD;  Location: [facility] CATH LAB;  Service: Cardiology;  Laterality: Left;    KNEE ARTHROSCOPY W/ DEBRIDEMENT      NASAL SEPTUM SURGERY      PERIPHERAL ANGIOGRAPHY N/A 6/21/2023    Procedure: Peripheral angiography;  Surgeon: Abbe Vilchis MD;  Location: [facility] CATH LAB;  Service: Cardiology;  Laterality: N/A;  right radial access--- RN PREOP 6/16----    TONSILLECTOMY         ALLERGIES AND MEDICATION:     Review of patient's allergies indicates:   Allergen Reactions    Aspirin Nausea And Vomiting    Astelin [azelastine] Other (See Comments)     Dry mouth    Flomax [tamsulosin] Other (See Comments)     Nosebleed        Medication List            Accurate as of September 30, 2024  2:21 PM. If you have any questions, ask your nurse or doctor.                START taking these medications      ezetimibe 10 mg tablet  Commonly known as: ZETIA  Take 1 tablet (10 mg total) by mouth once daily.  Started by: Abbe Vilchis MD            CHANGE how you take these medications      atorvastatin 40 MG tablet  Commonly known as: LIPITOR  Take 1 tablet  (40 mg total) by mouth every evening.  What changed: when to take this  Changed by: Abbe Vilchis MD            CONTINUE taking these medications      alfuzosin 10 mg Tb24  Commonly known as: UROXATRAL  Take 1 tablet (10 mg total) by mouth daily with breakfast.     amLODIPine 5 MG tablet  Commonly known as: NORVASC  Take 1 tablet (5 mg total) by mouth once daily.     apixaban 5 mg Tab  Commonly known as: ELIQUIS  Take 1 tablet (5 mg total) by mouth 2 (two) times daily.     cephALEXin 500 MG capsule  Commonly known as: KEFLEX  Take 1 capsule (500 mg total) by mouth every 6 (six) hours.     ciclopirox 0.77 % Crea  Commonly known as: LOPROX  Apply topically 2 (two) times daily.     ciclopirox 8 % Soln  Commonly known as: PENLAC  Apply topically nightly.     cinnamon bark 500 mg capsule     hydroCHLOROthiazide 12.5 mg capsule  Commonly known as: MICROZIDE  Take 1 capsule (12.5 mg total) by mouth once daily.     HYDROcodone-acetaminophen 5-325 mg per tablet  Commonly known as: NORCO  Take 1 tablet by mouth every 12 (twelve) hours as needed for Pain.     linaCLOtide 72 mcg Cap capsule  Commonly known as: LINZESS  Take 1 capsule (72 mcg total) by mouth before breakfast.     losartan 25 MG tablet  Commonly known as: COZAAR  Take 1 tablet (25 mg total) by mouth once daily.     multivitamin per tablet  Commonly known as: THERAGRAN     omega-3 fatty acids 1,250 mg Cap     omeprazole 20 MG capsule  Commonly known as: PRILOSEC  Take 1 capsule (20 mg total) by mouth every morning.     oxymetazoline 0.05 % nasal spray  Commonly known as: AFRIN     propylene glycoL 0.6 % Drop     sildenafiL 100 MG tablet  Commonly known as: VIAGRA  Take 1 tablet (100 mg total) by mouth daily as needed.     sodium bicarb-sodium chloride Pack     Systane GeL 0.3 % Gel  Generic drug: artificial tears(hypromellose)(GENTEAL/SUSTANE)     VITAMIN C 1000 MG tablet  Generic drug: ascorbic acid (vitamin C)               Where to Get Your Medications         These medications were sent to [facility] - [place], [place] - [address]  [address], [facility] [address]      Phone: [phone]   atorvastatin 40 MG tablet  ezetimibe 10 mg tablet         SOCIAL HISTORY:     Social History     Socioeconomic History    Marital status:    Tobacco Use    Smoking status: Former     Current packs/day: 0.00     Average packs/day: 1 pack/day for 20.0 years (20.0 ttl pk-yrs)     Types: Cigarettes     Start date: 1971     Quit date: 1991     Years since quittin.7    Smokeless tobacco: Never    Tobacco comments:     Quit .   Substance and Sexual Activity    Alcohol use: No    Drug use: No    Sexual activity: Yes     Partners: Female   Social History Narrative    N/a per the patient.      Social Drivers of Health     Financial Resource Strain: Patient Declined (2024)    Overall Financial Resource Strain (CARDIA)     Difficulty of Paying Living Expenses: Patient declined   Food Insecurity: Patient Declined (2024)    Hunger Vital Sign     Worried About Running Out of Food in the Last Year: Patient declined     Ran Out of Food in the Last Year: Patient declined   Transportation Needs: Patient Declined (2024)    TRANSPORTATION NEEDS     Transportation : Patient declined   Physical Activity: Sufficiently Active (2024)    Exercise Vital Sign     Days of Exercise per Week: 7 days     Minutes of Exercise per Session: 60 min   Stress: Patient Declined (2024)    [employer] - Occupational Stress Questionnaire     Feeling of Stress : Patient declined   Housing Stability: Patient Declined (2024)    Housing Stability Vital Sign     Unable to Pay for Housing in the Last Year: Patient declined     Homeless in the Last Year: Patient declined       FAMILY HISTORY:     Family History   Problem Relation Name Age of Onset    Hyperlipidemia Mother      Alzheimer's disease Mother       "Stomach cancer Father      Colon cancer Father          from wife--he is not unsure     Cataracts Other      No Known Problems Maternal Aunt      No Known Problems Maternal Uncle      No Known Problems Paternal Aunt      No Known Problems Paternal Uncle      No Known Problems Maternal Grandmother      No Known Problems Maternal Grandfather      No Known Problems Paternal Grandmother      No Known Problems Paternal Grandfather      Blindness Neg Hx      Cancer Neg Hx      Diabetes Neg Hx      Glaucoma Neg Hx      Rheum arthritis Neg Hx      Psoriasis Neg Hx      Lupus Neg Hx      Amblyopia Neg Hx      Hypertension Neg Hx      Macular degeneration Neg Hx      Retinal detachment Neg Hx      Strabismus Neg Hx      Stroke Neg Hx      Thyroid disease Neg Hx      Esophageal cancer Neg Hx         REVIEW OF SYSTEMS:   Review of Systems   Constitutional: Negative.   HENT: Negative.     Eyes: Negative.    Respiratory: Negative.     Endocrine: Negative.    Hematologic/Lymphatic: Negative.    Skin: Negative.    Musculoskeletal: Negative.    Gastrointestinal: Negative.    Genitourinary: Negative.    Neurological: Negative.    Psychiatric/Behavioral: Negative.     Allergic/Immunologic: Negative.        A 10 point review of systems was performed and all the pertinent positives have been mentioned. Rest of review of systems was negative.        PHYSICAL EXAM:     Vitals:    09/30/24 1334   BP: 130/62   Pulse: 60   Resp: 18    Body mass index is 28.62 kg/m².  Weight: 98.4 kg (216 lb 14.9 oz)   Height: 6' 1" (185.4 cm)     Physical Exam  Vitals reviewed.   Constitutional:       Appearance: He is well-developed.   HENT:      Head: Normocephalic.   Eyes:      Conjunctiva/sclera: Conjunctivae normal.      Pupils: Pupils are equal, round, and reactive to light.   Cardiovascular:      Rate and Rhythm: Normal rate and regular rhythm.      Heart sounds: Normal heart sounds.   Pulmonary:      Effort: Pulmonary effort is normal.      Breath " sounds: Normal breath sounds.   Abdominal:      General: Bowel sounds are normal.      Palpations: Abdomen is soft.   Musculoskeletal:      Cervical back: Normal range of motion and neck supple.   Skin:     General: Skin is warm.   Neurological:      Mental Status: He is alert and oriented to person, place, and time.           DATA:     Laboratory:  CBC:  Recent Labs   Lab 08/25/24 0213 08/26/24 0413 08/27/24  0742   WBC 5.81 6.04 6.00   Hemoglobin 14.5 14.8 16.0   Hematocrit 45.4 46.6 49.6   Platelets 129 L 127 L 127 L       CHEMISTRIES:  Recent Labs   Lab 02/08/22  1024 02/13/23  0818 08/24/24  1722 08/25/24 0213 08/26/24 0413 08/27/24  0742   Glucose 92   < > 99 114 H 96 96   Sodium 142   < > 140 142 140 140   Potassium 4.8   < > 3.2 L 3.8 4.5 4.5   BUN 13   < > 12 13 12 11   Creatinine 1.2   < > 0.9 1.1 1.2 1.0   eGFR if  >60  --   --   --   --   --    eGFR if non  60  --   --   --   --   --    Calcium 10.2   < > 8.5 L 9.6 9.4 9.8   Magnesium  --   --  1.7 1.9 1.9  --     < > = values in this interval not displayed.       CARDIAC BIOMARKERS:  Recent Labs   Lab 08/24/24 1722 08/24/24  2031 08/25/24  0213   CPK 84  --   --    Troponin I 0.033 H 0.036 H 0.037 H       COAGS:  Recent Labs   Lab 01/27/24  1549 02/02/24  1200 08/24/24  1722   INR 1.1 1.2 1.1       LIPIDS/LFTS:  Recent Labs   Lab 02/08/22  1024 02/13/23  0818 01/27/24  1549 08/23/24  0905 08/24/24  1722 08/25/24  0213 08/26/24  0413 08/27/24  0742   Cholesterol 238 H 155  --  125  --   --   --   --    Triglycerides 76 45  --  45  --   --   --   --    HDL 42 40  --  36 L  --   --   --   --    LDL Cholesterol 180.8 H 106.0  --  80.0  --   --   --   --    Non-HDL Cholesterol 196 115  --  89  --   --   --   --    AST 13 15   < >  --    < > 28 21 19   ALT 13 14   < >  --    < > 43 32 28    < > = values in this interval not displayed.       Hemoglobin A1C   Date Value Ref Range Status   02/13/2023 5.6 4.0 - 5.6 % Final      Comment:     ADA Screening Guidelines:  5.7-6.4%  Consistent with prediabetes  >or=6.5%  Consistent with diabetes    High levels of fetal hemoglobin interfere with the HbA1C  assay. Heterozygous hemoglobin variants (HbS, HgC, etc)do  not significantly interfere with this assay.   However, presence of multiple variants may affect accuracy.     02/08/2022 5.6 4.0 - 5.6 % Final     Comment:     ADA Screening Guidelines:  5.7-6.4%  Consistent with prediabetes  >or=6.5%  Consistent with diabetes    High levels of fetal hemoglobin interfere with the HbA1C  assay. Heterozygous hemoglobin variants (HbS, HgC, etc)do  not significantly interfere with this assay.   However, presence of multiple variants may affect accuracy.     05/24/2019 5.7 (H) 4.0 - 5.6 % Final     Comment:     ADA Screening Guidelines:  5.7-6.4%  Consistent with prediabetes  >or=6.5%  Consistent with diabetes  High levels of fetal hemoglobin interfere with the HbA1C  assay. Heterozygous hemoglobin variants (HbS, HgC, etc)do  not significantly interfere with this assay.   However, presence of multiple variants may affect accuracy.         TSH  Recent Labs   Lab 02/13/23  0818 08/24/24  1722   TSH 0.641 0.407       The ASCVD Risk score (Emmanuelle DK, et al., 2019) failed to calculate for the following reasons:    The valid total cholesterol range is 130 to 320 mg/dL       BNP    Lab Results   Component Value Date/Time     (H) 01/27/2024 03:49 PM    BNP 25 05/07/2015 05:30 PM             ASSESSMENT AND PLAN     Patient Active Problem List   Diagnosis    Sjogren's disease    GERD (gastroesophageal reflux disease)    Atherosclerosis of coronary artery bypass graft of native heart with angina pectoris    Hyperlipidemia    Cervical spondylosis    Degeneration of intervertebral disc    Rheumatoid arthritis involving multiple sites    Benign prostatic hyperplasia with weak urinary stream    Perennial allergic rhinitis    Essential hypertension    High frequency  hearing loss    Nasal obstruction    Dysfunctions associated with sleep stages or arousal from sleep    Primary insomnia    Wears dentures    Atherosclerosis of aorta    PAD (peripheral artery disease)    Poor circulation    Sebaceous cyst    Carotid artery disorder    Atrial fibrillation with slow ventricular response    Pulmonary heart disease    Hypokalemia    SSS (sick sinus syndrome)    Pacemaker    Thrombocytopenia, unspecified       Peripheral artery disease:  Now status post revascularization of right SFA and popliteal artery.  Also has residual infrapopliteal disease.  Is claudication after revascularization of the SFA disease has gone away and he can now walk 2 miles.  He also has flow-limiting stenosis in the distal left SFA, but states that he does not have any pain in the left leg.  Continue medical management.   Plavix 75 mg daily uninterrupted for at least 1 year, continue Eliquis 5 mg b.i.d.      Coronary artery disease status post CABG in 2003.  Stress test in May of 2023 did not show any significant ischemia.  Recent echo showed normal left ventricle systolic function     Carotid ultrasound in May of 2023 showed mild bilateral carotid artery block with no flow-limiting stenosis.    Hypertension:  Referred for digital hypertension program.  Patient does not want to join.  Because they asked to many questions about his background etc per patient     Atrial fibrillation:  On Eliquis.  Rate controlled.     Dyslipidemia:  Continue ezetimibe and statins.  Recheck lipid profile    Lab Results   Component Value Date    LDLCALC 80.0 08/23/2024     Dyspnea on exertion and chest tightness on exertion.  Angiogram revealed patent LIMA to LAD and severe triple-vessel disease with occluded SVG to OM and circumflex.  Patient states his symptoms are not lifestyle limiting   Continue medical management.    Sick sinus syndrome: Status post pacemaker implantation.  [place].  Pacemaker interrogated today.  Working  fine.  Pacemaker dependent with pacing greater than 90% of the time    Follow-up after 3 m     Visit today included increased complexity associated with the care of the episodic problem atrial fibrillation, sick sinus syndrome, coronary artery disease, peripheral artery disease, dyslipidemia, history of coronary artery bypass grafting, history of peripheral revascularization, pacemaker addressed and managing the longitudinal care of the patient due to the serious and/or complex managed problem(s)   Patient Active Problem List   Diagnosis    Sjogren's disease    GERD (gastroesophageal reflux disease)    Atherosclerosis of coronary artery bypass graft of native heart with angina pectoris    Hyperlipidemia    Cervical spondylosis    Degeneration of intervertebral disc    Rheumatoid arthritis involving multiple sites    Benign prostatic hyperplasia with weak urinary stream    Perennial allergic rhinitis    Essential hypertension    High frequency hearing loss    Nasal obstruction    Dysfunctions associated with sleep stages or arousal from sleep    Primary insomnia    Wears dentures    Atherosclerosis of aorta    PAD (peripheral artery disease)    Poor circulation    Sebaceous cyst    Carotid artery disorder    Atrial fibrillation with slow ventricular response    Pulmonary heart disease    Hypokalemia    SSS (sick sinus syndrome)    Pacemaker    Thrombocytopenia, unspecified     .        Thank you very much for involving me in the care of your patient.  Please do not hesitate to contact me if there are any questions.      Abbe Vilchis MD, FACC, [facility]  Interventional Cardiologist, [facility].           This note was dictated with the help of speech recognition software.  There might be un-intended errors and/or substitutions.

## 2024-10-15 ENCOUNTER — OFFICE VISIT (OUTPATIENT)
Dept: OTOLARYNGOLOGY | Facility: CLINIC | Age: 74
End: 2024-10-15
Payer: MEDICARE

## 2024-10-15 VITALS
SYSTOLIC BLOOD PRESSURE: 186 MMHG | DIASTOLIC BLOOD PRESSURE: 81 MMHG | WEIGHT: 216.69 LBS | BODY MASS INDEX: 28.59 KG/M2

## 2024-10-15 DIAGNOSIS — J34.2 NASAL SEPTAL DEVIATION: Primary | ICD-10-CM

## 2024-10-15 DIAGNOSIS — J34.3 HYPERTROPHY OF BOTH INFERIOR NASAL TURBINATES: ICD-10-CM

## 2024-10-15 DIAGNOSIS — J32.9 CHRONIC SINUSITIS, UNSPECIFIED LOCATION: ICD-10-CM

## 2024-10-15 PROCEDURE — 99999 PR PBB SHADOW E&M-EST. PATIENT-LVL III: CPT | Mod: PBBFAC,,, | Performed by: STUDENT IN AN ORGANIZED HEALTH CARE EDUCATION/TRAINING PROGRAM

## 2024-10-15 PROCEDURE — 99213 OFFICE O/P EST LOW 20 MIN: CPT | Mod: S$PBB,,, | Performed by: STUDENT IN AN ORGANIZED HEALTH CARE EDUCATION/TRAINING PROGRAM

## 2024-10-15 PROCEDURE — 99213 OFFICE O/P EST LOW 20 MIN: CPT | Mod: PBBFAC | Performed by: STUDENT IN AN ORGANIZED HEALTH CARE EDUCATION/TRAINING PROGRAM

## 2024-10-15 NOTE — PROGRESS NOTES
Subjective:      Rizwan is a 74 y.o. male who comes for follow-up of sinusitis.  His last visit with me was on 4/12/2024.  Continued to do well.  No further sinus infections.  No purulent nasal drainage or facial pressure.     His current sinus regime consists of: Saline irrigations.     The assessment of quality and severity of symptoms as measured by the SNOT-22 score is deferred.    The patient's medications, allergies, past medical, surgical, social and family histories were reviewed and updated as appropriate.    ROS     A detailed review of systems was obtained with pertinent positives as per the above HPI, and otherwise negative.        Objective:     BP (!) 186/81   Wt 98.3 kg (216 lb 11.4 oz)   BMI 28.59 kg/m²        Constitutional:   Vital signs are normal. He appears well-developed and well-nourished.     Head:  Normocephalic and atraumatic.     Ears:  Hearing normal to normal and whispered voice; external ear normal without scars, lesions, or masses; ear canal, tympanic membrane, and middle ear normal..   Right Ear: No swelling. Tympanic membrane is not perforated and not bulging. No middle ear effusion.   Left Ear: No swelling. Tympanic membrane is not perforated and not bulging.  No middle ear effusion.     Nose:  Nose normal including turbinates, nasal mucosa, sinuses and nasal septum. No epistaxis.     Mouth/Throat  Oropharynx clear and moist without lesions or asymmetry and normal uvula midline. Normal dentition. No tonsillar abscesses. Tonsillar exudate.      Neck:  Neck normal without thyromegaly masses, asymmetry, normal tracheal structure, crepitus, and tenderness, thyroid normal, trachea normal, phonation normal, full range of motion with neck supple and no adenopathy. No stridor present.        Head (right side): No submental adenopathy present.        Head (left side): No submental adenopathy present.     He has no cervical adenopathy.     Pulmonary/Chest:   No stridor.        Procedure    None    Data Reviewed    WBC (K/uL)   Date Value   08/27/2024 6.00     Eosinophil % (%)   Date Value   08/27/2024 2.7     Eos # (K/uL)   Date Value   08/27/2024 0.2     Platelets (K/uL)   Date Value   08/27/2024 127 (L)     Glucose (mg/dL)   Date Value   08/27/2024 96     Total IgE (IU/mL)   Date Value   09/30/2015 302 (H)       No sinus imaging available.      Assessment:     1. Nasal septal deviation    2. Hypertrophy of both inferior nasal turbinates    3. Chronic sinusitis, unspecified location         Plan:     - he continues to do well with the sinuses.  - continue irrigations  - RTC 2 months     Ward Schmitt MD

## 2024-10-17 ENCOUNTER — TELEPHONE (OUTPATIENT)
Dept: FAMILY MEDICINE | Facility: CLINIC | Age: 74
End: 2024-10-17
Payer: MEDICARE

## 2024-10-17 NOTE — TELEPHONE ENCOUNTER
----- Message from Tech Shauna sent at 10/17/2024  8:15 AM CDT -----  Regarding: Patient call back  .Type: Patient Call Back    Who called:self     What is the request in detail:caller states he is leaving the country and needs a print out of all the medications that he takes, states he needs this to go through customs.    Can the clinic reply by [facility]?no     Would the patient rather a call back or a response via [facility]? Call     Best call back number:..[phone]        Additional Information:

## 2024-10-21 ENCOUNTER — TELEPHONE (OUTPATIENT)
Dept: ORTHOPEDICS | Facility: CLINIC | Age: 74
End: 2024-10-21
Payer: MEDICARE

## 2024-10-21 DIAGNOSIS — R52 PAIN: Primary | ICD-10-CM

## 2024-10-22 ENCOUNTER — HOSPITAL ENCOUNTER (OUTPATIENT)
Dept: RADIOLOGY | Facility: HOSPITAL | Age: 74
Discharge: HOME | End: 2024-10-22
Attending: STUDENT IN AN ORGANIZED HEALTH CARE EDUCATION/TRAINING PROGRAM
Payer: MEDICARE

## 2024-10-22 ENCOUNTER — OFFICE VISIT (OUTPATIENT)
Dept: ORTHOPEDICS | Facility: CLINIC | Age: 74
End: 2024-10-22
Payer: MEDICARE

## 2024-10-22 VITALS — BODY MASS INDEX: 28.21 KG/M2 | WEIGHT: 212.88 LBS | HEIGHT: 73 IN

## 2024-10-22 DIAGNOSIS — R52 PAIN: ICD-10-CM

## 2024-10-22 DIAGNOSIS — M23.91 DERANGEMENT OF RIGHT KNEE: ICD-10-CM

## 2024-10-22 DIAGNOSIS — R52 PAIN: Primary | ICD-10-CM

## 2024-10-22 DIAGNOSIS — M17.10 ARTHRITIS OF KNEE: Primary | ICD-10-CM

## 2024-10-22 PROCEDURE — 99214 OFFICE O/P EST MOD 30 MIN: CPT | Mod: 25,S$PBB,, | Performed by: STUDENT IN AN ORGANIZED HEALTH CARE EDUCATION/TRAINING PROGRAM

## 2024-10-22 PROCEDURE — 99999 PR PBB SHADOW E&M-EST. PATIENT-LVL III: CPT | Mod: PBBFAC,,, | Performed by: STUDENT IN AN ORGANIZED HEALTH CARE EDUCATION/TRAINING PROGRAM

## 2024-10-22 PROCEDURE — 73564 X-RAY EXAM KNEE 4 OR MORE: CPT | Mod: 26,RT,, | Performed by: RADIOLOGY

## 2024-10-22 PROCEDURE — 20610 DRAIN/INJ JOINT/BURSA W/O US: CPT | Mod: PBBFAC | Performed by: STUDENT IN AN ORGANIZED HEALTH CARE EDUCATION/TRAINING PROGRAM

## 2024-10-22 PROCEDURE — 99999PBSHW PR PBB SHADOW TECHNICAL ONLY FILED TO HB: Mod: PBBFAC,,,

## 2024-10-22 PROCEDURE — 99213 OFFICE O/P EST LOW 20 MIN: CPT | Mod: PBBFAC,25 | Performed by: STUDENT IN AN ORGANIZED HEALTH CARE EDUCATION/TRAINING PROGRAM

## 2024-10-22 PROCEDURE — 73564 X-RAY EXAM KNEE 4 OR MORE: CPT | Mod: TC,RT

## 2024-10-22 PROCEDURE — 73562 X-RAY EXAM OF KNEE 3: CPT | Mod: 26,LT,, | Performed by: RADIOLOGY

## 2024-10-22 RX ORDER — TRIAMCINOLONE ACETONIDE 40 MG/ML
40 INJECTION, SUSPENSION INTRA-ARTICULAR; INTRAMUSCULAR
Status: DISCONTINUED | OUTPATIENT
Start: 2024-10-22 | End: 2024-10-22 | Stop reason: HOSPADM

## 2024-10-22 RX ADMIN — TRIAMCINOLONE ACETONIDE 40 MG: 40 INJECTION, SUSPENSION INTRA-ARTICULAR; INTRAMUSCULAR at 02:10

## 2024-10-22 NOTE — PROGRESS NOTES
Assessment & Plan   Encounter Diagnoses   Name Primary?    Arthritis of knee Yes    Derangement of right knee         Rizwan Demarco Jr.  Has bilateral knee arthritis of the medial compartment radiographically, but on the right side is  painful although he does admit to stiffness in both knees we discussed that there are number conservative treatment options available that she would assist him with the pain and stiffness.  We agreed upon the following plan: corticosteroid injection in the right knee which was done today in clinic without complication, see separate procedure note; physical therapy to work on bilateral range of motion  and strengthening,  hinged knee brace for the right knee because it does buckle on him at times especially during pivoting or twisting.  We would like to see him back in about 3 months for re-evaluation, although he is certainly welcome to come back sooner if there are any new or worsening symptoms or other concerns     Problem List:  Problem List Items Addressed This Visit    None  Visit Diagnoses       Arthritis of knee    -  Primary    Relevant Orders    Ambulatory referral/consult to Physical/Occupational Therapy    HME - OTHER    Large Joint Aspiration/Injection: R knee    Derangement of right knee        Relevant Orders    HME - OTHER              Patient ID: Rizwan Demarco Jr. is a 74 y.o. male.  Chief Complaint   Patient presents with    Right Knee - Pain        History of Present Illness:  Rizwan Demarco Jr. is a 74 y.o. male who presents for evaluation of Right knee pain. The knee pain has been present for years and  been progressive. Conservative treatment in the form of rest, activity modification has been attempted previously.  Of note, he had arthroscopic medial meniscectomy decades ago when he was in the . The patient requires No assistive device. The pain is worsened by   Walking, stairs. Physical therapy  has not been attempted previously. The patient  has a decreased quality of life due to knee pain and presents today for evaluation.     Past Medical History:  Past Medical History:   Diagnosis Date    Allergy     Anticoagulant long-term use     Arthritis     Rheumatoid arthritis    Back pain     Blood clotting tendency     BPH (benign prostatic hypertrophy)     Cervical spondylosis     Chronic a-fib 7/18/2024    Colon polyp 2010    adenoma    Coronary artery disease     Depression 05/08/2015    Dry eyes     Dry mouth     GERD (gastroesophageal reflux disease)     Hyperlipidemia     Hypertension     Lumbar spondylosis     Nasal obstruction     PAD (peripheral artery disease) 04/25/2023    Rheumatoid arthritis     Sinusitis     Special screening for malignant neoplasm of colon 06/29/2016    Trouble in sleeping         Surgical History:  Past Surgical History:   Procedure Laterality Date    AORTOGRAPHY N/A 6/19/2024    Procedure: AORTOGRAM;  Surgeon: Abbe Vilchis MD;  Location: [facility] CATH LAB;  Service: Cardiology;  Laterality: N/A;    ATHERECTOMY Left 6/21/2023    Procedure: Atherectomy;  Surgeon: Abbe Vilchis MD;  Location: [facility] CATH LAB;  Service: Cardiology;  Laterality: Left;    CHALAZION EXCISION      COLONOSCOPY N/A 6/29/2016    Procedure: COLONOSCOPY;  Surgeon: Partha Saucedo MD;  Location: [facility] ENDO;  Service: Endoscopy;  Laterality: N/A;    COLONOSCOPY N/A 7/24/2020    Procedure: COLONOSCOPY;  Surgeon: Ian Martinez MD;  Location: [facility] ([address] [place] [address]);  Service: Endoscopy;  Laterality: N/A;  4/16/20 - removed from 5/1/20, not called yet due to acute pain issues being addressed today - pg    COLONOSCOPY N/A 10/19/2023    Procedure: COLONOSCOPY;  Surgeon: Ian Martinez MD;  Location: [facility];  Service: Endoscopy;  Laterality: N/A;  order created from Dr. Martinez's previous colonoscopy report 7/24/2020-3 year surveillance.   ok to hold Eliquis 2 days and Plavix 5 days per Dr Vilchis-GT  PEG instr portal -ml  10/12- pre call complete.  LETICIA HORNE  ANGIOGRAPHY INCLUDING BYPASS GRAFTS WITH CATHETERIZATION OF LEFT HEART N/A 6/19/2024    Procedure: ANGIOGRAM, CORONARY, INCLUDING BYPASS GRAFT, WITH LEFT HEART CATHETERIZATION;  Surgeon: Abbe Viclhis MD;  Location: [facility] CATH LAB;  Service: Cardiology;  Laterality: N/A;  RN PREOP 06/17/2024    CORONARY ARTERY BYPASS GRAFT      ESOPHAGOGASTRODUODENOSCOPY N/A 7/24/2020    Procedure: ESOPHAGOGASTRODUODENOSCOPY (EGD);  Surgeon: Ian Martinez MD;  Location: [facility] ([address] [place] [address]);  Service: Endoscopy;  Laterality: N/A;  4/16/20 - removed from 5/1/20, not called yet due to acute pain issues being addressed today.  4/17/20 - rescheduled 7/24/20 - pg    EXCISION TURBINATE, SUBMUCOUS      INSERTION, ELECTRODE LEAD, CARDIAC PACEMAKER, 1 ELECTRODE LEAD Left 8/26/2024    Procedure: INSERTION, ELECTRODE LEAD, PACEMAKER, 1 ELECTRODE LEAD;  Surgeon: Rizwan Elise MD;  Location: [facility] CATH LAB;  Service: Cardiology;  Laterality: Left;    KNEE ARTHROSCOPY W/ DEBRIDEMENT      NASAL SEPTUM SURGERY      PERIPHERAL ANGIOGRAPHY N/A 6/21/2023    Procedure: Peripheral angiography;  Surgeon: Abbe Vilchis MD;  Location: [facility] CATH LAB;  Service: Cardiology;  Laterality: N/A;  right radial access--- RN PREOP 6/16----JM    TONSILLECTOMY          Social History:  He reports that he quit smoking about 33 years ago. His smoking use included cigarettes. He started smoking about 53 years ago. He has a 20 pack-year smoking history. He has never used smokeless tobacco. He reports that he does not drink alcohol and does not use drugs.     Family History:  family history includes Alzheimer's disease in his mother; Cataracts in an other family member; Colon cancer in his father; Hyperlipidemia in his mother; No Known Problems in his maternal aunt, maternal grandfather, maternal grandmother, maternal uncle, paternal aunt, paternal grandfather, paternal grandmother, and paternal uncle; Stomach cancer in his father.     Current Outpatient Medications on  File Prior to Visit   Medication Sig Dispense Refill    alfuzosin (UROXATRAL) 10 mg Tb24 Take 1 tablet (10 mg total) by mouth daily with breakfast. 90 tablet 3    amLODIPine (NORVASC) 5 MG tablet Take 1 tablet (5 mg total) by mouth once daily. 90 tablet 3    apixaban (ELIQUIS) 5 mg Tab Take 1 tablet (5 mg total) by mouth 2 (two) times daily. 60 tablet 11    artificial tears,hypromellose,,GENTEAL/SUSTANE, (SYSTANE GEL) 0.3 % Gel 1 drop as needed.      ascorbic acid, vitamin C, (VITAMIN C) 1000 MG tablet Take 1,000 mg by mouth once daily.      atorvastatin (LIPITOR) 40 MG tablet Take 1 tablet (40 mg total) by mouth every evening. 90 tablet 3    cephALEXin (KEFLEX) 500 MG capsule Take 1 capsule (500 mg total) by mouth every 6 (six) hours. 12 capsule 0    ciclopirox (LOPROX) 0.77 % Crea Apply topically 2 (two) times daily. 90 g 2    ciclopirox (PENLAC) 8 % Soln Apply topically nightly. 6.6 mL 11    cinnamon bark 500 mg capsule Take 500 mg by mouth once daily.      ezetimibe (ZETIA) 10 mg tablet Take 1 tablet (10 mg total) by mouth once daily. 90 tablet 3    hydroCHLOROthiazide (MICROZIDE) 12.5 mg capsule Take 1 capsule (12.5 mg total) by mouth once daily. 90 capsule 3    HYDROcodone-acetaminophen (NORCO) 5-325 mg per tablet Take 1 tablet by mouth every 12 (twelve) hours as needed for Pain. 8 tablet 0    linaCLOtide (LINZESS) 72 mcg Cap capsule Take 1 capsule (72 mcg total) by mouth before breakfast. 30 capsule 11    losartan (COZAAR) 25 MG tablet Take 1 tablet (25 mg total) by mouth once daily. 90 tablet 3    multivitamin (THERAGRAN) per tablet Take by mouth.  Tablet Oral Every day      omega-3 fatty acids 1,250 mg Cap Take by mouth.      omeprazole (PRILOSEC) 20 MG capsule Take 1 capsule (20 mg total) by mouth every morning. 90 capsule 3    oxymetazoline (AFRIN) 0.05 % nasal spray 2 sprays by Nasal route 2 (two) times daily.      propylene glycoL 0.6 % Drop Apply to eye. Not currently using      sildenafiL (VIAGRA) 100  "MG tablet Take 1 tablet (100 mg total) by mouth daily as needed. 10 tablet 11    sodium bicarb-sodium chloride Pack by sinus irrigation route.       No current facility-administered medications on file prior to visit.     Review of patient's allergies indicates:   Allergen Reactions    Aspirin Nausea And Vomiting    Astelin [azelastine] Other (See Comments)     Dry mouth    Flomax [tamsulosin] Other (See Comments)     Nosebleed          Physical exam:  Height 6' 1" (1.854 m), weight 96.6 kg (212 lb 13.7 oz).  General: no apparent distress    Gait: + antalgic, no use of assistive devices    R knee:   TTP at the medial joint line and not at the patella  Skin: intact, mild effusion  Range of motion: 5 to 110  Strength: 4+/5 with extension, 5/5 with flexion  Ligament exam: stable to varus/valgus and stable to AP stress in flexion and extension  Neurovascular: WWP,  Light touch sensation intact    L knee:   Not TTP   Skin: intact, no effusion  Range of motion: 2 to 110  Strength: 5/5 with extension, 5/5 with flexion  Ligament exam: stable to varus/valgus and stable to AP stress in flexion and extension  Neurovascular: WWP,  Light touch sensation intact     Relevant Results:  Imaging:  Plain x-rays of the b/l knee were obtained and independently reviewed by me today, 10/22/2024, and demonstrate  moderate narrowing of the  medial compartment,  medialosteophytes and sclerosis c/w KL grade  2 or 3 arthritic change. There is  neutral alignment.             "

## 2024-10-22 NOTE — PROCEDURES
Large Joint Aspiration/Injection: R knee    Date/Time: 10/22/2024 2:30 PM    Performed by: Sidney Wilson MD  Authorized by: Sidney Wilson MD    Consent Done?:  Yes (Verbal)  Indications:  Arthritis and pain  Site marked: the procedure site was marked    Timeout: prior to procedure the correct patient, procedure, and site was verified    Prep: patient was prepped and draped in usual sterile fashion      Local anesthesia used?: Yes    Anesthesia:  Local infiltration    Details:  Needle Size:  25 G  Location:  Knee  Site:  R knee  Medications:  40 mg triamcinolone acetonide 40 mg/mL  Patient tolerance:  Patient tolerated the procedure well with no immediate complications

## 2024-11-19 ENCOUNTER — CLINICAL SUPPORT (OUTPATIENT)
Dept: REHABILITATION | Facility: HOSPITAL | Age: 74
End: 2024-11-19
Payer: MEDICARE

## 2024-11-19 DIAGNOSIS — M25.562 CHRONIC PAIN OF BOTH KNEES: Primary | ICD-10-CM

## 2024-11-19 DIAGNOSIS — R26.89 ANTALGIC GAIT: ICD-10-CM

## 2024-11-19 DIAGNOSIS — M25.561 CHRONIC PAIN OF BOTH KNEES: Primary | ICD-10-CM

## 2024-11-19 DIAGNOSIS — M17.10 ARTHRITIS OF KNEE: ICD-10-CM

## 2024-11-19 DIAGNOSIS — G89.29 CHRONIC PAIN OF BOTH KNEES: Primary | ICD-10-CM

## 2024-11-19 PROCEDURE — 97110 THERAPEUTIC EXERCISES: CPT | Mod: PN

## 2024-11-19 PROCEDURE — 97161 PT EVAL LOW COMPLEX 20 MIN: CPT | Mod: PN

## 2024-11-19 NOTE — PLAN OF CARE
[facility]  Physical Therapy Initial Evaluation    Date: 11/19/2024   Name: Rizwan Demarco Jr.  Clinic Number: [id number]    Therapy Diagnosis:   Encounter Diagnoses   Name Primary?    Arthritis of knee     Chronic pain of both knees Yes    Antalgic gait      Physician: Sidney Wilson MD    Physician Orders: PT Eval and Treat   Medical Diagnosis from Referral: Arthritis of knee   Evaluation Date: 11/19/2024  Authorization Period Expiration: 12-31-24  Plan of Care Expiration: 2-19-25  Visit # / Visits authorized: 1/ 20   Progress Note Due: 12-19-24  FOTO: 1/ 1    Precautions: Standard    Time In: 3:00 pm  Time Out: 3:35 pm  Total Appointment Time (timed & untimed codes): 35  minutes    Subjective   Date of onset: years ago  History of current condition - Rizwan reports: that he has B knee pain, but he has pain the R knee. He states he has pain in the R knee with motion. He has limited motion with both knees.  Pt states knee pain is localized       SEAMUS: No traumatic event.   Any popping: No   Any Locking:  No   Any buckling: No   Pain radiates: no  Pain constant or intermittent:   Any injections: no    Pain:  Current 0/10, worst 10/10, best 0/10   Location: bilateral knee   Description: Sharp  Aggravating Factors: Walking and wrong move, stairs   Easing Factors: nothing    Prior Therapy: yes neck  Social History: lives with their family  Occupation: retired   Prior Level of Function: independent   Current Level of Function: independent     Pts goals: be able to bend both legs     Imaging, bone scan films:   FINDINGS:  Right knee joint is narrowed medially.  Bony structures are intact.  No joint effusion is seen.     Medical History:   Past Medical History:   Diagnosis Date    Allergy     Anticoagulant long-term use     Arthritis     Rheumatoid arthritis    Back pain     Blood clotting tendency     BPH (benign prostatic hypertrophy)     Cervical spondylosis     Chronic a-fib 7/18/2024     Colon polyp 2010    adenoma    Coronary artery disease     Depression 05/08/2015    Dry eyes     Dry mouth     GERD (gastroesophageal reflux disease)     Hyperlipidemia     Hypertension     Lumbar spondylosis     Nasal obstruction     PAD (peripheral artery disease) 04/25/2023    Rheumatoid arthritis     Sinusitis     Special screening for malignant neoplasm of colon 06/29/2016    Trouble in sleeping        Surgical History:   Rizwan Demarco Jr.  has a past surgical history that includes Coronary artery bypass graft; Tonsillectomy; Knee arthroscopy w/ debridement; Excision turbinate, submucous; Nasal septum surgery; Colonoscopy (N/A, 6/29/2016); Esophagogastroduodenoscopy (N/A, 7/24/2020); Colonoscopy (N/A, 7/24/2020); Chalazion excision; Peripheral angiography (N/A, 6/21/2023); Atherectomy (Left, 6/21/2023); Colonoscopy (N/A, 10/19/2023); Coronary angiography including bypass grafts with catheterization of left heart (N/A, 6/19/2024); Aortography (N/A, 6/19/2024); and insertion, electrode lead, cardiac pacemaker, 1 electrode lead (Left, 8/26/2024).    Medications:   Rizwan has a current medication list which includes the following prescription(s): alfuzosin, amlodipine, apixaban, systane gel, ascorbic acid (vitamin c), atorvastatin, cephalexin, ciclopirox, ciclopirox, cinnamon bark, ezetimibe, hydrochlorothiazide, hydrocodone-acetaminophen, linaclotide, losartan, multivitamin, omega-3 fatty acids, omeprazole, oxymetazoline, propylene glycol, sildenafil, and sodium bicarb-sodium chloride.    Allergies:   Review of patient's allergies indicates:   Allergen Reactions    Aspirin Nausea And Vomiting    Astelin [azelastine] Other (See Comments)     Dry mouth    Flomax [tamsulosin] Other (See Comments)     Nosebleed          Objective       Observation:       Posture Alignment: knee valgus ;    Sensation: intact to light touch    DTR: intact to light touch    GAIT DEVIATIONS: Rizwan displays antalgic gait;    Range  of Motion:   Knee Left active   Flexion 120 deg pain     Extension 0 deg      Knee Right active   Flexion 121 deg pain    Extension 0 deg       Lower Extremity Strength   Right LE  Left LE    Knee extension: 4+/5 Knee extension: 4+/5   Knee flexion: 4+/5 Knee flexion: 4+/5   Hip flexion: 4+/5 Hip flexion: 4+/5   Hip extension:  4+/5 Hip extension: 4+/5   Hip abduction: 4+/5 Hip abduction: 4+/5   Hip adduction: 4+/5 Hip adduction 4+/5   Ankle dorsiflexion: 4+/5 Ankle dorsiflexion: 4+/5   Ankle plantarflexion: 4+/5 Ankle plantarflexion: 4+/5         Joint Mobility:      Patellar sup./inf: normal   Patellar med/lat: normal     Palpation: no pain      Flexibility: =   Ely's test: R = +; L = +   Popliteal Angle: R = lacking 30 degrees ; L = lacking 30 degrees                     TREATMENT   Treatment Time In: 3:20 pm  Treatment Time Out: 3:30 pm  Total Treatment time separate from Evaluation: 10 minutes    Rizwan received therapeutic exercises to develop strength, endurance, ROM, and flexibility for 10 minutes including:  Supine hamstring stretch  Long sitting calf stretch  Long sitting hamstring stretch      Home Exercises and Patient Education Provided    Education provided:   - Perform HEP 2 times per day    Written Home Exercises Provided: Patient instructed to cont prior HEP.  Exercises were reviewed and Rizwan was able to demonstrate them prior to the end of the session.  Rizwan demonstrated good  understanding of the education provided.     See EMR under Patient Instructions for exercises provided 11/19/2024.    Assessment   Rizwan is a 74 y.o. male referred to outpatient Physical Therapy with a medical diagnosis of Arthritis of knee . Pt presents to therapy with B knee pain. Pt has pain during certain motion of knee. Pain is quickly sharp pain. Pt is able to achieve 120 deg of B knee flexion. Pt has 0 deg of B knee extension. Pt has 4+/5 in all muscles group during MMT of B LE. Pt demonstrated decrease hamstring  and calf flexibility. Pain is affecting functional ambulation at home and community. Pt will benefit from skilled PT services to return to PLOF.     Pt prognosis is Good.   Pt will benefit from skilled outpatient Physical Therapy to address the deficits stated above and in the chart below, provide pt/family education, and to maximize pt's level of independence.     Plan of care discussed with patient: Yes  Pt's spiritual, cultural and educational needs considered and patient is agreeable to the plan of care and goals as stated below:     Anticipated Barriers for therapy: Scheduling issues     Medical Necessity is demonstrated by the following  History  Co-morbidities and personal factors that may impact the plan of care [x] LOW: no personal factors / co-morbidities  [] MODERATE: 1-2 personal factors / co-morbidities  [] HIGH: 3+ personal factors / co-morbidities    Moderate / High Support Documentation:   Co-morbidities affecting plan of care:   Allergy    Anticoagulant long-term use    Arthritis    Rheumatoid arthritis   Back pain    Blood clotting tendency    BPH (benign prostatic hypertrophy)    Cervical spondylosis    Chronic a-fib    Colon polyp    adenoma   Coronary artery disease    Depression    Dry eyes    Dry mouth    GERD (gastroesophageal reflux disease)    Hyperlipidemia    Hypertension    Lumbar spondylosis    Nasal obstruction    PAD (peripheral artery disease)    Rheumatoid arthritis    Sinusitis    Special screening for malignant neoplasm of colon    Trouble in sleeping        Personal Factors:   no deficits     Examination  Body Structures and Functions, activity limitations and participation restrictions that may impact the plan of care [x] LOW: addressing 1-2 elements  [] MODERATE: 3+ elements  [] HIGH: 4+ elements (please support below)    Moderate / High Support Documentation: flexibility and AROM, and strength      Clinical Presentation [x] LOW: stable  [] MODERATE: Evolving  [] HIGH:  Unstable     Decision Making/ Complexity Score: low         GOALS: Short Term Goals:  4 weeks  1.Report decreased in pain at worse less than  <   / =  5  /10  to increase tolerance for functional mobility.On going  2. Pt to increase B popliteal angle by greater than > or = 5 degrees in order to improve flexibility and posture. On going  3.Pt to increase B Ely's Test by greater than  > or = 5degrees in order to improve flexibility and posture. On going  4. Pt to tolerate HEP to improve ROM and independence with ADL's.On going    Long Term Goals: 8 weeks  1.Report decreased in pain at worse less than  <   / =  1  /10  to increase tolerance for functional mobility. On going  2. Pt will report 90% or greater  on FOTO knee survey  to demonstrate increase in LE function with every day tasks. On going  3. Pt to be Independent with HEP to improve ROM and independence with ADL's. On going  4. Pt to increase B popliteal angle by greater than > or = 5 degrees in order to improve flexibility and posture. On going  5.Pt to increase B Ely's Test by greater than  > or = 5degrees in order to improve flexibility and posture. On going    Plan   Plan of care Certification: 11/19/2024 to 2-19-24.    Outpatient Physical Therapy 2 times weekly for 12 weeks to include the following interventions: Gait Training, Manual Therapy, Moist Heat/ Ice, Neuromuscular Re-ed, Patient Education, Self Care, Therapeutic Activities, and Therapeutic Exercise. Dry rosa Hurt, PT      I CERTIFY THE NEED FOR THESE SERVICES FURNISHED UNDER THIS PLAN OF TREATMENT AND WHILE UNDER MY CARE   Physician's comments:     Physician's Signature: ___________________________________________________

## 2024-11-21 ENCOUNTER — CLINICAL SUPPORT (OUTPATIENT)
Dept: REHABILITATION | Facility: HOSPITAL | Age: 74
End: 2024-11-21
Payer: MEDICARE

## 2024-11-21 DIAGNOSIS — M25.562 CHRONIC PAIN OF BOTH KNEES: Primary | ICD-10-CM

## 2024-11-21 DIAGNOSIS — M25.561 CHRONIC PAIN OF BOTH KNEES: Primary | ICD-10-CM

## 2024-11-21 DIAGNOSIS — G89.29 CHRONIC PAIN OF BOTH KNEES: Primary | ICD-10-CM

## 2024-11-21 DIAGNOSIS — M17.10 ARTHRITIS OF KNEE: ICD-10-CM

## 2024-11-21 DIAGNOSIS — R26.89 ANTALGIC GAIT: ICD-10-CM

## 2024-11-21 PROCEDURE — 97110 THERAPEUTIC EXERCISES: CPT | Mod: PN,CQ

## 2024-11-21 PROCEDURE — 97530 THERAPEUTIC ACTIVITIES: CPT | Mod: PN,CQ

## 2024-11-21 NOTE — PROGRESS NOTES
"[facility]   Physical Therapy Treatment Note     Name: Rizwan Demarco Jr.  Clinic Number: [id number]    Therapy Diagnosis:   Encounter Diagnoses   Name Primary?    Chronic pain of both knees Yes    Antalgic gait     Arthritis of knee      Physician: Sidney Wilson MD    Visit Date: 11/21/2024    Physician Orders: PT Eval and Treat   Medical Diagnosis from Referral: Arthritis of knee   Evaluation Date: 11/19/2024  Authorization Period Expiration: 12-31-24  Plan of Care Expiration: 2-19-25  Visit # / Visits authorized: 1/ 20   Progress Note Due: 12-19-24  FOTO: 1/ 1     Precautions: Standard       Visit #: 1 of 20  PTA Visit #: 1  Time In: 0700am   Time Out: 0800am   Total Billable Time: 60 minutes    Subjective     Pt reports: his knees only hurts when he move certain way. Right now, it's not hurting. He has no major problem with the exercises prescribed last visit. Patient states that he noticed it hurts more when he doesn't take his vitamins.   He was compliant with home exercise program.  Response to previous treatment: first treatment day   Functional change: ongoing     Pain: 0/10  Location: bilateral knees     Objective     Rizwan participated in dynamic functional therapeutic activities to improve functional performance for 10 minutes, including:    Nu-step for 10 minutes for endurance and legs strengthening Level 1.0 (may increase level next visit)         Rizwan received therapeutic exercises to develop strength, endurance, ROM, and flexibility for 50 minutes including:    Performs all exercises on both knees:   Quad sets bilateral with towel rolled under knee, 2x10   SAQ bilateral 2x10 (will add weights next visit)   Heel slide bilateral using sliding board and blue strap, 2x10   Supine hamstrings stretch using the blue strap, 3x30" holds   SLR bilateral, 2x10    - cues to point toes towards head when lifting leg up   HL hip abd with GTB 2x10 (increase reps next visit)    HL " hip add with ball squeezes, 2x10 hold for 5 seconds (increase reps next visit)   Shuttle double legs press 2 black bands, 2x10 (will increase reps next visit)       Add in future sessions:   Standing heel raises   Standing hip abduction bilateral   Matrix machines       Rizwan received the following manual therapy techniques: Joint mobilizations and Soft tissue Mobilization were applied to the: bilateral knees for 00 minutes, including:      Rizwan participated in neuromuscular re-education activities to improve: Balance, Coordination, Sense, and Proprioception for 00 minutes. The following activities were included:            Home Exercises Provided and Patient Education Provided     Written Home Exercises Provided: yes.  Exercises were reviewed and Rizwan was able to demonstrate them prior to the end of the session.  Rizwan demonstrated good  understanding of the education provided.     Education provided:   - home exercise program     Assessment   First treatment day after evaluation for bilateral knees pain. Focused on knee range of motion and quads strengthening which patient tolerates fairly well. Provided verbal cues for all exercises for proper techniques. Increase soreness to quadriceps muscles in SLR exercise. No reports of increase pain during or after therapy. Noted patient showed good flexibility in the knees, but weakness in lower extremity. Will incorporate functional strengthening next visit.     Rizwan Is progressing well towards his goals.   Pt prognosis is Good.     Pt will continue to benefit from skilled outpatient physical therapy to address the deficits listed in the problem list box on initial evaluation, provide pt/family education and to maximize pt's level of independence in the home and community environment.     Pt's spiritual, cultural and educational needs considered and pt agreeable to plan of care and goals.     Anticipated barriers to physical therapy: Scheduling issues      Goals:    Short Term Goals:  4 weeks  1.Report decreased in pain at worse less than  <   / =  5  /10  to increase tolerance for functional mobility.On going  2. Pt to increase B popliteal angle by greater than > or = 5 degrees in order to improve flexibility and posture. On going  3.Pt to increase B Ely's Test by greater than  > or = 5degrees in order to improve flexibility and posture. On going  4. Pt to tolerate HEP to improve ROM and independence with ADL's.On going     Long Term Goals: 8 weeks  1.Report decreased in pain at worse less than  <   / =  1  /10  to increase tolerance for functional mobility. On going  2. Pt will report 90% or greater  on FOTO knee survey  to demonstrate increase in LE function with every day tasks. On going  3. Pt to be Independent with HEP to improve ROM and independence with ADL's. On going  4. Pt to increase B popliteal angle by greater than > or = 5 degrees in order to improve flexibility and posture. On going  5.Pt to increase B Ely's Test by greater than  > or = 5degrees in order to improve flexibility and posture. On going   Plan   Plan of care Certification: 11/19/2024 to 2-19-24.     Outpatient Physical Therapy 2 times weekly for 12 weeks to include the following interventions: Gait Training, Manual Therapy, Moist Heat/ Ice, Neuromuscular Re-ed, Patient Education, Self Care, Therapeutic Activities, and Therapeutic Exercise. Dry needling.     Continue with plan of care     Ambar Altamirano, PTA   11/21/2024

## 2024-11-26 ENCOUNTER — OFFICE VISIT (OUTPATIENT)
Dept: FAMILY MEDICINE | Facility: CLINIC | Age: 74
End: 2024-11-26
Payer: MEDICARE

## 2024-11-26 VITALS
OXYGEN SATURATION: 97 % | DIASTOLIC BLOOD PRESSURE: 82 MMHG | WEIGHT: 210.56 LBS | HEART RATE: 69 BPM | TEMPERATURE: 98 F | BODY MASS INDEX: 27.91 KG/M2 | HEIGHT: 73 IN | SYSTOLIC BLOOD PRESSURE: 138 MMHG

## 2024-11-26 DIAGNOSIS — M05.79 RHEUMATOID ARTHRITIS INVOLVING MULTIPLE SITES WITH POSITIVE RHEUMATOID FACTOR: ICD-10-CM

## 2024-11-26 DIAGNOSIS — R79.9 ABNORMAL FINDING OF BLOOD CHEMISTRY: Primary | ICD-10-CM

## 2024-11-26 DIAGNOSIS — I10 ESSENTIAL HYPERTENSION: ICD-10-CM

## 2024-11-26 DIAGNOSIS — I70.0 ATHEROSCLEROSIS OF AORTA: ICD-10-CM

## 2024-11-26 DIAGNOSIS — I73.9 PAD (PERIPHERAL ARTERY DISEASE): ICD-10-CM

## 2024-11-26 DIAGNOSIS — R93.89 ABNORMAL FINDINGS ON DIAGNOSTIC IMAGING OF OTHER SPECIFIED BODY STRUCTURES: ICD-10-CM

## 2024-11-26 DIAGNOSIS — D69.6 THROMBOCYTOPENIA, UNSPECIFIED: ICD-10-CM

## 2024-11-26 DIAGNOSIS — Z12.5 ENCOUNTER FOR SCREENING FOR MALIGNANT NEOPLASM OF PROSTATE: ICD-10-CM

## 2024-11-26 PROCEDURE — G2211 COMPLEX E/M VISIT ADD ON: HCPCS | Mod: S$PBB,,, | Performed by: FAMILY MEDICINE

## 2024-11-26 PROCEDURE — 99214 OFFICE O/P EST MOD 30 MIN: CPT | Mod: S$PBB,,, | Performed by: FAMILY MEDICINE

## 2024-11-26 PROCEDURE — 99999 PR PBB SHADOW E&M-EST. PATIENT-LVL IV: CPT | Mod: PBBFAC,,, | Performed by: FAMILY MEDICINE

## 2024-11-26 PROCEDURE — 99214 OFFICE O/P EST MOD 30 MIN: CPT | Mod: PBBFAC,PO | Performed by: FAMILY MEDICINE

## 2024-11-26 NOTE — PROGRESS NOTES
Chief Complaint   Patient presents with    Follow-up       SUBJECTIVE:   Rizwan Demarco Jr. is a 74 y.o. male presenting for his annual checkup.   Current Outpatient Medications   Medication Sig Dispense Refill    alfuzosin (UROXATRAL) 10 mg Tb24 Take 1 tablet (10 mg total) by mouth daily with breakfast. 90 tablet 3    amLODIPine (NORVASC) 5 MG tablet Take 1 tablet (5 mg total) by mouth once daily. 90 tablet 3    apixaban (ELIQUIS) 5 mg Tab Take 1 tablet (5 mg total) by mouth 2 (two) times daily. 60 tablet 11    artificial tears,hypromellose,,GENTEAL/SUSTANE, (SYSTANE GEL) 0.3 % Gel 1 drop as needed.      ascorbic acid, vitamin C, (VITAMIN C) 1000 MG tablet Take 1,000 mg by mouth once daily.      atorvastatin (LIPITOR) 40 MG tablet Take 1 tablet (40 mg total) by mouth every evening. 90 tablet 3    cephALEXin (KEFLEX) 500 MG capsule Take 1 capsule (500 mg total) by mouth every 6 (six) hours. 12 capsule 0    ciclopirox (LOPROX) 0.77 % Crea Apply topically 2 (two) times daily. 90 g 2    ciclopirox (PENLAC) 8 % Soln Apply topically nightly. 6.6 mL 11    cinnamon bark 500 mg capsule Take 500 mg by mouth once daily.      ezetimibe (ZETIA) 10 mg tablet Take 1 tablet (10 mg total) by mouth once daily. 90 tablet 3    hydroCHLOROthiazide (MICROZIDE) 12.5 mg capsule Take 1 capsule (12.5 mg total) by mouth once daily. 90 capsule 3    HYDROcodone-acetaminophen (NORCO) 5-325 mg per tablet Take 1 tablet by mouth every 12 (twelve) hours as needed for Pain. 8 tablet 0    linaCLOtide (LINZESS) 72 mcg Cap capsule Take 1 capsule (72 mcg total) by mouth before breakfast. 30 capsule 11    losartan (COZAAR) 25 MG tablet Take 1 tablet (25 mg total) by mouth once daily. 90 tablet 3    multivitamin (THERAGRAN) per tablet Take by mouth.  Tablet Oral Every day      omega-3 fatty acids 1,250 mg Cap Take by mouth.      omeprazole (PRILOSEC) 20 MG capsule Take 1 capsule (20 mg total) by mouth every morning. 90 capsule 3    oxymetazoline  (AFRIN) 0.05 % nasal spray 2 sprays by Nasal route 2 (two) times daily.      propylene glycoL 0.6 % Drop Apply to eye. Not currently using      sildenafiL (VIAGRA) 100 MG tablet Take 1 tablet (100 mg total) by mouth daily as needed. 10 tablet 11    sodium bicarb-sodium chloride Pack by sinus irrigation route.       No current facility-administered medications for this visit.     Allergies: Aspirin, Astelin [azelastine], and Flomax [tamsulosin]   Patient Active Problem List    Diagnosis Date Noted    Chronic pain of both knees 11/19/2024    Antalgic gait 11/19/2024    Thrombocytopenia, unspecified 08/28/2024    Pacemaker 08/26/2024    SSS (sick sinus syndrome) 08/25/2024    Hypokalemia 08/24/2024    Pulmonary heart disease 07/22/2024    Sebaceous cyst 07/18/2024    Carotid artery disorder 07/18/2024    Atrial fibrillation with slow ventricular response 07/18/2024    PAD (peripheral artery disease) 04/25/2023    Poor circulation 04/25/2023    Atherosclerosis of aorta 01/24/2019    Wears dentures 05/21/2018    Dysfunctions associated with sleep stages or arousal from sleep     Primary insomnia     Nasal obstruction 09/18/2015    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 No dyspnea or chest pain on exertion. No abdominal pain, change in bowel habits, black or bloody stools. No urinary tract or prostatic symptoms. No neurological complaints.    OBJECTIVE:   The patient appears well, alert, oriented x 3,  "in no distress.   /82   Pulse 69   Temp 97.5 °F (36.4 °C) (Oral)   Ht 6' 1" (1.854 m)   Wt 95.5 kg (210 lb 8.6 oz)   SpO2 97%   BMI 27.78 kg/m²   Wt Readings from Last 5 Encounters:   11/26/24 95.5 kg (210 lb 8.6 oz)   10/22/24 96.6 kg (212 lb 13.7 oz)   10/15/24 98.3 kg (216 lb 11.4 oz)   09/30/24 98.4 kg (216 lb 14.9 oz)   09/09/24 100 kg (220 lb 7.4 oz)       ENT normal.  Neck supple. No adenopathy or thyromegaly. DANIEL. Lungs are clear, good air entry, no wheezes, rhonchi or rales. S1 and S2 normal, no murmurs, regular rate and rhythm. Abdomen is soft without tenderness, guarding, mass or organomegaly.  exam: deferred.  Extremities show no edema, normal peripheral pulses. Neurological is normal without focal findings. Chronic skin and eye change, some arthritic chagnes.    ASSESSMENT:   1. Abnormal finding of blood chemistry    2. Encounter for screening for malignant neoplasm of prostate    3. Abnormal findings on diagnostic imaging of other specified body structures    4. Essential hypertension    5. Rheumatoid arthritis involving multiple sites with positive rheumatoid factor    6. Thrombocytopenia, unspecified    7. PAD (peripheral artery disease)    8. Atherosclerosis of aorta          PLAN:   Counseled on age appropriate medical preventative services, including age appropriate cancer screenings, over all nutritional health, need for a consistent exercise regimen and an over all push towards maintaining a vigorous and active lifestyle.  Counseled on age appropriate vaccines and discussed upcoming health care needs based on age/gender.  Spent time with patient counseling on need for a good patient/doctor relationship moving forward.  Discussed use of common OTC medications and supplements.  Discussed common dietary aids and use of caffeine and the need for good sleep hygiene and stress management.    Problem List Items Addressed This Visit       Rheumatoid arthritis involving multiple sites    " "Overview     Has remained off Plaquenil  Has not seen rheum since 2016    No symptoms         Current Assessment & Plan     Monitor for progression  Off of plaquenil  Will get him to rheumatology once he is ready         Essential hypertension    Relevant Orders    Hypertension Digital Medicine (HDMP) Enrollment Order (Completed)    Atherosclerosis of aorta    Overview     "THE HEART IS NOT ENLARGED, AND THE AORTA TAPERS NORMALLY WITH ATHEROSCLEROTIC   CALCIFICATIONS" - CT Abdomen with contrast 9-         Current Assessment & Plan     Statin for now  Blood thinner         PAD (peripheral artery disease)    Current Assessment & Plan     Stable  Cardiology following  Riskfactor reduction         Thrombocytopenia, unspecified    Current Assessment & Plan     Watch for bleeding  Monitor treatment.            Other Visit Diagnoses       Abnormal finding of blood chemistry    -  Primary    Relevant Orders    CBC Auto Differential (Completed)    Comprehensive Metabolic Panel (Completed)    Urinalysis, Reflex to Urine Culture Urine, Clean Catch (Completed)    PSA, Screening (Completed)    Hemoglobin A1C (Completed)    Lipid Panel (Completed)    TSH (Completed)    Encounter for screening for malignant neoplasm of prostate        Relevant Orders    PSA, Screening (Completed)    Abnormal findings on diagnostic imaging of other specified body structures        Relevant Orders    TSH (Completed)          Lean into his frandy  It is agreat source of strength for him      "

## 2024-11-27 ENCOUNTER — CLINICAL SUPPORT (OUTPATIENT)
Dept: REHABILITATION | Facility: HOSPITAL | Age: 74
End: 2024-11-27
Payer: MEDICARE

## 2024-11-27 DIAGNOSIS — M25.562 CHRONIC PAIN OF BOTH KNEES: Primary | ICD-10-CM

## 2024-11-27 DIAGNOSIS — G89.29 CHRONIC PAIN OF BOTH KNEES: Primary | ICD-10-CM

## 2024-11-27 DIAGNOSIS — M25.561 CHRONIC PAIN OF BOTH KNEES: Primary | ICD-10-CM

## 2024-11-27 DIAGNOSIS — R26.89 ANTALGIC GAIT: ICD-10-CM

## 2024-11-27 PROCEDURE — 97530 THERAPEUTIC ACTIVITIES: CPT | Mod: PN

## 2024-11-27 NOTE — PROGRESS NOTES
[facility]   Physical Therapy Treatment Note     Name: Rizwan Demarco Jr.  Clinic Number: [id number]    Therapy Diagnosis:   Encounter Diagnoses   Name Primary?    Chronic pain of both knees Yes    Antalgic gait      Physician: Sidney Wilson MD    Visit Date: 11/27/2024    Physician Orders: PT Eval and Treat   Medical Diagnosis from Referral: Arthritis of knee   Evaluation Date: 11/19/2024  Authorization Period Expiration: 12-31-24  Plan of Care Expiration: 2-19-25  Visit # / Visits authorized: 2 20   Progress Note Due: 12-19-24  FOTO: 1/ 1     Precautions: Standard       Visit #: 1 of 20  PTA Visit #: 1  Time In: 2:00 pm  Time Out: 2:53 pm   Total Billable Time: 23 minutes    Subjective     Pt reports: that he does not have any pain today.   He was compliant with home exercise program.  Response to previous treatment: first treatment day   Functional change: ongoing     Pain: 0/10  Location: bilateral knees     Objective     Rizwan participated in dynamic functional therapeutic activities to improve functional performance for 23 minutes, including:    Nu-step for 10 minutes for endurance and legs strengthening Level 1.0 (may increase level next visit)   Sit to stand 20 in 3x10   Step up and down 6 in box    Rizwan received therapeutic exercises to develop strength, endurance, ROM, and flexibility for 0 minutes including:        Rizwan received the following manual therapy techniques: Joint mobilizations and Soft tissue Mobilization were applied to the: bilateral knees for 00 minutes, including:      Rizwan participated in neuromuscular re-education activities to improve: Balance, Coordination, Sense, and Proprioception for 30 minutes. The following activities were included:    Shuttle DL squat 2 black band 3x10  Shuttle single leg squat 1 black 3x10   Matrix knee extension 10# 3x10  Matrix knee flexion 25# 3x10   Matrix hip abd 25# 3x10       Home Exercises Provided and Patient  Education Provided     Written Home Exercises Provided: yes.  Exercises were reviewed and Rizwan was able to demonstrate them prior to the end of the session.  Rizwan demonstrated good  understanding of the education provided.     Education provided:   - home exercise program     Assessment   Second treatment day after evaluation for bilateral knees pain. Progression of exercises today. Pt did not have any provocation of pain. Focused on knee range of motion and quads strengthening which patient tolerates fairly well. Provided verbal cues for all exercises for proper techniques. Will incorporate functional strengthening next visit.     Rizwan Is progressing well towards his goals.   Pt prognosis is Good.     Pt will continue to benefit from skilled outpatient physical therapy to address the deficits listed in the problem list box on initial evaluation, provide pt/family education and to maximize pt's level of independence in the home and community environment.     Pt's spiritual, cultural and educational needs considered and pt agreeable to plan of care and goals.     Anticipated barriers to physical therapy: Scheduling issues      Goals:   Short Term Goals:  4 weeks  1.Report decreased in pain at worse less than  <   / =  5  /10  to increase tolerance for functional mobility.On going  2. Pt to increase B popliteal angle by greater than > or = 5 degrees in order to improve flexibility and posture. On going  3.Pt to increase B Ely's Test by greater than  > or = 5degrees in order to improve flexibility and posture. On going  4. Pt to tolerate HEP to improve ROM and independence with ADL's.On going     Long Term Goals: 8 weeks  1.Report decreased in pain at worse less than  <   / =  1  /10  to increase tolerance for functional mobility. On going  2. Pt will report 90% or greater  on FOTO knee survey  to demonstrate increase in LE function with every day tasks. On going  3. Pt to be Independent with HEP to improve ROM  and independence with ADL's. On going  4. Pt to increase B popliteal angle by greater than > or = 5 degrees in order to improve flexibility and posture. On going  5.Pt to increase B Ely's Test by greater than  > or = 5degrees in order to improve flexibility and posture. On going   Plan   Plan of care Certification: 11/19/2024 to 2-19-24.     Outpatient Physical Therapy 2 times weekly for 12 weeks to include the following interventions: Gait Training, Manual Therapy, Moist Heat/ Ice, Neuromuscular Re-ed, Patient Education, Self Care, Therapeutic Activities, and Therapeutic Exercise. Dry needling.     Continue with plan of care     Mike Hurt, PT   11/27/2024

## 2024-11-29 ENCOUNTER — CLINICAL SUPPORT (OUTPATIENT)
Dept: REHABILITATION | Facility: HOSPITAL | Age: 74
End: 2024-11-29
Payer: MEDICARE

## 2024-11-29 DIAGNOSIS — G89.29 CHRONIC PAIN OF BOTH KNEES: Primary | ICD-10-CM

## 2024-11-29 DIAGNOSIS — M25.562 CHRONIC PAIN OF BOTH KNEES: Primary | ICD-10-CM

## 2024-11-29 DIAGNOSIS — M25.561 CHRONIC PAIN OF BOTH KNEES: Primary | ICD-10-CM

## 2024-11-29 DIAGNOSIS — R26.89 ANTALGIC GAIT: ICD-10-CM

## 2024-11-29 PROCEDURE — 97110 THERAPEUTIC EXERCISES: CPT | Mod: PN,CQ

## 2024-11-29 PROCEDURE — 97530 THERAPEUTIC ACTIVITIES: CPT | Mod: PN,CQ

## 2024-11-29 NOTE — PROGRESS NOTES
[facility]   Physical Therapy Treatment Note     Name: Rizwan Demarco Jr.  Clinic Number: [id number]    Therapy Diagnosis:   Encounter Diagnoses   Name Primary?    Chronic pain of both knees Yes    Antalgic gait        Physician: Sidney Wilson MD    Visit Date: 11/29/2024    Physician Orders: PT Eval and Treat   Medical Diagnosis from Referral: Arthritis of knee   Evaluation Date: 11/19/2024  Authorization Period Expiration: 12-31-24  Plan of Care Expiration: 2-19-25  Visit # / Visits authorized: 3/  20   Progress Note Due: 12-19-24  FOTO: 1/ 1     Precautions: Standard       PTA Visit #: 1/5  Time In: 2:00 pm  Time Out: 3:00 pm  Total Billable Time: 53 minutes    Subjective     Pt reports: he's doing ok today. No pain in the knees.     He was compliant with home exercise program.  Response to previous treatment: first treatment day   Functional change: ongoing     Pain: 0/10  Location: bilateral knees     Objective     Rizwan participated in dynamic functional therapeutic activities to improve functional performance for 23 minutes, including:  Nu-step for 10 minutes for endurance and legs strengthening Level 2   Sit to stand 20 in 3x10   Step up and down 6 in box      Rizwan received therapeutic exercises to develop strength, endurance, ROM, and flexibility for 30 minutes including:  Shuttle DL squat +3 black band 3x10  Shuttle single leg squat 1 black 3x10   Matrix knee extension 15# 3x10  Matrix knee flexion 40# 3x10   Matrix hip abd 25# 3x10         Rizwan received the following manual therapy techniques: Joint mobilizations and Soft tissue Mobilization were applied to the: bilateral knees for 00 minutes, including:      Rizwan participated in neuromuscular re-education activities to improve: Balance, Coordination, Sense, and Proprioception for 30 minutes. The following activities were included:      Home Exercises Provided and Patient Education Provided     Written Home  Exercises Provided: yes.  Exercises were reviewed and Rizwan was able to demonstrate them prior to the end of the session.  Rizwan demonstrated good  understanding of the education provided.     Education provided:   - home exercise program     Assessment   Mr. Demarco presented to PT today without reports of adrianna knee pain or discomfort.  Continued with exercises introduced in previous session for improved, progressing level of difficulty with increased resistance for improved quad, glute and hamstring strength.  He was able to tolerate progressions today without reports of increased adrianna knee pain or discomfort.   Will continue to progress as tolerated.       Rizwan Is progressing well towards his goals.   Pt prognosis is Good.     Pt will continue to benefit from skilled outpatient physical therapy to address the deficits listed in the problem list box on initial evaluation, provide pt/family education and to maximize pt's level of independence in the home and community environment.     Pt's spiritual, cultural and educational needs considered and pt agreeable to plan of care and goals.     Anticipated barriers to physical therapy: Scheduling issues      Goals:   Short Term Goals:  4 weeks  1.Report decreased in pain at worse less than  <   / =  5  /10  to increase tolerance for functional mobility.On going  2. Pt to increase B popliteal angle by greater than > or = 5 degrees in order to improve flexibility and posture. On going  3.Pt to increase B Ely's Test by greater than  > or = 5degrees in order to improve flexibility and posture. On going  4. Pt to tolerate HEP to improve ROM and independence with ADL's.On going     Long Term Goals: 8 weeks  1.Report decreased in pain at worse less than  <   / =  1  /10  to increase tolerance for functional mobility. On going  2. Pt will report 90% or greater  on FOTO knee survey  to demonstrate increase in LE function with every day tasks. On going  3. Pt to be  Independent with HEP to improve ROM and independence with ADL's. On going  4. Pt to increase B popliteal angle by greater than > or = 5 degrees in order to improve flexibility and posture. On going  5.Pt to increase B Ely's Test by greater than  > or = 5degrees in order to improve flexibility and posture. On going   Plan   Continue with plan of care     Shweta Wen, PTA   11/29/2024

## 2024-12-01 NOTE — PROGRESS NOTES
"[facility]   Physical Therapy Treatment Note     Name: Rizwan Demarco Jr.  Clinic Number: [id number]    Therapy Diagnosis:   Encounter Diagnoses   Name Primary?    Chronic pain of both knees Yes    Antalgic gait          Physician: Sidney Wilson MD    Visit Date: 12/2/2024    Physician Orders: PT Eval and Treat   Medical Diagnosis from Referral: Arthritis of knee   Evaluation Date: 11/19/2024  Authorization Period Expiration: 12-31-24  Plan of Care Expiration: 2-19-25  Visit # / Visits authorized: 4/ 20   Progress Note Due: 12-19-24  FOTO: 1/ 1     Precautions: Standard       PTA Visit #: 2/5  Time In: 9:00 am  Time Out: 10:00 am  Total Billable Time: 53 minutes    Subjective     Pt reports: No knee pain in the knees.  Doing ok today.       He was compliant with home exercise program.  Response to previous treatment: first treatment day   Functional change: ongoing     Pain: 0/10  Location: bilateral knees     Objective     Rizwan participated in dynamic functional therapeutic activities to improve functional performance for 23 minutes, including:  Nu-step for 10 minutes for endurance and legs strengthening Level 2   Sit to stand 20 in 3x10   Step up and down 6 in box      Rizwan received therapeutic exercises to develop strength, endurance, ROM, and flexibility for 34 minutes including:  +Prone Quad Stretch 4x 30" hold   Shuttle DL squat +3 black band 3x10  Shuttle single leg squat 1 black 3x10   Matrix knee extension 15# 3x10  Matrix knee flexion 40# 3x10   Matrix hip abd 25# 3x10   Matrix hip add 30#3x10        Rizwan received the following manual therapy techniques: Joint mobilizations and Soft tissue Mobilization were applied to the: bilateral knees for 00 minutes, including:      Rizwan participated in neuromuscular re-education activities to improve: Balance, Coordination, Sense, and Proprioception for 30 minutes. The following activities were included:      Home Exercises " Provided and Patient Education Provided     Written Home Exercises Provided: yes.  Exercises were reviewed and Rizwan was able to demonstrate them prior to the end of the session.  Rizwan demonstrated good  understanding of the education provided.     Education provided:   - home exercise program     Assessment   Mr. Demarco presented to PT today without reports of adrianna knee pain or discomfort.  Continued with focus on improved adrianna quad, glute and hamstring strength.  Introduced prone quad stretch for improved quad flexibility, which Mr. Demarco was able to tolerate. He was able to complete today's session without reports of increased pain or discomfort.       Rizwan Is progressing well towards his goals.   Pt prognosis is Good.     Pt will continue to benefit from skilled outpatient physical therapy to address the deficits listed in the problem list box on initial evaluation, provide pt/family education and to maximize pt's level of independence in the home and community environment.     Pt's spiritual, cultural and educational needs considered and pt agreeable to plan of care and goals.     Anticipated barriers to physical therapy: Scheduling issues      Goals:   Short Term Goals:  4 weeks  1.Report decreased in pain at worse less than  <   / =  5  /10  to increase tolerance for functional mobility.On going  2. Pt to increase B popliteal angle by greater than > or = 5 degrees in order to improve flexibility and posture. On going  3.Pt to increase B Ely's Test by greater than  > or = 5degrees in order to improve flexibility and posture. On going  4. Pt to tolerate HEP to improve ROM and independence with ADL's.On going     Long Term Goals: 8 weeks  1.Report decreased in pain at worse less than  <   / =  1  /10  to increase tolerance for functional mobility. On going  2. Pt will report 90% or greater  on FOTO knee survey  to demonstrate increase in LE function with every day tasks. On going  3. Pt to be  Independent with HEP to improve ROM and independence with ADL's. On going  4. Pt to increase B popliteal angle by greater than > or = 5 degrees in order to improve flexibility and posture. On going  5.Pt to increase B Ely's Test by greater than  > or = 5degrees in order to improve flexibility and posture. On going   Plan   Continue with plan of care     Shweta Wen, PTA   12/2/2024

## 2024-12-02 ENCOUNTER — CLINICAL SUPPORT (OUTPATIENT)
Dept: REHABILITATION | Facility: HOSPITAL | Age: 74
End: 2024-12-02
Payer: MEDICARE

## 2024-12-02 DIAGNOSIS — G89.29 CHRONIC PAIN OF BOTH KNEES: Primary | ICD-10-CM

## 2024-12-02 DIAGNOSIS — M25.562 CHRONIC PAIN OF BOTH KNEES: Primary | ICD-10-CM

## 2024-12-02 DIAGNOSIS — R26.89 ANTALGIC GAIT: ICD-10-CM

## 2024-12-02 DIAGNOSIS — M25.561 CHRONIC PAIN OF BOTH KNEES: Primary | ICD-10-CM

## 2024-12-02 PROCEDURE — 97530 THERAPEUTIC ACTIVITIES: CPT | Mod: PN,CQ

## 2024-12-02 PROCEDURE — 97110 THERAPEUTIC EXERCISES: CPT | Mod: PN,CQ

## 2024-12-04 ENCOUNTER — HOSPITAL ENCOUNTER (OUTPATIENT)
Dept: CARDIOLOGY | Facility: HOSPITAL | Age: 74
Discharge: HOME | End: 2024-12-04
Attending: INTERNAL MEDICINE
Payer: MEDICARE

## 2024-12-04 ENCOUNTER — CLINICAL SUPPORT (OUTPATIENT)
Dept: CARDIOLOGY | Facility: HOSPITAL | Age: 74
End: 2024-12-04
Payer: MEDICARE

## 2024-12-04 DIAGNOSIS — Z95.0 PRESENCE OF CARDIAC PACEMAKER: ICD-10-CM

## 2024-12-04 DIAGNOSIS — R00.1 BRADYCARDIA, UNSPECIFIED: ICD-10-CM

## 2024-12-04 PROCEDURE — 93294 REM INTERROG EVL PM/LDLS PM: CPT | Mod: ,,, | Performed by: INTERNAL MEDICINE

## 2024-12-05 ENCOUNTER — CLINICAL SUPPORT (OUTPATIENT)
Dept: REHABILITATION | Facility: HOSPITAL | Age: 74
End: 2024-12-05
Payer: MEDICARE

## 2024-12-05 DIAGNOSIS — G89.29 CHRONIC PAIN OF BOTH KNEES: Primary | ICD-10-CM

## 2024-12-05 DIAGNOSIS — M25.562 CHRONIC PAIN OF BOTH KNEES: Primary | ICD-10-CM

## 2024-12-05 DIAGNOSIS — R26.89 ANTALGIC GAIT: ICD-10-CM

## 2024-12-05 DIAGNOSIS — M25.561 CHRONIC PAIN OF BOTH KNEES: Primary | ICD-10-CM

## 2024-12-05 LAB
OHS CV DC REMOTE DEVICE TYPE: NORMAL
OHS CV RV PACING PERCENT: 95 %

## 2024-12-05 PROCEDURE — 97110 THERAPEUTIC EXERCISES: CPT | Mod: PN,CQ

## 2024-12-05 NOTE — PROGRESS NOTES
"[facility]   Physical Therapy Treatment Note     Name: Rizwan Demarco Jr.  Clinic Number: [id number]    Therapy Diagnosis:   Encounter Diagnoses   Name Primary?    Chronic pain of both knees Yes    Antalgic gait            Physician: Sidney Wilson MD    Visit Date: 12/5/2024    Physician Orders: PT Eval and Treat   Medical Diagnosis from Referral: Arthritis of knee   Evaluation Date: 11/19/2024  Authorization Period Expiration: 12-31-24  Plan of Care Expiration: 2-19-25  Visit # / Visits authorized: 5/ 20   Progress Note Due: 12-19-24  FOTO: 1/ 1     Precautions: Standard       PTA Visit #: 3/5  Time In: 1:00 pm  Time Out: 2:00 pm  Total Billable Time: 53 minutes    Subjective     Pt reports: He had a little pain in the back after the last visit.  He thinks it was the leg press and he was just too close.       He was compliant with home exercise program.  Response to previous treatment: first treatment day   Functional change: ongoing     Pain: 0/10  Location: bilateral knees     Objective     Rizwan participated in dynamic functional therapeutic activities to improve functional performance for 23 minutes, including:  Nu-step for 10 minutes for endurance and legs strengthening Level 2   Sit to stand 20 in 3x10 +10# KB   Step up and down 6 in box      Rizwan received therapeutic exercises to develop strength, endurance, ROM, and flexibility for 34 minutes including:  Prone Quad Stretch 4x 30" hold   Shuttle DL squat 3 black band 3x10  Shuttle single leg squat 1 black 3x10   Matrix knee extension 15# 3x10  Matrix knee flexion 40# 3x10   Matrix hip abd +40# 3x10   Matrix hip add +35#3x10        Rizwan received the following manual therapy techniques: Joint mobilizations and Soft tissue Mobilization were applied to the: bilateral knees for 00 minutes, including:      Rizwan participated in neuromuscular re-education activities to improve: Balance, Coordination, Sense, and Proprioception " for 30 minutes. The following activities were included:      Home Exercises Provided and Patient Education Provided     Written Home Exercises Provided: yes.  Exercises were reviewed and Rizwan was able to demonstrate them prior to the end of the session.  Rizwan demonstrated good  understanding of the education provided.     Education provided:   - home exercise program     Assessment   Mr. Demarco presented to PT today without reports of adrianna knee pain but with slight LBP, which he attributes to being too close in the seat during the Shuttle Press.  Introduced resistance to sit to stands, as well as increased resistance to matrix hip abd/add machine, which Mr. eDmarco was able to tolerate.  Adjusted Shuttle Press seat further than in previous visit, which Mr. Demarco was able to complete without reports of increased LBP or discomfort.  Will continue to progress as tolerated.       Rizwan Is progressing well towards his goals.   Pt prognosis is Good.     Pt will continue to benefit from skilled outpatient physical therapy to address the deficits listed in the problem list box on initial evaluation, provide pt/family education and to maximize pt's level of independence in the home and community environment.     Pt's spiritual, cultural and educational needs considered and pt agreeable to plan of care and goals.     Anticipated barriers to physical therapy: Scheduling issues      Goals:   Short Term Goals:  4 weeks  1.Report decreased in pain at worse less than  <   / =  5  /10  to increase tolerance for functional mobility.On going  2. Pt to increase B popliteal angle by greater than > or = 5 degrees in order to improve flexibility and posture. On going  3.Pt to increase B Ely's Test by greater than  > or = 5degrees in order to improve flexibility and posture. On going  4. Pt to tolerate HEP to improve ROM and independence with ADL's.On going     Long Term Goals: 8 weeks  1.Report decreased in pain at worse  less than  <   / =  1 /10  to increase tolerance for functional mobility. On going  2. Pt will report 90% or greater  on FOTO knee survey  to demonstrate increase in LE function with every day tasks. On going  3. Pt to be Independent with HEP to improve ROM and independence with ADL's. On going  4. Pt to increase B popliteal angle by greater than > or = 5 degrees in order to improve flexibility and posture. On going  5.Pt to increase B Ely's Test by greater than  > or = 5degrees in order to improve flexibility and posture. On going   Plan   Continue with plan of care     Shweta Wen, PTA   12/5/2024

## 2024-12-08 NOTE — PROGRESS NOTES
"[facility]   Physical Therapy Treatment Note     Name: Rizwan Demarco Jr.  Clinic Number: [id number]    Therapy Diagnosis:   Encounter Diagnoses   Name Primary?    Chronic pain of both knees Yes    Antalgic gait            Physician: Sidney Wilson MD    Visit Date: 12/9/2024    Physician Orders: PT Eval and Treat   Medical Diagnosis from Referral: Arthritis of knee   Evaluation Date: 11/19/2024  Authorization Period Expiration: 12-31-24  Plan of Care Expiration: 2-19-25  Visit # / Visits authorized: 6/ 20   Progress Note Due: 12-19-24  FOTO: 1/ 1     Precautions: Standard     PTA Visit #: 4/5  Time In: 8:00 am   Time Out: 9:00 am   Total Billable Time: 60 minutes    Subjective     Pt reports: The knees feel a little looser since he started PT but otherwise, that ache is still there.     He was compliant with home exercise program.  Response to previous treatment: first treatment day   Functional change: ongoing     Pain: 0/10  Location: bilateral knees     Objective     Rizwan participated in dynamic functional therapeutic activities to improve functional performance for 20 minutes, including:  Nu-step for 10 minutes for endurance and legs strengthening Level +3   Sit to stand 20 in 3x10 +10# KB   Step up and down 6 in box      Rizwan received therapeutic exercises to develop strength, endurance, ROM, and flexibility for 40 minutes including:  Prone Quad Stretch 4x 30" hold   Standing HS Stretch 3x 30" hold  Standing Calf Stretch 3x 30" hold   Shuttle DL squat 3 black band 3x10  Shuttle single leg squat 1 black 3x10   Matrix knee extension 15# 3x10  Matrix knee flexion +45# 3x10   Matrix hip abd +45# 3x10   Matrix hip add +55# 3x10        Rizwan received the following manual therapy techniques: Joint mobilizations and Soft tissue Mobilization were applied to the: bilateral knees for 00 minutes, including:      Rizwan participated in neuromuscular re-education activities to improve: " "Balance, Coordination, Sense, and Proprioception for 30 minutes. The following activities were included:      Home Exercises Provided and Patient Education Provided     Written Home Exercises Provided: yes.  Exercises were reviewed and Rizwan was able to demonstrate them prior to the end of the session.  Rizwan demonstrated good  understanding of the education provided.     Education provided:   - home exercise program     Assessment   Mr. Demarco presented to PT today reporting improved adrianna knee flexibility but with continued adrianna knee discomfort, which he refers to as an "ache".  Continued with focus on improved hamstring, quad and calf flexibility, as well as adrianna LE strength. Mr. Demarco was able to complete today's session without reports of increased pain or discomfort.        Rizwan Is progressing well towards his goals.   Pt prognosis is Good.     Pt will continue to benefit from skilled outpatient physical therapy to address the deficits listed in the problem list box on initial evaluation, provide pt/family education and to maximize pt's level of independence in the home and community environment.     Pt's spiritual, cultural and educational needs considered and pt agreeable to plan of care and goals.     Anticipated barriers to physical therapy: Scheduling issues      Goals:   Short Term Goals:  4 weeks  1.Report decreased in pain at worse less than  <   / =  5  /10  to increase tolerance for functional mobility.On going  2. Pt to increase B popliteal angle by greater than > or = 5 degrees in order to improve flexibility and posture. On going  3.Pt to increase B Ely's Test by greater than  > or = 5degrees in order to improve flexibility and posture. On going  4. Pt to tolerate HEP to improve ROM and independence with ADL's.On going     Long Term Goals: 8 weeks  1.Report decreased in pain at worse less than  <   / =  1  /10  to increase tolerance for functional mobility. On going  2. Pt will report 90% " or greater  on FOTO knee survey  to demonstrate increase in LE function with every day tasks. On going  3. Pt to be Independent with HEP to improve ROM and independence with ADL's. On going  4. Pt to increase B popliteal angle by greater than > or = 5 degrees in order to improve flexibility and posture. On going  5.Pt to increase B Ely's Test by greater than  > or = 5degrees in order to improve flexibility and posture. On going   Plan   Continue with plan of care     Shweta Wen, JOSE LUIS   12/9/2024

## 2024-12-09 ENCOUNTER — CLINICAL SUPPORT (OUTPATIENT)
Dept: REHABILITATION | Facility: HOSPITAL | Age: 74
End: 2024-12-09
Payer: MEDICARE

## 2024-12-09 DIAGNOSIS — R26.89 ANTALGIC GAIT: ICD-10-CM

## 2024-12-09 DIAGNOSIS — G89.29 CHRONIC PAIN OF BOTH KNEES: Primary | ICD-10-CM

## 2024-12-09 DIAGNOSIS — M25.561 CHRONIC PAIN OF BOTH KNEES: Primary | ICD-10-CM

## 2024-12-09 DIAGNOSIS — M25.562 CHRONIC PAIN OF BOTH KNEES: Primary | ICD-10-CM

## 2024-12-09 PROCEDURE — 97530 THERAPEUTIC ACTIVITIES: CPT | Mod: PN,CQ

## 2024-12-09 PROCEDURE — 97110 THERAPEUTIC EXERCISES: CPT | Mod: PN,CQ

## 2024-12-11 ENCOUNTER — CLINICAL SUPPORT (OUTPATIENT)
Dept: REHABILITATION | Facility: HOSPITAL | Age: 74
End: 2024-12-11
Payer: MEDICARE

## 2024-12-11 DIAGNOSIS — M25.561 CHRONIC PAIN OF BOTH KNEES: Primary | ICD-10-CM

## 2024-12-11 DIAGNOSIS — G89.29 CHRONIC PAIN OF BOTH KNEES: Primary | ICD-10-CM

## 2024-12-11 DIAGNOSIS — M25.562 CHRONIC PAIN OF BOTH KNEES: Primary | ICD-10-CM

## 2024-12-11 DIAGNOSIS — R26.89 ANTALGIC GAIT: ICD-10-CM

## 2024-12-11 PROCEDURE — 97530 THERAPEUTIC ACTIVITIES: CPT | Mod: KX,PN

## 2024-12-11 PROCEDURE — 97112 NEUROMUSCULAR REEDUCATION: CPT | Mod: KX,PN

## 2024-12-11 NOTE — PROGRESS NOTES
"[facility]   Physical Therapy Treatment Note     Name: Rizwan Demarco Jr.  Clinic Number: [id number]    Therapy Diagnosis:   Encounter Diagnoses   Name Primary?    Chronic pain of both knees Yes    Antalgic gait              Physician: Sidney Wilson MD    Visit Date: 12/11/2024    Physician Orders: PT Eval and Treat   Medical Diagnosis from Referral: Arthritis of knee   Evaluation Date: 11/19/2024  Authorization Period Expiration: 12-31-24  Plan of Care Expiration: 2-19-25  Visit # / Visits authorized: 7/ 20   Progress Note Due: 1-11-25  FOTO: 1/ 1     Precautions: Standard     PTA Visit #: 4/5  Time In: 12:00 pm  Time Out:12:53 pm   Total Billable Time: 40 minutes    Subjective     Pt reports: that he is feeling better. He has less B knee stiffness     He was compliant with home exercise program.  Response to previous treatment: first treatment day   Functional change: ongoing     Pain: 0/10  Location: bilateral knees     Objective     Rizwan participated in dynamic functional therapeutic activities to improve functional performance for 20 minutes, including:  Nu-step for 10 minutes for endurance and legs strengthening Level +3   Sit to stand 20 in 3x10 +10# KB   Step up and down 6 in box      Rizwan received therapeutic exercises to develop strength, endurance, ROM, and flexibility for 10  minutes including:  Prone Quad Stretch 4x 30" hold   Standing HS Stretch 3x 30" hold  Standing Calf Stretch 3x 30" hold         Rizwan received the following manual therapy techniques: Joint mobilizations and Soft tissue Mobilization were applied to the: bilateral knees for 00 minutes, including:      Rizwan participated in neuromuscular re-education activities to improve: Balance, Coordination, Sense, and Proprioception for 23 minutes. The following activities were included:  Shuttle DL squat 3 black band 3x10  Shuttle single leg squat 1 black 3x10   Matrix knee extension 20# 2x10  Matrix knee " flexion +50# 2x10   Matrix hip abd +50# 2x10   Matrix hip add +60# 2x10    Home Exercises Provided and Patient Education Provided     Written Home Exercises Provided: yes.  Exercises were reviewed and Rizwan was able to demonstrate them prior to the end of the session.  Rizwan demonstrated good  understanding of the education provided.     Education provided:   - home exercise program     Assessment   Mr. Demarco presented to PT today reporting improved adrianna knee flexibility. Pt was re-assessed today. Pt has improved during functional mobility. Pain and stiffness has decreased. Pt has been demonstrating improvement in B LE muscles strength. He has been improving hamstring and hip flexors flexibility. Pt has met 4/4 STGs. Overall, pt has been improving well in therapy. Plan to cont with POC.        Rizwan Is progressing well towards his goals.   Pt prognosis is Good.     Pt will continue to benefit from skilled outpatient physical therapy to address the deficits listed in the problem list box on initial evaluation, provide pt/family education and to maximize pt's level of independence in the home and community environment.     Pt's spiritual, cultural and educational needs considered and pt agreeable to plan of care and goals.     Anticipated barriers to physical therapy: Scheduling issues      Goals:   Short Term Goals:  4 weeks  1.Report decreased in pain at worse less than  <   / =  5  /10  to increase tolerance for functional mobility.goal met 12-11-24  2. Pt to increase B popliteal angle by greater than > or = 5 degrees in order to improve flexibility and posture.goal met 12-11-24  3.Pt to increase B Ely's Test by greater than  > or = 5degrees in order to improve flexibility and posture. goal met 12-11-24  4. Pt to tolerate HEP to improve ROM and independence with ADL's.goal met 12-11-24     Long Term Goals: 8 weeks  1.Report decreased in pain at worse less than  <   / =  1  /10  to increase tolerance for  functional mobility. On going  2. Pt will report 90% or greater  on FOTO knee survey  to demonstrate increase in LE function with every day tasks. On going  3. Pt to be Independent with HEP to improve ROM and independence with ADL's. On going  4. Pt to increase B popliteal angle by greater than > or = 5 degrees in order to improve flexibility and posture. On going  5.Pt to increase B Ely's Test by greater than  > or = 5degrees in order to improve flexibility and posture. On going   Plan   Continue with plan of care     Mike Hurt, PT   12/11/2024

## 2024-12-16 ENCOUNTER — CLINICAL SUPPORT (OUTPATIENT)
Dept: REHABILITATION | Facility: HOSPITAL | Age: 74
End: 2024-12-16
Payer: MEDICARE

## 2024-12-16 DIAGNOSIS — R26.89 ANTALGIC GAIT: ICD-10-CM

## 2024-12-16 DIAGNOSIS — M25.561 CHRONIC PAIN OF BOTH KNEES: Primary | ICD-10-CM

## 2024-12-16 DIAGNOSIS — G89.29 CHRONIC PAIN OF BOTH KNEES: Primary | ICD-10-CM

## 2024-12-16 DIAGNOSIS — M25.562 CHRONIC PAIN OF BOTH KNEES: Primary | ICD-10-CM

## 2024-12-16 PROCEDURE — 97112 NEUROMUSCULAR REEDUCATION: CPT | Mod: PN,CQ

## 2024-12-16 PROCEDURE — 97110 THERAPEUTIC EXERCISES: CPT | Mod: PN,CQ

## 2024-12-16 NOTE — PROGRESS NOTES
"[facility]   Physical Therapy Treatment Note     Name: Rizwan Demarco Jr.  Clinic Number: [id number]    Therapy Diagnosis:   Encounter Diagnoses   Name Primary?    Chronic pain of both knees Yes    Antalgic gait        Physician: Sidney Wilson MD    Visit Date: 12/16/2024    Physician Orders: PT Eval and Treat   Medical Diagnosis from Referral: Arthritis of knee   Evaluation Date: 11/19/2024  Authorization Period Expiration: 12-31-24  Plan of Care Expiration: 2-19-25  Visit # / Visits authorized: 9/ 20   Progress Note Due: 1-11-25  FOTO: 1/ 1     Precautions: Standard     PTA Visit #: 1/5  Time In: 1:00 pm  Time Out: 2:00 pm  Total Billable Time: 40 minutes    Subjective     Pt reports: He's noticed he's gotten stronger.   For example, he doesn't really need to use his hand to get his leg up to get in the [place] anymore.     He was compliant with home exercise program.  Response to previous treatment: first treatment day   Functional change: ongoing     Pain: 0/10  Location: bilateral knees     Objective     Rizwan participated in dynamic functional therapeutic activities to improve functional performance for 20 minutes, including:  Nu-step for 10 minutes for endurance and legs strengthening Level 3   Sit to stand 20 in 3x10 +15# KB   Step up and down +8 in box 3x10  +Standing Marches 3# 2x10     Rizwan received therapeutic exercises to develop strength, endurance, ROM, and flexibility for 10  minutes including:  Prone Quad Stretch 4x 30" hold   Standing HS Stretch 3x 30" hold  Standing Calf Stretch 3x 30" hold     Rizwan participated in neuromuscular re-education activities to improve: Balance, Coordination, Sense, and Proprioception for 30 minutes. The following activities were included:  Shuttle DL squat +4 black band 3x10  Shuttle single leg squat+2 black 3x10   Matrix knee extension 20# 2x10  Matrix knee flexion +50# 2x10   Matrix hip abd 50# 2x10   Matrix hip add 60# " 2x10    Rizwan received the following manual therapy techniques: Joint mobilizations and Soft tissue Mobilization were applied to the: bilateral knees for 00 minutes, including:      Home Exercises Provided and Patient Education Provided     Written Home Exercises Provided: yes.  Exercises were reviewed and Rizwan was able to demonstrate them prior to the end of the session.  Rizwan demonstrated good  understanding of the education provided.     Education provided:   - home exercise program     Assessment   Mr. Demarco presented to PT today reporting occasional adrianna knee pain, but with improved functional mobility.  Introduced exercise for improved adrianna hip flexor mm strength, as well as continued with focus on improved adrianna knee flexibility and strength.  He was able to tolerate today's progressions without reports of of increased adrianna knee pain or discomfort.  He occasionally required verbal cues for proper form and mm activation, but he was able to carryover fairly well.       Rizwan Is progressing well towards his goals.   Pt prognosis is Good.     Pt will continue to benefit from skilled outpatient physical therapy to address the deficits listed in the problem list box on initial evaluation, provide pt/family education and to maximize pt's level of independence in the home and community environment.     Pt's spiritual, cultural and educational needs considered and pt agreeable to plan of care and goals.     Anticipated barriers to physical therapy: Scheduling issues      Goals:   Short Term Goals:  4 weeks  1.Report decreased in pain at worse less than  <   / =  5  /10  to increase tolerance for functional mobility.goal met 12-11-24  2. Pt to increase B popliteal angle by greater than > or = 5 degrees in order to improve flexibility and posture.goal met 12-11-24  3.Pt to increase B Ely's Test by greater than  > or = 5degrees in order to improve flexibility and posture. goal met 12-11-24  4. Pt to tolerate HEP to  improve ROM and independence with ADL's.goal met 12-11-24     Long Term Goals: 8 weeks  1.Report decreased in pain at worse less than  <   / =  1  /10  to increase tolerance for functional mobility. On going  2. Pt will report 90% or greater  on FOTO knee survey  to demonstrate increase in LE function with every day tasks. On going  3. Pt to be Independent with HEP to improve ROM and independence with ADL's. On going  4. Pt to increase B popliteal angle by greater than > or = 5 degrees in order to improve flexibility and posture. On going  5.Pt to increase B Ely's Test by greater than  > or = 5degrees in order to improve flexibility and posture. On going   Plan   Continue with plan of care     Shweta Wen, JOSE LUIS   12/16/2024

## 2024-12-18 NOTE — PROGRESS NOTES
"[facility]   Physical Therapy Treatment Note     Name: Rizwan Demarco Jr.  Clinic Number: [id number]    Therapy Diagnosis:   Encounter Diagnoses   Name Primary?    Chronic pain of both knees Yes    Antalgic gait          Physician: Sidney Wilson MD    Visit Date: 12/19/2024    Physician Orders: PT Eval and Treat   Medical Diagnosis from Referral: Arthritis of knee   Evaluation Date: 11/19/2024  Authorization Period Expiration: 12-31-24  Plan of Care Expiration: 2-19-25  Visit # / Visits authorized: 9/ 20   Progress Note Due: 1-11-25  FOTO: 1/ 1     Precautions: Standard     PTA Visit #: 2/5  Time In: 11:00 am  Time Out: 12:00 pm  Total Billable Time: 55 minutes    Subjective     Pt reports: The knees are fine today, but he's having other GI issues, so he may need to run to the restroom if possible.     He was compliant with home exercise program.  Response to previous treatment: first treatment day   Functional change: ongoing     Pain: 0/10  Location: bilateral knees     Objective     Rizwan participated in dynamic functional therapeutic activities to improve functional performance for 15 minutes, including:  Nu-step for 10 minutes for endurance and legs strengthening Level 3   Sit to stand 20 in 3x10 +15# KB   Step up and down +8 in box 3x10  Standing Marches 3# +3x10     Rizwan received therapeutic exercises to develop strength, endurance, ROM, and flexibility for 15  minutes including:  Prone Quad Stretch 4x 30" hold   Standing HS Stretch 3x 30" hold  Standing Calf Stretch 3x 30" hold     Rizwan participated in neuromuscular re-education activities to improve: Balance, Coordination, Sense, and Proprioception for 25 minutes. The following activities were included:  Shuttle DL squat +4 black band 3x10  Shuttle single leg squat+2 black 3x10   Matrix knee extension +30# +3x10  Matrix knee flexion 50# +3x10   Matrix hip abd 55# +3x10   Matrix hip add 60# +3x10    Rizwan received the " following manual therapy techniques: Joint mobilizations and Soft tissue Mobilization were applied to the: bilateral knees for 00 minutes, including:      Home Exercises Provided and Patient Education Provided     Written Home Exercises Provided: yes.  Exercises were reviewed and Rizwan was able to demonstrate them prior to the end of the session.  Rizwan demonstrated good  understanding of the education provided.     Education provided:   - home exercise program     Assessment   Mr. Demarco presented to PT today reporting decreased adrianna knee pain.  Today's session was limited, secondary to Mr Demarco requiring a few bathroom breaks. He was able to tolerate progressed number of sets for most exercises today.  Will reintroduce exercises not performed today during the next visit.      Rizwan Is progressing well towards his goals.   Pt prognosis is Good.     Pt will continue to benefit from skilled outpatient physical therapy to address the deficits listed in the problem list box on initial evaluation, provide pt/family education and to maximize pt's level of independence in the home and community environment.     Pt's spiritual, cultural and educational needs considered and pt agreeable to plan of care and goals.     Anticipated barriers to physical therapy: Scheduling issues      Goals:   Short Term Goals:  4 weeks  1.Report decreased in pain at worse less than  <   / =  5  /10  to increase tolerance for functional mobility.goal met 12-11-24  2. Pt to increase B popliteal angle by greater than > or = 5 degrees in order to improve flexibility and posture.goal met 12-11-24  3.Pt to increase B Ely's Test by greater than  > or = 5degrees in order to improve flexibility and posture. goal met 12-11-24  4. Pt to tolerate HEP to improve ROM and independence with ADL's.goal met 12-11-24     Long Term Goals: 8 weeks  1.Report decreased in pain at worse less than  <   / =  1  /10  to increase tolerance for functional  mobility. On going  2. Pt will report 90% or greater  on FOTO knee survey  to demonstrate increase in LE function with every day tasks. On going  3. Pt to be Independent with HEP to improve ROM and independence with ADL's. On going  4. Pt to increase B popliteal angle by greater than > or = 5 degrees in order to improve flexibility and posture. On going  5.Pt to increase B Ely's Test by greater than  > or = 5degrees in order to improve flexibility and posture. On going   Plan   Continue with plan of care     Shweta Wen, JOSE LUIS   12/19/2024

## 2024-12-19 ENCOUNTER — CLINICAL SUPPORT (OUTPATIENT)
Dept: REHABILITATION | Facility: HOSPITAL | Age: 74
End: 2024-12-19
Payer: MEDICARE

## 2024-12-19 DIAGNOSIS — G89.29 CHRONIC PAIN OF BOTH KNEES: Primary | ICD-10-CM

## 2024-12-19 DIAGNOSIS — M25.562 CHRONIC PAIN OF BOTH KNEES: Primary | ICD-10-CM

## 2024-12-19 DIAGNOSIS — R26.89 ANTALGIC GAIT: ICD-10-CM

## 2024-12-19 DIAGNOSIS — M25.561 CHRONIC PAIN OF BOTH KNEES: Primary | ICD-10-CM

## 2024-12-19 PROCEDURE — 97110 THERAPEUTIC EXERCISES: CPT | Mod: PN,CQ

## 2024-12-19 PROCEDURE — 97112 NEUROMUSCULAR REEDUCATION: CPT | Mod: PN,CQ

## 2024-12-19 PROCEDURE — 97530 THERAPEUTIC ACTIVITIES: CPT | Mod: PN,CQ

## 2024-12-23 ENCOUNTER — CLINICAL SUPPORT (OUTPATIENT)
Dept: REHABILITATION | Facility: HOSPITAL | Age: 74
End: 2024-12-23
Payer: MEDICARE

## 2024-12-23 DIAGNOSIS — M25.562 CHRONIC PAIN OF BOTH KNEES: Primary | ICD-10-CM

## 2024-12-23 DIAGNOSIS — R26.89 ANTALGIC GAIT: ICD-10-CM

## 2024-12-23 DIAGNOSIS — G89.29 CHRONIC PAIN OF BOTH KNEES: Primary | ICD-10-CM

## 2024-12-23 DIAGNOSIS — M25.561 CHRONIC PAIN OF BOTH KNEES: Primary | ICD-10-CM

## 2024-12-23 PROCEDURE — 97530 THERAPEUTIC ACTIVITIES: CPT | Mod: PN,CQ

## 2024-12-23 PROCEDURE — 97110 THERAPEUTIC EXERCISES: CPT | Mod: PN,CQ

## 2024-12-23 PROCEDURE — 97112 NEUROMUSCULAR REEDUCATION: CPT | Mod: PN,CQ

## 2024-12-23 NOTE — PROGRESS NOTES
"[facility]   Physical Therapy Treatment Note     Name: Rizwan Demarco Jr.  Clinic Number: [id number]    Therapy Diagnosis:   No diagnosis found.        Physician: Sidney Wilson MD    Visit Date: 12/23/2024    Physician Orders: PT Eval and Treat   Medical Diagnosis from Referral: Arthritis of knee   Evaluation Date: 11/19/2024  Authorization Period Expiration: 12-31-24  Plan of Care Expiration: 2-19-25  Visit # / Visits authorized: 10/  20   Progress Note Due: 1-11-25  FOTO: 1/ 1     Precautions: Standard     PTA Visit #: 3/5  Time In: 1:00 pm   Time Out: 2:00 pm  Total Billable Time: 55 minutes    Subjective     Pt reports: he's a little tired today.  The knees are doing ok though.     He was compliant with home exercise program.  Response to previous treatment: first treatment day   Functional change: ongoing     Pain: 0/10  Location: bilateral knees     Objective     Rizwan participated in dynamic functional therapeutic activities to improve functional performance for 30 minutes, including:  Nu-step for 10 minutes for endurance and legs strengthening Level 3   Sit to stand 20 in 3x10 15# KB   Step up and down 8 in box 3x10  Standing Marches 3# +3x10     Rizwan received therapeutic exercises to develop strength, endurance, ROM, and flexibility for 15  minutes including:  Prone Quad Stretch 4x 30" hold   Standing HS Stretch 3x 30" hold  Standing Calf Stretch 3x 30" hold     Rizwan participated in neuromuscular re-education activities to improve: Balance, Coordination, Sense, and Proprioception for 10 minutes. The following activities were included:  Shuttle DL squat 4 black band 3x10  Shuttle single leg squat 2 black 3x10   Matrix knee extension +30# +3x10  Matrix knee flexion 50# +3x10   Matrix hip abd 55# +3x10   Matrix hip add 60# +3x10    Rizwan received the following manual therapy techniques: Joint mobilizations and Soft tissue Mobilization were applied to the: bilateral knees " for 00 minutes, including:      Home Exercises Provided and Patient Education Provided     Written Home Exercises Provided: yes.  Exercises were reviewed and Rizwan was able to demonstrate them prior to the end of the session.  Rizwan demonstrated good  understanding of the education provided.     Education provided:   - home exercise program     Assessment   Mr. Demarco presented to PT today without complaints of adrianna knee pain. Continued with exercises focused on improved adrianna LE strength and improved functional mobility and strength.  He was challenged with today's session, so no progressions were made.  He required one bathroom break today, as he reported continued difficulty with GI issues so not all exercises were performed today.  Will reintroduce held exercises next visit.       Rizwan Is progressing well towards his goals.   Pt prognosis is Good.     Pt will continue to benefit from skilled outpatient physical therapy to address the deficits listed in the problem list box on initial evaluation, provide pt/family education and to maximize pt's level of independence in the home and community environment.     Pt's spiritual, cultural and educational needs considered and pt agreeable to plan of care and goals.     Anticipated barriers to physical therapy: Scheduling issues      Goals:   Short Term Goals:  4 weeks  1.Report decreased in pain at worse less than  <   / =  5  /10  to increase tolerance for functional mobility.goal met 12-11-24  2. Pt to increase B popliteal angle by greater than > or = 5 degrees in order to improve flexibility and posture.goal met 12-11-24  3.Pt to increase B Ely's Test by greater than  > or = 5degrees in order to improve flexibility and posture. goal met 12-11-24  4. Pt to tolerate HEP to improve ROM and independence with ADL's.goal met 12-11-24     Long Term Goals: 8 weeks  1.Report decreased in pain at worse less than  <   / =  1  /10  to increase tolerance for functional  mobility. On going  2. Pt will report 90% or greater  on FOTO knee survey  to demonstrate increase in LE function with every day tasks. On going  3. Pt to be Independent with HEP to improve ROM and independence with ADL's. On going  4. Pt to increase B popliteal angle by greater than > or = 5 degrees in order to improve flexibility and posture. On going  5.Pt to increase B Ely's Test by greater than  > or = 5degrees in order to improve flexibility and posture. On going   Plan   Continue with plan of care     Shweta Wen, JOSE LUIS   12/22/2024

## 2024-12-26 ENCOUNTER — CLINICAL SUPPORT (OUTPATIENT)
Dept: REHABILITATION | Facility: HOSPITAL | Age: 74
End: 2024-12-26
Payer: MEDICARE

## 2024-12-26 DIAGNOSIS — G89.29 CHRONIC PAIN OF BOTH KNEES: Primary | ICD-10-CM

## 2024-12-26 DIAGNOSIS — M25.562 CHRONIC PAIN OF BOTH KNEES: Primary | ICD-10-CM

## 2024-12-26 DIAGNOSIS — M25.561 CHRONIC PAIN OF BOTH KNEES: Primary | ICD-10-CM

## 2024-12-26 DIAGNOSIS — R26.89 ANTALGIC GAIT: ICD-10-CM

## 2024-12-26 PROCEDURE — 97112 NEUROMUSCULAR REEDUCATION: CPT | Mod: KX,PN

## 2024-12-26 PROCEDURE — 97110 THERAPEUTIC EXERCISES: CPT | Mod: KX,PN

## 2024-12-26 PROCEDURE — 97530 THERAPEUTIC ACTIVITIES: CPT | Mod: KX,PN

## 2024-12-26 NOTE — PROGRESS NOTES
"[facility]   Physical Therapy Treatment Note     Name: Rizwan Demarco Jr.  Clinic Number: [id number]    Therapy Diagnosis:   Encounter Diagnoses   Name Primary?    Chronic pain of both knees Yes    Antalgic gait            Physician: Sidney Wilson MD    Visit Date: 12/26/2024    Physician Orders: PT Eval and Treat   Medical Diagnosis from Referral: Arthritis of knee   Evaluation Date: 11/19/2024  Authorization Period Expiration: 12-31-24  Plan of Care Expiration: 2-19-25  Visit # / Visits authorized: 11/ 20   Progress Note Due: 1-11-25  FOTO: 1/ 1     Precautions: Standard     PTA Visit #: 3/5  Time In: 11:50 am   Time Out: 1245 pm  Total Billable Time: 55   minutes    Subjective     Pt reports:  he is doing ok. He states he feels tightness on his calf.      He was compliant with home exercise program.  Response to previous treatment: first treatment day   Functional change: ongoing     Pain: 0/10  Location: bilateral knees     Objective     Rizwan participated in dynamic functional therapeutic activities to improve functional performance for 30 minutes, including:  Nu-step for 10 minutes for endurance and legs strengthening Level 3   Sit to stand 20 in 3x10 15# KB   Step up and down 8 in box 3x10  Standing Marches 3# +3x10     Rizwan received therapeutic exercises to develop strength, endurance, ROM, and flexibility for 15  minutes including:  Prone Quad Stretch 4x 30" hold   Standing HS Stretch 3x 30" hold  Standing Calf Stretch 3x 30" hold     Rizwan participated in neuromuscular re-education activities to improve: Balance, Coordination, Sense, and Proprioception for 10 minutes. The following activities were included:  Shuttle DL squat 4 black band 3x10  Shuttle single leg squat 2 black 3x10   Matrix knee extension +30# +3x10  Matrix knee flexion 50# +3x10   Matrix hip abd 55# +3x10   Matrix hip add 60# +3x10    Rizwan received the following manual therapy techniques: Joint " mobilizations and Soft tissue Mobilization were applied to the: bilateral knees for 00 minutes, including:      Home Exercises Provided and Patient Education Provided     Written Home Exercises Provided: yes.  Exercises were reviewed and Rizwan was able to demonstrate them prior to the end of the session.  Rizwan demonstrated good  understanding of the education provided.     Education provided:   - home exercise program     Assessment   Mr. Demarco presented to PT today without complaints of adrianna knee pain. Pt was educated plan for d/c. Pt agreed to be d/c to gy. HEP will be given next visit. Continued with exercises focused on improved adrianna LE strength and improved functional mobility and strength. Pt required minor v/c's to perform exercises with proper form and proper muscles activation.   Plan to progress towards d/c.     Rizwan Is progressing well towards his goals.   Pt prognosis is Good.     Pt will continue to benefit from skilled outpatient physical therapy to address the deficits listed in the problem list box on initial evaluation, provide pt/family education and to maximize pt's level of independence in the home and community environment.     Pt's spiritual, cultural and educational needs considered and pt agreeable to plan of care and goals.     Anticipated barriers to physical therapy: Scheduling issues      Goals:   Short Term Goals:  4 weeks  1.Report decreased in pain at worse less than  <   / =  5  /10  to increase tolerance for functional mobility.goal met 12-11-24  2. Pt to increase B popliteal angle by greater than > or = 5 degrees in order to improve flexibility and posture.goal met 12-11-24  3.Pt to increase B Ely's Test by greater than  > or = 5degrees in order to improve flexibility and posture. goal met 12-11-24  4. Pt to tolerate HEP to improve ROM and independence with ADL's.goal met 12-11-24     Long Term Goals: 8 weeks  1.Report decreased in pain at worse less than  <   / =  1  /10   to increase tolerance for functional mobility. On going  2. Pt will report 90% or greater  on FOTO knee survey  to demonstrate increase in LE function with every day tasks. On going  3. Pt to be Independent with HEP to improve ROM and independence with ADL's. On going  4. Pt to increase B popliteal angle by greater than > or = 5 degrees in order to improve flexibility and posture. On going  5.Pt to increase B Ely's Test by greater than  > or = 5degrees in order to improve flexibility and posture. On going   Plan   Continue with plan of care     Mike Hurt, PT   12/26/2024

## 2024-12-27 ENCOUNTER — TELEPHONE (OUTPATIENT)
Dept: CARDIOLOGY | Facility: CLINIC | Age: 74
End: 2024-12-27
Payer: MEDICARE

## 2024-12-27 NOTE — TELEPHONE ENCOUNTER
----- Message from Roger sent at 12/27/2024  9:24 AM CST -----  Regarding: self  Type: Patient Call Back    Who called:    What is the request in detail:calling to see if he can be seen sooner than February due to him having a pacemaker     Can the clinic reply by [facility]?no    Would the patient rather a call back or a response via [facility]? callback  c  Best call back number:[phone]    Additional Information:

## 2024-12-31 ENCOUNTER — CLINICAL SUPPORT (OUTPATIENT)
Dept: REHABILITATION | Facility: HOSPITAL | Age: 74
End: 2024-12-31
Payer: MEDICARE

## 2024-12-31 DIAGNOSIS — M25.561 CHRONIC PAIN OF BOTH KNEES: Primary | ICD-10-CM

## 2024-12-31 DIAGNOSIS — G89.29 CHRONIC PAIN OF BOTH KNEES: Primary | ICD-10-CM

## 2024-12-31 DIAGNOSIS — R26.89 ANTALGIC GAIT: ICD-10-CM

## 2024-12-31 DIAGNOSIS — M25.562 CHRONIC PAIN OF BOTH KNEES: Primary | ICD-10-CM

## 2024-12-31 PROCEDURE — 97112 NEUROMUSCULAR REEDUCATION: CPT | Mod: KX,PN

## 2024-12-31 PROCEDURE — 97530 THERAPEUTIC ACTIVITIES: CPT | Mod: KX,PN

## 2024-12-31 NOTE — PROGRESS NOTES
[facility]   Physical Therapy Treatment Note     Name: Rziwan Demarco Jr.  Clinic Number: [id number]    Therapy Diagnosis:   Encounter Diagnoses   Name Primary?    Chronic pain of both knees Yes    Antalgic gait              Physician: Sidney Wilson MD    Visit Date: 12/31/2024    Physician Orders: PT Eval and Treat   Medical Diagnosis from Referral: Arthritis of knee   Evaluation Date: 11/19/2024  Authorization Period Expiration: 12-31-24  Plan of Care Expiration: 2-19-25  Visit # / Visits authorized: 12/ 20   Progress Note Due: 1-11-25  FOTO: 1/ 1     Precautions: Standard     PTA Visit #: 3/5  Time In: 12:50  pm  Time Out: 1:14 pm  Total Billable Time: 23   minutes    Subjective     Pt reports:  he is doing ok.he feels about 95% better. He denies calf tightness.     He was compliant with home exercise program.  Response to previous treatment: first treatment day   Functional change: ongoing     Pain: 0/10  Location: bilateral knees     Objective         Range of Motion:   Knee Left active   Flexion 120 deg no pain      Extension 0 deg       Knee Right active   Flexion 121 deg no pain    Extension 0 deg         Lower Extremity Strength   Right LE   Left LE     Knee extension: 4+/5 Knee extension: 4+/5   Knee flexion: 4+/5 Knee flexion: 4+/5   Hip flexion: 4+/5 Hip flexion: 4+/5   Hip extension:  4+/5 Hip extension: 4+/5   Hip abduction: 4+/5 Hip abduction: 4+/5   Hip adduction: 4+/5 Hip adduction 4+/5   Ankle dorsiflexion: 4+/5 Ankle dorsiflexion: 4+/5   Ankle plantarflexion: 4+/5 Ankle plantarflexion: 4+/5       Flexibility: =              Ely's test: R = -; L = -              Popliteal Angle: R = lacking 10 degrees ; L = lacking 12 degrees    Rizwan participated in dynamic functional therapeutic activities to improve functional performance for 10 minutes, including:  Upright bike   for 10 minutes for endurance and legs strengthening Level 3   Sit to stand 20 in 3x10 15# KB  "  Step up and down 8 in box 3x10  Standing Marches 3# +3x10     Rizwan received therapeutic exercises to develop strength, endurance, ROM, and flexibility for   minutes including:  Prone Quad Stretch 4x 30" hold   Standing HS Stretch 3x 30" hold  Standing Calf Stretch 3x 30" hold     Rizwan participated in neuromuscular re-education activities to improve: Balance, Coordination, Sense, and Proprioception for 13 minutes. The following activities were included:  Shuttle DL squat 4 black band 3x10  Shuttle single leg squat 2 black 3x10   Matrix knee extension +30# +3x10  Matrix knee flexion 50# +3x10   Matrix hip abd 55# +3x10   Matrix hip add 60# +3x10    Rizwan received the following manual therapy techniques: Joint mobilizations and Soft tissue Mobilization were applied to the: bilateral knees for 00 minutes, including:      Home Exercises Provided and Patient Education Provided     Written Home Exercises Provided: yes.  Exercises were reviewed and Rizwan was able to demonstrate them prior to the end of the session.  Rizwan demonstrated good  understanding of the education provided.     Education provided:   - home exercise program     Assessment   Mr. Demarco presented to PT today without complaints of adrianna knee pain. Pt was re-assessed today. Pt has improved Knee mobility and stability. Pt has been independent with exercises. Pt has met 5/5 LTGs. Overall, pt has improved well in therapy. Pt will join gym to cont with HEP. Plan to be d/c today.       Rizwan Is progressing well towards his goals.   Pt prognosis is Good.     Pt will continue to benefit from skilled outpatient physical therapy to address the deficits listed in the problem list box on initial evaluation, provide pt/family education and to maximize pt's level of independence in the home and community environment.     Pt's spiritual, cultural and educational needs considered and pt agreeable to plan of care and goals.     Anticipated barriers to " physical therapy: Scheduling issues      Goals:   Short Term Goals:  4 weeks  1.Report decreased in pain at worse less than  <   / =  5  /10  to increase tolerance for functional mobility.goal met 12-11-24  2. Pt to increase B popliteal angle by greater than > or = 5 degrees in order to improve flexibility and posture.goal met 12-11-24  3.Pt to increase B Ely's Test by greater than  > or = 5degrees in order to improve flexibility and posture. goal met 12-11-24  4. Pt to tolerate HEP to improve ROM and independence with ADL's.goal met 12-11-24     Long Term Goals: 8 weeks  1.Report decreased in pain at worse less than  <   / =  1  /10  to increase tolerance for functional mobility. Goal met 12-31-24  2. Pt will report 90% or greater  on FOTO knee survey  to demonstrate increase in LE function with every day tasks.Goal met 12-31-24  3. Pt to be Independent with HEP to improve ROM and independence with ADL's.Goal met 12-31-24  4. Pt to increase B popliteal angle by greater than > or = 5 degrees in order to improve flexibility and posture. Goal met 12-31-24  5.Pt to increase B Ely's Test by greater than  > or = 5degrees in order to improve flexibility and posture. Goal met 12-31-24  Plan   Plan to be d/c today    Mike Hurt, PT   12/31/2024

## 2025-01-22 ENCOUNTER — TELEPHONE (OUTPATIENT)
Dept: ORTHOPEDICS | Facility: CLINIC | Age: 75
End: 2025-01-22
Payer: MEDICARE

## 2025-01-22 ENCOUNTER — PATIENT MESSAGE (OUTPATIENT)
Dept: ORTHOPEDICS | Facility: CLINIC | Age: 75
End: 2025-01-22
Payer: MEDICARE

## 2025-01-22 NOTE — TELEPHONE ENCOUNTER
Called pt and LVM stating that I have r/s his appt date and time and that he can see the info in his portal     ----- Message from Jeremy sent at 1/22/2025  1:40 PM CST -----  Regarding: Appt  Contact: morenita [phone]  Pt is requesting call back Friday 1/24 or next week to reschedule appt cancelled  1/23 due to pt being out of town and flight has been cancelled, please call pt @ [phone]

## 2025-02-05 ENCOUNTER — PATIENT MESSAGE (OUTPATIENT)
Dept: OPTOMETRY | Facility: CLINIC | Age: 75
End: 2025-02-05
Payer: MEDICARE

## 2025-02-06 ENCOUNTER — OFFICE VISIT (OUTPATIENT)
Dept: ORTHOPEDICS | Facility: CLINIC | Age: 75
End: 2025-02-06
Payer: MEDICARE

## 2025-02-06 VITALS — BODY MASS INDEX: 28.84 KG/M2 | HEIGHT: 73 IN | WEIGHT: 217.63 LBS

## 2025-02-06 DIAGNOSIS — M17.11 ARTHRITIS OF RIGHT KNEE: Primary | ICD-10-CM

## 2025-02-06 DIAGNOSIS — M54.50 ACUTE LEFT-SIDED LOW BACK PAIN WITHOUT SCIATICA: ICD-10-CM

## 2025-02-06 PROCEDURE — 99999 PR PBB SHADOW E&M-EST. PATIENT-LVL V: CPT | Mod: PBBFAC,,, | Performed by: STUDENT IN AN ORGANIZED HEALTH CARE EDUCATION/TRAINING PROGRAM

## 2025-02-06 PROCEDURE — 99215 OFFICE O/P EST HI 40 MIN: CPT | Mod: PBBFAC | Performed by: STUDENT IN AN ORGANIZED HEALTH CARE EDUCATION/TRAINING PROGRAM

## 2025-02-06 PROCEDURE — 99214 OFFICE O/P EST MOD 30 MIN: CPT | Mod: S$PBB,,, | Performed by: STUDENT IN AN ORGANIZED HEALTH CARE EDUCATION/TRAINING PROGRAM

## 2025-02-06 NOTE — PATIENT INSTRUCTIONS
Try a heating pad on your low back for 30 minutes at a time, three times per day.  Continue using the Salonpas patches.  Do the attached exercises daily.

## 2025-02-07 NOTE — PROGRESS NOTES
Assessment & Plan   Encounter Diagnoses   Name Primary?    Arthritis of right knee Yes    Acute left-sided low back pain without sciatica           Rizwan Demarco Jr.  Has bilateral knee arthritis of the medial compartment radiographically, and the previous right knee symptoms have resolved s/p corticosteroid injection and PT. However, today he reports subacute low lumbar back pain after doing some heavy lifting a few weeks ago.  We offered PT but he prefers HEP and a handout is given. We also offered a medrol dosepack, which he initially declined but then requested so this is prescribed.  He will follow-up as needed for his knees, and if his back pain persists after the above I would recommend the Healthy Back program and then referral to Spine clinic if needed after the Healthy Back program.      Assessment & Plan    - Discussed oral anti-inflammatory medication and short course of oral steroid as treatment options for back pain.  - Continue using Salonpas patch for pain.  - Apply heating pad to affected area when sitting.  - Perform provided back exercises.  - Continue knee exercises.  - Contact office through patient portal if needed.          Problem List:  Problem List Items Addressed This Visit    None          Patient ID: Rizwan Demarco Jr. is a 74 y.o. male.  R knee pain, Left sided low back pain         HPI:  Back pain. Mr. Demarco presents for follow-up of right knee pain and a new complaint of back pain. Right knee is improving. Mr. Demarco developed pain in left low back area, attributed to overexertion. Pain is localized in the left low back region. It was severe enough to impair mobility, requiring the patient to roll and struggle to get to their feet. Mr. Demarco denies pain radiating down the leg. Back pain has improved significantly since onset.    Pain tends to return after prolonged sitting. Mr. Demarco reports using Salonpas patches for pain relief. Mr. Demarco has tried  various methods for relief, including exercises (crossing leg and pulling until mild discomfort is felt), using hot water in the shower, and using a massage chair gifted by granddaughter.    PREVIOUS TREATMENTS:  - Salonpas patches for pain relief  - Exercises, including crossing leg and pulling until mild discomfort is felt  - Hot showers for temporary relief  - Massage chair, which has provided some benefit    WORK STATUS:  - Mr. Demarco mentions being overworked, which led to back pain  - Mr. Demarco reports being able to be more active in the yard now that the weather has warmed up    SOCIAL HISTORY:  - Marital Status:          10/22/24: Rizwan Demarco Jr. is a 74 y.o. male who presents for evaluation of Right knee pain. The knee pain has been present for years and  been progressive. Conservative treatment in the form of rest, activity modification has been attempted previously.  Of note, he had arthroscopic medial meniscectomy decades ago when he was in the . The patient requires No assistive device. The pain is worsened by   Walking, stairs. Physical therapy  has not been attempted previously. The patient has a decreased quality of life due to knee pain and presents today for evaluation.     Past Medical History:  Past Medical History:   Diagnosis Date    Allergy     Anticoagulant long-term use     Arthritis     Rheumatoid arthritis    Back pain     Blood clotting tendency     BPH (benign prostatic hypertrophy)     Cervical spondylosis     Chronic a-fib 7/18/2024    Colon polyp 2010    adenoma    Coronary artery disease     Depression 05/08/2015    Dry eyes     Dry mouth     GERD (gastroesophageal reflux disease)     Hyperlipidemia     Hypertension     Lumbar spondylosis     Nasal obstruction     PAD (peripheral artery disease) 04/25/2023    Rheumatoid arthritis     Sinusitis     Special screening for malignant neoplasm of colon 06/29/2016    Trouble in sleeping         Surgical  History:  Past Surgical History:   Procedure Laterality Date    AORTOGRAPHY N/A 6/19/2024    Procedure: AORTOGRAM;  Surgeon: Abbe Vilchis MD;  Location: [facility] CATH LAB;  Service: Cardiology;  Laterality: N/A;    ATHERECTOMY Left 6/21/2023    Procedure: Atherectomy;  Surgeon: Abbe Vilchis MD;  Location: [facility] CATH LAB;  Service: Cardiology;  Laterality: Left;    CHALAZION EXCISION      COLONOSCOPY N/A 6/29/2016    Procedure: COLONOSCOPY;  Surgeon: Partha Saucedo MD;  Location: [facility] ENDO;  Service: Endoscopy;  Laterality: N/A;    COLONOSCOPY N/A 7/24/2020    Procedure: COLONOSCOPY;  Surgeon: Ian Martinez MD;  Location: [facility] ENDO (4TH FLR);  Service: Endoscopy;  Laterality: N/A;  4/16/20 - removed from 5/1/20, not called yet due to acute pain issues being addressed today - pg    COLONOSCOPY N/A 10/19/2023    Procedure: COLONOSCOPY;  Surgeon: Ian Martinez MD;  Location: [facility];  Service: Endoscopy;  Laterality: N/A;  order created from Dr. Martinez's previous colonoscopy report 7/24/2020-3 year surveillance.   ok to hold Eliquis 2 days and Plavix 5 days per Dr Vilchis-GT  PEG instr portal -ml  10/12- pre call complete.  DBM    CORONARY ANGIOGRAPHY INCLUDING BYPASS GRAFTS WITH CATHETERIZATION OF LEFT HEART N/A 6/19/2024    Procedure: ANGIOGRAM, CORONARY, INCLUDING BYPASS GRAFT, WITH LEFT HEART CATHETERIZATION;  Surgeon: Abbe Vilchis MD;  Location: [facility] CATH LAB;  Service: Cardiology;  Laterality: N/A;  RN PREOP 06/17/2024    CORONARY ARTERY BYPASS GRAFT      ESOPHAGOGASTRODUODENOSCOPY N/A 7/24/2020    Procedure: ESOPHAGOGASTRODUODENOSCOPY (EGD);  Surgeon: Ian Martinez MD;  Location: [facility] (4TH FLR);  Service: Endoscopy;  Laterality: N/A;  4/16/20 - removed from 5/1/20, not called yet due to acute pain issues being addressed today.  4/17/20 - rescheduled 7/24/20 - pg    EXCISION TURBINATE, SUBMUCOUS      INSERTION, ELECTRODE LEAD, CARDIAC PACEMAKER, 1 ELECTRODE LEAD Left 8/26/2024    Procedure: INSERTION, ELECTRODE  LEAD, PACEMAKER, 1 ELECTRODE LEAD;  Surgeon: Rizwan Elise MD;  Location: [facility] CATH LAB;  Service: Cardiology;  Laterality: Left;    KNEE ARTHROSCOPY W/ DEBRIDEMENT      NASAL SEPTUM SURGERY      PERIPHERAL ANGIOGRAPHY N/A 6/21/2023    Procedure: Peripheral angiography;  Surgeon: Abbe Vilchis MD;  Location: [facility] CATH LAB;  Service: Cardiology;  Laterality: N/A;  right radial access--- RN PREOP 6/16----JM    TONSILLECTOMY          Social History:  He reports that he quit smoking about 34 years ago. His smoking use included cigarettes. He started smoking about 54 years ago. He has a 20 pack-year smoking history. He has never used smokeless tobacco. He reports that he does not drink alcohol and does not use drugs.     Family History:  family history includes Alzheimer's disease in his mother; Cataracts in an other family member; Colon cancer in his father; Hyperlipidemia in his mother; No Known Problems in his maternal aunt, maternal grandfather, maternal grandmother, maternal uncle, paternal aunt, paternal grandfather, paternal grandmother, and paternal uncle; Stomach cancer in his father.     Current Outpatient Medications on File Prior to Visit   Medication Sig Dispense Refill    alfuzosin (UROXATRAL) 10 mg Tb24 Take 1 tablet (10 mg total) by mouth daily with breakfast. 90 tablet 3    amLODIPine (NORVASC) 5 MG tablet Take 1 tablet (5 mg total) by mouth once daily. 90 tablet 3    apixaban (ELIQUIS) 5 mg Tab Take 1 tablet (5 mg total) by mouth 2 (two) times daily. 60 tablet 11    artificial tears,hypromellose,,GENTEAL/SUSTANE, (SYSTANE GEL) 0.3 % Gel 1 drop as needed.      ascorbic acid, vitamin C, (VITAMIN C) 1000 MG tablet Take 1,000 mg by mouth once daily.      atorvastatin (LIPITOR) 40 MG tablet Take 1 tablet (40 mg total) by mouth every evening. 90 tablet 3    cephALEXin (KEFLEX) 500 MG capsule Take 1 capsule (500 mg total) by mouth every 6 (six) hours. 12 capsule 0    ciclopirox (LOPROX) 0.77 % Crea Apply  "topically 2 (two) times daily. 90 g 2    ciclopirox (PENLAC) 8 % Soln Apply topically nightly. 6.6 mL 11    cinnamon bark 500 mg capsule Take 500 mg by mouth once daily.      ezetimibe (ZETIA) 10 mg tablet Take 1 tablet (10 mg total) by mouth once daily. 90 tablet 3    hydroCHLOROthiazide (MICROZIDE) 12.5 mg capsule Take 1 capsule (12.5 mg total) by mouth once daily. 90 capsule 3    HYDROcodone-acetaminophen (NORCO) 5-325 mg per tablet Take 1 tablet by mouth every 12 (twelve) hours as needed for Pain. 8 tablet 0    linaCLOtide (LINZESS) 72 mcg Cap capsule Take 1 capsule (72 mcg total) by mouth before breakfast. 30 capsule 11    losartan (COZAAR) 25 MG tablet Take 1 tablet (25 mg total) by mouth once daily. 90 tablet 3    multivitamin (THERAGRAN) per tablet Take by mouth.  Tablet Oral Every day      omega-3 fatty acids 1,250 mg Cap Take by mouth.      omeprazole (PRILOSEC) 20 MG capsule Take 1 capsule (20 mg total) by mouth every morning. 90 capsule 3    oxymetazoline (AFRIN) 0.05 % nasal spray 2 sprays by Nasal route 2 (two) times daily.      propylene glycoL 0.6 % Drop Apply to eye. Not currently using      sildenafiL (VIAGRA) 100 MG tablet Take 1 tablet (100 mg total) by mouth daily as needed. 10 tablet 11    sodium bicarb-sodium chloride Pack by sinus irrigation route.       No current facility-administered medications on file prior to visit.     Review of patient's allergies indicates:   Allergen Reactions    Aspirin Nausea And Vomiting    Astelin [azelastine] Other (See Comments)     Dry mouth    Flomax [tamsulosin] Other (See Comments)     Nosebleed          Physical exam:  Height 6' 1" (1.854 m), weight 98.7 kg (217 lb 9.5 oz).  General: no apparent distress    Gait: + antalgic, no use of assistive devices    R knee:   Not TTP   Skin: intact, no sig effusion  Range of motion: 5 to 110  Strength: 4+/5 with extension, 5/5 with flexion  Ligament exam: correctable varus, otherwise stable to varus/valgus and " stable to AP stress in flexion and extension  Neurovascular: WWP,  Light touch sensation intact    L knee:   Not TTP   Skin: intact, no effusion  Range of motion: 2 to 110  Strength: 5/5 with extension, 5/5 with flexion  Ligament exam: stable to varus/valgus and stable to AP stress in flexion and extension  Neurovascular: WWP,  Light touch sensation intact    Back: TTP Left lower lumbar paraspinal muscles, not TTP at the midline of the spine over the vertebra, neg SLR b/l, NVI distally     Relevant Results:  Imaging:  Plain x-rays of the b/l knee were obtained and independently reviewed by me today, 02/07/2025, and demonstrate moderate narrowing of the  medial compartment,  medial osteophytes and sclerosis c/w KL grade 3 arthritic change. There is varus alignment on the Right and relatively neutral alignment on the Left.

## 2025-02-09 RX ORDER — METHYLPREDNISOLONE 4 MG/1
TABLET ORAL
Qty: 21 EACH | Refills: 0 | Status: SHIPPED | OUTPATIENT
Start: 2025-02-09 | End: 2025-03-02

## 2025-02-11 ENCOUNTER — OFFICE VISIT (OUTPATIENT)
Dept: OTOLARYNGOLOGY | Facility: CLINIC | Age: 75
End: 2025-02-11
Payer: MEDICARE

## 2025-02-11 VITALS
WEIGHT: 214.75 LBS | DIASTOLIC BLOOD PRESSURE: 79 MMHG | HEART RATE: 59 BPM | BODY MASS INDEX: 28.33 KG/M2 | SYSTOLIC BLOOD PRESSURE: 139 MMHG

## 2025-02-11 DIAGNOSIS — J32.9 CHRONIC SINUSITIS, UNSPECIFIED LOCATION: Primary | ICD-10-CM

## 2025-02-11 DIAGNOSIS — J34.2 NASAL SEPTAL DEVIATION: ICD-10-CM

## 2025-02-11 DIAGNOSIS — Z98.890 S/P FESS (FUNCTIONAL ENDOSCOPIC SINUS SURGERY): ICD-10-CM

## 2025-02-11 PROCEDURE — 99999 PR PBB SHADOW E&M-EST. PATIENT-LVL IV: CPT | Mod: PBBFAC,,, | Performed by: STUDENT IN AN ORGANIZED HEALTH CARE EDUCATION/TRAINING PROGRAM

## 2025-02-11 PROCEDURE — 87075 CULTR BACTERIA EXCEPT BLOOD: CPT | Performed by: STUDENT IN AN ORGANIZED HEALTH CARE EDUCATION/TRAINING PROGRAM

## 2025-02-11 PROCEDURE — 31231 NASAL ENDOSCOPY DX: CPT | Mod: S$PBB,,, | Performed by: STUDENT IN AN ORGANIZED HEALTH CARE EDUCATION/TRAINING PROGRAM

## 2025-02-11 PROCEDURE — 31231 NASAL ENDOSCOPY DX: CPT | Mod: PBBFAC | Performed by: STUDENT IN AN ORGANIZED HEALTH CARE EDUCATION/TRAINING PROGRAM

## 2025-02-11 PROCEDURE — 99214 OFFICE O/P EST MOD 30 MIN: CPT | Mod: 25,S$PBB,, | Performed by: STUDENT IN AN ORGANIZED HEALTH CARE EDUCATION/TRAINING PROGRAM

## 2025-02-11 PROCEDURE — 99214 OFFICE O/P EST MOD 30 MIN: CPT | Mod: PBBFAC | Performed by: STUDENT IN AN ORGANIZED HEALTH CARE EDUCATION/TRAINING PROGRAM

## 2025-02-11 PROCEDURE — 87070 CULTURE OTHR SPECIMN AEROBIC: CPT | Performed by: STUDENT IN AN ORGANIZED HEALTH CARE EDUCATION/TRAINING PROGRAM

## 2025-02-11 RX ORDER — DOXYCYCLINE HYCLATE 100 MG
100 TABLET ORAL 2 TIMES DAILY
Qty: 20 TABLET | Refills: 0 | Status: SHIPPED | OUTPATIENT
Start: 2025-02-11 | End: 2025-02-21

## 2025-02-11 NOTE — PROGRESS NOTES
Subjective:      Rizwan is a 74 y.o. male who comes for follow-up of sinusitis.  His last visit with me was on 10/15/2024.  Has been doing well.  Reports in the last week has was started to have some nasal congestion, facial pressure and brown discolored nasal mucus.    His current sinus regime consists of: Saline irrigations.    The assessment of quality and severity of symptoms as measured by the SNOT-22 score is deferred.    The patient's medications, allergies, past medical, surgical, social and family histories were reviewed and updated as appropriate.    Review of Systems   Constitutional:  Positive for malaise/fatigue.   HENT:  Positive for nosebleeds.    Eyes:  Positive for photophobia.   Respiratory:  Positive for wheezing.    Musculoskeletal:  Positive for back pain and neck pain.   Skin: Negative.    Neurological: Negative.       Answers submitted by the patient for this visit:  Review of Symptoms Questionnaire  (Submitted on 2/10/2025)  sinus pressure : Yes  Sinus infection(s)?: Yes  eye itching: Yes  Snoring?: Yes  Foot swelling?: Yes  Acid Reflux?: Yes  Urinating too frequently?: Yes  Seasonal Allergies?: Yes  None of these : Yes  None of these: Yes  sleep disturbance: Yes    A detailed review of systems was obtained with pertinent positives as per the above HPI, and otherwise negative.        Objective:     /79 (BP Location: Left arm, Patient Position: Sitting)   Pulse (!) 59   Wt 97.4 kg (214 lb 11.7 oz)   BMI 28.33 kg/m²        Constitutional:   Vital signs are normal. He appears well-developed and well-nourished.     Head:  Normocephalic and atraumatic.     Ears:  Hearing normal to normal and whispered voice; external ear normal without scars, lesions, or masses; ear canal, tympanic membrane, and middle ear normal..   Right Ear: No swelling. Tympanic membrane is not perforated and not bulging. No middle ear effusion.   Left Ear: No swelling. Tympanic membrane is not perforated and not  bulging.  No middle ear effusion.     Nose:  Nose normal including turbinates, nasal mucosa, sinuses and nasal septum. No epistaxis.     Mouth/Throat  Oropharynx clear and moist without lesions or asymmetry and normal uvula midline. Normal dentition. No tonsillar abscesses. Tonsillar exudate.      Neck:  Neck normal without thyromegaly masses, asymmetry, normal tracheal structure, crepitus, and tenderness, thyroid normal, trachea normal, phonation normal, full range of motion with neck supple and no adenopathy. No stridor present.        Head (right side): No submental adenopathy present.        Head (left side): No submental adenopathy present.     He has no cervical adenopathy.     Pulmonary/Chest:   No stridor.       Procedure    Nasal endoscopy performed.  See procedure note.    Nasal Endoscopy:  2/11/2025    The use of diagnostic nasal endoscopy was considered medically necessary for the evaluation and visualization of the nasal anatomy for symptoms suggestive of nasal or sinus origin. Physical examination (including a nasal speculum evaluation) did not provide sufficient clinical information to establish a diagnosis, or symptoms did not improve or worsened following treatment.     The nasal cavity was decongested with topical 1% phenylephrine and anesthetized with 4% lidocaine.  A rigid 0-degree endoscope was introduced into the nasal cavity.    The patient was seated in the examination chair. After discussion of risks and benefits, a nasal endoscope was inserted into the nose the endoscope was passed along the left nasal floor to the nasopharynx. It was then passed between the middle and superior meatus, nasal turbinates, nasal septum, nasopharynx and sphenoethmoid region. The nasal endoscope was withdrawn and there was no complications. An identical procedure was performed on the right side. I was present for the entire procedure.The patient tolerated the above procedure well. The findings of this procedure  can be found in the dictated note from 2/11/2025 visit.      Purulent drainage noted in bilateral middle meatus.  Cultures obtained.  No nasal masses nasal polyposis.  Edematous mucosa bilaterally.    Data Reviewed    WBC (K/uL)   Date Value   11/27/2024 6.13     Eosinophil % (%)   Date Value   11/27/2024 3.3     Eos # (K/uL)   Date Value   11/27/2024 0.2     Platelets (K/uL)   Date Value   11/27/2024 147 (L)     Glucose (mg/dL)   Date Value   11/27/2024 91     Total IgE (IU/mL)   Date Value   09/30/2015 302 (H)       No sinus imaging available.      Assessment:     No diagnosis found.       Plan:     - Doxy x 10 days  - Cultures of the sinuses were obtained today, if the antibiotics need to be changed based on the sensitivities I will adjust them as needed.    - continue irrigations  - RTC 3 months     Ward Schmitt MD

## 2025-02-13 ENCOUNTER — OFFICE VISIT (OUTPATIENT)
Dept: CARDIOLOGY | Facility: CLINIC | Age: 75
End: 2025-02-13
Payer: MEDICARE

## 2025-02-13 VITALS
OXYGEN SATURATION: 97 % | SYSTOLIC BLOOD PRESSURE: 132 MMHG | DIASTOLIC BLOOD PRESSURE: 84 MMHG | WEIGHT: 219.56 LBS | BODY MASS INDEX: 29.1 KG/M2 | HEART RATE: 68 BPM | RESPIRATION RATE: 16 BRPM | HEIGHT: 73 IN

## 2025-02-13 DIAGNOSIS — I10 ESSENTIAL HYPERTENSION: ICD-10-CM

## 2025-02-13 DIAGNOSIS — I49.5 SSS (SICK SINUS SYNDROME): ICD-10-CM

## 2025-02-13 DIAGNOSIS — Z95.0 PACEMAKER: ICD-10-CM

## 2025-02-13 DIAGNOSIS — I27.9 PULMONARY HEART DISEASE: ICD-10-CM

## 2025-02-13 DIAGNOSIS — R00.1 BRADYCARDIA, UNSPECIFIED: Primary | ICD-10-CM

## 2025-02-13 DIAGNOSIS — I25.709 ATHEROSCLEROSIS OF CORONARY ARTERY BYPASS GRAFT OF NATIVE HEART WITH ANGINA PECTORIS: ICD-10-CM

## 2025-02-13 DIAGNOSIS — I48.91 ATRIAL FIBRILLATION WITH SLOW VENTRICULAR RESPONSE: ICD-10-CM

## 2025-02-13 DIAGNOSIS — Z95.0 PRESENCE OF CARDIAC PACEMAKER: ICD-10-CM

## 2025-02-13 DIAGNOSIS — I70.0 ATHEROSCLEROSIS OF AORTA: ICD-10-CM

## 2025-02-13 LAB
BACTERIA SPEC AEROBE CULT: NORMAL
BACTERIA SPEC ANAEROBE CULT: NORMAL

## 2025-02-13 PROCEDURE — 99215 OFFICE O/P EST HI 40 MIN: CPT | Mod: PBBFAC,PO | Performed by: INTERNAL MEDICINE

## 2025-02-13 PROCEDURE — 99999 PR PBB SHADOW E&M-EST. PATIENT-LVL V: CPT | Mod: PBBFAC,,, | Performed by: INTERNAL MEDICINE

## 2025-02-13 NOTE — PROGRESS NOTES
CARDIOVASCULAR CONSULTATION    REASON FOR CONSULT:   Rizwan Demarco Jr. is a 74 y.o. male who presents for evaluation    HISTORY OF PRESENT ILLNESS:     Patient is a pleasant 72-year-old man.  Here for evaluation.  Past medical history significant for atrial fibrillation, on Eliquis.  Coronary artery bypass grafting in 2003.  Quadruple bypass as per patient, but unknown anatomy.  States had it done at [facility] [address] [facility].  Dyslipidemia, hypertension, Sjogren's disease.  Also peripheral artery disease with abnormal ABIs in 2021.  Denies any orthopnea, PND.  Main complaint is right leg, right calf claudication and cramping on walking.  Also complains of occasional heaviness in the chest area states that happens when he gets constipated.      2023:      The left ventricle is normal in size with concentric remodeling and normal systolic function. The estimated ejection fraction is 65%.  Normal right ventricular size with normal right ventricular systolic function.  Normal left ventricular diastolic function.  The estimated PA systolic pressure is 33 mmHg.  Intermediate central venous pressure (8 mmHg).  The ascending aorta is mildly dilated.      2021:      Moderately decreased right ORESTES, 0.68.  Moderately dampened PVR waveforms.  Mildly decreased left ORESTES, 0.89.  Moderately dampened PVR waveforms.      May 23:  Patient here for follow-up.  Peripheral ultrasound revealed 60 90% mid left SFA stenosis.  Discussed initiating Pletal with the patient.  Patient not interested.  Would like to proceed with the peripheral angiogram for further evaluation.  Lifestyle limiting calf claudication right more than left        Right ORESTES 0.73 suggesting mild arteial flow resriction. Left ORESTES normal 1.01     Mild bilateral carotid plaque with no flow limiting stenosis     Normal abdominal aortic size and flow. No AAA       Mild right SFA plaque with no flow limiting arterial stenosis  Moderate left SFA plaque with 60-90% mis SFA  Ok I sent in cipro for her    stenosis. Occluded left posterial tibial artery        Notes from July 23:  Patient here for follow-up.  Claudication in the right leg has gone away.  States he recently walk 2 miles without any claudication.  Has a stenosis in his left SFA also, but states that there is no claudication in the left leg.      Successful laser atherectomy, balloon angioplasty of the distal left SFA and proximal popliteal artery.  With excellent angiographic results      Procedures performed:     1. Ultrasound-guided left common femoral artery access next     2. Abdominal aortogram with pigtail placed in the suprarenal position      3. Selective right lower extremity angiogram     4. Selective left lower artery angiogram      5. Laser atherectomy of right SFA and proximal popliteal artery      6. Drug coated balloon angioplasty of right SFA and proximal popliteal artery      7. IVUS of right SFA, popliteal and TP trunk.        Procedural details      Ultrasound-guided access of left common femoral artery was obtained.  After that we inserted a pigtail in the suprarenal position and abdominal aortogram was obtained.  It demonstrated no significant iliac artery stenosis on both sides.  Then we selectively engaged the right iliac artery and angiogram of the right iliac artery was obtained.  It revealed mild 10-20% stenosis.  After that we obtained angiogram of the right SFA which revealed severe 90-95% stenosis in the distal SFA.  There was also another 90% stenosis in the right TP trunk.  Proximal  of right anterior tibial artery and right posterior tibial arteries was present.  Both these arteries filled with collaterals.  Peroneal artery provides the most robust flow to the foot.  After that we crossed these wires with the lesion and decision was made to fix the SFA and popliteal lesions because patient has lifestyle limiting claudication, but no resting pain or CLI.  IVUS was used for vessel sizing After that laser atherectomy of  these 2 lesions was performed followed by balloon angioplasty.  Angiogram post revealed excellent angiographic results.       Then we did angiogram of the left lower extremity from the sheath and it revealed distal SFA severe stenosis.  Anterior tibial artery is diffusely diseased and there appears to be atypical takeoff of the posterior tibial artery.  No significant stenosis was noted in the peroneal artery        Assessment plan     Continue medical management      Resume Eliquis in a.m..  Continue Plavix 75 mg qd     Aspirin 81 mg qd x 1 m     statins         Nov 23: doing fine. No more claudication. No cp, orthopnea, pnd    Notes from February 24: Patient here for follow-up.  Doing fine.  Denies any chest pains at rest on exertion, orthopnea, PND.      Notes from May 24: Patient here for follow-up.  Lately has been experiencing dyspnea on exertion and chest tightness on exertion    Notes from June 24: Patient here for follow-up.  Stress test abnormal    Results for orders placed or performed during the hospital encounter of 08/24/24   EKG 12-lead    Collection Time: 08/26/24  8:29 AM   Result Value Ref Range    QRS Duration 166 ms    OHS QTC Calculation 501 ms    Narrative    Test Reason : R00.1,    Vent. Rate : 063 BPM     Atrial Rate : 086 BPM     P-R Int : 000 ms          QRS Dur : 166 ms      QT Int : 490 ms       P-R-T Axes : 000 -81 089 degrees     QTc Int : 501 ms    Ventricular-paced rhythm  Abnormal ECG  When compared with ECG of 24-AUG-2024 19:49,  Significant changes have occurred  Confirmed by Rizwan Elise MD ([id number]) on 8/26/2024 2:41:51 PM    Referred By: [email or web address]   SELF           Confirmed By:Rizwan Elise MD       Results for orders placed during the hospital encounter of 06/06/24    Echo    Interpretation Summary    Left Ventricle: The left ventricle is normal in size. Mildly increased wall thickness. There is normal systolic function with a visually estimated ejection fraction of 55 - 60%.  Unable to assess diastolic function due to atrial fibrillation.    Right Ventricle: Normal right ventricular cavity size. Systolic function is normal.    Left Atrium: Left atrium is moderately dilated.    Right Atrium: Right atrium is moderately dilated.    Mitral Valve: There is mild regurgitation.    Tricuspid Valve: There is moderate regurgitation.    Pulmonary Artery: The estimated pulmonary artery systolic pressure is 48 mmHg.    IVC/SVC: Intermediate venous pressure at 8 mmHg.      Results for orders placed during the hospital encounter of 06/06/24    Nuclear Stress - Cardiology Interpreted    Interpretation Summary    Abnormal myocardial perfusion scan.    There is a moderate intensity, moderate to large sized, mostly reversible perfusion abnormality with some fixed areas in the inferior wall(s).    There are no other significant perfusion abnormalities.    The gated perfusion images showed an ejection fraction of 68% at rest.    There is normal wall motion at rest and post stress.    The ECG portion of the study is negative for ischemia.    The patient reported no chest pain during the stress test.    There were no arrhythmias during stress.      Results for orders placed during the hospital encounter of 06/21/23    Cardiac catheterization    Conclusion    The estimated blood loss was <50 mL.    Successful laser atherectomy, balloon angioplasty of the distal left SFA and proximal popliteal artery.  With excellent angiographic results    Procedures performed:    1. Ultrasound-guided left common femoral artery access next    2. Abdominal aortogram with pigtail placed in the suprarenal position    3. Selective right lower extremity angiogram    4. Selective left lower artery angiogram    5. Laser atherectomy of right SFA and proximal popliteal artery    6. Drug coated balloon angioplasty of right SFA and proximal popliteal artery    7. IVUS of right SFA, popliteal and TP trunk.      Procedural  details    Ultrasound-guided access of left common femoral artery was obtained.  After that we inserted a pigtail in the suprarenal position and abdominal aortogram was obtained.  It demonstrated no significant iliac artery stenosis on both sides.  Then we selectively engaged the right iliac artery and angiogram of the right iliac artery was obtained.  It revealed mild 10-20% stenosis.  After that we obtained angiogram of the right SFA which revealed severe 90-95% stenosis in the distal SFA.  There was also another 90% stenosis in the right TP trunk.  Proximal  of right anterior tibial artery and right posterior tibial arteries was present.  Both these arteries filled with collaterals.  Peroneal artery provides the most robust flow to the foot.  After that we crossed these wires with the lesion and decision was made to fix the SFA and popliteal lesions because patient has lifestyle limiting claudication, but no resting pain or CLI.  IVUS was used for vessel sizing After that laser atherectomy of these 2 lesions was performed followed by balloon angioplasty.  Angiogram post revealed excellent angiographic results.    Then we did angiogram of the left lower extremity from the sheath and it revealed distal SFA severe stenosis.  Anterior tibial artery is diffusely diseased and there appears to be atypical takeoff of the posterior tibial artery.  No significant stenosis was noted in the peroneal artery      Assessment plan    Continue medical management    Resume Eliquis in a.m..  Continue Plavix 75 mg qd    Aspirin 81 mg qd x 1 m    statins                The procedure log was documented by Documenter: Prerna Cherry RN and verified by Abbe Vilchis MD.    Date: 6/30/2023  Time: 1:18 PM      Notes from July 24: Patient here for follow-up.  Denies chest pains at rest on exertion, orthopnea, PND.    Notes from September 24: Patient here for follow-up.  In the interim had sick sinus syndrome requiring pacemaker  implantation.  Pacemaker check today.  Functioning fine.  [employer] pacemaker.  Denies orthopnea PND swelling of feet.    Feb 25:    History of Present Illness    CHIEF COMPLAINT:  Rizwan presents today with sinus infection    CURRENT SYMPTOMS:  He reports significant oral dryness, describing absence of saliva which he attributes to inadequate water intake this morning.    CARDIOVASCULAR:  He has a pacemaker that is monitored remotely, with the most recent evaluation conducted in December.  He monitors blood pressure at home weekly, with readings typically 130s or less. The highest recent reading was 139 on Tuesday, with some readings in the upper 120s.    MEDICATIONS:  He takes Amlodipine 5 mg daily, Eliquis 5 mg twice daily, Lipitor      PAST MEDICAL HISTORY:     Past Medical History:   Diagnosis Date    Allergy     Anticoagulant long-term use     Arthritis     Rheumatoid arthritis    Back pain     Blood clotting tendency     BPH (benign prostatic hypertrophy)     Cervical spondylosis     Chronic a-fib 7/18/2024    Colon polyp 2010    adenoma    Coronary artery disease     Depression 05/08/2015    Dry eyes     Dry mouth     GERD (gastroesophageal reflux disease)     Hyperlipidemia     Hypertension     Lumbar spondylosis     Nasal obstruction     PAD (peripheral artery disease) 04/25/2023    Rheumatoid arthritis     Sinusitis     Special screening for malignant neoplasm of colon 06/29/2016    Trouble in sleeping        PAST SURGICAL HISTORY:     Past Surgical History:   Procedure Laterality Date    AORTOGRAPHY N/A 6/19/2024    Procedure: AORTOGRAM;  Surgeon: Abbe Vilchis MD;  Location: [facility] CATH LAB;  Service: Cardiology;  Laterality: N/A;    ATHERECTOMY Left 6/21/2023    Procedure: Atherectomy;  Surgeon: Abbe Vilchis MD;  Location: [facility] CATH LAB;  Service: Cardiology;  Laterality: Left;    CHALAZION EXCISION      COLONOSCOPY N/A 6/29/2016    Procedure: COLONOSCOPY;  Surgeon: Partha Saucedo MD;   Location: [facility] ENDO;  Service: Endoscopy;  Laterality: N/A;    COLONOSCOPY N/A 7/24/2020    Procedure: COLONOSCOPY;  Surgeon: Ian Martinez MD;  Location: [facility] [place] (4TH FLR);  Service: Endoscopy;  Laterality: N/A;  4/16/20 - removed from 5/1/20, not called yet due to acute pain issues being addressed today - pg    COLONOSCOPY N/A 10/19/2023    Procedure: COLONOSCOPY;  Surgeon: Ian Martinez MD;  Location: [facility] ENDO;  Service: Endoscopy;  Laterality: N/A;  order created from Dr. Martinez's previous colonoscopy report 7/24/2020-3 year surveillance.   ok to hold Eliquis 2 days and Plavix 5 days per Dr Vilchis-GT  PEG instr portal -ml  10/12- pre call complete.  DBM    CORONARY ANGIOGRAPHY INCLUDING BYPASS GRAFTS WITH CATHETERIZATION OF LEFT HEART N/A 6/19/2024    Procedure: ANGIOGRAM, CORONARY, INCLUDING BYPASS GRAFT, WITH LEFT HEART CATHETERIZATION;  Surgeon: Abbe Vilchis MD;  Location: [facility] CATH LAB;  Service: Cardiology;  Laterality: N/A;  RN PREOP 06/17/2024    CORONARY ARTERY BYPASS GRAFT      ESOPHAGOGASTRODUODENOSCOPY N/A 7/24/2020    Procedure: ESOPHAGOGASTRODUODENOSCOPY (EGD);  Surgeon: Ian Martinez MD;  Location: [facility] [place] (4TH FLR);  Service: Endoscopy;  Laterality: N/A;  4/16/20 - removed from 5/1/20, not called yet due to acute pain issues being addressed today.  4/17/20 - rescheduled 7/24/20 - pg    EXCISION TURBINATE, SUBMUCOUS      INSERTION, ELECTRODE LEAD, CARDIAC PACEMAKER, 1 ELECTRODE LEAD Left 8/26/2024    Procedure: INSERTION, ELECTRODE LEAD, PACEMAKER, 1 ELECTRODE LEAD;  Surgeon: Rizwan Elise MD;  Location: [facility] CATH LAB;  Service: Cardiology;  Laterality: Left;    KNEE ARTHROSCOPY W/ DEBRIDEMENT      NASAL SEPTUM SURGERY      PERIPHERAL ANGIOGRAPHY N/A 6/21/2023    Procedure: Peripheral angiography;  Surgeon: Abbe Vilchis MD;  Location: [facility] CATH LAB;  Service: Cardiology;  Laterality: N/A;  right radial access--- RN PREOP 6/16----JM    TONSILLECTOMY         ALLERGIES AND MEDICATION:     Review of  patient's allergies indicates:   Allergen Reactions    Aspirin Nausea And Vomiting    Astelin [azelastine] Other (See Comments)     Dry mouth    Flomax [tamsulosin] Other (See Comments)     Nosebleed        Medication List            Accurate as of February 13, 2025 10:30 AM. If you have any questions, ask your nurse or doctor.                CONTINUE taking these medications      alfuzosin 10 mg Tb24  Commonly known as: UROXATRAL  Take 1 tablet (10 mg total) by mouth daily with breakfast.     amLODIPine 5 MG tablet  Commonly known as: NORVASC  Take 1 tablet (5 mg total) by mouth once daily.     apixaban 5 mg Tab  Commonly known as: ELIQUIS  Take 1 tablet (5 mg total) by mouth 2 (two) times daily.     atorvastatin 40 MG tablet  Commonly known as: LIPITOR  Take 1 tablet (40 mg total) by mouth every evening.     cephALEXin 500 MG capsule  Commonly known as: KEFLEX  Take 1 capsule (500 mg total) by mouth every 6 (six) hours.     ciclopirox 0.77 % Crea  Commonly known as: LOPROX  Apply topically 2 (two) times daily.     ciclopirox 8 % Soln  Commonly known as: PENLAC  Apply topically nightly.     cinnamon bark 500 mg capsule     doxycycline 100 MG tablet  Commonly known as: VIBRA-TABS  Take 1 tablet (100 mg total) by mouth 2 (two) times daily. for 10 days     ezetimibe 10 mg tablet  Commonly known as: ZETIA  Take 1 tablet (10 mg total) by mouth once daily.     hydroCHLOROthiazide 12.5 mg capsule  Commonly known as: MICROZIDE  Take 1 capsule (12.5 mg total) by mouth once daily.     HYDROcodone-acetaminophen 5-325 mg per tablet  Commonly known as: NORCO  Take 1 tablet by mouth every 12 (twelve) hours as needed for Pain.     linaCLOtide 72 mcg Cap capsule  Commonly known as: LINZESS  Take 1 capsule (72 mcg total) by mouth before breakfast.     losartan 25 MG tablet  Commonly known as: COZAAR  Take 1 tablet (25 mg total) by mouth once daily.     methylPREDNISolone 4 mg tablet  Commonly known as: MEDROL DOSEPACK  use as  directed     multivitamin per tablet  Commonly known as: THERAGRAN     omega-3 fatty acids 1,250 mg Cap     omeprazole 20 MG capsule  Commonly known as: PRILOSEC  Take 1 capsule (20 mg total) by mouth every morning.     oxymetazoline 0.05 % nasal spray  Commonly known as: AFRIN     propylene glycoL 0.6 % Drop     sildenafiL 100 MG tablet  Commonly known as: VIAGRA  Take 1 tablet (100 mg total) by mouth daily as needed.     sodium bicarb-sodium chloride Pack     Systane GeL 0.3 % Gel  Generic drug: artificial tears(hypromellose)(GENTEAL/SUSTANE)     VITAMIN C 1000 MG tablet  Generic drug: ascorbic acid (vitamin C)              SOCIAL HISTORY:     Social History     Socioeconomic History    Marital status:    Tobacco Use    Smoking status: Former     Current packs/day: 0.00     Average packs/day: 1 pack/day for 20.0 years (20.0 ttl pk-yrs)     Types: Cigarettes     Start date: 1971     Quit date: 1991     Years since quittin.1    Smokeless tobacco: Never    Tobacco comments:     Quit .   Substance and Sexual Activity    Alcohol use: No    Drug use: No    Sexual activity: Yes     Partners: Female   Social History Narrative    N/a per the patient.      Social Drivers of Health     Financial Resource Strain: Patient Declined (2024)    Overall Financial Resource Strain (CARDIA)     Difficulty of Paying Living Expenses: Patient declined   Food Insecurity: Patient Declined (2024)    Hunger Vital Sign     Worried About Running Out of Food in the Last Year: Patient declined     Ran Out of Food in the Last Year: Patient declined   Transportation Needs: Patient Declined (2024)    TRANSPORTATION NEEDS     Transportation : Patient declined   Physical Activity: Sufficiently Active (2024)    Exercise Vital Sign     Days of Exercise per Week: 7 days     Minutes of Exercise per Session: 60 min   Stress: Patient Declined (2024)    [employer] [place] [employer] - Occupational  "Stress Questionnaire     Feeling of Stress : Patient declined   Housing Stability: Patient Declined (8/27/2024)    Housing Stability Vital Sign     Unable to Pay for Housing in the Last Year: Patient declined     Homeless in the Last Year: Patient declined       FAMILY HISTORY:     Family History   Problem Relation Name Age of Onset    Hyperlipidemia Mother      Alzheimer's disease Mother      Stomach cancer Father      Colon cancer Father          from wife--he is not unsure     Cataracts Other      No Known Problems Maternal Aunt      No Known Problems Maternal Uncle      No Known Problems Paternal Aunt      No Known Problems Paternal Uncle      No Known Problems Maternal Grandmother      No Known Problems Maternal Grandfather      No Known Problems Paternal Grandmother      No Known Problems Paternal Grandfather      Blindness Neg Hx      Cancer Neg Hx      Diabetes Neg Hx      Glaucoma Neg Hx      Rheum arthritis Neg Hx      Psoriasis Neg Hx      Lupus Neg Hx      Amblyopia Neg Hx      Hypertension Neg Hx      Macular degeneration Neg Hx      Retinal detachment Neg Hx      Strabismus Neg Hx      Stroke Neg Hx      Thyroid disease Neg Hx      Esophageal cancer Neg Hx         REVIEW OF SYSTEMS:   Review of Systems   Constitutional: Negative.   HENT: Negative.     Eyes: Negative.    Respiratory: Negative.     Endocrine: Negative.    Hematologic/Lymphatic: Negative.    Skin: Negative.    Musculoskeletal: Negative.    Gastrointestinal: Negative.    Genitourinary: Negative.    Neurological: Negative.    Psychiatric/Behavioral: Negative.     Allergic/Immunologic: Negative.        A 10 point review of systems was performed and all the pertinent positives have been mentioned. Rest of review of systems was negative.        PHYSICAL EXAM:     Vitals:    02/13/25 1008   BP: (!) 175/92   Pulse: 68   Resp: 16    Body mass index is 28.97 kg/m².  Weight: 99.6 kg (219 lb 9.3 oz)   Height: 6' 1" (185.4 cm)     Physical " Exam  Vitals reviewed.   Constitutional:       Appearance: He is well-developed.   HENT:      Head: Normocephalic.   Eyes:      Conjunctiva/sclera: Conjunctivae normal.      Pupils: Pupils are equal, round, and reactive to light.   Cardiovascular:      Rate and Rhythm: Normal rate and regular rhythm.      Heart sounds: Normal heart sounds.   Pulmonary:      Effort: Pulmonary effort is normal.      Breath sounds: Normal breath sounds.   Abdominal:      General: Bowel sounds are normal.      Palpations: Abdomen is soft.   Musculoskeletal:      Cervical back: Normal range of motion and neck supple.   Skin:     General: Skin is warm.   Neurological:      Mental Status: He is alert and oriented to person, place, and time.         DATA:     Laboratory:  CBC:  Recent Labs   Lab 08/26/24  0413 08/27/24  0742 11/27/24  0859   WBC 6.04 6.00 6.13   Hemoglobin 14.8 16.0 15.2   Hematocrit 46.6 49.6 49.1   Platelets 127 L 127 L 147 L       CHEMISTRIES:  Recent Labs   Lab 08/24/24  1722 08/25/24  0213 08/26/24  0413 08/27/24  0742 11/27/24  0859   Glucose 99 114 H 96 96 91   Sodium 140 142 140 140 144   Potassium 3.2 L 3.8 4.5 4.5 3.9   BUN 12 13 12 11 13   Creatinine 0.9 1.1 1.2 1.0 1.2   Calcium 8.5 L 9.6 9.4 9.8 9.5   Magnesium 1.7 1.9 1.9  --   --        CARDIAC BIOMARKERS:  Recent Labs   Lab 08/24/24  1722 08/24/24  2031 08/25/24  0213   CPK 84  --   --    Troponin I 0.033 H 0.036 H 0.037 H       COAGS:  Recent Labs   Lab 01/27/24  1549 02/02/24  1200 08/24/24  1722   INR 1.1 1.2 1.1       LIPIDS/LFTS:  Recent Labs   Lab 02/13/23  0818 01/27/24  1549 08/23/24  0905 08/24/24  1722 08/26/24  0413 08/27/24  0742 11/27/24  0859   Cholesterol 155  --  125  --   --   --  149   Triglycerides 45  --  45  --   --   --  45   HDL 40  --  36 L  --   --   --  40   LDL Cholesterol 106.0  --  80.0  --   --   --  100.0   Non-HDL Cholesterol 115  --  89  --   --   --  109   AST 15   < >  --    < > 21 19 21   ALT 14   < >  --    < > 32 28 17     < > = values in this interval not displayed.       Hemoglobin A1C   Date Value Ref Range Status   11/27/2024 5.7 (H) 4.0 - 5.6 % Final     Comment:     ADA Screening Guidelines:  5.7-6.4%  Consistent with prediabetes  >or=6.5%  Consistent with diabetes    High levels of fetal hemoglobin interfere with the HbA1C  assay. Heterozygous hemoglobin variants (HbS, HgC, etc)do  not significantly interfere with this assay.   However, presence of multiple variants may affect accuracy.     02/13/2023 5.6 4.0 - 5.6 % Final     Comment:     ADA Screening Guidelines:  5.7-6.4%  Consistent with prediabetes  >or=6.5%  Consistent with diabetes    High levels of fetal hemoglobin interfere with the HbA1C  assay. Heterozygous hemoglobin variants (HbS, HgC, etc)do  not significantly interfere with this assay.   However, presence of multiple variants may affect accuracy.     02/08/2022 5.6 4.0 - 5.6 % Final     Comment:     ADA Screening Guidelines:  5.7-6.4%  Consistent with prediabetes  >or=6.5%  Consistent with diabetes    High levels of fetal hemoglobin interfere with the HbA1C  assay. Heterozygous hemoglobin variants (HbS, HgC, etc)do  not significantly interfere with this assay.   However, presence of multiple variants may affect accuracy.         TSH  Recent Labs   Lab 02/13/23  0818 08/24/24  1722 11/27/24  0859   TSH 0.641 0.407 0.428       The 10-year ASCVD risk score (Emmanuelle CORREA, et al., 2019) is: 36.9%    Values used to calculate the score:      Age: 74 years      Sex: Male      Is Non- : Yes      Diabetic: No      Tobacco smoker: No      Systolic Blood Pressure: 175 mmHg      Is BP treated: Yes      HDL Cholesterol: 40 mg/dL      Total Cholesterol: 149 mg/dL       BNP    Lab Results   Component Value Date/Time     (H) 01/27/2024 03:49 PM    BNP 25 05/07/2015 05:30 PM             ASSESSMENT AND PLAN     Patient Active Problem List   Diagnosis    Sjogren's disease    GERD (gastroesophageal  reflux disease)    Atherosclerosis of coronary artery bypass graft of native heart with angina pectoris    Hyperlipidemia    Cervical spondylosis    Degeneration of intervertebral disc    Rheumatoid arthritis involving multiple sites    Benign prostatic hyperplasia with weak urinary stream    Perennial allergic rhinitis    Essential hypertension    High frequency hearing loss    Nasal obstruction    Dysfunctions associated with sleep stages or arousal from sleep    Primary insomnia    Wears dentures    Atherosclerosis of aorta    PAD (peripheral artery disease)    Poor circulation    Sebaceous cyst    Carotid artery disorder    Atrial fibrillation with slow ventricular response    Pulmonary heart disease    Hypokalemia    SSS (sick sinus syndrome)    Pacemaker    Thrombocytopenia, unspecified    Chronic pain of both knees    Antalgic gait       Peripheral artery disease:  Now status post revascularization of right SFA and popliteal artery.  Also has residual infrapopliteal disease.  Is claudication after revascularization of the SFA disease has gone away and he can now walk 2 miles.  He also has flow-limiting stenosis in the distal left SFA, but states that he does not have any pain in the left leg.  Continue medical management.   Plavix 75 mg daily uninterrupted for at least 1 year, continue Eliquis 5 mg b.i.d.      Coronary artery disease status post CABG in 2003.  Stress test in May of 2023 did not show any significant ischemia.  Recent echo showed normal left ventricle systolic function     Carotid ultrasound in May of 2023 showed mild bilateral carotid artery block with no flow-limiting stenosis.    Hypertension:  Referred for digital hypertension program.  Patient does not want to join.  Because they asked to many questions about his background etc per patient     Atrial fibrillation:  On Eliquis.  Rate controlled.     Dyslipidemia:  Continue statins.  Could not tolerate ezetimibe    Lab Results   Component  Value Date    LDLCALC 100.0 11/27/2024     Dyspnea on exertion and chest tightness on exertion.  Angiogram revealed patent LIMA to LAD and severe triple-vessel disease with occluded SVG to OM and circumflex.  Patient states his symptoms are not lifestyle limiting   Continue medical management.    Sick sinus syndrome: Status post pacemaker implantation.  [employer].  Working fine.  Pacemaker dependent with pacing greater than 90% of the time    Follow-up after 3 m     Visit today included increased complexity associated with the care of the episodic problem atrial fibrillation, sick sinus syndrome, coronary artery disease, peripheral artery disease, dyslipidemia, history of coronary artery bypass grafting, history of peripheral revascularization, pacemaker addressed and managing the longitudinal care of the patient due to the serious and/or complex managed problem(s)   Patient Active Problem List   Diagnosis    Sjogren's disease    GERD (gastroesophageal reflux disease)    Atherosclerosis of coronary artery bypass graft of native heart with angina pectoris    Hyperlipidemia    Cervical spondylosis    Degeneration of intervertebral disc    Rheumatoid arthritis involving multiple sites    Benign prostatic hyperplasia with weak urinary stream    Perennial allergic rhinitis    Essential hypertension    High frequency hearing loss    Nasal obstruction    Dysfunctions associated with sleep stages or arousal from sleep    Primary insomnia    Wears dentures    Atherosclerosis of aorta    PAD (peripheral artery disease)    Poor circulation    Sebaceous cyst    Carotid artery disorder    Atrial fibrillation with slow ventricular response    Pulmonary heart disease    Hypokalemia    SSS (sick sinus syndrome)    Pacemaker    Thrombocytopenia, unspecified    Chronic pain of both knees    Antalgic gait     .        Thank you very much for involving me in the care of your patient.  Please do not hesitate to contact me if there are  any questions.      Abbe Vilchis MD, FAC, [facility]  Interventional Cardiologist, [facility].           This note was dictated with the help of speech recognition software.  There might be un-intended errors and/or substitutions.

## 2025-02-16 LAB
OHS QRS DURATION: 166 MS
OHS QTC CALCULATION: 494 MS

## 2025-02-27 ENCOUNTER — TELEPHONE (OUTPATIENT)
Dept: OTOLARYNGOLOGY | Facility: CLINIC | Age: 75
End: 2025-02-27
Payer: MEDICARE

## 2025-02-27 DIAGNOSIS — J32.9 CHRONIC SINUSITIS, UNSPECIFIED LOCATION: Primary | ICD-10-CM

## 2025-02-27 RX ORDER — SULFAMETHOXAZOLE AND TRIMETHOPRIM 800; 160 MG/1; MG/1
1 TABLET ORAL 2 TIMES DAILY
Qty: 20 TABLET | Refills: 0 | Status: SHIPPED | OUTPATIENT
Start: 2025-02-27 | End: 2025-03-09

## 2025-02-27 NOTE — TELEPHONE ENCOUNTER
----- Message from TRE Monique sent at 2/27/2025  2:40 PM CST -----  Regarding: FW: Plan of care  Contact: [phone]  Any change needed since cultures came back?  ----- Message -----  From: Alka Lerma  Sent: 2/27/2025   2:25 PM CST  To: [facility]sk[facility] Staff  Subject: Plan of care                                     Type:  plan of careWho Called: the pt is calling due to the antibiotic  prescribed did not work, the pt is requesting another antibiotic to the pharmacy below:Pharmacy name and phone #:  [facility] - [place], [place] - [facility] [address] [address]Oqnms: [phone] Fax: [phone] [facility] Call Back Number:  [phone]

## 2025-03-05 ENCOUNTER — PATIENT MESSAGE (OUTPATIENT)
Dept: OTOLARYNGOLOGY | Facility: CLINIC | Age: 75
End: 2025-03-05
Payer: MEDICARE

## 2025-03-05 ENCOUNTER — CLINICAL SUPPORT (OUTPATIENT)
Dept: CARDIOLOGY | Facility: HOSPITAL | Age: 75
End: 2025-03-05
Payer: MEDICARE

## 2025-03-05 ENCOUNTER — HOSPITAL ENCOUNTER (OUTPATIENT)
Dept: CARDIOLOGY | Facility: HOSPITAL | Age: 75
Discharge: HOME | End: 2025-03-05
Attending: INTERNAL MEDICINE
Payer: MEDICARE

## 2025-03-05 DIAGNOSIS — R00.1 BRADYCARDIA, UNSPECIFIED: ICD-10-CM

## 2025-03-05 DIAGNOSIS — Z95.0 PRESENCE OF CARDIAC PACEMAKER: ICD-10-CM

## 2025-03-05 PROCEDURE — 93294 REM INTERROG EVL PM/LDLS PM: CPT | Mod: ,,, | Performed by: INTERNAL MEDICINE

## 2025-03-05 PROCEDURE — 93296 REM INTERROG EVL PM/IDS: CPT | Performed by: INTERNAL MEDICINE

## 2025-03-06 ENCOUNTER — OFFICE VISIT (OUTPATIENT)
Dept: OPTOMETRY | Facility: CLINIC | Age: 75
End: 2025-03-06
Payer: MEDICARE

## 2025-03-06 DIAGNOSIS — H16.203 KERATOCONJUNCTIVITIS, BILATERAL: Primary | ICD-10-CM

## 2025-03-06 DIAGNOSIS — H25.13 NUCLEAR SCLEROSIS, BILATERAL: ICD-10-CM

## 2025-03-06 PROCEDURE — 99214 OFFICE O/P EST MOD 30 MIN: CPT | Mod: S$PBB,,, | Performed by: OPTOMETRIST

## 2025-03-06 PROCEDURE — 99999 PR PBB SHADOW E&M-EST. PATIENT-LVL III: CPT | Mod: PBBFAC,,, | Performed by: OPTOMETRIST

## 2025-03-06 PROCEDURE — 99213 OFFICE O/P EST LOW 20 MIN: CPT | Mod: PBBFAC | Performed by: OPTOMETRIST

## 2025-03-06 NOTE — PROGRESS NOTES
HPI    Rizwan Demarco is  74 y.o. male who comes in for routine eye exam. Dry   Eye   Pt. States overall vision is doing well, but is currently having trouble   with dry eye.    (--)flashes  (+)floaters  (--)pain  (+)Itching  (--)tearing  (+)burning  (+)Dryness    Gtt's: Systane PRN OU, Pataday PRN   Last edited by Osman Ordoñez, OD on 3/6/2025  1:06 PM.            Assessment /Plan     For exam results, see Encounter Report.    Keratoconjunctivitis, bilateral  -     perfluorohexyloctane, PF, 100 % Drop; Apply 1 drop to eye 4 (four) times daily.  Dispense: 3 mL; Refill: 1  -Miebo QID OU  -Ivizia PRN if unable to be covered above    Nuclear sclerosis, bilateral  -Educated patient on presence of cataracts at today's exam, monitor at annual dilated fundus exam. 5+ years surgical estimate.      RTC PRN Dryness, 12 mo

## 2025-03-11 LAB
OHS CV DC REMOTE DEVICE TYPE: NORMAL
OHS CV RV PACING PERCENT: 96 %

## 2025-03-25 ENCOUNTER — OFFICE VISIT (OUTPATIENT)
Dept: OTOLARYNGOLOGY | Facility: CLINIC | Age: 75
End: 2025-03-25
Payer: MEDICARE

## 2025-03-25 VITALS — SYSTOLIC BLOOD PRESSURE: 128 MMHG | WEIGHT: 216.06 LBS | BODY MASS INDEX: 28.5 KG/M2 | DIASTOLIC BLOOD PRESSURE: 72 MMHG

## 2025-03-25 DIAGNOSIS — J34.3 HYPERTROPHY OF BOTH INFERIOR NASAL TURBINATES: ICD-10-CM

## 2025-03-25 DIAGNOSIS — J34.2 NASAL SEPTAL DEVIATION: ICD-10-CM

## 2025-03-25 DIAGNOSIS — J32.9 CHRONIC SINUSITIS, UNSPECIFIED LOCATION: Primary | ICD-10-CM

## 2025-03-25 DIAGNOSIS — Z98.890 S/P FESS (FUNCTIONAL ENDOSCOPIC SINUS SURGERY): ICD-10-CM

## 2025-03-25 PROCEDURE — 99999 PR PBB SHADOW E&M-EST. PATIENT-LVL IV: CPT | Mod: PBBFAC,,, | Performed by: STUDENT IN AN ORGANIZED HEALTH CARE EDUCATION/TRAINING PROGRAM

## 2025-03-25 PROCEDURE — 99213 OFFICE O/P EST LOW 20 MIN: CPT | Mod: 25,S$PBB,, | Performed by: STUDENT IN AN ORGANIZED HEALTH CARE EDUCATION/TRAINING PROGRAM

## 2025-03-25 PROCEDURE — 31231 NASAL ENDOSCOPY DX: CPT | Mod: S$PBB,,, | Performed by: STUDENT IN AN ORGANIZED HEALTH CARE EDUCATION/TRAINING PROGRAM

## 2025-03-25 PROCEDURE — 99214 OFFICE O/P EST MOD 30 MIN: CPT | Mod: PBBFAC | Performed by: STUDENT IN AN ORGANIZED HEALTH CARE EDUCATION/TRAINING PROGRAM

## 2025-03-25 PROCEDURE — 31231 NASAL ENDOSCOPY DX: CPT | Mod: PBBFAC | Performed by: STUDENT IN AN ORGANIZED HEALTH CARE EDUCATION/TRAINING PROGRAM

## 2025-03-25 RX ORDER — AZELASTINE 1 MG/ML
1 SPRAY, METERED NASAL 2 TIMES DAILY
Qty: 30 ML | Refills: 6 | Status: SHIPPED | OUTPATIENT
Start: 2025-03-25 | End: 2026-03-25

## 2025-03-25 RX ORDER — FLUTICASONE PROPIONATE 50 MCG
2 SPRAY, SUSPENSION (ML) NASAL DAILY
Qty: 15.8 ML | Refills: 6 | Status: SHIPPED | OUTPATIENT
Start: 2025-03-25

## 2025-03-25 NOTE — PROGRESS NOTES
Subjective:      Rizwan is a 74 y.o. male who comes for follow-up of sinusitis.  His last visit with me was on 2/11/2025.  Cx at last visit showed cutibacterium.  He finished doxycycline without issue.  He still has episodic brown nasal drainage, he states it is not daily we will have him maybe once a week.    His current sinus regime consists of:  Nasal saline.    The assessment of quality and severity of symptoms as measured by the SNOT-22 score is deferred.    The patient's medications, allergies, past medical, surgical, social and family histories were reviewed and updated as appropriate.    ROS     A detailed review of systems was obtained with pertinent positives as per the above HPI, and otherwise negative.        Objective:     /72   Wt 98 kg (216 lb 0.8 oz)   BMI 28.50 kg/m²        Constitutional:   Vital signs are normal. He appears well-developed and well-nourished.     Head:  Normocephalic and atraumatic.     Ears:  Hearing normal to normal and whispered voice; external ear normal without scars, lesions, or masses; ear canal, tympanic membrane, and middle ear normal..   Right Ear: No swelling. Tympanic membrane is not perforated and not bulging. No middle ear effusion.   Left Ear: No swelling. Tympanic membrane is not perforated and not bulging.  No middle ear effusion.     Nose:  Nose normal including turbinates, nasal mucosa, sinuses and nasal septum. No epistaxis.     Mouth/Throat  Oropharynx clear and moist without lesions or asymmetry and normal uvula midline. Normal dentition. No tonsillar abscesses. Tonsillar exudate.      Neck:  Neck normal without thyromegaly masses, asymmetry, normal tracheal structure, crepitus, and tenderness, thyroid normal, trachea normal, phonation normal, full range of motion with neck supple and no adenopathy. No stridor present.        Head (right side): No submental adenopathy present.        Head (left side): No submental adenopathy present.     He has no  cervical adenopathy.     Pulmonary/Chest:   No stridor.       Procedure    Nasal endoscopy performed.  See procedure note.    Nasal Endoscopy:  3/25/2025    The use of diagnostic nasal endoscopy was considered medically necessary for the evaluation and visualization of the nasal anatomy for symptoms suggestive of nasal or sinus origin. Physical examination (including a nasal speculum evaluation) did not provide sufficient clinical information to establish a diagnosis, or symptoms did not improve or worsened following treatment.     The nasal cavity was decongested with topical 1% phenylephrine and anesthetized with 4% lidocaine.  A rigid 0-degree endoscope was introduced into the nasal cavity.    The patient was seated in the examination chair. After discussion of risks and benefits, a nasal endoscope was inserted into the nose the endoscope was passed along the left nasal floor to the nasopharynx. It was then passed between the middle and superior meatus, nasal turbinates, nasal septum, nasopharynx and sphenoethmoid region. The nasal endoscope was withdrawn and there was no complications. An identical procedure was performed on the right side. I was present for the entire procedure.The patient tolerated the above procedure well. The findings of this procedure can be found in the dictated note from 3/25/2025 visit.      Patent middle meatus bilaterally resolution of purulent drainage.  No edema or erythema noted.  Postsurgical changes to the right ethmoid and maxillary sinuses.    Data Reviewed    WBC (K/uL)   Date Value   11/27/2024 6.13     Eosinophil % (%)   Date Value   11/27/2024 3.3     Eos # (K/uL)   Date Value   11/27/2024 0.2     Platelets (K/uL)   Date Value   11/27/2024 147 (L)     Glucose (mg/dL)   Date Value   11/27/2024 91     Total IgE (IU/mL)   Date Value   09/30/2015 302 (H)     No sinus imaging available.    Assessment:     1. Chronic sinusitis, unspecified location    2. S/P FESS (functional  endoscopic sinus surgery)    3. Nasal septal deviation    4. Hypertrophy of both inferior nasal turbinates      Plan:     - Flonase and Astelin for allergic rhinitis.  - RTC 2 months.     Ward Schmitt MD

## 2025-04-01 ENCOUNTER — OFFICE VISIT (OUTPATIENT)
Dept: UROLOGY | Facility: CLINIC | Age: 75
End: 2025-04-01
Payer: MEDICARE

## 2025-04-01 VITALS — WEIGHT: 214.63 LBS | BODY MASS INDEX: 28.32 KG/M2

## 2025-04-01 DIAGNOSIS — N52.9 ERECTILE DYSFUNCTION, UNSPECIFIED ERECTILE DYSFUNCTION TYPE: ICD-10-CM

## 2025-04-01 DIAGNOSIS — N40.1 BPH WITH URINARY OBSTRUCTION: ICD-10-CM

## 2025-04-01 DIAGNOSIS — N13.8 BPH WITH URINARY OBSTRUCTION: ICD-10-CM

## 2025-04-01 DIAGNOSIS — R39.9 LOWER URINARY TRACT SYMPTOMS (LUTS): Primary | ICD-10-CM

## 2025-04-01 PROCEDURE — 99999 PR PBB SHADOW E&M-EST. PATIENT-LVL IV: CPT | Mod: PBBFAC,,, | Performed by: UROLOGY

## 2025-04-01 PROCEDURE — 99214 OFFICE O/P EST MOD 30 MIN: CPT | Mod: PBBFAC | Performed by: UROLOGY

## 2025-04-01 RX ORDER — ALFUZOSIN HYDROCHLORIDE 10 MG/1
10 TABLET, EXTENDED RELEASE ORAL
Qty: 90 TABLET | Refills: 3 | Status: SHIPPED | OUTPATIENT
Start: 2025-04-01 | End: 2026-04-01

## 2025-04-01 NOTE — PROGRESS NOTES
Subjective:       Patient ID: Rizwan Demarco Jr. is a 74 y.o. male who was referred by No ref. provider found    Chief Complaint:   Chief Complaint   Patient presents with    Follow-up     Enlarged prostate       Benign Prostatic Hypertrophy  Patient complains of lower urinary tract symptoms. He reports frequency and nocturia two times a night. He denies straining. Patient states symptoms are of mild severity. Onset of symptoms was several years ago and was gradual in onset.  He has no personal history and no family history of prostate cancer. He reports a history of no complicating symptoms. He denies flank pain, gross hematuria, kidney stones, and recurrent UTI.    Erectile Dysfunction  Patient complains of erectile dysfunction. Onset of dysfunction was several years ago and was gradual in onset.  Patient states the nature of difficulty is both attaining and maintaining erection. Full erections occur never. Partial erections occur with intercourse and with masturbation. Libido is not affected. Risk factors for ED include cardiovascular disease. Patient denies history of pelvic radiation. Previous treatment of ED includes Viagra and Cialis and ICI.    ACTIVE MEDICAL ISSUES:  Problem List[1]    PAST MEDICAL HISTORY  Past Medical History:   Diagnosis Date    Allergy     Anticoagulant long-term use     Arthritis     Rheumatoid arthritis    Back pain     Blood clotting tendency     BPH (benign prostatic hypertrophy)     Cervical spondylosis     Chronic a-fib 7/18/2024    Colon polyp 2010    adenoma    Coronary artery disease     Depression 05/08/2015    Dry eyes     Dry mouth     GERD (gastroesophageal reflux disease)     Hyperlipidemia     Hypertension     Lumbar spondylosis     Nasal obstruction     PAD (peripheral artery disease) 04/25/2023    Rheumatoid arthritis     Sinusitis     Special screening for malignant neoplasm of colon 06/29/2016    Trouble in sleeping        PAST SURGICAL HISTORY:  Past Surgical  History:   Procedure Laterality Date    AORTOGRAPHY N/A 6/19/2024    Procedure: AORTOGRAM;  Surgeon: Abbe Vilchis MD;  Location: [facility] CATH LAB;  Service: Cardiology;  Laterality: N/A;    ATHERECTOMY Left 6/21/2023    Procedure: Atherectomy;  Surgeon: Abbe Vilchis MD;  Location: [facility] CATH LAB;  Service: Cardiology;  Laterality: Left;    CHALAZION EXCISION      COLONOSCOPY N/A 6/29/2016    Procedure: COLONOSCOPY;  Surgeon: Partha Saucedo MD;  Location: [facility] ENDO;  Service: Endoscopy;  Laterality: N/A;    COLONOSCOPY N/A 7/24/2020    Procedure: COLONOSCOPY;  Surgeon: Ian Martinez MD;  Location: [facility] ENDO (4TH FLR);  Service: Endoscopy;  Laterality: N/A;  4/16/20 - removed from 5/1/20, not called yet due to acute pain issues being addressed today - pg    COLONOSCOPY N/A 10/19/2023    Procedure: COLONOSCOPY;  Surgeon: Ian Martinez MD;  Location: [facility] ENDO;  Service: Endoscopy;  Laterality: N/A;  order created from Dr. Martinez's previous colonoscopy report 7/24/2020-3 year surveillance.   ok to hold Eliquis 2 days and Plavix 5 days per Dr Vilchis-GT  PEG instr portal -ml  10/12- pre call complete.  DBM    CORONARY ANGIOGRAPHY INCLUDING BYPASS GRAFTS WITH CATHETERIZATION OF LEFT HEART N/A 6/19/2024    Procedure: ANGIOGRAM, CORONARY, INCLUDING BYPASS GRAFT, WITH LEFT HEART CATHETERIZATION;  Surgeon: Abbe Vilchis MD;  Location: [facility] CATH LAB;  Service: Cardiology;  Laterality: N/A;  RN PREOP 06/17/2024    CORONARY ARTERY BYPASS GRAFT      ESOPHAGOGASTRODUODENOSCOPY N/A 7/24/2020    Procedure: ESOPHAGOGASTRODUODENOSCOPY (EGD);  Surgeon: Ian Martinez MD;  Location: [facility] ENDO (4TH FLR);  Service: Endoscopy;  Laterality: N/A;  4/16/20 - removed from 5/1/20, not called yet due to acute pain issues being addressed today.  4/17/20 - rescheduled 7/24/20 - pg    EXCISION TURBINATE, SUBMUCOUS      INSERTION, ELECTRODE LEAD, CARDIAC PACEMAKER, 1 ELECTRODE LEAD Left 8/26/2024    Procedure: INSERTION, ELECTRODE LEAD, PACEMAKER, 1  ELECTRODE LEAD;  Surgeon: Rizwan Elise MD;  Location: [facility] CATH LAB;  Service: Cardiology;  Laterality: Left;    KNEE ARTHROSCOPY W/ DEBRIDEMENT      NASAL SEPTUM SURGERY      PERIPHERAL ANGIOGRAPHY N/A 6/21/2023    Procedure: Peripheral angiography;  Surgeon: Abbe Vilchis MD;  Location: [facility] CATH LAB;  Service: Cardiology;  Laterality: N/A;  right radial access--- RN PREOP 6/16----JM    TONSILLECTOMY         SOCIAL HISTORY:  Social History[2]    FAMILY HISTORY:  Family History   Problem Relation Name Age of Onset    Hyperlipidemia Mother      Alzheimer's disease Mother      Stomach cancer Father      Colon cancer Father          from wife--he is not unsure     Cataracts Other      No Known Problems Maternal Aunt      No Known Problems Maternal Uncle      No Known Problems Paternal Aunt      No Known Problems Paternal Uncle      No Known Problems Maternal Grandmother      No Known Problems Maternal Grandfather      No Known Problems Paternal Grandmother      No Known Problems Paternal Grandfather      Blindness Neg Hx      Cancer Neg Hx      Diabetes Neg Hx      Glaucoma Neg Hx      Rheum arthritis Neg Hx      Psoriasis Neg Hx      Lupus Neg Hx      Amblyopia Neg Hx      Hypertension Neg Hx      Macular degeneration Neg Hx      Retinal detachment Neg Hx      Strabismus Neg Hx      Stroke Neg Hx      Thyroid disease Neg Hx      Esophageal cancer Neg Hx         ALLERGIES AND MEDICATIONS: updated and reviewed.  Review of patient's allergies indicates:   Allergen Reactions    Aspirin Nausea And Vomiting    Astelin [azelastine] Other (See Comments)     Dry mouth    Flomax [tamsulosin] Other (See Comments)     Nosebleed     Current Outpatient Medications   Medication Sig    alfuzosin (UROXATRAL) 10 mg Tb24 Take 1 tablet (10 mg total) by mouth daily with breakfast.    amLODIPine (NORVASC) 5 MG tablet Take 1 tablet (5 mg total) by mouth once daily.    apixaban (ELIQUIS) 5 mg Tab Take 1 tablet (5 mg total) by mouth 2  (two) times daily.    artificial tears,hypromellose,,GENTEAL/SUSTANE, (SYSTANE GEL) 0.3 % Gel 1 drop as needed.    ascorbic acid, vitamin C, (VITAMIN C) 1000 MG tablet Take 1,000 mg by mouth once daily.    atorvastatin (LIPITOR) 40 MG tablet Take 1 tablet (40 mg total) by mouth every evening.    azelastine (ASTELIN) 137 mcg (0.1 %) nasal spray 1 spray (137 mcg total) by Nasal route 2 (two) times daily.    cephALEXin (KEFLEX) 500 MG capsule Take 1 capsule (500 mg total) by mouth every 6 (six) hours.    ciclopirox (LOPROX) 0.77 % Crea Apply topically 2 (two) times daily.    ciclopirox (PENLAC) 8 % Soln Apply topically nightly.    cinnamon bark 500 mg capsule Take 500 mg by mouth once daily.    fluticasone propionate (FLONASE) 50 mcg/actuation nasal spray 2 sprays (100 mcg total) by Each Nostril route once daily.    hydroCHLOROthiazide (MICROZIDE) 12.5 mg capsule Take 1 capsule (12.5 mg total) by mouth once daily.    HYDROcodone-acetaminophen (NORCO) 5-325 mg per tablet Take 1 tablet by mouth every 12 (twelve) hours as needed for Pain.    linaCLOtide (LINZESS) 72 mcg Cap capsule Take 1 capsule (72 mcg total) by mouth before breakfast.    losartan (COZAAR) 25 MG tablet Take 1 tablet (25 mg total) by mouth once daily.    multivitamin (THERAGRAN) per tablet Take by mouth.  Tablet Oral Every day    omega-3 fatty acids 1,250 mg Cap Take by mouth.    omeprazole (PRILOSEC) 20 MG capsule Take 1 capsule (20 mg total) by mouth every morning.    oxymetazoline (AFRIN) 0.05 % nasal spray 2 sprays by Nasal route 2 (two) times daily.    perfluorohexyloctane, PF, 100 % Drop Apply 1 drop to eye 4 (four) times daily.    propylene glycoL 0.6 % Drop Apply to eye. Not currently using    sildenafiL (VIAGRA) 100 MG tablet Take 1 tablet (100 mg total) by mouth daily as needed.    sodium bicarb-sodium chloride Pack by sinus irrigation route.     No current facility-administered medications for this visit.       Review of Systems    Constitutional:  Negative for chills and fever.   HENT:  Negative for congestion.    Respiratory:  Negative for chest tightness and shortness of breath.    Cardiovascular:  Negative for chest pain and palpitations.   Gastrointestinal:  Negative for abdominal pain, constipation, diarrhea, nausea and vomiting.   Genitourinary:  Negative for difficulty urinating, dysuria, flank pain, hematuria and urgency.   Musculoskeletal:  Negative for arthralgias.   Neurological:  Negative for dizziness.   Psychiatric/Behavioral:  Negative for confusion.        Objective:      Vitals:    04/01/25 1545   Weight: 97.3 kg (214 lb 9.9 oz)     Physical Exam  Vitals and nursing note reviewed.   Constitutional:       Appearance: He is well-developed.   HENT:      Head: Normocephalic.   Eyes:      Conjunctiva/sclera: Conjunctivae normal.   Neck:      Thyroid: No thyromegaly.      Trachea: No tracheal deviation.   Cardiovascular:      Rate and Rhythm: Normal rate.      Heart sounds: Normal heart sounds.   Pulmonary:      Effort: Pulmonary effort is normal. No respiratory distress.      Breath sounds: Normal breath sounds. No wheezing.   Abdominal:      General: Bowel sounds are normal.      Palpations: Abdomen is soft.      Tenderness: There is no abdominal tenderness. There is no rebound.      Hernia: No hernia is present.   Genitourinary:     Prostate: Enlarged. Not tender.      Rectum: Normal.      Comments: 40 gm smooth  Musculoskeletal:         General: No tenderness. Normal range of motion.      Cervical back: Normal range of motion and neck supple.   Lymphadenopathy:      Cervical: No cervical adenopathy.   Skin:     General: Skin is warm and dry.      Findings: No erythema or rash.   Neurological:      Mental Status: He is alert and oriented to person, place, and time.   Psychiatric:         Behavior: Behavior normal.         Thought Content: Thought content normal.         Judgment: Judgment normal.         Urine dipstick shows .   Micro exam: .             Component  Ref Range & Units (hover) 4 mo ago 5 yr ago 11 yr ago 12 yr ago 13 yr ago 14 yr ago   PSA, Screen 3.2 1.2 CM 0.97 R, CM 0.85 R, CM 0.88 R, CM 0.89 R, CM   Comment: The testing method is a chemiluminescent microparticle immunoassay  manufactured by [employer] and performed on the   or  [facility] system. Values obtained with different assay manufacturers  for  methods may be different and cannot be used interchangeably.  PSA Expected levels:  Hormonal Therapy: <0.05 ng/ml  Prostatectomy: <0.01 ng/ml  Radiation Therapy: <1.00 ng/ml   Resulting Agency [facility]             Specimen Collected: 11/27/24 08:59 CST       Assessment:       1. Lower urinary tract symptoms (LUTS)    2. Erectile dysfunction, unspecified erectile dysfunction type    3. BPH with urinary obstruction          Plan:       1. Lower urinary tract symptoms (LUTS) (Primary)    - alfuzosin (UROXATRAL) 10 mg Tb24; Take 1 tablet (10 mg total) by mouth daily with breakfast.  Dispense: 90 tablet; Refill: 3    2. Erectile dysfunction, unspecified erectile dysfunction type  declined    3. BPH with urinary obstruction  PSA per Dr. Perkins    -  Retroperitoneal Complete; Future             Follow up in about 1 year (around 4/1/2026) for Follow up Established.         [1]   Patient Active Problem List  Diagnosis    Sjogren's disease    GERD (gastroesophageal reflux disease)    Atherosclerosis of coronary artery bypass graft of native heart with angina pectoris    Hyperlipidemia    Cervical spondylosis    Degeneration of intervertebral disc    Rheumatoid arthritis involving multiple sites    Benign prostatic hyperplasia with weak urinary stream    Perennial allergic rhinitis    Essential hypertension    High frequency hearing loss    Nasal obstruction    Dysfunctions associated with sleep stages or arousal from sleep    Primary insomnia    Wears dentures    Atherosclerosis of aorta     PAD (peripheral artery disease)    Poor circulation    Sebaceous cyst    Carotid artery disorder    Atrial fibrillation with slow ventricular response    Pulmonary heart disease    Hypokalemia    SSS (sick sinus syndrome)    Pacemaker    Thrombocytopenia, unspecified    Chronic pain of both knees    Antalgic gait   [2]   Social History  Tobacco Use    Smoking status: Former     Current packs/day: 0.00     Average packs/day: 1 pack/day for 20.0 years (20.0 ttl pk-yrs)     Types: Cigarettes     Start date: 1971     Quit date: 1991     Years since quittin.2    Smokeless tobacco: Never    Tobacco comments:     Quit .   Substance Use Topics    Alcohol use: No    Drug use: No

## 2025-05-03 DIAGNOSIS — I48.91 NEW ONSET A-FIB: ICD-10-CM

## 2025-05-03 DIAGNOSIS — I10 ESSENTIAL HYPERTENSION: ICD-10-CM

## 2025-05-04 NOTE — TELEPHONE ENCOUNTER
No care due was identified.  [facility] Embedded Care Due Messages. Reference number: [id number].   5/03/2025 9:49:32 PM CDT

## 2025-05-04 NOTE — TELEPHONE ENCOUNTER
No care due was identified.  [facility] Embedded Care Due Messages. Reference number: [id number].   5/03/2025 9:47:57 PM CDT

## 2025-05-04 NOTE — TELEPHONE ENCOUNTER
No care due was identified.  [facility] Embedded Care Due Messages. Reference number: [id number].   5/03/2025 9:50:56 PM CDT

## 2025-05-05 RX ORDER — HYDROCHLOROTHIAZIDE 12.5 MG/1
12.5 CAPSULE ORAL DAILY
Qty: 90 CAPSULE | Refills: 1 | Status: SHIPPED | OUTPATIENT
Start: 2025-05-05

## 2025-05-05 RX ORDER — LOSARTAN POTASSIUM 25 MG/1
25 TABLET ORAL DAILY
Qty: 90 TABLET | Refills: 2 | Status: SHIPPED | OUTPATIENT
Start: 2025-05-05 | End: 2026-01-30

## 2025-05-05 NOTE — TELEPHONE ENCOUNTER
Refill Decision Note   Rizwan Demarco  is requesting a refill authorization.  Brief Assessment and Rationale for Refill:  Approve     Medication Therapy Plan:         Comments:     Note composed:7:52 AM 05/05/2025             Appointments     Last Visit   11/26/2024 Raúl Perkins MD   Next Visit   5/3/2025 Raúl Perkins MD

## 2025-05-05 NOTE — TELEPHONE ENCOUNTER
Refill Routing Note   Medication(s) are not appropriate for processing by [facility] for the following reason(s):        Outside of protocol    [facility] action(s):  Route               Appointments  past 12m or future 3m with PCP    Date Provider   Last Visit   11/26/2024 Raúl Perkins MD   Next Visit   5/30/2025 Raúl Perkins MD   ED visits in past 90 days: 0        Note composed:7:54 AM 05/05/2025

## 2025-05-05 NOTE — TELEPHONE ENCOUNTER
Refill Routing Note   Medication(s) are not appropriate for processing by [facility] for the following reason(s):        Drug-disease interaction: hydroCHLOROthiazide and Hypokalemia     [facility] action(s):  Defer             Pharmacist review requested: Yes     Appointments  past 12m or future 3m with PCP    Date Provider   Last Visit   11/26/2024 Raúl Perkins MD   Next Visit   5/3/2025 Raúl Perkins MD   ED visits in past 90 days: 0        Note composed:7:53 AM 05/05/2025

## 2025-05-05 NOTE — TELEPHONE ENCOUNTER
Refill Decision Note   Rizwan Washington  is requesting a refill authorization.  Brief Assessment and Rationale for Refill:  Approve     Medication Therapy Plan:        Pharmacist review requested: Yes   Extended chart review required: Yes   Comments:     Note composed:11:43 AM 05/05/2025

## 2025-05-13 ENCOUNTER — OFFICE VISIT (OUTPATIENT)
Dept: OTOLARYNGOLOGY | Facility: CLINIC | Age: 75
End: 2025-05-13
Payer: MEDICARE

## 2025-05-13 VITALS — WEIGHT: 212.94 LBS | SYSTOLIC BLOOD PRESSURE: 129 MMHG | DIASTOLIC BLOOD PRESSURE: 75 MMHG | BODY MASS INDEX: 28.1 KG/M2

## 2025-05-13 DIAGNOSIS — Z98.890 S/P FESS (FUNCTIONAL ENDOSCOPIC SINUS SURGERY): ICD-10-CM

## 2025-05-13 DIAGNOSIS — J32.9 CHRONIC SINUSITIS, UNSPECIFIED LOCATION: Primary | ICD-10-CM

## 2025-05-13 DIAGNOSIS — J34.2 NASAL SEPTAL DEVIATION: ICD-10-CM

## 2025-05-13 PROCEDURE — 99213 OFFICE O/P EST LOW 20 MIN: CPT | Mod: PBBFAC | Performed by: STUDENT IN AN ORGANIZED HEALTH CARE EDUCATION/TRAINING PROGRAM

## 2025-05-13 PROCEDURE — 99999 PR PBB SHADOW E&M-EST. PATIENT-LVL III: CPT | Mod: PBBFAC,,, | Performed by: STUDENT IN AN ORGANIZED HEALTH CARE EDUCATION/TRAINING PROGRAM

## 2025-05-13 PROCEDURE — 99213 OFFICE O/P EST LOW 20 MIN: CPT | Mod: S$PBB,,, | Performed by: STUDENT IN AN ORGANIZED HEALTH CARE EDUCATION/TRAINING PROGRAM

## 2025-05-13 NOTE — PROGRESS NOTES
Subjective:      Rizwan is a 74 y.o. male who comes for follow-up of sinusitis.  His last visit with me was on 3/25/2025.      History of Present Illness    CHIEF COMPLAINT:  Patient presents today for follow up of sinusitis    ENT:  He has a history of chronic sinusitis and allergic rhinitis. He was previously treated with doxycycline for acute sinusitis exacerbation. He continues fluticasone-azelastine for management of both conditions.  He has no new complaints today feels good with his nose and sinuses not bothering him.  Using Flonase on a regular basis      His current sinus regime consists of:  Nasal saline and Flonase.    The assessment of quality and severity of symptoms as measured by the SNOT-22 score is deferred.    The patient's medications, allergies, past medical, surgical, social and family histories were reviewed and updated as appropriate.    Review of Systems   Psychiatric/Behavioral:  Negative for suicidal ideas.         A detailed review of systems was obtained with pertinent positives as per the above HPI, and otherwise negative.        Objective:     /75   Wt 96.6 kg (212 lb 15.4 oz)   BMI 28.10 kg/m²        Constitutional:   Vital signs are normal. He appears well-developed and well-nourished.     Head:  Normocephalic and atraumatic.     Ears:  Hearing normal to normal and whispered voice; external ear normal without scars, lesions, or masses; ear canal, tympanic membrane, and middle ear normal..   Right Ear: No swelling. Tympanic membrane is not perforated and not bulging. No middle ear effusion.   Left Ear: No swelling. Tympanic membrane is not perforated and not bulging.  No middle ear effusion.     Nose:  Nose normal including turbinates, nasal mucosa, sinuses and nasal septum. No epistaxis.     Mouth/Throat  Oropharynx clear and moist without lesions or asymmetry and normal uvula midline. Normal dentition. No tonsillar abscesses. Tonsillar exudate.      Neck:  Neck normal  without thyromegaly masses, asymmetry, normal tracheal structure, crepitus, and tenderness, thyroid normal, trachea normal, phonation normal, full range of motion with neck supple and no adenopathy. No stridor present.        Head (right side): No submental adenopathy present.        Head (left side): No submental adenopathy present.     He has no cervical adenopathy.     Pulmonary/Chest:   No stridor.       Procedure    Nasal endoscopy performed.  See procedure note.    Nasal Endoscopy:  5/13/2025    The use of diagnostic nasal endoscopy was considered medically necessary for the evaluation and visualization of the nasal anatomy for symptoms suggestive of nasal or sinus origin. Physical examination (including a nasal speculum evaluation) did not provide sufficient clinical information to establish a diagnosis, or symptoms did not improve or worsened following treatment.     The nasal cavity was decongested with topical 1% phenylephrine and anesthetized with 4% lidocaine.  A rigid 0-degree endoscope was introduced into the nasal cavity.    The patient was seated in the examination chair. After discussion of risks and benefits, a nasal endoscope was inserted into the nose the endoscope was passed along the left nasal floor to the nasopharynx. It was then passed between the middle and superior meatus, nasal turbinates, nasal septum, nasopharynx and sphenoethmoid region. The nasal endoscope was withdrawn and there was no complications. An identical procedure was performed on the right side. I was present for the entire procedure.The patient tolerated the above procedure well. The findings of this procedure can be found in the dictated note from 5/13/2025 visit.      Patent middle meatus bilaterally resolution of purulent drainage.  No edema or erythema noted.  Postsurgical changes to the right ethmoid and maxillary sinuses.    Data Reviewed    WBC (K/uL)   Date Value   11/27/2024 6.13     Eosinophil % (%)   Date Value    11/27/2024 3.3     Eos # (K/uL)   Date Value   11/27/2024 0.2     Platelets (K/uL)   Date Value   11/27/2024 147 (L)     Glucose (mg/dL)   Date Value   11/27/2024 91     Total IgE (IU/mL)   Date Value   09/30/2015 302 (H)     No sinus imaging available.    Assessment & Plan    J32.9 Chronic sinusitis, unspecified  J30.9 Allergic rhinitis, unspecified  J01.90 Acute sinusitis, unspecified  Z79.51 Long term (current) use of inhaled steroids    IMPRESSION:  - Continued management of chronic sinusitis and allergic rhinitis.  - Previously treated acute exacerbation with doxycycline.    ALLERGIC RHINITIS AND CHRONIC SINUSITIS:  - Continue fluticasone-azelastine for allergic rhinitis and chronic sinusitis.       He will follow up with me in 6 months    This note was generated with the assistance of ambient listening technology. Verbal consent was obtained by the patient and accompanying visitor(s) for the recording of patient appointment to facilitate this note. I attest to having reviewed and edited the generated note for accuracy, though some syntax or spelling errors may persist. Please contact the author of this note for any clarification.    Ward Schmitt MD

## 2025-05-20 ENCOUNTER — OFFICE VISIT (OUTPATIENT)
Dept: CARDIOLOGY | Facility: CLINIC | Age: 75
End: 2025-05-20
Payer: MEDICARE

## 2025-05-20 VITALS
BODY MASS INDEX: 27.82 KG/M2 | OXYGEN SATURATION: 100 % | HEIGHT: 73 IN | SYSTOLIC BLOOD PRESSURE: 152 MMHG | DIASTOLIC BLOOD PRESSURE: 90 MMHG | WEIGHT: 209.88 LBS | HEART RATE: 60 BPM | RESPIRATION RATE: 18 BRPM

## 2025-05-20 DIAGNOSIS — Z95.0 PRESENCE OF CARDIAC PACEMAKER: Primary | ICD-10-CM

## 2025-05-20 DIAGNOSIS — I48.91 ATRIAL FIBRILLATION WITH SLOW VENTRICULAR RESPONSE: ICD-10-CM

## 2025-05-20 DIAGNOSIS — I48.91 NEW ONSET A-FIB: ICD-10-CM

## 2025-05-20 DIAGNOSIS — I49.5 SSS (SICK SINUS SYNDROME): ICD-10-CM

## 2025-05-20 DIAGNOSIS — I70.0 ATHEROSCLEROSIS OF AORTA: ICD-10-CM

## 2025-05-20 DIAGNOSIS — I10 ESSENTIAL HYPERTENSION: ICD-10-CM

## 2025-05-20 DIAGNOSIS — I25.709 ATHEROSCLEROSIS OF CORONARY ARTERY BYPASS GRAFT OF NATIVE HEART WITH ANGINA PECTORIS: ICD-10-CM

## 2025-05-20 PROCEDURE — G2211 COMPLEX E/M VISIT ADD ON: HCPCS | Mod: ,,, | Performed by: INTERNAL MEDICINE

## 2025-05-20 PROCEDURE — 99999 PR PBB SHADOW E&M-EST. PATIENT-LVL IV: CPT | Mod: PBBFAC,,, | Performed by: INTERNAL MEDICINE

## 2025-05-20 PROCEDURE — 99214 OFFICE O/P EST MOD 30 MIN: CPT | Mod: S$PBB,,, | Performed by: INTERNAL MEDICINE

## 2025-05-20 PROCEDURE — 93005 ELECTROCARDIOGRAM TRACING: CPT | Mod: PBBFAC | Performed by: INTERNAL MEDICINE

## 2025-05-20 PROCEDURE — 93010 ELECTROCARDIOGRAM REPORT: CPT | Mod: S$PBB,,, | Performed by: INTERNAL MEDICINE

## 2025-05-20 PROCEDURE — 99214 OFFICE O/P EST MOD 30 MIN: CPT | Mod: PBBFAC,25 | Performed by: INTERNAL MEDICINE

## 2025-05-20 RX ORDER — ATORVASTATIN CALCIUM 40 MG/1
40 TABLET, FILM COATED ORAL NIGHTLY
Qty: 90 TABLET | Refills: 3 | Status: SHIPPED | OUTPATIENT
Start: 2025-05-20 | End: 2025-05-30 | Stop reason: SDUPTHER

## 2025-05-20 RX ORDER — LOSARTAN POTASSIUM 25 MG/1
25 TABLET ORAL DAILY
Qty: 90 TABLET | Refills: 2 | Status: SHIPPED | OUTPATIENT
Start: 2025-05-20 | End: 2026-02-14

## 2025-05-20 RX ORDER — HYDROCHLOROTHIAZIDE 12.5 MG/1
12.5 CAPSULE ORAL DAILY
Qty: 90 CAPSULE | Refills: 1 | Status: SHIPPED | OUTPATIENT
Start: 2025-05-20

## 2025-05-20 RX ORDER — AMLODIPINE BESYLATE 5 MG/1
5 TABLET ORAL DAILY
Qty: 90 TABLET | Refills: 3 | Status: SHIPPED | OUTPATIENT
Start: 2025-05-20 | End: 2025-05-30 | Stop reason: SDUPTHER

## 2025-05-20 NOTE — PROGRESS NOTES
CARDIOVASCULAR CONSULTATION    REASON FOR CONSULT:   Rizwan Demarco Jr. is a 74 y.o. male who presents for evaluation    HISTORY OF PRESENT ILLNESS:     Patient is a pleasant 72-year-old man.  Here for evaluation.  Past medical history significant for atrial fibrillation, on Eliquis.  Coronary artery bypass grafting in 2003.  Quadruple bypass as per patient, but unknown anatomy.  States had it done at [facility].  Dyslipidemia, hypertension, Sjogren's disease.  Also peripheral artery disease with abnormal ABIs in 2021.  Denies any orthopnea, PND.  Main complaint is right leg, right calf claudication and cramping on walking.  Also complains of occasional heaviness in the chest area states that happens when he gets constipated.      2023:      The left ventricle is normal in size with concentric remodeling and normal systolic function. The estimated ejection fraction is 65%.  Normal right ventricular size with normal right ventricular systolic function.  Normal left ventricular diastolic function.  The estimated PA systolic pressure is 33 mmHg.  Intermediate central venous pressure (8 mmHg).  The ascending aorta is mildly dilated.      2021:      Moderately decreased right ORESTES, 0.68.  Moderately dampened PVR waveforms.  Mildly decreased left ORESTES, 0.89.  Moderately dampened PVR waveforms.      May 23:  Patient here for follow-up.  Peripheral ultrasound revealed 60 90% mid left SFA stenosis.  Discussed initiating Pletal with the patient.  Patient not interested.  Would like to proceed with the peripheral angiogram for further evaluation.  Lifestyle limiting calf claudication right more than left        Right ORESTES 0.73 suggesting mild arteial flow resriction. Left ORESTES normal 1.01     Mild bilateral carotid plaque with no flow limiting stenosis     Normal abdominal aortic size and flow. No AAA       Mild right SFA plaque with no flow limiting arterial stenosis  Moderate left SFA plaque with 60-90% mis SFA  stenosis. Occluded left posterial tibial artery        Notes from July 23:  Patient here for follow-up.  Claudication in the right leg has gone away.  States he recently walk 2 miles without any claudication.  Has a stenosis in his left SFA also, but states that there is no claudication in the left leg.      Successful laser atherectomy, balloon angioplasty of the distal left SFA and proximal popliteal artery.  With excellent angiographic results      Procedures performed:     1. Ultrasound-guided left common femoral artery access next     2. Abdominal aortogram with pigtail placed in the suprarenal position      3. Selective right lower extremity angiogram     4. Selective left lower artery angiogram      5. Laser atherectomy of right SFA and proximal popliteal artery      6. Drug coated balloon angioplasty of right SFA and proximal popliteal artery      7. IVUS of right SFA, popliteal and TP trunk.        Procedural details      Ultrasound-guided access of left common femoral artery was obtained.  After that we inserted a pigtail in the suprarenal position and abdominal aortogram was obtained.  It demonstrated no significant iliac artery stenosis on both sides.  Then we selectively engaged the right iliac artery and angiogram of the right iliac artery was obtained.  It revealed mild 10-20% stenosis.  After that we obtained angiogram of the right SFA which revealed severe 90-95% stenosis in the distal SFA.  There was also another 90% stenosis in the right TP trunk.  Proximal  of right anterior tibial artery and right posterior tibial arteries was present.  Both these arteries filled with collaterals.  Peroneal artery provides the most robust flow to the foot.  After that we crossed these wires with the lesion and decision was made to fix the SFA and popliteal lesions because patient has lifestyle limiting claudication, but no resting pain or CLI.  IVUS was used for vessel sizing After that laser atherectomy of  these 2 lesions was performed followed by balloon angioplasty.  Angiogram post revealed excellent angiographic results.       Then we did angiogram of the left lower extremity from the sheath and it revealed distal SFA severe stenosis.  Anterior tibial artery is diffusely diseased and there appears to be atypical takeoff of the posterior tibial artery.  No significant stenosis was noted in the peroneal artery        Assessment plan     Continue medical management      Resume Eliquis in a.m..  Continue Plavix 75 mg qd     Aspirin 81 mg qd x 1 m     statins         Nov 23: doing fine. No more claudication. No cp, orthopnea, pnd    Notes from February 24: Patient here for follow-up.  Doing fine.  Denies any chest pains at rest on exertion, orthopnea, PND.      Notes from May 24: Patient here for follow-up.  Lately has been experiencing dyspnea on exertion and chest tightness on exertion    Notes from June 24: Patient here for follow-up.  Stress test abnormal    Results for orders placed or performed in visit on 02/13/25   EKG 12-lead    Collection Time: 02/13/25 10:20 AM   Result Value Ref Range    QRS Duration 166 ms    OHS QTC Calculation 494 ms    Narrative    Test Reason :    Vent. Rate :  60 BPM     Atrial Rate : 394 BPM     P-R Int :    ms          QRS Dur : 166 ms      QT Int : 494 ms       P-R-T Axes :    -77  89 degrees    QTcB Int : 494 ms    Ventricular-paced rhythm  Abnormal ECG      Confirmed by Rizwan Elise ([id number]) on 2/16/2025 12:38:22 PM    Referred By: DARRYL           Confirmed By: Rizwan Elise       Results for orders placed during the hospital encounter of 06/06/24    Echo    Interpretation Summary    Left Ventricle: The left ventricle is normal in size. Mildly increased wall thickness. There is normal systolic function with a visually estimated ejection fraction of 55 - 60%. Unable to assess diastolic function due to atrial fibrillation.    Right Ventricle: Normal right ventricular cavity size.  Systolic function is normal.    Left Atrium: Left atrium is moderately dilated.    Right Atrium: Right atrium is moderately dilated.    Mitral Valve: There is mild regurgitation.    Tricuspid Valve: There is moderate regurgitation.    Pulmonary Artery: The estimated pulmonary artery systolic pressure is 48 mmHg.    IVC/SVC: Intermediate venous pressure at 8 mmHg.      Results for orders placed during the hospital encounter of 06/06/24    Nuclear Stress - Cardiology Interpreted    Interpretation Summary    Abnormal myocardial perfusion scan.    There is a moderate intensity, moderate to large sized, mostly reversible perfusion abnormality with some fixed areas in the inferior wall(s).    There are no other significant perfusion abnormalities.    The gated perfusion images showed an ejection fraction of 68% at rest.    There is normal wall motion at rest and post stress.    The ECG portion of the study is negative for ischemia.    The patient reported no chest pain during the stress test.    There were no arrhythmias during stress.      Results for orders placed during the hospital encounter of 06/21/23    Cardiac catheterization    Conclusion    The estimated blood loss was <50 mL.    Successful laser atherectomy, balloon angioplasty of the distal left SFA and proximal popliteal artery.  With excellent angiographic results    Procedures performed:    1. Ultrasound-guided left common femoral artery access next    2. Abdominal aortogram with pigtail placed in the suprarenal position    3. Selective right lower extremity angiogram    4. Selective left lower artery angiogram    5. Laser atherectomy of right SFA and proximal popliteal artery    6. Drug coated balloon angioplasty of right SFA and proximal popliteal artery    7. IVUS of right SFA, popliteal and TP trunk.      Procedural details    Ultrasound-guided access of left common femoral artery was obtained.  After that we inserted a pigtail in the suprarenal  position and abdominal aortogram was obtained.  It demonstrated no significant iliac artery stenosis on both sides.  Then we selectively engaged the right iliac artery and angiogram of the right iliac artery was obtained.  It revealed mild 10-20% stenosis.  After that we obtained angiogram of the right SFA which revealed severe 90-95% stenosis in the distal SFA.  There was also another 90% stenosis in the right TP trunk.  Proximal  of right anterior tibial artery and right posterior tibial arteries was present.  Both these arteries filled with collaterals.  Peroneal artery provides the most robust flow to the foot.  After that we crossed these wires with the lesion and decision was made to fix the SFA and popliteal lesions because patient has lifestyle limiting claudication, but no resting pain or CLI.  IVUS was used for vessel sizing After that laser atherectomy of these 2 lesions was performed followed by balloon angioplasty.  Angiogram post revealed excellent angiographic results.    Then we did angiogram of the left lower extremity from the sheath and it revealed distal SFA severe stenosis.  Anterior tibial artery is diffusely diseased and there appears to be atypical takeoff of the posterior tibial artery.  No significant stenosis was noted in the peroneal artery      Assessment plan    Continue medical management    Resume Eliquis in a.m..  Continue Plavix 75 mg qd    Aspirin 81 mg qd x 1 m    statins                The procedure log was documented by Documenter: Prerna Cherry RN and verified by Abbe Vilchis MD.    Date: 6/30/2023  Time: 1:18 PM      Notes from July 24: Patient here for follow-up.  Denies chest pains at rest on exertion, orthopnea, PND.    Notes from September 24: Patient here for follow-up.  In the interim had sick sinus syndrome requiring pacemaker implantation.  Pacemaker check today.  Functioning fine.  [employer] pacemaker.  Denies orthopnea PND swelling of feet.    Feb 25:    History  of Present Illness    CHIEF COMPLAINT:  Rizwan presents today with sinus infection    CURRENT SYMPTOMS:  He reports significant oral dryness, describing absence of saliva which he attributes to inadequate water intake this morning.    CARDIOVASCULAR:  He has a pacemaker that is monitored remotely, with the most recent evaluation conducted in December.  He monitors blood pressure at home weekly, with readings typically 130s or less. The highest recent reading was 139 on Tuesday, with some readings in the upper 120s.    MEDICATIONS:  He takes Amlodipine 5 mg daily, Eliquis 5 mg twice daily, Lipitor      PAST MEDICAL HISTORY:     Past Medical History:   Diagnosis Date    Allergy     Anticoagulant long-term use     Arthritis     Rheumatoid arthritis    Back pain     Blood clotting tendency     BPH (benign prostatic hypertrophy)     Cervical spondylosis     Chronic a-fib 7/18/2024    Colon polyp 2010    adenoma    Coronary artery disease     Depression 05/08/2015    Dry eyes     Dry mouth     GERD (gastroesophageal reflux disease)     Hyperlipidemia     Hypertension     Lumbar spondylosis     Nasal obstruction     PAD (peripheral artery disease) 04/25/2023    Rheumatoid arthritis     Sinusitis     Special screening for malignant neoplasm of colon 06/29/2016    Trouble in sleeping        PAST SURGICAL HISTORY:     Past Surgical History:   Procedure Laterality Date    AORTOGRAPHY N/A 6/19/2024    Procedure: AORTOGRAM;  Surgeon: Abbe Vilchis MD;  Location: [facility] CATH LAB;  Service: Cardiology;  Laterality: N/A;    ATHERECTOMY Left 6/21/2023    Procedure: Atherectomy;  Surgeon: Abbe Vilchis MD;  Location: [facility] CATH LAB;  Service: Cardiology;  Laterality: Left;    CHALAZION EXCISION      COLONOSCOPY N/A 6/29/2016    Procedure: COLONOSCOPY;  Surgeon: Partha Saucedo MD;  Location: [facility] ENDO;  Service: Endoscopy;  Laterality: N/A;    COLONOSCOPY N/A 7/24/2020    Procedure: COLONOSCOPY;  Surgeon: Ian Martinez MD;   Location: [facility] (4TH FLR);  Service: Endoscopy;  Laterality: N/A;  4/16/20 - removed from 5/1/20, not called yet due to acute pain issues being addressed today - pg    COLONOSCOPY N/A 10/19/2023    Procedure: COLONOSCOPY;  Surgeon: Ian Martinez MD;  Location: [facility] ENDO;  Service: Endoscopy;  Laterality: N/A;  order created from Dr. Martinez's previous colonoscopy report 7/24/2020-3 year surveillance.   ok to hold Eliquis 2 days and Plavix 5 days per Dr Vilchis-GT  PEG instr portal -ml  10/12- pre call complete.  DBM    CORONARY ANGIOGRAPHY INCLUDING BYPASS GRAFTS WITH CATHETERIZATION OF LEFT HEART N/A 6/19/2024    Procedure: ANGIOGRAM, CORONARY, INCLUDING BYPASS GRAFT, WITH LEFT HEART CATHETERIZATION;  Surgeon: Abbe Vilchis MD;  Location: [facility] CATH LAB;  Service: Cardiology;  Laterality: N/A;  RN PREOP 06/17/2024    CORONARY ARTERY BYPASS GRAFT      ESOPHAGOGASTRODUODENOSCOPY N/A 7/24/2020    Procedure: ESOPHAGOGASTRODUODENOSCOPY (EGD);  Surgeon: Ian Martinez MD;  Location: [facility] (4TH FLR);  Service: Endoscopy;  Laterality: N/A;  4/16/20 - removed from 5/1/20, not called yet due to acute pain issues being addressed today.  4/17/20 - rescheduled 7/24/20 - pg    EXCISION TURBINATE, SUBMUCOUS      INSERTION, ELECTRODE LEAD, CARDIAC PACEMAKER, 1 ELECTRODE LEAD Left 8/26/2024    Procedure: INSERTION, ELECTRODE LEAD, PACEMAKER, 1 ELECTRODE LEAD;  Surgeon: Rizwan Elise MD;  Location: [facility] CATH LAB;  Service: Cardiology;  Laterality: Left;    KNEE ARTHROSCOPY W/ DEBRIDEMENT      NASAL SEPTUM SURGERY      PERIPHERAL ANGIOGRAPHY N/A 6/21/2023    Procedure: Peripheral angiography;  Surgeon: Abbe Vilchis MD;  Location: [facility] CATH LAB;  Service: Cardiology;  Laterality: N/A;  right radial access--- RN PREOP 6/16----    TONSILLECTOMY         ALLERGIES AND MEDICATION:     Review of patient's allergies indicates:   Allergen Reactions    Aspirin Nausea And Vomiting    Astelin [azelastine] Other (See Comments)     Dry mouth     Flomax [tamsulosin] Other (See Comments)     Nosebleed        Medication List            Accurate as of May 20, 2025 10:36 AM. If you have any questions, ask your nurse or doctor.                CONTINUE taking these medications      alfuzosin 10 mg Tb24  Commonly known as: UROXATRAL  Take 1 tablet (10 mg total) by mouth daily with breakfast.     amLODIPine 5 MG tablet  Commonly known as: NORVASC  Take 1 tablet (5 mg total) by mouth once daily.     apixaban 5 mg Tab  Commonly known as: ELIQUIS  Take 1 tablet (5 mg total) by mouth 2 (two) times daily.     atorvastatin 40 MG tablet  Commonly known as: LIPITOR  Take 1 tablet (40 mg total) by mouth every evening.     azelastine 137 mcg (0.1 %) nasal spray  Commonly known as: ASTELIN  1 spray (137 mcg total) by Nasal route 2 (two) times daily.     cephALEXin 500 MG capsule  Commonly known as: KEFLEX  Take 1 capsule (500 mg total) by mouth every 6 (six) hours.     ciclopirox 0.77 % Crea  Commonly known as: LOPROX  Apply topically 2 (two) times daily.     ciclopirox 8 % Soln  Commonly known as: PENLAC  Apply topically nightly.     cinnamon bark 500 mg capsule     fluticasone propionate 50 mcg/actuation nasal spray  Commonly known as: FLONASE  2 sprays (100 mcg total) by Each Nostril route once daily.     hydroCHLOROthiazide 12.5 mg capsule  Commonly known as: MICROZIDE  Take 1 capsule (12.5 mg total) by mouth once daily.     HYDROcodone-acetaminophen 5-325 mg per tablet  Commonly known as: NORCO  Take 1 tablet by mouth every 12 (twelve) hours as needed for Pain.     linaCLOtide 72 mcg Cap capsule  Commonly known as: LINZESS  Take 1 capsule (72 mcg total) by mouth before breakfast.     losartan 25 MG tablet  Commonly known as: COZAAR  Take 1 tablet (25 mg total) by mouth once daily.     multivitamin per tablet  Commonly known as: THERAGRAN     omega-3 fatty acids 1,250 mg Cap     omeprazole 20 MG capsule  Commonly known as: PRILOSEC  Take 1 capsule (20 mg total) by mouth  every morning.     oxymetazoline 0.05 % nasal spray  Commonly known as: AFRIN     propylene glycoL 0.6 % Drop     sildenafiL 100 MG tablet  Commonly known as: VIAGRA  Take 1 tablet (100 mg total) by mouth daily as needed.     sodium bicarb-sodium chloride Pack     Systane GeL 0.3 % Gel  Generic drug: artificial tears(hypromellose)(GENTEAL/SUSTANE)     VITAMIN C 1000 MG tablet  Generic drug: ascorbic acid (vitamin C)              SOCIAL HISTORY:     Social History     Socioeconomic History    Marital status:    Tobacco Use    Smoking status: Former     Current packs/day: 0.00     Average packs/day: 1 pack/day for 20.0 years (20.0 ttl pk-yrs)     Types: Cigarettes     Start date: 1971     Quit date: 1991     Years since quittin.4    Smokeless tobacco: Never    Tobacco comments:     Quit .   Substance and Sexual Activity    Alcohol use: No    Drug use: No    Sexual activity: Yes     Partners: Female   Social History Narrative    N/a per the patient.      Social Drivers of Health     Financial Resource Strain: Low Risk  (2025)    Overall Financial Resource Strain (CARDIA)     Difficulty of Paying Living Expenses: Not hard at all   Food Insecurity: No Food Insecurity (2025)    Hunger Vital Sign     Worried About Running Out of Food in the Last Year: Never true     Ran Out of Food in the Last Year: Never true   Transportation Needs: No Transportation Needs (2025)    PRAPARE - Transportation     Lack of Transportation (Medical): No     Lack of Transportation (Non-Medical): No   Physical Activity: Insufficiently Active (2025)    Exercise Vital Sign     Days of Exercise per Week: 2 days     Minutes of Exercise per Session: 60 min   Stress: No Stress Concern Present (2025)    [employer] - Occupational Stress Questionnaire     Feeling of Stress : Not at all   Housing Stability: Low Risk  (2025)    Housing Stability Vital Sign     Unable to Pay  "for Housing in the Last Year: No     Homeless in the Last Year: No       FAMILY HISTORY:     Family History   Problem Relation Name Age of Onset    Hyperlipidemia Mother      Alzheimer's disease Mother      Stomach cancer Father      Colon cancer Father          from wife--he is not unsure     Cataracts Other      No Known Problems Maternal Aunt      No Known Problems Maternal Uncle      No Known Problems Paternal Aunt      No Known Problems Paternal Uncle      No Known Problems Maternal Grandmother      No Known Problems Maternal Grandfather      No Known Problems Paternal Grandmother      No Known Problems Paternal Grandfather      Blindness Neg Hx      Cancer Neg Hx      Diabetes Neg Hx      Glaucoma Neg Hx      Rheum arthritis Neg Hx      Psoriasis Neg Hx      Lupus Neg Hx      Amblyopia Neg Hx      Hypertension Neg Hx      Macular degeneration Neg Hx      Retinal detachment Neg Hx      Strabismus Neg Hx      Stroke Neg Hx      Thyroid disease Neg Hx      Esophageal cancer Neg Hx         REVIEW OF SYSTEMS:   Review of Systems   Constitutional: Negative.   HENT: Negative.     Eyes: Negative.    Respiratory: Negative.     Endocrine: Negative.    Hematologic/Lymphatic: Negative.    Skin: Negative.    Musculoskeletal: Negative.    Gastrointestinal: Negative.    Genitourinary: Negative.    Neurological: Negative.    Psychiatric/Behavioral: Negative.     Allergic/Immunologic: Negative.        A 10 point review of systems was performed and all the pertinent positives have been mentioned. Rest of review of systems was negative.        PHYSICAL EXAM:     Vitals:    05/20/25 1017   BP: (!) 152/90   Pulse: 60   Resp: 18    Body mass index is 27.69 kg/m².  Weight: 95.2 kg (209 lb 14.1 oz)   Height: 6' 1" (185.4 cm)     Physical Exam  Vitals reviewed.   Constitutional:       Appearance: He is well-developed.   HENT:      Head: Normocephalic.   Eyes:      Conjunctiva/sclera: Conjunctivae normal.      Pupils: Pupils are equal, " round, and reactive to light.   Cardiovascular:      Rate and Rhythm: Normal rate and regular rhythm.      Heart sounds: Normal heart sounds.   Pulmonary:      Effort: Pulmonary effort is normal.      Breath sounds: Normal breath sounds.   Abdominal:      General: Bowel sounds are normal.      Palpations: Abdomen is soft.   Musculoskeletal:      Cervical back: Normal range of motion and neck supple.   Skin:     General: Skin is warm.   Neurological:      Mental Status: He is alert and oriented to person, place, and time.           DATA:     Laboratory:  CBC:  Recent Labs   Lab 08/26/24  0413 08/27/24  0742 11/27/24  0859   WBC 6.04 6.00 6.13   Hemoglobin 14.8 16.0 15.2   Hematocrit 46.6 49.6 49.1   Platelets 127 L 127 L 147 L       CHEMISTRIES:  Recent Labs   Lab 08/24/24  1722 08/25/24  0213 08/26/24 0413 08/27/24  0742 11/27/24  0859   Glucose 99 114 H 96 96 91   Sodium 140 142 140 140 144   Potassium 3.2 L 3.8 4.5 4.5 3.9   BUN 12 13 12 11 13   Creatinine 0.9 1.1 1.2 1.0 1.2   Calcium 8.5 L 9.6 9.4 9.8 9.5   Magnesium 1.7 1.9 1.9  --   --        CARDIAC BIOMARKERS:  Recent Labs   Lab 08/24/24  1722 08/24/24  2031 08/25/24  0213   CPK 84  --   --    Troponin I 0.033 H 0.036 H 0.037 H       COAGS:  Recent Labs   Lab 01/27/24  1549 02/02/24  1200 08/24/24  1722   INR 1.1 1.2 1.1       LIPIDS/LFTS:  Recent Labs   Lab 02/13/23  0818 01/27/24  1549 08/23/24  0905 08/24/24  1722 08/26/24  0413 08/27/24  0742 11/27/24  0859   Cholesterol 155  --  125  --   --   --  149   Triglycerides 45  --  45  --   --   --  45   HDL 40  --  36 L  --   --   --  40   LDL Cholesterol 106.0  --  80.0  --   --   --  100.0   Non-HDL Cholesterol 115  --  89  --   --   --  109   AST 15   < >  --    < > 21 19 21   ALT 14   < >  --    < > 32 28 17    < > = values in this interval not displayed.       Hemoglobin A1C   Date Value Ref Range Status   11/27/2024 5.7 (H) 4.0 - 5.6 % Final     Comment:     ADA Screening Guidelines:  5.7-6.4%   Consistent with prediabetes  >or=6.5%  Consistent with diabetes    High levels of fetal hemoglobin interfere with the HbA1C  assay. Heterozygous hemoglobin variants (HbS, HgC, etc)do  not significantly interfere with this assay.   However, presence of multiple variants may affect accuracy.     02/13/2023 5.6 4.0 - 5.6 % Final     Comment:     ADA Screening Guidelines:  5.7-6.4%  Consistent with prediabetes  >or=6.5%  Consistent with diabetes    High levels of fetal hemoglobin interfere with the HbA1C  assay. Heterozygous hemoglobin variants (HbS, HgC, etc)do  not significantly interfere with this assay.   However, presence of multiple variants may affect accuracy.     02/08/2022 5.6 4.0 - 5.6 % Final     Comment:     ADA Screening Guidelines:  5.7-6.4%  Consistent with prediabetes  >or=6.5%  Consistent with diabetes    High levels of fetal hemoglobin interfere with the HbA1C  assay. Heterozygous hemoglobin variants (HbS, HgC, etc)do  not significantly interfere with this assay.   However, presence of multiple variants may affect accuracy.         TSH  Recent Labs   Lab 02/13/23  0818 08/24/24  1722 11/27/24  0859   TSH 0.641 0.407 0.428       The 10-year ASCVD risk score (Emmanuelle DK, et al., 2019) is: 29.6%    Values used to calculate the score:      Age: 74 years      Sex: Male      Is Non- : Yes      Diabetic: No      Tobacco smoker: No      Systolic Blood Pressure: 152 mmHg      Is BP treated: Yes      HDL Cholesterol: 40 mg/dL      Total Cholesterol: 149 mg/dL       BNP    Lab Results   Component Value Date/Time     (H) 01/27/2024 03:49 PM    BNP 25 05/07/2015 05:30 PM             ASSESSMENT AND PLAN     Patient Active Problem List   Diagnosis    Sjogren's disease    GERD (gastroesophageal reflux disease)    Atherosclerosis of coronary artery bypass graft of native heart with angina pectoris    Hyperlipidemia    Cervical spondylosis    Degeneration of intervertebral disc     Rheumatoid arthritis involving multiple sites    Benign prostatic hyperplasia with weak urinary stream    Perennial allergic rhinitis    Essential hypertension    High frequency hearing loss    Nasal obstruction    Dysfunctions associated with sleep stages or arousal from sleep    Primary insomnia    Wears dentures    Atherosclerosis of aorta    PAD (peripheral artery disease)    Poor circulation    Sebaceous cyst    Carotid artery disorder    Atrial fibrillation with slow ventricular response    Pulmonary heart disease    Hypokalemia    SSS (sick sinus syndrome)    Pacemaker    Thrombocytopenia, unspecified    Chronic pain of both knees    Antalgic gait       Peripheral artery disease:  Now status post revascularization of right SFA and popliteal artery.  Also has residual infrapopliteal disease.  Is claudication after revascularization of the SFA disease has gone away and he can now walk 2 miles.  He also has flow-limiting stenosis in the distal left SFA, but states that he does not have any pain in the left leg.  Continue medical management.   Plavix 75 mg daily uninterrupted for at least 1 year, continue Eliquis 5 mg b.i.d.      Coronary artery disease status post CABG in 2003.  Stress test in May of 2023 did not show any significant ischemia.  Recent echo showed normal left ventricle systolic function     Carotid ultrasound in May of 2023 showed mild bilateral carotid artery block with no flow-limiting stenosis.    Hypertension:  Referred for digital hypertension program.  Patient does not want to join.  Because they asked to many questions about his background etc per patient     Atrial fibrillation:  On Eliquis.  Rate controlled.     Dyslipidemia:  Continue statins.  Could not tolerate ezetimibe    Lab Results   Component Value Date    LDLCALC 100.0 11/27/2024     Dyspnea on exertion and chest tightness on exertion.  Angiogram revealed patent LIMA to LAD and severe triple-vessel disease with occluded SVG to  OM and circumflex.  Patient states his symptoms are not lifestyle limiting   Continue medical management.    Sick sinus syndrome: Status post pacemaker implantation.  [employer].  Working fine.  Pacemaker dependent with pacing greater than 90% of the time    Follow-up after 3 m     Visit today included increased complexity associated with the care of the episodic problem atrial fibrillation, sick sinus syndrome, coronary artery disease, peripheral artery disease, dyslipidemia, history of coronary artery bypass grafting, history of peripheral revascularization, pacemaker addressed and managing the longitudinal care of the patient due to the serious and/or complex managed problem(s)   Patient Active Problem List   Diagnosis    Sjogren's disease    GERD (gastroesophageal reflux disease)    Atherosclerosis of coronary artery bypass graft of native heart with angina pectoris    Hyperlipidemia    Cervical spondylosis    Degeneration of intervertebral disc    Rheumatoid arthritis involving multiple sites    Benign prostatic hyperplasia with weak urinary stream    Perennial allergic rhinitis    Essential hypertension    High frequency hearing loss    Nasal obstruction    Dysfunctions associated with sleep stages or arousal from sleep    Primary insomnia    Wears dentures    Atherosclerosis of aorta    PAD (peripheral artery disease)    Poor circulation    Sebaceous cyst    Carotid artery disorder    Atrial fibrillation with slow ventricular response    Pulmonary heart disease    Hypokalemia    SSS (sick sinus syndrome)    Pacemaker    Thrombocytopenia, unspecified    Chronic pain of both knees    Antalgic gait     .        Thank you very much for involving me in the care of your patient.  Please do not hesitate to contact me if there are any questions.      Abbe Vilchis MD, FACC, [facility]  Interventional Cardiologist, [facility].           This note was dictated with the help of speech recognition software.  There  might be un-intended errors and/or substitutions.                                 Thrombocytopenia, unspecified    Chronic pain of both knees    Antalgic gait     .        Thank you very much for involving me in the care of your patient.  Please do not hesitate to contact me if there are any questions.      Abbe Vilchis MD, FAC, [facility]  Interventional Cardiologist, [facility].           This note was dictated with the help of speech recognition software.  There might be un-intended errors and/or substitutions.

## 2025-05-21 LAB
OHS QRS DURATION: 174 MS
OHS QTC CALCULATION: 486 MS

## 2025-05-30 ENCOUNTER — OFFICE VISIT (OUTPATIENT)
Dept: FAMILY MEDICINE | Facility: CLINIC | Age: 75
End: 2025-05-30
Payer: MEDICARE

## 2025-05-30 VITALS
DIASTOLIC BLOOD PRESSURE: 80 MMHG | TEMPERATURE: 98 F | SYSTOLIC BLOOD PRESSURE: 130 MMHG | WEIGHT: 211.63 LBS | HEART RATE: 62 BPM | HEIGHT: 72 IN | BODY MASS INDEX: 28.66 KG/M2 | OXYGEN SATURATION: 97 %

## 2025-05-30 DIAGNOSIS — I73.9 PAD (PERIPHERAL ARTERY DISEASE): Primary | ICD-10-CM

## 2025-05-30 DIAGNOSIS — M05.79 RHEUMATOID ARTHRITIS INVOLVING MULTIPLE SITES WITH POSITIVE RHEUMATOID FACTOR: ICD-10-CM

## 2025-05-30 DIAGNOSIS — I25.709 ATHEROSCLEROSIS OF CORONARY ARTERY BYPASS GRAFT OF NATIVE HEART WITH ANGINA PECTORIS: ICD-10-CM

## 2025-05-30 DIAGNOSIS — D69.6 THROMBOCYTOPENIA, UNSPECIFIED: ICD-10-CM

## 2025-05-30 DIAGNOSIS — I10 ESSENTIAL HYPERTENSION: ICD-10-CM

## 2025-05-30 PROCEDURE — G2211 COMPLEX E/M VISIT ADD ON: HCPCS | Mod: ,,, | Performed by: FAMILY MEDICINE

## 2025-05-30 PROCEDURE — 99214 OFFICE O/P EST MOD 30 MIN: CPT | Mod: PBBFAC,PO | Performed by: FAMILY MEDICINE

## 2025-05-30 PROCEDURE — 99999 PR PBB SHADOW E&M-EST. PATIENT-LVL IV: CPT | Mod: PBBFAC,,, | Performed by: FAMILY MEDICINE

## 2025-05-30 PROCEDURE — 99214 OFFICE O/P EST MOD 30 MIN: CPT | Mod: S$PBB,,, | Performed by: FAMILY MEDICINE

## 2025-05-30 RX ORDER — ATORVASTATIN CALCIUM 40 MG/1
40 TABLET, FILM COATED ORAL NIGHTLY
Start: 2025-05-30

## 2025-05-30 RX ORDER — AMLODIPINE BESYLATE 5 MG/1
5 TABLET ORAL DAILY
Start: 2025-05-30 | End: 2026-05-30

## 2025-05-30 NOTE — ASSESSMENT & PLAN NOTE
The current medical regimen is effective;  continue present plan and medications.  Watch for progression

## 2025-05-30 NOTE — PROGRESS NOTES
Chief Complaint   Patient presents with    Follow-up       SUBJECTIVE:   Rizwan Demarco Jr. is a 75 y.o. male presenting for his annual checkup.   Current Medications[1]  Allergies: Aspirin, Astelin [azelastine], and Flomax [tamsulosin]   Patient Active Problem List    Diagnosis Date Noted    Chronic pain of both knees 11/19/2024    Antalgic gait 11/19/2024    Thrombocytopenia, unspecified 08/28/2024    Pacemaker 08/26/2024    SSS (sick sinus syndrome) 08/25/2024    Hypokalemia 08/24/2024    Pulmonary heart disease 07/22/2024    Sebaceous cyst 07/18/2024    Carotid artery disorder 07/18/2024    Atrial fibrillation with slow ventricular response 07/18/2024    PAD (peripheral artery disease) 04/25/2023    Poor circulation 04/25/2023    Atherosclerosis of aorta 01/24/2019    Wears dentures 05/21/2018    Dysfunctions associated with sleep stages or arousal from sleep     Primary insomnia     Nasal obstruction 09/18/2015    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 No dyspnea or chest pain on exertion. No abdominal pain, change in bowel habits, black or bloody stools. No urinary tract or prostatic symptoms. No neurological complaints.    OBJECTIVE:   The patient appears well, alert, oriented x 3, in no distress.   /80   Pulse 62   Temp 98.4 °F (36.9 °C) (Oral)   Ht 6' (1.829 m)   Wt 96 kg (211 lb 10.3 oz)   SpO2 97%   BMI 28.70 kg/m²   Wt Readings from Last 5 Encounters:   05/30/25 96  kg (211 lb 10.3 oz)   05/20/25 95.2 kg (209 lb 14.1 oz)   05/13/25 96.6 kg (212 lb 15.4 oz)   04/01/25 97.3 kg (214 lb 9.9 oz)   03/25/25 98 kg (216 lb 0.8 oz)       ENT normal.  Neck supple. No adenopathy or thyromegaly. DANIEL. Lungs are clear, good air entry, no wheezes, rhonchi or rales. S1 and S2 normal, no murmurs, regular rate and rhythm. Abdomen is soft without tenderness, guarding, mass or organomegaly.  exam: deferred.  Extremities show no edema, normal peripheral pulses. Neurological is normal without focal findings.    ASSESSMENT:   1. PAD (peripheral artery disease)    2. Atherosclerosis of coronary artery bypass graft of native heart with angina pectoris    3. Essential hypertension    4. Rheumatoid arthritis involving multiple sites with positive rheumatoid factor    5. Thrombocytopenia, unspecified          PLAN:   Counseled on age appropriate medical preventative services, including age appropriate cancer screenings, over all nutritional health, need for a consistent exercise regimen and an over all push towards maintaining a vigorous and active lifestyle.  Counseled on age appropriate vaccines and discussed upcoming health care needs based on age/gender.  Spent time with patient counseling on need for a good patient/doctor relationship moving forward.  Discussed use of common OTC medications and supplements.  Discussed common dietary aids and use of caffeine and the need for good sleep hygiene and stress management.    Problem List Items Addressed This Visit       Atherosclerosis of coronary artery bypass graft of native heart with angina pectoris    Overview   CABG 2003 [place]    - declined b-blockers.         Relevant Medications    amLODIPine (NORVASC) 5 MG tablet    atorvastatin (LIPITOR) 40 MG tablet    Rheumatoid arthritis involving multiple sites    Overview   Has remained off Plaquenil  Has not seen rheum since 2016    No symptoms         Current Assessment & Plan   The current medical regimen  is effective;  continue present plan and medications.  Watch for progression         Essential hypertension    Relevant Medications    amLODIPine (NORVASC) 5 MG tablet    atorvastatin (LIPITOR) 40 MG tablet    PAD (peripheral artery disease) - Primary    Relevant Medications    amLODIPine (NORVASC) 5 MG tablet    atorvastatin (LIPITOR) 40 MG tablet    Thrombocytopenia, unspecified    Current Assessment & Plan   Statin   And eliquis                 [1]   Current Outpatient Medications   Medication Sig Dispense Refill    alfuzosin (UROXATRAL) 10 mg Tb24 Take 1 tablet (10 mg total) by mouth daily with breakfast. 90 tablet 3    apixaban (ELIQUIS) 5 mg Tab Take 1 tablet (5 mg total) by mouth 2 (two) times daily. 60 tablet 11    artificial tears,hypromellose,,GENTEAL/SUSTANE, (SYSTANE GEL) 0.3 % Gel 1 drop as needed.      ascorbic acid, vitamin C, (VITAMIN C) 1000 MG tablet Take 1,000 mg by mouth once daily.      ciclopirox (LOPROX) 0.77 % Crea Apply topically 2 (two) times daily. 90 g 2    ciclopirox (PENLAC) 8 % Soln Apply topically nightly. 6.6 mL 11    cinnamon bark 500 mg capsule Take 500 mg by mouth once daily.      hydroCHLOROthiazide (MICROZIDE) 12.5 mg capsule Take 1 capsule (12.5 mg total) by mouth once daily. 90 capsule 1    linaCLOtide (LINZESS) 72 mcg Cap capsule Take 1 capsule (72 mcg total) by mouth before breakfast. 30 capsule 11    losartan (COZAAR) 25 MG tablet Take 1 tablet (25 mg total) by mouth once daily. 90 tablet 2    multivitamin (THERAGRAN) per tablet Take by mouth.  Tablet Oral Every day      omega-3 fatty acids 1,250 mg Cap Take by mouth.      omeprazole (PRILOSEC) 20 MG capsule Take 1 capsule (20 mg total) by mouth every morning. 90 capsule 3    propylene glycoL 0.6 % Drop Apply to eye. Not currently using      sodium bicarb-sodium chloride Pack by sinus irrigation route.      amLODIPine (NORVASC) 5 MG tablet Take 1 tablet (5 mg total) by mouth once daily.      atorvastatin (LIPITOR) 40 MG  tablet Take 1 tablet (40 mg total) by mouth every evening.       No current facility-administered medications for this visit.

## 2025-06-04 ENCOUNTER — HOSPITAL ENCOUNTER (OUTPATIENT)
Dept: CARDIOLOGY | Facility: HOSPITAL | Age: 75
Discharge: HOME | End: 2025-06-04
Attending: INTERNAL MEDICINE
Payer: MEDICARE

## 2025-06-04 ENCOUNTER — CLINICAL SUPPORT (OUTPATIENT)
Dept: CARDIOLOGY | Facility: HOSPITAL | Age: 75
End: 2025-06-04
Payer: MEDICARE

## 2025-06-04 DIAGNOSIS — R00.1 BRADYCARDIA, UNSPECIFIED: ICD-10-CM

## 2025-06-04 DIAGNOSIS — Z95.0 PRESENCE OF CARDIAC PACEMAKER: ICD-10-CM

## 2025-06-04 PROCEDURE — 93294 REM INTERROG EVL PM/LDLS PM: CPT | Mod: ,,, | Performed by: INTERNAL MEDICINE

## 2025-06-04 PROCEDURE — 93296 REM INTERROG EVL PM/IDS: CPT | Performed by: INTERNAL MEDICINE

## 2025-06-23 DIAGNOSIS — Z00.00 ENCOUNTER FOR MEDICARE ANNUAL WELLNESS EXAM: ICD-10-CM

## 2025-06-24 DIAGNOSIS — K59.00 CONSTIPATION, UNSPECIFIED CONSTIPATION TYPE: ICD-10-CM

## 2025-07-04 ENCOUNTER — OFFICE VISIT (OUTPATIENT)
Dept: URGENT CARE | Facility: CLINIC | Age: 75
End: 2025-07-04
Payer: MEDICARE

## 2025-07-04 VITALS
SYSTOLIC BLOOD PRESSURE: 155 MMHG | OXYGEN SATURATION: 98 % | BODY MASS INDEX: 28.9 KG/M2 | WEIGHT: 213.38 LBS | HEIGHT: 72 IN | HEART RATE: 60 BPM | TEMPERATURE: 98 F | DIASTOLIC BLOOD PRESSURE: 81 MMHG | RESPIRATION RATE: 16 BRPM

## 2025-07-04 DIAGNOSIS — B02.9 HERPES ZOSTER WITHOUT COMPLICATION: Primary | ICD-10-CM

## 2025-07-04 RX ORDER — VALACYCLOVIR HYDROCHLORIDE 1 G/1
1000 TABLET, FILM COATED ORAL 2 TIMES DAILY
Qty: 20 TABLET | Refills: 0 | Status: SHIPPED | OUTPATIENT
Start: 2025-07-04 | End: 2025-07-14

## 2025-07-04 NOTE — PROGRESS NOTES
Subjective:      Patient ID: Rizwan Demarco Jr. is a 75 y.o. male.    Vitals:  height is 6' (1.829 m) and weight is 96.8 kg (213 lb 6.5 oz). His oral temperature is 97.8 °F (36.6 °C). His blood pressure is 155/81 (abnormal) and his pulse is 60. His respiration is 16 and oxygen saturation is 98%.     Chief Complaint: Rash    Patient is here for left shoulder/arm rash with itchiness, redness and blistering onset one week ago. OTC medication anti-itch cream with no relief.    Rash  This is a new problem. Episode onset: one week ago. The problem has been gradually worsening since onset. The affected locations include the left shoulder and left arm. The rash is characterized by itchiness, redness and blistering. He was exposed to nothing. Past treatments include anti-itch cream. There is no history of allergies, asthma, eczema or varicella.       Skin:  Positive for rash.      Objective:     Physical Exam   Constitutional: He is oriented to person, place, and time.   HENT:   Head: Normocephalic.   Ears:   Right Ear: External ear normal.   Left Ear: External ear normal.   Nose: Nose normal.   Mouth/Throat: Mucous membranes are moist.   Eyes: Conjunctivae are normal.   Cardiovascular: Normal rate.   Pulmonary/Chest: Effort normal.   Musculoskeletal: Normal range of motion.         General: Normal range of motion.   Neurological: He is alert and oriented to person, place, and time.   Skin: Skin is dry, rash and vesicular.        Psychiatric: His behavior is normal.       Assessment:     1. Herpes zoster without complication        Plan:       Herpes zoster without complication  -     valACYclovir (VALTREX) 1000 MG tablet; Take 1 tablet (1,000 mg total) by mouth 2 (two) times daily. for 10 days  Dispense: 20 tablet; Refill: 0      Medication and problem list reviewed given risk for SE profile. Labs reviewed. GFR>60

## 2025-08-06 ENCOUNTER — PATIENT MESSAGE (OUTPATIENT)
Dept: FAMILY MEDICINE | Facility: CLINIC | Age: 75
End: 2025-08-06
Payer: MEDICARE

## 2025-08-07 ENCOUNTER — OFFICE VISIT (OUTPATIENT)
Dept: FAMILY MEDICINE | Facility: CLINIC | Age: 75
End: 2025-08-07
Payer: MEDICARE

## 2025-08-07 VITALS
HEIGHT: 72 IN | OXYGEN SATURATION: 97 % | HEART RATE: 60 BPM | DIASTOLIC BLOOD PRESSURE: 72 MMHG | BODY MASS INDEX: 28.6 KG/M2 | WEIGHT: 211.19 LBS | TEMPERATURE: 99 F | SYSTOLIC BLOOD PRESSURE: 136 MMHG

## 2025-08-07 DIAGNOSIS — K21.00 GASTROESOPHAGEAL REFLUX DISEASE WITH ESOPHAGITIS WITHOUT HEMORRHAGE: ICD-10-CM

## 2025-08-07 DIAGNOSIS — Z00.00 ENCOUNTER FOR MEDICARE ANNUAL WELLNESS EXAM: Primary | ICD-10-CM

## 2025-08-07 DIAGNOSIS — Z71.89 COUNSELING REGARDING ADVANCE DIRECTIVES AND GOALS OF CARE: ICD-10-CM

## 2025-08-07 PROCEDURE — 99999 PR PBB SHADOW E&M-EST. PATIENT-LVL IV: CPT | Mod: PBBFAC,,,

## 2025-08-07 PROCEDURE — 99214 OFFICE O/P EST MOD 30 MIN: CPT | Mod: PBBFAC,PO

## 2025-08-07 RX ORDER — OMEPRAZOLE 20 MG/1
20 CAPSULE, DELAYED RELEASE ORAL EVERY MORNING
Qty: 90 CAPSULE | Refills: 3 | Status: SHIPPED | OUTPATIENT
Start: 2025-08-07 | End: 2026-08-07

## 2025-08-07 NOTE — PROGRESS NOTES
Family Medicine      Patient Name: Rizwan Demarco Jr.  MRN: [id number]  : 1950    PCP: Raúl Perkins MD      HPI     Chief Complaint:  AWV    Rizwan Demarco Jr. is a 75 y.o. male with multiple medical diagnoses as listed in the medical history and problem list that presented for a Medicare AWV and comprehensive Health Risk Assessment today.      The following components were reviewed and updated:    Medical history  Family History  Social history  Allergies and Current Medications  Health Risk Assessment  Health Maintenance  Care Team         History     Past Medical History:  Past Medical History:   Diagnosis Date    Allergy     Anticoagulant long-term use     Arthritis     Rheumatoid arthritis    Back pain     Blood clotting tendency     BPH (benign prostatic hypertrophy)     Cervical spondylosis     Chronic a-fib 2024    Colon polyp     adenoma    Coronary artery disease     Depression 2015    Dry eyes     Dry mouth     GERD (gastroesophageal reflux disease)     Hyperlipidemia     Hypertension     Lumbar spondylosis     Nasal obstruction     PAD (peripheral artery disease) 2023    Rheumatoid arthritis     Sinusitis     Special screening for malignant neoplasm of colon 2016    Trouble in sleeping        Past Surgical History:  Past Surgical History:   Procedure Laterality Date    AORTOGRAPHY N/A 2024    Procedure: AORTOGRAM;  Surgeon: Abbe Vilchis MD;  Location: [facility] CATH LAB;  Service: Cardiology;  Laterality: N/A;    ATHERECTOMY Left 2023    Procedure: Atherectomy;  Surgeon: Abbe Vilchis MD;  Location: [facility] CATH LAB;  Service: Cardiology;  Laterality: Left;    CHALAZION EXCISION      COLONOSCOPY N/A 2016    Procedure: COLONOSCOPY;  Surgeon: Partha Saucedo MD;  Location: [facility] ENDO;  Service: Endoscopy;  Laterality: N/A;    COLONOSCOPY N/A 2020    Procedure: COLONOSCOPY;  Surgeon: Ian Martinez MD;  [facility]   Service: Endoscopy;  Laterality: N/A;  4/16/20 - removed from 5/1/20, not called yet due to acute pain issues being addressed today - pg    COLONOSCOPY N/A 10/19/2023    Procedure: COLONOSCOPY;  Surgeon: Ian Martinez MD;  Location: [facility];  Service: Endoscopy;  Laterality: N/A;  order created from Dr. Martinez's previous colonoscopy report 7/24/2020-3 year surveillance.   ok to hold Eliquis 2 days and Plavix 5 days per Dr Vilchis-GT  PEG instr portal -ml  10/12- pre call complete.  DBM    CORONARY ANGIOGRAPHY INCLUDING BYPASS GRAFTS WITH CATHETERIZATION OF LEFT HEART N/A 6/19/2024    Procedure: ANGIOGRAM, CORONARY, INCLUDING BYPASS GRAFT, WITH LEFT HEART CATHETERIZATION;  Surgeon: Abbe Vilchis MD;  Location: [facility] CATH LAB;  Service: Cardiology;  Laterality: N/A;  RN PREOP 06/17/2024    CORONARY ARTERY BYPASS GRAFT      ESOPHAGOGASTRODUODENOSCOPY N/A 7/24/2020    Procedure: ESOPHAGOGASTRODUODENOSCOPY (EGD);  Surgeon: Ian Martinez MD;  [facility]  Service: Endoscopy;  Laterality: N/A;  4/16/20 - removed from 5/1/20, not called yet due to acute pain issues being addressed today.  4/17/20 - rescheduled 7/24/20 - pg    EXCISION TURBINATE, SUBMUCOUS      INSERTION, ELECTRODE LEAD, CARDIAC PACEMAKER, 1 ELECTRODE LEAD Left 8/26/2024    Procedure: INSERTION, ELECTRODE LEAD, PACEMAKER, 1 ELECTRODE LEAD;  Surgeon: Rizwan Elise MD;  Location: [facility] CATH LAB;  Service: Cardiology;  Laterality: Left;    KNEE ARTHROSCOPY W/ DEBRIDEMENT      NASAL SEPTUM SURGERY      PERIPHERAL ANGIOGRAPHY N/A 6/21/2023    Procedure: Peripheral angiography;  Surgeon: Abbe Vilchis MD;  Location: [facility] CATH LAB;  Service: Cardiology;  Laterality: N/A;  right radial access--- RN PREOP 6/16----JM    TONSILLECTOMY         Social History:  Social History[1]    Family History:  Family History   Problem Relation Name Age of Onset    Hyperlipidemia Mother      Alzheimer's disease Mother      Stomach cancer Father      Colon cancer  Father          from wife--he is not unsure     Cataracts Other      No Known Problems Maternal Aunt      No Known Problems Maternal Uncle      No Known Problems Paternal Aunt      No Known Problems Paternal Uncle      No Known Problems Maternal Grandmother      No Known Problems Maternal Grandfather      No Known Problems Paternal Grandmother      No Known Problems Paternal Grandfather      Blindness Neg Hx      Cancer Neg Hx      Diabetes Neg Hx      Glaucoma Neg Hx      Rheum arthritis Neg Hx      Psoriasis Neg Hx      Lupus Neg Hx      Amblyopia Neg Hx      Hypertension Neg Hx      Macular degeneration Neg Hx      Retinal detachment Neg Hx      Strabismus Neg Hx      Stroke Neg Hx      Thyroid disease Neg Hx      Esophageal cancer Neg Hx         Allergies and Medications: (updated and reviewed)  Review of patient's allergies indicates:   Allergen Reactions    Aspirin Nausea And Vomiting    Astelin [azelastine] Other (See Comments)     Dry mouth    Flomax [tamsulosin] Other (See Comments)     Nosebleed     Current Medications[2]      Exam     Review of Systems:  (as noted above)  Review of Systems    Physical Exam:   Physical Exam  Vitals and nursing note reviewed.   Constitutional:       Appearance: Normal appearance. He is normal weight.   HENT:      Head: Normocephalic and atraumatic.   Eyes:      Extraocular Movements: Extraocular movements intact.      Pupils: Pupils are equal, round, and reactive to light.   Cardiovascular:      Rate and Rhythm: Normal rate and regular rhythm.      Pulses: Normal pulses.      Heart sounds: Normal heart sounds.   Pulmonary:      Effort: Pulmonary effort is normal.      Breath sounds: Normal breath sounds.   Abdominal:      General: Abdomen is flat.      Palpations: Abdomen is soft.   Musculoskeletal:         General: Normal range of motion.      Cervical back: Normal range of motion and neck supple.   Skin:     General: Skin is warm and dry.   Neurological:      Mental  Status: He is alert and oriented to person, place, and time. Mental status is at baseline.   Psychiatric:         Mood and Affect: Mood normal.         Behavior: Behavior normal.       Vitals:    08/07/25 1114 08/07/25 1120   BP: 132/70 136/72   Pulse: 60    Temp: 98.7 °F (37.1 °C)    TempSrc: Oral    SpO2: 97%    Weight: 95.8 kg (211 lb 2.5 oz)    Height: 6' (1.829 m)       Body mass index is 28.64 kg/m².        Clock:        Assessment & Plan       Diagnoses and health risks identified today and associated recommendations/orders:    1. Encounter for Medicare annual wellness exam  - Counseled on age appropriate medical preventative services including age appropriate cancer screenings, age appropriate eye and dental exams, over all nutritional health, need for a consistent exercise regimen, and an over all push towards maintaining a vigorous and active lifestyle. Counseled on age appropriate vaccines and discussed upcoming health care needs based on age/gender. Discussed good sleep hygiene and stress management.    2. Counseling regarding advance directives and goals of care  - I offered to discuss advanced care planning, including how to pick a person who would make decisions for you if you were unable to make them for yourself, called a health care power of , and what kind of decisions you might make such as use of life sustaining treatments such as ventilators and tube feeding when faced with a life limiting illness recorded on a living will that they will need to know. (How you want to be cared for as you near the end of your natural life)     X Patient is interested in learning more about how to make advanced directives.  I provided them paperwork and offered to discuss this with them.    3. Gastroesophageal reflux disease with esophagitis without hemorrhage  - omeprazole (PRILOSEC) 20 MG capsule; Take 1 capsule (20 mg total) by mouth every morning.  Dispense: 90 capsule; Refill:  3      --------------------------------------------    ** See Completed Assessments for Annual Wellness Visit within the encounter summary.**    The following assessments were completed:  Living Situation  CAGE  Depression Screening  Timed Get Up and Go  Whisper Test  Cognitive Function Screening  Nutrition Screening  ADL Screening  PAQ Screening      Provided Rizwan Demarco Jr.  with a 5-10 year written screening schedule and personal prevention plan. Recommendations were developed using the USPSTF age appropriate recommendations. Education, counseling, and referrals were provided as needed. After Visit Summary printed and given to patient which includes a list of additional screenings\tests needed.      Opioid documentation:      Patient does not have a current opioid prescription.          Health Maintenance:  Health Maintenance         Date Due Completion Date    Shingles Vaccine (1 of 2) 11/26/2025 (Originally 6/9/1998) ---    Influenza Vaccine (1) 09/01/2025 9/30/2024 (Declined)    Override on 9/30/2024: Declined    Override on 4/25/2023: Declined (Declined for season)    Override on 12/18/2019: Declined (Declines until next season)    Lipid Panel 11/27/2029 11/27/2024    TETANUS VACCINE 04/21/2030 4/21/2020            Health maintenance reviewed.      Follow Up:  No follow-ups on file.      - The patient is given an After Visit Summary that lists all medications with directions, allergies, education, orders placed during this encounter and follow-up instructions.      - I have reviewed the patient's medical information including past medical, family, and social history sections including the medications and allergies.      - We discussed the patient's current medications.     This note was created by combination of typed  and MModal dictation.  Transcription errors may be present.  If there are any questions, please contact me.       Sidra Arteaga PA-C  [facility]  Care               [1]   Social History  Socioeconomic History    Marital status:    Tobacco Use    Smoking status: Former     Current packs/day: 0.00     Average packs/day: 1 pack/day for 20.0 years (20.0 ttl pk-yrs)     Types: Cigarettes     Start date: 1971     Quit date: 1991     Years since quittin.6    Smokeless tobacco: Never    Tobacco comments:     Quit .   Substance and Sexual Activity    Alcohol use: No    Drug use: No    Sexual activity: Yes     Partners: Female   Social History Narrative    N/a per the patient.      Social Drivers of Health     Financial Resource Strain: Low Risk  (2025)    Overall Financial Resource Strain (CARDIA)     Difficulty of Paying Living Expenses: Not hard at all   Food Insecurity: No Food Insecurity (2025)    Hunger Vital Sign     Worried About Running Out of Food in the Last Year: Never true     Ran Out of Food in the Last Year: Never true   Transportation Needs: No Transportation Needs (2025)    PRAPARE - Transportation     Lack of Transportation (Medical): No     Lack of Transportation (Non-Medical): No   Physical Activity: Insufficiently Active (2025)    Exercise Vital Sign     Days of Exercise per Week: 2 days     Minutes of Exercise per Session: 50 min   Stress: No Stress Concern Present (2025)    [employer] - Occupational Stress Questionnaire     Feeling of Stress : Not at all   Housing Stability: Low Risk  (2025)    Housing Stability Vital Sign     Unable to Pay for Housing in the Last Year: No     Number of Times Moved in the Last Year: 0     Homeless in the Last Year: No   [2]   Current Outpatient Medications   Medication Sig Dispense Refill    alfuzosin (UROXATRAL) 10 mg Tb24 Take 1 tablet (10 mg total) by mouth daily with breakfast. 90 tablet 3    amLODIPine (NORVASC) 5 MG tablet Take 1 tablet (5 mg total) by mouth once daily.      apixaban (ELIQUIS) 5 mg Tab Take 1 tablet (5 mg total)  by mouth 2 (two) times daily. 60 tablet 11    atorvastatin (LIPITOR) 40 MG tablet Take 1 tablet (40 mg total) by mouth every evening.      ciclopirox (LOPROX) 0.77 % Crea Apply topically 2 (two) times daily. 90 g 2    cinnamon bark 500 mg capsule Take 500 mg by mouth once daily.      hydroCHLOROthiazide (MICROZIDE) 12.5 mg capsule Take 1 capsule (12.5 mg total) by mouth once daily. 90 capsule 1    losartan (COZAAR) 25 MG tablet Take 1 tablet (25 mg total) by mouth once daily. 90 tablet 2    multivitamin (THERAGRAN) per tablet Take by mouth.  Tablet Oral Every day      omega-3 fatty acids 1,250 mg Cap Take by mouth.      propylene glycoL 0.6 % Drop Apply to eye. Not currently using      sodium bicarb-sodium chloride Pack by sinus irrigation route.      artificial tears,hypromellose,,GENTEAL/SUSTANE, (SYSTANE GEL) 0.3 % Gel 1 drop as needed.      ascorbic acid, vitamin C, (VITAMIN C) 1000 MG tablet Take 1,000 mg by mouth once daily.      ciclopirox (PENLAC) 8 % Soln Apply topically nightly. 6.6 mL 11    omeprazole (PRILOSEC) 20 MG capsule Take 1 capsule (20 mg total) by mouth every morning. 90 capsule 3    valACYclovir (VALTREX) 1000 MG tablet Take 1 tablet (1,000 mg total) by mouth 2 (two) times daily. for 10 days 20 tablet 0     No current facility-administered medications for this visit.

## 2025-08-07 NOTE — PATIENT INSTRUCTIONS
Counseling and Referral of Other Preventative  (Italic type indicates deductible and co-insurance are waived)    Patient Name: Rizwan Demarco  Today's Date: 8/7/2025    Health Maintenance       Date Due Completion Date    Shingles Vaccine (1 of 2) 11/26/2025 (Originally 6/9/1998) ---    Influenza Vaccine (1) 09/01/2025 9/30/2024 (Declined)    Override on 9/30/2024: Declined    Override on 4/25/2023: Declined (Declined for season)    Override on 12/18/2019: Declined (Declines until next season)    Lipid Panel 11/27/2029 11/27/2024    TETANUS VACCINE 04/21/2030 4/21/2020        No orders of the defined types were placed in this encounter.      The following information is provided to all patients.  This information is to help you find resources for any of the problems found today that may be affecting your health:                  Living healthy guide: www.[facility].[place].[facility]      Understanding Diabetes: www.diabetes.org      Eating healthy: www.cdc.gov/healthyweight      CDC home safety checklist: www.cdc.gov/steadi/patient.html      Agency on Aging: www.goea.[place].[facility]      Alcoholics anonymous (AA): www.aa.org      Physical Activity: www.jimenez.nih.gov/kt7trbt      Tobacco use: www.quitwithusla.org

## (undated) DEVICE — SUT SILK 0 SH 30IN BLK BR

## (undated) DEVICE — CATH DXTERITY JL40 100CM 5FR

## (undated) DEVICE — KIT GLIDESHEATH SLEND 6FR 10CM

## (undated) DEVICE — SUT 2-0 VICRYL / SH (J417)

## (undated) DEVICE — PACK CATH LAB

## (undated) DEVICE — PAD DEFIB CADENCE ADULT R2

## (undated) DEVICE — CATH IMPULSE 5FR PIGTAIL 125CM

## (undated) DEVICE — CATH ULTRAVERSE  018 6X4X130

## (undated) DEVICE — KIT MANIFOLD LOW PRESS TUBING

## (undated) DEVICE — WIRE GUIDE SAFE-T-J .035 260CM

## (undated) DEVICE — KIT WATCHDOG HEMSTAS VALVE 8FR

## (undated) DEVICE — CABLE EXTENSION PACING 12 DISP

## (undated) DEVICE — SPONGE COTTON TRAY 4X4IN

## (undated) DEVICE — STAPLER SKIN ROTATING HEAD

## (undated) DEVICE — OMNIPAQUE CONTRAST 350MG/100ML

## (undated) DEVICE — CATH DXTERITY JL50 100CM 5FR

## (undated) DEVICE — CATH IMA INFINITI 4FRX100CM

## (undated) DEVICE — CATH DXTERITY JR40 100CM 5FR

## (undated) DEVICE — CATH LUTONIX DCB 018X130X6X300

## (undated) DEVICE — DRESSING TRANS 4X4 TEGADERM

## (undated) DEVICE — CATH EMPULSE ANGLED 5FR PIGTAI

## (undated) DEVICE — INTRODUCER PRELUDESNAP 6F 13CM

## (undated) DEVICE — CATH IMA DXT 5F 100CM

## (undated) DEVICE — GUIDEWIRE ADVNTG 035X260CM ANG

## (undated) DEVICE — GUIDEWIRE RUNTHROUGH EF 300CM

## (undated) DEVICE — KIT PROBE COVER WITH GEL

## (undated) DEVICE — CATH VISIONS PV RX IVUS .014P

## (undated) DEVICE — ANGIOTOUCH KIT

## (undated) DEVICE — CATH 5FR MP 125CM 5/BX

## (undated) DEVICE — CATH TURBO POWER 6F

## (undated) DEVICE — KIT INTRODUCER MICROPUNCTR 4F

## (undated) DEVICE — KIT HAND CONTROL HIGH PRESSUR

## (undated) DEVICE — SHEATH INTRO PINNACLE 6F 10CM

## (undated) DEVICE — PAD RADI FEMORAL

## (undated) DEVICE — PACK PM MEADOWCREST CUSTOM

## (undated) DEVICE — CATH ULTRAVERSE 018 5X150X150

## (undated) DEVICE — KIT SYR REUSABLE

## (undated) DEVICE — INTRODUCER SHEATH 5FR W/.035

## (undated) DEVICE — DRESSING TELFA N ADH 3X8

## (undated) DEVICE — CATH 5FR OMNIFLUSH 65CM .038

## (undated) DEVICE — SHEATH DESTINATION 6F X 45CM

## (undated) DEVICE — PRESTO INFLATION DEVICE

## (undated) DEVICE — SLING SWATHE UNIVERSAL FOAM

## (undated) DEVICE — DEVICE PERCLOSE SUT CLSR 6FR